# Patient Record
Sex: FEMALE | Race: WHITE | NOT HISPANIC OR LATINO | Employment: OTHER | URBAN - METROPOLITAN AREA
[De-identification: names, ages, dates, MRNs, and addresses within clinical notes are randomized per-mention and may not be internally consistent; named-entity substitution may affect disease eponyms.]

---

## 2017-01-12 ENCOUNTER — HOSPITAL ENCOUNTER (EMERGENCY)
Facility: HOSPITAL | Age: 67
Discharge: HOME | End: 2017-01-12
Admitting: EMERGENCY MEDICINE
Payer: MEDICARE

## 2017-01-12 VITALS
TEMPERATURE: 98.6 F | SYSTOLIC BLOOD PRESSURE: 199 MMHG | DIASTOLIC BLOOD PRESSURE: 88 MMHG | RESPIRATION RATE: 18 BRPM | HEART RATE: 80 BPM | OXYGEN SATURATION: 94 %

## 2017-01-12 DIAGNOSIS — R04.0 ANTERIOR EPISTAXIS: Primary | ICD-10-CM

## 2017-01-12 PROCEDURE — 99283 EMERGENCY DEPT VISIT LOW MDM: CPT

## 2017-01-12 RX ORDER — OXYMETAZOLINE HYDROCHLORIDE 0.05 G/100ML
2 SPRAY NASAL ONCE
Status: COMPLETED | OUTPATIENT
Start: 2017-01-12 | End: 2017-01-12

## 2017-01-12 RX ADMIN — OXYMETAZOLINE HYDROCHLORIDE 2 SPRAY: 5 SPRAY NASAL at 12:37

## 2017-01-12 RX ADMIN — SILVER NITRATE APPLICATORS 1 APPLICATOR: 25; 75 STICK TOPICAL at 12:38

## 2017-01-13 ENCOUNTER — HOSPITAL ENCOUNTER (EMERGENCY)
Facility: HOSPITAL | Age: 67
Discharge: HOME | End: 2017-01-13
Admitting: EMERGENCY MEDICINE
Payer: MEDICARE

## 2017-01-13 VITALS
BODY MASS INDEX: 37.11 KG/M2 | OXYGEN SATURATION: 95 % | TEMPERATURE: 97.7 F | WEIGHT: 223 LBS | SYSTOLIC BLOOD PRESSURE: 186 MMHG | RESPIRATION RATE: 20 BRPM | HEART RATE: 68 BPM | DIASTOLIC BLOOD PRESSURE: 80 MMHG

## 2017-01-13 DIAGNOSIS — R04.0 EPISTAXIS, RECURRENT: Primary | ICD-10-CM

## 2017-01-13 PROCEDURE — 99283 EMERGENCY DEPT VISIT LOW MDM: CPT

## 2017-01-13 RX ORDER — OXYMETAZOLINE HYDROCHLORIDE 0.05 G/100ML
2 SPRAY NASAL ONCE
Status: COMPLETED | OUTPATIENT
Start: 2017-01-13 | End: 2017-01-13

## 2017-01-13 RX ORDER — AMOXICILLIN AND CLAVULANATE POTASSIUM 875; 125 MG/1; MG/1
1 TABLET, FILM COATED ORAL ONCE
Status: COMPLETED | OUTPATIENT
Start: 2017-01-13 | End: 2017-01-13

## 2017-01-13 RX ORDER — OXYCODONE HYDROCHLORIDE AND ACETAMINOPHEN 5; 325 MG/1; MG/1
1 TABLET ORAL ONCE
Status: COMPLETED | OUTPATIENT
Start: 2017-01-13 | End: 2017-01-13

## 2017-01-13 RX ORDER — AMOXICILLIN AND CLAVULANATE POTASSIUM 875; 125 MG/1; MG/1
1 TABLET, FILM COATED ORAL 2 TIMES DAILY
Qty: 19 TABLET | Refills: 0 | Status: SHIPPED | OUTPATIENT
Start: 2017-01-13 | End: 2017-01-23

## 2017-01-13 RX ADMIN — OXYMETAZOLINE HYDROCHLORIDE 2 SPRAY: 5 SPRAY NASAL at 16:26

## 2017-01-13 RX ADMIN — OXYCODONE HYDROCHLORIDE AND ACETAMINOPHEN 1 TABLET: 5; 325 TABLET ORAL at 17:20

## 2017-01-13 RX ADMIN — AMOXICILLIN AND CLAVULANATE POTASSIUM 1 TABLET: 875; 125 TABLET, FILM COATED ORAL at 17:20

## 2017-01-15 ENCOUNTER — HOSPITAL ENCOUNTER (EMERGENCY)
Facility: HOSPITAL | Age: 67
Discharge: HOME | End: 2017-01-15
Attending: EMERGENCY MEDICINE | Admitting: EMERGENCY MEDICINE
Payer: MEDICARE

## 2017-01-15 VITALS
OXYGEN SATURATION: 94 % | SYSTOLIC BLOOD PRESSURE: 165 MMHG | HEART RATE: 90 BPM | RESPIRATION RATE: 24 BRPM | TEMPERATURE: 98.2 F | DIASTOLIC BLOOD PRESSURE: 72 MMHG

## 2017-01-15 DIAGNOSIS — Z48.00 ENCOUNTER FOR REMOVAL OF NASAL PACKING: Primary | ICD-10-CM

## 2017-01-15 PROCEDURE — 99283 EMERGENCY DEPT VISIT LOW MDM: CPT

## 2017-01-16 ENCOUNTER — TRANSCRIBE ORDERS (OUTPATIENT)
Dept: ADMINISTRATIVE | Facility: HOSPITAL | Age: 67
End: 2017-01-16

## 2017-01-16 DIAGNOSIS — R91.1 LUNG NODULE: Primary | ICD-10-CM

## 2017-01-19 ENCOUNTER — HOSPITAL ENCOUNTER (OUTPATIENT)
Dept: RADIOLOGY | Facility: HOSPITAL | Age: 67
Discharge: HOME | End: 2017-01-19
Attending: OTOLARYNGOLOGY
Payer: MEDICARE

## 2017-01-19 DIAGNOSIS — R91.1 LUNG NODULE: ICD-10-CM

## 2017-01-19 PROCEDURE — 71250 CT THORAX DX C-: CPT

## 2017-01-23 ENCOUNTER — HOSPITAL ENCOUNTER (EMERGENCY)
Facility: HOSPITAL | Age: 67
Discharge: HOME | End: 2017-01-23
Attending: EMERGENCY MEDICINE | Admitting: EMERGENCY MEDICINE
Payer: MEDICARE

## 2017-01-23 VITALS
SYSTOLIC BLOOD PRESSURE: 210 MMHG | DIASTOLIC BLOOD PRESSURE: 90 MMHG | HEART RATE: 92 BPM | RESPIRATION RATE: 16 BRPM | TEMPERATURE: 98.5 F | OXYGEN SATURATION: 98 %

## 2017-01-23 DIAGNOSIS — R04.0 EPISTAXIS: Primary | ICD-10-CM

## 2017-01-23 PROCEDURE — 99283 EMERGENCY DEPT VISIT LOW MDM: CPT

## 2017-02-16 ENCOUNTER — FOLLOW UP (OUTPATIENT)
Dept: URBAN - METROPOLITAN AREA CLINIC 27 | Facility: CLINIC | Age: 67
End: 2017-02-16

## 2017-02-16 DIAGNOSIS — E11.3413: ICD-10-CM

## 2017-02-16 PROCEDURE — 67028 INJECTION EYE DRUG: CPT

## 2017-02-16 PROCEDURE — 2024F 7 FLD RTA PHOTO EVC RTNOPTHY: CPT

## 2017-02-16 PROCEDURE — G8482 FLU IMMUNIZE ORDER/ADMIN: HCPCS

## 2017-02-16 PROCEDURE — 92012 INTRM OPH EXAM EST PATIENT: CPT | Mod: 25

## 2017-02-16 PROCEDURE — G8427 DOCREV CUR MEDS BY ELIG CLIN: HCPCS

## 2017-02-16 PROCEDURE — 4040F PNEUMOC VAC/ADMIN/RCVD: CPT

## 2017-02-16 PROCEDURE — 2021F DILAT MACULAR EXAM DONE: CPT

## 2017-02-16 PROCEDURE — G8397 DIL MACULA/FUNDUS EXAM/W DOC: HCPCS

## 2017-02-16 PROCEDURE — 2022F DILAT RTA XM EVC RTNOPTHY: CPT

## 2017-02-16 PROCEDURE — 92134 CPTRZ OPH DX IMG PST SGM RTA: CPT

## 2017-02-16 PROCEDURE — 2026F EYE IMG VALID EVC RTNOPTHY: CPT

## 2017-02-16 PROCEDURE — 5010F MACUL RESULT PHY/QHP MNG DM: CPT

## 2017-02-16 PROCEDURE — 1036F TOBACCO NON-USER: CPT

## 2017-02-16 ASSESSMENT — VISUAL ACUITY
OS_CC: 20/200+1
OD_CC: 20/30-2

## 2017-02-16 ASSESSMENT — TONOMETRY
OD_IOP_MMHG: 21
OS_IOP_MMHG: 20

## 2017-03-06 ENCOUNTER — APPOINTMENT (OUTPATIENT)
Dept: LAB | Facility: HOSPITAL | Age: 67
End: 2017-03-06
Attending: INTERNAL MEDICINE
Payer: MEDICARE

## 2017-03-06 ENCOUNTER — TRANSCRIBE ORDERS (OUTPATIENT)
Dept: ADMINISTRATIVE | Facility: HOSPITAL | Age: 67
End: 2017-03-06

## 2017-03-06 DIAGNOSIS — D64.9 ANEMIA, UNSPECIFIED: Primary | ICD-10-CM

## 2017-03-06 DIAGNOSIS — D64.9 ANEMIA, UNSPECIFIED: ICD-10-CM

## 2017-03-06 LAB
BASOPHILS # BLD AUTO: 0 THOUSANDS/ΜL (ref 0–0.1)
BASOPHILS NFR BLD AUTO: 1 % (ref 0–1)
EOSINOPHIL # BLD AUTO: 0.2 THOUSAND/ΜL (ref 0–0.61)
EOSINOPHIL NFR BLD AUTO: 2 % (ref 0–6)
ERYTHROCYTE [DISTWIDTH] IN BLOOD BY AUTOMATED COUNT: 16.1 % (ref 11.6–15.1)
FERRITIN SERPL-MCNC: 106 NG/ML (ref 8–388)
HCT VFR BLD AUTO: 39.5 % (ref 37–47)
HGB BLD-MCNC: 13.1 G/DL (ref 12–16)
IRON SATN MFR SERPL: 21 %
IRON SERPL-MCNC: 70 UG/DL (ref 50–170)
LYMPHOCYTES # BLD AUTO: 1.4 THOUSANDS/ΜL (ref 0.6–4.47)
LYMPHOCYTES NFR BLD AUTO: 15 % (ref 14–44)
MCH RBC QN AUTO: 28.2 PG (ref 27–31)
MCHC RBC AUTO-ENTMCNC: 33.1 G/DL (ref 31.4–37.4)
MCV RBC AUTO: 85 FL (ref 82–98)
MONOCYTES # BLD AUTO: 0.8 THOUSAND/ΜL (ref 0.17–1.22)
MONOCYTES NFR BLD AUTO: 9 % (ref 4–12)
NEUTROPHILS # BLD AUTO: 6.4 THOUSANDS/ΜL (ref 1.85–7.62)
NEUTS SEG NFR BLD AUTO: 73 % (ref 43–75)
NRBC BLD AUTO-RTO: 0 /100 WBCS
PLATELET # BLD AUTO: 200 THOUSANDS/UL (ref 130–400)
PMV BLD AUTO: 8.8 FL (ref 8.9–12.7)
RBC # BLD AUTO: 4.64 MILLION/UL (ref 4.2–5.4)
TIBC SERPL-MCNC: 326 UG/DL (ref 250–450)
WBC # BLD AUTO: 8.9 THOUSAND/UL (ref 4.8–10.8)

## 2017-03-06 PROCEDURE — 83540 ASSAY OF IRON: CPT

## 2017-03-06 PROCEDURE — 85025 COMPLETE CBC W/AUTO DIFF WBC: CPT | Performed by: INTERNAL MEDICINE

## 2017-03-06 PROCEDURE — 36415 COLL VENOUS BLD VENIPUNCTURE: CPT | Performed by: INTERNAL MEDICINE

## 2017-03-06 PROCEDURE — 82728 ASSAY OF FERRITIN: CPT

## 2017-03-06 PROCEDURE — 83550 IRON BINDING TEST: CPT

## 2017-03-09 ENCOUNTER — PROCEDURE ONLY (OUTPATIENT)
Dept: URBAN - METROPOLITAN AREA CLINIC 27 | Facility: CLINIC | Age: 67
End: 2017-03-09

## 2017-03-09 DIAGNOSIS — E11.3413: ICD-10-CM

## 2017-03-09 PROCEDURE — 67028 INJECTION EYE DRUG: CPT

## 2017-03-09 PROCEDURE — 03MG LUCENTIS 0.3MG

## 2017-03-09 ASSESSMENT — VISUAL ACUITY: OS_CC: 20/200

## 2017-03-09 ASSESSMENT — TONOMETRY: OS_IOP_MMHG: 19

## 2017-03-16 ENCOUNTER — ALLSCRIPTS OFFICE VISIT (OUTPATIENT)
Dept: OTHER | Facility: OTHER | Age: 67
End: 2017-03-16

## 2017-03-16 DIAGNOSIS — I25.10 ATHEROSCLEROTIC HEART DISEASE OF NATIVE CORONARY ARTERY WITHOUT ANGINA PECTORIS: ICD-10-CM

## 2017-03-16 DIAGNOSIS — E03.9 HYPOTHYROIDISM: ICD-10-CM

## 2017-03-22 DIAGNOSIS — D64.9 ANEMIA: ICD-10-CM

## 2017-03-23 ENCOUNTER — HOSPITAL ENCOUNTER (OUTPATIENT)
Dept: RADIOLOGY | Facility: HOSPITAL | Age: 67
Discharge: HOME | End: 2017-03-23
Attending: INTERNAL MEDICINE
Payer: MEDICARE

## 2017-03-23 ENCOUNTER — HOSPITAL ENCOUNTER (OUTPATIENT)
Dept: NON INVASIVE DIAGNOSTICS | Facility: HOSPITAL | Age: 67
Discharge: HOME | End: 2017-03-23
Attending: INTERNAL MEDICINE
Payer: MEDICARE

## 2017-03-23 DIAGNOSIS — I25.10 ATHEROSCLEROTIC HEART DISEASE OF NATIVE CORONARY ARTERY WITHOUT ANGINA PECTORIS: ICD-10-CM

## 2017-03-23 LAB
MAX DIASTOLIC BP: 83 MMHG
MAX HEART RATE: 80 BPM
MAX PREDICTED HEART RATE: 152 BPM
MAX. SYSTOLIC BP: 183 MMHG
PROTOCOL NAME: NORMAL
TIME IN EXERCISE PHASE: 60 S

## 2017-03-23 PROCEDURE — 93017 CV STRESS TEST TRACING ONLY: CPT

## 2017-03-23 PROCEDURE — 78452 HT MUSCLE IMAGE SPECT MULT: CPT

## 2017-03-23 PROCEDURE — A9502 TC99M TETROFOSMIN: HCPCS

## 2017-03-23 RX ADMIN — REGADENOSON 0.4 MG: 0.08 INJECTION, SOLUTION INTRAVENOUS at 10:14

## 2017-03-27 ENCOUNTER — TRANSCRIBE ORDERS (OUTPATIENT)
Dept: ADMINISTRATIVE | Facility: HOSPITAL | Age: 67
End: 2017-03-27

## 2017-03-27 ENCOUNTER — APPOINTMENT (OUTPATIENT)
Dept: LAB | Facility: HOSPITAL | Age: 67
End: 2017-03-27
Attending: INTERNAL MEDICINE
Payer: MEDICARE

## 2017-03-27 ENCOUNTER — GENERIC CONVERSION - ENCOUNTER (OUTPATIENT)
Dept: OTHER | Facility: OTHER | Age: 67
End: 2017-03-27

## 2017-03-27 DIAGNOSIS — I25.10 ATHEROSCLEROTIC HEART DISEASE OF NATIVE CORONARY ARTERY WITHOUT ANGINA PECTORIS: ICD-10-CM

## 2017-03-27 DIAGNOSIS — E03.9 HYPOTHYROIDISM: ICD-10-CM

## 2017-03-27 LAB
ALBUMIN SERPL BCP-MCNC: 3.2 G/DL (ref 3.5–5)
ALP SERPL-CCNC: 126 U/L (ref 46–116)
ALT SERPL W P-5'-P-CCNC: 19 U/L (ref 12–78)
ANION GAP SERPL CALCULATED.3IONS-SCNC: 6 MMOL/L (ref 4–13)
AST SERPL W P-5'-P-CCNC: 17 U/L (ref 5–45)
BILIRUB SERPL-MCNC: 0.4 MG/DL (ref 0.2–1)
BUN SERPL-MCNC: 55 MG/DL (ref 5–25)
CALCIUM SERPL-MCNC: 9.5 MG/DL (ref 8.3–10.1)
CHLORIDE SERPL-SCNC: 101 MMOL/L (ref 100–108)
CHOLEST SERPL-MCNC: 130 MG/DL (ref 50–200)
CO2 SERPL-SCNC: 31 MMOL/L (ref 21–32)
CREAT SERPL-MCNC: 1.35 MG/DL (ref 0.6–1.3)
GFR SERPL CREATININE-BSD FRML MDRD: 39 ML/MIN/1.73SQ M
GLUCOSE P FAST SERPL-MCNC: 126 MG/DL (ref 65–99)
HDLC SERPL-MCNC: 51 MG/DL (ref 40–60)
LDLC SERPL CALC-MCNC: 59 MG/DL (ref 0–100)
POTASSIUM SERPL-SCNC: 4.3 MMOL/L (ref 3.5–5.3)
PROT SERPL-MCNC: 8.2 G/DL (ref 6.4–8.2)
SODIUM SERPL-SCNC: 138 MMOL/L (ref 136–145)
TRIGL SERPL-MCNC: 100 MG/DL
TSH SERPL DL<=0.05 MIU/L-ACNC: 4.26 UIU/ML (ref 0.36–3.74)

## 2017-03-27 PROCEDURE — 36415 COLL VENOUS BLD VENIPUNCTURE: CPT

## 2017-03-27 PROCEDURE — 80053 COMPREHEN METABOLIC PANEL: CPT

## 2017-03-27 PROCEDURE — 80061 LIPID PANEL: CPT

## 2017-03-27 PROCEDURE — 84443 ASSAY THYROID STIM HORMONE: CPT

## 2017-03-28 ENCOUNTER — GENERIC CONVERSION - ENCOUNTER (OUTPATIENT)
Dept: OTHER | Facility: OTHER | Age: 67
End: 2017-03-28

## 2017-04-27 ENCOUNTER — FOLLOW UP (OUTPATIENT)
Dept: URBAN - METROPOLITAN AREA CLINIC 27 | Facility: CLINIC | Age: 67
End: 2017-04-27

## 2017-04-27 DIAGNOSIS — E11.3413: ICD-10-CM

## 2017-04-27 PROCEDURE — 2022F DILAT RTA XM EVC RTNOPTHY: CPT

## 2017-04-27 PROCEDURE — G8427 DOCREV CUR MEDS BY ELIG CLIN: HCPCS

## 2017-04-27 PROCEDURE — 2021F DILAT MACULAR EXAM DONE: CPT

## 2017-04-27 PROCEDURE — 1036F TOBACCO NON-USER: CPT

## 2017-04-27 PROCEDURE — 92134 CPTRZ OPH DX IMG PST SGM RTA: CPT

## 2017-04-27 PROCEDURE — 4040F PNEUMOC VAC/ADMIN/RCVD: CPT

## 2017-04-27 PROCEDURE — 67028 INJECTION EYE DRUG: CPT

## 2017-04-27 PROCEDURE — G8482 FLU IMMUNIZE ORDER/ADMIN: HCPCS

## 2017-04-27 PROCEDURE — 5010F MACUL RESULT PHY/QHP MNG DM: CPT

## 2017-04-27 PROCEDURE — 03MG LUCENTIS 0.3MG

## 2017-04-27 PROCEDURE — 92014 COMPRE OPH EXAM EST PT 1/>: CPT | Mod: 25

## 2017-04-27 PROCEDURE — 2024F 7 FLD RTA PHOTO EVC RTNOPTHY: CPT

## 2017-04-27 PROCEDURE — G8397 DIL MACULA/FUNDUS EXAM/W DOC: HCPCS

## 2017-04-27 PROCEDURE — 2026F EYE IMG VALID EVC RTNOPTHY: CPT

## 2017-04-27 ASSESSMENT — VISUAL ACUITY
OS_CC: 20/200
OD_CC: 20/30-

## 2017-04-27 ASSESSMENT — TONOMETRY
OD_IOP_MMHG: 21
OS_IOP_MMHG: 20

## 2017-06-15 ENCOUNTER — FOLLOW UP (OUTPATIENT)
Dept: URBAN - METROPOLITAN AREA CLINIC 27 | Facility: CLINIC | Age: 67
End: 2017-06-15

## 2017-06-15 DIAGNOSIS — E11.3413: ICD-10-CM

## 2017-06-15 PROCEDURE — G8397 DIL MACULA/FUNDUS EXAM/W DOC: HCPCS

## 2017-06-15 PROCEDURE — 1036F TOBACCO NON-USER: CPT

## 2017-06-15 PROCEDURE — 5010F MACUL RESULT PHY/QHP MNG DM: CPT

## 2017-06-15 PROCEDURE — 2021F DILAT MACULAR EXAM DONE: CPT

## 2017-06-15 PROCEDURE — 03MG LUCENTIS 0.3MG

## 2017-06-15 PROCEDURE — 92012 INTRM OPH EXAM EST PATIENT: CPT | Mod: 25

## 2017-06-15 PROCEDURE — 2022F DILAT RTA XM EVC RTNOPTHY: CPT

## 2017-06-15 PROCEDURE — 67028 INJECTION EYE DRUG: CPT

## 2017-06-15 PROCEDURE — 2024F 7 FLD RTA PHOTO EVC RTNOPTHY: CPT

## 2017-06-15 PROCEDURE — 4040F PNEUMOC VAC/ADMIN/RCVD: CPT

## 2017-06-15 PROCEDURE — 2026F EYE IMG VALID EVC RTNOPTHY: CPT

## 2017-06-15 PROCEDURE — G8482 FLU IMMUNIZE ORDER/ADMIN: HCPCS

## 2017-06-15 PROCEDURE — G8427 DOCREV CUR MEDS BY ELIG CLIN: HCPCS

## 2017-06-15 PROCEDURE — 92134 CPTRZ OPH DX IMG PST SGM RTA: CPT

## 2017-06-15 ASSESSMENT — VISUAL ACUITY
OD_CC: 20/40-2
OS_CC: 20/200

## 2017-06-15 ASSESSMENT — TONOMETRY
OS_IOP_MMHG: 20
OD_IOP_MMHG: 20

## 2017-06-16 ENCOUNTER — TRANSCRIBE ORDERS (OUTPATIENT)
Dept: ADMINISTRATIVE | Facility: HOSPITAL | Age: 67
End: 2017-06-16

## 2017-06-16 ENCOUNTER — APPOINTMENT (OUTPATIENT)
Dept: LAB | Facility: HOSPITAL | Age: 67
End: 2017-06-16
Attending: INTERNAL MEDICINE
Payer: MEDICARE

## 2017-06-16 DIAGNOSIS — D64.9 ANEMIA: ICD-10-CM

## 2017-06-16 DIAGNOSIS — D64.9 ANEMIA, UNSPECIFIED: ICD-10-CM

## 2017-06-16 DIAGNOSIS — D64.9 ANEMIA, UNSPECIFIED: Primary | ICD-10-CM

## 2017-06-16 LAB
BASOPHILS # BLD AUTO: 0 THOUSANDS/ΜL (ref 0–0.1)
BASOPHILS NFR BLD AUTO: 1 % (ref 0–1)
EOSINOPHIL # BLD AUTO: 0.3 THOUSAND/ΜL (ref 0–0.61)
EOSINOPHIL NFR BLD AUTO: 4 % (ref 0–6)
ERYTHROCYTE [DISTWIDTH] IN BLOOD BY AUTOMATED COUNT: 14.8 % (ref 11.6–15.1)
FERRITIN SERPL-MCNC: 79 NG/ML (ref 8–388)
HCT VFR BLD AUTO: 37.4 % (ref 37–47)
HGB BLD-MCNC: 12.5 G/DL (ref 12–16)
IRON SATN MFR SERPL: 21 %
IRON SERPL-MCNC: 64 UG/DL (ref 50–170)
LYMPHOCYTES # BLD AUTO: 1.3 THOUSANDS/ΜL (ref 0.6–4.47)
LYMPHOCYTES NFR BLD AUTO: 16 % (ref 14–44)
MCH RBC QN AUTO: 29.4 PG (ref 27–31)
MCHC RBC AUTO-ENTMCNC: 33.3 G/DL (ref 31.4–37.4)
MCV RBC AUTO: 88 FL (ref 82–98)
MONOCYTES # BLD AUTO: 0.8 THOUSAND/ΜL (ref 0.17–1.22)
MONOCYTES NFR BLD AUTO: 10 % (ref 4–12)
NEUTROPHILS # BLD AUTO: 5.7 THOUSANDS/ΜL (ref 1.85–7.62)
NEUTS SEG NFR BLD AUTO: 70 % (ref 43–75)
NRBC BLD AUTO-RTO: 0 /100 WBCS
PLATELET # BLD AUTO: 178 THOUSANDS/UL (ref 130–400)
PMV BLD AUTO: 8.5 FL (ref 8.9–12.7)
RBC # BLD AUTO: 4.24 MILLION/UL (ref 4.2–5.4)
TIBC SERPL-MCNC: 304 UG/DL (ref 250–450)
WBC # BLD AUTO: 8.2 THOUSAND/UL (ref 4.8–10.8)

## 2017-06-16 PROCEDURE — 83540 ASSAY OF IRON: CPT

## 2017-06-16 PROCEDURE — 82728 ASSAY OF FERRITIN: CPT

## 2017-06-16 PROCEDURE — 83550 IRON BINDING TEST: CPT

## 2017-06-16 PROCEDURE — 85025 COMPLETE CBC W/AUTO DIFF WBC: CPT

## 2017-06-22 DIAGNOSIS — D64.9 ANEMIA: ICD-10-CM

## 2017-06-26 ENCOUNTER — ALLSCRIPTS OFFICE VISIT (OUTPATIENT)
Dept: OTHER | Facility: OTHER | Age: 67
End: 2017-06-26

## 2017-07-01 ENCOUNTER — TRANSCRIBE ORDERS (OUTPATIENT)
Dept: ADMINISTRATIVE | Facility: HOSPITAL | Age: 67
End: 2017-07-01

## 2017-07-01 ENCOUNTER — APPOINTMENT (OUTPATIENT)
Dept: LAB | Facility: HOSPITAL | Age: 67
End: 2017-07-01
Payer: MEDICARE

## 2017-07-01 DIAGNOSIS — E78.5 OTHER AND UNSPECIFIED HYPERLIPIDEMIA: ICD-10-CM

## 2017-07-01 DIAGNOSIS — I10 ESSENTIAL HYPERTENSION, MALIGNANT: ICD-10-CM

## 2017-07-01 DIAGNOSIS — E11.65 UNCONTROLLED TYPE 2 DIABETES MELLITUS WITH COMPLICATION, UNSPECIFIED LONG TERM INSULIN USE STATUS: ICD-10-CM

## 2017-07-01 DIAGNOSIS — E11.3313 TYPE 2 DIABETES MELLITUS WITH MODERATE NONPROLIFERATIVE RETINOPATHY OF BOTH EYES AND MACULAR EDEMA, UNSPECIFIED LONG TERM INSULIN USE STATUS: ICD-10-CM

## 2017-07-01 DIAGNOSIS — R80.9 PROTEINURIA, UNSPECIFIED TYPE: ICD-10-CM

## 2017-07-01 DIAGNOSIS — E13.42 DIABETIC POLYNEUROPATHY ASSOCIATED WITH OTHER SPECIFIED DIABETES MELLITUS (HCC): ICD-10-CM

## 2017-07-01 DIAGNOSIS — I73.9 PERIPHERAL VASCULAR DISEASE, UNSPECIFIED (HCC): ICD-10-CM

## 2017-07-01 DIAGNOSIS — E11.65 UNCONTROLLED TYPE 2 DIABETES MELLITUS WITH COMPLICATION, UNSPECIFIED LONG TERM INSULIN USE STATUS: Primary | ICD-10-CM

## 2017-07-01 DIAGNOSIS — I25.119 ATHEROSCLEROSIS OF NATIVE CORONARY ARTERY WITH ANGINA PECTORIS, UNSPECIFIED WHETHER NATIVE OR TRANSPLANTED HEART (HCC): ICD-10-CM

## 2017-07-01 DIAGNOSIS — E11.8 UNCONTROLLED TYPE 2 DIABETES MELLITUS WITH COMPLICATION, UNSPECIFIED LONG TERM INSULIN USE STATUS: ICD-10-CM

## 2017-07-01 DIAGNOSIS — E11.8 UNCONTROLLED TYPE 2 DIABETES MELLITUS WITH COMPLICATION, UNSPECIFIED LONG TERM INSULIN USE STATUS: Primary | ICD-10-CM

## 2017-07-01 LAB
ALBUMIN SERPL BCP-MCNC: 3.3 G/DL (ref 3.5–5)
ALP SERPL-CCNC: 131 U/L (ref 46–116)
ALT SERPL W P-5'-P-CCNC: 18 U/L (ref 12–78)
ANION GAP SERPL CALCULATED.3IONS-SCNC: 7 MMOL/L (ref 4–13)
AST SERPL W P-5'-P-CCNC: 15 U/L (ref 5–45)
BACTERIA UR QL AUTO: ABNORMAL /HPF
BILIRUB SERPL-MCNC: 0.4 MG/DL (ref 0.2–1)
BILIRUB UR QL STRIP: NEGATIVE
BUN SERPL-MCNC: 63 MG/DL (ref 5–25)
CALCIUM SERPL-MCNC: 9.1 MG/DL (ref 8.3–10.1)
CHLORIDE SERPL-SCNC: 102 MMOL/L (ref 100–108)
CHOLEST SERPL-MCNC: 140 MG/DL (ref 50–200)
CLARITY UR: CLEAR
CO2 SERPL-SCNC: 29 MMOL/L (ref 21–32)
COLOR UR: YELLOW
CREAT SERPL-MCNC: 1.28 MG/DL (ref 0.6–1.3)
CREAT UR-MCNC: 29.9 MG/DL
EST. AVERAGE GLUCOSE BLD GHB EST-MCNC: 163 MG/DL
GFR SERPL CREATININE-BSD FRML MDRD: 41.5 ML/MIN/1.73SQ M
GLUCOSE P FAST SERPL-MCNC: 112 MG/DL (ref 65–99)
GLUCOSE UR STRIP-MCNC: NEGATIVE MG/DL
HBA1C MFR BLD: 7.3 % (ref 4.2–6.3)
HDLC SERPL-MCNC: 54 MG/DL (ref 40–60)
HGB UR QL STRIP.AUTO: ABNORMAL
KETONES UR STRIP-MCNC: NEGATIVE MG/DL
LDLC SERPL CALC-MCNC: 71 MG/DL (ref 0–100)
LEUKOCYTE ESTERASE UR QL STRIP: ABNORMAL
MICROALBUMIN UR-MCNC: 24.1 MG/L (ref 0–20)
MICROALBUMIN/CREAT 24H UR: 81 MG/G CREATININE (ref 0–30)
NITRITE UR QL STRIP: NEGATIVE
NON-SQ EPI CELLS URNS QL MICRO: ABNORMAL /HPF
PH UR STRIP.AUTO: 5 [PH] (ref 5–9)
POTASSIUM SERPL-SCNC: 4.2 MMOL/L (ref 3.5–5.3)
PROT SERPL-MCNC: 8.4 G/DL (ref 6.4–8.2)
PROT UR STRIP-MCNC: NEGATIVE MG/DL
RBC #/AREA URNS AUTO: ABNORMAL /HPF
SODIUM SERPL-SCNC: 138 MMOL/L (ref 136–145)
SP GR UR STRIP.AUTO: 1.01 (ref 1–1.03)
T4 FREE SERPL-MCNC: 1.12 NG/DL (ref 0.76–1.46)
TRIGL SERPL-MCNC: 75 MG/DL
TSH SERPL DL<=0.05 MIU/L-ACNC: 4.59 UIU/ML (ref 0.36–3.74)
UROBILINOGEN UR QL STRIP.AUTO: 0.2 E.U./DL
WBC #/AREA URNS AUTO: ABNORMAL /HPF

## 2017-07-01 PROCEDURE — 83036 HEMOGLOBIN GLYCOSYLATED A1C: CPT | Performed by: INTERNAL MEDICINE

## 2017-07-01 PROCEDURE — 82570 ASSAY OF URINE CREATININE: CPT | Performed by: INTERNAL MEDICINE

## 2017-07-01 PROCEDURE — 36415 COLL VENOUS BLD VENIPUNCTURE: CPT | Performed by: INTERNAL MEDICINE

## 2017-07-01 PROCEDURE — 80053 COMPREHEN METABOLIC PANEL: CPT

## 2017-07-01 PROCEDURE — 82043 UR ALBUMIN QUANTITATIVE: CPT | Performed by: INTERNAL MEDICINE

## 2017-07-01 PROCEDURE — 81001 URINALYSIS AUTO W/SCOPE: CPT | Performed by: INTERNAL MEDICINE

## 2017-07-01 PROCEDURE — 84439 ASSAY OF FREE THYROXINE: CPT

## 2017-07-01 PROCEDURE — 84443 ASSAY THYROID STIM HORMONE: CPT

## 2017-07-01 PROCEDURE — 80061 LIPID PANEL: CPT

## 2017-08-31 ENCOUNTER — FOLLOW UP (OUTPATIENT)
Dept: URBAN - METROPOLITAN AREA CLINIC 27 | Facility: CLINIC | Age: 67
End: 2017-08-31

## 2017-08-31 DIAGNOSIS — E11.3413: ICD-10-CM

## 2017-08-31 DIAGNOSIS — H40.053: ICD-10-CM

## 2017-08-31 PROCEDURE — G8482 FLU IMMUNIZE ORDER/ADMIN: HCPCS

## 2017-08-31 PROCEDURE — G8397 DIL MACULA/FUNDUS EXAM/W DOC: HCPCS

## 2017-08-31 PROCEDURE — 2024F 7 FLD RTA PHOTO EVC RTNOPTHY: CPT

## 2017-08-31 PROCEDURE — 5010F MACUL RESULT PHY/QHP MNG DM: CPT

## 2017-08-31 PROCEDURE — 92014 COMPRE OPH EXAM EST PT 1/>: CPT | Mod: 25

## 2017-08-31 PROCEDURE — 67028 INJECTION EYE DRUG: CPT

## 2017-08-31 PROCEDURE — 4040F PNEUMOC VAC/ADMIN/RCVD: CPT

## 2017-08-31 PROCEDURE — 2022F DILAT RTA XM EVC RTNOPTHY: CPT

## 2017-08-31 PROCEDURE — 1036F TOBACCO NON-USER: CPT

## 2017-08-31 PROCEDURE — G8428 CUR MEDS NOT DOCUMENT: HCPCS

## 2017-08-31 PROCEDURE — 92134 CPTRZ OPH DX IMG PST SGM RTA: CPT

## 2017-08-31 PROCEDURE — 2026F EYE IMG VALID EVC RTNOPTHY: CPT

## 2017-08-31 PROCEDURE — 03VIAL LUCENTIS 03MG VIAL

## 2017-08-31 PROCEDURE — 2021F DILAT MACULAR EXAM DONE: CPT

## 2017-08-31 ASSESSMENT — VISUAL ACUITY
OD_CC: 20/30+
OS_CC: 20/200

## 2017-08-31 ASSESSMENT — TONOMETRY
OS_IOP_MMHG: 24
OD_IOP_MMHG: 30

## 2017-09-18 ENCOUNTER — ALLSCRIPTS OFFICE VISIT (OUTPATIENT)
Dept: OTHER | Facility: OTHER | Age: 67
End: 2017-09-18

## 2017-09-18 DIAGNOSIS — N18.30 CHRONIC KIDNEY DISEASE, STAGE III (MODERATE) (HCC): ICD-10-CM

## 2017-10-15 ENCOUNTER — HOSPITAL ENCOUNTER (EMERGENCY)
Facility: HOSPITAL | Age: 67
Discharge: HOME | End: 2017-10-15
Attending: EMERGENCY MEDICINE | Admitting: EMERGENCY MEDICINE
Payer: MEDICARE

## 2017-10-15 VITALS
RESPIRATION RATE: 20 BRPM | BODY MASS INDEX: 38.46 KG/M2 | DIASTOLIC BLOOD PRESSURE: 58 MMHG | SYSTOLIC BLOOD PRESSURE: 127 MMHG | HEART RATE: 63 BPM | WEIGHT: 231.13 LBS | OXYGEN SATURATION: 94 % | TEMPERATURE: 98.1 F

## 2017-10-15 DIAGNOSIS — L03.90 CELLULITIS: Primary | ICD-10-CM

## 2017-10-15 LAB
ALBUMIN SERPL BCP-MCNC: 3.6 G/DL (ref 3.5–5)
ALP SERPL-CCNC: 126 U/L (ref 46–116)
ALT SERPL W P-5'-P-CCNC: 21 U/L (ref 12–78)
ANION GAP SERPL CALCULATED.3IONS-SCNC: 7 MMOL/L (ref 4–13)
AST SERPL W P-5'-P-CCNC: 21 U/L (ref 5–45)
BASOPHILS # BLD AUTO: 0 THOUSANDS/ΜL (ref 0–0.1)
BASOPHILS NFR BLD AUTO: 0 % (ref 0–1)
BILIRUB SERPL-MCNC: 0.6 MG/DL (ref 0.2–1)
BUN SERPL-MCNC: 56 MG/DL (ref 5–25)
CALCIUM SERPL-MCNC: 9.5 MG/DL (ref 8.3–10.1)
CHLORIDE SERPL-SCNC: 103 MMOL/L (ref 100–108)
CO2 SERPL-SCNC: 30 MMOL/L (ref 21–32)
CREAT SERPL-MCNC: 1.38 MG/DL (ref 0.6–1.3)
EOSINOPHIL # BLD AUTO: 0.2 THOUSAND/ΜL (ref 0–0.61)
EOSINOPHIL NFR BLD AUTO: 2 % (ref 0–6)
ERYTHROCYTE [DISTWIDTH] IN BLOOD BY AUTOMATED COUNT: 16 % (ref 11.6–15.1)
GFR SERPL CREATININE-BSD FRML MDRD: 39 ML/MIN/1.73SQ M
GLUCOSE SERPL-MCNC: 138 MG/DL (ref 65–140)
HCT VFR BLD AUTO: 42 % (ref 37–47)
HGB BLD-MCNC: 13.7 G/DL (ref 12–16)
LYMPHOCYTES # BLD AUTO: 1.2 THOUSANDS/ΜL (ref 0.6–4.47)
LYMPHOCYTES NFR BLD AUTO: 14 % (ref 14–44)
MCH RBC QN AUTO: 28.3 PG (ref 27–31)
MCHC RBC AUTO-ENTMCNC: 32.6 G/DL (ref 31.4–37.4)
MCV RBC AUTO: 87 FL (ref 82–98)
MONOCYTES # BLD AUTO: 0.9 THOUSAND/ΜL (ref 0.17–1.22)
MONOCYTES NFR BLD AUTO: 10 % (ref 4–12)
NEUTROPHILS # BLD AUTO: 6.5 THOUSANDS/ΜL (ref 1.85–7.62)
NEUTS SEG NFR BLD AUTO: 74 % (ref 43–75)
NRBC BLD AUTO-RTO: 0 /100 WBCS
PLATELET # BLD AUTO: 203 THOUSANDS/UL (ref 130–400)
PMV BLD AUTO: 9.1 FL (ref 8.9–12.7)
POTASSIUM SERPL-SCNC: 3.9 MMOL/L (ref 3.5–5.3)
PROT SERPL-MCNC: 9.1 G/DL (ref 6.4–8.2)
RBC # BLD AUTO: 4.84 MILLION/UL (ref 4.2–5.4)
SODIUM SERPL-SCNC: 140 MMOL/L (ref 136–145)
WBC # BLD AUTO: 8.8 THOUSAND/UL (ref 4.8–10.8)

## 2017-10-15 PROCEDURE — 80053 COMPREHEN METABOLIC PANEL: CPT | Performed by: EMERGENCY MEDICINE

## 2017-10-15 PROCEDURE — 99283 EMERGENCY DEPT VISIT LOW MDM: CPT

## 2017-10-15 PROCEDURE — 96365 THER/PROPH/DIAG IV INF INIT: CPT

## 2017-10-15 PROCEDURE — 36415 COLL VENOUS BLD VENIPUNCTURE: CPT | Performed by: EMERGENCY MEDICINE

## 2017-10-15 PROCEDURE — 85025 COMPLETE CBC W/AUTO DIFF WBC: CPT | Performed by: EMERGENCY MEDICINE

## 2017-10-15 PROCEDURE — 87040 BLOOD CULTURE FOR BACTERIA: CPT | Performed by: EMERGENCY MEDICINE

## 2017-10-15 RX ORDER — CEPHALEXIN 500 MG/1
500 CAPSULE ORAL EVERY 6 HOURS SCHEDULED
Qty: 28 CAPSULE | Refills: 0 | Status: SHIPPED | OUTPATIENT
Start: 2017-10-15 | End: 2017-10-22

## 2017-10-15 RX ORDER — DEXTROSE MONOHYDRATE 25 G/50ML
12.5 INJECTION, SOLUTION INTRAVENOUS ONCE
Status: DISCONTINUED | OUTPATIENT
Start: 2017-10-15 | End: 2017-10-15

## 2017-10-15 RX ADMIN — CEFTRIAXONE 1000 MG: 1 INJECTION, SOLUTION INTRAVENOUS at 15:01

## 2017-10-15 NOTE — ED PROVIDER NOTES
History  Chief Complaint   Patient presents with    Leg Swelling     Pt reports LLE redness/swelling which she noticed when she woke up this morning  76 yo female with hx of IDDM, HTN, CAD, CHF presents with hx of left lower leg redness and swelling which she noticed this am   Since the area has expanded and became more circular  No fever  HX of same about 2 years ago, admitted to [facility]  Initially treated as OP and it became worse and was admitted for 7 days with follow up with ID, does not recall what antibiotic she was on  Pt is allergic to PCN            Prior to Admission Medications   Prescriptions Last Dose Informant Patient Reported? Taking? Calcium Carbonate-Vitamin D (CALTRATE 600+D) 600-400 MG-UNIT per tablet   Yes No   Sig: Take 1 tablet by mouth 2 (two) times a day  Ferrous Sulfate (SLOW FE PO)   Yes No   Sig: Take 1 tablet by mouth daily  Multiple Vitamins-Minerals (CENTRUM CARDIO) TABS   Yes No   Sig: Take 2 tablets by mouth daily  acetaminophen (TYLENOL) 325 mg tablet   No No   Sig: As indicated   furosemide (LASIX) 80 mg tablet   Yes No   Sig: Take 80 mg by mouth daily  insulin aspart (NovoLOG) 100 units/mL injection   Yes No   Sig: Inject 10 Units under the skin 3 (three) times a day before meals     insulin glargine (LANTUS) 100 units/mL subcutaneous injection   Yes No   Sig: Inject 34 Units under the skin daily at bedtime  levothyroxine (SYNTHROID) 137 mcg tablet   Yes No   Sig: Take 137 mcg by mouth daily  metolazone (ZAROXOLYN) 2 5 mg tablet   Yes No   Sig: Take 2 5 mg by mouth 2 (two) times a week  prn   omega-3-acid ethyl esters (LOVAZA) 1 g capsule   Yes No   Sig: Take 1 g by mouth daily  omeprazole (PriLOSEC) 20 mg delayed release capsule   Yes No   Sig: Take 20 mg by mouth 3 (three) times a week  oxyCODONE (OXY-IR) 5 MG capsule   Yes No   Sig: Take 5 mg by mouth every 4 (four) hours as needed for moderate pain     rivaroxaban (XARELTO) 15 mg tablet   Yes No Sig: Take 15 mg by mouth daily   simvastatin (ZOCOR) 20 mg tablet   Yes No   Sig: Take 20 mg by mouth daily at bedtime  valsartan (DIOVAN) 80 mg tablet   Yes No   Sig: Take 80 mg by mouth daily  Facility-Administered Medications: None       Past Medical History:   Diagnosis Date    Arthritis     Atrial flutter ([place] [address] )     Cardiac disease     Carotid artery occlusion     [facility]     Coronary artery disease     Diabetes mellitus ([place] [address] )     Disease of thyroid gland     GERD (gastroesophageal reflux disease)     History of transfusion     Hyperlipidemia     Hypertension     Other specified diabetes mellitus with diabetic autonomic (poly)neuropathy ([place] [address] )     Renal disorder     Sleep apnea        Past Surgical History:   Procedure Laterality Date    ABDOMINAL SURGERY      CARDIAC PACEMAKER PLACEMENT      CARDIAC SURGERY      cabg x 2    EYE SURGERY      FRACTURE SURGERY      HERNIA REPAIR      [place] LAP,CHOLECYSTECTOMY N/A 8/10/2016    Procedure: CHOLECYSTECTOMY LAPAROSCOPIC;  Surgeon: Jaelyn Gonzalez MD;  [facility];  Service: General     [address] Left 11/4/2016    Procedure: MICRODIRECT LARYNGOSCOPY; LEFT VOCAL FOLD INJECTION ;  Surgeon: Feliciano Burr MD;  [facility];  Service: ENT    KS REPAIR UMBILICAL AQFX,2+H/G,AGFUV N/A 8/10/2016    Procedure: LAPAROSCOPIC REPAIR HERNIA VENTRAL;  Surgeon: Jaelyn Gonzalez MD;  [facility];  Service: General    VASCULAR SURGERY         History reviewed  No pertinent family history  I have reviewed and agree with the history as documented  Social History   Substance Use Topics    Smoking status: Never Smoker    Smokeless tobacco: Never Used    Alcohol use No        Review of Systems   Skin: Positive for rash  All other systems reviewed and are negative        Physical Exam  ED Triage Vitals [10/15/17 1240]   Temperature Pulse Respirations Blood Pressure SpO2   (!) 96 4 °F (35 8 °C) 85 20 150/68 95 %      Temp Source Heart Rate Source Patient Position - Orthostatic VS BP Location FiO2 (%)   Tympanic Monitor Sitting Left arm --      Pain Score       No Pain           Physical Exam   Constitutional: She is oriented to person, place, and time  She appears well-developed and well-nourished  HENT:   Head: Normocephalic and atraumatic  Right Ear: External ear normal    Left Ear: External ear normal    Nose: Nose normal    Mouth/Throat: Oropharynx is clear and moist    Eyes: EOM are normal  Pupils are equal, round, and reactive to light  Right eye exhibits no discharge  Left eye exhibits no discharge  Neck: Normal range of motion  Neck supple  Cardiovascular: Normal rate and regular rhythm  Pulmonary/Chest: Effort normal and breath sounds normal    Abdominal: Soft  She exhibits no distension  There is no tenderness  Large ventral hernia  Reduceable   Musculoskeletal: Normal range of motion  Left distal lower leg with circular area of erythema, dark pink, warm to touch, no lymphangitis  Neurological: She is alert and oriented to person, place, and time  No cranial nerve deficit  Coordination normal    Skin: Skin is warm and dry  She is not diaphoretic  Psychiatric: She has a normal mood and affect  Her behavior is normal    Nursing note and vitals reviewed        ED Medications  Medications   cefTRIAXone (ROCEPHIN) IVPB (premix) 1,000 mg (1,000 mg Intravenous New Bag 10/15/17 1501)       Diagnostic Studies  Labs Reviewed   CBC AND DIFFERENTIAL - Abnormal        Result Value Ref Range Status    RDW 16 0 (*) 11 6 - 15 1 % Final    WBC 8 80  4 80 - 10 80 Thousand/uL Final    RBC 4 84  4 20 - 5 40 Million/uL Final    Hemoglobin 13 7  12 0 - 16 0 g/dL Final    Hematocrit 42 0  37 0 - 47 0 % Final    MCV 87  82 - 98 fL Final    MCH 28 3  27 0 - 31 0 pg Final    MCHC 32 6  31 4 - 37 4 g/dL Final    MPV 9 1  8 9 - 12 7 fL Final    Platelets 023  136 - 400 Thousands/uL Final nRBC 0  /100 WBCs Final    Neutrophils Relative 74  43 - 75 % Final    Lymphocytes Relative 14  14 - 44 % Final    Monocytes Relative 10  4 - 12 % Final    Eosinophils Relative 2  0 - 6 % Final    Basophils Relative 0  0 - 1 % Final    Neutrophils Absolute 6 50  1 85 - 7 62 Thousands/µL Final    Lymphocytes Absolute 1 20  0 60 - 4 47 Thousands/µL Final    Monocytes Absolute 0 90  0 17 - 1 22 Thousand/µL Final    Eosinophils Absolute 0 20  0 00 - 0 61 Thousand/µL Final    Basophils Absolute 0 00  0 00 - 0 10 Thousands/µL Final   COMPREHENSIVE METABOLIC PANEL - Abnormal     BUN 56 (*) 5 - 25 mg/dL Final    Creatinine 1 38 (*) 0 60 - 1 30 mg/dL Final    Comment: Standardized to IDMS reference method    Alkaline Phosphatase 126 (*) 46 - 116 U/L Final    Total Protein 9 1 (*) 6 4 - 8 2 g/dL Final    Sodium 140  136 - 145 mmol/L Final    Potassium 3 9  3 5 - 5 3 mmol/L Final    Chloride 103  100 - 108 mmol/L Final    CO2 30  21 - 32 mmol/L Final    Anion Gap 7  4 - 13 mmol/L Final    Glucose 138  65 - 140 mg/dL Final    Comment:   If the patient is fasting, the ADA then defines impaired fasting glucose as > 100 mg/dL and diabetes as > or equal to 123 mg/dL  Specimen collection should occur prior to Sulfasalazine administration due to the potential for falsely depressed results  Specimen collection should occur prior to Sulfapyridine administration due to the potential for falsely elevated results  Calcium 9 5  8 3 - 10 1 mg/dL Final    AST 21  5 - 45 U/L Final    Comment:   Specimen collection should occur prior to Sulfasalazine administration due to the potential for falsely depressed results  ALT 21  12 - 78 U/L Final    Comment:   Specimen collection should occur prior to Sulfasalazine administration due to the potential for falsely depressed results       Albumin 3 6  3 5 - 5 0 g/dL Final    Total Bilirubin 0 60  0 20 - 1 00 mg/dL Final    eGFR 39  ml/min/1 73sq m Final    Narrative:     National Kidney Disease Education Program recommendations are as follows:  GFR calculation is accurate only with a steady state creatinine  Chronic Kidney disease less than 60 ml/min/1 73 sq  meters  Kidney failure less than 15 ml/min/1 73 sq  meters  BLOOD CULTURE   BLOOD CULTURE       No orders to display       Procedures  Procedures      Phone Contacts  ED Phone Contact    ED Course  ED Course as of Oct 15 1516   Sun Oct 15, 2017   1510 Re-exam:  no further extension of rash  No lymphangitis  PLAN:  iv rocephin here, home on keflex and recheck in 24 hrs  MDM  Number of Diagnoses or Management Options  Cellulitis:   Diagnosis management comments: PLAN:  IV rocephin here  PO keflex  Return in 24 hrs for recheck and repeat antibiotic      CritCare Time    Disposition  Final diagnoses:   Cellulitis     ED Disposition     ED Disposition Condition Comment    Discharge  Saturnino Ferrell discharge to home/self care  Condition at discharge: Good        Follow-up Information     Follow up With Specialties Details Why Contact Info Additional Information    [address] [facility] Emergency Department Emergency Medicine In 1 day  [place] [address]  [phone] [facility] ED, [facility], [facility]        Patient's Medications   Discharge Prescriptions    CEPHALEXIN (KEFLEX) 500 MG CAPSULE    Take 1 capsule by mouth every 6 (six) hours for 7 days       Start Date: 10/15/2017End Date: 10/22/2017       Order Dose: 500 mg       Quantity: 28 capsule    Refills: 0     No discharge procedures on file      ED Provider  Electronically Signed by       Kimberly Vicente MD  10/15/17 3716

## 2017-10-15 NOTE — DISCHARGE INSTRUCTIONS
Cellulitis, Ambulatory Care   GENERAL INFORMATION:   Cellulitis  is a skin infection caused by bacteria  Common symptoms include the following:   · Fever    · A red, warm, swollen area on your skin    · Pain when the area is touched    · Bumps or blisters (abscess) that may drain pus    · Bumpy, raised skin that feels like an orange peel  Seek immediate care for the following symptoms:   · An increase in pain, redness, warmth, and size    · Red streaks coming from the infected area    · A thin, gray-brown discharge coming from your infected skin area    · A crackling under your skin when you touch it    · Purple dots or bumps on your skin, or bleeding under your skin    · New swelling and pain in your legs    · Sudden trouble breathing or chest pain  Treatment for cellulitis  may include medicines to treat the bacterial infection or decrease pain  The infection may need to be cleaned out  Damaged, dead, or infected tissue may need to be cut away to help your wound heal   Manage your symptoms:   · Elevate your wound above the level of your heart  as often as you can  This will help decrease swelling and pain  Prop your wound on pillows or blankets to keep it elevated comfortably  · Clean your wound as directed  You may need to wash the wound with soap and water  Look for signs of infection  · Wear pressure stockings as directed  The stockings are tight and put pressure on your legs  This improves blood flow and decreases swelling  Prevent cellulitis:   · Wash your hands often  Use soap and water  Wash your hands after you use the bathroom, change a child's diapers, or sneeze  Wash your hands before you prepare or eat food  Use lotion to prevent dry, cracked skin  · Do not share personal items, such as towels, clothing, and razors  · Clean exercise equipment  with germ-killing  before and after you use it    Follow up with your healthcare provider as directed:  Write down your questions so you remember to ask them during your visits  CARE AGREEMENT:   You have the right to help plan your care  Learn about your health condition and how it may be treated  Discuss treatment options with your caregivers to decide what care you want to receive  You always have the right to refuse treatment  The above information is an  only  It is not intended as medical advice for individual conditions or treatments  Talk to your doctor, nurse or pharmacist before following any medical regimen to see if it is safe and effective for you  © 2014 [address] is for End User's use only and may not be sold, redistributed or otherwise used for commercial purposes  All illustrations and images included in CareNotes® are the copyrighted property of [employer]  or Tristan [employer]

## 2017-10-16 ENCOUNTER — HOSPITAL ENCOUNTER (EMERGENCY)
Facility: HOSPITAL | Age: 67
Discharge: HOME | End: 2017-10-16
Attending: EMERGENCY MEDICINE
Payer: MEDICARE

## 2017-10-16 VITALS
WEIGHT: 230 LBS | TEMPERATURE: 95.7 F | OXYGEN SATURATION: 96 % | BODY MASS INDEX: 38.32 KG/M2 | RESPIRATION RATE: 16 BRPM | HEIGHT: 65 IN | HEART RATE: 80 BPM | SYSTOLIC BLOOD PRESSURE: 128 MMHG | DIASTOLIC BLOOD PRESSURE: 58 MMHG

## 2017-10-16 DIAGNOSIS — L03.90 CELLULITIS: Primary | ICD-10-CM

## 2017-10-16 PROCEDURE — 99282 EMERGENCY DEPT VISIT SF MDM: CPT

## 2017-10-16 NOTE — ED PROVIDER NOTES
History  Chief Complaint   Patient presents with    Wound Check     recheck cellulitis from yesterday  58-year-old female presents to the ER for recheck of her cellulitis of left lower extremity  Patient was seen here yesterday for left lower extremity edema, swelling  Patient had blood work that did not show any acute abnormalities  Blood cultures were obtained  Patient received a course of IV antibiotic and discharged home on Keflex  Patient states that her rash appears to be looking better  Patient denies any fevers, chills, nausea, vomiting, diarrhea, dysuria  History provided by:  Patient  Wound Check          Prior to Admission Medications   Prescriptions Last Dose Informant Patient Reported? Taking? Calcium Carbonate-Vitamin D (CALTRATE 600+D) 600-400 MG-UNIT per tablet   Yes No   Sig: Take 1 tablet by mouth 2 (two) times a day  Ferrous Sulfate (SLOW FE PO)   Yes No   Sig: Take 1 tablet by mouth daily  Multiple Vitamins-Minerals (CENTRUM CARDIO) TABS   Yes No   Sig: Take 2 tablets by mouth daily  acetaminophen (TYLENOL) 325 mg tablet   No No   Sig: As indicated   cephalexin (KEFLEX) 500 mg capsule   No No   Sig: Take 1 capsule by mouth every 6 (six) hours for 7 days   furosemide (LASIX) 80 mg tablet   Yes No   Sig: Take 80 mg by mouth daily  insulin aspart (NovoLOG) 100 units/mL injection   Yes No   Sig: Inject 10 Units under the skin 3 (three) times a day before meals     insulin glargine (LANTUS) 100 units/mL subcutaneous injection   Yes No   Sig: Inject 34 Units under the skin daily at bedtime  levothyroxine (SYNTHROID) 137 mcg tablet   Yes No   Sig: Take 137 mcg by mouth daily  metolazone (ZAROXOLYN) 2 5 mg tablet   Yes No   Sig: Take 2 5 mg by mouth 2 (two) times a week  prn   omega-3-acid ethyl esters (LOVAZA) 1 g capsule   Yes No   Sig: Take 1 g by mouth daily     omeprazole (PriLOSEC) 20 mg delayed release capsule   Yes No   Sig: Take 20 mg by mouth 3 (three) times a week    oxyCODONE (OXY-IR) 5 MG capsule   Yes No   Sig: Take 5 mg by mouth every 4 (four) hours as needed for moderate pain  rivaroxaban (XARELTO) 15 mg tablet   Yes No   Sig: Take 15 mg by mouth daily   simvastatin (ZOCOR) 20 mg tablet   Yes No   Sig: Take 20 mg by mouth daily at bedtime  valsartan (DIOVAN) 80 mg tablet   Yes No   Sig: Take 80 mg by mouth daily  Facility-Administered Medications: None       Past Medical History:   Diagnosis Date    Arthritis     Atrial flutter ([place] [address] )     Cardiac disease     Carotid artery occlusion     [facility]     Coronary artery disease     Diabetes mellitus ([place] [address] )     Disease of thyroid gland     GERD (gastroesophageal reflux disease)     History of transfusion     Hyperlipidemia     Hypertension     Other specified diabetes mellitus with diabetic autonomic (poly)neuropathy ([place] [address] )     Renal disorder     Sleep apnea        Past Surgical History:   Procedure Laterality Date    ABDOMINAL SURGERY      CARDIAC PACEMAKER PLACEMENT      CARDIAC SURGERY      cabg x 2    EYE SURGERY      FRACTURE SURGERY      HERNIA REPAIR      [place] LAP,CHOLECYSTECTOMY N/A 8/10/2016    Procedure: CHOLECYSTECTOMY LAPAROSCOPIC;  Surgeon: Chacorta Jacobson MD;  [facility];  Service: General     [address] Left 11/4/2016    Procedure: MICRODIRECT LARYNGOSCOPY; LEFT VOCAL FOLD INJECTION ;  Surgeon: Arlen Dubois MD;  [facility];  Service: ENT    NM REPAIR UMBILICAL CORK,8+F/K,ODUCW N/A 8/10/2016    Procedure: LAPAROSCOPIC REPAIR HERNIA VENTRAL;  Surgeon: Chacorta Jacboson MD;  [facility];  Service: General    VASCULAR SURGERY         History reviewed  No pertinent family history  I have reviewed and agree with the history as documented      Social History   Substance Use Topics    Smoking status: Never Smoker    Smokeless tobacco: Never Used    Alcohol use No        Review of Systems Constitutional: Negative for activity change, appetite change, chills and fever  HENT: Negative for congestion and ear pain  Eyes: Negative for pain and discharge  Respiratory: Negative for cough, chest tightness, shortness of breath, wheezing and stridor  Cardiovascular: Negative for chest pain and palpitations  Gastrointestinal: Negative for abdominal distention, abdominal pain, constipation, diarrhea and nausea  Endocrine: Negative for cold intolerance  Genitourinary: Negative for dysuria, frequency and urgency  Musculoskeletal: Negative for arthralgias and back pain  Skin: Positive for rash  Negative for color change  Allergic/Immunologic: Negative for environmental allergies and food allergies  Neurological: Negative for dizziness, weakness, numbness and headaches  Hematological: Negative for adenopathy  Psychiatric/Behavioral: Negative for agitation, behavioral problems and confusion  The patient is not nervous/anxious  All other systems reviewed and are negative  Physical Exam  ED Triage Vitals [10/16/17 0836]   Temperature Pulse Respirations Blood Pressure SpO2   (!) 95 7 °F (35 4 °C) 80 16 128/58 96 %      Temp Source Heart Rate Source Patient Position - Orthostatic VS BP Location FiO2 (%)   Tympanic Monitor Sitting Right arm --      Pain Score       No Pain           Physical Exam   Constitutional: She is oriented to person, place, and time  She appears well-developed and well-nourished  HENT:   Head: Normocephalic and atraumatic  Mouth/Throat: Oropharynx is clear and moist    Eyes: Conjunctivae and EOM are normal    Neck: Normal range of motion  Neck supple  Cardiovascular: Normal rate, regular rhythm, normal heart sounds and intact distal pulses  Pulmonary/Chest: Effort normal and breath sounds normal    Abdominal: Soft  Bowel sounds are normal  She exhibits no distension  There is no tenderness  Musculoskeletal: Normal range of motion     Neurological: She is alert and oriented to person, place, and time  Skin: Skin is warm and dry  Circumferential erythema noted to the left lower extremity without any warm to touch, or tenderness to touch  Psychiatric: She has a normal mood and affect  Her behavior is normal  Judgment and thought content normal    Nursing note and vitals reviewed  ED Medications  Medications - No data to display    Diagnostic Studies  Labs Reviewed - No data to display    No orders to display       Procedures  Procedures      Phone Contacts  ED Phone Contact    ED Course  ED Course                                MDM  Number of Diagnoses or Management Options  Cellulitis:   Risk of Complications, Morbidity, and/or Mortality  General comments: Patient presented for recheck of her cellulitis  She appears to be healing  Patient denies any further spreading of rash, fevers, chills  Patient discharged home with continuation of her antibiotics and follow up to PCP  Patient agrees with discharge plan  Patient well appearing at time of discharge  Patient Progress  Patient progress: improved    CritCare Time    Disposition  Final diagnoses:   Cellulitis     ED Disposition     ED Disposition Condition Comment    Discharge  Kobe Mclaughlin discharge to home/self care  Condition at discharge: Good        Follow-up Information     Follow up With Specialties Details Why Contact Info    Petra Earl MD  In 2 days For wound re-check [address] [facility] [address]  [phone]          Patient's Medications   Discharge Prescriptions    No medications on file     No discharge procedures on file      ED Provider  Electronically Signed by       Monico Chopra DO  10/16/17 9414

## 2017-10-20 LAB
BACTERIA BLD CULT: NORMAL
BACTERIA BLD CULT: NORMAL

## 2017-11-02 ENCOUNTER — APPOINTMENT (OUTPATIENT)
Dept: LAB | Facility: HOSPITAL | Age: 67
End: 2017-11-02
Payer: MEDICARE

## 2017-11-02 ENCOUNTER — TRANSCRIBE ORDERS (OUTPATIENT)
Dept: ADMINISTRATIVE | Facility: HOSPITAL | Age: 67
End: 2017-11-02

## 2017-11-02 ENCOUNTER — FOLLOW UP (OUTPATIENT)
Dept: URBAN - METROPOLITAN AREA CLINIC 27 | Facility: CLINIC | Age: 67
End: 2017-11-02

## 2017-11-02 DIAGNOSIS — IMO0001 UNCONTROLLED DIABETES MELLITUS TYPE 2 WITHOUT COMPLICATIONS, UNSPECIFIED LONG TERM INSULIN USE STATUS: ICD-10-CM

## 2017-11-02 DIAGNOSIS — E13.42 DIABETIC POLYNEUROPATHY ASSOCIATED WITH OTHER SPECIFIED DIABETES MELLITUS (HCC): ICD-10-CM

## 2017-11-02 DIAGNOSIS — R80.9 PROTEINURIA, UNSPECIFIED TYPE: ICD-10-CM

## 2017-11-02 DIAGNOSIS — Z79.4 ENCOUNTER FOR LONG-TERM (CURRENT) USE OF INSULIN (HCC): ICD-10-CM

## 2017-11-02 DIAGNOSIS — I73.9 INTERMITTENT CLAUDICATION (HCC): ICD-10-CM

## 2017-11-02 DIAGNOSIS — I25.10 ATHEROSCLEROSIS OF NATIVE CORONARY ARTERY WITHOUT ANGINA PECTORIS, UNSPECIFIED WHETHER NATIVE OR TRANSPLANTED HEART: ICD-10-CM

## 2017-11-02 DIAGNOSIS — IMO0001 UNCONTROLLED DIABETES MELLITUS TYPE 2 WITHOUT COMPLICATIONS, UNSPECIFIED LONG TERM INSULIN USE STATUS: Primary | ICD-10-CM

## 2017-11-02 DIAGNOSIS — H40.053: ICD-10-CM

## 2017-11-02 DIAGNOSIS — N18.30 CHRONIC KIDNEY DISEASE, STAGE III (MODERATE) (HCC): ICD-10-CM

## 2017-11-02 DIAGNOSIS — E11.3313 TYPE 2 DIABETES MELLITUS WITH MODERATE NONPROLIFERATIVE RETINOPATHY OF BOTH EYES AND MACULAR EDEMA, UNSPECIFIED LONG TERM INSULIN USE STATUS: ICD-10-CM

## 2017-11-02 DIAGNOSIS — E11.3413: ICD-10-CM

## 2017-11-02 LAB
ALBUMIN SERPL BCP-MCNC: 3.4 G/DL (ref 3.5–5)
ALP SERPL-CCNC: 129 U/L (ref 46–116)
ALT SERPL W P-5'-P-CCNC: 27 U/L (ref 12–78)
ANION GAP SERPL CALCULATED.3IONS-SCNC: 9 MMOL/L (ref 4–13)
AST SERPL W P-5'-P-CCNC: 20 U/L (ref 5–45)
BACTERIA UR QL AUTO: ABNORMAL /HPF
BILIRUB SERPL-MCNC: 0.5 MG/DL (ref 0.2–1)
BILIRUB UR QL STRIP: NEGATIVE
BUN SERPL-MCNC: 45 MG/DL (ref 5–25)
CALCIUM SERPL-MCNC: 9 MG/DL (ref 8.3–10.1)
CHLORIDE SERPL-SCNC: 104 MMOL/L (ref 100–108)
CHOLEST SERPL-MCNC: 121 MG/DL (ref 50–200)
CLARITY UR: CLEAR
CO2 SERPL-SCNC: 27 MMOL/L (ref 21–32)
COLOR UR: YELLOW
CREAT SERPL-MCNC: 1.32 MG/DL (ref 0.6–1.3)
CREAT UR-MCNC: 35.1 MG/DL
EST. AVERAGE GLUCOSE BLD GHB EST-MCNC: 160 MG/DL
GFR SERPL CREATININE-BSD FRML MDRD: 41 ML/MIN/1.73SQ M
GLUCOSE P FAST SERPL-MCNC: 112 MG/DL (ref 65–99)
GLUCOSE UR STRIP-MCNC: NEGATIVE MG/DL
HBA1C MFR BLD: 7.2 % (ref 4.2–6.3)
HDLC SERPL-MCNC: 56 MG/DL (ref 40–60)
HGB UR QL STRIP.AUTO: ABNORMAL
KETONES UR STRIP-MCNC: NEGATIVE MG/DL
LDLC SERPL CALC-MCNC: 53 MG/DL (ref 0–100)
LEUKOCYTE ESTERASE UR QL STRIP: ABNORMAL
MICROALBUMIN UR-MCNC: 39.6 MG/L (ref 0–20)
MICROALBUMIN/CREAT 24H UR: 113 MG/G CREATININE (ref 0–30)
NITRITE UR QL STRIP: NEGATIVE
NON-SQ EPI CELLS URNS QL MICRO: ABNORMAL /HPF
PH UR STRIP.AUTO: 6 [PH] (ref 5–9)
POTASSIUM SERPL-SCNC: 4.1 MMOL/L (ref 3.5–5.3)
PROT SERPL-MCNC: 8.3 G/DL (ref 6.4–8.2)
PROT UR STRIP-MCNC: NEGATIVE MG/DL
RBC #/AREA URNS AUTO: ABNORMAL /HPF
SODIUM SERPL-SCNC: 140 MMOL/L (ref 136–145)
SP GR UR STRIP.AUTO: <=1.005 (ref 1–1.03)
TRIGL SERPL-MCNC: 60 MG/DL
TSH SERPL DL<=0.05 MIU/L-ACNC: 6.17 UIU/ML (ref 0.36–3.74)
UROBILINOGEN UR QL STRIP.AUTO: 0.2 E.U./DL
WBC #/AREA URNS AUTO: ABNORMAL /HPF

## 2017-11-02 PROCEDURE — 82043 UR ALBUMIN QUANTITATIVE: CPT | Performed by: INTERNAL MEDICINE

## 2017-11-02 PROCEDURE — 83036 HEMOGLOBIN GLYCOSYLATED A1C: CPT | Performed by: INTERNAL MEDICINE

## 2017-11-02 PROCEDURE — G9744 PT NOT ELI D/T ACT DIG HTN: HCPCS

## 2017-11-02 PROCEDURE — 81001 URINALYSIS AUTO W/SCOPE: CPT | Performed by: INTERNAL MEDICINE

## 2017-11-02 PROCEDURE — 03VIAL LUCENTIS 03MG VIAL

## 2017-11-02 PROCEDURE — 4040F PNEUMOC VAC/ADMIN/RCVD: CPT

## 2017-11-02 PROCEDURE — 2026F EYE IMG VALID EVC RTNOPTHY: CPT

## 2017-11-02 PROCEDURE — 36415 COLL VENOUS BLD VENIPUNCTURE: CPT

## 2017-11-02 PROCEDURE — 67028 INJECTION EYE DRUG: CPT

## 2017-11-02 PROCEDURE — 1036F TOBACCO NON-USER: CPT

## 2017-11-02 PROCEDURE — 80061 LIPID PANEL: CPT

## 2017-11-02 PROCEDURE — 2022F DILAT RTA XM EVC RTNOPTHY: CPT

## 2017-11-02 PROCEDURE — 82570 ASSAY OF URINE CREATININE: CPT | Performed by: INTERNAL MEDICINE

## 2017-11-02 PROCEDURE — 2024F 7 FLD RTA PHOTO EVC RTNOPTHY: CPT

## 2017-11-02 PROCEDURE — 92012 INTRM OPH EXAM EST PATIENT: CPT | Mod: 25

## 2017-11-02 PROCEDURE — G8428 CUR MEDS NOT DOCUMENT: HCPCS

## 2017-11-02 PROCEDURE — G8482 FLU IMMUNIZE ORDER/ADMIN: HCPCS

## 2017-11-02 PROCEDURE — 2021F DILAT MACULAR EXAM DONE: CPT

## 2017-11-02 PROCEDURE — 92134 CPTRZ OPH DX IMG PST SGM RTA: CPT

## 2017-11-02 PROCEDURE — 80053 COMPREHEN METABOLIC PANEL: CPT

## 2017-11-02 PROCEDURE — 5010F MACUL RESULT PHY/QHP MNG DM: CPT

## 2017-11-02 PROCEDURE — 84443 ASSAY THYROID STIM HORMONE: CPT

## 2017-11-02 PROCEDURE — G8397 DIL MACULA/FUNDUS EXAM/W DOC: HCPCS

## 2017-11-02 PROCEDURE — 84439 ASSAY OF FREE THYROXINE: CPT

## 2017-11-02 ASSESSMENT — VISUAL ACUITY
OS_PH: 20/200
OD_CC: 20/30-2
OS_CC: CF 3FT

## 2017-11-02 ASSESSMENT — TONOMETRY
OS_IOP_MMHG: 25
OD_IOP_MMHG: 25

## 2017-11-03 LAB — MISCELLANEOUS LAB TEST RESULT: NORMAL

## 2017-11-16 ENCOUNTER — PROCEDURE ONLY (OUTPATIENT)
Dept: URBAN - METROPOLITAN AREA CLINIC 27 | Facility: CLINIC | Age: 67
End: 2017-11-16

## 2017-11-16 DIAGNOSIS — E11.3413: ICD-10-CM

## 2017-11-16 PROCEDURE — 67028 INJECTION EYE DRUG: CPT

## 2017-11-16 ASSESSMENT — VISUAL ACUITY: OD_CC: 20/30-

## 2017-11-16 ASSESSMENT — TONOMETRY: OD_IOP_MMHG: 20

## 2017-12-04 ENCOUNTER — HOSPITAL ENCOUNTER (EMERGENCY)
Facility: HOSPITAL | Age: 67
Discharge: HOME | End: 2017-12-04
Attending: EMERGENCY MEDICINE | Admitting: EMERGENCY MEDICINE
Payer: MEDICARE

## 2017-12-04 ENCOUNTER — APPOINTMENT (EMERGENCY)
Dept: RADIOLOGY | Facility: HOSPITAL | Age: 67
End: 2017-12-04
Payer: MEDICARE

## 2017-12-04 VITALS
RESPIRATION RATE: 24 BRPM | WEIGHT: 237 LBS | DIASTOLIC BLOOD PRESSURE: 83 MMHG | SYSTOLIC BLOOD PRESSURE: 198 MMHG | OXYGEN SATURATION: 95 % | HEART RATE: 77 BPM | HEIGHT: 65 IN | TEMPERATURE: 96.7 F | BODY MASS INDEX: 39.49 KG/M2

## 2017-12-04 DIAGNOSIS — M77.10 LATERAL EPICONDYLITIS (TENNIS ELBOW): Primary | ICD-10-CM

## 2017-12-04 PROCEDURE — 73080 X-RAY EXAM OF ELBOW: CPT

## 2017-12-04 PROCEDURE — 99283 EMERGENCY DEPT VISIT LOW MDM: CPT

## 2017-12-04 RX ORDER — NAPROXEN 250 MG/1
250 TABLET ORAL 2 TIMES DAILY WITH MEALS
Qty: 20 TABLET | Refills: 0 | Status: ON HOLD | OUTPATIENT
Start: 2017-12-04 | End: 2018-02-16

## 2017-12-05 NOTE — ED PROVIDER NOTES
History  Chief Complaint   Patient presents with    Elbow Pain     patient c/o right elbow pain last night going down to hand and up into shoulder, can't use arm for support     Patient has been very active around the house between cleaning and decorating and her ADLs  Patient started noticing pain at a very specific point of the lateral aspect of the right dominant elbow  The pain is worse with movement about the elbow, particularly supination and flexion  She states that even at rest the pain seems to radiate up and down the arm  Hand and fingers are not affected  She has had no fever  No rashes  No known injury            Prior to Admission Medications   Prescriptions Last Dose Informant Patient Reported? Taking? Calcium Carbonate-Vitamin D (CALTRATE 600+D) 600-400 MG-UNIT per tablet   Yes No   Sig: Take 1 tablet by mouth 2 (two) times a day  Ferrous Sulfate (SLOW FE PO)   Yes No   Sig: Take 1 tablet by mouth daily  Multiple Vitamins-Minerals (CENTRUM CARDIO) TABS   Yes No   Sig: Take 2 tablets by mouth daily  acetaminophen (TYLENOL) 325 mg tablet   No No   Sig: As indicated   furosemide (LASIX) 80 mg tablet   Yes No   Sig: Take 80 mg by mouth daily  insulin aspart (NovoLOG) 100 units/mL injection   Yes No   Sig: Inject 10 Units under the skin 3 (three) times a day before meals     insulin glargine (LANTUS) 100 units/mL subcutaneous injection   Yes No   Sig: Inject 34 Units under the skin daily at bedtime  levothyroxine (SYNTHROID) 137 mcg tablet   Yes No   Sig: Take 137 mcg by mouth daily  metolazone (ZAROXOLYN) 2 5 mg tablet   Yes No   Sig: Take 2 5 mg by mouth 2 (two) times a week  prn   omega-3-acid ethyl esters (LOVAZA) 1 g capsule   Yes No   Sig: Take 1 g by mouth daily  omeprazole (PriLOSEC) 20 mg delayed release capsule   Yes No   Sig: Take 20 mg by mouth 3 (three) times a week     oxyCODONE (OXY-IR) 5 MG capsule   Yes No   Sig: Take 5 mg by mouth every 4 (four) hours as needed for moderate pain  rivaroxaban (XARELTO) 15 mg tablet   Yes No   Sig: Take 15 mg by mouth daily   simvastatin (ZOCOR) 20 mg tablet   Yes No   Sig: Take 20 mg by mouth daily at bedtime  valsartan (DIOVAN) 80 mg tablet   Yes No   Sig: Take 80 mg by mouth daily  Facility-Administered Medications: None       Past Medical History:   Diagnosis Date    Arthritis     Atrial flutter ([place] [address] )     Cardiac disease     Carotid artery occlusion     [facility]estive [facility] failure) [facility])     Coronary artery disease     Diabetes mellitus ([place] [address] )     Disease of thyroid gland     GERD (gastroesophageal reflux disease)     History of transfusion     Hyperlipidemia     Hypertension     Other specified diabetes mellitus with diabetic autonomic (poly)neuropathy ([place] [address] )     Renal disorder     Sleep apnea        Past Surgical History:   Procedure Laterality Date    ABDOMINAL SURGERY      CARDIAC PACEMAKER PLACEMENT      CARDIAC SURGERY      cabg x 2    CHOLECYSTECTOMY      EYE SURGERY      FRACTURE SURGERY      HERNIA REPAIR      IA LAP,CHOLECYSTECTOMY N/A 8/10/2016    Procedure: CHOLECYSTECTOMY LAPAROSCOPIC;  Surgeon: Evin Gibson MD;  [facility];  Service: General     [address] Left 11/4/2016    Procedure: MICRODIRECT LARYNGOSCOPY; LEFT VOCAL FOLD INJECTION ;  Surgeon: Benton Lopez MD;  [facility];  Service: ENT    IA REPAIR UMBILICAL HLTM,6+Y/A,KJKEA N/A 8/10/2016    Procedure: LAPAROSCOPIC REPAIR HERNIA VENTRAL;  Surgeon: Evin Gibson MD;  [facility];  Service: General    VASCULAR SURGERY         History reviewed  No pertinent family history  I have reviewed and agree with the history as documented  Social History   Substance Use Topics    Smoking status: Never Smoker    Smokeless tobacco: Never Used    Alcohol use No        Review of Systems   Constitutional: Negative for fever  Musculoskeletal: Positive for arthralgias and joint swelling  Negative for back pain and neck pain  Skin: Negative for rash  Neurological: Negative for weakness and numbness  All other systems reviewed and are negative  Physical Exam  ED Triage Vitals [12/04/17 1945]   Temperature Pulse Respirations Blood Pressure SpO2   (!) 96 7 °F (35 9 °C) 77 (!) 24 (!) 198/83 95 %      Temp Source Heart Rate Source Patient Position - Orthostatic VS BP Location FiO2 (%)   Tympanic Monitor Lying Left arm --      Pain Score       8           Orthostatic Vital Signs  Vitals:    12/04/17 1945   BP: (!) 198/83   Pulse: 77   Patient Position - Orthostatic VS: Lying       Physical Exam   Constitutional: She is oriented to person, place, and time  She appears well-developed and well-nourished  HENT:   Head: Normocephalic and atraumatic  Mouth/Throat: Oropharynx is clear and moist    Eyes: Conjunctivae are normal    Neck: Normal range of motion  Neck supple  Cardiovascular: Normal rate, regular rhythm, normal heart sounds and intact distal pulses  Pulmonary/Chest: Effort normal and breath sounds normal    Abdominal: Soft  Bowel sounds are normal  There is no tenderness  Musculoskeletal: Normal range of motion  She exhibits tenderness  She exhibits no deformity  There is exquisite point tenderness exactly at the lateral epicondyle of the right elbow  No effusion  Full range of motion   Neurological: She is alert and oriented to person, place, and time  Skin: Skin is warm and dry  No rash noted  Psychiatric: She has a normal mood and affect  Her behavior is normal    Nursing note and vitals reviewed        ED Medications  Medications - No data to display    Diagnostic Studies  Results Reviewed     None                 XR elbow 3+ vw RIGHT    (Results Pending)              Procedures  Procedures       Phone Contacts  ED Phone Contact    ED Course  ED Course                                MDM  Number of Diagnoses or Management Options  Lateral epicondylitis (tennis elbow): Diagnosis management comments: Patient has classic lateral epicondylitis very likely from an overuse injury  Patient was sensitive to ibuprofen because it causes rapid heart rate  She prefers to take aspirin as a nonsteroidal however she is also on Xarelto  I advised her to try an Aleve OTC 1st and if tolerated full a prescription for Naprosyn  I advised rest ice compression elevation and nonsteroidals as tolerated    CritCare Time    Disposition  Final diagnoses:   Lateral epicondylitis (tennis elbow)     Time reflects when diagnosis was documented in both MDM as applicable and the Disposition within this note     Time User Action Codes Description Comment    12/4/2017  8:39 PM Parul HOOD Add [M77 10] Lateral epicondylitis (tennis elbow)       ED Disposition     ED Disposition Condition Comment    Discharge  Juan Luis Lindquist discharge to home/self care  Condition at discharge: Stable        Follow-up Information     Follow up With Specialties Details Why Suresh Laguerre MD  Schedule an appointment as soon as possible for a visit in 1 day  [address]      Laya Vanessa MD Orthopedic Surgery Schedule an appointment as soon as possible for a visit in 1 day  [address] [facility] [address] [facility]  [phone]          Discharge Medication List as of 12/4/2017  8:41 PM      START taking these medications    Details   naproxen (NAPROSYN) 250 mg tablet Take 1 tablet by mouth 2 (two) times a day with meals, Starting Mon 12/4/2017, Print         CONTINUE these medications which have NOT CHANGED    Details   acetaminophen (TYLENOL) 325 mg tablet As indicated, No Print      Calcium Carbonate-Vitamin D (CALTRATE 600+D) 600-400 MG-UNIT per tablet Take 1 tablet by mouth 2 (two) times a day , Until Discontinued, Historical Med      Ferrous Sulfate (SLOW FE PO) Take 1 tablet by mouth daily  , Until Discontinued, Historical Med furosemide (LASIX) 80 mg tablet Take 80 mg by mouth daily  , Until Discontinued, Historical Med      insulin aspart (NovoLOG) 100 units/mL injection Inject 10 Units under the skin 3 (three) times a day before meals  , Until Discontinued, Historical Med      insulin glargine (LANTUS) 100 units/mL subcutaneous injection Inject 34 Units under the skin daily at bedtime  , Until Discontinued, Historical Med      levothyroxine (SYNTHROID) 137 mcg tablet Take 137 mcg by mouth daily  , Until Discontinued, Historical Med      metolazone (ZAROXOLYN) 2 5 mg tablet Take 2 5 mg by mouth 2 (two) times a week  prn, Until Discontinued, Historical Med      Multiple Vitamins-Minerals (CENTRUM CARDIO) TABS Take 2 tablets by mouth daily  , Until Discontinued, Historical Med      omega-3-acid ethyl esters (LOVAZA) 1 g capsule Take 1 g by mouth daily  , Until Discontinued, Historical Med      omeprazole (PriLOSEC) 20 mg delayed release capsule Take 20 mg by mouth 3 (three) times a week , Until Discontinued, Historical Med      oxyCODONE (OXY-IR) 5 MG capsule Take 5 mg by mouth every 4 (four) hours as needed for moderate pain , Until Discontinued, Historical Med      rivaroxaban (XARELTO) 15 mg tablet Take 15 mg by mouth daily, Until Discontinued, Historical Med      simvastatin (ZOCOR) 20 mg tablet Take 20 mg by mouth daily at bedtime  , Until Discontinued, Historical Med      valsartan (DIOVAN) 80 mg tablet Take 80 mg by mouth daily  , Until Discontinued, Historical Med           No discharge procedures on file      ED Provider  Electronically Signed by         Richard Fox MD  12/04/17 1742

## 2017-12-05 NOTE — DISCHARGE INSTRUCTIONS
Tennis Elbow   WHAT YOU NEED TO KNOW:   Tennis elbow is inflammation of the tendons in your elbow  Tendons are strong tissues that connect muscle to bone  DISCHARGE INSTRUCTIONS:   Return to the emergency department if:   · You suddenly have no feeling in your arm, hand, or fingers  · You suddenly cannot move your arm, wrist, hand, or fingers  Contact your healthcare provider if:   · You have a fever  · You have more pain or weakness in your arm, wrist, hand, or fingers  · You have new numbness or tingling in your arm, hand, or fingers  · You have questions or concerns about your condition or care  Medicines:   · Acetaminophen  decreases pain and fever  It is available without a doctor's order  Ask how much to take and how often to take it  Follow directions  Read the labels of all other medicines you are using to see if they also contain acetaminophen, or ask your doctor or pharmacist  Acetaminophen can cause liver damage if not taken correctly  Do not use more than 4 grams (4,000 milligrams) total of acetaminophen in one day  · NSAIDs , such as ibuprofen, help decrease swelling, pain, and fever  This medicine is available with or without a doctor's order  NSAIDs can cause stomach bleeding or kidney problems in certain people  If you take blood thinner medicine, always ask your healthcare provider if NSAIDs are safe for you  Always read the medicine label and follow directions  · Take your medicine as directed  Contact your healthcare provider if you think your medicine is not helping or if you have side effects  Tell him or her if you are allergic to any medicine  Keep a list of the medicines, vitamins, and herbs you take  Include the amounts, and when and why you take them  Bring the list or the pill bottles to follow-up visits  Carry your medicine list with you in case of an emergency    Physical therapy:  A physical therapist teaches you exercises to help improve movement and strength, and to decrease pain  Self-care:   · Wear your support device as directed  Devices, such as an arm strap, brace, or splint, help limit your arm movement  They also decrease pain and help prevent more damage to your tendon  Ask your healthcare provider how to care for your arm while you wear a brace or splint  · Rest your injured arm  and avoid activities that cause pain  This will help your tendons heal     · Apply ice on your elbow  for 15 to 20 minutes every hour or as directed  Use an ice pack, or put crushed ice in a plastic bag  Cover it with a towel before you apply it to your skin  Ice helps prevent tissue damage and decreases swelling and pain  · Elevate your elbow  above the level of your heart as often as you can  This will help decrease swelling and pain  Prop your elbow on pillows or blankets to keep it elevated comfortably  Follow up with your healthcare provider as directed:  Write down your questions so you remember to ask them during your visits  © 2017 [address] [employer] Information is for End User's use only and may not be sold, redistributed or otherwise used for commercial purposes  All illustrations and images included in CareNotes® are the copyrighted property of A D A M , Inc  or [employer]  The above information is an  only  It is not intended as medical advice for individual conditions or treatments  Talk to your doctor, nurse or pharmacist before following any medical regimen to see if it is safe and effective for you

## 2017-12-13 ENCOUNTER — ALLSCRIPTS OFFICE VISIT (OUTPATIENT)
Dept: OTHER | Facility: OTHER | Age: 67
End: 2017-12-13

## 2017-12-15 NOTE — PROGRESS NOTES
Assessment  1  Lateral epicondylitis of right elbow (046 32) (M77 11)    Plan    Murali Landers has signs and symptoms consistent of lateral epicondylitis of the right elbow  We provided her a cock up wrist splint to wear at night while sleeping and a counter force brace to wear during the day  She was fitted for these by our orthotic fitter  Murali Landers was also provided an informative flier for  epicondylitis as well as a home exercise program   The program was described and demonstrated for her  Should she not improve over the next 6-8 weeks she can return to see me  Discussion/Summary  The patient was counseled regarding diagnostic results,-- instructions for management,-- risk factor reductions,-- prognosis,-- patient and family education,-- impressions,-- risks and benefits of treatment options,-- importance of compliance with treatment  Chief Complaint  1  Elbow Pain    History of Present Illness    To Mann is a 77-year-old female visiting today for right elbow pain  She was referred to us by Dr  product of the emergency room for right elbow pain that was quite severe on December 4, 2017  the elbow pain was of gradual onset and she relates this to twisting a cap off of her humidifier  repetitively  The severe and sharp pain was located about the posterior lateral aspect of the right elbow that would radiate into the forearm  Flexing and extending the  elbow would cause her pain to increase and she was better with the use of aspirin  To Mann does have an ibuprofen allergy and is unable to take Advil  She states that her elbow pain is improved significantly since December 4th  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  Review of Systems   Constitutional: No fever, no chills, feels well, no tiredness, no recent weight gain or loss  Eyes: No complaints of eyesight problems, no red eyes  ENT: no loss of hearing, no nosebleeds, no sore throat    Cardiovascular: No complaints of chest pain, no palpitations, no leg claudication or lower extremity edema  Respiratory: no compliants of shortness of breath, no wheezing, no cough  Gastrointestinal: no complaints of abdominal pain, no constipation, no nausea or diarrhea, no vomiting, no bloody stools  Genitourinary: no complaints of dysuria, no incontinence  Musculoskeletal: as noted in HPI  Integumentary: no complaints of skin rash or lesion, no itching or dry skin, no skin wounds  Neurological: no complaints of headache, no confusion, no numbness or tingling, no dizziness  Endocrine: No complaints of muscle weakness, no feelings of weakness, no frequent urination, no excessive thirst   Psychiatric: No suicidal thoughts, no anxiety, no feelings of depression  ROS reviewed  Active Problems  1  Anemia (285 9) (D64 9)   2  Arteriosclerotic coronary artery disease (414 00) (I25 10)   3  Arthritis (716 90) (M19 90)   4  Chronic atrial fibrillation (427 31) (I48 2)   5  Chronic renal impairment, stage 3 (moderate) (585 3) (N18 3)   6  Congestive heart failure (428 0) (I50 9)   7  Diabetes mellitus (250 00) (E11 9)   8  H/O coronary artery bypass surgery (V45 81) (Z95 1)   9  H/O tricuspid valve annuloplasty (V15 1) (Z98 890)   10  Heart attack (410 90) (I21 9)   11  Heart disease (429 9) (I51 9)   12  High blood pressure (401 9) (I10)   13  Hypothyroidism (244 9) (E03 9)   14  Kidney disease (593 9) (N28 9)   15  Mild pulmonary hypertension (416 8) (I27 20)   16  Pacemaker (V45 01) (Z95 0)   17  Postoperative examination (V67 00) (Z09)    Past Medical History   · Arthritis (716 90) (M19 90)   · History of cholelithiasis (V12 79) (Z87 19)   · History of Umbilical hernia without obstruction and without gangrene (553 1) (K42 9)    The active problems and past medical history were reviewed and updated today        Surgical History   · History of CABG   · History of Cataract Surgery   · History of Heart Valve Replacement   · History of Pacemaker Placement   · History of PTA Subclavian Artery Right   · History of Umbilical Hernia Repair    The surgical history was reviewed and updated today  Family History  Mother    · No pertinent family history  Maternal Aunt    · Family history of malignant neoplasm of breast (V16 3) (Z80 3)    The family history was reviewed and updated today  Social History   · Always uses seat belt   · Never a smoker   · No drug use   · Occasional alcohol use   · Retired   · Single   · Social alcohol use (Z78 9)  The social history was reviewed and updated today  The social history was reviewed and is unchanged  Current Meds   1  Aleve TABS Recorded   2  Caltrate 600 Plus-Vit D TABS; TAKE 1 TABLET DAILY AS DIRECTED; Therapy: (Recorded:04Dfj3527) to Recorded   3  Centrum TABS; TAKE 1 TABLET EVERY 12 HOURS; Therapy: (Recorded:09Bmb3105) to Recorded   4  Diovan 80 MG Oral Tablet; TAKE 1 TABLET DAILY; Therapy: (Recorded:59Nar6983) to Recorded   5  Furosemide 80 MG Oral Tablet; TAKE 1 TABLET DAILY AS DIRECTED; Therapy: 95ABG0588 to (Evaluate:50Yus3526)  Requested for: 01NEW4514; Last Rx:20Nov2017 Ordered   6  Iron TABS; TAKE 1 TABLET DAILY AS DIRECTED; Therapy: (Recorded:56Sve6628) to Recorded   7  Lantus 100 UNIT/ML Subcutaneous Solution; INJECT SUBCUTANEOUSLY AS DIRECTED; Therapy: (Recorded:55Cli7375) to Recorded   8  MetOLazone 2 5 MG Oral Tablet; take 2 times a week as needed; Therapy: 90Ksf9803 to Recorded   9  Naproxen 500 MG Oral Tablet Recorded   10  Omega-3 & Omega-6 Fish Oil Oral Capsule; One Daily; Therapy: (Recorded:40Kqu5020) to Recorded   11  OxyCODONE HCl - 5 MG Oral Tablet; take 1 tablet every 8 hours prn severe pain; Therapy: (Recorded:97Gha1084) to Recorded   12  PriLOSEC 20 MG CPDR; TAKE ONE CAPSULE BY MOUTH EVERY DAY; Therapy: (Recorded:21Hjt6598) to Recorded   13  Simvastatin 20 MG Oral Tablet; TAKE 1 TABLET (20MG) BY ORAL ROUTE EVERY DAY  IN THE EVENING;   Therapy: 60Foi6906 to (Cordell Arrieta)  Requested for: 23Aug2017; Last Rx: 49MTH6022 Ordered   14  Synthroid 137 MCG Oral Tablet; TAKE 1 TABLET DAILY AS DIRECTED; Therapy: (Recorded:47Snx5989) to Recorded   15  Xarelto 15 MG Oral Tablet; Take 1 tablet daily; Therapy: 11ELI3943 to (Renew:92Nsr5213)  Requested for: 63TPM7394; Last  Rx:88Cec9563 Ordered    The medication list was reviewed and updated today  Allergies  1  Adhesive Tape TAPE   2  Ibuprofen CAPS   3  Penicillins    Vitals   Recorded: 67Zoz3447 10:39AM   Heart Rate 85   Systolic 706   Diastolic 83   Height 5 ft 5 in   Weight 248 lb 4 00 oz   BMI Calculated 41 31   BSA Calculated 2 17     Physical Exam    Right Elbow: Appearance: Normal except swelling (posterolateral )  Tenderness: None except the lateral epicondyle  ROM: Full except as noted: Flexion: which was painful-- and-- 5/5  Extension: painful 5/5-- and-- painful  Pronation: 5/5  Supination: 5/5  Motor: Normal  Special Tests: Negative except negative valgus stress-- and-- negative varus stress  Constitutional - General appearance: Normal   Musculoskeletal - Gait and station: Normal -- Digits and nails: Normal -- Muscle strength/tone: Normal -- Upper extremity compartments: Normal   Cardiovascular - Pulses: Normal -- Examination of extremities for edema and/or varicosities: Normal   Lymphatic - Palpation of lymph nodes in other areas: Normal  no right epitrochlear node enlargement  Skin - Skin and subcutaneous tissue: Normal   Neurologic - Sensation: Normal -- Upper extremity peripheral neuro exam: Normal   Psychiatric - Orientation to person, place, and time: Normal -- Mood and affect: Normal   Eyes  Conjunctiva and lids: Normal    Pupils and irises: Normal        Results/Data  I personally reviewed the films/images/results in the office today  My interpretation follows  X-ray Review Xrays of the right elbow are normal with no signs of acute fracture or other osseous abnormality        Attending Note  Scribe Attestation:  Caleb Rodas Call am acting as a scribe in the presence of the attending physician while the attending physician examines the patient  Physician Attestation:  Екатерина Combs MD personally performed the services described in this documentation as scribed in my presence, and it is both accurate and complete  Future Appointments    Date/Time Provider Specialty Site   12/21/2017 11:00 AM [facility] [address] [facility] [place]   03/22/2018 02:00 PM [facility] [address] [place] [address]   06/28/2018 02:30 PM [facility] [address] [facility]   01/25/2018 10:40 AM SIDNEY Zapata   Cardiology [address]     Signatures   Electronically signed by : ZAC Rodrigues; Dec 13 2017 11:38AM EST                       (Author)    Electronically signed by : SIDNEY Aden ; Dec 13 2017  4:00PM EST                       (Author)

## 2017-12-21 ENCOUNTER — GENERIC CONVERSION - ENCOUNTER (OUTPATIENT)
Dept: OTHER | Facility: OTHER | Age: 67
End: 2017-12-21

## 2017-12-21 ENCOUNTER — ALLSCRIPTS OFFICE VISIT (OUTPATIENT)
Dept: OTHER | Facility: OTHER | Age: 67
End: 2017-12-21

## 2018-01-09 NOTE — CONSULTS
Assessment    1  Family history of malignant neoplasm of breast (V16 3) (Z80 3) : Maternal Aunt   2  Social alcohol use (Z78 9)    Plan  Anemia    · Drink plenty of fluids ; Status:Complete;   Done: 55IOK6486   Ordered; Janice Powell; Ordered By:Tiffanie Shin;   · Follow-up visit in 2 months Evaluation and Treatment  Follow-up  Status: Hold For -  Scheduling  Requested for: 21BBO8286   Ordered; For: Anemia; Ordered By: Caitlyn Bowels Performed:  Due: 31EPV7363   · (1) CBC/PLT/DIFF; Status:Active; Requested ONW:84KBB5077;    Perform:[facility]; Due:94Puo4428;Ordered; Janice Powell; Ordered By:Tiffanie Shin;   · (1) CELIAC DISEASE AB PROFILE; Status:Active; Requested CCJ:91YSV2408;    Perform:[facility]; Due:01Btn4214;Ordered; Janice Powell; Ordered By:Tiffanie Shin;   · (1) FERRITIN; Status:Active; Requested YTD:11ZSL7186;    Perform:[facility]; Due:25Bhb3262;Ordered; Janice Powell; Ordered By:Marino Shin;   · (1) FREE LIGHT CHAINS, SERUM; Status:Active; Requested RADHA:74OXP2872;    Perform:[facility]; Due:84Opm5659;Ordered; Janice Powell; Ordered By:Marino Shin;   · (1) IRON SATURATION %, TIBC; Status:Active; Requested EAC:54EUX7114;    Perform:[facility]; Due:05Mwx2720;Ordered; Janice Powell; Ordered By:Marino Shin;   · (1) PROTEIN ELECTRO, SERUM; Status:Active; Requested ELX:51DJK7190;    Perform:[facility]; Due:41Uzw9197;Ordered; Janice Powell; Ordered By:Tiffanie Shin;   · (1) VITAMIN B12; Status:Active; Requested MIL:33WBP7273;    Perform:[facility]; Due:09Vbq8739;Ordered; Janice Barajas; Ordered By:Tiffanie Shin;    Discussion/Summary    In summary, this is a 70-year-old female history of anemia as outlined  While we do not have documentation of prior iron deficiency her excellent clinical response to oral iron replacement is certainly suggestive in this regard    I agree that she's had a relatively comprehensive workup including EGD and colonoscopy  Hemoccults were negative and capsule endoscopy could be deferred on this basis  Further, her hemoglobin has increased to current value of 12 0 with normalization of MCV compared to her baseline of approximately 9 g in February this year  Again, based on the above behavior, I would say that it is reasonable for her to proceed with upcoming cholecystectomy and umbilical hernia repair  Evaluation of other potential causes of anemia can be pursued  Thereafter  Differential diagnosis for contributors could include duodenal process such as inflammatory bowel disease, celiac, blood loss through small bowel source, or previous and nasal bleeding as outlined  I reviewed the above considerations with the patient  She voiced understanding and agreement  The treatment plan was reviewed with the patient/guardian  The patient/guardian understands and agrees with the treatment plan   The patient was counseled regarding diagnostic results, instructions for management, patient and family education, impressions  total time of encounter was 40 minutes  Chief Complaint  Evaluation regarding anemia  History of Present Illness  2012- transfusion after cardiac valvular surgery  February 2016- noted to have anemia and decreasing energy  Hgb 11  to 9 in 2 months  Dr Jeremiah Roberts  Colonoscopy feb 2016- "ok " EGD followed ? [facility]  Hemo cults were negative  Started on oral iron with improvement in energy  Review of Systems    Constitutional: recent 25 lbs in past 6 months, intentional  lb weight loss, but not feeling poorly and not feeling tired  Eyes: No complaints of eye pain, no red eyes, no eyesight problems, no discharge, no dry eyes, no itching of eyes  The patient presents with complaints of nosebleeds (mostly in the winter  Better since using humidified  Had been going on for few months Nov-March 2016    Right nare more than left  ENT attributed to dry air  )     Cardiovascular: No complaints of slow heart rate, no fast heart rate, no chest pain, no palpitations, no leg claudication, no lower extremity edema  Respiratory: shortness of breath during exertion  Gastrointestinal: constipation, but no nausea, no vomiting and no blood in stools  Genitourinary: No complaints of dysuria, no incontinence, no pelvic pain, no dysmenorrhea, no vaginal discharge or bleeding  Musculoskeletal: No complaints of arthralgias, no myalgias, no joint swelling or stiffness, no limb pain or swelling  Integumentary: No complaints of skin rash or lesions, no itching, no skin wounds, no breast pain or lump  Neurological: No complaints of headache, no confusion, no convulsions, no numbness, no dizziness or fainting, no tingling, no limb weakness, no difficulty walking  Psychiatric: Not suicidal, no sleep disturbance, no anxiety or depression, no change in personality, no emotional problems  Endocrine: No complaints of proptosis, no hot flashes, no muscle weakness, no deepening of the voice, no feelings of weakness  Hematologic/Lymphatic: No complaints of swollen glands, no swollen glands in the neck, does not bleed easily, does not bruise easily  Active Problems    1  Arthritis (716 90) (M19 90)   2  Cholelithiases (574 20) (K80 20)   3  Congestive heart failure (428 0) (I50 9)   4  Diabetes mellitus (250 00) (E11 9)   5  Heart attack (410 90) (I21 3)   6  Heart disease (429 9) (I51 9)   7  High blood pressure (401 9) (I10)   8  Hypothyroidism (244 9) (E03 9)   9  Kidney disease (593 9) (N28 9)   10  Umbilical hernia without obstruction and without gangrene (553 1) (K42 9)    Past Medical History    1  Arthritis (716 90) (M19 90)    Surgical History    1  History of CABG   2  History of Cataract Surgery   3  History of Heart Valve Replacement   4  History of Pacemaker Placement   5  History of PTA Subclavian Artery Right    The surgical history was reviewed and updated today         Family History  Maternal Aunt    1  Family history of malignant neoplasm of breast (V16 3) (Z80 3)    The family history was reviewed and updated today  Social History    · Always uses seat belt   · Never a smoker   · No drug use   · Occasional alcohol use   · Retired   · Single   · Social alcohol use (Z78 9)  The social history was reviewed and updated today  Current Meds   1  Caltrate 600 Plus-Vit D TABS; TAKE 1 TABLET DAILY AS DIRECTED; Therapy: (Recorded:67Uge9356) to Recorded   2  Centrum TABS; TAKE 1 TABLET EVERY 12 HOURS; Therapy: (Recorded:08Jul2016) to Recorded   3  Diovan 80 MG Oral Tablet; TAKE 1 TABLET DAILY; Therapy: (Recorded:08Jul2016) to Recorded   4  Furosemide 80 MG Oral Tablet; TAKE 1 TABLET DAILY AS DIRECTED; Therapy: (Recorded:08Jul2016) to Recorded   5  Iron TABS; TAKE 1 TABLET DAILY AS DIRECTED; Therapy: (Recorded:08Jul2016) to Recorded   6  Lantus 100 UNIT/ML Subcutaneous Solution; INJECT SUBCUTANEOUSLY AS   DIRECTED; Therapy: (Recorded:08Jul2016) to Recorded   7  Metolazone 5 MG Oral Tablet; 1/2 tab daily; Therapy: (Recorded:08Jul2016) to Recorded   8  Omega-3 & Omega-6 Fish Oil Oral Capsule; One Daily; Therapy: (Recorded:08Jul2016) to Recorded   9  OxyCODONE HCl - 5 MG Oral Tablet; take 1 tablet every 8 hours prn severe pain; Therapy: (Recorded:47Mir2731) to Recorded   10  PriLOSEC 20 MG Oral Capsule Delayed Release; TAKE ONE CAPSULE BY MOUTH    EVERY DAY; Therapy: (Recorded:08Jul2016) to Recorded   11  Simvastatin 20 MG Oral Tablet; TAKE 1 TABLET AT BEDTIME; Therapy: (Recorded:08Jul2016) to Recorded   12  Synthroid 137 MCG Oral Tablet; TAKE 1 TABLET DAILY AS DIRECTED; Therapy: (Recorded:08Jul2016) to Recorded   13  Xarelto 15 MG Oral Tablet; One tab daily with an evening meal;    Therapy: (Recorded:46Knj3114) to Recorded    Allergies    1  Adhesive Tape TAPE   2  Ibuprofen CAPS   3   Penicillins    Vitals  Vital Signs    Recorded: 13KBY0705 23:56AX   Systolic 306   Diastolic 62   Heart Rate 75   Respiration 16   Temperature 96 5 F   Pain Scale 0   O2 Saturation 92   Height 5 ft 5 in   Weight 246 lb 4 oz   BMI Calculated 40 98   BSA Calculated 2 16     Physical Exam    Constitutional   General appearance: No acute distress, well appearing and well nourished  Eyes   Conjunctiva and lids: No swelling, erythema or discharge  Ears, Nose, Mouth, and Throat   External inspection of ears and nose: Normal     Oropharynx: Normal with no erythema, edema, exudate or lesions  Pulmonary   Auscultation of lungs: Clear to auscultation  Cardiovascular   Auscultation of heart: Normal rate and rhythm, normal S1 and S2, without murmurs  Examination of extremities for edema and/or varicosities: Normal     Abdomen   Abdomen: Non-tender, no masses  Liver and spleen: Abnormal   â¢ Large umbilical Hernia  Lymphatic   Palpation of lymph nodes in neck: No lymphadenopathy  Musculoskeletal   Gait and station: Normal     Skin   Skin and subcutaneous tissue: Normal without rashes or lesions  Neurologic   Cranial nerves: Cranial nerves 2-12 intact      Psychiatric   Orientation to person, place, and time: Normal          Future Appointments    Date/Time Provider Specialty Site   08/24/2016 09:30 AM Lisa Campos MD General Surgery [facility]     Signatures   Electronically signed by : SIDNEY Ivey ,DO; Jul 25 2016 12:39PM EST                       (Author)

## 2018-01-11 ENCOUNTER — TRANSCRIBE ORDERS (OUTPATIENT)
Dept: ADMINISTRATIVE | Facility: HOSPITAL | Age: 68
End: 2018-01-11

## 2018-01-11 ENCOUNTER — APPOINTMENT (OUTPATIENT)
Dept: LAB | Facility: HOSPITAL | Age: 68
End: 2018-01-11
Attending: INTERNAL MEDICINE
Payer: MEDICARE

## 2018-01-11 ENCOUNTER — APPOINTMENT (OUTPATIENT)
Dept: LAB | Facility: HOSPITAL | Age: 68
End: 2018-01-11
Payer: MEDICARE

## 2018-01-11 DIAGNOSIS — E11.3313 DIABETIC VISUAL LOSS TOTAL VISION IMPAIRMENT OF BOTH EYES, WITH MACULAR EDEMA, WITH MODERATE NONPROLIFERATIVE RETINOPATHY, ASSOCIATED WITH TYPE 2 DIABETES MELLITUS (HCC): ICD-10-CM

## 2018-01-11 DIAGNOSIS — E11.65 UNCONTROLLED TYPE 2 DIABETES MELLITUS WITH COMPLICATION, UNSPECIFIED LONG TERM INSULIN USE STATUS: ICD-10-CM

## 2018-01-11 DIAGNOSIS — E11.8 UNCONTROLLED TYPE 2 DIABETES MELLITUS WITH COMPLICATION, UNSPECIFIED LONG TERM INSULIN USE STATUS: Primary | ICD-10-CM

## 2018-01-11 DIAGNOSIS — I73.9 INTERMITTENT CLAUDICATION (HCC): ICD-10-CM

## 2018-01-11 DIAGNOSIS — E06.3 CHRONIC LYMPHOCYTIC THYROIDITIS: ICD-10-CM

## 2018-01-11 DIAGNOSIS — R60.9 EDEMA, UNSPECIFIED TYPE: ICD-10-CM

## 2018-01-11 DIAGNOSIS — R80.9 PROTEINURIA, UNSPECIFIED TYPE: ICD-10-CM

## 2018-01-11 DIAGNOSIS — R80.9 TYPE 2 DIABETES MELLITUS WITH MICROALBUMINURIA, UNSPECIFIED LONG TERM INSULIN USE STATUS: Primary | ICD-10-CM

## 2018-01-11 DIAGNOSIS — H54.0X55 DIABETIC VISUAL LOSS TOTAL VISION IMPAIRMENT OF BOTH EYES, WITH MACULAR EDEMA, WITH MODERATE NONPROLIFERATIVE RETINOPATHY, ASSOCIATED WITH TYPE 2 DIABETES MELLITUS (HCC): ICD-10-CM

## 2018-01-11 DIAGNOSIS — E13.42 DIABETIC POLYNEUROPATHY ASSOCIATED WITH OTHER SPECIFIED DIABETES MELLITUS (HCC): ICD-10-CM

## 2018-01-11 DIAGNOSIS — E11.29 TYPE 2 DIABETES MELLITUS WITH MICROALBUMINURIA, UNSPECIFIED LONG TERM INSULIN USE STATUS: ICD-10-CM

## 2018-01-11 DIAGNOSIS — I25.119 ATHEROSCLEROSIS OF NATIVE CORONARY ARTERY OF NATIVE HEART WITH ANGINA PECTORIS (HCC): ICD-10-CM

## 2018-01-11 DIAGNOSIS — N18.30 CHRONIC KIDNEY DISEASE, STAGE III (MODERATE) (HCC): ICD-10-CM

## 2018-01-11 DIAGNOSIS — E11.65 UNCONTROLLED TYPE 2 DIABETES MELLITUS WITH COMPLICATION, UNSPECIFIED LONG TERM INSULIN USE STATUS: Primary | ICD-10-CM

## 2018-01-11 DIAGNOSIS — E11.8 UNCONTROLLED TYPE 2 DIABETES MELLITUS WITH COMPLICATION, UNSPECIFIED LONG TERM INSULIN USE STATUS: ICD-10-CM

## 2018-01-11 DIAGNOSIS — N18.30 CHRONIC KIDNEY DISEASE, STAGE III (MODERATE) (HCC): Primary | ICD-10-CM

## 2018-01-11 DIAGNOSIS — R80.9 TYPE 2 DIABETES MELLITUS WITH MICROALBUMINURIA, UNSPECIFIED LONG TERM INSULIN USE STATUS: ICD-10-CM

## 2018-01-11 DIAGNOSIS — E11.29 TYPE 2 DIABETES MELLITUS WITH MICROALBUMINURIA, UNSPECIFIED LONG TERM INSULIN USE STATUS: Primary | ICD-10-CM

## 2018-01-11 LAB
ANION GAP SERPL CALCULATED.3IONS-SCNC: 8 MMOL/L (ref 4–13)
BUN SERPL-MCNC: 54 MG/DL (ref 5–25)
CALCIUM SERPL-MCNC: 9 MG/DL (ref 8.3–10.1)
CHLORIDE SERPL-SCNC: 104 MMOL/L (ref 100–108)
CO2 SERPL-SCNC: 27 MMOL/L (ref 21–32)
CREAT SERPL-MCNC: 1.24 MG/DL (ref 0.6–1.3)
GFR SERPL CREATININE-BSD FRML MDRD: 45 ML/MIN/1.73SQ M
GLUCOSE P FAST SERPL-MCNC: 127 MG/DL (ref 65–99)
POTASSIUM SERPL-SCNC: 3.9 MMOL/L (ref 3.5–5.3)
SODIUM SERPL-SCNC: 139 MMOL/L (ref 136–145)
T4 FREE SERPL-MCNC: 1.29 NG/DL (ref 0.76–1.46)
TSH SERPL DL<=0.05 MIU/L-ACNC: 2.25 UIU/ML (ref 0.36–3.74)

## 2018-01-11 PROCEDURE — 84439 ASSAY OF FREE THYROXINE: CPT

## 2018-01-11 PROCEDURE — 84443 ASSAY THYROID STIM HORMONE: CPT

## 2018-01-11 PROCEDURE — 36415 COLL VENOUS BLD VENIPUNCTURE: CPT

## 2018-01-11 PROCEDURE — 80048 BASIC METABOLIC PNL TOTAL CA: CPT

## 2018-01-11 NOTE — RESULT NOTES
Message   TSH is slightly high  His patient on any Synthroid     Verified Results  (1) TSH [id number] 08:04AM Zeeshan Lyn Order Number: [id number]     Test Name Result Flag Reference   TSH 4 264 uIU/mL H 0 358-3 740   - Patient Instructions: This bloodwork is non-fasting  Please drink two glasses of water morning of bloodwork  - Patient Instructions: This is a fasting blood test  Water,black tea or black  coffee only after 9:00pm the night before test Drink 2 glasses of water the morning of test - Patient Instructions: This bloodwork is non-fasting  Please drink two glasses of   water morning of bloodwork  Patients undergoing fluorescein dye angiography may retain small amounts of fluorescein in the body for 48-72 hours post procedure  Samples containing fluorescein can produce falsely depressed TSH values  If the patient had this procedure,a specimen should be resubmitted post fluorescein clearance            The recommended reference ranges for TSH during pregnancy are as follows:  First trimester 0 1 to 2 5 uIU/mL  Second trimester  0 2 to 3 0 uIU/mL  Third trimester 0 3 to 3 0 uIU/m

## 2018-01-12 ENCOUNTER — GENERIC CONVERSION - ENCOUNTER (OUTPATIENT)
Dept: OTHER | Facility: OTHER | Age: 68
End: 2018-01-12

## 2018-01-12 VITALS
DIASTOLIC BLOOD PRESSURE: 70 MMHG | HEIGHT: 65 IN | HEART RATE: 68 BPM | WEIGHT: 240 LBS | BODY MASS INDEX: 39.99 KG/M2 | OXYGEN SATURATION: 92 % | SYSTOLIC BLOOD PRESSURE: 130 MMHG

## 2018-01-13 VITALS
HEART RATE: 68 BPM | BODY MASS INDEX: 39.24 KG/M2 | WEIGHT: 235.5 LBS | OXYGEN SATURATION: 91 % | TEMPERATURE: 97.4 F | DIASTOLIC BLOOD PRESSURE: 78 MMHG | HEIGHT: 65 IN | SYSTOLIC BLOOD PRESSURE: 124 MMHG | RESPIRATION RATE: 16 BRPM

## 2018-01-14 VITALS
DIASTOLIC BLOOD PRESSURE: 62 MMHG | HEIGHT: 65 IN | SYSTOLIC BLOOD PRESSURE: 124 MMHG | WEIGHT: 224 LBS | BODY MASS INDEX: 37.32 KG/M2 | HEART RATE: 60 BPM

## 2018-01-15 NOTE — RESULT NOTES
Message   Please call patient with a stress test  It is normal     Verified Results  NM MYOCARDIAL PERFUSION SPECT (RX STRESS AND/OR REST) [id number] 07:57AM Fatmata Brian Order Number: [id number]    - Patient Instructions: To schedule this appointment, please contact Central Scheduling at [phone]  Test Name Result Flag Reference   NM MYOCARDIAL PERFUSION SPECT (RX STRESS AND/OR REST) (Report)     [facility]   [address] [facility] [address] [facility] [address] [facility] [address]   [phone]     Rest/Stress Gated SPECT Myocardial Perfusion Imaging After Regadenoson     Patient: Ramila Mcmillan   MR number: [id number]   Account number: [de-identified]   : 1950   Age: 75 years   Gender: Female   Status: Outpatient   Location: Stress lab   Height: 65 in   Weight: 224 lb   BP: 184/ 83 mmHg     Allergies: IBUPROFEN, PENICILLINS     Diagnosis: I25 10 - Atherosclerotic heart disease of native coronary artery without angina pectoris     Primary Physician: Simone Hilario   RN: ALPA Cordova   Referring Physician: Makayla Baum MD   Group: [facility]   Report Prepared By[de-identified] ALPA Cordova   Interpreting Physician: Allegra Downs MD     INDICATIONS: Evaluation of known coronary artery disease  HISTORY: The patient is a 75year old  female  Chest pain status: no chest pain  Other symptoms: dyspnea  Coronary artery disease risk factors: hypertension, family history of premature coronary artery disease, and diabetes   mellitus  Cardiovascular history: coronary artery disease  Prior cardiovascular procedures: coronary artery bypass grafting, Tricuspid valve annuloplasty- 2012 and pacemaker  Co-morbidity: obesity  Medications: Xarelto, a diuretic, a lipid   lowering agent, an antihypertensive agent, diabetic medications, and thyroid medications  PHYSICAL EXAM: Baseline physical exam screening: no wheezes audible  REST ECG: Paced rhythm       PROCEDURE: The study was performed in the stress lab  A regadenoson infusion pharmacologic stress test was performed  Gated SPECT myocardial perfusion imaging was performed after stress and at rest  Systolic blood pressure was 184 mmHg, at   the start of the study  Diastolic blood pressure was 83 mmHg, at the start of the study  The heart rate was 64 bpm, at the start of the study  IV double checked  Regadenoson protocol:   Time HR bpm SBP mmHg DBP mmHg Symptoms Rhythm/conduct   Baseline 10:06 64 183 83 none paced   1 min 10:15 69 178 76 flushing --   2 min 10:16 71 182 79 same as above --   3 min 10:17 77 176 66 subsiding --   No medications or fluids given  STRESS SUMMARY: Duration of pharmacologic stress was 3 min  Maximal heart rate during stress was 71 bpm  The heart rate response to stress was blunted  There was resting hypertension with an appropriate blood pressure response to stress  The rate-pressure product for the peak heart rate and blood pressure was 13449  There was no chest pain during stress  The stress test was terminated due to protocol completion  Pre oxygen saturation: 96 %  Peak oxygen saturation: 98 %  The stress ECG was equivocal for ischemia  ISOTOPE ADMINISTRATION:   Resting isotope administration Stress isotope administration   Agent Tetrofosmin Tetrofosmin   Dose 10 8 mCi 33 mCi   Date 03/23/2017 03/23/2017     The radiopharmaceutical was injected at the peak effect of pharmacologic stress  MYOCARDIAL PERFUSION IMAGING:   The image quality was good  PERFUSION DEFECTS:   - There was a small, fixed myocardial perfusion defect of the apical wall cannot rule out prior infarct  GATED SPECT:   The calculated left ventricular ejection fraction was 57 %  There was moderately reduced myocardial thickening and motion of the apical wall of the left ventricle  There was paradoxical motion of the interventricular septum  SUMMARY:   - Stress results:  There was resting hypertension with an appropriate blood pressure response to stress  There was no chest pain during stress  - ECG conclusions: The stress ECG was equivocal for ischemia  - Perfusion imaging: There was a small, fixed myocardial perfusion defect of the apical wall cannot rule out prior infarct    - Gated SPECT: The calculated left ventricular ejection fraction was 57 %  There was moderately reduced myocardial thickening and motion of the apical wall of the left ventricle  There was paradoxical motion of the interventricular   septum  IMPRESSIONS: Abnormal study after pharmacologic vasodilation       Prepared and signed by     Staci Dominguez MD   Signed 03/27/2017 21:09:18

## 2018-01-16 NOTE — RESULT NOTES
Message   Her BUN and creatinine has increased  She needs to decrease her Lasix  She should call us or find out how much Lasix she is taking  Her cholesterol is acceptable     Verified Results  (1) COMPREHENSIVE METABOLIC PANEL [id number] 81:92OI aLura Terrell Order Number: [id number]     Test Name Result Flag Reference   SODIUM 138 mmol/L  136-145   POTASSIUM 4 3 mmol/L  3 5-5 3   CHLORIDE 101 mmol/L  100-108   CARBON DIOXIDE 31 mmol/L  21-32   ANION GAP (CALC) 6 mmol/L  4-13   BLOOD UREA NITROGEN 55 mg/dL H 5-25   CREATININE 1 35 mg/dL H 0 60-1 30   Standardized to IDMS reference method   CALCIUM 9 5 mg/dL  8 3-10 1   BILI, TOTAL 0 40 mg/dL  0 20-1 00   ALK PHOSPHATAS 126 U/L H    ALT (SGPT) 19 U/L  12-78   AST(SGOT) 17 U/L  5-45   ALBUMIN 3 2 g/dL L 3 5-5 0   TOTAL PROTEIN 8 2 g/dL  6 4-8 2   eGFR Non-African American 39 0 ml/min/1 73sq m     - Patient Instructions: This is a fasting blood test  Water,black tea or black  coffee only after 9:00pm the night before test Drink 2 glasses of water the morning of test - Patient Instructions: This bloodwork is non-fasting  Please drink two glasses of   water morning of bloodwork  National Kidney Disease Education Program recommendations are as follows:  GFR calculation is accurate only with a steady state creatinine  Chronic Kidney disease less than 60 ml/min/1 73 sq  meters  Kidney failure less than 15 ml/min/1 73 sq  meters  GLUCOSE FASTING 126 mg/dL H 65-99     (1) LIPID PANEL, FASTING [id number] 08:04AM Laura Terrell Order Number: [id number]     Test Name Result Flag Reference   CHOLESTEROL 130 mg/dL     HDL,DIRECT 51 mg/dL  40-60   Specimen collection should occur prior to Metamizole administration due to the potential for falsely depressed results  LDL CHOLESTEROL CALCULATED 59 mg/dL  0-100   - Patient Instructions:  This is a fasting blood test  Water,black tea or black  coffee only after 9:00pm the night before test   Drink 2 glasses of water the morning of test     - Patient Instructions: This is a fasting blood test  Water,black tea or black  coffee only after 9:00pm the night before test Drink 2 glasses of water the morning of test - Patient Instructions: This bloodwork is non-fasting  Please drink two glasses of   water morning of bloodwork  Triglyceride:         Normal              <150 mg/dl       Borderline High    150-199 mg/dl       High               200-499 mg/dl       Very High          >499 mg/dl  Cholesterol:         Desirable        <200 mg/dl      Borderline High  200-239 mg/dl      High             >239 mg/dl  HDL Cholesterol:        High    >59 mg/dL      Low     <41 mg/dL  LDL CALCULATED:    This screening LDL is a calculated result  It does not have the accuracy of the Direct Measured LDL in the monitoring of patients with hyperlipidemia and/or statin therapy  Direct Measure LDL (III767) must be ordered separately in these patients  TRIGLYCERIDES 100 mg/dL  <=150   Specimen collection should occur prior to N-Acetylcysteine or Metamizole administration due to the potential for falsely depressed results

## 2018-01-17 NOTE — PROGRESS NOTES
Assessment    1  Anemia (285 9) (D64 9)    Plan  Anemia    · Drink plenty of fluids ; Status:Complete;   Done: [id number]   Ordered; Gladys Taylor; Ordered By:Dorene Shin;   · (1) CBC/PLT/DIFF; Status:Active; Requested for:22Dec2016;    Perform:[facility]; JOSE ARMANDO:[id number]; Gladys Taylor; Ordered By:Dorene Shin;   · (1) CBC/PLT/DIFF; Status: In Progress - Order Generated; Requested for:Recurring  Schedule: 12/22/2016; 3/22/2017; 6/22/2017 ;    Perform:[facility]; [id number]:[id number]; Gladys Taylor; Ordered By:Dorene Shin;   · (1) FERRITIN; Status:Active; Requested for:22Dec2016;    Perform:[facility]; ZOA:[id number]; Gladys Taylor; Ordered By:Doreen Shin;   · (1) FERRITIN; Status: In Progress - Order Generated; Requested for:Recurring Schedule:  12/22/2016; 3/22/2017; 6/22/2017 ;    Perform:[facility]; LLJ:[id number]; Gladys Taylor; Ordered By:Marino Shin;   · (1) IRON SATURATION %, TIBC; Status:Active; Requested for:22Dec2016;    Perform:[facility]; BME:[id number]; Gladys Taylor; Ordered By:Marino Shin;   · (1) IRON SATURATION %, TIBC; Status: In Progress - Order Generated; Requested  for:Recurring Schedule: 12/22/2016; 3/22/2017; 6/22/2017 ;    Perform:[facility]; [id number]:[id number]; Gladys Taylor; Ordered By:Dorene Shin;   · Follow-up visit in 6 months Evaluation and Treatment  Follow-up  Status: Complete   Done: [id number] 10:15AM   Ordered; For: Anemia; Ordered By: Elisa Flores Performed:  Due: [id number]; Last Updated By: Cb Barker; 12/22/2016 10:25:25 AM    Discussion/Summary  Discussion Summary:   In summary, this is a 70-year-old female history of anemia as outlined  She had parenteral iron replacement in the past   Her current hemoglobin and iron studies are normal  Continued observation remains appropriate  It seems that she is able to maintain adequate iron stores on dietary iron replacement    I reviewed the above with the patient  She voiced understanding and agreement  Chief Complaint  Chief Complaint Free Text Note Form: Evaluation regarding anemia  History of Present Illness  HPI: 2012- transfusion after cardiac valvular surgery  February 2016- noted to have anemia and decreasing energy  Hgb 11  to 9 in 2 months  Dr Ibeth Seo  Colonoscopy feb 2016- "ok " EGD followed ? Rodriguez's  Hemo cults were negative  Started on oral iron with improvement in energy  August 10, 2016- had cholecystectomy  Required transfusion RBC's  Developed left sided vocal cord paralysis  Interval History: No pain  No complaints  Continues to lose wt about 1 lb per week  Due to dietary modification  Review of Systems  Complete-Female:   Constitutional: recent 25 lbs in past 6 months, intentional  lb weight loss, but No fever, no chills, feels well, no tiredness, no recent weight gain or weight loss, not feeling poorly and not feeling tired  Eyes: No complaints of eye pain, no red eyes, no eyesight problems, no discharge, no dry eyes, no itching of eyes  The patient presents with complaints of nosebleeds (mostly in the winter  Better since using humidified  Had been going on for few months Nov-March 2016    Right nare more than left  ENT attributed to dry air  )  Cardiovascular: No complaints of slow heart rate, no fast heart rate, no chest pain, no palpitations, no leg claudication, no lower extremity edema  Respiratory: shortness of breath during exertion  Gastrointestinal: constipation, but no nausea, no vomiting and no blood in stools  Genitourinary: No complaints of dysuria, no incontinence, no pelvic pain, no dysmenorrhea, no vaginal discharge or bleeding  Musculoskeletal: No complaints of arthralgias, no myalgias, no joint swelling or stiffness, no limb pain or swelling  Integumentary: No complaints of skin rash or lesions, no itching, no skin wounds, no breast pain or lump     Neurological: No complaints of headache, no confusion, no convulsions, no numbness, no dizziness or fainting, no tingling, no limb weakness, no difficulty walking  Psychiatric: Not suicidal, no sleep disturbance, no anxiety or depression, no change in personality, no emotional problems  Endocrine: No complaints of proptosis, no hot flashes, no muscle weakness, no deepening of the voice, no feelings of weakness  Hematologic/Lymphatic: No complaints of swollen glands, no swollen glands in the neck, does not bleed easily, does not bruise easily  Active Problems    1  Anemia (285 9) (D64 9)   2  Arthritis (716 90) (M19 90)   3  Congestive heart failure (428 0) (I50 9)   4  Diabetes mellitus (250 00) (E11 9)   5  Heart attack (410 90) (I21 3)   6  Heart disease (429 9) (I51 9)   7  High blood pressure (401 9) (I10)   8  Hypothyroidism (244 9) (E03 9)   9  Kidney disease (593 9) (N28 9)   10  Postoperative examination (V67 00) (Z09)    Past Medical History    1  Arthritis (716 90) (M19 90)   2  History of cholelithiasis (V12 79) (Z87 19)   3  History of Umbilical hernia without obstruction and without gangrene (553 1) (K42 9)    Surgical History    1  History of CABG   2  History of Cataract Surgery   3  History of Heart Valve Replacement   4  History of Pacemaker Placement   5  History of PTA Subclavian Artery Right   6  History of Umbilical Hernia Repair  Surgical History Reviewed: The surgical history was reviewed and updated today  Family History  Mother    1  No pertinent family history  Maternal Aunt    2  Family history of malignant neoplasm of breast (V16 3) (Z80 3)  Family History Reviewed: The family history was reviewed and updated today  Social History    · Always uses seat belt   · Never a smoker   · No drug use   · Occasional alcohol use   · Retired   · Single   · Social alcohol use (Z78 9)  Social History Reviewed: The social history was reviewed and updated today  Current Meds   1   Caltrate 600 Plus-Vit D TABS; TAKE 1 TABLET DAILY AS DIRECTED; Therapy: (Recorded:[id number]) to Recorded   2  Centrum TABS; TAKE 1 TABLET EVERY 12 HOURS; Therapy: (Recorded:08Jul2016) to Recorded   3  Diovan 80 MG Oral Tablet; TAKE 1 TABLET DAILY; Therapy: (Recorded:08Jul2016) to Recorded   4  Eliquis 5 MG Oral Tablet; 1 TABELT PO TWICE A DAY; Therapy: (Nemesio Montoya) to Recorded   5  Furosemide 80 MG Oral Tablet; TAKE 1 TABLET DAILY AS DIRECTED; Therapy: (Recorded:08Jul2016) to Recorded   6  Iron TABS; TAKE 1 TABLET DAILY AS DIRECTED; Therapy: (Recorded:08Jul2016) to Recorded   7  Lantus 100 UNIT/ML Subcutaneous Solution; INJECT SUBCUTANEOUSLY AS   DIRECTED; Therapy: (Recorded:08Jul2016) to Recorded   8  MetOLazone 5 MG Oral Tablet; 1/2 tab daily; Therapy: (Recorded:08Jul2016) to Recorded   9  Omega-3 & Omega-6 Fish Oil Oral Capsule; One Daily; Therapy: (Recorded:08Jul2016) to Recorded   10  OxyCODONE HCl - 5 MG Oral Tablet; take 1 tablet every 8 hours prn severe pain; Therapy: (Recorded:08Jul2016) to Recorded   11  PriLOSEC 20 MG Oral Capsule Delayed Release; TAKE ONE CAPSULE BY MOUTH    EVERY DAY; Therapy: (Recorded:08Jul2016) to Recorded   12  Simvastatin 20 MG Oral Tablet; TAKE 1 TABLET AT BEDTIME; Therapy: (Recorded:08Jul2016) to Recorded   13  Synthroid 137 MCG Oral Tablet; TAKE 1 TABLET DAILY AS DIRECTED; Therapy: (Recorded:08Jul2016) to Recorded    Allergies    1  Adhesive Tape TAPE   2  Ibuprofen CAPS   3  Penicillins    Vitals  Vital Signs    Recorded: [id number] 09:54AM   Temperature 96 2 F   Heart Rate 84   Respiration 16   Systolic 346   Diastolic 66   Height 5 ft 5 in   Weight 229 lb 8 oz   BMI Calculated 38 19   BSA Calculated 2 1   O2 Saturation 94   Pain Scale 0     Physical Exam    Constitutional   General appearance: No acute distress, well appearing and well nourished  Eyes   Conjunctiva and lids: No swelling, erythema or discharge      Ears, Nose, Mouth, and Throat External inspection of ears and nose: Normal     Oropharynx: Normal with no erythema, edema, exudate or lesions  Pulmonary   Auscultation of lungs: Clear to auscultation  Cardiovascular   Auscultation of heart: Normal rate and rhythm, normal S1 and S2, without murmurs  Examination of extremities for edema and/or varicosities: Normal     Abdomen   Abdomen: Non-tender, no masses  Liver and spleen: Abnormal   â¢ Large umbilical Hernia  Lymphatic   Palpation of lymph nodes in neck: No lymphadenopathy  Musculoskeletal   Gait and station: Normal     Skin   Skin and subcutaneous tissue: Normal without rashes or lesions  Neurologic   Cranial nerves: Cranial nerves 2-12 intact  Psychiatric   Orientation to person, place, and time: Normal          Results/Data  (1) CBC/ PLT (NO DIFF) [id number] 09:40AM Randolpharely Jaron     Test Name Result Flag Reference   HEMATOCRIT 37 3 %  37 0-47 0   HEMOGLOBIN 12 3 g/dL  12 0-16 0   MCHC 33 0 g/dL  31 4-37 4   MCH 27 7 pg  27 0-31 0   MCV 84 fL  82-98   PLATELET COUNT 458 Thousands/uL  130-400   RBC COUNT 4 44 Million/uL  4 20-5  40   RDW 15 8 % H 11 6-15 1   WBC COUNT 7 80 Thousand/uL  4 80-10 80   MPV 9 0 fL  8 9-12 7       Future Appointments    Date/Time Provider Specialty Site   01/10/2017 09:15 AM Sridhar Whelan MD General Surgery [facility]     Signatures   Electronically signed by : SIDNEY Lehman D ,DO; Dec 22 2016 10:32AM EST                       (Author)

## 2018-01-18 ENCOUNTER — FOLLOW UP (OUTPATIENT)
Dept: URBAN - METROPOLITAN AREA CLINIC 27 | Facility: CLINIC | Age: 68
End: 2018-01-18

## 2018-01-18 DIAGNOSIS — H40.053: ICD-10-CM

## 2018-01-18 DIAGNOSIS — E11.3413: ICD-10-CM

## 2018-01-18 PROCEDURE — 92012 INTRM OPH EXAM EST PATIENT: CPT | Mod: 25

## 2018-01-18 PROCEDURE — 03VIAL LUCENTIS 03MG VIAL

## 2018-01-18 PROCEDURE — 67028 INJECTION EYE DRUG: CPT

## 2018-01-18 PROCEDURE — 92134 CPTRZ OPH DX IMG PST SGM RTA: CPT

## 2018-01-18 ASSESSMENT — TONOMETRY
OS_IOP_MMHG: 21
OD_IOP_MMHG: 23

## 2018-01-18 ASSESSMENT — VISUAL ACUITY
OS_CC: 20/160
OD_CC: 20/30-

## 2018-01-23 VITALS
BODY MASS INDEX: 41.36 KG/M2 | SYSTOLIC BLOOD PRESSURE: 135 MMHG | HEIGHT: 65 IN | WEIGHT: 248.25 LBS | DIASTOLIC BLOOD PRESSURE: 83 MMHG | HEART RATE: 85 BPM

## 2018-01-24 RX ORDER — IRON,CARBONYL/ASCORBIC ACID 100-250 MG
TABLET ORAL
Status: ON HOLD | COMMUNITY
End: 2018-02-16

## 2018-01-24 RX ORDER — NAPROXEN SODIUM 220 MG
TABLET ORAL
Status: ON HOLD | COMMUNITY
End: 2018-02-16

## 2018-01-24 RX ORDER — GUARN/MA-HUANG/P.GIN/S.GINSENG
TABLET ORAL
COMMUNITY
End: 2018-02-21 | Stop reason: HOSPADM

## 2018-01-25 ENCOUNTER — OFFICE VISIT (OUTPATIENT)
Dept: CARDIOLOGY CLINIC | Facility: CLINIC | Age: 68
End: 2018-01-25
Payer: MEDICARE

## 2018-01-25 VITALS
HEART RATE: 65 BPM | HEIGHT: 65 IN | OXYGEN SATURATION: 91 % | SYSTOLIC BLOOD PRESSURE: 108 MMHG | BODY MASS INDEX: 42.24 KG/M2 | DIASTOLIC BLOOD PRESSURE: 68 MMHG | WEIGHT: 253.5 LBS

## 2018-01-25 DIAGNOSIS — I48.91 ATRIAL FIBRILLATION, UNSPECIFIED TYPE (HCC): Primary | ICD-10-CM

## 2018-01-25 DIAGNOSIS — R06.02 SOB (SHORTNESS OF BREATH): ICD-10-CM

## 2018-01-25 DIAGNOSIS — E11.9 TYPE 2 DIABETES MELLITUS (HCC): ICD-10-CM

## 2018-01-25 DIAGNOSIS — E66.9 OBESITY: ICD-10-CM

## 2018-01-25 DIAGNOSIS — I25.10 CAD (CORONARY ARTERY DISEASE): ICD-10-CM

## 2018-01-25 DIAGNOSIS — I50.40 COMBINED SYSTOLIC AND DIASTOLIC CONGESTIVE HEART FAILURE, UNSPECIFIED CONGESTIVE HEART FAILURE CHRONICITY: ICD-10-CM

## 2018-01-25 DIAGNOSIS — I10 ESSENTIAL HYPERTENSION: ICD-10-CM

## 2018-01-25 DIAGNOSIS — E03.9 HYPOTHYROIDISM: ICD-10-CM

## 2018-01-25 DIAGNOSIS — Z98.890 HISTORY OF TRICUSPID VALVE ANNULOPLASTY: ICD-10-CM

## 2018-01-25 DIAGNOSIS — Z95.0 HISTORY OF PERMANENT CARDIAC PACEMAKER PLACEMENT: ICD-10-CM

## 2018-01-25 PROCEDURE — 93000 ELECTROCARDIOGRAM COMPLETE: CPT | Performed by: INTERNAL MEDICINE

## 2018-01-25 PROCEDURE — 99214 OFFICE O/P EST MOD 30 MIN: CPT | Performed by: INTERNAL MEDICINE

## 2018-01-25 RX ORDER — INSULIN GLARGINE 100 [IU]/ML
INJECTION, SOLUTION SUBCUTANEOUS
Refills: 1 | COMMUNITY
Start: 2017-12-24 | End: 2018-05-18

## 2018-01-25 RX ORDER — INSULIN ASPART 100 [IU]/ML
INJECTION, SOLUTION INTRAVENOUS; SUBCUTANEOUS
Refills: 1 | Status: ON HOLD | COMMUNITY
Start: 2017-12-29 | End: 2018-02-16

## 2018-01-25 RX ORDER — PEN NEEDLE, DIABETIC 31 GX5/16"
NEEDLE, DISPOSABLE MISCELLANEOUS
Refills: 3 | COMMUNITY
Start: 2017-12-05 | End: 2019-05-23

## 2018-01-25 RX ORDER — BLOOD SUGAR DIAGNOSTIC
STRIP MISCELLANEOUS
Refills: 3 | COMMUNITY
Start: 2018-01-02 | End: 2019-05-23

## 2018-01-25 NOTE — PATIENT INSTRUCTIONS
DASH Eating Plan   AMBULATORY CARE:   The DASH (Dietary Approaches to Stop Hypertension) Eating Plan  is designed to help prevent or lower high blood pressure  It can also help to lower LDL (bad) cholesterol and decrease your risk for heart disease  The plan is low in sodium, sugar, unhealthy fats, and total fat  It is high in potassium, calcium, magnesium, and fiber  These nutrients are added when you eat more fruits, vegetables, and whole grains  Your sodium limit each day: Your dietitian will tell you how much sodium is safe for you to have each day  People with high blood pressure should have no more than 1,500 to 2,300 mg of sodium in a day  A teaspoon (tsp) of salt has 2,300 mg of sodium  This may seem like a difficult goal, but small changes to the foods you eat can make a big difference  Your healthcare provider or dietitian can help you create a meal plan that follows your sodium limit  How to limit sodium:   · Read food labels  Food labels can help you choose foods that are low in sodium  The amount of sodium is listed in milligrams (mg)  The % Daily Value (DV) column tells you how much of your daily needs are met by 1 serving of the food for each nutrient listed  Choose foods that have less than 5% of the DV of sodium  These foods are considered low in sodium  Foods that have 20% or more of the DV of sodium are considered high in sodium  Avoid foods that have more than 300 mg of sodium in each serving  Choose foods that say low-sodium, reduced-sodium, or no salt added on the food label  · Avoid salt  Do not salt food at the table, and add very little salt to foods during cooking  Use herbs and spices, such as onions, garlic, and salt-free seasonings to add flavor to foods  Try lemon or lime juice or vinegar to give foods a tart flavor  Use hot peppers or a small amount of hot pepper sauce to add a spicy flavor to foods  · Ask about salt substitutes    Ask your healthcare provider if you may use salt substitutes  Some salt substitutes have ingredients that can be harmful if you have certain health conditions  · Choose foods carefully at restaurants  Meals from restaurants, especially fast food restaurants, are often high in sodium  Some restaurants have nutrition information that tells you the amount of sodium in their foods  Ask to have your food prepared with less, or no salt  What you need to know about fats:   · Include healthy fats  Examples are unsaturated fats and omega-3 fatty acids  Unsaturated fats are found in soybean, canola, olive, or sunflower oil, and liquid and soft tub margarines  Omega-3 fatty acids are found in fatty fish, such as salmon, tuna, mackerel, and sardines  It is also found in flaxseed oil and ground flaxseed  · Avoid unhealthy fats  Do not eat unhealthy fats, such as saturated fats and trans fats  Saturated fats are found in foods that contain fat from animals  Examples are fatty meats, whole milk, butter, cream, and other dairy foods  It is also found in shortening, stick margarine, palm oil, and coconut oil  Trans fats are found in fried foods, crackers, chips, and baked goods made with margarine or shortening  Foods to include: With the DASH eating plan, you need to eat a certain number of servings from each food group  This will help you get enough of certain nutrients and limit others  The amount of servings you should eat depends on how many calories you need  Your dietitian can tell you how many calories you need  The number of servings listed next to the food groups below are for people who need about 2,000 calories each day    · Grains:  6 to 8 servings (3 of these servings should be whole-grain foods)    ¨ 1 slice of whole-grain bread     ¨ 1 ounce of dry cereal    ¨ ½ cup of cooked cereal, pasta, or brown rice    · Vegetables and fruits:  4 to 5 servings of fruits and 4 to 5 servings of vegetables    ¨ 1 medium fruit    ¨ ½ cup of frozen, canned (no added salt), or chopped fresh vegetables     ¨ ½ cup of fresh, frozen, dried, or canned fruit (canned in light syrup or fruit juice)    ¨ ½ cup of vegetable or fruit juice    · Dairy:  2 to 3 servings    ¨ 1 cup of nonfat (skim) or 1% milk    ¨ 1½ ounces of fat-free or low-fat cheese    ¨ 6 ounces of nonfat or low-fat yogurt    · Lean meat, poultry, and fish:  6 ounces or less    [employer] (chicken, turkey) with no skin    ¨ Fish (especially fatty fish, such as salmon, fresh tuna, or mackerel)    ¨ Lean beef and pork (loin, round, extra lean hamburger)    ¨ Egg whites and egg substitutes    · Nuts, seeds, and legumes:  4 to 5 servings each week    ¨ ½ cup of cooked beans and peas    ¨ 1½ ounces of unsalted nuts    ¨ 2 tablespoons of peanut butter or seeds    · Sweets and added sugars:  5 or less each week    ¨ 1 tablespoon of sugar, jelly, or jam    ¨ ½ cup of sorbet or gelatin    ¨ 1 cup of lemonade    · Fats:  2 to 3 servings each week    ¨ 1 teaspoon of soft margarine or vegetable oil    ¨ 1 tablespoon of mayonnaise    ¨ 2 tablespoons of salad dressing  Foods to avoid:   · Grains:      [employer], such as doughnuts, pastries, cookies, and biscuits (high in fat and sugar)    ¨ Mixes for cornbread and biscuits, packaged foods, such as bread stuffing, rice and pasta mixes, macaroni and cheese, and instant cereals (high in sodium)    · Fruits and vegetables:      ¨ Regular, canned vegetables (high in sodium)    ¨ Sauerkraut, pickled vegetables, and other foods prepared in brine (high in sodium)    ¨ Fried vegetables or vegetables in butter or high-fat sauces    ¨ Fruit in cream or butter sauce (high in fat)    · Dairy:      ¨ Whole milk, 2% milk, and cream (high in fat)    ¨ Regular cheese and processed cheese (high in fat and sodium)    · Meats and protein foods:      ¨ Smoked or cured meat, such as corned beef, [place]ough[place], ham, hot dogs, and sausage (high in fat and sodium)    ¨ Canned beans and canned meats or spreads, such as potted meats, sardines, anchovies, and imitation seafood (high in sodium)    ¨ Deli or lunch meats, such as bologna, ham, turkey, and roast beef (high in sodium)    ¨ High-fat meat (T-bone steak, regular hamburger, and ribs)    ¨ Whole eggs and egg yolks (high in fat)    · Other:      ¨ Seasonings made with salt, such as garlic salt, celery salt, onion salt, seasoned salt, meat tenderizers, and monosodium glutamate (MSG)    ¨ Miso soup and canned or dried soup mixes (high in sodium)    ¨ Regular soy sauce, barbecue sauce, teriyaki sauce, steak sauce, Worcestershire sauce, and most flavored vinegars (high in sodium)    ¨ Regular condiments, such as mustard, ketchup, and salad dressings (high in sodium)    ¨ Gravy and sauces, such as Bernabe or cheese sauces (high in sodium and fat)    ¨ Drinks high in sugar, such as soda or fruit drinks    [employer] foods, such as salted chips, popcorn, pretzels, pork rinds, salted crackers, and salted nuts    ¨ Frozen foods, such as dinners, entrees, vegetables with sauces, and breaded meats (high in sodium)  Other guidelines to follow:   · Maintain a healthy weight  Your risk for heart disease is higher if you are overweight  Your healthcare provider may suggest that you lose weight if you are overweight  You can lose weight by eating fewer calories and foods that have added sugars and fat  The DASH meal plan can help you do this  Decrease calories by eating smaller portions at each meal and fewer snacks  Ask your healthcare provider for more information about how to lose weight  · Exercise regularly  Regular exercise can help you reach or maintain a healthy weight  Regular exercise can also help decrease your blood pressure and improve your cholesterol levels  Get 30 minutes or more of moderate exercise each day of the week  To lose weight, get at least 60 minutes of exercise  Talk to your healthcare provider about the best exercise program for you      · Limit alcohol  Women should limit alcohol to 1 drink a day  Men should limit alcohol to 2 drinks a day  A drink of alcohol is 12 ounces of beer, 5 ounces of wine, or 1½ ounces of liquor  © 2017 [facility] Information is for End User's use only and may not be sold, redistributed or otherwise used for commercial purposes  All illustrations and images included in CareNotes® are the copyrighted property of A D A M [employer] Inc  or Tristan Deal  The above information is an  only  It is not intended as medical advice for individual conditions or treatments  Talk to your doctor, nurse or pharmacist before following any medical regimen to see if it is safe and effective for you

## 2018-02-16 ENCOUNTER — HOSPITAL ENCOUNTER (INPATIENT)
Facility: HOSPITAL | Age: 68
LOS: 5 days | Discharge: HOME | DRG: 286 | End: 2018-02-21
Attending: EMERGENCY MEDICINE | Admitting: FAMILY MEDICINE
Payer: MEDICARE

## 2018-02-16 ENCOUNTER — APPOINTMENT (EMERGENCY)
Dept: RADIOLOGY | Facility: HOSPITAL | Age: 68
DRG: 286 | End: 2018-02-16
Payer: MEDICARE

## 2018-02-16 ENCOUNTER — APPOINTMENT (OUTPATIENT)
Dept: NON INVASIVE DIAGNOSTICS | Facility: HOSPITAL | Age: 68
DRG: 286 | End: 2018-02-16
Payer: MEDICARE

## 2018-02-16 DIAGNOSIS — I50.40 COMBINED SYSTOLIC AND DIASTOLIC CONGESTIVE HEART FAILURE, UNSPECIFIED CONGESTIVE HEART FAILURE CHRONICITY: ICD-10-CM

## 2018-02-16 DIAGNOSIS — K80.20: ICD-10-CM

## 2018-02-16 DIAGNOSIS — I50.9 CHF EXACERBATION (HCC): Primary | ICD-10-CM

## 2018-02-16 PROBLEM — I50.33 ACUTE ON CHRONIC DIASTOLIC CHF (CONGESTIVE HEART FAILURE) (HCC): Status: ACTIVE | Noted: 2018-02-16

## 2018-02-16 PROBLEM — I48.20 CHRONIC A-FIB (HCC): Status: ACTIVE | Noted: 2018-02-16

## 2018-02-16 PROBLEM — N18.30 CKD (CHRONIC KIDNEY DISEASE) STAGE 3, GFR 30-59 ML/MIN (HCC): Status: ACTIVE | Noted: 2018-02-16

## 2018-02-16 LAB
ALBUMIN SERPL BCP-MCNC: 3.1 G/DL (ref 3.5–5)
ALP SERPL-CCNC: 125 U/L (ref 46–116)
ALT SERPL W P-5'-P-CCNC: 18 U/L (ref 12–78)
ANION GAP SERPL CALCULATED.3IONS-SCNC: 8 MMOL/L (ref 4–13)
APTT PPP: 31 SECONDS (ref 23–35)
AST SERPL W P-5'-P-CCNC: 23 U/L (ref 5–45)
BACTERIA UR QL AUTO: ABNORMAL /HPF
BASOPHILS # BLD AUTO: 0.1 THOUSANDS/ΜL (ref 0–0.1)
BASOPHILS NFR BLD AUTO: 1 % (ref 0–1)
BILIRUB SERPL-MCNC: 0.5 MG/DL (ref 0.2–1)
BILIRUB UR QL STRIP: NEGATIVE
BUN SERPL-MCNC: 62 MG/DL (ref 5–25)
CALCIUM SERPL-MCNC: 8.8 MG/DL (ref 8.3–10.1)
CHLORIDE SERPL-SCNC: 104 MMOL/L (ref 100–108)
CLARITY UR: CLEAR
CO2 SERPL-SCNC: 26 MMOL/L (ref 21–32)
COLOR UR: COLORLESS
CREAT SERPL-MCNC: 1.42 MG/DL (ref 0.6–1.3)
EOSINOPHIL # BLD AUTO: 0.2 THOUSAND/ΜL (ref 0–0.61)
EOSINOPHIL NFR BLD AUTO: 3 % (ref 0–6)
ERYTHROCYTE [DISTWIDTH] IN BLOOD BY AUTOMATED COUNT: 18 % (ref 11.6–15.1)
GFR SERPL CREATININE-BSD FRML MDRD: 38 ML/MIN/1.73SQ M
GLUCOSE SERPL-MCNC: 142 MG/DL (ref 65–140)
GLUCOSE SERPL-MCNC: 175 MG/DL (ref 65–140)
GLUCOSE SERPL-MCNC: 184 MG/DL (ref 65–140)
GLUCOSE SERPL-MCNC: 236 MG/DL (ref 65–140)
GLUCOSE UR STRIP-MCNC: NEGATIVE MG/DL
HCT VFR BLD AUTO: 35.2 % (ref 37–47)
HGB BLD-MCNC: 11 G/DL (ref 12–16)
HGB UR QL STRIP.AUTO: ABNORMAL
INR PPP: 1.38 (ref 0.86–1.16)
KETONES UR STRIP-MCNC: NEGATIVE MG/DL
LEUKOCYTE ESTERASE UR QL STRIP: NEGATIVE
LYMPHOCYTES # BLD AUTO: 1 THOUSANDS/ΜL (ref 0.6–4.47)
LYMPHOCYTES NFR BLD AUTO: 13 % (ref 14–44)
MAGNESIUM SERPL-MCNC: 2.4 MG/DL (ref 1.6–2.6)
MCH RBC QN AUTO: 25.4 PG (ref 27–31)
MCHC RBC AUTO-ENTMCNC: 31.3 G/DL (ref 31.4–37.4)
MCV RBC AUTO: 81 FL (ref 82–98)
MONOCYTES # BLD AUTO: 0.9 THOUSAND/ΜL (ref 0.17–1.22)
MONOCYTES NFR BLD AUTO: 12 % (ref 4–12)
NEUTROPHILS # BLD AUTO: 5.3 THOUSANDS/ΜL (ref 1.85–7.62)
NEUTS SEG NFR BLD AUTO: 71 % (ref 43–75)
NITRITE UR QL STRIP: NEGATIVE
NON-SQ EPI CELLS URNS QL MICRO: ABNORMAL /HPF
NRBC BLD AUTO-RTO: 0 /100 WBCS
NT-PROBNP SERPL-MCNC: 3455 PG/ML
PH UR STRIP.AUTO: 6 [PH] (ref 5–9)
PHOSPHATE SERPL-MCNC: 3.5 MG/DL (ref 2.3–4.1)
PLATELET # BLD AUTO: 199 THOUSANDS/UL (ref 130–400)
PMV BLD AUTO: 8.4 FL (ref 8.9–12.7)
POTASSIUM SERPL-SCNC: 4.6 MMOL/L (ref 3.5–5.3)
PROT SERPL-MCNC: 8.1 G/DL (ref 6.4–8.2)
PROT UR STRIP-MCNC: NEGATIVE MG/DL
PROTHROMBIN TIME: 14.5 SECONDS (ref 9.4–11.7)
RBC # BLD AUTO: 4.32 MILLION/UL (ref 4.2–5.4)
RBC #/AREA URNS AUTO: ABNORMAL /HPF
SODIUM SERPL-SCNC: 138 MMOL/L (ref 136–145)
SP GR UR STRIP.AUTO: 1.01 (ref 1–1.03)
TROPONIN I SERPL-MCNC: <0.02 NG/ML
UROBILINOGEN UR QL STRIP.AUTO: 0.2 E.U./DL
WBC # BLD AUTO: 7.4 THOUSAND/UL (ref 4.8–10.8)
WBC #/AREA URNS AUTO: ABNORMAL /HPF

## 2018-02-16 PROCEDURE — 96374 THER/PROPH/DIAG INJ IV PUSH: CPT

## 2018-02-16 PROCEDURE — 87081 CULTURE SCREEN ONLY: CPT | Performed by: STUDENT IN AN ORGANIZED HEALTH CARE EDUCATION/TRAINING PROGRAM

## 2018-02-16 PROCEDURE — 81001 URINALYSIS AUTO W/SCOPE: CPT | Performed by: EMERGENCY MEDICINE

## 2018-02-16 PROCEDURE — 85610 PROTHROMBIN TIME: CPT | Performed by: EMERGENCY MEDICINE

## 2018-02-16 PROCEDURE — 82948 REAGENT STRIP/BLOOD GLUCOSE: CPT

## 2018-02-16 PROCEDURE — 36415 COLL VENOUS BLD VENIPUNCTURE: CPT | Performed by: EMERGENCY MEDICINE

## 2018-02-16 PROCEDURE — 85025 COMPLETE CBC W/AUTO DIFF WBC: CPT | Performed by: EMERGENCY MEDICINE

## 2018-02-16 PROCEDURE — 93306 TTE W/DOPPLER COMPLETE: CPT | Performed by: INTERNAL MEDICINE

## 2018-02-16 PROCEDURE — 85730 THROMBOPLASTIN TIME PARTIAL: CPT | Performed by: EMERGENCY MEDICINE

## 2018-02-16 PROCEDURE — 99223 1ST HOSP IP/OBS HIGH 75: CPT | Performed by: INTERNAL MEDICINE

## 2018-02-16 PROCEDURE — 99285 EMERGENCY DEPT VISIT HI MDM: CPT

## 2018-02-16 PROCEDURE — 93005 ELECTROCARDIOGRAM TRACING: CPT

## 2018-02-16 PROCEDURE — 93010 ELECTROCARDIOGRAM REPORT: CPT | Performed by: INTERNAL MEDICINE

## 2018-02-16 PROCEDURE — 99223 1ST HOSP IP/OBS HIGH 75: CPT | Performed by: FAMILY MEDICINE

## 2018-02-16 PROCEDURE — 71045 X-RAY EXAM CHEST 1 VIEW: CPT

## 2018-02-16 PROCEDURE — 84100 ASSAY OF PHOSPHORUS: CPT | Performed by: EMERGENCY MEDICINE

## 2018-02-16 PROCEDURE — 80053 COMPREHEN METABOLIC PANEL: CPT | Performed by: EMERGENCY MEDICINE

## 2018-02-16 PROCEDURE — 93306 TTE W/DOPPLER COMPLETE: CPT

## 2018-02-16 PROCEDURE — 84484 ASSAY OF TROPONIN QUANT: CPT | Performed by: FAMILY MEDICINE

## 2018-02-16 PROCEDURE — 83880 ASSAY OF NATRIURETIC PEPTIDE: CPT | Performed by: EMERGENCY MEDICINE

## 2018-02-16 PROCEDURE — 83735 ASSAY OF MAGNESIUM: CPT | Performed by: EMERGENCY MEDICINE

## 2018-02-16 PROCEDURE — 84484 ASSAY OF TROPONIN QUANT: CPT | Performed by: EMERGENCY MEDICINE

## 2018-02-16 RX ORDER — PRAVASTATIN SODIUM 40 MG
40 TABLET ORAL
Status: DISCONTINUED | OUTPATIENT
Start: 2018-02-16 | End: 2018-02-21 | Stop reason: HOSPADM

## 2018-02-16 RX ORDER — FUROSEMIDE 10 MG/ML
80 INJECTION INTRAMUSCULAR; INTRAVENOUS ONCE
Status: COMPLETED | OUTPATIENT
Start: 2018-02-16 | End: 2018-02-16

## 2018-02-16 RX ORDER — FUROSEMIDE 40 MG/1
40 TABLET ORAL DAILY
COMMUNITY
End: 2018-02-21 | Stop reason: HOSPADM

## 2018-02-16 RX ORDER — CALCIUM CARBONATE 500(1250)
1 TABLET ORAL
Status: DISCONTINUED | OUTPATIENT
Start: 2018-02-17 | End: 2018-02-21 | Stop reason: HOSPADM

## 2018-02-16 RX ORDER — INSULIN GLARGINE 100 [IU]/ML
30 INJECTION, SOLUTION SUBCUTANEOUS
Status: DISCONTINUED | OUTPATIENT
Start: 2018-02-16 | End: 2018-02-21 | Stop reason: HOSPADM

## 2018-02-16 RX ORDER — PANTOPRAZOLE SODIUM 40 MG/1
40 TABLET, DELAYED RELEASE ORAL
Status: DISCONTINUED | OUTPATIENT
Start: 2018-02-16 | End: 2018-02-21 | Stop reason: HOSPADM

## 2018-02-16 RX ORDER — OXYCODONE HYDROCHLORIDE 5 MG/1
5 TABLET ORAL EVERY 4 HOURS PRN
Status: DISCONTINUED | OUTPATIENT
Start: 2018-02-16 | End: 2018-02-17

## 2018-02-16 RX ORDER — VALSARTAN 80 MG/1
80 TABLET ORAL DAILY
Status: DISCONTINUED | OUTPATIENT
Start: 2018-02-16 | End: 2018-02-19

## 2018-02-16 RX ORDER — FUROSEMIDE 10 MG/ML
60 INJECTION INTRAMUSCULAR; INTRAVENOUS
Status: DISCONTINUED | OUTPATIENT
Start: 2018-02-16 | End: 2018-02-19

## 2018-02-16 RX ORDER — CHLORAL HYDRATE 500 MG
1000 CAPSULE ORAL DAILY
Status: DISCONTINUED | OUTPATIENT
Start: 2018-02-16 | End: 2018-02-21 | Stop reason: HOSPADM

## 2018-02-16 RX ADMIN — FUROSEMIDE 60 MG: 10 INJECTION, SOLUTION INTRAMUSCULAR; INTRAVENOUS at 16:06

## 2018-02-16 RX ADMIN — INSULIN GLARGINE 30 UNITS: 100 INJECTION, SOLUTION SUBCUTANEOUS at 22:18

## 2018-02-16 RX ADMIN — INSULIN LISPRO 1 UNITS: 100 INJECTION, SOLUTION INTRAVENOUS; SUBCUTANEOUS at 16:36

## 2018-02-16 RX ADMIN — PANTOPRAZOLE SODIUM 40 MG: 40 TABLET, DELAYED RELEASE ORAL at 12:14

## 2018-02-16 RX ADMIN — Medication 2 TABLET: at 12:05

## 2018-02-16 RX ADMIN — Medication 1000 MG: at 12:04

## 2018-02-16 RX ADMIN — LEVOTHYROXINE SODIUM 137 MCG: 112 TABLET ORAL at 12:05

## 2018-02-16 RX ADMIN — OXYCODONE HYDROCHLORIDE 5 MG: 5 TABLET ORAL at 22:15

## 2018-02-16 RX ADMIN — FUROSEMIDE 80 MG: 10 INJECTION, SOLUTION INTRAMUSCULAR; INTRAVENOUS at 06:57

## 2018-02-16 RX ADMIN — RIVAROXABAN 15 MG: 15 TABLET, FILM COATED ORAL at 12:05

## 2018-02-16 RX ADMIN — PRAVASTATIN SODIUM 40 MG: 40 TABLET ORAL at 16:09

## 2018-02-16 RX ADMIN — VALSARTAN 80 MG: 80 TABLET ORAL at 12:05

## 2018-02-16 NOTE — SOCIAL WORK
DASH discussion completed  Discussed goals of making sure pt's  needs are met upon discharge, pt's preferences are taken into account, pt understands health condition, medications and symptoms to watch for after returning home and pt is aware of any follow up appointments recommended by hospital physician  PT LIVES ALONE INDEPENDENTLY, HAS SUPPORTIVE FAMILY, USES A CANE, USES [facility] FOR RX NEEDS, DCP IS TO HOME WHEN STABLE

## 2018-02-16 NOTE — ED PROVIDER NOTES
History  Chief Complaint   Patient presents with    Shortness of Breath     pt states she has gained 7 pounds in 5 days and today about 1 hour ago she developed shortness of breath and nausea     CC 4year-old female with past medical history of CHF, diabetes, hyperlipidemia, GERD, hypertension, atrial fibrillation, arthritis, CAD, presents to the ER with worsening shortness of breath for the past 4 days  Patient is on Lasix 80 milligrams every other day and 40 milligrams every other day  Patient notes 7 pounds weight gain over the past 4 days  Patient came to the ER today as her shortness of breath is increasing  Patient now notes bilateral pedal edema  Patient denies any chest pain, nausea, vomiting, diarrhea, dysuria, headaches, weakness, dizziness  History provided by:  Patient      Prior to Admission Medications   Prescriptions Last Dose Informant Patient Reported? Taking? ACCU-CHEK CAITLIN PLUS test strip   Yes No   Sig: USE TO TEST 4 TIMES DAILY   B-D UF III MINI PEN NEEDLES 31G X 5 MM MISC   Yes No   Sig: USE 4 TIMES A DAY   Calcium 600-200 MG-UNIT per tablet   Yes No   Sig: Take by mouth   Calcium Carbonate-Vitamin D (CALTRATE 600+D) 600-400 MG-UNIT per tablet   Yes No   Sig: Take 1 tablet by mouth 2 (two) times a day  Ferrous Sulfate (SLOW FE PO)   Yes No   Sig: Take 1 tablet by mouth daily  Iron-Vitamin C (IRON 100/C) 100-250 MG TABS   Yes No   Sig: Take by mouth   LANTUS SOLOSTAR injection pen 100 units/mL   Yes No   Sig: INJECT 30 UNITS DAILY   Multiple Vitamins-Minerals (CENTRUM CARDIO) TABS   Yes No   Sig: Take 2 tablets by mouth daily  NOVOLOG FLEXPEN 100 UNIT/ML SOPN   Yes No   Sig: UP TO 70 UNTIS DAILY AS DIRECTED   acetaminophen (TYLENOL) 325 mg tablet   No No   Sig: As indicated   furosemide (LASIX) 80 mg tablet   Yes No   Sig: Take 80 mg by mouth daily     insulin aspart (NovoLOG) 100 units/mL injection   Yes No   Sig: Inject 10 Units under the skin 3 (three) times a day before meals     insulin glargine (LANTUS) 100 units/mL subcutaneous injection   Yes No   Sig: Inject 34 Units under the skin daily at bedtime  levothyroxine (SYNTHROID) 137 mcg tablet   Yes No   Sig: Take 137 mcg by mouth daily  metolazone (ZAROXOLYN) 2 5 mg tablet   Yes No   Sig: Take 2 5 mg by mouth 2 (two) times a week  prn   multivitamin-minerals (CENTRUM) tablet   Yes No   Sig: Take by mouth   naproxen (NAPROSYN) 250 mg tablet   No No   Sig: Take 1 tablet by mouth 2 (two) times a day with meals   naproxen sodium (ALEVE) 220 MG tablet   Yes No   Sig: Take by mouth   omega-3-acid ethyl esters (LOVAZA) 1 g capsule   Yes No   Sig: Take 1 g by mouth daily  omeprazole (PriLOSEC) 20 mg delayed release capsule   Yes No   Sig: Take 20 mg by mouth 3 (three) times a week  oxyCODONE (OXY-IR) 5 MG capsule   Yes No   Sig: Take 5 mg by mouth every 4 (four) hours as needed for moderate pain  rivaroxaban (XARELTO) 15 mg tablet   Yes No   Sig: Take 15 mg by mouth daily   simvastatin (ZOCOR) 20 mg tablet   Yes No   Sig: Take 20 mg by mouth daily at bedtime  valsartan (DIOVAN) 80 mg tablet   Yes No   Sig: Take 80 mg by mouth daily        Facility-Administered Medications: None       Past Medical History:   Diagnosis Date    Arthritis     Atrial flutter ([place] [id number] )     Cardiac disease     Carotid artery occlusion     [facility]     Coronary artery disease     Diabetes mellitus ([place] [address] )     Disease of thyroid gland     GERD (gastroesophageal reflux disease)     History of transfusion     Hyperlipidemia     Hypertension     Other specified diabetes mellitus with diabetic autonomic (poly)neuropathy ([place] [address] )     Renal disorder     Sleep apnea        Past Surgical History:   Procedure Laterality Date    ABDOMINAL SURGERY      CARDIAC PACEMAKER PLACEMENT      CARDIAC SURGERY      cabg x 2    CHOLECYSTECTOMY      EYE SURGERY      FRACTURE SURGERY      HERNIA REPAIR      OK LAP,CHOLECYSTECTOMY N/A 8/10/2016    Procedure: CHOLECYSTECTOMY LAPAROSCOPIC;  Surgeon: Millie English MD;  [facility]: [facility];  Service: General    NM LARYNGOSCOPY,DIRECT,SCOPE,INJ CORDS Left 11/4/2016    Procedure: MICRODIRECT LARYNGOSCOPY; LEFT VOCAL FOLD INJECTION ;  Surgeon: Jalyn Ching MD;  [facility]: [facility];  Service: ENT    NM REPAIR UMBILICAL VDTP,8+E/C,TDDOI N/A 8/10/2016    Procedure: LAPAROSCOPIC REPAIR HERNIA VENTRAL;  Surgeon: Millie English MD;  [facility];  Service: General    VASCULAR SURGERY         History reviewed  No pertinent family history  I have reviewed and agree with the history as documented  Social History   Substance Use Topics    Smoking status: Never Smoker    Smokeless tobacco: Never Used    Alcohol use No        Review of Systems   Constitutional: Negative for activity change, appetite change, chills and fever  HENT: Negative for congestion and ear pain  Eyes: Negative for pain and discharge  Respiratory: Positive for shortness of breath  Negative for cough, chest tightness, wheezing and stridor  Cardiovascular: Negative for chest pain and palpitations  Gastrointestinal: Negative for abdominal distention, abdominal pain, constipation, diarrhea and nausea  Endocrine: Negative for cold intolerance  Genitourinary: Negative for dysuria, frequency and urgency  Musculoskeletal: Negative for arthralgias and back pain  Skin: Negative for color change and rash  Allergic/Immunologic: Negative for environmental allergies and food allergies  Neurological: Negative for dizziness, weakness, numbness and headaches  Hematological: Negative for adenopathy  Psychiatric/Behavioral: Negative for agitation, behavioral problems and confusion  The patient is not nervous/anxious  All other systems reviewed and are negative        Physical Exam  ED Triage Vitals   Temperature Pulse Respirations Blood Pressure SpO2   02/16/18 0610 02/16/18 0553 02/16/18 0553 02/16/18 0553 02/16/18 0553   98 4 °F (36 9 °C) 78 (!) 28 166/68 94 %      Temp Source Heart Rate Source Patient Position - Orthostatic VS BP Location FiO2 (%)   02/16/18 0610 02/16/18 0553 02/16/18 0553 02/16/18 0553 --   Tympanic Monitor Lying Left arm       Pain Score       --                  Orthostatic Vital Signs  Vitals:    02/16/18 0553   BP: 166/68   Pulse: 78   Patient Position - Orthostatic VS: Lying       Physical Exam   Constitutional: She is oriented to person, place, and time  She appears well-developed and well-nourished  HENT:   Head: Normocephalic and atraumatic  Mouth/Throat: Oropharynx is clear and moist    Eyes: Conjunctivae and EOM are normal    Neck: Normal range of motion  Neck supple  Cardiovascular: Normal heart sounds and intact distal pulses  Irregularly irregular rhythm on the monitor  Pulmonary/Chest: Effort normal  She has rales  Bibasilar rales noted on exam    Abdominal: Soft  Bowel sounds are normal  She exhibits no distension  There is no tenderness  Musculoskeletal: Normal range of motion  1+ pitting edema noted bilateral, right side worse than left  Neurological: She is alert and oriented to person, place, and time  Skin: Skin is warm and dry  Psychiatric: She has a normal mood and affect  Her behavior is normal  Judgment and thought content normal    Nursing note and vitals reviewed        ED Medications  Medications   furosemide (LASIX) injection 80 mg (80 mg Intravenous Given 2/16/18 0657)       Diagnostic Studies  Results Reviewed     Procedure Component Value Units Date/Time    Protime-INR [[id number]]  (Abnormal) Collected:  02/16/18 0605    Lab Status:  Final result Specimen:  Blood from Arm, Right Updated:  02/16/18 0650     Protime 14 5 (H) seconds      INR 1 38 (H)    APTT [[id number]]  (Normal) Collected:  02/16/18 0605    Lab Status:  Final result Specimen:  Blood from Arm, Right Updated:  02/16/18 0650     PTT 31 seconds Narrative: Therapeutic Heparin Range = 60-90 seconds    Magnesium [[id number]]  (Normal) Collected:  02/16/18 0605    Lab Status:  Final result Specimen:  Blood from Arm, Right Updated:  02/16/18 0639     Magnesium 2 4 mg/dL     Phosphorus [[id number]]  (Normal) Collected:  02/16/18 3604    Lab Status:  Final result Specimen:  Blood from Arm, Right Updated:  02/16/18 0639     Phosphorus 3 5 mg/dL     B-type natriuretic peptide [[id number]]  (Abnormal) Collected:  02/16/18 5653    Lab Status:  Final result Specimen:  Blood from Arm, Right Updated:  02/16/18 0639     NT-proBNP 3,455 (H) pg/mL     Troponin I [[id number]]  (Normal) Collected:  02/16/18 0605    Lab Status:  Final result Specimen:  Blood from Arm, Right Updated:  02/16/18 0639     Troponin I <0 02 ng/mL     Narrative:         [employer] Chemistry analyzer 99% cutoff is > 0 04 ng/mL in network labs    o cTnI 99% cutoff is useful only when applied to patients in the clinical setting of myocardial ischemia  o cTnI 99% cutoff should be interpreted in the context of clinical history, ECG findings and possibly cardiac imaging to establish correct diagnosis  o cTnI 99% cutoff may be suggestive but clearly not indicative of a coronary event without the clinical setting of myocardial ischemia      Comprehensive metabolic panel [[id number]]  (Abnormal) Collected:  02/16/18 0605    Lab Status:  Final result Specimen:  Blood from Arm, Right Updated:  02/16/18 9989     Sodium 138 mmol/L      Potassium 4 6 mmol/L      Chloride 104 mmol/L      CO2 26 mmol/L      Anion Gap 8 mmol/L      BUN 62 (H) mg/dL      Creatinine 1 42 (H) mg/dL      Glucose 175 (H) mg/dL      Calcium 8 8 mg/dL      AST 23 U/L      ALT 18 U/L      Alkaline Phosphatase 125 (H) U/L      Total Protein 8 1 g/dL      Albumin 3 1 (L) g/dL      Total Bilirubin 0 50 mg/dL      eGFR 38 ml/min/1 73sq m     Narrative:         National Kidney Disease Education Program recommendations are as follows:  GFR calculation is accurate only with a steady state creatinine  Chronic Kidney disease less than 60 ml/min/1 73 sq  meters  Kidney failure less than 15 ml/min/1 73 sq  meters  CBC and differential [[id number]]  (Abnormal) Collected:  02/16/18 0605    Lab Status:  Final result Specimen:  Blood from Arm, Right Updated:  02/16/18 0615     WBC 7 40 Thousand/uL      RBC 4 32 Million/uL      Hemoglobin 11 0 (L) g/dL      Hematocrit 35 2 (L) %      MCV 81 (L) fL      MCH 25 4 (L) pg      MCHC 31 3 (L) g/dL      RDW 18 0 (H) %      MPV 8 4 (L) fL      Platelets 404 Thousands/uL      nRBC 0 /100 WBCs      Neutrophils Relative 71 %      Lymphocytes Relative 13 (L) %      Monocytes Relative 12 %      Eosinophils Relative 3 %      Basophils Relative 1 %      Neutrophils Absolute 5 30 Thousands/µL      Lymphocytes Absolute 1 00 Thousands/µL      Monocytes Absolute 0 90 Thousand/µL      Eosinophils Absolute 0 20 Thousand/µL      Basophils Absolute 0 10 Thousands/µL     UA w Reflex to Microscopic w Reflex to Culture [[id number]]     Lab Status:  No result Specimen:  Urine                  XR chest 1 view portable    (Results Pending)              Procedures  ECG 12 Lead Documentation  Date/Time: 2/16/2018 5:01 AM  Performed by: Saqib Shock  Authorized by: Saqib Shock     Indications / Diagnosis:  Shortness of  ECG reviewed by me, the ED Provider: yes    Patient location:  ED  Previous ECG:     Previous ECG:  Compared to current    Similarity:  No change  Interpretation:     Interpretation: abnormal    Comments:      Irregularly irregular rhythm, rate 73, normal axis, no acute ST elevations noted, T-wave flattening noted throughout, essentially unchanged EKG from previous  Phone Contacts  ED Phone Contact    ED Course  ED Course as of Feb 16 0709 Fri Feb 16, 2018   6700 Case discussed with hospitalist will admit patient                                  MDM  Number of Diagnoses or Management Options  CHF exacerbation ([address] ): Diagnosis management comments: Obtain blood work, troponin, BNP, EKG, chest x-ray  Give Lasix for possible CHF exacerbation  Place patient on supplemental oxygen  Amount and/or Complexity of Data Reviewed  Clinical lab tests: ordered and reviewed  Tests in the radiology section of CPT®: ordered and reviewed  Tests in the medicine section of CPT®: ordered and reviewed  Independent visualization of images, tracings, or specimens: yes    Risk of Complications, Morbidity, and/or Mortality  General comments: Patient's chest x-ray shows bibasilar pleural effusion  Patient has been elevated BNP  At this point patient's symptoms are secondary to CHF exacerbation  Patient started on Lasix IV and admitted for further management of her CHF exacerbation  Patient and family agrees with admission plans  Patient Progress  Patient progress: stable    CritCare Time    Disposition  Final diagnoses:   CHF exacerbation ([address] )     Time reflects when diagnosis was documented in both MDM as applicable and the Disposition within this note     Time User Action Codes Description Comment    2/16/2018  7:00 AM Shaun Barron Add [I50 9] CHF exacerbation [facility])       ED Disposition     ED Disposition Condition Comment    Admit  Case was discussed with Dr Manuel Rivas and the patient's admission status was agreed to be Admission Status: observation status to the service of Dr Manuel Rivas  Follow-up Information    None       Patient's Medications   Discharge Prescriptions    No medications on file     No discharge procedures on file      ED Provider  Electronically Signed by           Cami Roach DO  02/16/18 8212

## 2018-02-16 NOTE — H&P
History and Physical - [facility] Internal Medicine    Patient Information: Sanjana Stanley 70 y o  female MRN: [id number]  Unit/Bed#: [facility]ot [facility] 532-20 Encounter: [id number]  Admitting Physician: Ion Allen DO  PCP: No primary care provider on file  Date of Admission:  02/16/18        Hospital Problem List:     Principal Problem:    Acute on chronic diastolic CHF (congestive heart failure) ([facility])  Active Problems:    Chronic a-fib (HCC)    Coronary artery disease involving coronary bypass graft of native heart without angina pectoris    Long-term insulin use in type 2 diabetes ([facility] )    Hypertension    Hyperlipidemia    CKD (chronic kidney disease) stage 3, GFR 30-59 ml/min      Assessment/Plan:    * Acute on chronic diastolic CHF (congestive heart failure) ([facility] )   Assessment & Plan    Patient received a dose of IV Lasix in the ER  Admit to telemetry  Cardiology consult  Patient started on 60 mg IV Lasix twice a day  Continue Diovan  Repeat echo today shows worsening of systolic function  EF is now about 40 %  EF was 60-65% in 2016  Troponins negative  Strict I/Os and daily weight          Chronic a-fib (HCC)   Assessment & Plan    Continue Xarelto  As per prior notes, Patient has underlying very slow ventricular rate and has [employer] pacemaker in place which is functioning adequately  Pacemaker was interrogated December 2017        Coronary artery disease involving coronary bypass graft of native heart without angina pectoris   Assessment & Plan    Continue fish oil, statin  Cardiology consult as noted above        CKD (chronic kidney disease) stage 3, GFR 30-59 ml/min   Assessment & Plan    Creatinine appears somewhat higher than prior values  Monitor creatinine closely while on diuretics  Repeat level in the a m  Hyperlipidemia   Assessment & Plan    Continue statin          Hypertension   Assessment & Plan    Continue Diovan and monitor blood pressure        Long-term insulin use in type 2 diabetes [facility])   Assessment & Plan    Continue Lantus and Humalog sliding scale insulin  Check hemoglobin A1c level in the a m  VTE Prophylaxis: Rivaroxaban (Xarelto)  / sequential compression device   Code Status: Level 1 - Full Code    Anticipated Length of Stay:  Patient will be admitted on an Inpatient basis with an anticipated length of stay of atleast 2 midnights  Justification for Hospital Stay:  CHF exacerbation    Total Time for Visit, including Counseling / Coordination of Care: 45 minutes  Greater than 50% of this total time spent on direct patient counseling and coordination of care  Chief Complaint:     Shortness of Breath (pt states she has gained 7 pounds in 5 days and today about 1 hour ago she developed shortness of breath and nausea)    of Present Illness:    Aleksandr Mae is a 70 y o  female who presents with worsening shortness of breath since yesterday  Patient states that she has gained about 7 lb in 5 days  She also reports nausea  patient is on Lasix at home and reports compliance with it  Patient also reports adhering to a low-salt and diabetic diet  Patient reports bilateral leg edema and weakness due to heaviness in her legs  She also reports intermittent chronic leg pain due to neuropathy  she reports chronic pain in her chest that started after her CABG  She was told that it was "nerve damage"  She does not require oxygen at home  she reports decreased urine output  denies any paroxysmal nocturnal dyspnea, orthopnea    Review of Systems:    Review of Systems   Constitutional: Positive for unexpected weight change  Negative for appetite change, chills and fever  HENT: Negative for congestion and trouble swallowing  Eyes: Negative for photophobia and visual disturbance  Respiratory: Positive for shortness of breath  Negative for chest tightness and wheezing  Cardiovascular: Positive for chest pain (chronic) and leg swelling   Negative for palpitations  Gastrointestinal: Positive for abdominal pain  Negative for blood in stool, nausea and vomiting  Genitourinary: Positive for decreased urine volume  Negative for dysuria, frequency and hematuria  Musculoskeletal:        (+) Chronic leg pain from neuropathy   Skin: Negative for wound  Neurological: Positive for weakness  Negative for syncope, speech difficulty and headaches  Hematological: Does not bruise/bleed easily  Psychiatric/Behavioral: Negative for agitation  Past Medical and Surgical History:     Past Medical History:   Diagnosis Date    Arthritis     Atrial flutter ([place] [address] )     Cardiac disease     Carotid artery occlusion     CHF (congestive heart failure) ([place] [address] )     Coronary artery disease     Diabetes mellitus ([place] [address] )     Disease of thyroid gland     GERD (gastroesophageal reflux disease)     History of transfusion     Hyperlipidemia     Hypertension     Other specified diabetes mellitus with diabetic autonomic (poly)neuropathy ([place] [address] )     Renal disorder     Sleep apnea        Past Surgical History:   Procedure Laterality Date    ABDOMINAL SURGERY      CARDIAC PACEMAKER PLACEMENT      CARDIAC SURGERY      cabg x 2    CHOLECYSTECTOMY      EYE SURGERY      FRACTURE SURGERY      HERNIA REPAIR      DE LAP,CHOLECYSTECTOMY N/A 8/10/2016    Procedure: CHOLECYSTECTOMY LAPAROSCOPIC;  Surgeon: Brandee Spicer MD;  Location: [facility];  Service: General     [address] Left 11/4/2016    Procedure: MICRODIRECT LARYNGOSCOPY; LEFT VOCAL FOLD INJECTION ;  Surgeon: Kael Owens MD;  Location: [facility];  Service: ENT    DE REPAIR UMBILICAL QOMN,5+U/I,KULPV N/A 8/10/2016    Procedure: LAPAROSCOPIC REPAIR HERNIA VENTRAL;  Surgeon: Brandee Spicer MD;  [facility]: [facility];  Service: General    VASCULAR SURGERY         Meds/Allergies:    PTA meds:   Prior to Admission Medications   Prescriptions Last Dose Informant Patient Reported? Taking? ACCU-CHEK CAITLIN PLUS test strip   Yes No   Sig: USE TO TEST 4 TIMES DAILY   B-D UF III MINI PEN NEEDLES 31G X 5 MM MISC   Yes No   Sig: USE 4 TIMES A DAY   Calcium 600-200 MG-UNIT per tablet Past Month at Unknown time  Yes Yes   Sig: Take by mouth   Calcium Carbonate-Vitamin D (CALTRATE 600+D) 600-400 MG-UNIT per tablet Past Month at Unknown time  Yes Yes   Sig: Take 1 tablet by mouth 2 (two) times a day  LANTUS SOLOSTAR injection pen 100 units/mL   Yes No   Sig: INJECT 30 UNITS DAILY   Multiple Vitamins-Minerals (CENTRUM CARDIO) TABS 2/15/2018 at Unknown time  Yes Yes   Sig: Take 2 tablets by mouth daily  furosemide (LASIX) 40 mg tablet  Self Yes No   Sig: Take 40 mg by mouth daily   furosemide (LASIX) 80 mg tablet 2/15/2018 at Unknown time Self Yes Yes   Sig: Take 80 mg by mouth daily     insulin aspart (NovoLOG) 100 units/mL injection   Yes No   Sig: Inject 10 Units under the skin 3 (three) times a day before meals     levothyroxine (SYNTHROID) 137 mcg tablet 2/15/2018 at Unknown time  Yes Yes   Sig: Take 137 mcg by mouth daily  magnesium hydroxide (GOMES CHEWS) 311 MG CHEW   Yes Yes   Sig: Chew 311 mg 2 (two) times a day   omega-3-acid ethyl esters (LOVAZA) 1 g capsule 2/15/2018 at Unknown time  Yes Yes   Sig: Take 1 g by mouth daily  omeprazole (PriLOSEC) 20 mg delayed release capsule   Yes No   Sig: Take 20 mg by mouth 3 (three) times a week     oxyCODONE (OXY-IR) 5 MG capsule More than a month at Unknown time  Yes No   Sig: Take 5 mg by mouth every 4 (four) hours as needed for moderate pain  rivaroxaban (XARELTO) 15 mg tablet 2/15/2018 at Unknown time  Yes Yes   Sig: Take 15 mg by mouth daily   simvastatin (ZOCOR) 20 mg tablet 2/15/2018 at Unknown time  Yes Yes   Sig: Take 20 mg by mouth daily at bedtime  valsartan (DIOVAN) 80 mg tablet 2/15/2018 at Unknown time  Yes Yes   Sig: Take 80 mg by mouth daily  Facility-Administered Medications: None       Allergies:    Allergies   Allergen Reactions    Other      Adhesive tape    Ibuprofen Palpitations    Penicillins Rash     History:     Marital Status: Single     Substance Use History:   History   Alcohol Use No     History   Smoking Status    Never Smoker   Smokeless Tobacco    Never Used     History   Drug Use No       Family History:    History reviewed  No pertinent family history  Physical Exam:     Vitals:   Blood Pressure: 139/63 (02/16/18 1222)  Pulse: 62 (02/16/18 1222)  Temperature: 99 1 °F (37 3 °C) (02/16/18 1222)  Temp Source: Oral (02/16/18 1222)  Respirations: 20 (02/16/18 1222)  Height: 5' 5" (165 1 cm) (02/16/18 0813)  Weight - Scale: 117 kg (258 lb 9 6 oz) (02/16/18 1240)  SpO2: 93 % (02/16/18 1222)    Physical Exam   Constitutional: She is oriented to person, place, and time  She appears well-developed and well-nourished  No distress  HENT:   Head: Normocephalic and atraumatic  Mouth/Throat: Oropharynx is clear and moist    Eyes: EOM are normal  Right eye exhibits no discharge  Left eye exhibits no discharge  No scleral icterus  Neck: Neck supple  Cardiovascular:   Murmur heard  Irregularly, irregular rate and rhythm   Pulmonary/Chest: Effort normal and breath sounds normal  No respiratory distress  She has no wheezes  She has no rales  Abdominal: Soft  Bowel sounds are normal  She exhibits no distension  There is no tenderness  Reducible umbilical hernia noted   Musculoskeletal: She exhibits edema (B/L L  E)  Neurological: She is alert and oriented to person, place, and time  No cranial nerve deficit  Skin: Skin is dry  She is not diaphoretic  Lab Results: I have personally reviewed pertinent reports          Results from last 7 days  Lab Units 02/16/18  0605   WBC Thousand/uL 7 40   HEMOGLOBIN g/dL 11 0*   HEMATOCRIT % 35 2*   PLATELETS Thousands/uL 199   NEUTROS PCT % 71   LYMPHS PCT % 13*   MONOS PCT % 12   EOS PCT % 3       Results from last 7 days  Lab Units 02/16/18  0605   SODIUM mmol/L 138   POTASSIUM mmol/L 4 6   CHLORIDE mmol/L 104   CO2 mmol/L 26   BUN mg/dL 62*   CREATININE mg/dL 1 42*   CALCIUM mg/dL 8 8   TOTAL PROTEIN g/dL 8 1   BILIRUBIN TOTAL mg/dL 0 50   ALK PHOS U/L 125*   ALT U/L 18   AST U/L 23   GLUCOSE RANDOM mg/dL 175*       Results from last 7 days  Lab Units 02/16/18  0605   INR  1 38*       Imaging: I have personally reviewed pertinent reports  Xr Chest 1 View Portable    Result Date: 2/16/2018  Narrative: CHEST INDICATION:  Shortness of breath  COMPARISON:  11/04/2016 VIEWS:   AP frontal IMAGES:  1 FINDINGS: Cardiomediastinal silhouette appears enlarged  A median sternotomy has been performed  A permanent pacemaker is present  There are no infiltrates  The patient appears to be in mild heart failure  Visualized osseous structures appear within normal limits for the patient's age  Impression: Cardiomegaly and mild CHF  Workstation performed: [id number]       XR chest 1 view portable   Final Result      Cardiomegaly and mild CHF  Workstation performed: [id number]             EKG, Pathology, and Other Studies Reviewed on Admission:   · EKG shows atrial fibrillation with nonspecific ST T wave changes    Epic Records Reviewed: Yes     ** Please Note: Dragon 360 Dictation voice to text software may have been used in the creation of this document   **

## 2018-02-16 NOTE — CONSULTS
CARDIOLOGY CONSULTATION  Katina Burkett 70 y o  female MRN: [id number]  Unit/Bed#: [address] [place] [address] Encounter: [id number]      History of Present Illness   Physician Requesting Consult: Raheel Nava DO  Reason for Consult / Principal Problem: Shortness of breath    Assessment/Plan   Acute on chronic diastolic heart failure- last echo 2d in 2016- progression of valvular disease- rule out severe AS/worsening MS  Agree with IV diuresis  Low-salt diet  Tj standing weights  Strict in's and out's  Agree with IV diuresis target 1 5-2 L net  Hx mild-mod AS, mild MS- diuretic therapy  Htn urgency  CAD s/p 4V CABG- Last stress test 3/2017 nuclear-  Mild to moderate pulmonary htn  Hx of tricupsid annuloplasty  Acute on chronic renal insufficiency baseline 1 3- followed by nephrology  Hypothyroidism  IDDM      HPI: Katina Burkett is a 70y o  year old female who presents with progressive shortness of breath for past 4 days  Patient is on alternating dosage of 80mg and 40mg every other day  The patient notes 7 pound weight gain over the past 4 days  Patient came to ER because of 5 pound weight gain, increased shortness of breath, and pdeal edema  Currently denies having chest pain, nausea, or vomiting        Historical Information   Past Medical History:   Diagnosis Date    Arthritis     Atrial flutter [facility] [address] [facility]     Cardiac disease     Carotid artery occlusion     [facility]     Coronary artery disease     Diabetes mellitus ([facility] [address] [facility]     Disease of thyroid gland     GERD (gastroesophageal reflux disease)     History of transfusion     Hyperlipidemia     Hypertension     Other specified diabetes mellitus with diabetic autonomic [facility]neuropathy [facility]     Renal disorder     Sleep apnea      Past Surgical History:   Procedure Laterality Date    ABDOMINAL SURGERY      CARDIAC PACEMAKER PLACEMENT      CARDIAC SURGERY      cabg x 2    CHOLECYSTECTOMY      EYE SURGERY      FRACTURE SURGERY      HERNIA REPAIR      ME LAP,CHOLECYSTECTOMY N/A 8/10/2016    Procedure: CHOLECYSTECTOMY LAPAROSCOPIC;  Surgeon: Maggie Spatz, MD;  [facility];  Service: General    ME LARYNGOSCOPY,DIRECT,SCOPE,INJ CORDS Left 11/4/2016    Procedure: Rosa Manus; LEFT VOCAL FOLD INJECTION ;  Surgeon: Josh Javier MD;  [facility];  Service: ENT    ME REPAIR UMBILICAL FSHM,2+O/F,XGSOC N/A 8/10/2016    Procedure: Christcornelio Boot;  Surgeon: Maggie Spatz, MD;  [facility];  Service: General    VASCULAR SURGERY       History   Alcohol Use No     History   Drug Use No     History   Smoking Status    Never Smoker   Smokeless Tobacco    Never Used     History reviewed  No pertinent family history  Meds/Allergies   Prior to Admission medications    Medication Sig Start Date End Date Taking? Authorizing Provider   Calcium 600-200 MG-UNIT per tablet Take by mouth   Yes Historical Provider, MD   Calcium Carbonate-Vitamin D (CALTRATE 600+D) 600-400 MG-UNIT per tablet Take 1 tablet by mouth 2 (two) times a day  Yes Historical Provider, MD   furosemide (LASIX) 80 mg tablet Take 80 mg by mouth daily     Yes Historical Provider, MD   levothyroxine (SYNTHROID) 137 mcg tablet Take 137 mcg by mouth daily  Yes Historical Provider, MD   magnesium hydroxide (GOMES CHEWS) 311 MG CHEW Chew 311 mg 2 (two) times a day   Yes Historical Provider, MD   Multiple Vitamins-Minerals (CENTRUM CARDIO) TABS Take 2 tablets by mouth daily  Yes Historical Provider, MD   omega-3-acid ethyl esters (LOVAZA) 1 g capsule Take 1 g by mouth daily  Yes Historical Provider, MD   rivaroxaban (XARELTO) 15 mg tablet Take 15 mg by mouth daily   Yes Historical Provider, MD   simvastatin (ZOCOR) 20 mg tablet Take 20 mg by mouth daily at bedtime  Yes Historical Provider, MD   valsartan (DIOVAN) 80 mg tablet Take 80 mg by mouth daily     Yes Historical Provider, MD JACK TUCKER PLUS test strip USE TO TEST 4 TIMES DAILY 1/2/18   Historical Provider, MD HOLDER UF III MINI PEN NEEDLES 31G X 5 MM MISC USE 4 TIMES A DAY 12/5/17   Historical Provider, MD   furosemide (LASIX) 40 mg tablet Take 40 mg by mouth daily    Historical Provider, MD   insulin aspart (NovoLOG) 100 units/mL injection Inject 10 Units under the skin 3 (three) times a day before meals      Historical Provider, MD   LANTUS SOLOSTAR injection pen 100 units/mL INJECT 30 UNITS DAILY 12/24/17   Historical Provider, MD   omeprazole (PriLOSEC) 20 mg delayed release capsule Take 20 mg by mouth 3 (three) times a week      Historical Provider, MD   oxyCODONE (OXY-IR) 5 MG capsule Take 5 mg by mouth every 4 (four) hours as needed for moderate pain  Historical Provider, MD   acetaminophen (TYLENOL) 325 mg tablet As indicated 8/14/16 2/16/18  Dina Kunz MD   Ferrous Sulfate (SLOW FE PO) Take 1 tablet by mouth daily  2/16/18  Historical Provider, MD   insulin glargine (LANTUS) 100 units/mL subcutaneous injection Inject 34 Units under the skin daily at bedtime  2/16/18  Historical Provider, MD   Iron-Vitamin C (IRON 100/C) 100-250 MG TABS Take by mouth  2/16/18  Historical Provider, MD   metolazone (ZAROXOLYN) 2 5 mg tablet Take 2 5 mg by mouth 2 (two) times a week   prn  2/16/18  Historical Provider, MD   multivitamin-minerals (CENTRUM) tablet Take by mouth  2/16/18  Historical Provider, MD   naproxen (NAPROSYN) 250 mg tablet Take 1 tablet by mouth 2 (two) times a day with meals 12/4/17 2/16/18  aHleigh Damon MD   naproxen sodium (ALEVE) 220 MG tablet Take by mouth  2/16/18  Historical Provider, MD Jelena Rico 100 UNIT/ML SOPN UP TO 70 UNTIS DAILY AS DIRECTED 12/29/17 2/16/18  Historical Provider, MD     Current Facility-Administered Medications   Medication Dose Route Frequency Provider Last Rate Last Dose    [START ON 2/17/2018] calcium carbonate (OYSTER SHELL,OSCAL) 500 mg tablet 1 tablet  1 tablet Oral Daily With Breakfast Manasi Revankar, DO        fish oil capsule 1,000 mg  1,000 mg Oral Daily Manasi Revankar, DO        insulin glargine (LANTUS) subcutaneous injection 30 Units  30 Units Subcutaneous HS Manasi Revankar, DO        insulin lispro (HumaLOG) 100 units/mL subcutaneous injection 1-5 Units  1-5 Units Subcutaneous TID AC Manasi Revankar, DO        levothyroxine tablet 137 mcg  137 mcg Oral Daily Manasi Revankar, DO        multivitamin-minerals (CENTRUM) tablet 2 tablet  2 tablet Oral Daily Manasi Revankar, DO        oxyCODONE (ROXICODONE) IR tablet 5 mg  5 mg Oral Q4H PRN Manasi Revankar, DO        pantoprazole (PROTONIX) EC tablet 40 mg  40 mg Oral Early Morning Manasi Revankar, DO        pravastatin (PRAVACHOL) tablet 40 mg  40 mg Oral Daily With [email or web address] Revankar, DO        rivaroxaban (XARELTO) tablet 15 mg  15 mg Oral Daily Manasi Revankar, DO        valsartan (DIOVAN) tablet 80 mg  80 mg Oral Daily Manasi Revankar, DO         Allergies   Allergen Reactions    Other      Adhesive tape    Ibuprofen Palpitations    Penicillins Rash       Review of systems  CONSTITUTIONAL:  No weight loss, fever, chills, weakness or fatigue  HEENT:  Eyes:  No visual loss, blurred vision, double vision or yellow sclerae  Ears, Nose, Throat:  No hearing loss, sneezing, congestion, runny nose or sore throat  SKIN:  No rash or itching  CARDIOVASCULAR: As per hpi  RESPIRATORY:  As per hpi  GASTROINTESTINAL:  No anorexia, nausea, vomiting or diarrhea  No abdominal pain or blood  GENITOURINARY:  Burning on urination  No flank pain  NEUROLOGICAL:  No headache, dizziness, syncope, paralysis, ataxia, numbness or tingling in the extremities  No change in bowel or bladder control  MUSCULOSKELETAL:  No muscle, back pain, joint pain or stiffness  HEMATOLOGIC:  No anemia, bleeding or bruising  LYMPHATICS:  No enlarged nodes  No history of splenectomy    PSYCHIATRIC:  No active suicidal or homicidal ideation  ENDOCRINOLOGIC:  No reports of sweating, cold or heat intolerance  No polyuria or polydipsia  ALLERGIES:  No history of asthma, hives, eczema or rhinitis  More than 10 systems reviewed and otherwise noncontributory  Objective   Vitals: Blood pressure 139/65, pulse 62, temperature (!) 95 6 °F (35 3 °C), temperature source Tympanic, resp  rate 20, height 5' 5" (1 651 m), weight 117 kg (258 lb 8 5 oz), SpO2 92 %, not currently breastfeeding  Physical Exam   Constitutional: She is oriented to person, place, and time  She appears well-developed and well-nourished  No distress  HENT:   Head: Normocephalic and atraumatic  Right Ear: External ear normal    Left Ear: External ear normal    Nose: Nose normal    Mouth/Throat: No oropharyngeal exudate  Eyes: Conjunctivae are normal  Pupils are equal, round, and reactive to light  Right eye exhibits no discharge  Left eye exhibits no discharge  No scleral icterus  Neck: Normal range of motion  No JVD present  No tracheal deviation present  No thyromegaly present  Cardiovascular: Intact distal pulses  An irregularly irregular rhythm present  Exam reveals gallop  Exam reveals no friction rub  Murmur heard  Pulmonary/Chest: Effort normal and breath sounds normal  No stridor  No respiratory distress  She has no wheezes  She has no rales  She exhibits no tenderness  Abdominal: Soft  Bowel sounds are normal  She exhibits distension  She exhibits no mass  There is no tenderness  There is no rebound and no guarding  Genitourinary:   Genitourinary Comments: No CVA tenderness   Musculoskeletal: She exhibits edema  She exhibits no tenderness or deformity  Neurological: She is alert and oriented to person, place, and time  She displays normal reflexes  She exhibits normal muscle tone  Skin: Skin is warm and dry  No rash noted  She is not diaphoretic  No erythema  Psychiatric: She has a normal mood and affect   Her behavior is normal  Judgment and thought content normal    Nursing note and vitals reviewed        Recent Results (from the past 24 hour(s))   CBC and differential    Collection Time: 02/16/18  6:05 AM   Result Value Ref Range    WBC 7 40 4 80 - 10 80 Thousand/uL    RBC 4 32 4 20 - 5 40 Million/uL    Hemoglobin 11 0 (L) 12 0 - 16 0 g/dL    Hematocrit 35 2 (L) 37 0 - 47 0 %    MCV 81 (L) 82 - 98 fL    MCH 25 4 (L) 27 0 - 31 0 pg    MCHC 31 3 (L) 31 4 - 37 4 g/dL    RDW 18 0 (H) 11 6 - 15 1 %    MPV 8 4 (L) 8 9 - 12 7 fL    Platelets 374 744 - 780 Thousands/uL    nRBC 0 /100 WBCs    Neutrophils Relative 71 43 - 75 %    Lymphocytes Relative 13 (L) 14 - 44 %    Monocytes Relative 12 4 - 12 %    Eosinophils Relative 3 0 - 6 %    Basophils Relative 1 0 - 1 %    Neutrophils Absolute 5 30 1 85 - 7 62 Thousands/µL    Lymphocytes Absolute 1 00 0 60 - 4 47 Thousands/µL    Monocytes Absolute 0 90 0 17 - 1 22 Thousand/µL    Eosinophils Absolute 0 20 0 00 - 0 61 Thousand/µL    Basophils Absolute 0 10 0 00 - 0 10 Thousands/µL   Protime-INR    Collection Time: 02/16/18  6:05 AM   Result Value Ref Range    Protime 14 5 (H) 9 4 - 11 7 seconds    INR 1 38 (H) 0 86 - 1 16   APTT    Collection Time: 02/16/18  6:05 AM   Result Value Ref Range    PTT 31 23 - 35 seconds   Comprehensive metabolic panel    Collection Time: 02/16/18  6:05 AM   Result Value Ref Range    Sodium 138 136 - 145 mmol/L    Potassium 4 6 3 5 - 5 3 mmol/L    Chloride 104 100 - 108 mmol/L    CO2 26 21 - 32 mmol/L    Anion Gap 8 4 - 13 mmol/L    BUN 62 (H) 5 - 25 mg/dL    Creatinine 1 42 (H) 0 60 - 1 30 mg/dL    Glucose 175 (H) 65 - 140 mg/dL    Calcium 8 8 8 3 - 10 1 mg/dL    AST 23 5 - 45 U/L    ALT 18 12 - 78 U/L    Alkaline Phosphatase 125 (H) 46 - 116 U/L    Total Protein 8 1 6 4 - 8 2 g/dL    Albumin 3 1 (L) 3 5 - 5 0 g/dL    Total Bilirubin 0 50 0 20 - 1 00 mg/dL    eGFR 38 ml/min/1 73sq m   Magnesium    Collection Time: 02/16/18  6:05 AM   Result Value Ref Range    Magnesium 2 4 1 6 - 2 6 mg/dL Phosphorus    Collection Time: 18  6:05 AM   Result Value Ref Range    Phosphorus 3 5 2 3 - 4 1 mg/dL   Troponin I    Collection Time: 18  6:05 AM   Result Value Ref Range    Troponin I <0 02 <=0 04 ng/mL   B-type natriuretic peptide    Collection Time: 18  6:05 AM   Result Value Ref Range    NT-proBNP 3,455 (H) <125 pg/mL   UA w Reflex to Microscopic w Reflex to Culture    Collection Time: 18  9:40 AM   Result Value Ref Range    Color, UA Colorless     Clarity, UA Clear     Specific [place], UA 1 010 1 000 - 1 030    pH, UA 6 0 5 0 - 9 0    Leukocytes, UA Negative Negative    Nitrite, UA Negative Negative    Protein, UA Negative Negative mg/dl    Glucose, UA Negative Negative mg/dl    Ketones, UA Negative Negative mg/dl    Urobilinogen, UA 0 2 0 2, 1 0 E U /dl E U /dl    Bilirubin, UA Negative Negative    Blood, UA Trace-Intact (A) Negative   Urine Microscopic    Collection Time: 18  9:40 AM   Result Value Ref Range    RBC, UA 1-2 (A) None Seen, 0-5 /hpf    WBC, UA 0-1 (A) None Seen, 0-5, 5-55, 5-65 /hpf    Epithelial Cells Occasional None Seen, Occasional /hpf    Bacteria, UA Occasional None Seen, Occasional /hpf     Imaging: I have personally reviewed pertinent films in PACS    Cardiac testing:   Results for orders placed during the hospital encounter of 16   Echo complete with contrast if indicated    Narrative [address]  [address] [address]  [phone]    Transthoracic Echocardiogram  2D, M-mode, Doppler, and Color Doppler    Study date:  2016    Patient: Kallie Lefort  MR number: [id number]  Account number: [de-identified]  : 1950  Age: 78 years  Gender: Female  Status: Routine  Location: Bedside  Height: 66 in  Weight: 241 6 lb  BP: 158/ 70 mmHg    Indications: Murmur    Diagnoses: 785 2 - CARDIAC MURMURS NEC    Sonographer:  Sumit Anguiano  Primary Physician:  Rojas Doyle  Referring Physician: Anna Marie Leung MD  Group:  [facility]  Interpreting Physician:  Alem Molina MD    IMPRESSIONS:  The examination was technically difficult  The left ventricle appeared normal  in size and overall systolic function  Large body habitus with main cause The  above features are consistent with cor pulmonale  Features were consistent with  pulmonary hypertension  SUMMARY    LEFT VENTRICLE:  Systolic function was normal by visual assessment  Ejection fraction was  estimated in the range of 60 % to 65 %  Although no diagnostic regional wall  motion abnormality was identified, this possibility cannot be completely  excluded on the basis of this study  Wall thickness was at the upper limits of normal     RIGHT VENTRICLE:  The ventricle was mildly to moderately dilated, based on parasternal short axis  view with poor visualization in apical views  Permanent pacemaker leads not  seen  Systolic pressure was moderately increased  Estimated peak pressure was  50 mmHg  MITRAL VALVE:  There was mild to moderate annular calcification  Transmitral velocity was increased due to nonrheumatic valvular stenosis, owing  to mitral annular calcification  There was mild nonrheumatic stenosis  There was trace regurgitation  AORTIC VALVE:  Leaflets exhibited moderately increased thickness, mild calcification, mildly  reduced cuspal separation, and reduced mobility  Transaortic velocity was mildly increased due to valvular stenosis  There was mild to moderate stenosis with peak and mean gradients of 17/9 mmHg  and aortic valve area of 1 6-1 7 cm squared  TRICUSPID VALVE:  There was trace regurgitation  Estimated peak PA pressure was 50 mmHg  The findings suggest moderate pulmonary hypertension  IVC, HEPATIC VEINS:  The inferior vena cava was markedly dilated  Respirophasic changes in dimension were absent      HISTORY: PRIOR HISTORY: HTN, DM, HL, GERD, Atrial Fibrillation    PROCEDURE: The procedure was performed at the bedside  This was a routine  study  The transthoracic approach was used  The study included complete 2D  imaging, M-mode, complete spectral Doppler, and color Doppler  The heart rate  was 68 bpm, at the start of the study  Images were not obtained from the  suprasternal notch acoustic windows  Echocardiographic views were limited due  to decreased penetration  This was a technically difficult study  LEFT VENTRICLE: Size was normal  Systolic function was normal by visual  assessment  Ejection fraction was estimated in the range of 60 % to 65 %  Although no diagnostic regional wall motion abnormality was identified, this  possibility cannot be completely excluded on the basis of this study  Wall  thickness was at the upper limits of normal  DOPPLER: Left ventricular  diastolic function could not be assessed because of atrial fibrillation and  presence of nonrheumatic mitral valve stenosis  RIGHT VENTRICLE: The ventricle was mildly to moderately dilated, based on  parasternal short axis view with poor visualization in apical views  Permanent  pacemaker leads not seen  Systolic function was low normal  DOPPLER: Systolic  pressure was moderately increased  Estimated peak pressure was 50 mmHg  LEFT ATRIUM: Size was at the upper limits of normal     RIGHT ATRIUM: Not well visualized  MITRAL VALVE: There was mild to moderate annular calcification  DOPPLER:  Transmitral velocity was increased due to nonrheumatic valvular stenosis, owing  to mitral annular calcification  There was mild nonrheumatic stenosis  There  was trace regurgitation  AORTIC VALVE: Leaflets exhibited moderately increased thickness, mild  calcification, mildly reduced cuspal separation, and reduced mobility  DOPPLER:  Transaortic velocity was mildly increased due to valvular stenosis  There was  mild to moderate stenosis with peak and mean gradients of 17/9 mmHg and aortic  valve area of 1 6-1 7 cm squared   There was no regurgitation  TRICUSPID VALVE: DOPPLER: There was trace regurgitation  Estimated peak PA  pressure was 50 mmHg  The findings suggest moderate pulmonary hypertension  PULMONIC VALVE: Not well visualized  DOPPLER: The transpulmonic velocity was  within the normal range  There was no regurgitation  AORTA: The root exhibited normal size  SYSTEMIC VEINS: IVC: The inferior vena cava was markedly dilated  Respirophasic  changes in dimension were absent  SYSTEM MEASUREMENT TABLES    2D mode  AoR Diam 2D: 3 1 cm  LA Diam (2D): 4 9 cm  LA/Ao (2D): 1 58  FS (2D Teich): 26 8 %  IVSd (2D): 1 27 cm  LVDEV: 86 3 cm³  LVESV: 41 cm³  LVIDd(2D): 4 37 cm  LVISd (2D): 3 2 cm  LVOT Area 2D: 2 84 cm squared  LVPWd (2D): 1 22 cm  SV (Teich): 45 3 cm³    Apical four chamber  LVEF A4C: 50 %    Unspecified Scan Mode  TROY Cont Eq (Peak Carlos): 1 68 cm squared  TROY Cont Eq (VTI): 1 63 cm squared  LVOT (VTI): 28 9 cm  LVOT Diam : 1 9 cm  LVOT Vmax: 1200 mm/s  LVOT Vmax; Mean: 1200 mm/s  Peak Grad ; Mean: 6 mm[Hg]  SV (LVOT): 82 cm³  VTI;Mean: 3 mm[Hg]  MV Peak E Carlos  Mean: 1640 mm/s  MVA (PHT): 3 24 cm squared  PHT: 68 ms  Max P mm[Hg]  V Max: 3120 mm/s  Vmax: 2870 mm/s  RA Area: 21 9 cm squared  RA Volume: 60 5 cm³  TAPSE: 1 7 cm    [employer] Accredited Echocardiography Laboratory    Prepared and electronically signed by    Sharon Madrigal MD  Signed 2016 21:50:09       No results found for this or any previous visit  EKG: atrial fibrillation nonspecific st-t wave changes    Counseling / Coordination of Care  Total time spent today 70 minutes  Greater than 50% of total time was spent with the patient and / or family counseling and / or coordination of care  Heart failure, mitral stenosis, aortic stenosis, renal insufficiency  Kay Chan MD [facility]      "This note has been constructed using a voice recognition system  Therefore there may be syntax, spelling, and/or grammatical errors   Please call if you have any questions  "

## 2018-02-17 PROBLEM — E66.01 MORBID OBESITY WITH BMI OF 40.0-44.9, ADULT (HCC): Status: ACTIVE | Noted: 2018-02-17

## 2018-02-17 LAB
ANION GAP SERPL CALCULATED.3IONS-SCNC: 11 MMOL/L (ref 4–13)
ATRIAL RATE: 156 BPM
BUN SERPL-MCNC: 57 MG/DL (ref 5–25)
CALCIUM SERPL-MCNC: 8.4 MG/DL (ref 8.3–10.1)
CHLORIDE SERPL-SCNC: 102 MMOL/L (ref 100–108)
CO2 SERPL-SCNC: 26 MMOL/L (ref 21–32)
CREAT SERPL-MCNC: 1.38 MG/DL (ref 0.6–1.3)
ERYTHROCYTE [DISTWIDTH] IN BLOOD BY AUTOMATED COUNT: 18 % (ref 11.6–15.1)
EST. AVERAGE GLUCOSE BLD GHB EST-MCNC: 189 MG/DL
GFR SERPL CREATININE-BSD FRML MDRD: 39 ML/MIN/1.73SQ M
GLUCOSE SERPL-MCNC: 140 MG/DL (ref 65–140)
GLUCOSE SERPL-MCNC: 147 MG/DL (ref 65–140)
GLUCOSE SERPL-MCNC: 174 MG/DL (ref 65–140)
GLUCOSE SERPL-MCNC: 192 MG/DL (ref 65–140)
GLUCOSE SERPL-MCNC: 207 MG/DL (ref 65–140)
HBA1C MFR BLD: 8.2 % (ref 4.2–6.3)
HCT VFR BLD AUTO: 32.6 % (ref 37–47)
HGB BLD-MCNC: 10.1 G/DL (ref 12–16)
MCH RBC QN AUTO: 25.2 PG (ref 27–31)
MCHC RBC AUTO-ENTMCNC: 30.9 G/DL (ref 31.4–37.4)
MCV RBC AUTO: 82 FL (ref 82–98)
MRSA NOSE QL CULT: NORMAL
PLATELET # BLD AUTO: 178 THOUSANDS/UL (ref 130–400)
PMV BLD AUTO: 8.8 FL (ref 8.9–12.7)
POTASSIUM SERPL-SCNC: 3.9 MMOL/L (ref 3.5–5.3)
QRS AXIS: -78 DEGREES
QRSD INTERVAL: 102 MS
QT INTERVAL: 420 MS
QTC INTERVAL: 462 MS
RBC # BLD AUTO: 4 MILLION/UL (ref 4.2–5.4)
SODIUM SERPL-SCNC: 139 MMOL/L (ref 136–145)
T WAVE AXIS: 255 DEGREES
TSH SERPL DL<=0.05 MIU/L-ACNC: 2.63 UIU/ML (ref 0.36–3.74)
VENTRICULAR RATE: 73 BPM
WBC # BLD AUTO: 7.3 THOUSAND/UL (ref 4.8–10.8)

## 2018-02-17 PROCEDURE — G8979 MOBILITY GOAL STATUS: HCPCS

## 2018-02-17 PROCEDURE — 85027 COMPLETE CBC AUTOMATED: CPT | Performed by: FAMILY MEDICINE

## 2018-02-17 PROCEDURE — 80048 BASIC METABOLIC PNL TOTAL CA: CPT | Performed by: FAMILY MEDICINE

## 2018-02-17 PROCEDURE — 82948 REAGENT STRIP/BLOOD GLUCOSE: CPT

## 2018-02-17 PROCEDURE — G8978 MOBILITY CURRENT STATUS: HCPCS

## 2018-02-17 PROCEDURE — 83036 HEMOGLOBIN GLYCOSYLATED A1C: CPT | Performed by: FAMILY MEDICINE

## 2018-02-17 PROCEDURE — 84443 ASSAY THYROID STIM HORMONE: CPT | Performed by: FAMILY MEDICINE

## 2018-02-17 PROCEDURE — 97161 PT EVAL LOW COMPLEX 20 MIN: CPT

## 2018-02-17 PROCEDURE — 99232 SBSQ HOSP IP/OBS MODERATE 35: CPT | Performed by: FAMILY MEDICINE

## 2018-02-17 PROCEDURE — 99232 SBSQ HOSP IP/OBS MODERATE 35: CPT | Performed by: INTERNAL MEDICINE

## 2018-02-17 RX ORDER — OXYCODONE HYDROCHLORIDE 5 MG/1
5 TABLET ORAL EVERY 4 HOURS PRN
Status: DISCONTINUED | OUTPATIENT
Start: 2018-02-17 | End: 2018-02-21 | Stop reason: HOSPADM

## 2018-02-17 RX ADMIN — PRAVASTATIN SODIUM 40 MG: 40 TABLET ORAL at 15:37

## 2018-02-17 RX ADMIN — PANTOPRAZOLE SODIUM 40 MG: 40 TABLET, DELAYED RELEASE ORAL at 06:25

## 2018-02-17 RX ADMIN — Medication 1 TABLET: at 07:45

## 2018-02-17 RX ADMIN — INSULIN LISPRO 1 UNITS: 100 INJECTION, SOLUTION INTRAVENOUS; SUBCUTANEOUS at 17:44

## 2018-02-17 RX ADMIN — FUROSEMIDE 60 MG: 10 INJECTION, SOLUTION INTRAMUSCULAR; INTRAVENOUS at 15:37

## 2018-02-17 RX ADMIN — INSULIN LISPRO 1 UNITS: 100 INJECTION, SOLUTION INTRAVENOUS; SUBCUTANEOUS at 12:11

## 2018-02-17 RX ADMIN — OXYCODONE HYDROCHLORIDE 5 MG: 5 TABLET ORAL at 07:44

## 2018-02-17 RX ADMIN — FUROSEMIDE 60 MG: 10 INJECTION, SOLUTION INTRAMUSCULAR; INTRAVENOUS at 07:46

## 2018-02-17 RX ADMIN — Medication 2 TABLET: at 08:51

## 2018-02-17 RX ADMIN — LEVOTHYROXINE SODIUM 137 MCG: 112 TABLET ORAL at 08:51

## 2018-02-17 RX ADMIN — INSULIN GLARGINE 30 UNITS: 100 INJECTION, SOLUTION SUBCUTANEOUS at 22:25

## 2018-02-17 RX ADMIN — OXYCODONE HYDROCHLORIDE 5 MG: 5 TABLET ORAL at 03:06

## 2018-02-17 RX ADMIN — Medication 1000 MG: at 08:51

## 2018-02-17 RX ADMIN — VALSARTAN 80 MG: 80 TABLET ORAL at 08:51

## 2018-02-17 RX ADMIN — RIVAROXABAN 15 MG: 15 TABLET, FILM COATED ORAL at 08:51

## 2018-02-17 NOTE — ASSESSMENT & PLAN NOTE
Xarelto on hold for cardiac catheterization as per Cardiology recommendations  As per prior notes, patient has underlying very slow ventricular rate and has [employer] pacemaker in place which is functioning adequately    Pacemaker was interrogated December 2017

## 2018-02-17 NOTE — MALNUTRITION/BMI
This medical record reflects one or more clinical indicators suggestive of malnutrition and/or morbid obesity  Malnutrition Findings:              BMI Findings:  BMI Classifications: Morbid Obesity 40-44 9     Body mass index is 42 6 kg/m²  See Nutrition note dated 2/17/18 for additional details  Completed nutrition assessment is viewable in the nutrition documentation

## 2018-02-17 NOTE — PHYSICAL THERAPY NOTE
PT EVALUATION       02/17/18 1145   Note Type   Note type Eval only   Pain Assessment   Pain Assessment No/denies pain   Home Living   Type of Home House  (1 level, 6 MICHELINE)   Home Equipment Cane;Walker   Prior Function   Level of Centre Independent with ADLs and functional mobility  (ambulates with cane prn)   Lives With Alone  (family lives upstairs)   ADL Assistance Independent   General   Additional Pertinent History admitted with rapid weight gain, diagnosed with CHF   Cognition   Overall Cognitive Status WFL   RUE Assessment   RUE Assessment WFL   LUE Assessment   LUE Assessment WFL   RLE Assessment   RLE Assessment WFL   LLE Assessment   LLE Assessment WFL   Bed Mobility   Supine to Sit 7  Independent   Transfers   Sit to Stand 7  Independent   Stand to Sit 7  Independent   Stand pivot 7  Independent   Toilet transfer 7  Independent   Ambulation/Elevation   Gait pattern [facility])   Gait Assistance 6  Modified independent   Assistive Device (with cane or walker)   Distance 2x 60 feet each   Balance   Static Sitting Good   Dynamic Sitting Good   Static Standing Good   Dynamic Standing Fair   Activity Tolerance   Activity Tolerance Patient tolerated treatment well   Assessment   Prognosis Good   Problem List Decreased strength; Impaired balance;Decreased mobility; Decreased endurance   Assessment Patient seen for Physical Therapy evaluation  Patient admitted with Acute on chronic diastolic CHF (congestive heart failure) ([facility] [address] )  Comorbidities affecting patient's physical performance include: DM, obesity, htn, CHF  Personal factors affecting patient at time of initial evaluation include: ambulating with assistive device and stairs to enter home  Prior to admission, patient was independent with functional mobility without assistive device    Please find objective findings from Physical Therapy assessment regarding body systems outlined above with impairments and limitations including weakness, impaired balance, decreased endurance, decreased activity tolerance and decreased functional mobility tolerance  The Barthel Index was used as a functional outcome tool presenting with a score of 100 today indicating no limitations of functional mobility and ADLS  Patient's clinical presentation is currently stable  as seen in patient's presentation of new onset of impairment of functional mobility, decreased endurance and new onset of weakness  Pt would benefit from continued Physical Therapy treatment to address deficits as defined above and maximize level of functional mobility  As demonstrated by objective findings, the assigned level of complexity for this evaluation is low  PT IS WEAKER THAN BASELINE BUT IS INDEPENDENT WITH ADL AND FUNCTIONAL MOBILITY  RECOMMEND PT AMBULATE AD GABBY ON UNIT WITH WALKER OR CANE  ANTICIPATE PT WILL RETURN TO BASELINE AS MEDICAL CONDITION IMPROVED  NO ADDITIONAL SKILLED PT NEEDS AT THIS TIME     Goals   Patient Goals GO HOME   Plan   Treatment/Interventions (PT TO AMBULATE AD GABBY/DC PT)   Recommendation   Recommendation (pt to ambulate ad gabby)   Equipment Recommended (pt has a cane or walker if needed)   Barthel Index   Feeding 10   Bathing 5   Grooming Score 5   Dressing Score 10   Bladder Score 10   Bowels Score 10   Toilet Use Score 10   Transfers (Bed/Chair) Score 15   Mobility (Level Surface) Score 15   Stairs Score 10   Barthel Index Score 100

## 2018-02-17 NOTE — ASSESSMENT & PLAN NOTE
Symptoms improving  Still with leg swelling which is worse than baseline   Continue diuresis with 60 mg IV Lasix b i d and Diovan  Repeat echo during this admission shows worsening of systolic function  EF is now about 40 %  EF was 60-65% in 2016  Troponins negative  Strict I/Os and daily weight  Cardiac catheterization in a m  to evaluate for reduction in EF

## 2018-02-17 NOTE — PLAN OF CARE
CARDIOVASCULAR - ADULT     Maintains optimal cardiac output and hemodynamic stability Progressing     Absence of cardiac dysrhythmias or at baseline rhythm Progressing        DISCHARGE PLANNING     Discharge to home or other facility with appropriate resources Progressing        DISCHARGE PLANNING - CARE MANAGEMENT     Discharge to post-acute care or home with appropriate resources Progressing        INFECTION - ADULT     Absence or prevention of progression during hospitalization Progressing     Absence of fever/infection during neutropenic period Progressing        Knowledge Deficit     Patient/family/caregiver demonstrates understanding of disease process, treatment plan, medications, and discharge instructions Progressing        METABOLIC, FLUID AND ELECTROLYTES - ADULT     Glucose maintained within target range Progressing        PAIN - ADULT     Verbalizes/displays adequate comfort level or baseline comfort level Progressing        Potential for Falls     Patient will remain free of falls Progressing        SAFETY ADULT     Maintain or return to baseline ADL function Progressing     Maintain or return mobility status to optimal level Progressing

## 2018-02-17 NOTE — ASSESSMENT & PLAN NOTE
Creatinine appears stable overall  Monitor creatinine closely while on diuretics  Repeat level in the a m

## 2018-02-17 NOTE — PROGRESS NOTES
Pt ambulated in hallway, 300 ft with the walker tolerated well  No oxygen while  Ambulating   Desaturated to 88% after ambulating, O2 back on and up to 94% on 2l NC

## 2018-02-18 PROBLEM — Z98.890 H/O TRICUSPID VALVE ANNULOPLASTY: Status: ACTIVE | Noted: 2018-02-18

## 2018-02-18 PROBLEM — I50.43 ACUTE ON CHRONIC COMBINED SYSTOLIC AND DIASTOLIC CHF (CONGESTIVE HEART FAILURE) (HCC): Status: ACTIVE | Noted: 2018-02-16

## 2018-02-18 LAB
GLUCOSE SERPL-MCNC: 119 MG/DL (ref 65–140)
GLUCOSE SERPL-MCNC: 179 MG/DL (ref 65–140)
GLUCOSE SERPL-MCNC: 188 MG/DL (ref 65–140)
GLUCOSE SERPL-MCNC: 267 MG/DL (ref 65–140)

## 2018-02-18 PROCEDURE — 99232 SBSQ HOSP IP/OBS MODERATE 35: CPT | Performed by: FAMILY MEDICINE

## 2018-02-18 PROCEDURE — 82948 REAGENT STRIP/BLOOD GLUCOSE: CPT

## 2018-02-18 PROCEDURE — 99232 SBSQ HOSP IP/OBS MODERATE 35: CPT | Performed by: INTERNAL MEDICINE

## 2018-02-18 RX ADMIN — FUROSEMIDE 60 MG: 10 INJECTION, SOLUTION INTRAMUSCULAR; INTRAVENOUS at 16:32

## 2018-02-18 RX ADMIN — PANTOPRAZOLE SODIUM 40 MG: 40 TABLET, DELAYED RELEASE ORAL at 06:37

## 2018-02-18 RX ADMIN — INSULIN GLARGINE 30 UNITS: 100 INJECTION, SOLUTION SUBCUTANEOUS at 21:40

## 2018-02-18 RX ADMIN — BISACODYL 5 MG: 5 TABLET, COATED ORAL at 06:42

## 2018-02-18 RX ADMIN — RIVAROXABAN 15 MG: 15 TABLET, FILM COATED ORAL at 08:33

## 2018-02-18 RX ADMIN — VALSARTAN 80 MG: 80 TABLET ORAL at 08:33

## 2018-02-18 RX ADMIN — Medication 1 TABLET: at 08:33

## 2018-02-18 RX ADMIN — OXYCODONE HYDROCHLORIDE 5 MG: 5 TABLET ORAL at 06:37

## 2018-02-18 RX ADMIN — INSULIN LISPRO 2 UNITS: 100 INJECTION, SOLUTION INTRAVENOUS; SUBCUTANEOUS at 12:12

## 2018-02-18 RX ADMIN — LEVOTHYROXINE SODIUM 137 MCG: 112 TABLET ORAL at 08:33

## 2018-02-18 RX ADMIN — PRAVASTATIN SODIUM 40 MG: 40 TABLET ORAL at 16:31

## 2018-02-18 RX ADMIN — OXYCODONE HYDROCHLORIDE 5 MG: 5 TABLET ORAL at 01:28

## 2018-02-18 RX ADMIN — INSULIN LISPRO 1 UNITS: 100 INJECTION, SOLUTION INTRAVENOUS; SUBCUTANEOUS at 16:33

## 2018-02-18 RX ADMIN — FUROSEMIDE 60 MG: 10 INJECTION, SOLUTION INTRAMUSCULAR; INTRAVENOUS at 08:33

## 2018-02-18 RX ADMIN — Medication 2 TABLET: at 08:34

## 2018-02-18 RX ADMIN — Medication 1000 MG: at 08:34

## 2018-02-18 NOTE — PROGRESS NOTES
[facility] Progress Note  Patient: Keenan Huertas 70 y o  female   MRN: [id number]  PCP: No primary care provider on file  Unit/Bed#: [address] [place] [address] Encounter: [id number]  Date Of Visit: 02/17/18    Problem List:    Principal Problem:    Acute on chronic diastolic CHF (congestive heart failure) ([facility])  Active Problems:    Chronic a-fib ([facility])    Coronary artery disease involving coronary bypass graft of native heart without angina pectoris    Long-term insulin use in type 2 diabetes ([facility] )    Hypertension    Hyperlipidemia    CKD (chronic kidney disease) stage 3, GFR 30-59 ml/min      Assessment & Plan:    * Acute on chronic diastolic CHF (congestive heart failure) ([facility] )   Assessment & Plan    Symptoms improving  Continue diuresis with 60 mg IV Lasix twice a day  Continue Diovan  Repeat echo during this admission shows worsening of systolic function  EF is now about 40 %  EF was 60-65% in 2016  Troponins negative  Strict I/Os and daily weight          Chronic a-fib ([facility])   Assessment & Plan    Continue Xarelto  As per prior notes, patient has underlying very slow ventricular rate and has Medtronic pacemaker in place which is functioning adequately  Pacemaker was interrogated December 2017        Coronary artery disease involving coronary bypass graft of native heart without angina pectoris   Assessment & Plan    Continue statin, fish oil        CKD (chronic kidney disease) stage 3, GFR 30-59 ml/min   Assessment & Plan    Creatinine appears somewhat better today compared to yesterday  Monitor creatinine closely while on diuretics  Repeat level in the a m          Hyperlipidemia   Assessment & Plan    Continue statin        Hypertension   Assessment & Plan    On Diovan and monitor blood pressure        Long-term insulin use in type 2 diabetes ([facility])   Assessment & Plan    Continue Lantus and Humalog sliding scale insulin  hemoglobin A1c of 8 2              VTE Pharmacologic Prophylaxis: Pharmacologic: Rivaroxaban (Xarelto)  Mechanical VTE Prophylaxis in Place: Yes    Patient Centered Rounds: I have performed bedside rounds with nursing staff today  Discussions with Specialists or Other Care Team Provider: Yes    Education and Discussions with Family / Patient:Yes    Time Spent for Care: 30 minutes  More than 50% of total time spent on counseling and coordination of care as described above  Current Length of Stay: 1 day(s)    Current Patient Status: Inpatient     Discharge Plan: home    Code Status: Level 1 - Full Code      Subjective:   States that her legs do not feel as heavy today  She did have cramping last night which has improved today  Denies any abdominal pain    Objective:     Vitals:   Temp (24hrs), Av 3 °F (36 8 °C), Min:97 7 °F (36 5 °C), Max:98 6 °F (37 °C)    HR:  [63-73] 65  Resp:  [18-20] 18  BP: (129-161)/(59-71) 131/59  SpO2:  [90 %-95 %] 95 %  Body mass index is 42 6 kg/m²  Input and Output Summary (last 24 hours): Intake/Output Summary (Last 24 hours) at 18  Last data filed at 18 1700   Gross per 24 hour   Intake              540 ml   Output             2150 ml   Net            -1610 ml       Physical Exam:     Physical Exam   Constitutional: She is oriented to person, place, and time  She appears well-developed and well-nourished  No distress  HENT:   Head: Normocephalic and atraumatic  Eyes: EOM are normal  Right eye exhibits no discharge  Left eye exhibits no discharge  No scleral icterus  Neck: Neck supple  Cardiovascular: Normal rate and regular rhythm  Murmur heard  Pulmonary/Chest: Effort normal and breath sounds normal  No respiratory distress  She has no wheezes  She has no rales  Abdominal: Soft  Bowel sounds are normal  She exhibits no distension  There is no tenderness  Reducible, nontender umbilical hernia   Musculoskeletal: She exhibits edema (B/L L  E)     Neurological: She is alert and oriented to person, place, and time  No cranial nerve deficit  Skin: Skin is dry  She is not diaphoretic  Psychiatric: She has a normal mood and affect  Additional Data:     Labs:      Results from last 7 days  Lab Units 02/17/18  0551 02/16/18  0605   WBC Thousand/uL 7 30 7 40   HEMOGLOBIN g/dL 10 1* 11 0*   HEMATOCRIT % 32 6* 35 2*   PLATELETS Thousands/uL 178 199   NEUTROS PCT %  --  71   LYMPHS PCT %  --  13*   MONOS PCT %  --  12   EOS PCT %  --  3       Results from last 7 days  Lab Units 02/17/18  0551 02/16/18  0605   SODIUM mmol/L 139 138   POTASSIUM mmol/L 3 9 4 6   CHLORIDE mmol/L 102 104   CO2 mmol/L 26 26   BUN mg/dL 57* 62*   CREATININE mg/dL 1 38* 1 42*   CALCIUM mg/dL 8 4 8 8   TOTAL PROTEIN g/dL  --  8 1   BILIRUBIN TOTAL mg/dL  --  0 50   ALK PHOS U/L  --  125*   ALT U/L  --  18   AST U/L  --  23   GLUCOSE RANDOM mg/dL 140 175*       Results from last 7 days  Lab Units 02/16/18  0605   INR  1 38*       * I Have Reviewed All Lab Data Listed Above  * Additional Pertinent Lab Tests Reviewed: All Labs For Current Hospital Admission Reviewed    Imaging:  Xr Chest 1 View Portable    Result Date: 2/16/2018  Narrative: CHEST INDICATION:  Shortness of breath  COMPARISON:  11/04/2016 VIEWS:   AP frontal IMAGES:  1 FINDINGS: Cardiomediastinal silhouette appears enlarged  A median sternotomy has been performed  A permanent pacemaker is present  There are no infiltrates  The patient appears to be in mild heart failure  Visualized osseous structures appear within normal limits for the patient's age  Impression: Cardiomegaly and mild CHF  Workstation performed: [id number]     Imaging Reports Reviewed by myself    Cultures:   Blood Culture:   Lab Results   Component Value Date    BLOODCX No Growth After 5 Days  10/15/2017    BLOODCX No Growth After 5 Days   10/15/2017     Urine Culture:   Lab Results   Component Value Date    URINECX >100,000 cfu/ml Klebsiella pneumoniae 08/15/2016     Sputum Culture: No components found for: SPUTUMCX  Wound Culture: No results found for: WOUNDCULT    Last 24 Hours Medication List:     Current Facility-Administered Medications:  bisacodyl 5 mg Oral Daily PRN Kate Arora MD   calcium carbonate 1 tablet Oral Daily With Breakfast Vero Revankar, DO   fish oil 1,000 mg Oral Daily Vero Revankar, DO   furosemide 60 mg Intravenous BID (diuretic) Sara Washington MD   insulin glargine 30 Units Subcutaneous HS Vero Revankar, DO   insulin lispro 1-5 Units Subcutaneous TID AC Vero Revankar, DO   levothyroxine 137 mcg Oral Daily Vero Revankar, DO   multivitamin-minerals 2 tablet Oral Daily Vero Revankar, DO   oxyCODONE 5 mg Oral Q4H PRN Kate Arora MD   pantoprazole 40 mg Oral Early Morning Vero Revankar, DO   pravastatin 40 mg Oral Daily With John-Padmini Revankar, DO   rivaroxaban 15 mg Oral Daily Vero Revankar, DO   valsartan 80 mg Oral Daily Vero Revankar, DO        Today, Patient Was Seen By: Shahram Palma DO    ** Please Note: Dragon 360 Dictation voice to text software may have been used in the creation of this document   **

## 2018-02-18 NOTE — CASE MANAGEMENT
Initial Clinical Review    Admission: Date/Time/Statement: 2/16/18 @ 1526     Orders Placed This Encounter   Procedures    Place in Observation (expected length of stay for this patient is less than two midnights)     Standing Status:   Standing     Number of Occurrences:   1     Order Specific Question:   Admitting Physician     Answer:   Luis Fernando Resendez [[id number]]     Order Specific Question:   Level of Care     Answer:   Med Surg [16]    Inpatient Admission     Standing Status:   Standing     Number of Occurrences:   1     Order Specific Question:   Admitting Physician     Answer:   Elida Toscano     Order Specific Question:   Level of Care     Answer:   Med Surg [16]     Order Specific Question:   Estimated length of stay     Answer:   More than 2 Midnights     Order Specific Question:   Certification     Answer:   I certify that inpatient services are medically necessary for this patient for a duration of greater than two midnights  See H&P and MD Progress Notes for additional information about the patient's course of treatment  ED: Date/Time/Mode of Arrival:   ED Arrival Information     Expected Arrival Acuity Means of Arrival Escorted By Service Admission Type    - 2/16/2018 05:45 Emergent Walk-In Family Member General Medicine Emergency    Arrival Complaint    HEART PROBLEM,SOB,NAUSEA      Chief Complaint:   Chief Complaint   Patient presents with    Shortness of Breath     pt states she has gained 7 pounds in 5 days and today about 1 hour ago she developed shortness of breath and nausea   History of Illness:   39-year-old female with past medical history of CHF, diabetes, hyperlipidemia, GERD, hypertension, atrial fibrillation, arthritis, CAD, presents to the ER with worsening shortness of breath for the past 4 days  Patient is on Lasix 80 milligrams every other day and 40 milligrams every other day  Patient notes 7 pounds weight gain over the past 4 days    Patient came to the ER today as her shortness of breath is increasing  Patient now notes bilateral pedal edema  Patient denies any chest pain, nausea, vomiting, diarrhea, dysuria, headaches, weakness, dizziness  ED Vital Signs:   ED Triage Vitals   Temperature Pulse Respirations Blood Pressure SpO2   02/16/18 0610 02/16/18 0553 02/16/18 0553 02/16/18 0553 02/16/18 0553   98 4 °F (36 9 °C) 78 (!) 28 166/68 94 %      Temp Source Heart Rate Source Patient Position - Orthostatic VS BP Location FiO2 (%)   02/16/18 0610 02/16/18 0553 02/16/18 0553 02/16/18 0553 --   Tympanic Monitor Lying Left arm       Pain Score       02/16/18 0935       No Pain        Wt Readings from Last 1 Encounters:   02/18/18 116 kg (256 lb 3 2 oz)   Vital Signs (abnormal):   T 95 6  Abnormal Labs/Diagnostic Test Results:   HGB 11 PT 14 5 INR 1 38 BUN 62 CR 1 42 GLUC 175 ALK PHOS 125 ALB 3 1 GFR 38 BNP 3455   TROP NEG  U/A+BLOOD  CXR=Cardiomegaly and mild CHF  EKG=Irregularly irregular rhythm, rate 73, normal axis, no acute ST elevations noted, T-wave flattening noted throughout, essentially unchanged EKG from previous  ED Treatment:   Medication Administration from 02/16/2018 0545 to 02/16/2018 0757       Date/Time Order Dose Route Action Action by Comments     02/16/2018 0657 furosemide (LASIX) injection 80 mg 80 mg Intravenous Given Antonino Chan RN       Past Medical/Surgical History:    Active Ambulatory Problems     Diagnosis Date Noted    Long-term insulin use in type 2 diabetes ([facility] ) 06/04/2016    Obesity 06/04/2016    Gallbladder calculus without cholecystitis 06/06/2016    Symptomatic cholelithiasis 15/36/9834    Umbilical hernia 31/80/3126    SOB (shortness of breath) 08/15/2016    HTN (hypertension) 08/15/2016    Pneumonia 08/15/2016    Leukocytosis 08/15/2016    UTI (urinary tract infection) 08/17/2016    Anemia 08/17/2016    Coronary artery disease involving coronary bypass graft of native heart without angina pectoris 08/17/2016    Combined systolic and diastolic congestive heart failure ([place] [address] ) 08/17/2016    Vocal cord paralysis 08/17/2016    Diabetes mellitus [facility])      Resolved Ambulatory Problems     Diagnosis Date Noted    Epigastric pain 06/04/2016     Past Medical History:   Diagnosis Date    Arthritis     Atrial flutter ([place] [address] )     Cardiac disease     Carotid artery occlusion     [facility]congest[facility] failure) ([facility])     Coronary artery disease     Diabetes mellitus ([place] [address] )     Disease of thyroid gland     GERD (gastroesophageal reflux disease)     History of transfusion     Hyperlipidemia     Hypertension     Other specified diabetes mellitus with diabetic autonomic (poly)neuropathy ([place] [address] )     Renal disorder     Sleep apnea    Admitting Diagnosis: Shortness of breath [R06 02]  CHF exacerbation ([place] [address] ) [I50 9]  Age/Sex: 70 y o  female  Assessment/Plan:   * Acute on chronic diastolic CHF (congestive heart failure) ([place] [address] )   Assessment & Plan     Patient received a dose of IV Lasix in the ER  Admit to telemetry  Cardiology consult  Patient started on 60 mg IV Lasix twice a day  Continue Diovan  Repeat echo today shows worsening of systolic function  EF is now about 40 %  EF was 60-65% in 2016  Troponins negative  Strict I/Os and daily weight       Chronic a-fib ([facility])   Assessment & Plan     Continue Xarelto  As per prior notes, Patient has underlying very slow ventricular rate and has Medtronic pacemaker in place which is functioning adequately   Pacemaker was interrogated December 2017       Coronary artery disease involving coronary bypass graft of native heart without angina pectoris   Assessment & Plan     Continue fish oil, statin  Cardiology consult as noted above       CKD (chronic kidney disease) stage 3, GFR 30-59 ml/min   Assessment & Plan     Creatinine appears somewhat higher than prior values  Monitor creatinine closely while on diuretics    Repeat level in the a m        Hyperlipidemia   Assessment & Plan     Continue statin        Hypertension   Assessment & Plan     Continue Diovan and monitor blood pressure       Long-term insulin use in type 2 diabetes ([facility])   Assessment & Plan     Continue Lantus and Humalog sliding scale insulin  Check hemoglobin A1c level in the a m     Admission Orders:  TELEMETRY  PT/OT EVAL & TX  CONSULT CARDIO  CHF EDUCATION  RODDY  ACCKEVINS WITH COVERAGE SCALE  Scheduled Meds:   Current Facility-Administered Medications:  bisacodyl 5 mg Oral Daily PRN Susanna Blakely MD   calcium carbonate 1 tablet Oral Daily With Breakfast Vero Revankar, DO   fish oil 1,000 mg Oral Daily Vero Revankar, DO   furosemide 60 mg Intravenous BID (diuretic) Emerson Arias MD   insulin glargine 30 Units Subcutaneous HS Vero Revankar, DO   insulin lispro 1-5 Units Subcutaneous TID AC Vero Revankar, DO   levothyroxine 137 mcg Oral Daily Vero Revankar, DO   multivitamin-minerals 2 tablet Oral Daily Vero Revankar, DO   oxyCODONE 5 mg Oral Q4H PRN Susanna Blakely MD   pantoprazole 40 mg Oral Early Morning Vero Revankar, DO   pravastatin 40 mg Oral Daily With Charenton-Padmini Revankar, DO   rivaroxaban 15 mg Oral Daily Vero Revankar, DO   valsartan 80 mg Oral Daily Vero Revankar, DO     Continuous Infusions:    PRN Meds:   bisacodyl    oxyCODONE

## 2018-02-18 NOTE — PROGRESS NOTES
Progress Note - Cardiology   Rohan Medina 70 y o  female MRN: [id number]  Unit/Bed#: [address] [place] [address] Encounter: [id number]  02/17/18  10:55 PM        Assessment:  1  Acute on chronic combined systolic and diastolic CHF - EF 91% on recent echocardiogram which is reduced from previous  2   Aortic stenosis + mild regurgitation on echocardiogram  3  CAD s/p 4 vessel CABG  4  H/o tricuspid valve annuloplasty - stable gradients  5  DM  6  Hypothyroidism  7  Pulmonary hypertension    Plan:  As above  Continue IV diuresis      Subjective: Rohan Medina denies any chest pain  Still with LE edema  Meds/Allergies   current meds:   Current Facility-Administered Medications   Medication Dose Route Frequency    bisacodyl (DULCOLAX) EC tablet 5 mg  5 mg Oral Daily PRN    calcium carbonate (OYSTER SHELL,OSCAL) 500 mg tablet 1 tablet  1 tablet Oral Daily With Breakfast    fish oil capsule 1,000 mg  1,000 mg Oral Daily    furosemide (LASIX) injection 60 mg  60 mg Intravenous BID (diuretic)    insulin glargine (LANTUS) subcutaneous injection 30 Units  30 Units Subcutaneous HS    insulin lispro (HumaLOG) 100 units/mL subcutaneous injection 1-5 Units  1-5 Units Subcutaneous TID AC    levothyroxine tablet 137 mcg  137 mcg Oral Daily    multivitamin-minerals (CENTRUM) tablet 2 tablet  2 tablet Oral Daily    oxyCODONE (ROXICODONE) IR tablet 5 mg  5 mg Oral Q4H PRN    pantoprazole (PROTONIX) EC tablet 40 mg  40 mg Oral Early Morning    pravastatin (PRAVACHOL) tablet 40 mg  40 mg Oral Daily With Dinner    rivaroxaban (XARELTO) tablet 15 mg  15 mg Oral Daily    valsartan (DIOVAN) tablet 80 mg  80 mg Oral Daily     Allergies   Allergen Reactions    Other      Adhesive tape    Ibuprofen Palpitations    Penicillins Rash       Objective   Vitals: Blood pressure 131/59, pulse 65, temperature 98 6 °F (37 °C), temperature source Oral, resp   rate 18, height 5' 5" (1 651 m), weight 116 kg (256 lb), SpO2 95 %, not currently breastfeeding , Body mass index is 42 6 kg/m²  Intake/Output Summary (Last 24 hours) at 02/17/18 2255  Last data filed at 02/17/18 2100   Gross per 24 hour   Intake              540 ml   Output             1750 ml   Net            -1210 ml       Physical Exam   Constitutional: She appears healthy  No distress  HENT:   Nose: Nose normal    Mouth/Throat: Oropharynx is clear  Neck: Neck supple  No JVD present  Cardiovascular: Normal rate and regular rhythm  Exam reveals no distant heart sounds and no friction rub  No murmur heard  Pulmonary/Chest: Effort normal and breath sounds normal  She has no wheezes  She has no rales  Abdominal: Soft  She exhibits no distension  There is no tenderness  Musculoskeletal: She exhibits edema  Neurological: She is alert and oriented to person, place, and time  Skin: Skin is warm and dry  No rash noted         Lab Results:     Troponins:   Results from last 7 days  Lab Units 02/16/18  1604 02/16/18  1110 02/16/18  0605   TROPONIN I ng/mL <0 02 <0 02 <0 02       Recent Results (from the past 24 hour(s))   CBC (With Platelets)    Collection Time: 02/17/18  5:51 AM   Result Value Ref Range    WBC 7 30 4 80 - 10 80 Thousand/uL    RBC 4 00 (L) 4 20 - 5 40 Million/uL    Hemoglobin 10 1 (L) 12 0 - 16 0 g/dL    Hematocrit 32 6 (L) 37 0 - 47 0 %    MCV 82 82 - 98 fL    MCH 25 2 (L) 27 0 - 31 0 pg    MCHC 30 9 (L) 31 4 - 37 4 g/dL    RDW 18 0 (H) 11 6 - 15 1 %    Platelets 153 754 - 406 Thousands/uL    MPV 8 8 (L) 8 9 - 12 7 fL   Basic metabolic panel    Collection Time: 02/17/18  5:51 AM   Result Value Ref Range    Sodium 139 136 - 145 mmol/L    Potassium 3 9 3 5 - 5 3 mmol/L    Chloride 102 100 - 108 mmol/L    CO2 26 21 - 32 mmol/L    Anion Gap 11 4 - 13 mmol/L    BUN 57 (H) 5 - 25 mg/dL    Creatinine 1 38 (H) 0 60 - 1 30 mg/dL    Glucose 140 65 - 140 mg/dL    Calcium 8 4 8 3 - 10 1 mg/dL    eGFR 39 ml/min/1 73sq m   TSH, 3rd generation    Collection Time: 18  5:51 AM   Result Value Ref Range    TSH 3RD GENERATON 2 632 0 358 - 3 740 uIU/mL   Hemoglobin A1c    Collection Time: 18  5:51 AM   Result Value Ref Range    Hemoglobin A1C 8 2 (H) 4 2 - 6 3 %     mg/dl   Fingerstick Glucose (POCT)    Collection Time: 18  7:25 AM   Result Value Ref Range    POC Glucose 147 (H) 65 - 140 mg/dl   Fingerstick Glucose (POCT)    Collection Time: 18 11:03 AM   Result Value Ref Range    POC Glucose 207 (H) 65 - 140 mg/dl   Fingerstick Glucose (POCT)    Collection Time: 18  4:21 PM   Result Value Ref Range    POC Glucose 192 (H) 65 - 140 mg/dl   Fingerstick Glucose (POCT)    Collection Time: 18  8:35 PM   Result Value Ref Range    POC Glucose 174 (H) 65 - 140 mg/dl          Cardiac testing:   Results for orders placed during the hospital encounter of 18   Echo complete with contrast if indicated    Narrative [address]  [address] [facility] [place] [address]  [phone]    Transthoracic Echocardiogram  2D, M-mode, Doppler, and Color Doppler    Study date:  2018    Patient: Dixon Olivares  MR number: [id number]  Account number: [de-identified]  : 1950  Age: 70 years  Gender: Female  Status: Routine  Location: Bedside  Height: 65 in 65 in  Weight: 258 lb 257 4 lb  BP: 136/ 87 mmHg    Indications: Dyspnea    Diagnoses: R06 00 - Dyspnea, unspecified    Sonographer:  DREA Anderson  Primary Physician:  Alexander Arevalo  Referring Physician:  Jan Webb MD  Group:  [facility]  Interpreting Physician:  Jan Webb MD    SUMMARY    SUMMARY:  Compared with prior echo report 2016, there is worsening in systolic function from 58--11% and elevated filling pressures  LEFT VENTRICLE:  Systolic function was normal by visual assessment  Ejection fraction was estimated in the range of 40 % to 45 % to be 40 %  There was moderate diffuse hypokinesis with regional variations    Doppler parameters were consistent with both elevated ventricular end-diastolic filling pressure and elevated left atrial filling pressure  VENTRICULAR SEPTUM:  There was mild systolic and diastolic flattening  These changes are consistent with RV volume and pressure overload  RIGHT VENTRICLE:  Systolic function was mildly reduced  LEFT ATRIUM:  The atrium was moderately to markedly dilated  RIGHT ATRIUM:  The atrium was moderately dilated  MITRAL VALVE:  There was mild annular calcification  AORTIC VALVE:  There was mild regurgitation  TRICUSPID VALVE:  There was moderate regurgitation  The findings suggest severe pulmonary hypertension  HISTORY: PRIOR HISTORY: HTN, GERD, DM, CAD, CHF, Hyperlipidemia, Atrial Fibrillation    PROCEDURE: The procedure was performed at the bedside  This was a routine study  The transthoracic approach was used  The study included complete 2D imaging, M-mode, complete spectral Doppler, and color Doppler  The heart rate was 66 bpm,  at the start of the study  Echocardiographic views were limited due to restricted patient mobility, poor acoustic window availability, and decreased penetration  This was a technically difficult study  LEFT VENTRICLE: Size was normal  Systolic function was normal by visual assessment  Ejection fraction was estimated in the range of 40 % to 45 % to be 40 %  There was moderate diffuse hypokinesis with regional variations  Wall thickness  was normal  No evidence of apical thrombus  DOPPLER: The study was not technically sufficient to allow evaluation of LV diastolic function  Left ventricular diastolic function parameters were abnormal  Doppler parameters were consistent  with both elevated ventricular end-diastolic filling pressure and elevated left atrial filling pressure  VENTRICULAR SEPTUM: There was mild systolic and diastolic flattening  These changes are consistent with RV volume and pressure overload      RIGHT VENTRICLE: The size was normal  Systolic function was mildly reduced  Wall thickness was normal     LEFT ATRIUM: The atrium was moderately to markedly dilated  RIGHT ATRIUM: The atrium was moderately dilated  MITRAL VALVE: There was mild annular calcification  There was normal leaflet separation  DOPPLER: The transmitral velocity was within the normal range  There was no evidence for stenosis  There was no significant regurgitation  AORTIC VALVE: The valve was probably trileaflet  Leaflets exhibited mildly increased thickness, mild calcification, normal cuspal separation, and sclerosis  DOPPLER: Transaortic velocity was within the normal range  There was no evidence  for stenosis  There was mild regurgitation  TRICUSPID VALVE: The valve structure was normal  There was normal leaflet separation  DOPPLER: The transtricuspid velocity was within the normal range  There was no evidence for stenosis  There was moderate regurgitation  Estimated peak PA  pressure was 65 mmHg  The findings suggest severe pulmonary hypertension  PULMONIC VALVE: Leaflets exhibited normal thickness, no calcification, and normal cuspal separation  DOPPLER: The transpulmonic velocity was within the normal range  There was no significant regurgitation  PERICARDIUM: There was no pericardial effusion  The pericardium was normal in appearance  AORTA: The root exhibited normal size  SYSTEMIC VEINS: IVC: The inferior vena cava was normal in size      SYSTEM MEASUREMENT TABLES    2D mode  AoR Diam 2D: 2 8 cm  LA Diam (2D): 5 cm  LA/Ao (2D): 1 79  FS (2D Teich): 21 5 %  IVSd (2D): 1 17 cm  LVDEV: 102 cm³  LVESV: 57 8 cm³  LVIDd(2D): 4 7 cm  LVISd (2D): 3 69 cm  LVOT Area 2D: 3 14 cm squared  LVPWd (2D): 1 08 cm  SV (Teich): 44 2 cm³    Apical four chamber  LVEF A4C: 42 %    Unspecified Scan Mode  TROY Cont Eq (Peak Carlos): 1 53 cm squared  TROY Cont Eq (VTI): 1 57 cm squared  LVOT (VTI): 27 6 cm  LVOT Diam : 2 cm  LVOT Vmax: 1020 mm/s  LVOT Vmax; Mean: 1020 mm/s  Peak Grad ; Mean: 4 mm[Hg]  SV (LVOT): 87 cm³  VTI;Mean: 3 mm[Hg]  TROY Cont Eq (VTI): 2 13 cm squared  MV Peak E Carlos  Mean: 1990 mm/s  MVA (PHT): 2 86 cm squared  PHT: 76 ms  Max P mm[Hg]  V Max: 3790 mm/s  Vmax: 3790 mm/s  RA Area: 22 cm squared  RA Volume: 68 cm³  TAPSE: 1 5 cm    [facility] Accredited Echocardiography Laboratory    Prepared and electronically signed by    Tracy Smith MD  Signed 2018 17:11:18       No results found for this or any previous visit  No results found for this or any previous visit  No results found for this or any previous visit  No results found for this or any previous visit  Results for orders placed during the hospital encounter of 17   NM myocardial perfusion spect (rx stress and/or rest)    [address]  [phone]    Rest/Stress Gated SPECT Myocardial Perfusion Imaging After Regadenoson    Patient: Manny Jane  MR number: [id number]  Account number: [de-identified]  : 1950  Age: 75 years  Gender: Female  Status: Outpatient  Location: Stress lab  Height: 65 in  Weight: 224 lb  BP: 184/ 83 mmHg    Allergies: IBUPROFEN, PENICILLINS    Diagnosis: I25 10 - Atherosclerotic heart disease of native coronary artery without angina pectoris    Primary Physician:  Hien Rodas  RN:  ALPA Calderon  Referring Physician:  Jef Grimes MD  Group:  [facility]  Report Prepared By[de-identified]  ALPA Calderon  Interpreting Physician:  Tracy Smith MD    INDICATIONS: Evaluation of known coronary artery disease  HISTORY: The patient is a 75year old  female  Chest pain status: no chest pain  Other symptoms: dyspnea  Coronary artery disease risk factors: hypertension, family history of premature coronary artery disease, and diabetes  mellitus  Cardiovascular history: coronary artery disease   Prior cardiovascular procedures: coronary artery bypass grafting, Tricuspid valve annuloplasty- 2012 and pacemaker  Co-morbidity: obesity  Medications: Xarelto, a diuretic, a lipid  lowering agent, an antihypertensive agent, diabetic medications, and thyroid medications  PHYSICAL EXAM: Baseline physical exam screening: no wheezes audible  REST ECG: Paced rhythm  PROCEDURE: The study was performed in the stress lab  A regadenoson infusion pharmacologic stress test was performed  Gated SPECT myocardial perfusion imaging was performed after stress and at rest  Systolic blood pressure was 184 mmHg, at  the start of the study  Diastolic blood pressure was 83 mmHg, at the start of the study  The heart rate was 64 bpm, at the start of the study  IV double checked  Regadenoson protocol:  Time HR bpm SBP mmHg DBP mmHg Symptoms Rhythm/conduct  Baseline 10:06 64 183 83 none paced  1 min 10:15 69 178 76 flushing --  2 min 10:16 71 182 79 same as above --  3 min 10:17 77 176 66 subsiding --  No medications or fluids given  STRESS SUMMARY: Duration of pharmacologic stress was 3 min  Maximal heart rate during stress was 71 bpm  The heart rate response to stress was blunted  There was resting hypertension with an appropriate blood pressure response to stress  The rate-pressure product for the peak heart rate and blood pressure was 60202  There was no chest pain during stress  The stress test was terminated due to protocol completion  Pre oxygen saturation: 96 %  Peak oxygen saturation: 98 %  The stress ECG was equivocal for ischemia  ISOTOPE ADMINISTRATION:  Resting isotope administration Stress isotope administration  Agent Tetrofosmin Tetrofosmin  Dose 10 8 mCi 33 mCi  Date 03/23/2017 03/23/2017    The radiopharmaceutical was injected at the peak effect of pharmacologic stress  MYOCARDIAL PERFUSION IMAGING:  The image quality was good      PERFUSION DEFECTS:  -  There was a small, fixed myocardial perfusion defect of the apical wall cannot rule out prior infarct  GATED SPECT:  The calculated left ventricular ejection fraction was 57 %  There was moderately reduced myocardial thickening and motion of the apical wall of the left ventricle  There was paradoxical motion of the interventricular septum  SUMMARY:  -  Stress results: There was resting hypertension with an appropriate blood pressure response to stress  There was no chest pain during stress  -  ECG conclusions: The stress ECG was equivocal for ischemia  -  Perfusion imaging: There was a small, fixed myocardial perfusion defect of the apical wall cannot rule out prior infarct   -  Gated SPECT: The calculated left ventricular ejection fraction was 57 %  There was moderately reduced myocardial thickening and motion of the apical wall of the left ventricle  There was paradoxical motion of the interventricular  septum  IMPRESSIONS: Abnormal study after pharmacologic vasodilation      Prepared and signed by    Robb Chandra MD  Signed 03/27/2017 21:09:18           EKG/TELE: Personally reviewed  No events    Imaging: I have personally reviewed pertinent films in PACS

## 2018-02-18 NOTE — PROGRESS NOTES
Progress Note - Cardiology   Sanjana Stanley 70 y o  female MRN: [id number]  Unit/Bed#: [address] [place] [address] Encounter: [id number]  02/18/18  2:54 PM        Assessment:  1  Acute on chronic combined systolic and diastolic CHF - EF 02% on recent echocardiogram which is reduced from previous  2   Aortic stenosis + mild regurgitation on echocardiogram  3  CAD s/p 4 vessel CABG  4  H/o tricuspid valve annuloplasty - stable gradients,  Moderate TR seen on echocardiogram with severe pulmonary hypertension  5  DM  6  Hypothyroidism  7  Pulmonary hypertension    Plan:  Continue IV diuresis  Will obtain stress test in AM to evaluate reduction in EF  Consider LAILA to reevaluate Tricuspid valve  Subjective: Sanjana Stanley denies any chest pain  Still with LE edema        Meds/Allergies   current meds:   Current Facility-Administered Medications   Medication Dose Route Frequency    bisacodyl (DULCOLAX) EC tablet 5 mg  5 mg Oral Daily PRN    calcium carbonate (OYSTER SHELL,OSCAL) 500 mg tablet 1 tablet  1 tablet Oral Daily With Breakfast    fish oil capsule 1,000 mg  1,000 mg Oral Daily    furosemide (LASIX) injection 60 mg  60 mg Intravenous BID (diuretic)    insulin glargine (LANTUS) subcutaneous injection 30 Units  30 Units Subcutaneous HS    insulin lispro (HumaLOG) 100 units/mL subcutaneous injection 1-5 Units  1-5 Units Subcutaneous TID AC    levothyroxine tablet 137 mcg  137 mcg Oral Daily    multivitamin-minerals (CENTRUM) tablet 2 tablet  2 tablet Oral Daily    oxyCODONE (ROXICODONE) IR tablet 5 mg  5 mg Oral Q4H PRN    pantoprazole (PROTONIX) EC tablet 40 mg  40 mg Oral Early Morning    pravastatin (PRAVACHOL) tablet 40 mg  40 mg Oral Daily With Dinner    rivaroxaban (XARELTO) tablet 15 mg  15 mg Oral Daily    valsartan (DIOVAN) tablet 80 mg  80 mg Oral Daily     Allergies   Allergen Reactions    Other      Adhesive tape    Ibuprofen Palpitations    Penicillins Rash       Objective   Vitals: Blood pressure 145/65, pulse 68, temperature 98 4 °F (36 9 °C), temperature source Oral, resp  rate 18, height 5' 5" (1 651 m), weight 116 kg (256 lb 3 2 oz), SpO2 92 %, not currently breastfeeding , Body mass index is 42 63 kg/m²  Intake/Output Summary (Last 24 hours) at 02/18/18 1454  Last data filed at 02/18/18 1401   Gross per 24 hour   Intake                0 ml   Output             1850 ml   Net            -1850 ml       Physical Exam   Constitutional: She appears healthy  No distress  HENT:   Nose: Nose normal    Mouth/Throat: Oropharynx is clear  Eyes: Conjunctivae are normal  Pupils are equal, round, and reactive to light  Neck: Neck supple  No JVD present  Cardiovascular: Normal rate and regular rhythm  Exam reveals no distant heart sounds and no friction rub  No murmur heard  Pulmonary/Chest: Effort normal  She has no rales  She has scattered wheezes  Abdominal: Soft  She exhibits no distension  There is no tenderness  Musculoskeletal: She exhibits edema  Neurological: She is alert and oriented to person, place, and time  Skin: Skin is warm and dry  No rash noted         Lab Results:     Troponins:     Results from last 7 days  Lab Units 02/16/18  1604 02/16/18  1110 02/16/18  0605   TROPONIN I ng/mL <0 02 <0 02 <0 02       Recent Results (from the past 24 hour(s))   Fingerstick Glucose (POCT)    Collection Time: 02/17/18  4:21 PM   Result Value Ref Range    POC Glucose 192 (H) 65 - 140 mg/dl   Fingerstick Glucose (POCT)    Collection Time: 02/17/18  8:35 PM   Result Value Ref Range    POC Glucose 174 (H) 65 - 140 mg/dl   Fingerstick Glucose (POCT)    Collection Time: 02/18/18  7:52 AM   Result Value Ref Range    POC Glucose 119 65 - 140 mg/dl   Fingerstick Glucose (POCT)    Collection Time: 02/18/18 11:29 AM   Result Value Ref Range    POC Glucose 267 (H) 65 - 140 mg/dl          Cardiac testing:   Results for orders placed during the hospital encounter of 02/16/18   Echo complete with contrast if indicated    Narrative [facility] [address]  [address] [facility]  [phone]    Transthoracic Echocardiogram  2D, M-mode, Doppler, and Color Doppler    Study date:  2018    Patient: Maxine Rahman  MR number: [id number]  Account number: [de-identified]  : 1950  Age: 70 years  Gender: Female  Status: Routine  Location: Bedside  Height: 65 in 65 in  Weight: 258 lb 257 4 lb  BP: 136/ 87 mmHg    Indications: Dyspnea    Diagnoses: R06 00 - Dyspnea, unspecified    Sonographer:  DREA Hairston  Primary Physician:  Sheryl Pablo  Referring Physician:  Krystal Thompson MD  Group:  [facility]  Interpreting Physician:  Krystal Thompson MD    SUMMARY    SUMMARY:  Compared with prior echo report 2016, there is worsening in systolic function from 77--27% and elevated filling pressures  LEFT VENTRICLE:  Systolic function was normal by visual assessment  Ejection fraction was estimated in the range of 40 % to 45 % to be 40 %  There was moderate diffuse hypokinesis with regional variations  Doppler parameters were consistent with both elevated ventricular end-diastolic filling pressure and elevated left atrial filling pressure  VENTRICULAR SEPTUM:  There was mild systolic and diastolic flattening  These changes are consistent with RV volume and pressure overload  RIGHT VENTRICLE:  Systolic function was mildly reduced  LEFT ATRIUM:  The atrium was moderately to markedly dilated  RIGHT ATRIUM:  The atrium was moderately dilated  MITRAL VALVE:  There was mild annular calcification  AORTIC VALVE:  There was mild regurgitation  TRICUSPID VALVE:  There was moderate regurgitation  The findings suggest severe pulmonary hypertension  HISTORY: PRIOR HISTORY: HTN, GERD, DM, CAD, CHF, Hyperlipidemia, Atrial Fibrillation    PROCEDURE: The procedure was performed at the bedside  This was a routine study   The transthoracic approach was used  The study included complete 2D imaging, M-mode, complete spectral Doppler, and color Doppler  The heart rate was 66 bpm,  at the start of the study  Echocardiographic views were limited due to restricted patient mobility, poor acoustic window availability, and decreased penetration  This was a technically difficult study  LEFT VENTRICLE: Size was normal  Systolic function was normal by visual assessment  Ejection fraction was estimated in the range of 40 % to 45 % to be 40 %  There was moderate diffuse hypokinesis with regional variations  Wall thickness  was normal  No evidence of apical thrombus  DOPPLER: The study was not technically sufficient to allow evaluation of LV diastolic function  Left ventricular diastolic function parameters were abnormal  Doppler parameters were consistent  with both elevated ventricular end-diastolic filling pressure and elevated left atrial filling pressure  VENTRICULAR SEPTUM: There was mild systolic and diastolic flattening  These changes are consistent with RV volume and pressure overload  RIGHT VENTRICLE: The size was normal  Systolic function was mildly reduced  Wall thickness was normal     LEFT ATRIUM: The atrium was moderately to markedly dilated  RIGHT ATRIUM: The atrium was moderately dilated  MITRAL VALVE: There was mild annular calcification  There was normal leaflet separation  DOPPLER: The transmitral velocity was within the normal range  There was no evidence for stenosis  There was no significant regurgitation  AORTIC VALVE: The valve was probably trileaflet  Leaflets exhibited mildly increased thickness, mild calcification, normal cuspal separation, and sclerosis  DOPPLER: Transaortic velocity was within the normal range  There was no evidence  for stenosis  There was mild regurgitation  TRICUSPID VALVE: The valve structure was normal  There was normal leaflet separation   DOPPLER: The transtricuspid velocity was within the normal range  There was no evidence for stenosis  There was moderate regurgitation  Estimated peak PA  pressure was 65 mmHg  The findings suggest severe pulmonary hypertension  PULMONIC VALVE: Leaflets exhibited normal thickness, no calcification, and normal cuspal separation  DOPPLER: The transpulmonic velocity was within the normal range  There was no significant regurgitation  PERICARDIUM: There was no pericardial effusion  The pericardium was normal in appearance  AORTA: The root exhibited normal size  SYSTEMIC VEINS: IVC: The inferior vena cava was normal in size  SYSTEM MEASUREMENT TABLES    2D mode  AoR Diam 2D: 2 8 cm  LA Diam (2D): 5 cm  LA/Ao (2D): 1 79  FS (2D Teich): 21 5 %  IVSd (2D): 1 17 cm  LVDEV: 102 cm³  LVESV: 57 8 cm³  LVIDd(2D): 4 7 cm  LVISd (2D): 3 69 cm  LVOT Area 2D: 3 14 cm squared  LVPWd (2D): 1 08 cm  SV (Teich): 44 2 cm³    Apical four chamber  LVEF A4C: 42 %    Unspecified Scan Mode  TROY Cont Eq (Peak Carlos): 1 53 cm squared  TROY Cont Eq (VTI): 1 57 cm squared  LVOT (VTI): 27 6 cm  LVOT Diam : 2 cm  LVOT Vmax: 1020 mm/s  LVOT Vmax; Mean: 1020 mm/s  Peak Grad ; Mean: 4 mm[Hg]  SV (LVOT): 87 cm³  VTI;Mean: 3 mm[Hg]  TROY Cont Eq (VTI): 2 13 cm squared  MV Peak E Carlos   Mean: 1990 mm/s  MVA (PHT): 2 86 cm squared  PHT: 76 ms  Max P mm[Hg]  V Max: 3790 mm/s  Vmax: 3790 mm/s  RA Area: 22 cm squared  RA Volume: 68 cm³  TAPSE: 1 5 cm    [facility] Accredited Echocardiography [facility]    Prepared and electronically signed by    Nick Ford MD  Signed 2018 17:11:18         Results for orders placed during the hospital encounter of 17   NM myocardial perfusion spect (rx stress and/or rest)    [facility] [place]   [address] [facility] [address]  [phone]    Rest/Stress Gated SPECT Myocardial Perfusion Imaging After Regadenoson    Patient: Abraham Garcia  MR number: [id number]  Account number: [de-identified]  : 1950  Age: 75 years  Gender: Female  Status: Outpatient  Location: Stress lab  Height: 65 in  Weight: 224 lb  BP: 184/ 83 mmHg    Allergies: IBUPROFEN, PENICILLINS    Diagnosis: I25 10 - Atherosclerotic heart disease of native coronary artery without angina pectoris    Primary Physician:  Mike Bahena  RN:  ALPA Newsome  Referring Physician:  Ar Ashby MD  Group:  [facility]  Report Prepared By[de-identified]  ALPA Newsome  Interpreting Physician:  Paul Meyers MD    INDICATIONS: Evaluation of known coronary artery disease  HISTORY: The patient is a 75year old  female  Chest pain status: no chest pain  Other symptoms: dyspnea  Coronary artery disease risk factors: hypertension, family history of premature coronary artery disease, and diabetes  mellitus  Cardiovascular history: coronary artery disease  Prior cardiovascular procedures: coronary artery bypass grafting, Tricuspid valve annuloplasty- 2012 and pacemaker  Co-morbidity: obesity  Medications: Xarelto, a diuretic, a lipid  lowering agent, an antihypertensive agent, diabetic medications, and thyroid medications  PHYSICAL EXAM: Baseline physical exam screening: no wheezes audible  REST ECG: Paced rhythm  PROCEDURE: The study was performed in the stress lab  A regadenoson infusion pharmacologic stress test was performed  Gated SPECT myocardial perfusion imaging was performed after stress and at rest  Systolic blood pressure was 184 mmHg, at  the start of the study  Diastolic blood pressure was 83 mmHg, at the start of the study  The heart rate was 64 bpm, at the start of the study  IV double checked  Regadenoson protocol:  Time HR bpm SBP mmHg DBP mmHg Symptoms Rhythm/conduct  Baseline 10:06 64 183 83 none paced  1 min 10:15 69 178 76 flushing --  2 min 10:16 71 182 79 same as above --  3 min 10:17 77 176 66 subsiding --  No medications or fluids given  STRESS SUMMARY: Duration of pharmacologic stress was 3 min   Maximal heart rate during stress was 71 bpm  The heart rate response to stress was blunted  There was resting hypertension with an appropriate blood pressure response to stress  The rate-pressure product for the peak heart rate and blood pressure was 57471  There was no chest pain during stress  The stress test was terminated due to protocol completion  Pre oxygen saturation: 96 %  Peak oxygen saturation: 98 %  The stress ECG was equivocal for ischemia  ISOTOPE ADMINISTRATION:  Resting isotope administration Stress isotope administration  Agent Tetrofosmin Tetrofosmin  Dose 10 8 mCi 33 mCi  Date 03/23/2017 03/23/2017    The radiopharmaceutical was injected at the peak effect of pharmacologic stress  MYOCARDIAL PERFUSION IMAGING:  The image quality was good  PERFUSION DEFECTS:  -  There was a small, fixed myocardial perfusion defect of the apical wall cannot rule out prior infarct  GATED SPECT:  The calculated left ventricular ejection fraction was 57 %  There was moderately reduced myocardial thickening and motion of the apical wall of the left ventricle  There was paradoxical motion of the interventricular septum  SUMMARY:  -  Stress results: There was resting hypertension with an appropriate blood pressure response to stress  There was no chest pain during stress  -  ECG conclusions: The stress ECG was equivocal for ischemia  -  Perfusion imaging: There was a small, fixed myocardial perfusion defect of the apical wall cannot rule out prior infarct   -  Gated SPECT: The calculated left ventricular ejection fraction was 57 %  There was moderately reduced myocardial thickening and motion of the apical wall of the left ventricle  There was paradoxical motion of the interventricular  septum  IMPRESSIONS: Abnormal study after pharmacologic vasodilation      Prepared and signed by    Meño Shin MD  Signed 03/27/2017 21:09:18           EKG/TELE: Personally reviewed  No events    Imaging: I have personally reviewed pertinent films in PACS

## 2018-02-19 ENCOUNTER — APPOINTMENT (INPATIENT)
Dept: NON INVASIVE DIAGNOSTICS | Facility: HOSPITAL | Age: 68
DRG: 286 | End: 2018-02-19
Attending: INTERNAL MEDICINE
Payer: MEDICARE

## 2018-02-19 LAB
ANION GAP SERPL CALCULATED.3IONS-SCNC: 5 MMOL/L (ref 4–13)
BUN SERPL-MCNC: 57 MG/DL (ref 5–25)
CALCIUM SERPL-MCNC: 8.6 MG/DL (ref 8.3–10.1)
CHLORIDE SERPL-SCNC: 105 MMOL/L (ref 100–108)
CO2 SERPL-SCNC: 31 MMOL/L (ref 21–32)
CREAT SERPL-MCNC: 1.42 MG/DL (ref 0.6–1.3)
ERYTHROCYTE [DISTWIDTH] IN BLOOD BY AUTOMATED COUNT: 17.5 % (ref 11.6–15.1)
GFR SERPL CREATININE-BSD FRML MDRD: 38 ML/MIN/1.73SQ M
GLUCOSE SERPL-MCNC: 151 MG/DL (ref 65–140)
GLUCOSE SERPL-MCNC: 198 MG/DL (ref 65–140)
GLUCOSE SERPL-MCNC: 201 MG/DL (ref 65–140)
GLUCOSE SERPL-MCNC: 82 MG/DL (ref 65–140)
GLUCOSE SERPL-MCNC: 86 MG/DL (ref 65–140)
HCT VFR BLD AUTO: 32.5 % (ref 37–47)
HGB BLD-MCNC: 10.2 G/DL (ref 12–16)
MCH RBC QN AUTO: 25.2 PG (ref 27–31)
MCHC RBC AUTO-ENTMCNC: 31.3 G/DL (ref 31.4–37.4)
MCV RBC AUTO: 81 FL (ref 82–98)
PLATELET # BLD AUTO: 174 THOUSANDS/UL (ref 130–400)
PMV BLD AUTO: 8.1 FL (ref 8.9–12.7)
POTASSIUM SERPL-SCNC: 3.5 MMOL/L (ref 3.5–5.3)
RBC # BLD AUTO: 4.03 MILLION/UL (ref 4.2–5.4)
SODIUM SERPL-SCNC: 141 MMOL/L (ref 136–145)
WBC # BLD AUTO: 6.8 THOUSAND/UL (ref 4.8–10.8)

## 2018-02-19 PROCEDURE — 97165 OT EVAL LOW COMPLEX 30 MIN: CPT

## 2018-02-19 PROCEDURE — 99232 SBSQ HOSP IP/OBS MODERATE 35: CPT | Performed by: FAMILY MEDICINE

## 2018-02-19 PROCEDURE — 99232 SBSQ HOSP IP/OBS MODERATE 35: CPT | Performed by: INTERNAL MEDICINE

## 2018-02-19 PROCEDURE — 85027 COMPLETE CBC AUTOMATED: CPT | Performed by: FAMILY MEDICINE

## 2018-02-19 PROCEDURE — G8989 SELF CARE D/C STATUS: HCPCS

## 2018-02-19 PROCEDURE — 80048 BASIC METABOLIC PNL TOTAL CA: CPT | Performed by: FAMILY MEDICINE

## 2018-02-19 PROCEDURE — G8988 SELF CARE GOAL STATUS: HCPCS

## 2018-02-19 PROCEDURE — 82948 REAGENT STRIP/BLOOD GLUCOSE: CPT

## 2018-02-19 PROCEDURE — G8987 SELF CARE CURRENT STATUS: HCPCS

## 2018-02-19 RX ORDER — POTASSIUM CHLORIDE 20 MEQ/1
40 TABLET, EXTENDED RELEASE ORAL ONCE
Status: COMPLETED | OUTPATIENT
Start: 2018-02-19 | End: 2018-02-19

## 2018-02-19 RX ADMIN — PRAVASTATIN SODIUM 40 MG: 40 TABLET ORAL at 17:42

## 2018-02-19 RX ADMIN — Medication 1 TABLET: at 08:27

## 2018-02-19 RX ADMIN — INSULIN GLARGINE 30 UNITS: 100 INJECTION, SOLUTION SUBCUTANEOUS at 22:30

## 2018-02-19 RX ADMIN — PANTOPRAZOLE SODIUM 40 MG: 40 TABLET, DELAYED RELEASE ORAL at 05:55

## 2018-02-19 RX ADMIN — INSULIN LISPRO 1 UNITS: 100 INJECTION, SOLUTION INTRAVENOUS; SUBCUTANEOUS at 12:39

## 2018-02-19 RX ADMIN — POTASSIUM CHLORIDE 40 MEQ: 1500 TABLET, EXTENDED RELEASE ORAL at 12:39

## 2018-02-19 RX ADMIN — Medication 2 TABLET: at 08:27

## 2018-02-19 RX ADMIN — VALSARTAN 80 MG: 80 TABLET ORAL at 08:27

## 2018-02-19 RX ADMIN — INSULIN LISPRO 1 UNITS: 100 INJECTION, SOLUTION INTRAVENOUS; SUBCUTANEOUS at 17:43

## 2018-02-19 RX ADMIN — Medication 1000 MG: at 08:27

## 2018-02-19 RX ADMIN — LEVOTHYROXINE SODIUM 137 MCG: 112 TABLET ORAL at 08:27

## 2018-02-19 RX ADMIN — FUROSEMIDE 60 MG: 10 INJECTION, SOLUTION INTRAMUSCULAR; INTRAVENOUS at 08:26

## 2018-02-19 RX ADMIN — FUROSEMIDE 60 MG: 10 INJECTION, SOLUTION INTRAMUSCULAR; INTRAVENOUS at 17:42

## 2018-02-19 NOTE — PROGRESS NOTES
[facility] Progress Note  Patient: Veronica Taylor 70 y o  female   MRN: [id number]  PCP: No primary care provider on file  Unit/Bed#: [address] [place] [address] Encounter: [id number]  Date Of Visit: 02/18/18    Problem List:    Principal Problem:    Acute on chronic combined systolic and diastolic CHF (congestive heart failure) ([facility])  Active Problems:    Chronic a-fib ([facility])    Coronary artery disease involving coronary bypass graft of native heart without angina pectoris    Long-term insulin use in type 2 diabetes ([facility] )    Hypertension    Hyperlipidemia    CKD (chronic kidney disease) stage 3, GFR 30-59 ml/min    Morbid obesity with BMI of 40 0-44 9, adult ([place] [id number] )    H/O tricuspid valve annuloplasty      Assessment & Plan:    * Acute on chronic combined systolic and diastolic CHF (congestive heart failure) ([facility] )   Assessment & Plan    Symptoms improving  Continue diuresis with 60 mg IV Lasix b i d and Diovan  Repeat echo during this admission shows worsening of systolic function  EF is now about 40 %  EF was 60-65% in 2016  Troponins negative  Strict I/Os and daily weight  Stress test in AM to evaluate for reduction in EF  Chronic a-fib ([facility] )   Assessment & Plan    On Xarelto  As per prior notes, patient has underlying very slow ventricular rate and has Medtronic pacemaker in place which is functioning adequately  Pacemaker was interrogated December 2017        Coronary artery disease involving coronary bypass graft of native heart without angina pectoris   Assessment & Plan    On statin, fish oil        H/O tricuspid valve annuloplasty   Assessment & Plan    Possible LAILA to reevaluate tricuspid valve as per cardio        Morbid obesity with BMI of 40 0-44 9, adult ([facility])   Assessment & Plan    Diet and lifestyle modification  Body mass index is 42 63 kg/m²  CKD (chronic kidney disease) stage 3, GFR 30-59 ml/min   Assessment & Plan    No labwork from today    Monitor creatinine closely while on diuretics  Repeat level in the a m  Hyperlipidemia   Assessment & Plan    On statin        Hypertension   Assessment & Plan    Continue Diovan and monitor blood pressure        Long-term insulin use in type 2 diabetes (HCC)   Assessment & Plan    Continue Lantus and Humalog SSI  hemoglobin A1c of 8 2              VTE Pharmacologic Prophylaxis:   Pharmacologic: Rivaroxaban (Xarelto)  Mechanical VTE Prophylaxis in Place: Yes    Patient Centered Rounds: I have performed bedside rounds with nursing staff today  Discussions with Specialists or Other Care Team Provider: Yes    Education and Discussions with Family / Patient:Yes    Time Spent for Care: 35 min  More than 50% of total time spent on counseling and coordination of care as described above  Current Length of Stay: 2 day(s)    Current Patient Status: Inpatient     Discharge Plan: home    Code Status: Level 1 - Full Code     Certification Statement: The patient will continue to require additional inpatient hospital stay due to CHF exacerbation requiring IV diuretic      Subjective:     Less cramping of her legs overnight  Reports improvement in her breathing    Objective:     Vitals:   Temp (24hrs), Av 3 °F (36 8 °C), Min:97 9 °F (36 6 °C), Max:98 6 °F (37 °C)    HR:  [61-68] 67  Resp:  [18-20] 20  BP: (138-148)/(65-70) 148/68  SpO2:  [92 %-94 %] 94 %  Body mass index is 42 63 kg/m²  Input and Output Summary (last 24 hours): Intake/Output Summary (Last 24 hours) at 18 2221  Last data filed at 18 1900   Gross per 24 hour   Intake                0 ml   Output             1650 ml   Net            -1650 ml       Physical Exam:     Physical Exam   Constitutional: She is oriented to person, place, and time  She appears well-developed and well-nourished  No distress  HENT:   Head: Normocephalic and atraumatic  Mouth/Throat: Oropharynx is clear and moist    Eyes: Conjunctivae are normal  No scleral icterus     Neck: Neck supple  Cardiovascular: Normal rate and regular rhythm  Murmur heard  Pulmonary/Chest: Effort normal and breath sounds normal  No respiratory distress  She has no wheezes  She has no rales  Abdominal: Soft  Bowel sounds are normal  She exhibits no distension  There is no tenderness  Reducible, nontender umbilical hernia   Musculoskeletal: She exhibits edema (Bilateral lower extremity)  Neurological: She is alert and oriented to person, place, and time  No cranial nerve deficit  Skin: Skin is dry  She is not diaphoretic  Psychiatric: She has a normal mood and affect  Additional Data:     Labs:      Results from last 7 days  Lab Units 02/17/18  0551 02/16/18  0605   WBC Thousand/uL 7 30 7 40   HEMOGLOBIN g/dL 10 1* 11 0*   HEMATOCRIT % 32 6* 35 2*   PLATELETS Thousands/uL 178 199   NEUTROS PCT %  --  71   LYMPHS PCT %  --  13*   MONOS PCT %  --  12   EOS PCT %  --  3       Results from last 7 days  Lab Units 02/17/18  0551 02/16/18  0605   SODIUM mmol/L 139 138   POTASSIUM mmol/L 3 9 4 6   CHLORIDE mmol/L 102 104   CO2 mmol/L 26 26   BUN mg/dL 57* 62*   CREATININE mg/dL 1 38* 1 42*   CALCIUM mg/dL 8 4 8 8   TOTAL PROTEIN g/dL  --  8 1   BILIRUBIN TOTAL mg/dL  --  0 50   ALK PHOS U/L  --  125*   ALT U/L  --  18   AST U/L  --  23   GLUCOSE RANDOM mg/dL 140 175*       Results from last 7 days  Lab Units 02/16/18  0605   INR  1 38*       * I Have Reviewed All Lab Data Listed Above  * Additional Pertinent Lab Tests Reviewed: All Labs For Current Hospital Admission Reviewed    Imaging:  Xr Chest 1 View Portable    Result Date: 2/16/2018  Narrative: CHEST INDICATION:  Shortness of breath  COMPARISON:  11/04/2016 VIEWS:   AP frontal IMAGES:  1 FINDINGS: Cardiomediastinal silhouette appears enlarged  A median sternotomy has been performed  A permanent pacemaker is present  There are no infiltrates  The patient appears to be in mild heart failure   Visualized osseous structures appear within normal limits for the patient's age  Impression: Cardiomegaly and mild CHF  Workstation performed: [id number]     Imaging Reports Reviewed by myself    Cultures:   Blood Culture:   Lab Results   Component Value Date    BLOODCX No Growth After 5 Days  10/15/2017    BLOODCX No Growth After 5 Days  10/15/2017     Urine Culture:   Lab Results   Component Value Date    URINECX >100,000 cfu/ml Klebsiella pneumoniae 08/15/2016     Sputum Culture: No components found for: SPUTUMCX  Wound Culture: No results found for: WOUNDCULT    Last 24 Hours Medication List:     Current Facility-Administered Medications:  bisacodyl 5 mg Oral Daily PRN Evaline Flatness, MD   calcium carbonate 1 tablet Oral Daily With Breakfast Vero Revankar, DO   fish oil 1,000 mg Oral Daily Vreo Revankar, DO   furosemide 60 mg Intravenous BID (diuretic) Ekaterina Blanton MD   insulin glargine 30 Units Subcutaneous HS Vero Revankar, DO   insulin lispro 1-5 Units Subcutaneous TID AC Vero Revankar, DO   levothyroxine 137 mcg Oral Daily Vreo Revankar, DO   multivitamin-minerals 2 tablet Oral Daily Vero Revankar, DO   oxyCODONE 5 mg Oral Q4H PRN Evaline Flatenriqueta, MD   pantoprazole 40 mg Oral Early Morning Vero Revankar, DO   pravastatin 40 mg Oral Daily With [place]mini Revankar, DO   rivaroxaban 15 mg Oral Daily Vero Revankar, DO   valsartan 80 mg Oral Daily Vero Revankar, DO        Today, Patient Was Seen By: Alphonso Haney DO    ** Please Note: Dragon 360 Dictation voice to text software may have been used in the creation of this document   **

## 2018-02-19 NOTE — CONSULTS
[address]   Neurology Initial Consult    Papi Raya is a 70 y o  female  [address]3 [facility]-*          Information obtained from:   Chief Complaint   Patient presents with    Shortness of Breath     pt states she has gained 7 pounds in 5 days and today about 1 hour ago she developed shortness of breath and nausea         Assessment/Plan:    [email or web address]          HPI:      Past Medical History:   Diagnosis Date    Arthritis     [facility]     Cardiac disease     Carotid artery occlusion     [facility]congest[facility] failure) [facility]     Coronary artery disease     Diabetes mellitus ([facility]     Disease of thyroid gland     GERD (gastroesophageal reflux disease)     History of transfusion     Hyperlipidemia     Hypertension     Other specified diabetes mellitus with diabetic autonomic (poly)neuropathy ([facility] )     Renal disorder     Sleep apnea        Past Surgical History:   Procedure Laterality Date    ABDOMINAL SURGERY      CARDIAC PACEMAKER PLACEMENT      CARDIAC SURGERY      cabg x 2    CHOLECYSTECTOMY      EYE SURGERY      FRACTURE SURGERY      HERNIA REPAIR      NH LAP,CHOLECYSTECTOMY N/A 8/10/2016    Procedure: CHOLECYSTECTOMY LAPAROSCOPIC;  Surgeon: Jaspreet Mary MD;  [facility];  Service: General    NH LARYNGOSCOPY,DIRECT,SCOPE,INJ CORDS Left 11/4/2016    Procedure: MICRODIRECT LARYNGOSCOPY; LEFT VOCAL FOLD INJECTION ;  Surgeon: Tho Bridges MD;  Location[facility];  Service: ENT    NH REPAIR UMBILICAL DRFF,2+T/U,OEMLL N/A 8/10/2016    Procedure: LAPAROSCOPIC REPAIR HERNIA VENTRAL;  Surgeon: Jaspreet Mary MD;  [facility];  Service: General    VASCULAR SURGERY         Allergies   Allergen Reactions    Other      Adhesive tape    Ibuprofen Palpitations    Penicillins Rash         Current Facility-Administered Medications:     bisacodyl (DULCOLAX) EC tablet 5 mg, 5 mg, Oral, Daily PRN, Christobal Kayser, MD, 5 mg at 02/18/18 1232   calcium carbonate (OYSTER SHELL,OSCAL) 500 mg tablet 1 tablet, 1 tablet, Oral, Daily With Breakfast, Vero Revankar, DO, 1 tablet at 02/19/18 0827    fish oil capsule 1,000 mg, 1,000 mg, Oral, Daily, Vero Revankar, DO, 1,000 mg at 02/19/18 0827    furosemide (LASIX) injection 60 mg, 60 mg, Intravenous, BID (diuretic), Meño Shin MD, 60 mg at 02/19/18 0826    insulin glargine (LANTUS) subcutaneous injection 30 Units, 30 Units, Subcutaneous, HS, Vero Revankar, DO, 30 Units at 02/18/18 2140    insulin lispro (HumaLOG) 100 units/mL subcutaneous injection 1-5 Units, 1-5 Units, Subcutaneous, TID AC, 1 Units at 02/18/18 1633 **AND** Fingerstick Glucose (POCT), , , TID AC, Vero Revankar, DO    levothyroxine tablet 137 mcg, 137 mcg, Oral, Daily, Vero Revankar, DO, 137 mcg at 02/19/18 0827    multivitamin-minerals (CENTRUM) tablet 2 tablet, 2 tablet, Oral, Daily, Vero Revankar, DO, 2 tablet at 02/19/18 0827    oxyCODONE (ROXICODONE) IR tablet 5 mg, 5 mg, Oral, Q4H PRN, Modesta Berkowitz MD, 5 mg at 02/18/18 1735    pantoprazole (PROTONIX) EC tablet 40 mg, 40 mg, Oral, Early Morning, Vero Revankar, DO, 40 mg at 02/19/18 0555    potassium chloride (K-DUR,KLOR-CON) CR tablet 40 mEq, 40 mEq, Oral, Once, Vero Revankar, DO    pravastatin (PRAVACHOL) tablet 40 mg, 40 mg, Oral, Daily With Dinner, Vero Revankar, DO, 40 mg at 02/18/18 1631    Social History     Social History    Marital status: Single     Spouse name: N/A    Number of children: N/A    Years of education: N/A     Occupational History    Not on file  Social History Main Topics    Smoking status: Never Smoker    Smokeless tobacco: Never Used    Alcohol use No    Drug use: No    Sexual activity: No     Other Topics Concern    Not on file     Social History Narrative    No narrative on file       History reviewed  No pertinent family history  Review of systems:  Please see HPI for positive symptoms   No fever, no chills, no weight change  Ocular: No drainage, no blurred vision  HEENT:  No sore throat, earache, or congestion  No neck pain  COR:  No chest pain  No palpitations  Lungs:  no sob, wheezing,  GI:  no  nausea, no vomiting, no diarrhea, no constipation, no anorexia  :  No dysuria, frequency, or urgency  No hematuria  Musculoskeletal:  No joint pain or swelling or edema  Skin:  No rash or itching  Psychiatric:  no anxiety, no depression  Endocrine:  No polyuria or polydipsia  Physical examination:  Vitals:    02/19/18 0820   BP: 148/65   Pulse: 65   Resp: 20   Temp: (!) 97 4 °F (36 3 °C)   SpO2: 95%       GENERAL APPEARANCE:  The patient is alert, oriented  He is in no acute distress  HEENT:  Head is normocephalic  The sinuses are otherwise nontender  Pupils are equal and reactive  NECK:  Supple without lymphadenopathy  HEART:  Regular rate and rhythm  LUNGS:  clear to auscultation  No crackles or wheezes are heard  ABDOMEN:  Soft, nontender, nondistended with good bowel sounds heard  EXTREMITIES:  Without cyanosis, clubbing or edema  Mental status: The patient is alert, attentive, and oriented  Speech is clear and fluent, good repetition, comprehension, and naming  he recalls 3/3 objects at 5 minutes  Cranial nerves:  CN II: Visual fields are full to confrontation  Fundoscopic exam is normal with sharp discs and no vascular changes    Pupils are 3 mm and briskly reactive to light  CN III, IV, VI: At primary gaze, there is no eye deviation  CN V: Facial sensation is intact to pinprick in all 3 divisions bilaterally  Corneal responses are intact  CN VII: Face is symmetric with normal eye closure and smile  CN VIII: Hearing is normal to rubbing fingers  CN IX, X: Palate elevates symmetrically  Phonation is normal   CN XI: Head turning and shoulder shrug are intact  CN XII: Tongue is midline with normal movements and no atrophy  Motor: There is no pronator drift of out-stretched arms    Muscle bulk and tone are normal      Muscle exam  Arm Right Left Leg Right Left   Deltoid 5/5 5/5 Iliopsoas 5/5 5/5   Biceps 5/5 5/5 Quads 5/5 5/5   Triceps 5/5 5/5 Hamstrings 5/5 5/5   Wrist Extension 5/5 5/5 Ankle Dorsi Flexion 5/5 5/5   Wrist Flexion 5/5 5/5 Ankle Plantar Flexion 5/5 5/5   Interossei 5/5 5/5 Ankle Eversion 5/5 5/5   APB 5/5 5/5 Ankle Inversion 5/5 5/5       Reflexes   RJ BJ TJ KJ AJ Plantars León's   Right 2+ 2+ 2+ 2+ 2+ Downgoing Not present   Left 2+ 2+ 2+ 2+ 2+ Downgoing Not present     Sensory:  Light touch, pinprick, position sense, and vibration sense are intact in fingers and toes  Coordination:  Rapid alternating movements and fine finger movements are intact  There is no dysmetria on finger-to-nose and heel-knee-shin  There are no abnormal or extraneous movements  Romberg negative  Gait/Stance:  Normal heel/toe walking and tandem gait  Lab Results   Component Value Date    WBC 6 80 02/19/2018    HGB 10 2 (L) 02/19/2018    HCT 32 5 (L) 02/19/2018    MCV 81 (L) 02/19/2018     02/19/2018     Lab Results   Component Value Date    HGBA1C 8 2 (H) 02/17/2018     Lab Results   Component Value Date    ALT 18 02/16/2018    AST 23 02/16/2018    ALKPHOS 125 (H) 02/16/2018    BILITOT 0 50 02/16/2018     Lab Results   Component Value Date    GLUCOSE 86 02/19/2018    CALCIUM 8 6 02/19/2018     02/19/2018    K 3 5 02/19/2018    CO2 31 02/19/2018     02/19/2018    BUN 57 (H) 02/19/2018    CREATININE 1 42 (H) 02/19/2018         Radiology          Review of reports and notes reveal:    Independent Interpretation of images or specimens:  MRI brain: negative for stroke, mass/lesion  No acute process  CT brain: negative for acute process  Thank you for this consult  Total time of encounter: ***  More than 50% of time was spent in counseling and coordination of care of patient  [facility]  [address] [place], [place] [address]

## 2018-02-19 NOTE — PROGRESS NOTES
[facility] Progress Note  Patient: Monae Cruz 70 y o  female   MRN: [id number]  PCP: No primary care provider on file  Unit/Bed#: [address] [place] [address] Encounter: [id number]  Date Of Visit: 02/19/18    Problem List:    Principal Problem:    Acute on chronic combined systolic and diastolic CHF (congestive heart failure) ([facility])  Active Problems:    Chronic a-fib ([facility])    Coronary artery disease involving coronary bypass graft of native heart without angina pectoris    Long-term insulin use in type 2 diabetes ([facility])    Hypertension    Hyperlipidemia    CKD (chronic kidney disease) stage 3, GFR 30-59 ml/min    Morbid obesity with BMI of 40 0-44 9, adult ([facility])    H/O tricuspid valve annuloplasty      Assessment & Plan:    * Acute on chronic combined systolic and diastolic CHF (congestive heart failure) ([facility])   Assessment & Plan    Symptoms improving  Still with leg swelling which is worse than baseline   Continue diuresis with 60 mg IV Lasix b i d and Diovan  Repeat echo during this admission shows worsening of systolic function  EF is now about 40 %  EF was 60-65% in 2016  Troponins negative  Strict I/Os and daily weight  Cardiac catheterization in a m  to evaluate for reduction in EF  Chronic a-fib [facility])   Assessment & Plan    Xarelto on hold for cardiac catheterization as per Cardiology recommendations  As per prior notes, patient has underlying very slow ventricular rate and has Medtronic pacemaker in place which is functioning adequately  Pacemaker was interrogated December 2017        Coronary artery disease involving coronary bypass graft of native heart without angina pectoris   Assessment & Plan    Continue statin, fish oil        H/O tricuspid valve annuloplasty   Assessment & Plan    Possible LAILA as per Cardiology to reevaluate tricuspid valve        Morbid obesity with BMI of 40 0-44 9, adult ([facility])   Assessment & Plan    Diet and lifestyle modification   Body mass index is 42 63 kg/m²  CKD (chronic kidney disease) stage 3, GFR 30-59 ml/min   Assessment & Plan    Creatinine appears stable overall  Monitor creatinine closely while on diuretics  Repeat level in the a m  Hyperlipidemia   Assessment & Plan    Continue statin        Hypertension   Assessment & Plan    Continue Diovan and monitor blood pressure        Long-term insulin use in type 2 diabetes (HCC)   Assessment & Plan    On Lantus and Humalog SSI  hemoglobin A1c of 8 2              VTE Pharmacologic Prophylaxis:   Pharmacologic: Rivaroxaban (Xarelto) d/c'ed for cardiac cath  Mechanical VTE Prophylaxis in Place: Yes    Patient Centered Rounds: I have performed bedside rounds with nursing staff today  Discussions with Specialists or Other Care Team Provider: Yes    Education and Discussions with Family / Patient:Yes    Time Spent for Care: 30 min  More than 50% of total time spent on counseling and coordination of care as described above  Current Length of Stay: 3 day(s)    Current Patient Status: Inpatient     Discharge Plan: home    Code Status: Level 1 - Full Code     Certification Statement: The patient will continue to require additional inpatient hospital stay due to CHF exacerbation requiring IV diuretic      Subjective:     Still with leg swelling  Shortness of breath has improved    Objective:     Vitals:   Temp (24hrs), Av 7 °F (36 5 °C), Min:97 4 °F (36 3 °C), Max:97 9 °F (36 6 °C)    HR:  [65-67] 65  Resp:  [20] 20  BP: (148)/(65-68) 148/65  SpO2:  [94 %-95 %] 95 %  Body mass index is 42 52 kg/m²  Input and Output Summary (last 24 hours): Intake/Output Summary (Last 24 hours) at 18 1331  Last data filed at 18 1229   Gross per 24 hour   Intake                0 ml   Output             2050 ml   Net            -2050 ml       Physical Exam:     Physical Exam   Constitutional: She is oriented to person, place, and time  She appears well-developed and well-nourished   No distress  HENT:   Head: Normocephalic and atraumatic  Mouth/Throat: Oropharynx is clear and moist    Eyes: Conjunctivae are normal  Right eye exhibits no discharge  Left eye exhibits no discharge  Neck: Neck supple  Cardiovascular: Normal rate and regular rhythm  Murmur heard  Pulmonary/Chest: Effort normal and breath sounds normal  No respiratory distress  She has no wheezes  She has no rales  Abdominal: Soft  Bowel sounds are normal  She exhibits no distension  There is no tenderness  Reducible, nontender umbilical hernia   Musculoskeletal: She exhibits edema (B/L L  E)  Neurological: She is alert and oriented to person, place, and time  No cranial nerve deficit  Skin: Skin is dry  She is not diaphoretic  Psychiatric: She has a normal mood and affect  Additional Data:     Labs:      Results from last 7 days  Lab Units 02/19/18  0632  02/16/18  0605   WBC Thousand/uL 6 80  < > 7 40   HEMOGLOBIN g/dL 10 2*  < > 11 0*   HEMATOCRIT % 32 5*  < > 35 2*   PLATELETS Thousands/uL 174  < > 199   NEUTROS PCT %  --   --  71   LYMPHS PCT %  --   --  13*   MONOS PCT %  --   --  12   EOS PCT %  --   --  3   < > = values in this interval not displayed  Results from last 7 days  Lab Units 02/19/18 0632  02/16/18  0605   SODIUM mmol/L 141  < > 138   POTASSIUM mmol/L 3 5  < > 4 6   CHLORIDE mmol/L 105  < > 104   CO2 mmol/L 31  < > 26   BUN mg/dL 57*  < > 62*   CREATININE mg/dL 1 42*  < > 1 42*   CALCIUM mg/dL 8 6  < > 8 8   TOTAL PROTEIN g/dL  --   --  8 1   BILIRUBIN TOTAL mg/dL  --   --  0 50   ALK PHOS U/L  --   --  125*   ALT U/L  --   --  18   AST U/L  --   --  23   GLUCOSE RANDOM mg/dL 86  < > 175*   < > = values in this interval not displayed  Results from last 7 days  Lab Units 02/16/18  0605   INR  1 38*       * I Have Reviewed All Lab Data Listed Above  * Additional Pertinent Lab Tests Reviewed:  All Labs For Current Hospital Admission Reviewed    Imaging:  Xr Chest 1 View Portable    Result Date: 2/16/2018  Narrative: CHEST INDICATION:  Shortness of breath  COMPARISON:  11/04/2016 VIEWS:   AP frontal IMAGES:  1 FINDINGS: Cardiomediastinal silhouette appears enlarged  A median sternotomy has been performed  A permanent pacemaker is present  There are no infiltrates  The patient appears to be in mild heart failure  Visualized osseous structures appear within normal limits for the patient's age  Impression: Cardiomegaly and mild CHF  Workstation performed: [id number]     Imaging Reports Reviewed by myself    Cultures:   Blood Culture:   Lab Results   Component Value Date    BLOODCX No Growth After 5 Days  10/15/2017    BLOODCX No Growth After 5 Days  10/15/2017     Urine Culture:   Lab Results   Component Value Date    URINECX >100,000 cfu/ml Klebsiella pneumoniae 08/15/2016     Sputum Culture: No components found for: SPUTUMCX  Wound Culture: No results found for: WOUNDCULT    Last 24 Hours Medication List:     Current Facility-Administered Medications:  bisacodyl 5 mg Oral Daily PRN Shannon Norman MD   calcium carbonate 1 tablet Oral Daily With Breakfast Vero Revankar, DO   fish oil 1,000 mg Oral Daily Vero Revankar, DO   furosemide 60 mg Intravenous BID (diuretic) Krystal Thompson MD   insulin glargine 30 Units Subcutaneous HS Vero Revankar, DO   insulin lispro 1-5 Units Subcutaneous TID AC Vero Revankar, DO   levothyroxine 137 mcg Oral Daily Vero Revankar, DO   multivitamin-minerals 2 tablet Oral Daily Vero Revankar, DO   oxyCODONE 5 mg Oral Q4H PRN Shannon Norman MD   pantoprazole 40 mg Oral Early Morning Vero Revankar, DO   pravastatin 40 mg Oral Daily With John-Padmini Revankar, DO        Today, Patient Was Seen By: Grace Acevedo DO    ** Please Note: Dragon 360 Dictation voice to text software may have been used in the creation of this document   **

## 2018-02-19 NOTE — OCCUPATIONAL THERAPY NOTE
OT EVALUATION       02/19/18 1128   Pain Assessment   Pain Assessment No/denies pain   Home Living   Type of Home House  ([address])   Home Layout One level   Bathroom Shower/Tub Tub/shower unit   Bathroom Equipment Shower chair;Grab bars in shower;Commode   Home Equipment Walker;Cane   Prior Function   Level of [place] Independent with ADLs and functional mobility  (uses cane at times)   Lives With Alone  (family lives downstairs )   Receives Help From Family   ADL Assistance Independent   IADLs Independent   Comments son checks on patient daily   ADL   Eating Assistance 7  Independent   Grooming Assistance 7  Independent   UB Bathing Assistance 7  Independent   LB Bathing Assistance 7  Independent   UB Dressing Assistance 7  Independent   LB Dressing Assistance 7  [address]   Supine to Sit 7  Independent   Sit to Supine 7  Independent   Transfers   Sit to Stand 7  Independent   Stand to Sit 7  Independent   Functional Mobility   Functional Mobility 7  Independent   Additional Comments 150 feet   Additional items Rolling walker   Balance   Static Sitting Good   Dynamic Sitting Good   Static Standing Good   Dynamic Standing Good   Activity Tolerance   Activity Tolerance Patient tolerated treatment well   RUE Assessment   RUE Assessment WFL   LUE Assessment   LUE Assessment WFL   Cognition   Overall Cognitive Status WFL   Arousal/Participation Cooperative   Attention Within functional limits   Orientation Level Oriented X4   Following Commands Follows all commands and directions without difficulty   Assessment   Assessment Patient evaluated by Occupational Therapy  Patient admitted with Acute on chronic combined systolic and diastolic CHF (congestive heart failure) ([facility] )  The patients occupational profile, medical and therapy history includes a brief history with review of medical and/or therapy records related to the presenting problem    Comorbidities affecting functional mobility and ADLS include: arthritis, CAD, cardiac disease, CHF, diabetes and hypertension  Prior to admission, patient was independent with functional mobility with cane at times, independent with ADLS and independent with IADLS  The evaluation identifies the following performance deficits: no deficits, that result in activity limitations and/or participation restrictions  This evaluation requires clinical decision making of low complexity, because the patients presents with no comorbidities that affect occupational performance and required no modification of tasks or assistance with consideration of a limited number of treatment options  The Barthel Index was used as a functional outcome tool presenting with a score of 100, indicating no limitations of functional mobility and ADLS  Patient is independent with ADLS and functional mobility  No further skilled OT services required at this time  Discharge OT     Goals   Patient Goals go home   Recommendation   OT Discharge Recommendation Home independent   Barthel Index   Feeding 10   Bathing 5   Grooming Score 5   Dressing Score 10   Bladder Score 10   Bowels Score 10   Toilet Use Score 10   Transfers (Bed/Chair) Score 15   Mobility (Level Surface) Score 15   Stairs Score 10   Barthel Index Score 100

## 2018-02-20 ENCOUNTER — APPOINTMENT (INPATIENT)
Dept: NON INVASIVE DIAGNOSTICS | Facility: HOSPITAL | Age: 68
DRG: 286 | End: 2018-02-20
Attending: INTERNAL MEDICINE
Payer: MEDICARE

## 2018-02-20 PROBLEM — G47.33 OBSTRUCTIVE SLEEP APNEA: Status: ACTIVE | Noted: 2018-02-20

## 2018-02-20 LAB
ANION GAP SERPL CALCULATED.3IONS-SCNC: 6 MMOL/L (ref 4–13)
BUN SERPL-MCNC: 56 MG/DL (ref 5–25)
CALCIUM SERPL-MCNC: 8.7 MG/DL (ref 8.3–10.1)
CHLORIDE SERPL-SCNC: 105 MMOL/L (ref 100–108)
CO2 SERPL-SCNC: 31 MMOL/L (ref 21–32)
CREAT SERPL-MCNC: 1.41 MG/DL (ref 0.6–1.3)
GFR SERPL CREATININE-BSD FRML MDRD: 38 ML/MIN/1.73SQ M
GLUCOSE SERPL-MCNC: 116 MG/DL (ref 65–140)
GLUCOSE SERPL-MCNC: 127 MG/DL (ref 65–140)
GLUCOSE SERPL-MCNC: 154 MG/DL (ref 65–140)
GLUCOSE SERPL-MCNC: 174 MG/DL (ref 65–140)
GLUCOSE SERPL-MCNC: 222 MG/DL (ref 65–140)
HCT VFR BLD AUTO: 34.9 % (ref 37–47)
HGB BLD-MCNC: 10.9 G/DL (ref 12–16)
POTASSIUM SERPL-SCNC: 4 MMOL/L (ref 3.5–5.3)
SODIUM SERPL-SCNC: 142 MMOL/L (ref 136–145)

## 2018-02-20 PROCEDURE — 80048 BASIC METABOLIC PNL TOTAL CA: CPT | Performed by: FAMILY MEDICINE

## 2018-02-20 PROCEDURE — 82948 REAGENT STRIP/BLOOD GLUCOSE: CPT

## 2018-02-20 PROCEDURE — C1769 GUIDE WIRE: HCPCS

## 2018-02-20 PROCEDURE — 85014 HEMATOCRIT: CPT | Performed by: FAMILY MEDICINE

## 2018-02-20 PROCEDURE — 85018 HEMOGLOBIN: CPT | Performed by: FAMILY MEDICINE

## 2018-02-20 PROCEDURE — C1894 INTRO/SHEATH, NON-LASER: HCPCS

## 2018-02-20 PROCEDURE — 4A023N7 MEASUREMENT OF CARDIAC SAMPLING AND PRESSURE, LEFT HEART, PERCUTANEOUS APPROACH: ICD-10-PCS | Performed by: INTERNAL MEDICINE

## 2018-02-20 PROCEDURE — 93005 ELECTROCARDIOGRAM TRACING: CPT

## 2018-02-20 PROCEDURE — 93455 CORONARY ART/GRFT ANGIO S&I: CPT | Performed by: INTERNAL MEDICINE

## 2018-02-20 PROCEDURE — 99233 SBSQ HOSP IP/OBS HIGH 50: CPT | Performed by: INTERNAL MEDICINE

## 2018-02-20 PROCEDURE — 99152 MOD SED SAME PHYS/QHP 5/>YRS: CPT | Performed by: INTERNAL MEDICINE

## 2018-02-20 PROCEDURE — 93010 ELECTROCARDIOGRAM REPORT: CPT | Performed by: INTERNAL MEDICINE

## 2018-02-20 PROCEDURE — 93455 CORONARY ART/GRFT ANGIO S&I: CPT

## 2018-02-20 PROCEDURE — B2111ZZ FLUOROSCOPY OF MULTIPLE CORONARY ARTERIES USING LOW OSMOLAR CONTRAST: ICD-10-PCS | Performed by: INTERNAL MEDICINE

## 2018-02-20 PROCEDURE — B2151ZZ FLUOROSCOPY OF LEFT HEART USING LOW OSMOLAR CONTRAST: ICD-10-PCS | Performed by: INTERNAL MEDICINE

## 2018-02-20 PROCEDURE — 94660 CPAP INITIATION&MGMT: CPT

## 2018-02-20 RX ORDER — MIDAZOLAM HYDROCHLORIDE 1 MG/ML
INJECTION INTRAMUSCULAR; INTRAVENOUS CODE/TRAUMA/SEDATION MEDICATION
Status: COMPLETED | OUTPATIENT
Start: 2018-02-20 | End: 2018-02-20

## 2018-02-20 RX ORDER — SODIUM CHLORIDE 9 MG/ML
50 INJECTION, SOLUTION INTRAVENOUS CONTINUOUS
Status: DISCONTINUED | OUTPATIENT
Start: 2018-02-20 | End: 2018-02-21

## 2018-02-20 RX ORDER — LIDOCAINE HYDROCHLORIDE 10 MG/ML
INJECTION, SOLUTION INFILTRATION; PERINEURAL CODE/TRAUMA/SEDATION MEDICATION
Status: COMPLETED | OUTPATIENT
Start: 2018-02-20 | End: 2018-02-20

## 2018-02-20 RX ORDER — CARVEDILOL 3.12 MG/1
3.12 TABLET ORAL 2 TIMES DAILY WITH MEALS
Status: DISCONTINUED | OUTPATIENT
Start: 2018-02-20 | End: 2018-02-21 | Stop reason: HOSPADM

## 2018-02-20 RX ORDER — FENTANYL CITRATE 50 UG/ML
INJECTION, SOLUTION INTRAMUSCULAR; INTRAVENOUS CODE/TRAUMA/SEDATION MEDICATION
Status: COMPLETED | OUTPATIENT
Start: 2018-02-20 | End: 2018-02-20

## 2018-02-20 RX ORDER — VALSARTAN 80 MG/1
80 TABLET ORAL DAILY
Status: DISCONTINUED | OUTPATIENT
Start: 2018-02-20 | End: 2018-02-21 | Stop reason: HOSPADM

## 2018-02-20 RX ORDER — FUROSEMIDE 10 MG/ML
20 INJECTION INTRAMUSCULAR; INTRAVENOUS ONCE
Status: COMPLETED | OUTPATIENT
Start: 2018-02-20 | End: 2018-02-20

## 2018-02-20 RX ADMIN — INSULIN GLARGINE 30 UNITS: 100 INJECTION, SOLUTION SUBCUTANEOUS at 21:24

## 2018-02-20 RX ADMIN — CARVEDILOL 3.12 MG: 3.12 TABLET, FILM COATED ORAL at 17:47

## 2018-02-20 RX ADMIN — FUROSEMIDE 20 MG: 10 INJECTION, SOLUTION INTRAMUSCULAR; INTRAVENOUS at 13:48

## 2018-02-20 RX ADMIN — Medication 1000 MG: at 10:43

## 2018-02-20 RX ADMIN — Medication 1 TABLET: at 10:43

## 2018-02-20 RX ADMIN — OXYCODONE HYDROCHLORIDE 5 MG: 5 TABLET ORAL at 11:46

## 2018-02-20 RX ADMIN — LEVOTHYROXINE SODIUM 137 MCG: 112 TABLET ORAL at 07:26

## 2018-02-20 RX ADMIN — INSULIN LISPRO 1 UNITS: 100 INJECTION, SOLUTION INTRAVENOUS; SUBCUTANEOUS at 17:47

## 2018-02-20 RX ADMIN — PRAVASTATIN SODIUM 40 MG: 40 TABLET ORAL at 17:47

## 2018-02-20 RX ADMIN — VALSARTAN 80 MG: 80 TABLET ORAL at 13:46

## 2018-02-20 RX ADMIN — PANTOPRAZOLE SODIUM 40 MG: 40 TABLET, DELAYED RELEASE ORAL at 06:53

## 2018-02-20 RX ADMIN — LIDOCAINE HYDROCHLORIDE 8 ML: 10 INJECTION, SOLUTION INFILTRATION; PERINEURAL at 09:41

## 2018-02-20 RX ADMIN — FENTANYL CITRATE 25 MCG: 50 INJECTION, SOLUTION INTRAMUSCULAR; INTRAVENOUS at 09:36

## 2018-02-20 RX ADMIN — MIDAZOLAM HYDROCHLORIDE 1 MG: 1 INJECTION, SOLUTION INTRAMUSCULAR; INTRAVENOUS at 09:36

## 2018-02-20 RX ADMIN — IODIXANOL 70 ML: 320 INJECTION, SOLUTION INTRAVASCULAR at 11:06

## 2018-02-20 RX ADMIN — SODIUM CHLORIDE 50 ML/HR: 0.9 INJECTION, SOLUTION INTRAVENOUS at 10:45

## 2018-02-20 RX ADMIN — Medication 2 TABLET: at 10:43

## 2018-02-20 NOTE — ASSESSMENT & PLAN NOTE
Patient has significant improvement of symptoms with IV Lasix  Patient had -8 liters since admission  Patient's echo showed EF of 40% which is decreased from previous echo done in 2016 which showed EF of 60-65%  Patient can be transitioned to p o  Lasix in a m    Patient's cardiac catheterization showed patent grafts

## 2018-02-20 NOTE — ASSESSMENT & PLAN NOTE
Patient cardiac catheterization showed significant 3 vessel coronary disease with patent grafts  Decreased ejection fraction etiology is not clear  Outpatient [facility]GA per Cardiology

## 2018-02-20 NOTE — PROGRESS NOTES
Patient post cardiac cath this morning  Pulses present to BL legs and upper extremities  C/o pain to R calf, pain subsided with Roxicodone 5 mg  Femo stat present to right femoral artery, pressure decreased to 20 mmHg at 12 30pm, and removed at 3 pm  No bleeding at site, dressing clean dry & intact  Patient minimal ambulatory as tolerated, no lightheadedness, no dizziness, no c/o pain

## 2018-02-20 NOTE — PROGRESS NOTES
Progress Note - Cardiology   Madelin Crooked 70 y o  female MRN: [id number]  Unit/Bed#: [address] [place] [address] Encounter: [id number]    Assessment/Plan:  Acute on chronic systolic heart failure EF of 40%- decline in EF since prior echo 2016, cardiac catheterization postponed secondary to xarelto  Plan for cardiac cath tomorrow R + L with Dr Hunter Godwin (outpatient cardiologist)  Will hold ace-inhibitor  Hold diuretic in AM   CAD s/p bypass grafts  Severe pulm htn- secondary to heart failure? Hx of tricuspid annuloplasty  Diabetes mellitus  Hypothyroidism  Afib- restart xarelto post cath  Acute on chronic kidney disease    Subjective/Objective   Shortness of breath significantly improved  No chest pain    Objective:     Vitals: /58 (BP Location: Left arm)   Pulse 73   Temp 97 6 °F (36 4 °C) (Oral)   Resp 20   Ht 5' 5" (1 651 m)   Wt 116 kg (255 lb 8 2 oz)   LMP  (LMP Unknown)   SpO2 93%   BMI 42 52 kg/m²   Vitals:    02/18/18 0750 02/19/18 0500   Weight: 116 kg (256 lb 3 2 oz) 116 kg (255 lb 8 2 oz)     Orthostatic Blood Pressures    Flowsheet Row Most Recent Value   Blood Pressure  132/58 filed at 02/19/2018 1406   Patient Position - Orthostatic VS  Lying filed at 02/19/2018 1406            Intake/Output Summary (Last 24 hours) at 02/19/18 1900  Last data filed at 02/19/18 1701   Gross per 24 hour   Intake                0 ml   Output             1550 ml   Net            -1550 ml       Invasive Devices     Peripheral Intravenous Line            Peripheral IV 02/16/18 Right Arm 3 days                Review of Systems: reviewed    Physical Exam   Constitutional: She is oriented to person, place, and time  No distress  HENT:   Head: Normocephalic and atraumatic  Right Ear: External ear normal    Left Ear: External ear normal    Eyes: Conjunctivae are normal  Pupils are equal, round, and reactive to light  Right eye exhibits no discharge  Left eye exhibits no discharge  No scleral icterus     Neck: Normal range of motion  Neck supple  JVD present  No tracheal deviation present  No thyromegaly present  Cardiovascular: Normal rate and regular rhythm  Exam reveals gallop  Exam reveals no friction rub  Murmur heard  Pulmonary/Chest: Effort normal and breath sounds normal  No stridor  No respiratory distress  She has no wheezes  She has no rales  She exhibits no tenderness  Abdominal: Soft  Bowel sounds are normal  She exhibits no distension and no mass  There is no tenderness  There is no rebound and no guarding  Musculoskeletal: Normal range of motion  She exhibits edema  She exhibits no tenderness or deformity  Neurological: She is alert and oriented to person, place, and time  She has normal reflexes  No cranial nerve deficit  She exhibits normal muscle tone  Coordination normal    Skin: Skin is warm and dry  No rash noted  She is not diaphoretic  No erythema  No pallor  Psychiatric: She has a normal mood and affect  Her behavior is normal  Judgment and thought content normal    Nursing note and vitals reviewed  Lab Results: I have personally reviewed pertinent lab results  Imaging: I have personally reviewed pertinent reports  EKG: reviewed  VTE Pharmacologic Prophylaxis: Sequential compression device (Venodyne)   VTE Mechanical Prophylaxis: sequential compression device    Counseling / Coordination of Care  Total time spent today 40 minutes  Greater than 50% of total time was spent with the patient and / or family counseling and / or coordination of care   A description of the counseling / coordination of care: hf, afib

## 2018-02-20 NOTE — PROGRESS NOTES
Progress Note - Cardiology   Gomez Granados 70 y o  female MRN: [id number]  Unit/Bed#: [address] [place] [address] Encounter: [id number]    Assessment and Plan:   1  Acute on chronic systolic and diastolic heart failure who with recent echo showing ejection fraction 40-45%  Echo reviewed, difficult study  Cardiac catheterization shows no evidence of any progression of coronary artery disease  Will check MUGA for echo as lower EF, as persistently documented lower EF may when it want us to place biventricular pacemaker on her  2  CAD status post CABG x4 with LIMA to LAD, SVG vein graft to RCA, SVG vein graft to OM and SVG vein graft to diagonal   All patent on cardiac catheterization  LV g not done due to CRI  LVEDP was around 16 mm of mercury  3  History of severe pulmonary hypertension and tricuspid and low plasty  4  Diabetes mellitus  Need better control HbA1c 8 2  5  Hypothyroidism  6  Chronic atrial fibrillations status post [employer] pacemaker for sick sinus syndrome currently 100% paced  Will resume Xarelto  7  Acute on kidney disease with serum creatinine baseline around 1 5  8  Dyslipidemia on statin  Plan  Will resume Lasix  Resume Diovan  Will add Coreg  Monitor input output  Daily weight  Check electrolytes  Resume Xarelto  MUGA scan  Concur with other Rx provided  Subjective / Objective:   Patient seen and evaluated  She has earlier cardiac catheterization done found to have three-vessel coronary artery disease but patent grafts of  LIMA to LAD, vein graft to diagonal, circumflex OM 1 and RPDA was patent  Vitals: Blood pressure 150/66, pulse 63, temperature (!) 96 4 °F (35 8 °C), temperature source Tympanic, resp  rate 20, height 5' 5" (1 651 m), weight 115 kg (253 lb 2 oz), SpO2 97 %, not currently breastfeeding  Vitals:    02/19/18 0500 02/20/18 0548   Weight: 116 kg (255 lb 8 2 oz) 115 kg (253 lb 2 oz)     Body mass index is 42 12 kg/m²    BP Readings from Last 3 Encounters:   02/20/18 150/66   01/25/18 108/68   12/13/17 135/83     Orthostatic Blood Pressures    Flowsheet Row Most Recent Value   Blood Pressure  150/66 filed at 02/20/2018 1420   Patient Position - Orthostatic VS  Lying filed at 02/20/2018 1420             Intake/Output Summary (Last 24 hours) at 02/20/18 1515  Last data filed at 02/20/18 1127   Gross per 24 hour   Intake              360 ml   Output             2000 ml   Net            -1640 ml        Invasive Devices     Peripheral Intravenous Line            Peripheral IV 02/20/18 Left Forearm less than 1 day                  Intake/Output Summary (Last 24 hours) at 02/20/18 1515  Last data filed at 02/20/18 1127   Gross per 24 hour   Intake              360 ml   Output             2000 ml   Net            -1640 ml       Invasive Devices     Peripheral Intravenous Line            Peripheral IV 02/20/18 Left Forearm less than 1 day                Physical Exam:   Physical Exam    Neurologic:  Alert & oriented x 3,  no focal deficits noted   Constitutional:  Well developed, well nourished,  With no acute distress  Eyes:  PERRL, conjunctiva normal   HENT:  Atraumatic, external ears normal, nose normal,   NECK: Normal range of motion, no tenderness, neck is supple , No JVP  Respiratory:  Bilateral air entry mostly clear to auscultation  Cardiovascular: S1-S2 regular with a 3/6 pansystolic murmur  GI:  Soft, nondistended, normal bowel sounds, nontender, no hepatosplenomegaly appreciated  Musculoskeletal:  No tenderness, no deformities      Extremities:  Mild edema and distal pulses are present  Psychiatric:  Speech and behavior appropriate       Medications/ Allergies:       Current Facility-Administered Medications:  bisacodyl 5 mg Oral Daily PRN Kate Arora MD    calcium carbonate 1 tablet Oral Daily With Breakfast Vero Brown DO    carvedilol 3 125 mg Oral BID With Meals Jeff Fan MD    fish oil 1,000 mg Oral Daily Vero Brown DO    insulin glargine 30 Units Subcutaneous HS Vero Revankar, DO    insulin lispro 1-5 Units Subcutaneous TID AC Vero Revankar, DO    levothyroxine 137 mcg Oral Daily Vero Revankar, DO    multivitamin-minerals 2 tablet Oral Daily Vero Revankar, DO    oxyCODONE 5 mg Oral Q4H PRN Bridger Boland MD    pantoprazole 40 mg Oral Early Morning Vero Revankar, DO    pravastatin 40 mg Oral Daily With [employer] Revankar, DO    sodium chloride 50 mL/hr Intravenous Continuous Anna Marie Leung MD Last Rate: 50 mL/hr (02/20/18 1045)   valsartan 80 mg Oral Daily Anna Marie Leung MD        bisacodyl 5 mg Daily PRN   oxyCODONE 5 mg Q4H PRN     Allergies   Allergen Reactions    Other      Adhesive tape    Ibuprofen Palpitations    Penicillins Rash       VTE Pharmacologic Prophylaxis:   Xarelto    Labs:   Troponins:    Results from last 7 days  Lab Units 02/16/18  1604 02/16/18  1110 02/16/18  0605   TROPONIN I ng/mL <0 02 <0 02 <0 02       CBC with diff:    Results from last 7 days  Lab Units 02/20/18  0551 02/19/18  0632 02/17/18  0551 02/16/18  0605   WBC Thousand/uL  --  6 80 7 30 7 40   HEMOGLOBIN g/dL 10 9* 10 2* 10 1* 11 0*   HEMATOCRIT % 34 9* 32 5* 32 6* 35 2*   MCV fL  --  81* 82 81*   PLATELETS Thousands/uL  --  174 178 199   MCH pg  --  25 2* 25 2* 25 4*   MCHC g/dL  --  31 3* 30 9* 31 3*   RDW %  --  17 5* 18 0* 18 0*   MPV fL  --  8 1* 8 8* 8 4*   NRBC AUTO /100 WBCs  --   --   --  0       CMP:    Results from last 7 days  Lab Units 02/20/18  0551 02/19/18  0632 02/17/18  0551 02/16/18  0605   SODIUM mmol/L 142 141 139 138   POTASSIUM mmol/L 4 0 3 5 3 9 4 6   CHLORIDE mmol/L 105 105 102 104   CO2 mmol/L 31 31 26 26   ANION GAP mmol/L 6 5 11 8   BUN mg/dL 56* 57* 57* 62*   CREATININE mg/dL 1 41* 1 42* 1 38* 1 42*   GLUCOSE RANDOM mg/dL 174* 86 140 175*   CALCIUM mg/dL 8 7 8 6 8 4 8 8   AST U/L  --   --   --  23   ALT U/L  --   --   --  18   ALK PHOS U/L  --   --   --  125*   TOTAL PROTEIN g/dL  --   --   --  8 1   BILIRUBIN TOTAL mg/dL --   --   --  0 50   EGFR ml/min/1 73sq m 38 38 39 38       Magnesium:    Results from last 7 days  Lab Units 18  0605   MAGNESIUM mg/dL 2 4     Coags:    Results from last 7 days  Lab Units 18  0605   PTT seconds 31   INR  1 38*     TSH:    Results from last 7 days  Lab Units 18  0551   TSH 3RD GENERATON uIU/mL 2 632     Hgb A1c:    Results from last 7 days  Lab Units 18  0551   HEMOGLOBIN A1C % 8 2*         Imaging & Testing   I have personally reviewed pertinent reports  Xr Chest 1 View Portable    Result Date: 2018  Narrative: CHEST INDICATION:  Shortness of breath  COMPARISON:  2016 VIEWS:   AP frontal IMAGES:  1 FINDINGS: Cardiomediastinal silhouette appears enlarged  A median sternotomy has been performed  A permanent pacemaker is present  There are no infiltrates  The patient appears to be in mild heart failure  Visualized osseous structures appear within normal limits for the patient's age  Impression: Cardiomegaly and mild CHF  Workstation performed: [id number]        EKG / Monitor: Personally reviewed  Patient is paced all the time      Cardiac testing:   Results for orders placed during the hospital encounter of 18   Echo complete with contrast if indicated    Narrative Mandy [address]  [address] [facility]  [phone]    Transthoracic Echocardiogram  2D, M-mode, Doppler, and Color Doppler    Study date:  2018    Patient: Adam Benson  MR number: [id number]  Account number: [de-identified]  : 1950  Age: 70 years  Gender: Female  Status: Routine  Location: Bedside  Height: 65 in 65 in  Weight: 258 lb 257 4 lb  BP: 136/ 87 mmHg    Indications: Dyspnea    Diagnoses: R06 00 - Dyspnea, unspecified    Sonographer:  DREA Ross  Primary Physician:  Kamar Gillis  Referring Physician:  Ritu Castillo MD  Group:  [facility]  Interpreting Physician:  Ritu Castillo MD    SUMMARY    SUMMARY:  Compared with prior echo report 2016, there is worsening in systolic function from 10--97% and elevated filling pressures  LEFT VENTRICLE:  Systolic function was normal by visual assessment  Ejection fraction was estimated in the range of 40 % to 45 % to be 40 %  There was moderate diffuse hypokinesis with regional variations  Doppler parameters were consistent with both elevated ventricular end-diastolic filling pressure and elevated left atrial filling pressure  VENTRICULAR SEPTUM:  There was mild systolic and diastolic flattening  These changes are consistent with RV volume and pressure overload  RIGHT VENTRICLE:  Systolic function was mildly reduced  LEFT ATRIUM:  The atrium was moderately to markedly dilated  RIGHT ATRIUM:  The atrium was moderately dilated  MITRAL VALVE:  There was mild annular calcification  AORTIC VALVE:  There was mild regurgitation  TRICUSPID VALVE:  There was moderate regurgitation  The findings suggest severe pulmonary hypertension  HISTORY: PRIOR HISTORY: HTN, GERD, DM, CAD, CHF, Hyperlipidemia, Atrial Fibrillation    PROCEDURE: The procedure was performed at the bedside  This was a routine study  The transthoracic approach was used  The study included complete 2D imaging, M-mode, complete spectral Doppler, and color Doppler  The heart rate was 66 bpm,  at the start of the study  Echocardiographic views were limited due to restricted patient mobility, poor acoustic window availability, and decreased penetration  This was a technically difficult study  LEFT VENTRICLE: Size was normal  Systolic function was normal by visual assessment  Ejection fraction was estimated in the range of 40 % to 45 % to be 40 %  There was moderate diffuse hypokinesis with regional variations  Wall thickness  was normal  No evidence of apical thrombus  DOPPLER: The study was not technically sufficient to allow evaluation of LV diastolic function   Left ventricular diastolic function parameters were abnormal  Doppler parameters were consistent  with both elevated ventricular end-diastolic filling pressure and elevated left atrial filling pressure  VENTRICULAR SEPTUM: There was mild systolic and diastolic flattening  These changes are consistent with RV volume and pressure overload  RIGHT VENTRICLE: The size was normal  Systolic function was mildly reduced  Wall thickness was normal     LEFT ATRIUM: The atrium was moderately to markedly dilated  RIGHT ATRIUM: The atrium was moderately dilated  MITRAL VALVE: There was mild annular calcification  There was normal leaflet separation  DOPPLER: The transmitral velocity was within the normal range  There was no evidence for stenosis  There was no significant regurgitation  AORTIC VALVE: The valve was probably trileaflet  Leaflets exhibited mildly increased thickness, mild calcification, normal cuspal separation, and sclerosis  DOPPLER: Transaortic velocity was within the normal range  There was no evidence  for stenosis  There was mild regurgitation  TRICUSPID VALVE: The valve structure was normal  There was normal leaflet separation  DOPPLER: The transtricuspid velocity was within the normal range  There was no evidence for stenosis  There was moderate regurgitation  Estimated peak PA  pressure was 65 mmHg  The findings suggest severe pulmonary hypertension  PULMONIC VALVE: Leaflets exhibited normal thickness, no calcification, and normal cuspal separation  DOPPLER: The transpulmonic velocity was within the normal range  There was no significant regurgitation  PERICARDIUM: There was no pericardial effusion  The pericardium was normal in appearance  AORTA: The root exhibited normal size  SYSTEMIC VEINS: IVC: The inferior vena cava was normal in size      SYSTEM MEASUREMENT TABLES    2D mode  AoR Diam 2D: 2 8 cm  LA Diam (2D): 5 cm  LA/Ao (2D): 1 79  FS (2D Teich): 21 5 %  IVSd (2D): 1 17 cm  LVDEV: 102 cm³  LVESV: 57 8 cm³  LVIDd(2D): 4 7 cm  LVISd (2D): 3 69 cm  LVOT Area 2D: 3 14 cm squared  LVPWd (2D): 1 08 cm  SV (Teich): 44 2 cm³    Apical four chamber  LVEF A4C: 42 %    Unspecified Scan Mode  TROY Cont Eq (Peak Carlos): 1 53 cm squared  TROY Cont Eq (VTI): 1 57 cm squared  LVOT (VTI): 27 6 cm  LVOT Diam : 2 cm  LVOT Vmax: 1020 mm/s  LVOT Vmax; Mean: 1020 mm/s  Peak Grad ; Mean: 4 mm[Hg]  SV (LVOT): 87 cm³  VTI;Mean: 3 mm[Hg]  TROY Cont Eq (VTI): 2 13 cm squared  MV Peak E Carlos  Mean: 1990 mm/s  MVA (PHT): 2 86 cm squared  PHT: 76 ms  Max P mm[Hg]  V Max: 3790 mm/s  Vmax: 3790 mm/s  RA Area: 22 cm squared  RA Volume: 68 cm³  TAPSE: 1 5 cm    [employer] Accredited Echocardi[facility] Laboratory    Prepared and electronically signed by    Raymond Hernandez MD  Signed 2018 17:11:18         Dr Jose Salcedo MD [facility]      "This note has been constructed using a voice recognition system  Therefore there may be syntax, spelling, and/or grammatical errors   Please call if you have any questions  "

## 2018-02-20 NOTE — RESPIRATORY THERAPY NOTE
RT Protocol Note  Samuel Wheeler 70 y o  female MRN: [id number]  Unit/Bed#: [address] [place] [address] Encounter: [id number]    Assessment    Principal Problem:    Acute on chronic combined systolic and diastolic CHF (congestive heart failure) ([place] [address] )  Active Problems:    Long-term insulin use in type 2 diabetes ([place] [address] )    Coronary artery disease involving coronary bypass graft of native heart without angina pectoris    Hypertension    Hyperlipidemia    CKD (chronic kidney disease) stage 3, GFR 30-59 ml/min    Chronic a-fib (HCC)    Morbid obesity with BMI of 40 0-44 9, adult ([place] [address] )    H/O tricuspid valve annuloplasty      Home Pulmonary Medications:    No home meds pt has cpap at home not using too much pressure but doctors are working on getting her a unit that delivers pressure based on pt needs  Pt states she will try to use our cpap if offered      Past Medical History:   Diagnosis Date    Arthritis     Atrial flutter ([place] [address] )     Cardiac disease     Carotid artery occlusion     CHF (congestive heart failure) ([place] [address] )     Coronary artery disease     Diabetes mellitus ([place] [address] )     Disease of thyroid gland     GERD (gastroesophageal reflux disease)     History of transfusion     Hyperlipidemia     Hypertension     Other specified diabetes mellitus with diabetic autonomic (poly)neuropathy ([place] [address] )     Renal disorder     Sleep apnea      Social History     Social History    Marital status: Single     Spouse name: N/A    Number of children: N/A    Years of education: N/A     Social History Main Topics    Smoking status: Never Smoker    Smokeless tobacco: Never Used    Alcohol use No    Drug use: No    Sexual activity: No     Other Topics Concern    None     Social History Narrative    None       Subjective    Subjective Data: pt states breathing is good now    Objective    Physical Exam:   Assessment Type: Assess only  General Appearance: Alert, Awake  Respiratory Pattern: Normal  Chest Assessment: Chest expansion symmetrical  Bilateral Breath Sounds: Clear  R Breath Sounds: Clear  L Breath Sounds: Clear  Location Specific: No  Cough: Non-productive, Dry, Strong  O2 Device: nc 3 lpm 99%    Vitals:  Blood pressure 151/69, pulse 66, temperature 98 2 °F (36 8 °C), temperature source Oral, resp  rate 17, height 5' 5" (1 651 m), weight 115 kg (253 lb 2 oz), SpO2 99 %, not currently breastfeeding  Imaging and other studies: I have personally reviewed pertinent reports        O2 Device: nc 3 lpm 99%     Plan    Respiratory Plan: No distress/Pulmonary history (cpap hs for tere pressure unknown)        Resp Comments: pt states no breathing issues no resp/pulm history no pulm meds or 02 at home

## 2018-02-20 NOTE — ASSESSMENT & PLAN NOTE
Patient has underlying low heart rate and patient has a pacemaker which was interrogated  In December 2017 with normal function   resume Xarelto

## 2018-02-20 NOTE — SEDATION DOCUMENTATION
Hemostasis obtained to right femoral arteriotomy site with manual pressure for 15 minutes  Site remains stable, no bleeding or hematoma noted  Groin soft to touch  Femostop applied at 40mmHg per Dr Kendal Tuttle

## 2018-02-20 NOTE — PROCEDURES
Cardiac Catheterization Operative Report    Gomez Granados  [id number]  2/20/2018  No primary care provider on file  Right coronary angiography  Left coronary angiography  LV gram  Fluoroscopy    Cardiologist: Dr Michelle Hale MD [facility]    Brief history: Patient is 67-year-old woman with history of coronary artery disease status post CABG with LIMA to LAD, SVG vein graft to RPDA, SVG vein graft to diagonal and SVG vein graft to stay OM1 was admitted with new onset heart failure and was found to have low ejection fraction  In view of her worsening ejection fraction and history of coronary artery disease she was scheduled to have cardiac catheterization  She was explained about the procedure in detail, risk of heart attack, bleeding, stroke, kidney injury but not limited to it discussed with the patient and verbal consent was obtained  Procedure Details: The risks, benefits, complications, including risk of stroke, heart attack, bleeding and even death but not limited to it, along with alternative  treatment options, and expected outcomes were discussed with the patient in detail  The patient and/or family concurred with the proposed plan, giving informed consent  Patient was brought to the cath lab after IV hydration was begun and oral premedication was given  He was further sedated with midazolam and fentanyl  He was prepped and draped in the usual manner  Using the modified Seldinger access technique, a 6 [place] sheath was placed in the right common femoral artery without any complications    A left heart catheterization was done  Right and left coronary angiograms were performed  End of the procedure patient was transferred to holding area in stable condition without any complications  He tolerated the procedure well  After the procedure was completed, sedation was stopped and the sheaths and catheters were all removed  Hemostasis was achieved with 6 [place] Gracie Angio-Seal     Equipment used:   1  JR4 for right coronary angiography, and for right coronary angiography, SVG to RCA, SVG to diagonal, and SVG to om 1 angiogram  2   JL 3 5 for left coronary angiography  3  LV gram was not done due to CRI  Patient's LVEDP was measured    Findings:    1  Dominance: Right dominant coronary system    2  Left main Coronary artery: Normal size vessels  It bifurcates into large LAD and a nondominant but medium-size circumflex system  Distal left main has around 70% stenosis  I    3  Left anterior descending artery: LAD is a large-size vessel and in the mid it is 100% occluded  Competitive flow is seen in LAD from LIMA  A diagonal has also patent graft from aorta  4  Circumflex Coronary artery: Circumflex is a nondominant medium size vessel, it is still a medium to large size artery  It has proximally around 70%  OM1 is 100% occluded with competitive flow  AV groove circumflex is small        5  Right coronary artery: RCA is normal size vessel and it is 100% occluded in the mid area  SVG vein graft to RPDA is widely patent  5  Left ventriculogram: LV gram was not done due to CRI  LVEDP was around 16  There was no gradient across aortic valve    Estimated Blood Loss: Minimal    Complications:  None,  patient tolerated the procedure well  Impression: Cardiac catheterization shows severe three-vessel coronary artery disease  Patient had patent LIMA to LAD, SVG vein graft to diagonal, circumflex as well as RPDA  LV-gram was not done due to CRI    Recommendation: Continue medical therapy  Continue aggressive risk factor modification  Continue medical Rx  Monitor electrolytes  Disposition: Patient transferred to holding area/ monitoring room in stable condition  Condition: Stable    "This note has been constructed using a voice recognition system  Therefore there may be syntax, spelling, and/or grammatical errors   Please call if you have any questions  "    Dr Emeli Paz MD [facility]

## 2018-02-20 NOTE — ASSESSMENT & PLAN NOTE
Creatinine close to baseline  Patient is status post cardiac catheterization  Can resume Diovan and Lasix in a m    Follow-up BMP

## 2018-02-20 NOTE — PLAN OF CARE
Problem: RESPIRATORY - ADULT  Goal: Achieves optimal ventilation and oxygenation  INTERVENTIONS:  - Assess for changes in respiratory status  - Assess for changes in mentation and behavior  - Position to facilitate oxygenation and minimize respiratory effort  - Oxygen administration by appropriate delivery method based on oxygen saturation (per order) or ABGs  - Initiate smoking cessation education as indicated  - Encourage broncho-pulmonary hygiene including cough, deep breathe, Incentive Spirometry  - Assess the need for suctioning and aspirate as needed  - Assess and instruct to report SOB or any respiratory difficulty  - Respiratory Therapy support as indicated    cpap for tere and n/c     Outcome: Progressing  resp poc initiated cpap for tere and nc added    will update on 2/23

## 2018-02-20 NOTE — PROGRESS NOTES
Progress Note - Katina Burkett 1950, 70 y o  female MRN: [id number]    Unit/Bed#: [address] [place] [address] Encounter: [id number]    Primary Care Provider: No primary care provider on file  Date and time admitted to hospital: 2/16/2018  5:50 AM        * Acute on chronic combined systolic and diastolic CHF (congestive heart failure) ([facility] [address] )   Assessment & Plan    Patient has significant improvement of symptoms with IV Lasix  Patient had -8 liters since admission  Patient's echo showed EF of 40% which is decreased from previous echo done in 2016 which showed EF of 60-65%  Patient can be transitioned to p o  Lasix in a m  Patient's cardiac catheterization showed patent grafts        Hypertension   Assessment & Plan    Blood pressure is running high  Restart Diovan  Patient also added on Coreg        Coronary artery disease involving coronary bypass graft of native heart without angina pectoris   Assessment & Plan    Patient cardiac catheterization showed significant 3 vessel coronary disease with patent grafts  Decreased ejection fraction etiology is not clear  Outpatient MUGA per Cardiology  Long-term insulin use in type 2 diabetes [facility])   Assessment & Plan    Last hemoglobin A1c was 8 2  Continue Lantus and Humalog sliding scale        [facility]   Assessment & Plan    Patient has underlying low heart rate and patient has a pacemaker which was interrogated  In December 2017 with normal function   resume Xarelto        Hyperlipidemia   Assessment & Plan    Continue Pravachol 40 milligram p o  daily        Morbid obesity with BMI of 40 0-44 9, adult [facility])   Assessment & Plan    Dietary modification and nutrition evaluation        CKD (chronic kidney disease) stage 3, GFR 30-59 ml/min   Assessment & Plan    Creatinine close to baseline  Patient is status post cardiac catheterization  Can resume Diovan and Lasix in a m    Follow-up BMP        Obstructive sleep apnea   Assessment & Plan    Patient was not clear of her CPAP settings  Will start CPAP 10 centimeter water        H/O tricuspid valve annuloplasty   Assessment & Plan    Follow-up with Cardiology            VTE Pharmacologic Prophylaxis:   Pharmacologic: Rivaroxaban (Xarelto)  Mechanical VTE Prophylaxis in Place: Yes    Patient Centered Rounds: I have performed bedside rounds with nursing staff today  Discussions with Specialists or Other Care Team Provider: Pastora Michele    Education and Discussions with Family / Patient: Yes    Time Spent for Care: 45 minutes  More than 50% of total time spent on counseling and coordination of care as described above  Current Length of Stay: 4 day(s)    Current Patient Status: Inpatient   Certification Statement: The patient will continue to require additional inpatient hospital stay due to CHF    Discharge Plan: Home    Code Status: Level 1 - Full Code      Subjective:   Patient has significant improvement of shortness of breath  Patient complains of some right calf pain which improved with pain medication  Patient also reported improved leg swelling  Denies any chest pain    Objective:     Vitals:   Temp (24hrs), Av 6 °F (36 4 °C), Min:96 4 °F (35 8 °C), Max:98 4 °F (36 9 °C)    HR:  [60-83] 73  Resp:  [17-20] 20  BP: (140-191)/(62-80) 149/66  SpO2:  [92 %-99 %] 98 %  Body mass index is 42 12 kg/m²  Input and Output Summary (last 24 hours): Intake/Output Summary (Last 24 hours) at 18 1829  Last data filed at 18 1127   Gross per 24 hour   Intake              360 ml   Output             1800 ml   Net            -1440 ml       Physical Exam:     Physical Exam   Constitutional: No distress  HENT:   Head: Normocephalic and atraumatic  Nose: Nose normal    Eyes: Conjunctivae and EOM are normal  Pupils are equal, round, and reactive to light  Neck: Normal range of motion  Neck supple  No JVD present  Cardiovascular: Normal rate and regular rhythm    Exam reveals no gallop and no friction rub     Murmur heard  Pansystolic murmur   Pulmonary/Chest: Effort normal and breath sounds normal  No respiratory distress  She has no wheezes  She has no rales  She exhibits no tenderness  Abdominal: Soft  Bowel sounds are normal  She exhibits no distension  There is no tenderness  There is no rebound and no guarding  Musculoskeletal: She exhibits edema  Trace edema bilaterally   Neurological: She is alert  No cranial nerve deficit  Skin: Skin is warm and dry  No rash noted  Psychiatric: She has a normal mood and affect  Additional Data:     Labs:      Results from last 7 days  Lab Units 02/20/18  0551 02/19/18  0632  02/16/18  0605   WBC Thousand/uL  --  6 80  < > 7 40   HEMOGLOBIN g/dL 10 9* 10 2*  < > 11 0*   HEMATOCRIT % 34 9* 32 5*  < > 35 2*   PLATELETS Thousands/uL  --  174  < > 199   NEUTROS PCT %  --   --   --  71   LYMPHS PCT %  --   --   --  13*   MONOS PCT %  --   --   --  12   EOS PCT %  --   --   --  3   < > = values in this interval not displayed  Results from last 7 days  Lab Units 02/20/18 0551  02/16/18  0605   SODIUM mmol/L 142  < > 138   POTASSIUM mmol/L 4 0  < > 4 6   CHLORIDE mmol/L 105  < > 104   CO2 mmol/L 31  < > 26   BUN mg/dL 56*  < > 62*   CREATININE mg/dL 1 41*  < > 1 42*   CALCIUM mg/dL 8 7  < > 8 8   TOTAL PROTEIN g/dL  --   --  8 1   BILIRUBIN TOTAL mg/dL  --   --  0 50   ALK PHOS U/L  --   --  125*   ALT U/L  --   --  18   AST U/L  --   --  23   GLUCOSE RANDOM mg/dL 174*  < > 175*   < > = values in this interval not displayed  Results from last 7 days  Lab Units 02/16/18  0605   INR  1 38*       * I Have Reviewed All Lab Data Listed Above  * Additional Pertinent Lab Tests Reviewed:  [facility]        Recent Cultures (last 7 days):           Last 24 Hours Medication List:     Current Facility-Administered Medications:  bisacodyl 5 mg Oral Daily PRN Antony Belle MD    calcium carbonate 1 tablet Oral Daily With Breakfast Vero Revankar, DO    carvedilol 3 125 mg Oral BID With Meals Jef Grimes MD    fish oil 1,000 mg Oral Daily Vero Revankar, DO    insulin glargine 30 Units Subcutaneous HS Vero Revankar, DO    insulin lispro 1-5 Units Subcutaneous TID AC Vero Revankar, DO    levothyroxine 137 mcg Oral Daily Vero Revankar, DO    multivitamin-minerals 2 tablet Oral Daily Vero Revankar, DO    oxyCODONE 5 mg Oral Q4H PRN Zelda Greene MD    pantoprazole 40 mg Oral Early Morning Vero Revankar, DO    pravastatin 40 mg Oral Daily With [place]-Pad[place] Revankar, DO    sodium chloride 50 mL/hr Intravenous Continuous Jef Grimes MD Last Rate: 50 mL/hr (02/20/18 1045)   valsartan 80 mg Oral Daily Jef Grimes MD         Today, Patient Was Seen By: Jenni Crane MD    ** Please Note: Dictation voice to text software may have been used in the creation of this document   **

## 2018-02-20 NOTE — SEDATION DOCUMENTATION
Bedside report given to [facility], [facility]  Site remains stable at this time, no bleeding or hematoma  Femostop in place at ~40mmHg

## 2018-02-21 ENCOUNTER — APPOINTMENT (INPATIENT)
Dept: RADIOLOGY | Facility: HOSPITAL | Age: 68
DRG: 286 | End: 2018-02-21
Payer: MEDICARE

## 2018-02-21 VITALS
DIASTOLIC BLOOD PRESSURE: 57 MMHG | HEIGHT: 65 IN | TEMPERATURE: 98 F | SYSTOLIC BLOOD PRESSURE: 115 MMHG | HEART RATE: 61 BPM | WEIGHT: 253.13 LBS | RESPIRATION RATE: 20 BRPM | OXYGEN SATURATION: 93 % | BODY MASS INDEX: 42.17 KG/M2

## 2018-02-21 LAB
ANION GAP SERPL CALCULATED.3IONS-SCNC: 5 MMOL/L (ref 4–13)
ATRIAL RATE: 357 BPM
BUN SERPL-MCNC: 49 MG/DL (ref 5–25)
CALCIUM SERPL-MCNC: 8.6 MG/DL (ref 8.3–10.1)
CHLORIDE SERPL-SCNC: 106 MMOL/L (ref 100–108)
CO2 SERPL-SCNC: 31 MMOL/L (ref 21–32)
CREAT SERPL-MCNC: 1.21 MG/DL (ref 0.6–1.3)
ERYTHROCYTE [DISTWIDTH] IN BLOOD BY AUTOMATED COUNT: 18.5 % (ref 11.6–15.1)
GFR SERPL CREATININE-BSD FRML MDRD: 46 ML/MIN/1.73SQ M
GLUCOSE SERPL-MCNC: 146 MG/DL (ref 65–140)
GLUCOSE SERPL-MCNC: 157 MG/DL (ref 65–140)
GLUCOSE SERPL-MCNC: 220 MG/DL (ref 65–140)
HCT VFR BLD AUTO: 33.7 % (ref 37–47)
HGB BLD-MCNC: 10.5 G/DL (ref 12–16)
MAGNESIUM SERPL-MCNC: 2.2 MG/DL (ref 1.6–2.6)
MCH RBC QN AUTO: 25.2 PG (ref 27–31)
MCHC RBC AUTO-ENTMCNC: 31.3 G/DL (ref 31.4–37.4)
MCV RBC AUTO: 81 FL (ref 82–98)
PLATELET # BLD AUTO: 168 THOUSANDS/UL (ref 130–400)
PMV BLD AUTO: 8.5 FL (ref 8.9–12.7)
POTASSIUM SERPL-SCNC: 4.1 MMOL/L (ref 3.5–5.3)
QRS AXIS: 263 DEGREES
QRSD INTERVAL: 188 MS
QT INTERVAL: 522 MS
QTC INTERVAL: 534 MS
RBC # BLD AUTO: 4.17 MILLION/UL (ref 4.2–5.4)
SODIUM SERPL-SCNC: 142 MMOL/L (ref 136–145)
T WAVE AXIS: 86 DEGREES
VENTRICULAR RATE: 63 BPM
WBC # BLD AUTO: 6.3 THOUSAND/UL (ref 4.8–10.8)

## 2018-02-21 PROCEDURE — 99232 SBSQ HOSP IP/OBS MODERATE 35: CPT | Performed by: INTERNAL MEDICINE

## 2018-02-21 PROCEDURE — 80048 BASIC METABOLIC PNL TOTAL CA: CPT | Performed by: INTERNAL MEDICINE

## 2018-02-21 PROCEDURE — 82948 REAGENT STRIP/BLOOD GLUCOSE: CPT

## 2018-02-21 PROCEDURE — 85027 COMPLETE CBC AUTOMATED: CPT | Performed by: INTERNAL MEDICINE

## 2018-02-21 PROCEDURE — 83735 ASSAY OF MAGNESIUM: CPT | Performed by: INTERNAL MEDICINE

## 2018-02-21 PROCEDURE — 78472 GATED HEART PLANAR SINGLE: CPT

## 2018-02-21 PROCEDURE — 99239 HOSP IP/OBS DSCHRG MGMT >30: CPT | Performed by: INTERNAL MEDICINE

## 2018-02-21 PROCEDURE — A9560 TC99M LABELED RBC: HCPCS

## 2018-02-21 RX ORDER — FUROSEMIDE 10 MG/ML
20 INJECTION INTRAMUSCULAR; INTRAVENOUS ONCE
Status: DISCONTINUED | OUTPATIENT
Start: 2018-02-21 | End: 2018-02-21

## 2018-02-21 RX ORDER — FUROSEMIDE 40 MG/1
40 TABLET ORAL
Status: DISCONTINUED | OUTPATIENT
Start: 2018-02-21 | End: 2018-02-21

## 2018-02-21 RX ORDER — METOLAZONE 5 MG/1
2.5 TABLET ORAL 3 TIMES WEEKLY
Status: DISCONTINUED | OUTPATIENT
Start: 2018-02-21 | End: 2018-02-21 | Stop reason: HOSPADM

## 2018-02-21 RX ORDER — OXYCODONE HYDROCHLORIDE 5 MG/1
5 CAPSULE ORAL EVERY 6 HOURS PRN
Qty: 10 CAPSULE | Refills: 0 | Status: SHIPPED | OUTPATIENT
Start: 2018-02-21 | End: 2018-02-24

## 2018-02-21 RX ORDER — METOLAZONE 2.5 MG/1
2.5 TABLET ORAL 3 TIMES WEEKLY
Qty: 30 TABLET | Refills: 0 | Status: SHIPPED | OUTPATIENT
Start: 2018-02-23 | End: 2018-03-05 | Stop reason: SDUPTHER

## 2018-02-21 RX ORDER — FUROSEMIDE 40 MG/1
40 TABLET ORAL
Status: DISCONTINUED | OUTPATIENT
Start: 2018-02-21 | End: 2018-02-21 | Stop reason: HOSPADM

## 2018-02-21 RX ADMIN — Medication 1 TABLET: at 08:00

## 2018-02-21 RX ADMIN — CARVEDILOL 3.12 MG: 3.12 TABLET, FILM COATED ORAL at 08:00

## 2018-02-21 RX ADMIN — Medication 1000 MG: at 08:00

## 2018-02-21 RX ADMIN — Medication 2 TABLET: at 08:00

## 2018-02-21 RX ADMIN — CARVEDILOL 3.12 MG: 3.12 TABLET, FILM COATED ORAL at 15:46

## 2018-02-21 RX ADMIN — LEVOTHYROXINE SODIUM 137 MCG: 112 TABLET ORAL at 06:40

## 2018-02-21 RX ADMIN — RIVAROXABAN 15 MG: 15 TABLET, FILM COATED ORAL at 08:00

## 2018-02-21 RX ADMIN — FUROSEMIDE 40 MG: 40 TABLET ORAL at 15:46

## 2018-02-21 RX ADMIN — VALSARTAN 80 MG: 80 TABLET ORAL at 08:00

## 2018-02-21 RX ADMIN — PRAVASTATIN SODIUM 40 MG: 40 TABLET ORAL at 15:46

## 2018-02-21 RX ADMIN — FUROSEMIDE 40 MG: 40 TABLET ORAL at 14:12

## 2018-02-21 RX ADMIN — PANTOPRAZOLE SODIUM 40 MG: 40 TABLET, DELAYED RELEASE ORAL at 06:40

## 2018-02-21 RX ADMIN — INSULIN LISPRO 1 UNITS: 100 INJECTION, SOLUTION INTRAVENOUS; SUBCUTANEOUS at 08:01

## 2018-02-21 RX ADMIN — INSULIN LISPRO 2 UNITS: 100 INJECTION, SOLUTION INTRAVENOUS; SUBCUTANEOUS at 12:14

## 2018-02-21 RX ADMIN — METOLAZONE 2.5 MG: 5 TABLET ORAL at 12:13

## 2018-02-21 NOTE — ASSESSMENT & PLAN NOTE
Patient has significant improvement of symptoms with IV Lasix  Patient had -8 5 liters since admission  Patient's echo showed EF of 40% which is decreased from previous echo done in 2016 which showed EF of 60-65%  Patient had MUGA scan today which showed EF of 45%  Patient's cardiac catheterization showed patent grafts  Patient will be discharged on Lasix 80 milligram p o  daily and metolazone 3 times a week  BMP in 1 week  Patient will need to check daily weights and follow up with Dr Sylwia Ly in 2 weeks

## 2018-02-21 NOTE — NURSING NOTE
Pt left the unit via wheelchair, accompanied by son and granddaughters  Discharge paperwork reviewed with patient, prescription given to patient, information on new medications and procedures provided to patient  No discomfort at this time, no SOB  Follow appointments reviewed with patient

## 2018-02-21 NOTE — CASE MANAGEMENT
Continued Stay Review    Date: 2/20 /18    Vital Signs: /57 (BP Location: Left arm)   Pulse 61   Temp 98 °F (36 7 °C) (Oral)   Resp 20   Ht 5' 5" (1 651 m)   Wt 115 kg (253 lb 2 oz)   LMP  (LMP Unknown)   SpO2 93%   BMI 42 12 kg/m²     Medications:   Scheduled Meds:   Current Facility-Administered Medications:  bisacodyl 5 mg Oral Daily PRN Radha Rodriguez MD   calcium carbonate 1 tablet Oral Daily With Breakfast Veroaixa Brown, DO   carvedilol 3 125 mg Oral BID With Meals Brie Jacobson MD   fish oil 1,000 mg Oral Daily Vero Revankar, DO   furosemide 40 mg Oral BID (diuretic) Brie Jacobson MD   insulin glargine 30 Units Subcutaneous HS Vero Revankar, DO   insulin lispro 1-5 Units Subcutaneous TID AC Vero Revankar, DO   levothyroxine 137 mcg Oral Daily Vero Revankar, DO   metolazone 2 5 mg Oral Once per day on Mon Wed Fri Brie Jacobson MD   multivitamin-minerals 2 tablet Oral Daily Vero Revankar, DO   oxyCODONE 5 mg Oral Q4H PRN Radha Rodriguez MD   pantoprazole 40 mg Oral Early Morning Vero Revankar, DO   pravastatin 40 mg Oral Daily With [place]-Pad[place] Revankar, DO   rivaroxaban 15 mg Oral Daily With Breakfast Gee Rivera MD   valsartan 80 mg Oral Daily Brie Jacobson MD     Continuous Infusions:    PRN Meds:   bisacodyl    oxyCODONE    Abnormal Labs/Diagnostic Results:     Age/Sex: 70 y o  female     Assessment/Plan:   * Acute on chronic combined systolic and diastolic CHF (congestive heart failure) ([facility] [address] )   Assessment & Plan     Patient has significant improvement of symptoms with IV Lasix  Patient had -8 liters since admission  Patient's echo showed EF of 40% which is decreased from previous echo done in 2016 which showed EF of 60-65%  Patient can be transitioned to p o  Lasix in a m    Patient's cardiac catheterization showed patent grafts          Hypertension   Assessment & Plan     Blood pressure is running high  Restart Diovan  Patient also added on Coreg        Coronary artery disease involving coronary bypass graft of native heart without angina pectoris   Assessment & Plan     Patient cardiac catheterization showed significant 3 vessel coronary disease with patent grafts  Decreased ejection fraction etiology is not clear  Outpatient MUGA per Cardiology           Long-term insulin use in type 2 diabetes [facility])   Assessment & Plan     Last hemoglobin A1c was 8 2  Continue Lantus and Humalog sliding scale          Chronic a-fib [facility])   Assessment & Plan     Patient has underlying low heart rate and patient has a pacemaker which was interrogated  In December 2017 with normal function   resume Xarelto          Hyperlipidemia   Assessment & Plan     Continue Pravachol 40 milligram p o  daily          Morbid obesity with BMI of 40 0-44 9, adult (HCC)   Assessment & Plan     Dietary modification and nutrition evaluation          CKD (chronic kidney disease) stage 3, GFR 30-59 ml/min   Assessment & Plan     Creatinine close to baseline  Patient is status post cardiac catheterization  Can resume Diovan and Lasix in a m    Follow-up BMP          Obstructive sleep apnea   Assessment & Plan     Patient was not clear of her CPAP settings  Will start CPAP 10 centimeter water          H/O tricuspid valve annuloplasty   Assessment & Plan     Follow-up with Cardiology               Discharge Plan:

## 2018-02-21 NOTE — PROGRESS NOTES
Progress Note - Cardiology   Angie Fierro 70 y o  female MRN: [id number]  Unit/Bed#: [address] [place] [address] Encounter: [id number]    Assessment and Plan:   1  Acute on chronic systolic and diastolic heart failure who with recent echo showing ejection fraction 40-45%  Echo reviewed, difficult study  Cardiac catheterization shows no evidence of any progression of coronary artery disease  MUGA  EF 45%  2  CAD status post CABG x4 with LIMA to LAD, SVG vein graft to RCA, SVG vein graft to OM and SVG vein graft to diagonal   All patent on cardiac catheterization  LV gram not done due to CRI  LVEDP was around 16 mm of mercury  3  History of severe pulmonary hypertension and tricuspid valve annuloplasty  4  Diabetes mellitus  Need better control HbA1c 8 2  5  Hypothyroidism  6  Chronic atrial fibrillations status post [employer] pacemaker for sick sinus syndrome currently 100% paced  Will resume Xarelto  7  Acute on kidney disease with serum creatinine baseline around 1 5  8  Dyslipidemia on statin  Plan  Lasix 80 mg daily along with Zaroxolyn 2 5 alternate days  Resume Diovan  Will add Coreg  Monitor input output  Daily weight  Check BMP in 3-5 days  Continue Xarelto  Okay to discharge from cardiac point of view  Follow-up in 1-2 weeks  Discussed with medical team    Subjective / Objective:   Patient seen and evaluated  No chest pain no shortness of breath no other significant complaint  She has earlier cardiac catheterization done found to have three-vessel coronary artery disease but patent grafts of  LIMA to LAD, vein graft to diagonal, circumflex OM 1 and RPDA was patent  Vitals: Blood pressure 147/65, pulse 63, temperature 97 7 °F (36 5 °C), temperature source Oral, resp  rate 18, height 5' 5" (1 651 m), weight 115 kg (253 lb 2 oz), SpO2 93 %, not currently breastfeeding    Vitals:    02/19/18 0500 02/20/18 0548   Weight: 116 kg (255 lb 8 2 oz) 115 kg (253 lb 2 oz)     Body mass index is 42 12 kg/m²   BP Readings from Last 3 Encounters:   02/21/18 147/65   01/25/18 108/68   12/13/17 135/83     Orthostatic Blood Pressures    Flowsheet Row Most Recent Value   Blood Pressure  147/65 filed at 02/21/2018 9606   Patient Position - Orthostatic VS  Sitting filed at 02/21/2018 0758             Intake/Output Summary (Last 24 hours) at 02/21/18 1021  Last data filed at 02/21/18 0101   Gross per 24 hour   Intake              260 ml   Output             1800 ml   Net            -1540 ml        Invasive Devices     Peripheral Intravenous Line            Peripheral IV 02/21/18 Right Forearm less than 1 day                Invasive Devices     Peripheral Intravenous Line            Peripheral IV 02/21/18 Right Forearm less than 1 day                Physical Exam:   Physical Exam   Constitutional: She is oriented to person, place, and time  She appears well-developed and well-nourished  No distress  HENT:   Head: Normocephalic and atraumatic  Eyes: Pupils are equal, round, and reactive to light  Neck: Neck supple  No JVD present  No tracheal deviation present  No thyromegaly present  Cardiovascular: Normal rate, regular rhythm, S1 normal, S2 normal and intact distal pulses  Exam reveals no gallop, no S3, no S4, no distant heart sounds and no friction rub  Murmur heard  Systolic (ejection) murmur is present with a grade of 2/6   Pulmonary/Chest: Effort normal and breath sounds normal  No respiratory distress  She has no wheezes  She has no rales  She exhibits no tenderness  Abdominal: Soft  Bowel sounds are normal  She exhibits no distension  There is no tenderness  Musculoskeletal: She exhibits edema  She exhibits no deformity  Edema is mild   Neurological: She is alert and oriented to person, place, and time  Skin: Skin is warm and dry  No rash noted  She is not diaphoretic  No pallor  Psychiatric: She has a normal mood and affect   Her behavior is normal  Judgment normal          Medications/ Allergies:         bisacodyl 5 mg Daily PRN   oxyCODONE 5 mg Q4H PRN     Allergies   Allergen Reactions    Other      Adhesive tape    Ibuprofen Palpitations    Penicillins Rash       VTE Pharmacologic Prophylaxis:   Xarelto    Labs:   Troponins:    Results from last 7 days  Lab Units 02/16/18  1604 02/16/18  1110 02/16/18  0605   TROPONIN I ng/mL <0 02 <0 02 <0 02       CBC with diff:    Results from last 7 days  Lab Units 02/21/18  0546 02/20/18  0551 02/19/18  0632 02/17/18  0551 02/16/18  0605   WBC Thousand/uL 6 30  --  6 80 7 30 7 40   HEMOGLOBIN g/dL 10 5* 10 9* 10 2* 10 1* 11 0*   HEMATOCRIT % 33 7* 34 9* 32 5* 32 6* 35 2*   MCV fL 81*  --  81* 82 81*   PLATELETS Thousands/uL 168  --  174 178 199   MCH pg 25 2*  --  25 2* 25 2* 25 4*   MCHC g/dL 31 3*  --  31 3* 30 9* 31 3*   RDW % 18 5*  --  17 5* 18 0* 18 0*   MPV fL 8 5*  --  8 1* 8 8* 8 4*   NRBC AUTO /100 WBCs  --   --   --   --  0       CMP:    Results from last 7 days  Lab Units 02/21/18  0546 02/20/18  0551 02/19/18  0632 02/17/18  0551 02/16/18  0605   SODIUM mmol/L 142 142 141 139 138   POTASSIUM mmol/L 4 1 4 0 3 5 3 9 4 6   CHLORIDE mmol/L 106 105 105 102 104   CO2 mmol/L 31 31 31 26 26   ANION GAP mmol/L 5 6 5 11 8   BUN mg/dL 49* 56* 57* 57* 62*   CREATININE mg/dL 1 21 1 41* 1 42* 1 38* 1 42*   GLUCOSE RANDOM mg/dL 146* 174* 86 140 175*   CALCIUM mg/dL 8 6 8 7 8 6 8 4 8 8   AST U/L  --   --   --   --  23   ALT U/L  --   --   --   --  18   ALK PHOS U/L  --   --   --   --  125*   TOTAL PROTEIN g/dL  --   --   --   --  8 1   BILIRUBIN TOTAL mg/dL  --   --   --   --  0 50   EGFR ml/min/1 73sq m 46 38 38 39 38       Magnesium:    Results from last 7 days  Lab Units 02/21/18  0546 02/16/18  0605   MAGNESIUM mg/dL 2 2 2 4     Coags:    Results from last 7 days  Lab Units 02/16/18  0605   PTT seconds 31   INR  1 38*     TSH:    Results from last 7 days  Lab Units 02/17/18  0551   TSH 3RD GENERATON uIU/mL 2 632     Hgb A1c:    Results from last 7 days  Lab Units 18  0551   HEMOGLOBIN A1C % 8 2*       Imaging & Testing   I have personally reviewed pertinent reports  Xr Chest 1 View Portable    Result Date: 2018  Narrative: CHEST INDICATION:  Shortness of breath  COMPARISON:  2016 VIEWS:   AP frontal IMAGES:  1 FINDINGS: Cardiomediastinal silhouette appears enlarged  A median sternotomy has been performed  A permanent pacemaker is present  There are no infiltrates  The patient appears to be in mild heart failure  Visualized osseous structures appear within normal limits for the patient's age  Impression: Cardiomegaly and mild CHF  Workstation performed: [id number]     EKG / Monitor: Personally reviewed  Patient is paced all the time  Cardiac testing:   Results for orders placed during the hospital encounter of 18   Echo complete with contrast if indicated    [address]  [phone]    Transthoracic Echocardiogram  2D, M-mode, Doppler, and Color Doppler    Study date:  2018    Patient: Isha Daugherty  MR number: [id number]  Account number: [de-identified]  : 1950  Age: 70 years  Gender: Female  Status: Routine  Location: Bedside  Height: 65 in 65 in  Weight: 258 lb 257 4 lb  BP: 136/ 87 mmHg    Indications: Dyspnea    Diagnoses: R06 00 - Dyspnea, unspecified    Sonographer:  DREA Mehta  Primary Physician:  Sudeep Fields  Referring Physician:  Raymond Hernandez MD  Group:  [facility]  Interpreting Physician:  Raymond Hernandez MD    SUMMARY    SUMMARY:  Compared with prior echo report 2016, there is worsening in systolic function from 31--33% and elevated filling pressures  LEFT VENTRICLE:  Systolic function was normal by visual assessment  Ejection fraction was estimated in the range of 40 % to 45 % to be 40 %  There was moderate diffuse hypokinesis with regional variations    Doppler parameters were consistent with both elevated ventricular end-diastolic filling pressure and elevated left atrial filling pressure  VENTRICULAR SEPTUM:  There was mild systolic and diastolic flattening  These changes are consistent with RV volume and pressure overload  RIGHT VENTRICLE:  Systolic function was mildly reduced  LEFT ATRIUM:  The atrium was moderately to markedly dilated  RIGHT ATRIUM:  The atrium was moderately dilated  MITRAL VALVE:  There was mild annular calcification  AORTIC VALVE:  There was mild regurgitation  TRICUSPID VALVE:  There was moderate regurgitation  The findings suggest severe pulmonary hypertension  HISTORY: PRIOR HISTORY: HTN, GERD, DM, CAD, CHF, Hyperlipidemia, Atrial Fibrillation    PROCEDURE: The procedure was performed at the bedside  This was a routine study  The transthoracic approach was used  The study included complete 2D imaging, M-mode, complete spectral Doppler, and color Doppler  The heart rate was 66 bpm,  at the start of the study  Echocardiographic views were limited due to restricted patient mobility, poor acoustic window availability, and decreased penetration  This was a technically difficult study  LEFT VENTRICLE: Size was normal  Systolic function was normal by visual assessment  Ejection fraction was estimated in the range of 40 % to 45 % to be 40 %  There was moderate diffuse hypokinesis with regional variations  Wall thickness  was normal  No evidence of apical thrombus  DOPPLER: The study was not technically sufficient to allow evaluation of LV diastolic function  Left ventricular diastolic function parameters were abnormal  Doppler parameters were consistent  with both elevated ventricular end-diastolic filling pressure and elevated left atrial filling pressure  VENTRICULAR SEPTUM: There was mild systolic and diastolic flattening  These changes are consistent with RV volume and pressure overload      RIGHT VENTRICLE: The size was normal  Systolic function was mildly reduced  Wall thickness was normal     LEFT ATRIUM: The atrium was moderately to markedly dilated  RIGHT ATRIUM: The atrium was moderately dilated  MITRAL VALVE: There was mild annular calcification  There was normal leaflet separation  DOPPLER: The transmitral velocity was within the normal range  There was no evidence for stenosis  There was no significant regurgitation  AORTIC VALVE: The valve was probably trileaflet  Leaflets exhibited mildly increased thickness, mild calcification, normal cuspal separation, and sclerosis  DOPPLER: Transaortic velocity was within the normal range  There was no evidence  for stenosis  There was mild regurgitation  TRICUSPID VALVE: The valve structure was normal  There was normal leaflet separation  DOPPLER: The transtricuspid velocity was within the normal range  There was no evidence for stenosis  There was moderate regurgitation  Estimated peak PA  pressure was 65 mmHg  The findings suggest severe pulmonary hypertension  PULMONIC VALVE: Leaflets exhibited normal thickness, no calcification, and normal cuspal separation  DOPPLER: The transpulmonic velocity was within the normal range  There was no significant regurgitation  PERICARDIUM: There was no pericardial effusion  The pericardium was normal in appearance  AORTA: The root exhibited normal size  SYSTEMIC VEINS: IVC: The inferior vena cava was normal in size      SYSTEM MEASUREMENT TABLES    2D mode  AoR Diam 2D: 2 8 cm  LA Diam (2D): 5 cm  LA/Ao (2D): 1 79  FS (2D Teich): 21 5 %  IVSd (2D): 1 17 cm  LVDEV: 102 cm³  LVESV: 57 8 cm³  LVIDd(2D): 4 7 cm  LVISd (2D): 3 69 cm  LVOT Area 2D: 3 14 cm squared  LVPWd (2D): 1 08 cm  SV (Teich): 44 2 cm³    Apical four chamber  LVEF A4C: 42 %    Unspecified Scan Mode  TROY Cont Eq (Peak Carlos): 1 53 cm squared  TROY Cont Eq (VTI): 1 57 cm squared  LVOT (VTI): 27 6 cm  LVOT Diam : 2 cm  LVOT Vmax: 1020 mm/s  LVOT Vmax; Mean: 1020 mm/s  Peak Grad ; Mean: 4 mm[Hg]  SV (LVOT): 87 cm³  VTI;Mean: 3 mm[Hg]  TROY Cont Eq (VTI): 2 13 cm squared  MV Peak E Carlos  Mean: 1990 mm/s  MVA (PHT): 2 86 cm squared  PHT: 76 ms  Max P mm[Hg]  V Max: 3790 mm/s  Vmax: 3790 mm/s  RA Area: 22 cm squared  RA Volume: 68 cm³  TAPSE: 1 5 cm    [facility] Accredited Echocardiography Laboratory    Prepared and electronically signed by    Veronica Carranza MD  Signed 2018 17:11:18         Dr Jose Calhoun MD [facility]      "This note has been constructed using a voice recognition system  Therefore there may be syntax, spelling, and/or grammatical errors   Please call if you have any questions  "

## 2018-02-21 NOTE — DISCHARGE INSTRUCTIONS
A-fib (Atrial Fibrillation)   WHAT YOU NEED TO KNOW:   A-fib may come and go, or it may be a long-term condition  A-fib can cause blood clots, stroke, or heart failure  These conditions may become life-threatening  It is important to treat and manage a-fib to help prevent a blood clot, stroke, or heart failure  DISCHARGE INSTRUCTIONS:   Call 911 for any of the following:   · You have any of the following signs of a heart attack:      ¨ Squeezing, pressure, or pain in your chest that lasts longer than 5 minutes or returns    ¨ Discomfort or pain in your back, neck, jaw, stomach, or arm     ¨ Trouble breathing    ¨ Nausea or vomiting    ¨ Lightheadedness or a sudden cold sweat, especially with chest pain or trouble breathing    · You have any of the following signs of a stroke:      ¨ Numbness or drooping on one side of your face     ¨ Weakness in an arm or leg    ¨ Confusion or difficulty speaking    ¨ Dizziness, a severe headache, or vision loss  Return to the emergency department if:  You have any of the following signs of a blood clot:  · You feel lightheaded, are short of breath, and have chest pain  · You cough up blood  · You have swelling, redness, pain, or warmth in your arm or leg  Contact your cardiologist or healthcare provider if:   · Your target heart rate is not in the range it should be  · You have new or worsening swelling in your legs, feet, ankles, or abdomen  · You are short of breath, even at rest      · You have questions or concerns about your condition or care  Medicines: You may need any of the following:  · Heart medicines  help control your heart rate and rhythm  You may need more than one medicine to treat your symptoms  · Blood thinners    help prevent blood clots  Examples of blood thinners include heparin and warfarin  Clots can cause strokes, heart attacks, and death   The following are general safety guidelines to follow while you are taking a blood thinner:    ¨ Watch for bleeding and bruising while you take blood thinners  Watch for bleeding from your gums or nose  Watch for blood in your urine and bowel movements  Use a soft washcloth on your skin, and a soft toothbrush to brush your teeth  This can keep your skin and gums from bleeding  If you shave, use an electric shaver  Do not play contact sports  ¨ Tell your dentist and other healthcare providers that you take anticoagulants  Wear a bracelet or necklace that says you take this medicine  ¨ Do not start or stop any medicines unless your healthcare provider tells you to  Many medicines cannot be used with blood thinners  ¨ Tell your healthcare provider right away if you forget to take the medicine, or if you take too much  ¨ Warfarin  is a blood thinner that you may need to take  The following are things you should be aware of if you take warfarin  § Foods and medicines can affect the amount of warfarin in your blood  Do not make major changes to your diet while you take warfarin  Warfarin works best when you eat about the same amount of vitamin K every day  Vitamin K is found in green leafy vegetables and certain other foods  Ask for more information about what to eat when you are taking warfarin  § You will need to see your healthcare provider for follow-up visits when you are on warfarin  You will need regular blood tests  These tests are used to decide how much medicine you need  · Antiplatelets , such as aspirin, help prevent blood clots  Take your antiplatelet medicine exactly as directed  These medicines make it more likely for you to bleed or bruise  If you are told to take aspirin, do not take acetaminophen or ibuprofen instead  · Take your medicine as directed  Contact your healthcare provider if you think your medicine is not helping or if you have side effects  Tell him or her if you are allergic to any medicine   Keep a list of the medicines, vitamins, and herbs you take  Include the amounts, and when and why you take them  Bring the list or the pill bottles to follow-up visits  Carry your medicine list with you in case of an emergency  Follow up with your cardiologist as directed: You will need regular blood tests and monitoring  Write down your questions so you remember to ask them during your visits  Manage A-fib:   · Know your target heart rate  Learn how to take your pulse and monitor your heart rate  · Manage other health conditions  This includes high blood pressure, sleep apnea, thyroid disease, diabetes, and other heart conditions  Take medicine as directed and follow your treatment plan  · Limit or do not drink alcohol  Alcohol can make a-fib hard to manage  Ask your healthcare provider if it is safe for you to drink alcohol  A drink of alcohol is 12 ounces of beer, 5 ounces of wine, or 1½ ounces of liquor  · Do not smoke  Nicotine and other chemicals in cigarettes and cigars can cause heart and lung damage  Ask your healthcare provider for information if you currently smoke and need help to quit  E-cigarettes or smokeless tobacco still contain nicotine  Talk to your healthcare provider before you use these products  · Eat heart-healthy foods  Heart healthy foods will help keep your cholesterol low  These include fruits, vegetables, whole-grain breads, low-fat dairy products, beans, lean meats, and fish  Replace butter and margarine with heart-healthy oils such as olive oil and canola oil  · Maintain a healthy weight  Ask your healthcare provider how much you should weigh  Ask him to help you create a weight loss plan if you are overweight  · Exercise for 30 minutes  most days of the week  Ask your healthcare provider about the best exercise plan for you  © 2017 [employer] Information is for End User's use only and may not be sold, redistributed or otherwise used for commercial purposes   All illustrations and images included in CareNotes® are the copyrighted property of [employer]  or [employer]  The above information is an  only  It is not intended as medical advice for individual conditions or treatments  Talk to your doctor, nurse or pharmacist before following any medical regimen to see if it is safe and effective for you    Check daily weight and call Dr Андрей Winter if 3lb or more weight gain in 2-3 days

## 2018-02-21 NOTE — DISCHARGE SUMMARY
Discharge- Madelin Jean-Baptiste 1950, 70 y o  female MRN: [id number]    Unit/Bed#: [address] [place] [address] Encounter: [id number]    Primary Care Provider: No primary care provider on file  Date and time admitted to hospital: 2/16/2018  5:50 AM        * Acute on chronic combined systolic and diastolic CHF (congestive heart failure) ([address] )   Assessment & Plan    Patient has significant improvement of symptoms with IV Lasix  Patient had -8 5 liters since admission  Patient's echo showed EF of 40% which is decreased from previous echo done in 2016 which showed EF of 60-65%  Patient had MUGA scan today which showed EF of 45%  Patient's cardiac catheterization showed patent grafts  Patient will be discharged on Lasix 80 milligram p o  daily and metolazone 3 times a week  BMP in 1 week  Patient will need to check daily weights and follow up with Dr Hunter Godwin in 2 weeks        Hypertension   Assessment & Plan    Blood pressure was low last night  Continue Diovan, Lasix and metolazone  Hold off Coreg for now        Coronary artery disease involving coronary bypass graft of native heart without angina pectoris   Assessment & Plan    Patient cardiac catheterization showed significant 3 vessel coronary disease with patent grafts            Long-term insulin use in type 2 diabetes [facility])   Assessment & Plan    Last hemoglobin A1c was 8 2  Continue Lantus and Humalog sliding scale        Chronic a-fib [facility])   Assessment & Plan    Patient has underlying low heart rate and patient has a pacemaker which was interrogated  In December 2017 with normal function   resume Xarelto        Morbid obesity with BMI of 40 0-44 9, adult [facility])   Assessment & Plan    Dietary modification and nutrition evaluation        CKD (chronic kidney disease) stage 3, GFR 30-59 ml/min   Assessment & Plan    Creatinine has improved and is at 1 2  Continue Diovan, Lasix and metolazone  Follow BMP in 1 week        Obstructive sleep apnea   Assessment & Plan Continue CPAP        H/O tricuspid valve annuloplasty   Assessment & Plan    Follow-up with Cardiology              Resolved Problems  Date Reviewed: 2/21/2018    None          Consultations During Hospital Stay:  · Dr Lars Mobley    Procedures Performed:     · Cardiac catheterization showed severe 3 vessel coronary disease with patent grafts  · Echo showed EF of 40% with elevated filling pressures on moderate diffuse hypokinesis with regional variations  · MUGa scan showed EF of 45%     Outpatient Tests Requested:  · Bmp in 1 week, follow-up Dr Lars Mobley in 2 weeks    Complications:  None    Reason for Admission:  CHF exacerbation    Hospital Course:     Monae Cruz is a 70 y o  female patient who originally presented to the hospital on 2/16/2018 due to worsening shortness of breath and weight gain about 7 pounds in 5 days and bilateral leg swelling with heaviness  Patient was admitted to the hospital for CHF exacerbation and was seen in consultation with Cardiology  Patient was initially started on Lasix 60 milligram IV q 12 hours with good diuresis  Patient's echo showed EF of 40%  Later due to low EF which has decreased from 60% in 2016 , cardiac catheterization was performed which showed patent grafts  Later patient was transitioned to home dose Lasix and patient advised to increase her metolazone dose to 3 times a week  Patient also had a MUGA scan which showed EF of 45%  Patient remained in a stable condition be discharged home with follow BMP in 1 week, cardiology follow-up in 2 weeks  Patient was discharged on Lasix 80 milligram p o  daily, metolazone 3 times a week  Please see above list of diagnoses and related plan for additional information       Condition at Discharge: stable     Discharge Day Visit / Exam:     Subjective:  Patient denies any chest pain, shortness of breath, abdominal pain, nausea, vomiting, leg 7  Vitals: Blood Pressure: 115/57 (02/21/18 1253)  Pulse: 61 (02/21/18 1253)  Temperature: 98 °F (36 7 °C) (02/21/18 1253)  Temp Source: Oral (02/21/18 1253)  Respirations: 20 (02/21/18 1253)  Height: 5' 5" (165 1 cm) (02/16/18 0813)  Weight - Scale: 115 kg (253 lb 2 oz) (02/20/18 0548)  SpO2: 93 % (02/21/18 1253)  Exam:   Physical Exam   Constitutional: No distress  HENT:   Head: Normocephalic and atraumatic  Nose: Nose normal    Eyes: Conjunctivae and EOM are normal  Pupils are equal, round, and reactive to light  Neck: Normal range of motion  Neck supple  No JVD present  Cardiovascular: Normal rate and regular rhythm  Exam reveals no gallop and no friction rub  Murmur heard  Pulmonary/Chest: Effort normal and breath sounds normal  No respiratory distress  She has no wheezes  She has no rales  She exhibits no tenderness  Abdominal: Soft  Bowel sounds are normal  She exhibits no distension  There is no tenderness  There is no rebound and no guarding  Musculoskeletal: She exhibits edema  Right leg +1 pedal edema   Neurological: She is alert  No cranial nerve deficit  Skin: Skin is warm and dry  No rash noted  Psychiatric: She has a normal mood and affect  Discharge instructions/Information to patient and family:   See after visit summary for information provided to patient and family  Provisions for Follow-Up Care:  See after visit summary for information related to follow-up care and any pertinent home health orders  Disposition:     Home    For Discharges to [facility]:   · Not Applicable to this Patient - Not Applicable to this Patient    Planned Readmission: No     Discharge Statement:  I spent 40 minutes discharging the patient  This time was spent on the day of discharge  I had direct contact with the patient on the day of discharge   Greater than 50% of the total time was spent examining patient, answering all patient questions, arranging and discussing plan of care with patient as well as directly providing post-discharge instructions  Additional time then spent on discharge activities  Discharge Medications:  See after visit summary for reconciled discharge medications provided to patient and family        ** Please Note: This note has been constructed using a voice recognition system **

## 2018-02-26 NOTE — RESULT NOTES
Verified Results  (1) BASIC METABOLIC PROFILE [id number] 10:32AM John Welch     Test Name Result Flag Reference   SODIUM 139 mmol/L  136-145   POTASSIUM 3 9 mmol/L  3 5-5 3   CHLORIDE 104 mmol/L  100-108   CARBON DIOXIDE 27 mmol/L  21-32   ANION GAP (CALC) 8 mmol/L  4-13   BLOOD UREA NITROGEN 54 mg/dL H 5-25   CREATININE 1 24 mg/dL  0 60-1 30   Standardized to IDMS reference method   CALCIUM 9 0 mg/dL  8 3-10 1   eGFR 45 ml/min/1 73sq m     This is a patient instruction: Patient fasting for 8 hours or longer recommended  National Kidney Disease Education Program recommendations are as follows:  GFR calculation is accurate only with a steady state creatinine  Chronic Kidney disease less than 60 ml/min/1 73 sq  meters  Kidney failure less than 15 ml/min/1 73 sq  meters  GLUCOSE FASTING 127 mg/dL H 65-99   Specimen collection should occur prior to Sulfasalazine administration due to the potential for falsely depressed results  Specimen collection should occur prior to Sulfapyridine administration due to the potential for falsely elevated results

## 2018-03-01 ENCOUNTER — TRANSCRIBE ORDERS (OUTPATIENT)
Dept: ADMINISTRATIVE | Facility: HOSPITAL | Age: 68
End: 2018-03-01

## 2018-03-01 ENCOUNTER — FOLLOW UP (OUTPATIENT)
Dept: URBAN - METROPOLITAN AREA CLINIC 27 | Facility: CLINIC | Age: 68
End: 2018-03-01

## 2018-03-01 ENCOUNTER — APPOINTMENT (OUTPATIENT)
Dept: LAB | Facility: HOSPITAL | Age: 68
End: 2018-03-01
Attending: INTERNAL MEDICINE
Payer: MEDICARE

## 2018-03-01 DIAGNOSIS — I50.41 ACUTE COMBINED SYSTOLIC AND DIASTOLIC HEART FAILURE (HCC): Primary | ICD-10-CM

## 2018-03-01 DIAGNOSIS — I50.41 ACUTE COMBINED SYSTOLIC AND DIASTOLIC HEART FAILURE (HCC): ICD-10-CM

## 2018-03-01 DIAGNOSIS — H40.053: ICD-10-CM

## 2018-03-01 DIAGNOSIS — I50.40 COMBINED SYSTOLIC AND DIASTOLIC CONGESTIVE HEART FAILURE, UNSPECIFIED CONGESTIVE HEART FAILURE CHRONICITY: ICD-10-CM

## 2018-03-01 DIAGNOSIS — E11.3413: ICD-10-CM

## 2018-03-01 LAB
ANION GAP SERPL CALCULATED.3IONS-SCNC: 11 MMOL/L (ref 4–13)
BUN SERPL-MCNC: 67 MG/DL (ref 5–25)
CALCIUM SERPL-MCNC: 9.3 MG/DL (ref 8.3–10.1)
CHLORIDE SERPL-SCNC: 100 MMOL/L (ref 100–108)
CO2 SERPL-SCNC: 28 MMOL/L (ref 21–32)
CREAT SERPL-MCNC: 1.45 MG/DL (ref 0.6–1.3)
GFR SERPL CREATININE-BSD FRML MDRD: 37 ML/MIN/1.73SQ M
GLUCOSE P FAST SERPL-MCNC: 149 MG/DL (ref 65–99)
POTASSIUM SERPL-SCNC: 3.4 MMOL/L (ref 3.5–5.3)
SODIUM SERPL-SCNC: 139 MMOL/L (ref 136–145)

## 2018-03-01 PROCEDURE — 03VIAL LUCENTIS 03MG VIAL

## 2018-03-01 PROCEDURE — 36415 COLL VENOUS BLD VENIPUNCTURE: CPT

## 2018-03-01 PROCEDURE — 67028 INJECTION EYE DRUG: CPT

## 2018-03-01 PROCEDURE — 92014 COMPRE OPH EXAM EST PT 1/>: CPT | Mod: 25

## 2018-03-01 PROCEDURE — 92134 CPTRZ OPH DX IMG PST SGM RTA: CPT

## 2018-03-01 PROCEDURE — 80048 BASIC METABOLIC PNL TOTAL CA: CPT

## 2018-03-01 ASSESSMENT — TONOMETRY
OD_IOP_MMHG: 28
OS_IOP_MMHG: 27

## 2018-03-01 ASSESSMENT — VISUAL ACUITY
OD_CC: 20/25-
OS_CC: 20/200

## 2018-03-03 PROBLEM — I50.32 CHRONIC DIASTOLIC CHF (CONGESTIVE HEART FAILURE), NYHA CLASS 3 (HCC): Status: RESOLVED | Noted: 2018-03-03 | Resolved: 2018-03-03

## 2018-03-03 PROBLEM — I50.32 CHRONIC DIASTOLIC CHF (CONGESTIVE HEART FAILURE), NYHA CLASS 3 (HCC): Status: ACTIVE | Noted: 2018-03-03

## 2018-03-03 PROBLEM — I25.10 ARTERIOSCLEROTIC CORONARY ARTERY DISEASE: Status: ACTIVE | Noted: 2017-03-16

## 2018-03-03 NOTE — PROGRESS NOTES
Cardiology Out Patient Progress Note  Katina Burkett 70 y o  female MRN: [id number]   Encounter: [id number]    1  Chronic a-fib ([place] [address] )    2  Combined systolic and diastolic congestive heart failure, unspecified congestive heart failure chronicity ([place] [address] )    3  Coronary artery disease involving coronary bypass graft of native heart without angina pectoris    4  Essential hypertension    5  CKD (chronic kidney disease) stage 3, GFR 30-59 ml/min    6  H/O tricuspid valve annuloplasty    7  Anemia, unspecified type    8  Morbid obesity with BMI of 40 0-44 9, adult ([place] [address] )    9  Type 2 diabetes mellitus without complication, with long-term current use of insulin ([place] [address] )    10  Hyperlipidemia, unspecified hyperlipidemia type    11  Obstructive sleep apnea      Assessment/Plan   1  Combined systolic and diastolic heart failure [place] heart Association class 2/3  She she seems to be currently compensated  In fact may be little bit on the dry side  Will decrease metolazone to 2 days a week as BUN and creatinine has gone up  Continue Lasix 80 mg daily as before  She was advised to measure her weight every day and if she gains more than 2 lb or loses more than 2 lb a day or gains 5 lb a week or loses 5 lb a week he should call us  She is keeping a diary  BMP requested  2  Coronary artery disease status post coronary artery bypass surgery with 4 grafts  Status post cardiac catheterization, in February of 2018  All grafts are patent she has three-vessel coronary artery disease  3  Chronic atrial fibrillation  Patient has underlying very slow ventricular rate and is status post Medtronic pacemaker which is functioning adequately  She is on Xarelto  Pacemaker was interrogated December 2017   4  Diabetes mellitus  Management as per PMD  5  Chronic renal insufficiency  Her baseline creatinine is around 1 3, BUN still elevated  Her potassium is low    Will give her additional 10 mEq potassium twice a week and if remains low will add Aldactone and DC metolazone in next visit  6 History of tricuspid annuloplasty  Echo was reviewed which was done in June 2016  Echo reviewed PA pressure is high around 60   7  Dyslipidemia  Continue statins  8 History of anemia and lung nodule, follow-up with hematology and oncology  9  Hypertension  But he well controlled with current Rx   10   Hypothyroidism  On Synthroid and TSH was acceptable  other Rx as before  Follow-up in 4 weeks    Counseling :   A description of the counseling  Patient is little under stress regarding her sister's death  She was counselled  She was reassured  All her questions were answered  Advised her to lose weight  Diet was advised  Advised to decrease salt intake and decreased fluid intake  Monitor her input output  HPI :     Veronica Taylor is a 70y o  year old female who presents for follow-up  She haspast medical history significant for coronary artery disease status post coronary artery bypass surgery with 4 bypasses in 2004 and then tricuspid valve annuloplasty in January 2012, known moderate pulmonary hypertension with mild to moderate TR, recurrent abdominal hernia, history of anemia, chronic atrial fibrillation with underlying sick sinus syndrome status post Medtronic pacemaker on long-term antithrombotic therapy with Xarelto who came for regular follow-up  Patient has some dyspnea on exertion  She hasn't had any recent stress test  She also had some mild CRI creatinine stable around 1 3  He has no fever no chills  No PND, no orthopnea, mostly exertional symptoms  01/25/2018  Patient came for follow-up  She is little bit depressed and sad as her sister passed away  She gained about 15-20 lb in the last few months  This is making her more short of breath  No leg edema  She had a blood test done recently which shows BUN is slightly high but creatinine is excepted    She had a fall few weeks ago and her umbilical hernia is making her lose balance  No fever no chills no nausea no vomiting  No leg swelling, has chronic shortness of breath  No PND no other significant cardiovascular complaint  No palpitations, no loss of consciousness  03/05/2018  Patient came for follow-up after hospital discharge  Above reviewed  She was admitted with diastolic heart failure  Underwent ECHO and cardiac catheterization  Found to have nonobstructive CAD in her grafts  She is now on medical therapy  Her diuretics were adjusted  She has lost about 10-12 lb  No dizziness no lightheadedness  She monitor her blood pressures almost every day  Blood pressure has been adequate 1 reading was low few days ago  She has continue still losing weight  No chest pain  No shortness of breath  No fever no PND no orthopnea no other significant complaint  Review of Systems   Constitutional: Positive for activity change and unexpected weight change  Patient has gained about 25 lb   Respiratory: Positive for shortness of breath  Shortness of breath is chronic no new change   Cardiovascular: Positive for leg swelling  Right more than left but much better than before   Gastrointestinal:        Known large umbilical hernia   Musculoskeletal: Positive for arthralgias and back pain  Psychiatric/Behavioral: The patient is nervous/anxious  Little depressed and stressed as sister passed away   All other systems reviewed and are negative        Historical Information   Past Medical History:   Diagnosis Date    Arthritis     Atrial flutter ([facility] [place] [facility] )     Cardiac disease     Carotid artery occlusion     [facility]     Coronary artery disease     Diabetes mellitus ([place] [address] )     Disease of thyroid gland     GERD (gastroesophageal reflux disease)     History of transfusion     Hyperlipidemia     Hypertension     Other specified diabetes mellitus with diabetic autonomic (poly)neuropathy ([facility] [place] [facility] )     Renal disorder     Sleep apnea      Past Surgical History:   Procedure Laterality Date    ABDOMINAL SURGERY      CARDIAC PACEMAKER PLACEMENT      CARDIAC SURGERY      cabg x 2    CHOLECYSTECTOMY      EYE SURGERY      FRACTURE SURGERY      HERNIA REPAIR      FL LAP,CHOLECYSTECTOMY N/A 8/10/2016    Procedure: CHOLECYSTECTOMY LAPAROSCOPIC;  Surgeon: Amy Forbes MD;  [facility];  Service: General    FL LARYNGOSCOPY,DIRECT,SCOPE,INJ CORDS Left 11/4/2016    Procedure: Susan Noonanbessy; LEFT VOCAL FOLD INJECTION ;  Surgeon: Sae Anderson MD;  [facility];  Service: ENT    FL REPAIR UMBILICAL ZALV,0+E/Z,UVEKW N/A 8/10/2016    Procedure: Murali Reynolds;  Surgeon: Amy Forbes MD;  [facility];  Service: General    VASCULAR SURGERY       History   Alcohol Use No     History   Drug Use No     History   Smoking Status    Never Smoker   Smokeless Tobacco    Never Used     Family History: History reviewed  No pertinent family history  Meds/Allergies     Allergies   Allergen Reactions    Other      Adhesive tape    Ibuprofen Palpitations    Penicillins Rash       Current Outpatient Prescriptions:     ACCU-CHEK CAITLIN PLUS test strip, USE TO TEST 4 TIMES DAILY, Disp: , Rfl: 3    B-D UF III MINI PEN NEEDLES 31G X 5 MM MISC, USE 4 TIMES A DAY, Disp: , Rfl: 3    Calcium Carbonate-Vitamin D (CALTRATE 600+D) 600-400 MG-UNIT per tablet, Take 1 tablet by mouth 2 (two) times a day , Disp: , Rfl:     furosemide (LASIX) 80 mg tablet, Take 80 mg by mouth daily  , Disp: , Rfl:     insulin aspart (NovoLOG) 100 units/mL injection, Inject 10 Units under the skin 3 (three) times a day before meals  , Disp: , Rfl:     LANTUS SOLOSTAR injection pen 100 units/mL, INJECT 30 UNITS DAILY, Disp: , Rfl: 1    levothyroxine (SYNTHROID) 137 mcg tablet, Take 137 mcg by mouth daily  , Disp: , Rfl:     magnesium hydroxide (GOMES CHEWS) 311 MG CHEW, Chew 311 mg 2 (two) times a day, Disp: , Rfl:     metolazone (ZAROXOLYN) 2 5 mg tablet, Take 1 tablet (2 5 mg total) by mouth 3 (three) times a week, Disp: 30 tablet, Rfl: 0    Multiple Vitamins-Minerals (CENTRUM CARDIO) TABS, Take 2 tablets by mouth daily  , Disp: , Rfl:     omega-3-acid ethyl esters (LOVAZA) 1 g capsule, Take 1 g by mouth daily  , Disp: , Rfl:     omeprazole (PriLOSEC) 20 mg delayed release capsule, Take 20 mg by mouth 3 (three) times a week  , Disp: , Rfl:     rivaroxaban (XARELTO) 15 mg tablet, Take 15 mg by mouth daily, Disp: , Rfl:     simvastatin (ZOCOR) 20 mg tablet, Take 20 mg by mouth daily at bedtime  , Disp: , Rfl:     valsartan (DIOVAN) 80 mg tablet, Take 80 mg by mouth daily  , Disp: , Rfl:     Vitals: Blood pressure 118/78, pulse 78, height 5' 5" (1 651 m), weight 111 kg (245 lb 12 8 oz), SpO2 94 %, not currently breastfeeding  Body mass index is 40 9 kg/m²  Physical Exam:  Physical Exam    Neurologic:  Alert & oriented x 3, no new focal deficits, Not in any acute distress,  Constitutional:  Well developed, well nourished, non-toxic appearance morbidly obese  Eyes:  Pupil equal and reacting to light, conjunctiva normal   HENT:  Atraumatic, oropharynx moist, Neck- normal range of motion, no tenderness, supple   Respiratory:  Bilateral air entry, mostly clear to auscultation  Cardiovascular: S1-S2 irregular with a 2/6 ejection systolic murmur   GI:  Soft, nondistended, normal bowel sounds, nontender, no hepatosplenomegaly appreciated  Has large umbilical hernia  Musculoskeletal:  No edema, no tenderness, no deformities  Skin:  Well hydrated, no rash   Lymphatic:  No lymphadenopathy noted   Extremities:  1+ edema of right lower extremity  No significant edema on the left  Diagnostic cardiac testing review:    Cardiac catheterization:  Cardiac catheterization done in February of 2018 shows severe three-vessel coronary artery disease  Distal left main has 70%  %    Proximal circ 70%, obtuse marginal 100%, RPDA 100% occluded  Patient had patent LIMA to LAD, SVG vein graft to diagonal, circumflex as well as RPDA  LV-gram was not done due to CRI  Echo Doppler:  Echo Doppler done in 2018 shows EF around 45%, dilated right-sided chambers, moderate to severe TR with PA pressure 65 mm of mercury, mild AI, mild MR, moderate to markedly dilated left atrium, moderately dilated right atrium Paradoxical septal motion noted  Stress test:  Nuclear stress test done in  was abnormal   EF was 57%  Paradoxical septal motion was noted  Nuclear stress test in 2017 shows is fixed apical wall defect with prior infarct  No ischemia EF was 57%    Pacemaker interrogation:  Patient has Medtronic pacemaker which is functioning adequately its VVI mode  Patient is paced 100% all the time  EC lead EKG done on 2018 shows atrial fibrillation with few paced beats  Heart rate is 65 beats per minute  Patient has known Medtronic pacemaker  It is a VVI type  2018  Repeat EKG shows paced rhythm heart rate 78 beats per minute  Underlying is AFib    Results of echo and stress test reviewed with patient  Labs done 2018 shows sodium 139 potassium 3 4 BUN 67 creatinine 1 45 GFR is 37      Lab Review   Lab Results   Component Value Date    WBC 6 30 2018    HGB 10 5 (L) 2018    HCT 33 7 (L) 2018    MCV 81 (L) 2018     2018     Lab Results   Component Value Date     2018    K 3 4 (L) 2018     2018    CO2 28 2018    ANIONGAP 11 2018    BUN 67 (H) 2018    CREATININE 1 45 (H) 2018    GLUCOSE 146 (H) 2018    GLUF 149 (H) 2018    CALCIUM 9 3 2018    AST 23 2018    ALT 18 2018    ALKPHOS 125 (H) 2018    PROT 8 1 2018    BILITOT 0 50 2018    EGFR 37 2018     Lab Results   Component Value Date    GLUCOSE 146 (H) 2018    CALCIUM 9 3 2018     03/01/2018    K 3 4 (L) 03/01/2018    CO2 28 03/01/2018     03/01/2018    BUN 67 (H) 03/01/2018    CREATININE 1 45 (H) 03/01/2018     Dr Ar Ashby MD [facility]      "This note has been constructed using a voice recognition system  Therefore there may be syntax, spelling, and/or grammatical errors   Please call if you have any questions  "

## 2018-03-05 ENCOUNTER — OFFICE VISIT (OUTPATIENT)
Dept: CARDIOLOGY CLINIC | Facility: CLINIC | Age: 68
End: 2018-03-05
Payer: MEDICARE

## 2018-03-05 VITALS
SYSTOLIC BLOOD PRESSURE: 118 MMHG | DIASTOLIC BLOOD PRESSURE: 78 MMHG | HEIGHT: 65 IN | BODY MASS INDEX: 40.95 KG/M2 | OXYGEN SATURATION: 94 % | HEART RATE: 78 BPM | WEIGHT: 245.8 LBS

## 2018-03-05 DIAGNOSIS — D64.9 ANEMIA, UNSPECIFIED TYPE: ICD-10-CM

## 2018-03-05 DIAGNOSIS — I50.40 COMBINED SYSTOLIC AND DIASTOLIC CONGESTIVE HEART FAILURE, UNSPECIFIED CONGESTIVE HEART FAILURE CHRONICITY: ICD-10-CM

## 2018-03-05 DIAGNOSIS — E11.9 TYPE 2 DIABETES MELLITUS WITHOUT COMPLICATION, WITH LONG-TERM CURRENT USE OF INSULIN (HCC): ICD-10-CM

## 2018-03-05 DIAGNOSIS — Z98.890 H/O TRICUSPID VALVE ANNULOPLASTY: ICD-10-CM

## 2018-03-05 DIAGNOSIS — I10 ESSENTIAL HYPERTENSION: ICD-10-CM

## 2018-03-05 DIAGNOSIS — G47.33 OBSTRUCTIVE SLEEP APNEA: ICD-10-CM

## 2018-03-05 DIAGNOSIS — E66.01 MORBID OBESITY WITH BMI OF 40.0-44.9, ADULT (HCC): ICD-10-CM

## 2018-03-05 DIAGNOSIS — I48.20 CHRONIC A-FIB (HCC): ICD-10-CM

## 2018-03-05 DIAGNOSIS — E78.5 HYPERLIPIDEMIA, UNSPECIFIED HYPERLIPIDEMIA TYPE: ICD-10-CM

## 2018-03-05 DIAGNOSIS — N18.30 CKD (CHRONIC KIDNEY DISEASE) STAGE 3, GFR 30-59 ML/MIN (HCC): ICD-10-CM

## 2018-03-05 DIAGNOSIS — I25.810 CORONARY ARTERY DISEASE INVOLVING CORONARY BYPASS GRAFT OF NATIVE HEART WITHOUT ANGINA PECTORIS: ICD-10-CM

## 2018-03-05 DIAGNOSIS — Z79.4 TYPE 2 DIABETES MELLITUS WITHOUT COMPLICATION, WITH LONG-TERM CURRENT USE OF INSULIN (HCC): ICD-10-CM

## 2018-03-05 PROCEDURE — 93000 ELECTROCARDIOGRAM COMPLETE: CPT | Performed by: INTERNAL MEDICINE

## 2018-03-05 PROCEDURE — 99214 OFFICE O/P EST MOD 30 MIN: CPT | Performed by: INTERNAL MEDICINE

## 2018-03-05 RX ORDER — POTASSIUM CHLORIDE 750 MG/1
10 TABLET, EXTENDED RELEASE ORAL 2 TIMES WEEKLY
Qty: 15 TABLET | Refills: 3 | Status: SHIPPED | OUTPATIENT
Start: 2018-03-05 | End: 2019-04-08 | Stop reason: SDUPTHER

## 2018-03-05 RX ORDER — METOLAZONE 2.5 MG/1
2.5 TABLET ORAL 2 TIMES WEEKLY
Qty: 10 TABLET | Refills: 3 | Status: SHIPPED | OUTPATIENT
Start: 2018-03-05 | End: 2019-04-11 | Stop reason: SDUPTHER

## 2018-03-05 NOTE — PATIENT INSTRUCTIONS
Heart Failure   AMBULATORY CARE:   Heart failure (HF) is a condition that does not allow your heart to fill or pump properly  Not enough oxygen in your blood gets to your organs and tissues  HF can occur in the right side, the left side, or both lower chambers of your heart  HF is often caused by damage or injury to your heart  The damage may be caused by heart attack, other heart conditions, or high blood pressure  HF is a long-term condition that tends to get worse over time  It is important to manage your health to improve your quality of life  HF can be worsened by heavy alcohol use, smoking, diabetes that is not controlled, or obesity  Common signs and symptoms:   · Difficulty breathing with activity that worsens to difficulty breathing at rest    · Shortness of breath while lying flat    · Severe shortness of breath and coughing at night that usually wakes you     · Chest pain at night    · Periods of no breathing, then breathing fast    · Fatigue or lack of energy (often worsened by physical activity)     · Swelling in your ankles, legs, or abdomen    · Fast heartbeat, purple color around your mouth and nailbeds    · Fingers and toes cool to the touch  Call 911 if:   · You have any of the following signs of a heart attack:      ¨ Squeezing, pressure, or pain in your chest that lasts longer than 5 minutes or returns    ¨ Discomfort or pain in your back, neck, jaw, stomach, or arm     ¨ Trouble breathing    ¨ Nausea or vomiting    ¨ Lightheadedness or a sudden cold sweat, especially with chest pain or trouble breathing    Seek care immediately if:   · You gain 3 or more pounds (1 4 kg) in a day, or more than your healthcare provider says you should  · Your heartbeat is fast, slow, or uneven all the time    Contact your healthcare provider if:   · You have symptoms of worsening HF:      ¨ Shortness of breath at rest, at night, or that is getting worse in any way     ¨ Weight gain of 5 or more pounds (2 2 kg) in a week     ¨ More swelling in your legs or ankles     ¨ Abdominal pain or swelling     ¨ More coughing     ¨ Loss of appetite     ¨ Feeling tired all the time    · You feel hopeless or depressed, or you have lost interest in things you used to enjoy  · You often feel worried or afraid  · You have questions or concerns about your condition or care  Treatment for HF  may include any of the following:  · Medicines  may be needed to help regulate your heart rhythm  You may also need medicines to lower your blood pressure, and to get rid of extra fluids  · Oxygen  may help you breathe easier if your oxygen level is lower than normal  A CPAP machine may be used to keep your airway open while you sleep  · Surgery  can be done to implant a pacemaker in your chest to regulate your heart rhythm  Other types of surgery can open blocked heart vessels, replace a damaged heart valve, or remove scar tissue  Manage or prevent HF:   · Do not smoke  Nicotine and other chemicals in cigarettes and cigars can cause lung damage and make HF difficult to manage  Ask your healthcare provider for information if you currently smoke and need help to quit  E-cigarettes or smokeless tobacco still contain nicotine  Talk to your healthcare provider before you use these products  · Do not drink alcohol or take illegal drugs  Alcohol and drugs can worsen your symptoms quickly  · Weigh yourself every morning  Use the same scale, in the same spot  Do this after you use the bathroom, but before you eat or drink anything  Wear the same type of clothing  Do not wear shoes  Record your weight each day so you will notice any sudden weight gain  Swelling and weight gain are signs of fluid retention  If you are overweight, ask how to lose weight safely  · Check your blood pressure and heart rate every day  Ask for more information about how to measure your blood pressure and heart rate correctly   Ask what these numbers should be for you  · Manage any chronic health conditions you have  These include high blood pressure, diabetes, obesity, high cholesterol, metabolic syndrome, and COPD  You will have fewer symptoms if you manage these health conditions  Follow your healthcare provider's recommendations and follow up with him or her regularly  · Eat heart-healthy foods and limit sodium (salt)  An easy way to do this is to eat more fresh fruits and vegetables and fewer canned and processed foods  Replace butter and margarine with heart-healthy oils such as olive oil and canola oil  Other heart-healthy foods include walnuts, whole-grain breads, low-fat dairy products, beans, and lean meats  Fatty fish such as salmon and tuna are also heart healthy  Ask how much salt you can eat each day  Do not use salt substitutes  · Drink liquids as directed  You may need to limit the amount of liquids you drink if you retain fluid  Ask how much liquid to drink each day and which liquids are best for you  · Stay active  If you are not active, your symptoms are likely to worsen quickly  Walking, bicycling, and other types of physical activity help maintain your strength and improve your mood  Physical activity also helps you manage your weight  Work with your healthcare provider to create an exercise plan that is right for you  · Get vaccines as directed  Get a flu shot every year  You may also need the pneumonia vaccine  The flu and pneumonia can be severe for a person who has HF  Vaccines protect you from these infections  Join a support group:  Living with HF can be difficult  It may be helpful to talk with others who have HF  You may learn how to better manage your condition or get emotional support  For more information:   · Madhu Lance   Phone: 3- 179 - [phone]  Web Address: https://[email or web address]  org   Follow up with your healthcare provider or cardiologist within 2 weeks or as directed: You may need to return for other tests  You may need home health care  A healthcare provider will monitor your vital signs, weight, and make sure your medicines are working  Write down your questions so you remember to ask them during your visits  © 2017 [address] Information is for End User's use only and may not be sold, redistributed or otherwise used for commercial purposes  All illustrations and images included in CareNotes® are the copyrighted property of [employer]  or [employer]istan [employer]  The above information is an  only  It is not intended as medical advice for individual conditions or treatments  Talk to your doctor, nurse or pharmacist before following any medical regimen to see if it is safe and effective for you

## 2018-03-07 NOTE — PROGRESS NOTES
"  Results/Data  Cardiac Device Remote [id number] 02:28PM Bertha Tamez     Test Name Result Flag Reference   MISCELLANEOUS COMMENT      CARELINK TRANSMISSION: [id number] VOLTAGE ADEQUATE  (4 5 YRS)   88%  ALL AVAILABLE LEAD PARAMETERS WITHIN NORMAL LIMITS  NO SIGNIFICANT HIGH RATE EPISODES  NORMAL DEVICE FUNCTION  ---JHAVERI   Cardiac Electrophysiology Report      [id number]   DEVICE TYPE Pacemaker       Cardiac Electrophysiology Report [id number] 02:28PM Bertha Tamez     Test Name Result Flag Reference   Cardiac Electrophysiology Report      [id number]     Signatures   Electronically signed by : Charlene Ang, ; Dec 26 2017 10:20AM EST                       (Author)    Electronically signed by : SIDNEY Mcconnell ; Dec 26 2017 12:16PM EST                       (Author)    "

## 2018-03-07 NOTE — PROGRESS NOTES
"  Discussion/Summary  Normal device function      Results/Data  Cardiac Device In Clinic 18Sep2017 12:27PM Lewis Lam     Test Name Result Flag Reference   MISCELLANEOUS COMMENT      DEVICE INTERROGATED IN THE [facility] OFFICE: NP TO DEVICE CLINIC  BATTERY VOLTAGE ADEQUATE (5 YR)   90 4% (>40%/BRADYCARDIA)  NO SIGNIFICANT HIGH RATE EPISODES  NO PROGRAMMING CHANGES MADE TO DEVICE PARAMETERS  NORMAL DEVICE FUNCTION  RG   Cardiac Electrophysiology Report      [id number]  pdf   DEVICE TYPE Pacemaker       Cardiac Electrophysiology Report 18Sep2017 12:27PM Riwhit Genaro     Test Name Result Flag Reference   Cardiac Electrophysiology Report      [id number]     Signatures   Electronically signed by : Ankita Pearce, ; Sep 18 2017  8:32AM EST                       (Author)    Electronically signed by : SIDNEY Mireles ; Sep 18 2017  9:36AM EST                       (Author)    "

## 2018-03-12 ENCOUNTER — TRANSCRIBE ORDERS (OUTPATIENT)
Dept: ADMINISTRATIVE | Facility: HOSPITAL | Age: 68
End: 2018-03-12

## 2018-03-12 ENCOUNTER — APPOINTMENT (OUTPATIENT)
Dept: LAB | Facility: HOSPITAL | Age: 68
End: 2018-03-12
Payer: MEDICARE

## 2018-03-12 DIAGNOSIS — E11.65 UNCONTROLLED TYPE 2 DIABETES MELLITUS WITH COMPLICATION, UNSPECIFIED LONG TERM INSULIN USE STATUS: ICD-10-CM

## 2018-03-12 DIAGNOSIS — E06.3 CHRONIC LYMPHOCYTIC THYROIDITIS: ICD-10-CM

## 2018-03-12 DIAGNOSIS — N18.30 CHRONIC KIDNEY DISEASE, STAGE III (MODERATE) (HCC): ICD-10-CM

## 2018-03-12 DIAGNOSIS — E11.8 UNCONTROLLED TYPE 2 DIABETES MELLITUS WITH COMPLICATION, UNSPECIFIED LONG TERM INSULIN USE STATUS: ICD-10-CM

## 2018-03-12 DIAGNOSIS — I25.10 DISEASE OF CARDIOVASCULAR SYSTEM: ICD-10-CM

## 2018-03-12 DIAGNOSIS — H54.0X55 DIABETIC VISUAL LOSS TOTAL VISION IMPAIRMENT OF BOTH EYES, WITH MACULAR EDEMA, WITH MODERATE NONPROLIFERATIVE RETINOPATHY, ASSOCIATED WITH TYPE 2 DIABETES MELLITUS (HCC): ICD-10-CM

## 2018-03-12 DIAGNOSIS — E11.3313 DIABETIC VISUAL LOSS TOTAL VISION IMPAIRMENT OF BOTH EYES, WITH MACULAR EDEMA, WITH MODERATE NONPROLIFERATIVE RETINOPATHY, ASSOCIATED WITH TYPE 2 DIABETES MELLITUS (HCC): ICD-10-CM

## 2018-03-12 DIAGNOSIS — I73.9 INTERMITTENT CLAUDICATION (HCC): ICD-10-CM

## 2018-03-12 DIAGNOSIS — E11.65 UNCONTROLLED TYPE 2 DIABETES MELLITUS WITH COMPLICATION, UNSPECIFIED LONG TERM INSULIN USE STATUS: Primary | ICD-10-CM

## 2018-03-12 DIAGNOSIS — R80.9 PROTEINURIA, UNSPECIFIED TYPE: ICD-10-CM

## 2018-03-12 DIAGNOSIS — E11.8 UNCONTROLLED TYPE 2 DIABETES MELLITUS WITH COMPLICATION, UNSPECIFIED LONG TERM INSULIN USE STATUS: Primary | ICD-10-CM

## 2018-03-12 DIAGNOSIS — E13.42 DIABETIC POLYNEUROPATHY ASSOCIATED WITH OTHER SPECIFIED DIABETES MELLITUS (HCC): ICD-10-CM

## 2018-03-12 LAB
ALBUMIN SERPL BCP-MCNC: 3.2 G/DL (ref 3.5–5)
ALP SERPL-CCNC: 133 U/L (ref 46–116)
ALT SERPL W P-5'-P-CCNC: 16 U/L (ref 12–78)
ANION GAP SERPL CALCULATED.3IONS-SCNC: 12 MMOL/L (ref 4–13)
AST SERPL W P-5'-P-CCNC: 16 U/L (ref 5–45)
BACTERIA UR QL AUTO: ABNORMAL /HPF
BILIRUB SERPL-MCNC: 0.7 MG/DL (ref 0.2–1)
BILIRUB UR QL STRIP: NEGATIVE
BUN SERPL-MCNC: 66 MG/DL (ref 5–25)
CALCIUM SERPL-MCNC: 9.3 MG/DL (ref 8.3–10.1)
CHLORIDE SERPL-SCNC: 100 MMOL/L (ref 100–108)
CHOLEST SERPL-MCNC: 96 MG/DL (ref 50–200)
CLARITY UR: CLEAR
CO2 SERPL-SCNC: 26 MMOL/L (ref 21–32)
COLOR UR: YELLOW
CREAT SERPL-MCNC: 1.39 MG/DL (ref 0.6–1.3)
CREAT UR-MCNC: 37.9 MG/DL
EST. AVERAGE GLUCOSE BLD GHB EST-MCNC: 192 MG/DL
GFR SERPL CREATININE-BSD FRML MDRD: 39 ML/MIN/1.73SQ M
GLUCOSE P FAST SERPL-MCNC: 181 MG/DL (ref 65–99)
GLUCOSE UR STRIP-MCNC: NEGATIVE MG/DL
HBA1C MFR BLD: 8.3 % (ref 4.2–6.3)
HDLC SERPL-MCNC: 41 MG/DL (ref 40–60)
HGB UR QL STRIP.AUTO: ABNORMAL
KETONES UR STRIP-MCNC: NEGATIVE MG/DL
LDLC SERPL CALC-MCNC: 41 MG/DL (ref 0–100)
LEUKOCYTE ESTERASE UR QL STRIP: ABNORMAL
MICROALBUMIN UR-MCNC: 57.4 MG/L (ref 0–20)
MICROALBUMIN/CREAT 24H UR: 151 MG/G CREATININE (ref 0–30)
NITRITE UR QL STRIP: NEGATIVE
NON-SQ EPI CELLS URNS QL MICRO: ABNORMAL /HPF
PH UR STRIP.AUTO: 6 [PH] (ref 5–9)
POTASSIUM SERPL-SCNC: 3.7 MMOL/L (ref 3.5–5.3)
PROT SERPL-MCNC: 8.2 G/DL (ref 6.4–8.2)
PROT UR STRIP-MCNC: NEGATIVE MG/DL
RBC #/AREA URNS AUTO: ABNORMAL /HPF
SODIUM SERPL-SCNC: 138 MMOL/L (ref 136–145)
SP GR UR STRIP.AUTO: <=1.005 (ref 1–1.03)
T4 FREE SERPL-MCNC: 1.32 NG/DL (ref 0.76–1.46)
T4 SERPL-MCNC: 8.8 UG/DL (ref 4.7–13.3)
TRIGL SERPL-MCNC: 70 MG/DL
TSH SERPL DL<=0.05 MIU/L-ACNC: 2.69 UIU/ML (ref 0.36–3.74)
UROBILINOGEN UR QL STRIP.AUTO: 0.2 E.U./DL
WBC #/AREA URNS AUTO: ABNORMAL /HPF

## 2018-03-12 PROCEDURE — 82570 ASSAY OF URINE CREATININE: CPT | Performed by: INTERNAL MEDICINE

## 2018-03-12 PROCEDURE — 84439 ASSAY OF FREE THYROXINE: CPT

## 2018-03-12 PROCEDURE — 36415 COLL VENOUS BLD VENIPUNCTURE: CPT | Performed by: INTERNAL MEDICINE

## 2018-03-12 PROCEDURE — 84443 ASSAY THYROID STIM HORMONE: CPT

## 2018-03-12 PROCEDURE — 80053 COMPREHEN METABOLIC PANEL: CPT

## 2018-03-12 PROCEDURE — 82043 UR ALBUMIN QUANTITATIVE: CPT | Performed by: INTERNAL MEDICINE

## 2018-03-12 PROCEDURE — 80061 LIPID PANEL: CPT

## 2018-03-12 PROCEDURE — 81001 URINALYSIS AUTO W/SCOPE: CPT | Performed by: INTERNAL MEDICINE

## 2018-03-12 PROCEDURE — 83036 HEMOGLOBIN GLYCOSYLATED A1C: CPT | Performed by: INTERNAL MEDICINE

## 2018-03-19 ENCOUNTER — TELEPHONE (OUTPATIENT)
Dept: CARDIOLOGY CLINIC | Facility: CLINIC | Age: 68
End: 2018-03-19

## 2018-03-20 ENCOUNTER — TRANSCRIBE ORDERS (OUTPATIENT)
Dept: ADMINISTRATIVE | Facility: HOSPITAL | Age: 68
End: 2018-03-20

## 2018-03-20 ENCOUNTER — LAB (OUTPATIENT)
Dept: LAB | Facility: HOSPITAL | Age: 68
End: 2018-03-20
Attending: INTERNAL MEDICINE
Payer: MEDICARE

## 2018-03-20 DIAGNOSIS — I50.40 COMBINED SYSTOLIC AND DIASTOLIC CONGESTIVE HEART FAILURE, UNSPECIFIED CONGESTIVE HEART FAILURE CHRONICITY: ICD-10-CM

## 2018-03-20 DIAGNOSIS — N18.30 CKD (CHRONIC KIDNEY DISEASE) STAGE 3, GFR 30-59 ML/MIN (HCC): ICD-10-CM

## 2018-03-20 LAB
ANION GAP SERPL CALCULATED.3IONS-SCNC: 9 MMOL/L (ref 4–13)
BUN SERPL-MCNC: 71 MG/DL (ref 5–25)
CALCIUM SERPL-MCNC: 9 MG/DL (ref 8.3–10.1)
CHLORIDE SERPL-SCNC: 99 MMOL/L (ref 100–108)
CO2 SERPL-SCNC: 28 MMOL/L (ref 21–32)
CREAT SERPL-MCNC: 1.43 MG/DL (ref 0.6–1.3)
GFR SERPL CREATININE-BSD FRML MDRD: 37 ML/MIN/1.73SQ M
GLUCOSE P FAST SERPL-MCNC: 187 MG/DL (ref 65–99)
POTASSIUM SERPL-SCNC: 3.7 MMOL/L (ref 3.5–5.3)
SODIUM SERPL-SCNC: 136 MMOL/L (ref 136–145)

## 2018-03-20 PROCEDURE — 80048 BASIC METABOLIC PNL TOTAL CA: CPT

## 2018-03-20 PROCEDURE — 36415 COLL VENOUS BLD VENIPUNCTURE: CPT

## 2018-03-22 ENCOUNTER — TELEPHONE (OUTPATIENT)
Dept: CARDIOLOGY CLINIC | Facility: CLINIC | Age: 68
End: 2018-03-22

## 2018-03-22 NOTE — PROGRESS NOTES
Weight is 232    Yamilethlean Jose patient is feeling well at this point  She is scared about stopping any meds because she is doing so well at this point

## 2018-04-02 NOTE — PROGRESS NOTES
Patient labs are acceptable  Continue same medications  She is advised to follow up her appointment regularly

## 2018-04-19 ENCOUNTER — FOLLOW UP (OUTPATIENT)
Dept: URBAN - METROPOLITAN AREA CLINIC 27 | Facility: CLINIC | Age: 68
End: 2018-04-19

## 2018-04-19 DIAGNOSIS — E11.3413: ICD-10-CM

## 2018-04-19 DIAGNOSIS — H40.053: ICD-10-CM

## 2018-04-19 PROCEDURE — 67028 INJECTION EYE DRUG: CPT

## 2018-04-19 PROCEDURE — 03VIAL LUCENTIS 03MG VIAL

## 2018-04-19 PROCEDURE — 92012 INTRM OPH EXAM EST PATIENT: CPT | Mod: 25

## 2018-04-19 PROCEDURE — 92134 CPTRZ OPH DX IMG PST SGM RTA: CPT

## 2018-04-19 ASSESSMENT — VISUAL ACUITY
OD_CC: 20/30
OS_CC: 20/200
OD_PH: 20/25

## 2018-04-19 ASSESSMENT — TONOMETRY
OS_IOP_MMHG: 29
OD_IOP_MMHG: 26

## 2018-05-18 ENCOUNTER — HOSPITAL ENCOUNTER (EMERGENCY)
Facility: HOSPITAL | Age: 68
Discharge: HOME | End: 2018-05-18
Attending: EMERGENCY MEDICINE | Admitting: EMERGENCY MEDICINE
Payer: MEDICARE

## 2018-05-18 VITALS
HEIGHT: 66 IN | RESPIRATION RATE: 16 BRPM | WEIGHT: 229 LBS | SYSTOLIC BLOOD PRESSURE: 158 MMHG | HEART RATE: 84 BPM | BODY MASS INDEX: 36.8 KG/M2 | OXYGEN SATURATION: 96 % | TEMPERATURE: 96 F | DIASTOLIC BLOOD PRESSURE: 70 MMHG

## 2018-05-18 DIAGNOSIS — L03.039 CELLULITIS OF GREAT TOE: Primary | ICD-10-CM

## 2018-05-18 LAB
ALBUMIN SERPL BCP-MCNC: 3.2 G/DL (ref 3.5–5)
ALP SERPL-CCNC: 136 U/L (ref 46–116)
ALT SERPL W P-5'-P-CCNC: 19 U/L (ref 12–78)
ANION GAP SERPL CALCULATED.3IONS-SCNC: 8 MMOL/L (ref 4–13)
APTT PPP: 27 SECONDS (ref 24–36)
AST SERPL W P-5'-P-CCNC: 24 U/L (ref 5–45)
ATRIAL RATE: 60 BPM
BASOPHILS # BLD AUTO: 0.05 THOUSANDS/ΜL (ref 0–0.1)
BASOPHILS NFR BLD AUTO: 1 % (ref 0–1)
BILIRUB SERPL-MCNC: 0.4 MG/DL (ref 0.2–1)
BUN SERPL-MCNC: 84 MG/DL (ref 5–25)
CALCIUM SERPL-MCNC: 9.3 MG/DL (ref 8.3–10.1)
CHLORIDE SERPL-SCNC: 96 MMOL/L (ref 100–108)
CO2 SERPL-SCNC: 30 MMOL/L (ref 21–32)
CREAT SERPL-MCNC: 1.46 MG/DL (ref 0.6–1.3)
EOSINOPHIL # BLD AUTO: 0.24 THOUSAND/ΜL (ref 0–0.61)
EOSINOPHIL NFR BLD AUTO: 2 % (ref 0–6)
ERYTHROCYTE [DISTWIDTH] IN BLOOD BY AUTOMATED COUNT: 18.6 % (ref 11.6–15.1)
GFR SERPL CREATININE-BSD FRML MDRD: 36 ML/MIN/1.73SQ M
GLUCOSE SERPL-MCNC: 135 MG/DL (ref 65–140)
HCT VFR BLD AUTO: 40.8 % (ref 34.8–46.1)
HGB BLD-MCNC: 13 G/DL (ref 11.5–15.4)
IMM GRANULOCYTES # BLD AUTO: 0.04 THOUSAND/UL (ref 0–0.2)
IMM GRANULOCYTES NFR BLD AUTO: 0 % (ref 0–2)
INR PPP: 1.08 (ref 0.86–1.16)
LYMPHOCYTES # BLD AUTO: 1.73 THOUSANDS/ΜL (ref 0.6–4.47)
LYMPHOCYTES NFR BLD AUTO: 16 % (ref 14–44)
MCH RBC QN AUTO: 26.2 PG (ref 26.8–34.3)
MCHC RBC AUTO-ENTMCNC: 31.9 G/DL (ref 31.4–37.4)
MCV RBC AUTO: 82 FL (ref 82–98)
MONOCYTES # BLD AUTO: 1.12 THOUSAND/ΜL (ref 0.17–1.22)
MONOCYTES NFR BLD AUTO: 11 % (ref 4–12)
NEUTROPHILS # BLD AUTO: 7.46 THOUSANDS/ΜL (ref 1.85–7.62)
NEUTS SEG NFR BLD AUTO: 70 % (ref 43–75)
NRBC BLD AUTO-RTO: 0 /100 WBCS
PLATELET # BLD AUTO: 225 THOUSANDS/UL (ref 149–390)
PMV BLD AUTO: 10.6 FL (ref 8.9–12.7)
POTASSIUM SERPL-SCNC: 3.6 MMOL/L (ref 3.5–5.3)
PROT SERPL-MCNC: 9.3 G/DL (ref 6.4–8.2)
PROTHROMBIN TIME: 11.4 SECONDS (ref 9.4–11.7)
QRS AXIS: 267 DEGREES
QRSD INTERVAL: 206 MS
QT INTERVAL: 524 MS
QTC INTERVAL: 536 MS
RBC # BLD AUTO: 4.97 MILLION/UL (ref 3.81–5.12)
SODIUM SERPL-SCNC: 134 MMOL/L (ref 136–145)
T WAVE AXIS: 77 DEGREES
VENTRICULAR RATE: 63 BPM
WBC # BLD AUTO: 10.64 THOUSAND/UL (ref 4.31–10.16)

## 2018-05-18 PROCEDURE — 96366 THER/PROPH/DIAG IV INF ADDON: CPT

## 2018-05-18 PROCEDURE — 36415 COLL VENOUS BLD VENIPUNCTURE: CPT | Performed by: EMERGENCY MEDICINE

## 2018-05-18 PROCEDURE — 80053 COMPREHEN METABOLIC PANEL: CPT | Performed by: EMERGENCY MEDICINE

## 2018-05-18 PROCEDURE — 93010 ELECTROCARDIOGRAM REPORT: CPT | Performed by: INTERNAL MEDICINE

## 2018-05-18 PROCEDURE — 99283 EMERGENCY DEPT VISIT LOW MDM: CPT

## 2018-05-18 PROCEDURE — 85610 PROTHROMBIN TIME: CPT | Performed by: EMERGENCY MEDICINE

## 2018-05-18 PROCEDURE — 96365 THER/PROPH/DIAG IV INF INIT: CPT

## 2018-05-18 PROCEDURE — 93005 ELECTROCARDIOGRAM TRACING: CPT

## 2018-05-18 PROCEDURE — 85025 COMPLETE CBC W/AUTO DIFF WBC: CPT | Performed by: EMERGENCY MEDICINE

## 2018-05-18 PROCEDURE — 85730 THROMBOPLASTIN TIME PARTIAL: CPT | Performed by: EMERGENCY MEDICINE

## 2018-05-18 RX ORDER — CEPHALEXIN 500 MG/1
500 CAPSULE ORAL EVERY 6 HOURS SCHEDULED
Qty: 40 CAPSULE | Refills: 0 | Status: SHIPPED | OUTPATIENT
Start: 2018-05-18 | End: 2018-05-28

## 2018-05-18 RX ORDER — SULFAMETHOXAZOLE AND TRIMETHOPRIM 800; 160 MG/1; MG/1
1 TABLET ORAL EVERY 12 HOURS SCHEDULED
Qty: 20 TABLET | Refills: 0 | Status: SHIPPED | OUTPATIENT
Start: 2018-05-18 | End: 2018-05-28

## 2018-05-18 RX ADMIN — VANCOMYCIN HYDROCHLORIDE 1500 MG: 1 INJECTION, POWDER, LYOPHILIZED, FOR SOLUTION INTRAVENOUS at 08:00

## 2018-05-18 NOTE — DISCHARGE INSTRUCTIONS
Cellulitis, Ambulatory Care   GENERAL INFORMATION:   Cellulitis  is a skin infection caused by bacteria  Common symptoms include the following:   · Fever    · A red, warm, swollen area on your skin    · Pain when the area is touched    · Bumps or blisters (abscess) that may drain pus    · Bumpy, raised skin that feels like an orange peel  Seek immediate care for the following symptoms:   · An increase in pain, redness, warmth, and size    · Red streaks coming from the infected area    · A thin, gray-brown discharge coming from your infected skin area    · A crackling under your skin when you touch it    · Purple dots or bumps on your skin, or bleeding under your skin    · New swelling and pain in your legs    · Sudden trouble breathing or chest pain  Treatment for cellulitis  may include medicines to treat the bacterial infection or decrease pain  The infection may need to be cleaned out  Damaged, dead, or infected tissue may need to be cut away to help your wound heal   Manage your symptoms:   · Elevate your wound above the level of your heart  as often as you can  This will help decrease swelling and pain  Prop your wound on pillows or blankets to keep it elevated comfortably  · Clean your wound as directed  You may need to wash the wound with soap and water  Look for signs of infection  · Wear pressure stockings as directed  The stockings are tight and put pressure on your legs  This improves blood flow and decreases swelling  Prevent cellulitis:   · Wash your hands often  Use soap and water  Wash your hands after you use the bathroom, change a child's diapers, or sneeze  Wash your hands before you prepare or eat food  Use lotion to prevent dry, cracked skin  · Do not share personal items, such as towels, clothing, and razors  · Clean exercise equipment  with germ-killing  before and after you use it    Follow up with your healthcare provider as directed:  Write down your questions so you remember to ask them during your visits  CARE AGREEMENT:   You have the right to help plan your care  Learn about your health condition and how it may be treated  Discuss treatment options with your caregivers to decide what care you want to receive  You always have the right to refuse treatment  The above information is an  only  It is not intended as medical advice for individual conditions or treatments  Talk to your doctor, nurse or pharmacist before following any medical regimen to see if it is safe and effective for you  © 2014 [address] is for End User's use only and may not be sold, redistributed or otherwise used for commercial purposes  All illustrations and images included in CareNotes® are the copyrighted property of [employer]  or [employer]istan [employer]

## 2018-05-18 NOTE — ED PROVIDER NOTES
History  Chief Complaint   Patient presents with    Toe Pain     Pt c/o redness, pain to right great toe for two days  Patient is a diabetic who is had problems with recurrent infections of ingrown toenails  Patient sees a podiatrist on a regular basis and in fact has an upcoming appointment on Monday  She has had a recent hemiunguectomy of the toenail of the right great toe  Patient noticed pain and redness around the cuticle of that toe yesterday which is worse today  She has redness which is extending from the end of the toe to its base  There is also small wound on the very tip of the toe which she cannot account for  She has had no fever no chills  He has had no chest pain or breathing problems  Patient is on blood thinners and states that she even takes some aspirin occasionally even though she know she is not supposed to            Prior to Admission Medications   Prescriptions Last Dose Informant Patient Reported? Taking? ACCU-CHEK CAITLIN PLUS test strip  Self Yes Yes   Sig: USE TO TEST 4 TIMES DAILY   B-D UF III MINI PEN NEEDLES 31G X 5 MM MISC  Self Yes Yes   Sig: USE 4 TIMES A DAY   furosemide (LASIX) 80 mg tablet  Self Yes Yes   Sig: Take 80 mg by mouth daily     insulin aspart (NovoLOG) 100 units/mL injection  Self Yes Yes   Sig: Inject 10 Units under the skin 3 (three) times a day before meals     insulin degludec (TRESIBA) injection pen 100 units/mL   Yes Yes   Sig: Inject 20 Units under the skin daily at bedtime   levothyroxine (SYNTHROID) 137 mcg tablet  Self Yes Yes   Sig: Take 137 mcg by mouth daily     magnesium hydroxide (GOMES CHEWS) 311 MG CHEW  Self Yes Yes   Sig: Chew 311 mg 2 (two) times a day   metolazone (ZAROXOLYN) 2 5 mg tablet   No Yes   Sig: Take 1 tablet (2 5 mg total) by mouth 2 (two) times a week   omeprazole (PriLOSEC) 20 mg delayed release capsule  Self Yes Yes   Sig: Take 20 mg by mouth 3 (three) times a week     potassium chloride (K-DUR,KLOR-CON) 10 mEq tablet No Yes   Sig: Take 1 tablet (10 mEq total) by mouth 2 (two) times a week   rivaroxaban (XARELTO) 15 mg tablet  Self Yes Yes   Sig: Take 15 mg by mouth daily   simvastatin (ZOCOR) 20 mg tablet  Self Yes Yes   Sig: Take 20 mg by mouth daily at bedtime  valsartan (DIOVAN) 80 mg tablet  Self Yes Yes   Sig: Take 80 mg by mouth daily  Facility-Administered Medications: None       Past Medical History:   Diagnosis Date    Arthritis     Atrial flutter ([place] [address] )     Cardiac disease     Carotid artery occlusion     [facility]     Coronary artery disease     Diabetes mellitus ([place] [address] )     Disease of thyroid gland     GERD (gastroesophageal reflux disease)     History of transfusion     Hyperlipidemia     Hypertension     Other specified diabetes mellitus with diabetic autonomic (poly)neuropathy ([place] [address] )     Renal disorder     Sleep apnea        Past Surgical History:   Procedure Laterality Date    ABDOMINAL SURGERY      CARDIAC PACEMAKER PLACEMENT      CARDIAC SURGERY      cabg x 2    CHOLECYSTECTOMY      EYE SURGERY      FRACTURE SURGERY      HERNIA REPAIR      [place] LAP,CHOLECYSTECTOMY N/A 8/10/2016    Procedure: CHOLECYSTECTOMY LAPAROSCOPIC;  Surgeon: Evin Gibson MD;  [facility];  Service: General     [address] Left 11/4/2016    Procedure: MICRODIRECT LARYNGOSCOPY; LEFT VOCAL FOLD INJECTION ;  Surgeon: Benton Lopez MD;  [facility];  Service: ENT    [place] REPAIR UMBILICAL XDCR,8+O/X,SONZR N/A 8/10/2016    Procedure: LAPAROSCOPIC REPAIR HERNIA VENTRAL;  Surgeon: Evin Gibson MD;  [facility];  Service: General    VASCULAR SURGERY         History reviewed  No pertinent family history  I have reviewed and agree with the history as documented      Social History   Substance Use Topics    Smoking status: Never Smoker    Smokeless tobacco: Never Used    Alcohol use No        Review of Systems   Constitutional: Negative for chills and fever  HENT: Negative for congestion  Respiratory: Negative for shortness of breath  Cardiovascular: Negative for chest pain  Gastrointestinal: Negative for abdominal pain  Genitourinary: Negative for dysuria  Musculoskeletal: Positive for arthralgias, joint swelling and myalgias  Skin: Positive for color change and rash  Neurological: Negative for weakness and numbness  Hematological: Bruises/bleeds easily  All other systems reviewed and are negative  Physical Exam  ED Triage Vitals [05/18/18 0632]   Temperature Pulse Respirations Blood Pressure SpO2   (!) 96 °F (35 6 °C) 88 20 (!) 174/73 96 %      Temp Source Heart Rate Source Patient Position - Orthostatic VS BP Location FiO2 (%)   Tympanic Monitor Sitting Left arm --      Pain Score       7           Orthostatic Vital Signs  Vitals:    05/18/18 0632   BP: (!) 174/73   Pulse: 88   Patient Position - Orthostatic VS: Sitting       Physical Exam   Constitutional: She is oriented to person, place, and time  She appears well-developed and well-nourished  HENT:   Head: Normocephalic and atraumatic  Mouth/Throat: Oropharynx is clear and moist    Eyes: Conjunctivae are normal    Neck: Normal range of motion  Neck supple  Cardiovascular: Normal rate, regular rhythm, normal heart sounds and intact distal pulses  DP pulses palpable   Pulmonary/Chest: Effort normal and breath sounds normal    Abdominal: Soft  Bowel sounds are normal    Musculoskeletal: Normal range of motion  There is tenderness and erythema around the cuticle of the toe extending proximally to the MTP   Neurological: She is alert and oriented to person, place, and time  Skin: Skin is warm  Rash noted  There is erythema  There is a small ulcer at the very tip of the toe with surrounding cellulitis   Psychiatric: She has a normal mood and affect  Her behavior is normal    Nursing note and vitals reviewed        ED Medications  Medications   vancomycin (VANCOCIN) 1,500 mg in sodium chloride 0 9 % 250 mL IVPB (0 mg/kg × 104 kg Intravenous Stopped 5/18/18 0957)       Diagnostic Studies  Results Reviewed     Procedure Component Value Units Date/Time    Comprehensive metabolic panel [[id number]]  (Abnormal) Collected:  05/18/18 0755    Lab Status:  Final result Specimen:  Blood from Arm, Left Updated:  05/18/18 8780     Sodium 134 (L) mmol/L      Potassium 3 6 mmol/L      Chloride 96 (L) mmol/L      CO2 30 mmol/L      Anion Gap 8 mmol/L      BUN 84 (H) mg/dL      Creatinine 1 46 (H) mg/dL      Glucose 135 mg/dL      Calcium 9 3 mg/dL      AST 24 U/L      ALT 19 U/L      Alkaline Phosphatase 136 (H) U/L      Total Protein 9 3 (H) g/dL      Albumin 3 2 (L) g/dL      Total Bilirubin 0 40 mg/dL      eGFR 36 ml/min/1 73sq m     Narrative:         National Kidney Disease Education Program recommendations are as follows:  GFR calculation is accurate only with a steady state creatinine  Chronic Kidney disease less than 60 ml/min/1 73 sq  meters  Kidney failure less than 15 ml/min/1 73 sq  meters      APTT [[id number]]  (Normal) Collected:  05/18/18 0755    Lab Status:  Final result Specimen:  Blood from Arm, Left Updated:  05/18/18 0815     PTT 27 seconds     Protime-INR [[id number]]  (Normal) Collected:  05/18/18 0755    Lab Status:  Final result Specimen:  Blood from Arm, Left Updated:  05/18/18 0815     Protime 11 4 seconds      INR 1 08    CBC and differential [[id number]]  (Abnormal) Collected:  05/18/18 0755    Lab Status:  Final result Specimen:  Blood from Arm, Left Updated:  05/18/18 0804     WBC 10 64 (H) Thousand/uL      RBC 4 97 Million/uL      Hemoglobin 13 0 g/dL      Hematocrit 40 8 %      MCV 82 fL      MCH 26 2 (L) pg      MCHC 31 9 g/dL      RDW 18 6 (H) %      MPV 10 6 fL      Platelets 914 Thousands/uL      nRBC 0 /100 WBCs      Neutrophils Relative 70 %      Immat GRANS % 0 %      Lymphocytes Relative 16 %      Monocytes Relative 11 %      Eosinophils Relative 2 % Basophils Relative 1 %      Neutrophils Absolute 7 46 Thousands/µL      Immature Grans Absolute 0 04 Thousand/uL      Lymphocytes Absolute 1 73 Thousands/µL      Monocytes Absolute 1 12 Thousand/µL      Eosinophils Absolute 0 24 Thousand/µL      Basophils Absolute 0 05 Thousands/µL                  No orders to display              Procedures  ECG 12 Lead Documentation  Date/Time: 5/18/2018 7:42 AM  Performed by: Daniel Galicia  Authorized by: Daniel Galicia     Indications / Diagnosis:  Cellulitis  ECG reviewed by me, the ED Provider: yes    Patient location:  ED  Interpretation:     Interpretation: abnormal    Rate:     ECG rate:  63    ECG rate assessment: normal    Rhythm:     Rhythm: paced    Pacing:     Capture:  Complete    Type of pacing:  Ventricular  Ectopy:     Ectopy: none             Phone Contacts  ED Phone Contact    ED Course                               MDM  Number of Diagnoses or Management Options  Diagnosis management comments: Patient has been compliant with blood thinners and in fact was even on aspirin as well  Her EKG is paced the 100% capture  I suspect the ulcers from unknown trauma as opposed to a embolic phenomenon    CritCare Time    Disposition  Final diagnoses:   Cellulitis of great toe     Time reflects when diagnosis was documented in both MDM as applicable and the Disposition within this note     Time User Action Codes Description Comment    5/18/2018 10:08 AM Stanislaw HOOD Add [L03 039] Cellulitis of great toe       ED Disposition     ED Disposition Condition Comment    Discharge  Galo Celaya discharge to home/self care  Condition at discharge: Stable        Follow-up Information     Follow up With Specialties Details Why Αμαλίας MD [facility], Family [facility] Schedule an appointment as soon as possible for a visit As needed [address] [place] [address]  [phone]          Patient's Medications   Discharge Prescriptions CEPHALEXIN (KEFLEX) 500 MG CAPSULE    Take 1 capsule (500 mg total) by mouth every 6 (six) hours for 10 days       Start Date: 5/18/2018 End Date: 5/28/2018       Order Dose: 500 mg       Quantity: 40 capsule    Refills: 0    SULFAMETHOXAZOLE-TRIMETHOPRIM (BACTRIM DS) 800-160 MG PER TABLET    Take 1 tablet by mouth every 12 (twelve) hours for 10 days       Start Date: 5/18/2018 End Date: 5/28/2018       Order Dose: 1 tablet       Quantity: 20 tablet    Refills: 0     No discharge procedures on file      ED Provider  Electronically Signed by           Valentine Matias MD  05/18/18 3520

## 2018-05-18 NOTE — ED NOTES
Discharge instructions reviewed with patient  Patient states understanding  Patient discharged to home       Terrence Hall RN  05/18/18 8211

## 2018-05-21 ENCOUNTER — VBI (OUTPATIENT)
Dept: ADMINISTRATIVE | Facility: OTHER | Age: 68
End: 2018-05-21

## 2018-05-21 DIAGNOSIS — E78.5 DYSLIPIDEMIA: Primary | ICD-10-CM

## 2018-05-21 RX ORDER — SIMVASTATIN 20 MG
TABLET ORAL
Qty: 90 TABLET | Refills: 2 | Status: SHIPPED | OUTPATIENT
Start: 2018-05-21 | End: 2019-02-20 | Stop reason: SDUPTHER

## 2018-05-23 DIAGNOSIS — I48.20 CHRONIC ATRIAL FIBRILLATION (HCC): Primary | ICD-10-CM

## 2018-05-23 RX ORDER — RIVAROXABAN 15 MG/1
TABLET, FILM COATED ORAL
Qty: 90 TABLET | Refills: 0 | Status: SHIPPED | OUTPATIENT
Start: 2018-05-23 | End: 2018-11-14 | Stop reason: SDUPTHER

## 2018-05-29 ENCOUNTER — PATIENT OUTREACH (OUTPATIENT)
Dept: OTHER | Facility: HOSPITAL | Age: 68
End: 2018-05-29

## 2018-05-29 NOTE — PROGRESS NOTES
Hallie Al is new to [place]  she has hx of two CABG surgeries, DM II with insulin use, cholecystectomy, paralyzed vocal cord and cellulitis of foot requiring IV antibiotics  This last DX has caused latest ER admission  She had an inpatient stay for CHF 2/16/18 in which she told staff she would like to find a local PCP  She was given Dr Diop Dad name but never followed up with her  She checks glucose 4x daily, follows with endo and has hyperglycemia  She also logs daily weights, pulse and BP  We review the yellow zones of CHF and COPD booklets  I explain [facility] hours, on call policy and Care Management services  She is agreeable to calls  I transfer her to [facility] to make her first appointment

## 2018-05-31 ENCOUNTER — FOLLOW UP (OUTPATIENT)
Dept: URBAN - METROPOLITAN AREA CLINIC 27 | Facility: CLINIC | Age: 68
End: 2018-05-31

## 2018-05-31 DIAGNOSIS — H40.053: ICD-10-CM

## 2018-05-31 DIAGNOSIS — E11.3413: ICD-10-CM

## 2018-05-31 PROCEDURE — 92012 INTRM OPH EXAM EST PATIENT: CPT | Mod: 25

## 2018-05-31 PROCEDURE — 67028 INJECTION EYE DRUG: CPT

## 2018-05-31 PROCEDURE — 92134 CPTRZ OPH DX IMG PST SGM RTA: CPT

## 2018-05-31 PROCEDURE — PFS3 LUCENTIS 0.3MG PREFILLED SYRINGE

## 2018-05-31 ASSESSMENT — VISUAL ACUITY
OD_CC: 20/30
OS_CC: CF 4FT

## 2018-05-31 ASSESSMENT — TONOMETRY
OS_IOP_MMHG: 21
OD_IOP_MMHG: 16

## 2018-06-13 ENCOUNTER — PATIENT OUTREACH (OUTPATIENT)
Dept: OTHER | Facility: HOSPITAL | Age: 68
End: 2018-06-13

## 2018-06-13 NOTE — PROGRESS NOTES
[address]  SHE C/O PEELING SKIN ON HANDS/FEET  IT IS WORSENING  SHE DOES NOT HAVE PAIN OR ITCHING  SHE HAS JUST FINISHED AN ANTIBIOTIC REGIMEN FOR CELLULITS  SHE HAS PCP APPOINTMENT NEXT WEEK, NEW PATIENT, TO ESTABLISH CARE  SHE REFUSES TO SEE IF THIS CAN BE MOVED UP AS SHE HAS PODIATRY APT ON 6/15 AND ENDOCRINE ON 6/18  I ENCOURAGE HER TO CALL PCP IF CONDITION WORSENS

## 2018-06-15 LAB — HBA1C MFR BLD HPLC: 7.8 %

## 2018-06-19 ENCOUNTER — PATIENT OUTREACH (OUTPATIENT)
Dept: OTHER | Facility: HOSPITAL | Age: 68
End: 2018-06-19

## 2018-06-19 NOTE — PROGRESS NOTES
Kim Sullivan returns my call  She has seen Dr Zuri Olivares, podiatrist, who has determined the skin peeling on hands/feet is from Vancomycin used to treat cellulitis  Zaire Friday did not go to endo apt  I encourage he to reschedule  She will start care with Dr Von Montilla 6/20  I encourage her to wear shoes, especially around her dogs, inspect her feet daily  I encourage her to call PCP office any time with issues  She is checking glucose 4x daily  Average is 130  She does say night time levels can reach 200  She describes insulin doseage of 1 unit/ 4 grams carbs and I unit for each 30 points over 150  We again discuss carb servings, snack options

## 2018-06-20 ENCOUNTER — OFFICE VISIT (OUTPATIENT)
Dept: FAMILY MEDICINE CLINIC | Facility: CLINIC | Age: 68
End: 2018-06-20
Payer: MEDICARE

## 2018-06-20 VITALS
SYSTOLIC BLOOD PRESSURE: 136 MMHG | WEIGHT: 240 LBS | DIASTOLIC BLOOD PRESSURE: 66 MMHG | HEART RATE: 84 BPM | TEMPERATURE: 97 F | HEIGHT: 63 IN | BODY MASS INDEX: 42.52 KG/M2 | RESPIRATION RATE: 16 BRPM

## 2018-06-20 DIAGNOSIS — I48.20 CHRONIC ATRIAL FIBRILLATION (HCC): ICD-10-CM

## 2018-06-20 DIAGNOSIS — Z79.4 TYPE 2 DIABETES MELLITUS WITH DIABETIC NEUROPATHY, WITH LONG-TERM CURRENT USE OF INSULIN (HCC): ICD-10-CM

## 2018-06-20 DIAGNOSIS — I50.43 ACUTE ON CHRONIC COMBINED SYSTOLIC AND DIASTOLIC CHF (CONGESTIVE HEART FAILURE) (HCC): Primary | ICD-10-CM

## 2018-06-20 DIAGNOSIS — R42 DIZZINESS: ICD-10-CM

## 2018-06-20 DIAGNOSIS — E11.40 TYPE 2 DIABETES MELLITUS WITH DIABETIC NEUROPATHY, WITH LONG-TERM CURRENT USE OF INSULIN (HCC): ICD-10-CM

## 2018-06-20 DIAGNOSIS — I50.40 COMBINED SYSTOLIC AND DIASTOLIC CONGESTIVE HEART FAILURE, UNSPECIFIED HF CHRONICITY (HCC): ICD-10-CM

## 2018-06-20 DIAGNOSIS — M19.90 ARTHRITIS: ICD-10-CM

## 2018-06-20 PROBLEM — I27.20 MILD PULMONARY HYPERTENSION (HCC): Status: ACTIVE | Noted: 2017-03-16

## 2018-06-20 PROCEDURE — 99204 OFFICE O/P NEW MOD 45 MIN: CPT | Performed by: INTERNAL MEDICINE

## 2018-06-20 RX ORDER — TRAMADOL HYDROCHLORIDE 50 MG/1
50 TABLET ORAL EVERY 6 HOURS PRN
Qty: 30 TABLET | Refills: 0 | Status: SHIPPED | OUTPATIENT
Start: 2018-06-20 | End: 2020-02-20 | Stop reason: SDUPTHER

## 2018-06-20 NOTE — ASSESSMENT & PLAN NOTE
She will continue her medications, urged continued daily weights  No symptoms currently of decompensated CHF

## 2018-06-20 NOTE — ASSESSMENT & PLAN NOTE
Escribed tramadol to her pharmacy to try this instead of oxycodone  Advised on possible side effects and she will use only PRN

## 2018-06-20 NOTE — ASSESSMENT & PLAN NOTE
Lab Results   Component Value Date    HGBA1C 8 3 (H) 03/12/2018       No results for input(s): POCGLU in the last 72 hours  Blood Sugar Average: Last 72 hrs:     She sees Dr Daniel Junior regularly and will have recent labs faxed to the office  Continue current medications  Referred to optmae  Sees podiatry regularlly

## 2018-06-20 NOTE — PROGRESS NOTES
Subjective:      Patient ID: Ale Bello is a 70 y o  female  Chief Complaint   Patient presents with    initial visit     follow up for medication use--radha       NP, moved up from [facility], needs a new PCP  Sees Dr Jim Dias for DM and a Dr Keke Calderon for podiatry in [place]  Up to date with optho and has diabetic retinopathy in both eyes  Has history of CAD and CHF and sees Dr Cano Pulse regularly  Denies dyspnea, orthopnea, or PND currently  Will get intermittent edema and does weigh herself at least once daily  Has an ENT due to history of L vocal cord paralysis and he is also seeing her for some dizziness  He gave her a referral for the [address] [place] [address]  Has a lot of intermittent pain beneath bra strap, was told nerve damage from her previous CABG  Previously had rx for oxycodone  Would like to know what she can take for intermittent use  The following portions of the patient's history were reviewed and updated as appropriate: allergies, current medications, past family history, past medical history, past social history, past surgical history and problem list     Review of Systems   Constitutional: Negative  Respiratory: Negative  Cardiovascular: Negative  Musculoskeletal:        As per HPI  Current Outpatient Prescriptions   Medication Sig Dispense Refill    ACCU-CHEK CAITLIN PLUS test strip USE TO TEST 4 TIMES DAILY  3    B-D UF III MINI PEN NEEDLES 31G X 5 MM MISC USE 4 TIMES A DAY  3    furosemide (LASIX) 80 mg tablet Take 80 mg by mouth daily        insulin aspart (NovoLOG) 100 units/mL injection Inject 10 Units under the skin 3 (three) times a day before meals        insulin degludec (TRESIBA) injection pen 100 units/mL Inject 20 Units under the skin daily at bedtime      levothyroxine (SYNTHROID) 137 mcg tablet Take 137 mcg by mouth daily        metolazone (ZAROXOLYN) 2 5 mg tablet Take 1 tablet (2 5 mg total) by mouth 2 (two) times a week 10 tablet 3    omeprazole (PriLOSEC) 20 mg delayed release capsule Take 20 mg by mouth 3 (three) times a week        potassium chloride (K-DUR,KLOR-CON) 10 mEq tablet Take 1 tablet (10 mEq total) by mouth 2 (two) times a week 15 tablet 3    simvastatin (ZOCOR) 20 mg tablet TAKE 1 TABLET (20MG) BY ORAL ROUTE EVERY DAY IN THE EVENING 90 tablet 2    valsartan (DIOVAN) 80 mg tablet Take 80 mg by mouth daily   XARELTO 15 MG tablet TAKE 1 TAB BY MOUTH DAILY WITH EVENEING MEAL  90 tablet 0    traMADol (ULTRAM) 50 mg tablet Take 1 tablet (50 mg total) by mouth every 6 (six) hours as needed for moderate pain 30 tablet 0     No current facility-administered medications for this visit  Objective:    /66   Pulse 84   Temp (!) 97 °F (36 1 °C)   Resp 16   Ht 5' 3" (1 6 m)   Wt 109 kg (240 lb)   LMP  (LMP Unknown)   BMI 42 51 kg/m²        Physical Exam   Constitutional: She appears well-developed and well-nourished  obese   Eyes: Conjunctivae are normal    Neck: Neck supple  No JVD present  No thyromegaly present  Cardiovascular: Normal rate, normal heart sounds and intact distal pulses  An irregularly irregular rhythm present  Exam reveals no gallop and no friction rub  Pulses are no weak pulses  No murmur heard  Pulses:       Dorsalis pedis pulses are 1+ on the right side, and 1+ on the left side  Pulmonary/Chest: Effort normal and breath sounds normal  She has no wheezes  She has no rales  Abdominal: Soft  Bowel sounds are normal  She exhibits no distension  There is no tenderness  Musculoskeletal: She exhibits edema  Trace bilateral brawny edema with hyperpigmentation  Feet:   Right Foot:   Skin Integrity: Positive for erythema  Negative for ulcer, skin breakdown, warmth, callus or dry skin  Left Foot:   Skin Integrity: Positive for erythema  Negative for ulcer, skin breakdown, warmth, callus or dry skin  Skin: Skin is warm and dry  No rash noted         Patient's shoes and socks removed  Right Foot/Ankle   Right Foot Inspection  Skin Exam: skin normal, skin intact and erythema no dry skin, no warmth, no callus, no maceration, no abnormal color, no pre-ulcer, no ulcer and no callus                          Toe Exam: erythemano swelling, no tenderness and  no right toe deformity  Sensory     Proprioception: intact   Monofilament testing: intact  Vascular  Capillary refills: < 3 seconds  The right DP pulse is 1+  Left Foot/Ankle  Left Foot Inspection  Skin Exam: skin normal, skin intact and erythemano dry skin, no warmth, no maceration, normal color, no pre-ulcer, no ulcer and no callus                         Toe Exam: erythemano swelling, no tenderness and no left toe deformity                   Sensory     Proprioception: intact  Monofilament: intact  Vascular  Capillary refills: < 3 seconds  The left DP pulse is 1+  Assign Risk Category:  No deformity present; No loss of protective sensation; No weak pulses       Risk: 0      Assessment/Plan:    Arthritis  Escribed tramadol to her pharmacy to try this instead of oxycodone  Advised on possible side effects and she will use only PRN  Chronic atrial fibrillation (HCC)  Rate controlled and is up to date with appointments with cardiology (Dr Corinne Guard)  Combined systolic and diastolic congestive heart failure ([facility] )  She will continue her medications, urged continued daily weights  No symptoms currently of decompensated CHF  Type 2 diabetes mellitus with diabetic neuropathy, with long-term current use of insulin ([facility])  Lab Results   Component Value Date    HGBA1C 8 3 (H) 03/12/2018       No results for input(s): POCGLU in the last 72 hours  Blood Sugar Average: Last 72 hrs:     She sees Dr Zeeshan Ricks regularly and will have recent labs faxed to the office  Continue current medications  Referred to optho  Sees podiatry regularlly         Diagnoses and all orders for this visit:    Acute on chronic combined systolic and diastolic CHF (congestive heart failure) (HCC)    Chronic atrial fibrillation (HCC)    Combined systolic and diastolic congestive heart failure, unspecified HF chronicity ([facility] )    Dizziness  -     Ambulatory referral to Physical Therapy; Future    Arthritis  -     traMADol (ULTRAM) 50 mg tablet; Take 1 tablet (50 mg total) by mouth every 6 (six) hours as needed for moderate pain    Type 2 diabetes mellitus with diabetic neuropathy, with long-term current use of insulin ([facility] )          Return in about 6 months (around 12/20/2018)         Norma Rogel MD

## 2018-06-21 DIAGNOSIS — I50.32 CHRONIC DIASTOLIC HEART FAILURE (HCC): Primary | ICD-10-CM

## 2018-06-22 RX ORDER — FUROSEMIDE 80 MG
TABLET ORAL
Qty: 30 TABLET | Refills: 5 | Status: SHIPPED | OUTPATIENT
Start: 2018-06-22 | End: 2018-12-22 | Stop reason: SDUPTHER

## 2018-07-02 ENCOUNTER — EVALUATION (OUTPATIENT)
Dept: PHYSICAL THERAPY | Facility: CLINIC | Age: 68
End: 2018-07-02
Payer: MEDICARE

## 2018-07-02 DIAGNOSIS — R42 DIZZINESS: ICD-10-CM

## 2018-07-02 DIAGNOSIS — R26.9 GAIT ABNORMALITY: Primary | ICD-10-CM

## 2018-07-02 PROCEDURE — G8979 MOBILITY GOAL STATUS: HCPCS | Performed by: PHYSICAL THERAPIST

## 2018-07-02 PROCEDURE — G8978 MOBILITY CURRENT STATUS: HCPCS | Performed by: PHYSICAL THERAPIST

## 2018-07-02 PROCEDURE — 97163 PT EVAL HIGH COMPLEX 45 MIN: CPT | Performed by: PHYSICAL THERAPIST

## 2018-07-02 NOTE — PROGRESS NOTES
PT Evaluation     Today's date: 2018  Patient name: Armand Solano  : 1950  MRN: [id number]  Referring provider: Abelardo Becerril MD  Dx:   Encounter Diagnosis     ICD-10-CM    1  Dizziness R42 Ambulatory referral to Physical Therapy                  Assessment  Impairments: activity intolerance, impaired balance, impaired physical strength, safety issue and weight-bearing intolerance    Assessment details: Patient is 70year old female who arrived to PT ambulating with quad cane  Patient presented with decreased overall LE strength and endurance as per 5xSTS(21 87sec) and 6MWT (745ft) as compared to age matched norms  Decrease in gait speed was noted nursing home through 6MWT  Patient presents as high risk for falls as per Glenny Presto (44/56) and TUG (22 89sec) as compared to age matched norms  Patient presents with challenges with walking in the community, navigate stairs and walking on uneven terrain  Patient will benefit from skilled PT to address deficits in order to decrease risk for fall and enhance mobility to highest level  Understanding of Dx/Px/POC: good   Prognosis: good    Goals  Goals  STG 30 days    Patient will achieve 300 feet improvement with overall distance achieved of 1045 feet with 6 minute walk test which is Minimal Detectable Change pre current research standards with endurance to demonstrate enhance functional capacity   Patient will display 2 3  second improvement with overall score of 20 59 minute  with TUG test or lower with noted improvement being Minimal Detectable Change pre current research standards with fall risk  Patient will achieve 50/56 PRATT score with minimal improve by 6 points or more demonstrating Minimal Detectable change per current research standards for this objective test which assess fall risk  Patient will achieve   59 ft/sec improvement with score of  2 9ft/sec with 10 Meter Walk test, demonstrating Minimal Detectable change per current research standards for this objective test which assess fall risk     Patient will be able to perform sit to stand without physical assistance       LT days   Patient will score low risk for falls with 3/4 fall risk measures   Patient will be able to ambulate 1300 feet with/without AD during 6 minute walk test   Patient will display 5 x sit to stand test score of 13 seconds   Patient will be able to ambulate outdoors without any loss of balance   Patient will report reduction in near falls to 2 times weekly by DC       Cut off score   All date taken from [facility] Section or Rehab Measures    PRATT test: 46/56                                              5 x STS Test:  MDC: 6 points                                                  MDC: 2 3 seconds   age norms                                                                 Age Norms   61-76 year old = M: 54, F: 54                        61-76 year old: 11 4 seconds   66-77 year old = M 47,  F: 48                       66-77 year old: 12 6 seconds     80-80 year old = M46,   F: 53                       80-80 year old: 14 8 seconds      TUG test:                                                                     10 Meter Walk Test:  MDC: 4 14 seconds       MDC:  59 ft/sec  Cut off score for Falls                                                  Age Norms  > 13 5 seconds community dwelling adults                20-29; M: 4 56 ft/sec F: 4 62 ft/sec  > 32 2 Frail Elderly                                                     30-39: M 4 76 ft/sec  F: 4 68 ft/sec          40-49: M: 4 79 ft/sec  F: 4 62 ft/sec  6 Minute Walk Test      50-59: M: 4 76 ft/sec  F: 4 56 ft/sec  MDC: 190 feet       60-69: M: 4 56 ft/sec  F: 4 26 ft/sec  Age Norms       70-+    M: 4 36 ft/sec  F: 4 16 ft/sec  60-69:    M: 1876 F: 1765  70-79:    M: 1729 F: 1545  80-89 +: M: 4910 F; 1286     Plan  Planned therapy interventions: manual therapy, balance, balance/weight bearing training, neuromuscular re-education, strengthening, graded activity, gait training, graded exercise, graded motor, home exercise program, therapeutic exercise and patient education  Frequency: 2x week  Duration in weeks: 12  Treatment plan discussed with: patient        Subjective Evaluation    History of Present Illness  Mechanism of injury: Patient reports that she feels unsteady when walking in the community  Patient reports that she uses the quad cane to ambulate in the community however does not use the AD when in the house  Patient reports that does not feel confident walking down ramps or with stairs  Her goal is to become more comfortable with walking       Quality of life: good    Pain  Current pain ratin  At best pain ratin  At worst pain ratin    Social Support  Steps to enter house: yes  6  Stairs in house: no   Lives in: [facility]  Lives with: alone    Employment status: not working  Hand dominance: right    Treatments  Previous treatment: physical therapy  Patient Goals  Patient goal: be more confortable with walking         Objective     Strength/Myotome Testing     Left Hip   Planes of Motion   Flexion: 4-  Extension: 4-  Abduction: 4-  Adduction: 4    Right Hip   Planes of Motion   Flexion: 4  Extension: 4-  Abduction: 4-  Adduction: 4    Left Knee   Flexion: 4-  Extension: 4    Right Knee   Flexion: 4-  Extension: 4    Functional Assessment     Comments  Gait speed: 10/14 31= 0 69 m/s = 2 31ft/sec  Day 44/56  5xSTS 21 87sec  30sec STS 7rep  TU 89 sec No AD   6MWT: 745ft quad cane           Precautions: fall    Daily Treatment Diary     Manual                                                                                   Exercise Diary              STS             Standing TE  Flex  Abd  ext             Step taps             Side step Modalities

## 2018-07-05 ENCOUNTER — APPOINTMENT (OUTPATIENT)
Dept: PHYSICAL THERAPY | Facility: CLINIC | Age: 68
End: 2018-07-05
Payer: MEDICARE

## 2018-07-09 ENCOUNTER — APPOINTMENT (OUTPATIENT)
Dept: PHYSICAL THERAPY | Facility: CLINIC | Age: 68
End: 2018-07-09
Payer: MEDICARE

## 2018-07-12 ENCOUNTER — OFFICE VISIT (OUTPATIENT)
Dept: PHYSICAL THERAPY | Facility: CLINIC | Age: 68
End: 2018-07-12
Payer: MEDICARE

## 2018-07-12 DIAGNOSIS — R42 DIZZINESS: Primary | ICD-10-CM

## 2018-07-12 DIAGNOSIS — R26.9 GAIT ABNORMALITY: ICD-10-CM

## 2018-07-12 PROCEDURE — 97110 THERAPEUTIC EXERCISES: CPT | Performed by: PHYSICAL THERAPIST

## 2018-07-12 NOTE — PROGRESS NOTES
Daily Note     Today's date: 2018  Patient name: Kaila Edwards  : 1950  MRN: [id number]  Referring provider: Krystle Carr MD  Dx:   Encounter Diagnosis     ICD-10-CM    1  Dizziness R42    2  Gait abnormality R26 9                   Subjective: Patient reports that she needed assistance when walking to the clinic from her car  She reports that she got anxious walking over due to the ramps and curbs  Patient reported that she was sick last week with "stomach bug" but feels better today  Objective: See treatment diary below      Precautions: fall    Daily Treatment Diary     Manual                                                                                   Exercise Diary              STS 15 reps   2 UE             Standing TE  Flex  Abd  ext 15 reps   2 UE             Step taps 15 reps             Side step              Hurdles  6"   reciprocal   1 UE   FWD   LAT - 1 UE             FT EC foam  30 secon x 2 set             minisquats  15 reps                                                                                                                                                                                          Modalities                                                               Assessment: Patient tolerated session with focus on maintain stability  Decreased graded control noted due to LE weakness  Decreased endurance noted as per frequent rests  Patient presents with fear of falling and requires assistance when walking out to her car  Patient will continue to benefit from skilled PT to decrease fear of falling, increase LE strength and decrease risk for falls

## 2018-07-16 ENCOUNTER — PATIENT OUTREACH (OUTPATIENT)
Dept: OTHER | Facility: HOSPITAL | Age: 68
End: 2018-07-16

## 2018-07-19 ENCOUNTER — OFFICE VISIT (OUTPATIENT)
Dept: PHYSICAL THERAPY | Facility: CLINIC | Age: 68
End: 2018-07-19
Payer: MEDICARE

## 2018-07-19 ENCOUNTER — FOLLOW UP (OUTPATIENT)
Dept: URBAN - METROPOLITAN AREA CLINIC 27 | Facility: CLINIC | Age: 68
End: 2018-07-19

## 2018-07-19 DIAGNOSIS — R26.9 GAIT ABNORMALITY: ICD-10-CM

## 2018-07-19 DIAGNOSIS — E11.3413: ICD-10-CM

## 2018-07-19 DIAGNOSIS — H40.053: ICD-10-CM

## 2018-07-19 DIAGNOSIS — R42 DIZZINESS: Primary | ICD-10-CM

## 2018-07-19 PROCEDURE — 92014 COMPRE OPH EXAM EST PT 1/>: CPT | Mod: 25

## 2018-07-19 PROCEDURE — 92134 CPTRZ OPH DX IMG PST SGM RTA: CPT

## 2018-07-19 PROCEDURE — 97112 NEUROMUSCULAR REEDUCATION: CPT | Performed by: PHYSICAL THERAPIST

## 2018-07-19 PROCEDURE — 67028 INJECTION EYE DRUG: CPT

## 2018-07-19 PROCEDURE — PFS3 LUCENTIS 0.3MG PREFILLED SYRINGE

## 2018-07-19 ASSESSMENT — VISUAL ACUITY
OD_CC: 20/80
OS_CC: CF 5FT

## 2018-07-19 ASSESSMENT — TONOMETRY
OD_IOP_MMHG: 26
OS_IOP_MMHG: 28

## 2018-07-19 NOTE — PROGRESS NOTES
Daily Note     Today's date: 2018  Patient name: Yelena Mullins  : 1950  MRN: [id number]  Referring provider: Claire Schroeder MD  Dx:   Encounter Diagnosis     ICD-10-CM    1  Dizziness R42    2  Gait abnormality R26 9                   Subjective: Patient reports that she needed assistance when walking to the clinic from her car  She reports that she got anxious walking over due to the ramps and curbs  Patient reported that she was sick last week with "stomach bug" but feels better today  Objective: See treatment diary below      Precautions: fall    Daily Treatment Diary     Manual                                                                                   Exercise Diary       STS 15 reps   2 UE  10 reps, 2 sets, UE knees  And 7 reps form arm rest      Standing TE  Flex  Abd  ext 15 reps   2 UE  20 reps, 3 sec hold  End range   UE support      Step taps 15 reps       Side step        Hurdles  6"   reciprocal   1 UE   FWD   LAT - 1 UE       FT EC foam  30 secon x 2 set       minisquats  15 reps       Toe raises   20 reps 3 sec hold UE      Heel raises   20 reps 3 sec hold UE      Gait training with SPC   Amb from parking lot to clinic and back out  150 feet , 2 sets  Modalities                                                               Assessment:   Focus of therapy session on reviewing and issuing HEP consisting of exercises in flow sheet  Education for co-contraction of proximal hip musculature to improve neuro recruitment with isometric hold and Kegel  Patient was able to stand with pushing up from knee and conducted 1 rep with sit to stand without UE support  Patient continues to require UE support and anxious to reduce support due to fear of falling  Patient will continue to benefit from skilled PT     PLAN:  Addition of step downs and simulation curb step either in clinic or outdoors

## 2018-07-23 ENCOUNTER — OFFICE VISIT (OUTPATIENT)
Dept: PHYSICAL THERAPY | Facility: CLINIC | Age: 68
End: 2018-07-23
Payer: MEDICARE

## 2018-07-23 DIAGNOSIS — R42 DIZZINESS: Primary | ICD-10-CM

## 2018-07-23 DIAGNOSIS — R26.9 GAIT ABNORMALITY: ICD-10-CM

## 2018-07-23 PROCEDURE — 97112 NEUROMUSCULAR REEDUCATION: CPT | Performed by: PHYSICAL THERAPIST

## 2018-07-23 PROCEDURE — 97110 THERAPEUTIC EXERCISES: CPT | Performed by: PHYSICAL THERAPIST

## 2018-07-23 NOTE — PROGRESS NOTES
Daily Note     Today's date: 2018  Patient name: Fina Rios  : 1950  MRN: [id number]  Referring provider: Suri Wood MD  Dx:   Encounter Diagnosis     ICD-10-CM    1  Dizziness R42    2  Gait abnormality R26 9                   Subjective:Patient reports that she has nothing new to report to PT  Objective: See treatment diary below    Precautions: fall    Daily Treatment Diary     Manual                                                                                   Exercise Diary      STS 15 reps   2 UE  10 reps, 2 sets, UE knees  And 7 reps form arm rest  20 reps   No UE     Standing TE  Flex  Abd  ext 15 reps   2 UE  20 reps, 3 sec hold  End range   UE support  On foam   20 reps, 3 iso hold   2 UE support     Step taps 15 reps       Side step        Hurdles  6"   reciprocal   1 UE   FWD   LAT - 1 UE       FT EC foam  30 secon x 2 set   30 secon x 2 set     minisquats  15 reps       Toe raises   20 reps 3 sec hold UE      Heel raises   20 reps 3 sec hold UE      Gait training with SPC   Amb from parking lot to clinic and back out  150 feet , 2 sets  70ft close supervision    Step ups    4" and 6" step with foam in between   No UE   5 cycles FWD   5 cycles LAT                                                                       Modalities                                                               Assessment: Patient demonstrated the ability to perform exercises without use of UE support  Educated patient on use of cane and re-sized for patient  Verbal cueing required to increase graded control when negotiating steps and curb  Patient demonstrated the ability to perform STS without UE support  Patient continues to require UE support and anxious to reduce support due to fear of falling   Patient will continue to benefit from skilled PT     Plan: continue with POC     Ambulation with [facility] with obstacles/curb

## 2018-07-26 ENCOUNTER — OFFICE VISIT (OUTPATIENT)
Dept: PHYSICAL THERAPY | Facility: CLINIC | Age: 68
End: 2018-07-26
Payer: MEDICARE

## 2018-07-26 DIAGNOSIS — R26.9 GAIT ABNORMALITY: ICD-10-CM

## 2018-07-26 DIAGNOSIS — R42 DIZZINESS: Primary | ICD-10-CM

## 2018-07-26 PROCEDURE — 97112 NEUROMUSCULAR REEDUCATION: CPT

## 2018-07-26 PROCEDURE — 97110 THERAPEUTIC EXERCISES: CPT

## 2018-07-26 NOTE — PROGRESS NOTES
Daily Note     Today's date: 2018  Patient name: Yelena Mullins  : 1950  MRN: [id number]  Referring provider: Claire Schroeder MD  Dx:   Encounter Diagnosis     ICD-10-CM    1  Dizziness R42    2  Gait abnormality R26 9        Start Time: 945  Stop Time: 1030  Total time in clinic (min): 45 minutes    Subjective:  Pt reports no pain today and no recent medical updates  Pt c/o most diffclty with ramps  Objective: See treatment diary below    Precautions: fall    Daily Treatment Diary     Manual                                                                                   Exercise Diary  18   STS 15 reps   2 UE  10 reps, 2 sets, UE knees  And 7 reps form arm rest  20 reps   No UE  10 reps, 9 reps,    Standing TE  Flex  Abd  ext 15 reps   2 UE  20 reps, 3 sec hold  End range   UE support  On foam   20 reps, 3 iso hold   2 UE support  20 reps, 3 sec iso hold, 2 UE   Step taps 15 reps       Side step        Hurdles  6"   reciprocal   1 UE   FWD   LAT - 1 UE       FT FOAM HT    EO, 10 turns, LOB= 3   FT EC foam  30 secon x 2 set   30 secon x 2 set  60 sec, 2 sets   minisquats  15 reps       Toe raises   20 reps 3 sec hold UE      Heel raises   20 reps 3 sec hold UE      Gait training with SPC   Amb from parking lot to clinic and back out  150 feet , 2 sets  70ft close supervision    Step ups    4" and 6" step with foam in between   No UE   5 cycles FWD   5 cycles LAT Foam to 6" step   1 UE   20 reps,   FWD  LAT                                                                      Modalities                                                               Assessment: Pt unable to perform step up exercise on foam without UE support today  Good static balance noted on foam  Multiple LOB with EC  Regressed to EO with HT to improve vestibular function  Two UE support for standing therapeutic exercise  Consider progressing to 1 UE   Pt improving with sit to stand transfer with 1 failed attempt noted today  Patient continues to require UE support and anxious to reduce support due to fear of falling  Patient will continue to benefit from skilled PT     Plan: continue with POC

## 2018-07-27 LAB
LEFT EYE DIABETIC RETINOPATHY: NORMAL
RIGHT EYE DIABETIC RETINOPATHY: NORMAL
SEVERITY (EYE EXAM): NORMAL

## 2018-07-30 ENCOUNTER — APPOINTMENT (OUTPATIENT)
Dept: PHYSICAL THERAPY | Facility: CLINIC | Age: 68
End: 2018-07-30
Payer: MEDICARE

## 2018-08-02 ENCOUNTER — PATIENT OUTREACH (OUTPATIENT)
Dept: OTHER | Facility: HOSPITAL | Age: 68
End: 2018-08-02

## 2018-08-02 ENCOUNTER — OFFICE VISIT (OUTPATIENT)
Dept: PHYSICAL THERAPY | Facility: CLINIC | Age: 68
End: 2018-08-02
Payer: MEDICARE

## 2018-08-02 DIAGNOSIS — R26.9 GAIT ABNORMALITY: ICD-10-CM

## 2018-08-02 DIAGNOSIS — R42 DIZZINESS: Primary | ICD-10-CM

## 2018-08-02 PROCEDURE — G8979 MOBILITY GOAL STATUS: HCPCS | Performed by: PHYSICAL THERAPIST

## 2018-08-02 PROCEDURE — G8978 MOBILITY CURRENT STATUS: HCPCS | Performed by: PHYSICAL THERAPIST

## 2018-08-02 PROCEDURE — 97112 NEUROMUSCULAR REEDUCATION: CPT | Performed by: PHYSICAL THERAPIST

## 2018-08-02 NOTE — PROGRESS NOTES
PT Re-Evaluation     Today's date: 2018  Patient name: Evette Peña  : 1950  MRN: [id number]  Referring provider: Khadijah Rivers MD  Dx:   Encounter Diagnosis     ICD-10-CM    1  Dizziness R42    2  Gait abnormality R26 9                   Assessment  Impairments: activity intolerance, impaired balance, impaired physical strength, safety issue and weight-bearing intolerance    Assessment details: Patient is 70year old female who arrived to PT ambulating with quad cane  Patient presented with increased overall LE strength and endurance as per 5xSTS(21 87sec to 2 58ft/sec) and 6MWT (745ft to 878ft ) as compared to age matched norms  Patient presents as high risk for falls despite improved scores as per Day (44/56 to 48/56) and TUG (22 89sec to 19 02 without AD and 13  65sec with AD) as compared to age matched norms  Patient presents with challenges with walking in the community, navigate stairs and walking on uneven terrain  Patient will benefit from skilled PT to address deficits in order to decrease risk for fall and enhance mobility to highest level  18    Gait speed: 10/14 31= 0 69 m/s =  2 31ft/sec  2 58ft/sec  Day      44/56   48/56  5xSTS      21 87sec  21 22 sec  30sec STS     7rep   7 reps  TU 89 sec No AD  19 02 no AD; 13 02 AD  6MWT:     745ft quad cane  878 ft  Understanding of Dx/Px/POC: good   Prognosis: good    Goals  Goals  STG 30 days    Patient will achieve 300 feet improvement with overall distance achieved of 1045 feet with 6 minute walk test which is Minimal Detectable Change pre current research standards with endurance to demonstrate enhance functional capacity PARTIALLY MET  Patient will display 2 3  second improvement with overall score of 20 59 minute  with TUG test or lower with noted improvement being Minimal Detectable Change pre current research standards with fall risk   MET  Patient will achieve 50/56 DAY score with minimal improve by 6 points or more demonstrating Minimal Detectable change per current research standards for this objective test which assess fall risk  PARTIALLY MET  Patient will achieve   59 ft/sec improvement with score of  2 9ft/sec with 10 Meter Walk test, demonstrating Minimal Detectable change per current research standards for this objective test which assess fall risk   PARTIALLY MET  Patient will be able to perform sit to stand without physical assistance MET      LT days   Patient will score low risk for falls with 3/4 fall risk measures PARTIALLY MET  Patient will be able to ambulate 1300 feet with/without AD during 6 minute walk test NOT MET  Patient will display 5 x sit to stand test score of 13 seconds NOT MET  Patient will be able to ambulate outdoors without any loss of balance NOT MET  Patient will report reduction in near falls to 2 times weekly by DC PARTIALLY MET      Cut off score   All date taken from [facility] Neuro Section or Rehab Measures    PRATT test: 46/56                                              5 x STS Test:  MDC: 6 points                                                  MDC: 2 3 seconds   age norms                                                                 Age Norms   61-76 year old = M: 54, F: 54                        61-76 year old: 11 4 seconds   66-77 year old = M 47,  F: 48                       66-77 year old: 12 6 seconds     80-80 year old = M46,   F: 48                       80-80 year old: 14 8 seconds      TUG test:                                                                     10 Meter Walk Test:  MDC: 4 14 seconds       MDC:  59 ft/sec  Cut off score for Falls                                                  Age Norms  > 13 5 seconds community dwelling adults                20-29; M: 4 56 ft/sec F: 4 62 ft/sec  > 32 2 Frail Elderly                                                     30-39: M 4 76 ft/sec  F: 4 68 ft/sec          40-49: M: 4 79 ft/sec  F: 4 62 ft/sec  6 Minute Walk Test      50-59: M: 4 76 ft/sec  F: 4 56 ft/sec  MDC: 190 feet       60-69: M: 4 56 ft/sec  F: 4 26 ft/sec  Age Norms       70-+    M: 4 36 ft/sec  F: 4 16 ft/sec  60-69:    M: 1876 F: 3072  89-85:    M: 1729 F: 5449  97-70 +: M: 7824 F; 1286     Plan  Planned therapy interventions: manual therapy, balance, balance/weight bearing training, neuromuscular re-education, strengthening, graded activity, gait training, graded exercise, graded motor, home exercise program, therapeutic exercise and patient education  Frequency: 2x week  Duration in weeks: 12  Treatment plan discussed with: patient        Subjective Evaluation    History of Present Illness  Mechanism of injury: Patient reports she feels more confortable with stairs and has increased walking in the house to increase her endurance  Patient reports she has completed HEP 1x/day  Patient reports she walked down a ramp for the first time in a while  Patient continues to feels unsteady when walking in the community but feels more confident  Patient reports she is having side-effects with an medication which is affecting her vision, ability to drive and itching       Quality of life: good    Pain  Current pain ratin  At best pain ratin  At worst pain ratin    Social Support  Steps to enter house: yes  6  Stairs in house: no   Lives in: [facility]  Lives with: alone    Employment status: not working  Hand dominance: right    Treatments  Previous treatment: physical therapy  Patient Goals  Patient goal: be more confortable with walking         Objective     Strength/Myotome Testing     Left Hip   Planes of Motion   Flexion: 4+  Extension: 4+  Abduction: 4+  Adduction: 4+    Right Hip   Planes of Motion   Flexion: 4+  Extension: 4+  Abduction: 4+  Adduction: 4+    Left Knee   Flexion: 4+  Extension: 4    Right Knee   Flexion: 4+  Extension: 4+    Functional Assessment     Comments       18    Gait speed: 10/14 31= 0 69 m/s = 2 31ft/sec  2 58ft/sec  Day      44/56   48/56  5xSTS      21 87sec  21 22 sec  30sec STS     7rep   7 reps  TU 89 sec No AD  19 02 no AD; 13 02 AD  6MWT:     745ft quad cane  878 ft          Precautions: fall    Daily Treatment Diary     Manual                                                                                   Exercise Diary              STS             Standing TE  Flex  Abd  ext             Step taps             Side step                                                                                                                                                                                                                                  Modalities

## 2018-08-02 NOTE — PROGRESS NOTES
Thelma Whitten HAS BEGUN TO EXPERIENCE BLURRED Ingrid Liner  [address], [address]  THE THOUGHT IS THIS MAY BE A SIDE EFFECT OF TRESIBA  SHE IS NOW TAKING LANTUS 20 MG AT NIGHT  SHE IS NOT DRIVING RELATED TO THIS  SHE HAS RETURN APPOINTMENTS WITH THESE SPECIALISTS TO ASSESS FOR IMPROVEMENT  SHE WILL SEE PODIATRY NEXT WEEK  SHE DENIES OPEN WOUNDS, S/S INFECTION, OR SOB  SHE DOES SEE AN IMPROVEMENT IN [facility] WHEN SHE HAS A SNACK WITH FAT AT NIGHT  SHE IS EXPERIENCING DIABETIC "BURN OUT, BUT IS KEEPING APPOINTMENTS AND TAKING MEDS AS DIRECTED  I WILL CALL HER ONCE MORE TO SEE IF SYMPTOMS HAVE IMPROVED  SHE WOULD LIKE MONTHLY CALLS

## 2018-08-07 ENCOUNTER — OFFICE VISIT (OUTPATIENT)
Dept: PHYSICAL THERAPY | Facility: CLINIC | Age: 68
End: 2018-08-07
Payer: MEDICARE

## 2018-08-07 DIAGNOSIS — R26.9 GAIT ABNORMALITY: ICD-10-CM

## 2018-08-07 DIAGNOSIS — R42 DIZZINESS: Primary | ICD-10-CM

## 2018-08-07 PROCEDURE — 97112 NEUROMUSCULAR REEDUCATION: CPT | Performed by: PHYSICAL THERAPIST

## 2018-08-07 NOTE — PROGRESS NOTES
Daily Note     Today's date: 2018  Patient name: Reyna Cowart  : 1950  MRN: [id number]  Referring provider: Teetee Remy MD  Dx:   Encounter Diagnosis     ICD-10-CM    1  Dizziness R42    2  Gait abnormality R26 9                   Subjective:  Pt reports no issues at this time, notes walking better outdoors  Objective: See treatment diary below    Precautions: fall    Daily Treatment Diary     Manual                                                                                 //  Exercise Diary  18   STS 10 reps   2 UE  10 reps, 2 sets, UE knees  And 7 reps form arm rest  20 reps   No UE  10 reps, 9 reps,    Standing TE  Flex  Abd  ext 15 reps   2 UE  20 reps, 3 sec hold  End range   UE support  On foam   20 reps, 3 iso hold   2 UE support  20 reps, 3 sec iso hold, 2 UE   Step taps 20 reps with SOLO step  Side step        Hurdles  6"   reciprocal   1 UE   FWD   LAT - 1 UE       FT FOAM HT    EO, 10 turns, LOB= 3   FT EC foam    30 secon x 2 set  60 sec, 2 sets   minisquats         Toe raises   20 reps 3 sec hold UE      Heel raises   20 reps 3 sec hold UE      Gait training with SPC   Amb from parking lot to clinic and back out  150 feet , 2 sets  70ft close supervision    Step ups    4" and 6" step with foam in between   No UE   5 cycles FWD   5 cycles LAT Foam to 6" step   1 UE   20 reps,   FWD  LAT   SOLO step walking  FWD, Lateral stepping  80 feet full Jena   2 cycles fwd and lateral       SOLO step step ups 4 inch with foam pad  10 reps R/L                                                            Modalities                                                               Assessment: Focus of session on balance activities without UE support via SOLO step system  Patient had 1 LOB with assistance from PT to correct  Was able to correctly and safely ambulate 300 feet outdoors without loss of balance and increase in speed and amp of step      Patient will continue to benefit from skilled PT     Plan: continue with POC

## 2018-08-08 ENCOUNTER — OFFICE VISIT (OUTPATIENT)
Dept: PHYSICAL THERAPY | Facility: CLINIC | Age: 68
End: 2018-08-08
Payer: MEDICARE

## 2018-08-08 DIAGNOSIS — R26.9 GAIT ABNORMALITY: ICD-10-CM

## 2018-08-08 DIAGNOSIS — R42 DIZZINESS: Primary | ICD-10-CM

## 2018-08-08 PROCEDURE — 97112 NEUROMUSCULAR REEDUCATION: CPT | Performed by: PHYSICAL THERAPIST

## 2018-08-08 PROCEDURE — 97530 THERAPEUTIC ACTIVITIES: CPT | Performed by: PHYSICAL THERAPIST

## 2018-08-08 NOTE — PROGRESS NOTES
Daily Note     Today's date: 2018  Patient name: Evette Peña  : 1950  MRN: [id number]  Referring provider: Khadijah Rivers MD  Dx:   Encounter Diagnosis     ICD-10-CM    1  Dizziness R42    2  Gait abnormality R26 9        Start Time: 1615  Stop Time: 1700  Total time in clinic (min): 45 minutes    Subjective: Patient reports soreness in LE secondary to last PT session  Patient felt lighted and checked blood glucose level 224  Patient reports she ate cherries before visit  Objective: See treatment diary below    Precautions: fall    Daily Treatment Diary     Manual                                                                                 //  Exercise Diary  18   STS 10 reps   2 UE   20 reps   No UE  10 reps, 9 reps,    Standing TE  Flex  Abd  ext 15 reps   2 UE   On foam   20 reps, 3 iso hold   2 UE support  20 reps, 3 sec iso hold, 2 UE   Step taps 20 reps with SOLO step         Side step        Hurdles  6"   reciprocal   1 UE   FWD   LAT - 1 UE  6" x 10 cycles   FWD   LAT      FT FOAM HT    EO, 10 turns, LOB= 3   FT EC foam   30 sec x 4  30 secon x 2 set  60 sec, 2 sets   minisquats         Toe raises        Heel raises        Gait training with [facility]     70ft close supervision    Step ups   4"   8 reps  4" and 6" step with foam in between   No UE   5 cycles FWD   5 cycles LAT Foam to 6" step   1 UE   20 reps,   FWD  LAT   SOLO step walking  FWD, Lateral stepping  80 feet full [place]   2 cycles fwd and lateral  FWD, Lateral stepping  80 feet full [place]   2 cycles fwd and lateral      SOLO step step ups 4 inch with foam pad  10 reps R/L       Cone weaving   8 cones x 4 laps      Agility ladder with foam and hurdles   4 laps                                            Modalities                                                          - all exercises in solo step       Assessment: patient challenged with maintain static balance on foam with EO and EC with posterior sway and apprehension noted  Patient required verbal cueing to increase stride length  Patient was able to negiotate 4 inch curb with continuous gait patter  Patient will continue to benefit from skilled PT     Plan: continue with POC

## 2018-08-15 ENCOUNTER — OFFICE VISIT (OUTPATIENT)
Dept: PHYSICAL THERAPY | Facility: CLINIC | Age: 68
End: 2018-08-15
Payer: MEDICARE

## 2018-08-15 DIAGNOSIS — R26.9 GAIT ABNORMALITY: ICD-10-CM

## 2018-08-15 DIAGNOSIS — R42 DIZZINESS: Primary | ICD-10-CM

## 2018-08-15 PROCEDURE — 97112 NEUROMUSCULAR REEDUCATION: CPT | Performed by: PHYSICAL THERAPIST

## 2018-08-15 NOTE — PROGRESS NOTES
Daily Note     Today's date: 8/15/2018  Patient name: Estephania Stanley  : 1950  MRN: [id number]  Referring provider: Ernestina Talbot MD  Dx:   Encounter Diagnosis     ICD-10-CM    1  Dizziness R42    2  Gait abnormality R26 9                   Subjective: Patient reports that she felt sore after last visit but feels more energized today  Objective: See treatment diary below      Precautions: fall    Daily Treatment Diary     Manual                                                                                 //  Exercise Diary  8/7/18 8/8 8/15 7/26/18   STS 10 reps   2 UE   10 reps  No UE   10 reps, 9 reps,    Standing TE  Flex  Abd  ext 15 reps   2 UE     20 reps, 3 sec iso hold, 2 UE   Step taps 20 reps with SOLO step  Side step        Hurdles  6"   reciprocal   1 UE   FWD   LAT - 1 UE  6" x 10 cycles   FWD   LAT  6" foam   FWD  LAT   BACK  No UE     FT FOAM HT    EO, 10 turns, LOB= 3   FT EC foam   30 sec x 4  30sec x 3  60 sec, 2 sets   minisquats         Toe raises        Heel raises        Gait training with SPC         Step ups   4"   8 reps   Foam to 6" step   1 UE   20 reps,   FWD  LAT   SOLO step walking  FWD, Lateral stepping  80 feet full [place]   2 cycles fwd and lateral  FWD, Lateral stepping  80 feet full [place]   2 cycles fwd and lateral      SOLO step step ups 4 inch with foam pad  10 reps R/L       Cone weaving   8 cones x 4 laps      Agility ladder with foam and hurdles   4 laps     Ball toss on foam    On foam   30 reps   LOB: 7    Ramp incline    FWD   20 reps   1 UE     Ambulation with commands    70 ft x 4 laps                       Modalities                                                          - all exercises in solo step       Assessment:  Patient challlenged with foam based activities with posterior sway noted  Patient able to negiotate hurdles with intermittent use of UE  Patient challenged with ball toss with posterior LOB noted   Patient able to maintain dynamic balance with gait with commands  Patient will continue to benefit from skilled PT     Plan: continue with POC

## 2018-08-16 ENCOUNTER — OFFICE VISIT (OUTPATIENT)
Dept: PHYSICAL THERAPY | Facility: CLINIC | Age: 68
End: 2018-08-16
Payer: MEDICARE

## 2018-08-16 DIAGNOSIS — R42 DIZZINESS: Primary | ICD-10-CM

## 2018-08-16 DIAGNOSIS — R26.9 GAIT ABNORMALITY: ICD-10-CM

## 2018-08-16 PROCEDURE — 97110 THERAPEUTIC EXERCISES: CPT | Performed by: PHYSICAL THERAPIST

## 2018-08-16 PROCEDURE — 97112 NEUROMUSCULAR REEDUCATION: CPT | Performed by: PHYSICAL THERAPIST

## 2018-08-16 NOTE — PROGRESS NOTES
Daily Note     Today's date: 2018  Patient name: Rosa Maria Beckman  : 1950  MRN: [id number]  Referring provider: Lieutenant Annetta MD  Dx:   Encounter Diagnosis     ICD-10-CM    1  Dizziness R42    2  Gait abnormality R26 9                   Subjective: No new complaints       Objective: See treatment diary below      Precautions: fall    Daily Treatment Diary     Manual                                                                                 //  Exercise Diary  8/7/18 8/8 8/15 8/16   STS 10 reps   2 UE   10 reps  No UE   10 reps, no UE   Standing TE  Flex  Abd  ext 15 reps   2 UE        Step taps 20 reps with SOLO step  Side step        Hurdles  6"   reciprocal   1 UE   FWD   LAT - 1 UE  6" x 10 cycles   FWD   LAT  6" foam   FWD  LAT   BACK  No UE     FT FOAM HT       FT EC foam   30 sec x 4  30sec x 3     minisquats         Toe raises        Heel raises        Gait training with SPC         Step ups   4"   8 reps  Foam inbetween (2) 6" steps   No UE   FWD   LAT   No UE  Foam to 6" step   1 UE   20 reps,   FWD  LAT  BACK   SOLO step walking  FWD, Lateral stepping  80 feet full [place]   2 cycles fwd and lateral  FWD, Lateral stepping  80 feet full [place]   2 cycles fwd and lateral      SOLO step step ups 4 inch with foam pad  10 reps R/L       Cone weaving   8 cones x 4 laps      Agility ladder with foam and hurdles   4 laps     Ball toss on foam    On foam   30 reps   LOB: 7    Ramp incline    FWD   20 reps   1 UE  20 reps   No UE    Ambulation with commands    70 ft x 4 laps     Uneven surface mat    5 cycles   FWD   LAT   No UE              Modalities                                                          - all exercises in solo step       Assessment:    Patient tolerated treatment session without UE support throughout session  Patient challenged with foam based activities with posterior sway noted  Patient challenged with decline of ramp   Decreased eccentric quad control noted with step downs  Patient will continue to benefit from skilled PT to increase LE strength, endurance and decrease risk of balance  Plan: continue with POC

## 2018-08-17 ENCOUNTER — APPOINTMENT (OUTPATIENT)
Dept: PHYSICAL THERAPY | Facility: CLINIC | Age: 68
End: 2018-08-17
Payer: MEDICARE

## 2018-08-20 ENCOUNTER — OFFICE VISIT (OUTPATIENT)
Dept: PHYSICAL THERAPY | Facility: CLINIC | Age: 68
End: 2018-08-20
Payer: MEDICARE

## 2018-08-20 DIAGNOSIS — R26.9 GAIT ABNORMALITY: Primary | ICD-10-CM

## 2018-08-20 DIAGNOSIS — R42 DIZZINESS: ICD-10-CM

## 2018-08-20 PROCEDURE — 97530 THERAPEUTIC ACTIVITIES: CPT

## 2018-08-20 PROCEDURE — 97110 THERAPEUTIC EXERCISES: CPT

## 2018-08-20 PROCEDURE — 97112 NEUROMUSCULAR REEDUCATION: CPT

## 2018-08-20 NOTE — PROGRESS NOTES
Daily Note     Today's date: 2018  Patient name: Letha Mejia  : 1950  MRN: [id number]  Referring provider: Woody Huerta MD  Dx:   Encounter Diagnosis     ICD-10-CM    1  Gait abnormality R26 9    2  Dizziness R42        Start Time:   Stop Time:   Total time in clinic (min): 45 minutes    Subjective: Patient reported no updates since last session  No dizziness upon arrival  Patient states that graded surfaces "give my anxiety"         Objective: See treatment diary below      Precautions: Fall Risk    Daily Treatment Diary     Manual                                                                                   Exercise Diary  2018 8/8 8/15 8/16   STS 10 reps performed throughout session   10 reps  No UE   10 reps, no UE   Standing TE  Flex  Abd  ext 20 reps each direction indicated, all performed on foam, 2 UE       Step taps       Side step        Hurdles  Forward, Lateral, Backwards, No UE  6" hurdles, 5 cycles in parallel bars 6" x 10 cycles   FWD   LAT  6" foam   FWD  LAT   BACK  No UE     FT FOAM HT       FT EC foam   30 sec x 4  30sec x 3     minisquats        Toe raises        Heel raises        Gait training with SPC         Step ups  2 consecutive surfaces, foam between each surface, 8" steps, no UE, Forward, Backwards, Sideways, 20 reps each direction  4"   8 reps  Foam inbetween (2) 6" steps   No UE   FWD   LAT   No UE  Foam to 6" step   1 UE   20 reps,   FWD  LAT  BACK   SOLO step walking   FWD, Lateral stepping  80 feet full [place]   2 cycles fwd and lateral      SOLO step step ups 4 inch with foam pad        Cone weaving   8 cones x 4 laps      Agility ladder with foam and hurdles   4 laps     Ball toss on foam    On foam   30 reps   LOB: 7    Ramp incline  Forward and Backwards, no UE, 15% grade, 20 cycles ascending and descending each direction   FWD   20 reps   1 UE  20 reps   No UE    Ambulation with commands    70 ft x 4 laps     Uneven surface mat    5 cycles FWD   LAT   No UE              Modalities                                                         Assessment: Patient tolerated session well, able to complete backward negotiation over hurdles and ascending and descending raised surface with minimal difficulties today, requiring no UE support  Patient continues to demonstrate hesitation with ascending and descending raised surfaces today, no UE required when performing  Patient improving stability on compliant surfaces  Patient will continue to benefit from skilled PT to increase LE strength, endurance and decrease risk of balance  Plan: continue with POC

## 2018-08-23 ENCOUNTER — OFFICE VISIT (OUTPATIENT)
Dept: PHYSICAL THERAPY | Facility: CLINIC | Age: 68
End: 2018-08-23
Payer: MEDICARE

## 2018-08-23 DIAGNOSIS — R26.9 GAIT ABNORMALITY: Primary | ICD-10-CM

## 2018-08-23 DIAGNOSIS — R42 DIZZINESS: ICD-10-CM

## 2018-08-23 PROCEDURE — 97112 NEUROMUSCULAR REEDUCATION: CPT

## 2018-08-23 PROCEDURE — 97110 THERAPEUTIC EXERCISES: CPT

## 2018-08-23 PROCEDURE — 97530 THERAPEUTIC ACTIVITIES: CPT

## 2018-08-23 NOTE — PROGRESS NOTES
Daily Note     Today's date: 2018  Patient name: Ale Bello  : 1950  MRN: [id number]  Referring provider: Albert Kuo MD  Dx:   Encounter Diagnosis     ICD-10-CM    1  Gait abnormality R26 9    2  Dizziness R42        Start Time:   Stop Time:   Total time in clinic (min): 45 minutes    Subjective: Patient reported no updates since last session  No dizziness upon arrival  States that she "feels good today"           Objective: See treatment diary below      Precautions: Fall Risk,   Past Medical History:   Diagnosis Date    Arthritis     Atrial flutter ([place] [address] )     Cardiac disease     Carotid artery occlusion     CHF (congestive heart failure) ([place] [address] )     Cholelithiasis     last assessed 2016    Coronary artery disease     Diabetes mellitus ([place] [address] )     Disease of thyroid gland     GERD (gastroesophageal reflux disease)     History of transfusion     Hyperlipidemia     Hypertension     Long-term insulin use in type 2 diabetes ([place] [address] ) 2016    Other specified diabetes mellitus with diabetic autonomic (poly)neuropathy ([place] [address] )     Renal disorder     Sleep apnea     Umbilical hernia with obstruction, without gangrene     last assessed 2016         Daily Treatment Diary     Manual                                                                                   Exercise Diary  2018 2018 8/15 8/16   STS 10 reps performed throughout session  25 reps consecutively  10 reps  No UE   10 reps, no UE   Standing TE  Flex  Abd  ext 20 reps each direction indicated, all performed on foam, 2 UE 25 reps each direction indicated, all performed on foam, 2 UE support      Step taps  12" taps, standing on foam    Hip Flexion- 20 reps, 3 second holds bilaterally, 1 UE support    Hip Abduction- 20 reps, 3 second holds bilaterally, 1 UE support    Hip Extension- 20 reps, 3 second holds bilaterally, 1 UE support     Side step        Hurdles  Forward, Lateral, Backwards, No UE  6" hurdles, 5 cycles in parallel bars  6" foam   FWD  LAT   BACK  No UE     FT FOAM HT       FT EC foam    30sec x 3     minisquats        Toe raises        Heel raises        Gait training with SPC        Step ups  2 consecutive surfaces, foam between each surface, 8" steps, no UE, Forward, Backwards, Sideways, 20 reps each direction  2 consecutive surfaces, foam between each surface, 12" steps, no UE, Forward, Backwards, Sideways, 20 reps each direction  Foam inbetween (2) 6" steps   No UE   FWD   LAT   No UE  Foam to 6" step   1 UE   20 reps,   FWD  LAT  BACK   SOLO step walking        SOLO step step ups 4 inch with foam pad        Cone weaving        Agility ladder with foam and hurdles        Ball toss on foam    On foam   30 reps   LOB: 7    Ramp incline  Forward and Backwards, no UE, 15% grade, 20 cycles ascending and descending each direction  Forward and Backwards, no UE, 15% grade, 20 cycles ascending and descending each direction  FWD   20 reps   1 UE  20 reps   No UE    Ambulation with commands    70 ft x 4 laps     Uneven surface mat    5 cycles   FWD   LAT   No UE              Modalities                                                         Assessment: Patient tolerated session well, able to progress her step tap heights as well as performance of activities on compliant surfaces  Patient requires 1 UE support for all stepping activities due to her balance deficits and necessities for haptic touch  Progressed step ups today to 12" height today, patient required extensive visual and tactile cuing today from bars in order to improve her stability due to the increased height  Patient was able to complete a full turn on ramp today without UE support  Patient will continue to benefit from skilled PT to increase LE strength, endurance and decrease risk of balance  Plan: continue with POC

## 2018-08-27 ENCOUNTER — OFFICE VISIT (OUTPATIENT)
Dept: PHYSICAL THERAPY | Facility: CLINIC | Age: 68
End: 2018-08-27
Payer: MEDICARE

## 2018-08-27 DIAGNOSIS — R26.9 GAIT ABNORMALITY: Primary | ICD-10-CM

## 2018-08-27 DIAGNOSIS — R42 DIZZINESS: ICD-10-CM

## 2018-08-27 PROCEDURE — 97530 THERAPEUTIC ACTIVITIES: CPT

## 2018-08-27 PROCEDURE — 97112 NEUROMUSCULAR REEDUCATION: CPT

## 2018-08-27 PROCEDURE — 97110 THERAPEUTIC EXERCISES: CPT

## 2018-08-27 NOTE — PROGRESS NOTES
[facility]  Ambulatory Surgery Unit  Pre-operative Instructions for Endo Procedures    Procedure Date  8/8/17            Tentative Arrival Time 1100      1. On the day of your procedure, please report to the Ambulatory Surgery Unit Registration Desk and sign in at your designated time. The Ambulatory Surgery Unit is located in [facility] on the Critical access hospital side of the [facility] across from the [address] [place] [address]. Please have all of your health insurance cards and a photo ID. 2. You must have someone with you to drive you home, as you should not drive a car for 24 hours following anesthesia. Please make arrangements for a responsible adult friend or family member to stay with you for at least the first 24 hours after your procedure. 3. Do not have anything to eat or drink (including water, gum, mints, coffee, juice) after midnight  8/7/17. This may not apply to medications prescribed by your physician. (Please note below the special instructions with medications to take the morning of your procedure.)    4. If applicable, follow the clear liquid diet and bowel prep instructions provided by your physician's office. If you do not have this information, or have any questions, please contact your physician's office. 5. We recommend you do not drink any alcoholic beverages for 24 hours before and after your procedure. 6. Stop all Aspirin, non-steroidal anti-inflammatory drugs (i.e. Advil, Aleve), vitamins, and supplements as directed by your surgeon's office. **If you are currently taking Plavix, Coumadin, or other blood-thinning agents, contact your surgeon for instructions. **    7. In an effort to help prevent surgical site infection, we ask that you shower with an anti-bacterial soap (i.e. Dial or Safeguard) on the morning of your procedure. Do not apply any lotions, powders, or deodorants after showering. 8. Wear comfortable clothes. Wear glasses instead of contacts.  Do not Daily Note     Today's date: 2018  Patient name: Talon Velazquez  : 1950  MRN: [id number]  Referring provider: Farida Butler MD  Dx:   Encounter Diagnosis     ICD-10-CM    1  Gait abnormality R26 9    2  Dizziness R42        Start Time:   Stop Time: [facility]  Total time in clinic (min): 42 minutes    Subjective: Patient reported no updates since last session  No dizziness upon arrival  States that she "feels good today"  [facility]         Objective: See treatment diary below      Precautions: Fall Risk,   Past Medical History:   Diagnosis Date    Arthritis     [facility] flutter ([facility] )     Cardiac disease     Carotid artery occlusion     CHF (congestive heart failure) ([place] [address] )     Cholelithiasis     last assessed 2016    Coronary artery disease     Diabetes mellitus ([place] [address] )     Disease of thyroid gland     GERD (gastroesophageal reflux disease)     History of transfusion     Hyperlipidemia     Hypertension     Long-term insulin use in type 2 diabetes ([place] [address] ) 2016    Other specified diabetes mellitus with diabetic autonomic (poly)neuropathy ([place] [address] )     Renal disorder     Sleep apnea     Umbilical hernia with obstruction, without gangrene     last assessed 2016         Daily Treatment Diary     Manual                                                                                   Exercise Diary  2018   STS 10 reps performed throughout session  25 reps consecutively  25 reps  Consecutively 10 reps, no UE   Standing TE  Flex  Abd  ext 20 reps each direction indicated, all performed on foam, 2 UE 25 reps each direction indicated, all performed on foam, 2 UE support  25 reps each direction indicated, all performed on foam, 1 UE support    Step taps  12" taps, standing on foam    Hip Flexion- 20 reps, 3 second holds bilaterally, 1 UE support    Hip Abduction- 20 reps, 3 second holds bilaterally, 1 UE support    Hip Extension- 20 reps, 3 second holds bilaterally, 1 UE support     Side step        Hurdles  Forward, Lateral, Backwards, No UE  6" hurdles, 5 cycles in parallel bars  Forward, Lateral, Backwards, No UE  9" hurdles, 5 cycles in parallel bars    FT FOAM HT       FT EC foam        minisquats        Toe raises        Heel raises        Gait training with SPC        Step ups  2 consecutive surfaces, foam between each surface, 8" steps, no UE, Forward, Backwards, Sideways, 20 reps each direction  2 consecutive surfaces, foam between each surface, 12" steps, no UE, Forward, Backwards, Sideways, 20 reps each direction   Foam to 6" step   1 UE   20 reps,   FWD  LAT  BACK   SOLO step walking        SOLO step step ups 4 inch with foam pad        Cone weaving        Agility ladder with foam and hurdles        Ball toss on foam        Ramp incline  Forward and Backwards, no UE, 15% grade, 20 cycles ascending and descending each direction  Forward and Backwards, no UE, 15% grade, 20 cycles ascending and descending each direction  Forward and Backwards, no UE, 15% grade, 20 cycles ascending and descending each direction  20 reps   No UE    Ambulation with commands        Uneven surface mat    5 cycles   FWD   LAT   No UE   Lateral cone taps no UE   25 cones, no UE support, 20 feet, 5 cycles     Forward cone taps no UE   25 cones, no UE support, 20 feet, 5 cycles     Lateral stepping on foam beams   5 cycles in parallel bars                                    Modalities                                                         Assessment: Patient tolerated session well, progressing laurence height today as well as dynamic balance with step taps on cones today  Patient demonstrates slight difficulties with neuromuscular control with controlling descent of foot today with cone taps, hitting into cones occasionally with loss of foot control   Patient is challenged on compliant surfaces with lateral stepping on foam, indicating difficulties with narrow base of support on bring any jewelry or money (other than copays or fees as instructed). Do not wear make-up, particularly mascara, the morning of your procedure. Wear your hair loose or down, no ponytails, buns, yobani pins or clips. All body piercings must be removed. 9. You should understand that if you do not follow these instructions your procedure may be cancelled. If your physical condition changes (i.e. fever, cold or flu) please contact your surgeon as soon as possible. 10. It is important that you be on time. If a situation occurs where you may be late, or if you have any questions or problems, please call [phone]. 11. Your procedure time may be subject to change. You will receive a phone call the day prior to confirm your arrival time. Special Instructions: Take all medications and inhalers, as prescribed, on the morning of surgery with a sip of water EXCEPT: Aspirin to be instructed by Dr. Suzette Sandhoff      I understand a pre-operative phone call will be made to verify my procedure time. In the event that I am not available, I give permission for a message to be left on my answering service and/or with another person? Yes    Instructions given during pat phone call.  Patient verbalized understanding.         ___________________      ___________________      ___________________  (Signature of Patient)          (Witness)                   (Date and Time) complaint surfaces  Patient will continue to benefit from skilled PT to increase LE strength, endurance and decrease risk of balance  Plan: continue with POC

## 2018-08-30 ENCOUNTER — OFFICE VISIT (OUTPATIENT)
Dept: PHYSICAL THERAPY | Facility: CLINIC | Age: 68
End: 2018-08-30
Payer: MEDICARE

## 2018-08-30 DIAGNOSIS — R42 DIZZINESS: ICD-10-CM

## 2018-08-30 DIAGNOSIS — R26.9 GAIT ABNORMALITY: Primary | ICD-10-CM

## 2018-08-30 PROCEDURE — 97112 NEUROMUSCULAR REEDUCATION: CPT | Performed by: PHYSICAL THERAPIST

## 2018-08-30 PROCEDURE — 97116 GAIT TRAINING THERAPY: CPT | Performed by: PHYSICAL THERAPIST

## 2018-08-30 NOTE — PROGRESS NOTES
Daily Note     Today's date: 2018  Patient name: Galo Celaya  : 1950  MRN: [id number]  Referring provider: Josephine Higgins MD  Dx:   Encounter Diagnosis     ICD-10-CM    1  Gait abnormality R26 9    2  Dizziness R42                   Subjective: Patient reports she still feels apprehensive when negotiating ramps, but overall feels balance has improved        Objective: See treatment diary below      Precautions: Fall Risk,   Past Medical History:   Diagnosis Date    Arthritis     Atrial flutter ([place] [address] )     Cardiac disease     Carotid artery occlusion     CHF (congestive heart failure) ([place] [address] )     Cholelithiasis     last assessed 2016    Coronary artery disease     Diabetes mellitus ([place] [address] )     Disease of thyroid gland     GERD (gastroesophageal reflux disease)     History of transfusion     Hyperlipidemia     Hypertension     Long-term insulin use in type 2 diabetes ([place] [address] ) 2016    Other specified diabetes mellitus with diabetic autonomic (poly)neuropathy ([place] [address] )     Renal disorder     Sleep apnea     Umbilical hernia with obstruction, without gangrene     last assessed 2016         Daily Treatment Diary     Manual                                                                                   Exercise Diary  2018   STS 10 reps performed throughout session  25 reps consecutively  25 reps  Consecutively 10 reps, no UE   Standing TE  Flex  Abd  ext 20 reps each direction indicated, all performed on foam, 2 UE 25 reps each direction indicated, all performed on foam, 2 UE support  25 reps each direction indicated, all performed on foam, 1 UE support    Step taps  12" taps, standing on foam    Hip Flexion- 20 reps, 3 second holds bilaterally, 1 UE support    Hip Abduction- 20 reps, 3 second holds bilaterally, 1 UE support    Hip Extension- 20 reps, 3 second holds bilaterally, 1 UE support     Side step        Hurdles  Forward, Lateral, Backwards, No UE  6" hurdles, 5 cycles in parallel bars  Forward, Lateral, Backwards, No UE  9" hurdles, 5 cycles in parallel bars Forward and lateral, no UE support  9" hurdles  4 cycles in // bars   FT FOAM HT       FT EC foam        minisquats        Toe raises        Heel raises        Gait training with SPC     Outdoors on concrete and grassy surfaces  Forward, lateral, backwards  Forward with eyes closed on concrete     Step ups  2 consecutive surfaces, foam between each surface, 8" steps, no UE, Forward, Backwards, Sideways, 20 reps each direction  2 consecutive surfaces, foam between each surface, 12" steps, no UE, Forward, Backwards, Sideways, 20 reps each direction      SOLO step walking        SOLO step step ups 4 inch with foam pad        Cone weaving        Agility ladder with foam and hurdles        Ball toss on foam        Ramp incline  Forward and Backwards, no UE, 15% grade, 20 cycles ascending and descending each direction  Forward and Backwards, no UE, 15% grade, 20 cycles ascending and descending each direction  Forward and Backwards, no UE, 15% grade, 20 cycles ascending and descending each direction  Outdoors on concrete surface 5 reps   Ambulation with commands        Uneven surface mat       Lateral cone taps no UE   25 cones, no UE support, 20 feet, 5 cycles  No UE support  20 ft x 4 cycles   Forward cone taps no UE   25 cones, no UE support, 20 feet, 5 cycles     Lateral stepping on foam beams   5 cycles in parallel bars    Tandem walk    In // bars but no UE support  4 cycles                            Modalities                                                         Assessment: Patient tolerated session well, progressing to tandem walk as higher level gait activity  Pt did well negotiating uneven surfaces outdoors, no use of SPC and no LOB  Pt also demonstrated ability to negotiate curb step using SPC and sign post to steady herself   Pt with 2 posterior LOB during sidestep cone taps requiring CG/min A to maintain balance  Pt fatigued by end of session  Pt will cont to benefit from skilled PT to further improve higher level dynamic balance and maximize safety with all functional mobility  Plan: continue with POC

## 2018-08-31 PROCEDURE — G8978 MOBILITY CURRENT STATUS: HCPCS

## 2018-08-31 PROCEDURE — G8979 MOBILITY GOAL STATUS: HCPCS

## 2018-09-04 ENCOUNTER — OFFICE VISIT (OUTPATIENT)
Dept: PHYSICAL THERAPY | Facility: CLINIC | Age: 68
End: 2018-09-04
Payer: MEDICARE

## 2018-09-04 DIAGNOSIS — R26.9 GAIT ABNORMALITY: Primary | ICD-10-CM

## 2018-09-04 DIAGNOSIS — R42 DIZZINESS: ICD-10-CM

## 2018-09-04 PROCEDURE — 97110 THERAPEUTIC EXERCISES: CPT | Performed by: PHYSICAL THERAPIST

## 2018-09-04 PROCEDURE — 97112 NEUROMUSCULAR REEDUCATION: CPT | Performed by: PHYSICAL THERAPIST

## 2018-09-04 NOTE — PROGRESS NOTES
Daily Note     Today's date: 2018  Patient name: Alfredo Hoyos  : 1950  MRN: [id number]  Referring provider: Jessica Reid MD  Dx:   Encounter Diagnosis     ICD-10-CM    1  Gait abnormality R26 9    2  Dizziness R42        Start Time:   Stop Time:   Total time in clinic (min): 45 minutes    Subjective: Patient reports increased L knee pain over the past week; feels better today  C/o L knee pain during SLS on foam       Objective: See treatment diary below      Precautions: Fall Risk,   Past Medical History:   Diagnosis Date    Arthritis     Atrial flutter ([place] [address] )     Cardiac disease     Carotid artery occlusion     CHF (congestive heart failure) ([place] [address] )     Cholelithiasis     last assessed 2016    Coronary artery disease     Diabetes mellitus ([place] [address] )     Disease of thyroid gland     GERD (gastroesophageal reflux disease)     History of transfusion     Hyperlipidemia     Hypertension     Long-term insulin use in type 2 diabetes ([place] [address] ) 2016    Other specified diabetes mellitus with diabetic autonomic (poly)neuropathy ([place] [address] )     Renal disorder     Sleep apnea     Umbilical hernia with obstruction, without gangrene     last assessed 2016         Daily Treatment Diary     Manual                                                                                   Exercise Diary  2018   STS  25 reps consecutively  25 reps  Consecutively 10 reps, no UE   Standing TE  Flex  Abd  ext On foam  25x each  Abd & Ext  1 UE support 25 reps each direction indicated, all performed on foam, 2 UE support  25 reps each direction indicated, all performed on foam, 1 UE support    Step taps  12" taps, standing on foam    Hip Flexion- 20 reps, 3 second holds bilaterally, 1 UE support    Hip Abduction- 20 reps, 3 second holds bilaterally, 1 UE support    Hip Extension- 20 reps, 3 second holds bilaterally, 1 UE support     Side step        Hurdles  Forward, Lateral, Backwards  No UE   9" hurdles  4 cycles  Forward, Lateral, Backwards, No UE  9" hurdles, 5 cycles in parallel bars Forward and lateral, no UE support  9" hurdles  4 cycles in // bars   FT FOAM HT       FT EC foam  5 sec x 2      minisquats        Toe raises        Heel raises  25x on foam      Gait training with SPC     Outdoors on concrete and grassy surfaces  Forward, lateral, backwards  Forward with eyes closed on concrete     Step ups  On Bosu ball  Fwd and lateral  5 cycles 2 consecutive surfaces, foam between each surface, 12" steps, no UE, Forward, Backwards, Sideways, 20 reps each direction      SOLO step walking        SOLO step step ups 4 inch with foam pad        Cone weaving        Agility ladder with foam and hurdles        Ball toss on foam  20 reps    Grabbed // bar 4x      Ramp incline   Forward and Backwards, no UE, 15% grade, 20 cycles ascending and descending each direction  Forward and Backwards, no UE, 15% grade, 20 cycles ascending and descending each direction  Outdoors on concrete surface 5 reps   Ambulation with commands        Uneven surface mat       Lateral cone taps no UE   25 cones, no UE support, 20 feet, 5 cycles  No UE support  20 ft x 4 cycles   Forward cone taps no UE   25 cones, no UE support, 20 feet, 5 cycles     Lateral stepping on foam beams   5 cycles in parallel bars    Tandem walk    In // bars but no UE support  4 cycles   EC on foam 30 sec x2      FT EO on foam 30 sec x2                 Modalities                                                         Assessment:  Pt challenged with more foam based exercises today, as well as use of Bosu for step ups  Pt has most difficulty with FT EC on foam surface, only able to maintain for 5 seconds before grabbing // bars  Also noted increased foot catching on hurdles today  Pt reports L knee had been bothering her for the past week; increased pain with L SLS on foam  Pt fatigued by end of session   Pt will cont to benefit from skilled PT to further improve higher level dynamic balance and maximize safety with all functional mobility  Plan: continue with POC  Progress to more foam based activities to further challenge balance, as tolerated

## 2018-09-06 ENCOUNTER — APPOINTMENT (OUTPATIENT)
Dept: PHYSICAL THERAPY | Facility: CLINIC | Age: 68
End: 2018-09-06
Payer: MEDICARE

## 2018-09-06 ENCOUNTER — FOLLOW UP (OUTPATIENT)
Dept: URBAN - METROPOLITAN AREA CLINIC 27 | Facility: CLINIC | Age: 68
End: 2018-09-06

## 2018-09-06 DIAGNOSIS — H40.053: ICD-10-CM

## 2018-09-06 DIAGNOSIS — E11.3413: ICD-10-CM

## 2018-09-06 PROCEDURE — 67028 INJECTION EYE DRUG: CPT

## 2018-09-06 PROCEDURE — PFS3 LUCENTIS 0.3MG PREFILLED SYRINGE

## 2018-09-06 PROCEDURE — 92134 CPTRZ OPH DX IMG PST SGM RTA: CPT

## 2018-09-06 PROCEDURE — 92014 COMPRE OPH EXAM EST PT 1/>: CPT | Mod: 25

## 2018-09-06 ASSESSMENT — VISUAL ACUITY
OD_CC: 20/160
OS_CC: 20/200

## 2018-09-06 ASSESSMENT — TONOMETRY: OD_IOP_MMHG: 26,24

## 2018-09-10 ENCOUNTER — OFFICE VISIT (OUTPATIENT)
Dept: PHYSICAL THERAPY | Facility: CLINIC | Age: 68
End: 2018-09-10
Payer: MEDICARE

## 2018-09-10 DIAGNOSIS — R42 DIZZINESS: ICD-10-CM

## 2018-09-10 DIAGNOSIS — R26.9 GAIT ABNORMALITY: Primary | ICD-10-CM

## 2018-09-10 PROCEDURE — 97112 NEUROMUSCULAR REEDUCATION: CPT

## 2018-09-10 NOTE — PROGRESS NOTES
PT Re-Evaluation /Progress Note    Today's date: 9/10/2018  Patient name: Erum Perales  : 1950  MRN: [id number]  Referring provider: May Jay MD  Dx:   Encounter Diagnosis     ICD-10-CM    1  Gait abnormality R26 9    2  Dizziness R42        Start Time: 172  Stop Time: 181  Total time in clinic (min): 46 minutes    Assessment  Impairments: activity intolerance, impaired balance, impaired physical strength, safety issue and weight-bearing intolerance    Assessment details: Patient is 70year old female who arrived to PT ambulating with occasional SPC usage  Patient reports no dizziness and has improved all outcome measures assessed today  LE endurance improving via 30 second sit to stand and 6 minute walk test, although below age related norms  Patient improving in fall risk stratification with PRATT scores as well as improving with TUG and gait speed, still deemed a fall risk although improved  No significant changes in MMT for LE strength  Patient expresses concerns about anxiety with uneven surfaces and steps and ramps, but notes that her ability to negotiate ramps is improving  Patient requires skilled PT to meet all goals and maximize function  Understanding of Dx/Px/POC: good   Prognosis: good    Goals  Goals  STG 30 days    Patient will achieve 300 feet improvement with overall distance achieved of 1045 feet with 6 minute walk test which is Minimal Detectable Change pre current research standards with endurance to demonstrate enhance functional capacity-PARTIALLY MET  Patient will display 2 3  second improvement with overall score of 20 59 minute  with TUG test or lower with noted improvement being Minimal Detectable Change pre current research standards with fall risk  -MET  Patient will achieve 50/56 PRATT score with minimal improve by 6 points or more demonstrating Minimal Detectable change per current research standards for this objective test which assess fall risk - MET  Patient will achieve   59 ft/sec improvement with score of  2 9ft/sec with 10 Meter Walk test, demonstrating Minimal Detectable change per current research standards for this objective test which assess fall risk   PARTIALLY MET  Patient will be able to perform sit to stand without physical assistance-MET      LT days   Patient will score low risk for falls with 3/4 fall risk measures PARTIALLY MET  Patient will be able to ambulate 1300 feet with/without AD during 6 minute walk test NOT MET  Patient will display 5 x sit to stand test score of 13 seconds-MET  Patient will be able to ambulate outdoors without any loss of balance NOT MET  Patient will report reduction in near falls to 2 times weekly by DC PARTIALLY MET      Cut off score   All date taken from [facility] or Rehab Measures    PRATT test: 46                                              5 x STS Test:  MDC: 6 points                                                  MDC: 2 3 seconds   age norms                                                                 Age Norms   61-76 year old = M: 54, F: 54                        61-76 year old: 11 4 seconds   66-77 year old = M 47,  F: 48                       66-77 year old: 12 6 seconds     80-80 year old = M46,   F: 48                       80-80 year old: 14 8 seconds      TUG test:                                                                     10 Meter Walk Test:  MDC: 4 14 seconds       MDC:  59 ft/sec  Cut off score for Falls                                                  Age Norms  > 13 5 seconds community dwelling adults                20-29; M: 4 56 ft/sec F: 4 62 ft/sec  > 32 2 Frail Elderly                                                     30-39: M 4 76 ft/sec  F: 4 68 ft/sec          40-49: M: 4 79 ft/sec  F: 4 62 ft/sec  6 Minute Walk Test      50-59: M: 4 76 ft/sec  F: 4 56 ft/sec  MDC: 190 feet       60-69: M: 4 56 ft/sec  F: 4 26 ft/sec  Age Norms       70-+    M: 4 36 ft/sec  F: 4 16 ft/sec  60-69:    M: 1876 F: 1765  70-79:    M: 80 F: 1545  80-89 +: M: 80 F; 5     Plan  Planned therapy interventions: manual therapy, balance, balance/weight bearing training, neuromuscular re-education, strengthening, graded activity, gait training, graded exercise, graded motor, home exercise program, therapeutic exercise and patient education  Frequency: 2x week  Duration in weeks: 12  Plan of Care beginning date: 2018  Plan of Care expiration date: 2018  Treatment plan discussed with: patient        Subjective Evaluation    History of Present Illness  Mechanism of injury: Patient reports she feels more confortable with stairs and has increased her confidence with her improvements with her ramp abilities  Patient notes that she continues to feel challenged with her ramps, patient notes increases in anxiety with performance  Patient says she "feels stronger"  Patient denies dizziness     Quality of life: good    Pain  Current pain ratin  At best pain ratin  At worst pain ratin    Social Support  Steps to enter house: yes  6  Stairs in house: no   Lives in: [facility]  Lives with: alone    Employment status: not working  Hand dominance: right    Treatments  Previous treatment: physical therapy  Patient Goals  Patient goal: be more confortable with walking         Objective     Strength/Myotome Testing     Left Hip   Planes of Motion   Flexion: 4+  Extension: 4+  Abduction: 4+  Adduction: 4+    Right Hip   Planes of Motion   Flexion: 4+  Extension: 4+  Abduction: 4+  Adduction: 4+    Left Knee   Flexion: 4+  Extension: 4+    Right Knee   Flexion: 4+  Extension: 4+    Left Ankle/Foot   Dorsiflexion: 4  Plantar flexion: 4+    Right Ankle/Foot   Dorsiflexion: 4  Plantar flexion: 4+    Functional Assessment     Comments       7/2/18   8/2/18                                           9/10    Gait speed: 10/14 31= 0 69 m/s =  2 31ft/sec  2 58ft/sec                                  10 meters/ 11 30 seconds=  88 meters/seconds   Day      44/56   48/56                                           50/56  5xSTS      21 87sec  21 22 sec                       14 02 seconds  30sec STS     7rep   7 reps                                          10 reps        TU 89 sec No AD  19 02 no AD; 13 02 AD             No AD: 12 05 seconds , AD: 10 97 seconds  6MWT:     745ft quad cane  878 ft                                         No Cane, 1000 feet      Flowsheet Rows      Most Recent Value   PT/OT G-Codes   Current Score  63   Projected Score  67   FOTO information reviewed  Yes   Assessment Type  Re-evaluation [monthly update]   G code set  Mobility: Walking & Moving Around   Mobility: Walking and Moving Around Current Status ()  CJ   Mobility: Walking and Moving Around Goal Status ()  CJ          Precautions: fall    Daily Treatment Diary     Manual                                                                                   Exercise Diary   9/10           STS             Standing TE  Flex  Abd  ext             Step taps             Side step              Neuro Testing  23'                                                                                                                                                                                                                  Modalities

## 2018-09-13 ENCOUNTER — OFFICE VISIT (OUTPATIENT)
Dept: PHYSICAL THERAPY | Facility: CLINIC | Age: 68
End: 2018-09-13
Payer: MEDICARE

## 2018-09-13 DIAGNOSIS — R26.9 GAIT ABNORMALITY: Primary | ICD-10-CM

## 2018-09-13 DIAGNOSIS — R42 DIZZINESS: ICD-10-CM

## 2018-09-13 PROCEDURE — 97112 NEUROMUSCULAR REEDUCATION: CPT

## 2018-09-13 PROCEDURE — 97116 GAIT TRAINING THERAPY: CPT

## 2018-09-13 NOTE — PROGRESS NOTES
Daily Note     Today's date: 2018  Patient name: Rosalie Correia  : 1950  MRN: [id number]  Referring provider: Darrius Morris MD  Dx:   Encounter Diagnosis     ICD-10-CM    1  Gait abnormality R26 9    2  Dizziness R42        Start Time: 7300  Stop Time:   Total time in clinic (min): 45 minutes    Subjective: Patient reports that she continues to have difficulty going down ramps and is fearful of falling  She does not want to rely on her cane for ambulation       Objective: See treatment diary below      Precautions: Fall Risk,   Past Medical History:   Diagnosis Date    Arthritis     [facility] )     Cardiac disease     Carotid artery occlusion     CHF (congestive heart failure) ([place] [address] )     Cholelithiasis     last assessed 2016    Coronary artery disease     Diabetes mellitus ([place] [address] )     Disease of thyroid gland     GERD (gastroesophageal reflux disease)     History of transfusion     Hyperlipidemia     Hypertension     Long-term insulin use in type 2 diabetes ([place] [address] ) 2016    Other specified diabetes mellitus with diabetic autonomic (poly)neuropathy ([place] [address] )     Renal disorder     Sleep apnea     Umbilical hernia with obstruction, without gangrene     last assessed 2016         Daily Treatment Diary     Manual                                                                                   Exercise Diary  2018   STS  25 reps consecutively  25 reps  Consecutively 10 reps, no UE   Standing TE  Flex  Abd  ext On foam  25x each  Abd & Ext  1 UE support 25 reps each direction indicated, all performed on foam, 2 UE support  25 reps each direction indicated, all performed on foam, 1 UE support    Step taps 8", 2 x 10 without UE support  12" taps, standing on foam    Hip Flexion- 20 reps, 3 second holds bilaterally, 1 UE support    Hip Abduction- 20 reps, 3 second holds bilaterally, 1 UE support    Hip Extension- 20 reps, 3 second holds bilaterally, 1 UE support     Side step        Hurdles  Forward, Lateral, Backwards  No UE  9" hurdles  4 cycles  Forward, Lateral, Backwards, No UE  9" hurdles, 5 cycles in parallel bars Forward and lateral, no UE support  9" hurdles  4 cycles in // bars   FT FOAM HT       FT EC foam        minisquats        Toe raises        Heel raises        Gait training with SPC  Outdoors over grass, ascending and descending ramps, ascending and descending curbs   Outdoors on concrete and grassy surfaces  Forward, lateral, backwards  Forward with eyes closed on concrete     Step ups   2 consecutive surfaces, foam between each surface, 12" steps, no UE, Forward, Backwards, Sideways, 20 reps each direction      SOLO step walking        SOLO step step ups 4 inch with foam pad        Cone weaving        Agility ladder with foam and hurdles        Ball toss on foam        Ramp incline  5x's ascending and descending Forward and Backwards, no UE, 15% grade, 20 cycles ascending and descending each direction  Forward and Backwards, no UE, 15% grade, 20 cycles ascending and descending each direction  Outdoors on concrete surface 5 reps   Ambulation with commands        Uneven surface mat       Lateral cone taps no UE   25 cones, no UE support, 20 feet, 5 cycles  No UE support  20 ft x 4 cycles   Forward cone taps no UE   25 cones, no UE support, 20 feet, 5 cycles     Lateral stepping on foam beams   5 cycles in parallel bars    Tandem walk    In // bars but no UE support  4 cycles   EC on foam 30 sec x3      FT EO on foam 30 sec x3                 Modalities                                                         Assessment:  Pt was able to complete FT EO on foam without requiring use of UE, with feet apart EC on foam patient required UE for tactile cueing to maintain COB  Completed gait training outside with and without use of SPC  Patient felt more confident and less fearful ambulating ramp with SPC, but her goal is to not require [facility] for ambulation  When descending curbs patient had to step down laterally, and did not feel safe descending forward  Goal for next session is to correct proper descend of curb with AD and no railing incase she comes across this situation in the community  Patient maintained good posture and stability with ambulation over grass and PT close supervision   Pt will cont to benefit from skilled PT to further improve higher level dynamic balance and maximize safety with all functional mobility  Plan: continue with POC  Progress to more foam based activities to further challenge balance, as tolerated

## 2018-09-17 ENCOUNTER — APPOINTMENT (OUTPATIENT)
Dept: PHYSICAL THERAPY | Facility: CLINIC | Age: 68
End: 2018-09-17
Payer: MEDICARE

## 2018-09-20 ENCOUNTER — OFFICE VISIT (OUTPATIENT)
Dept: PHYSICAL THERAPY | Facility: CLINIC | Age: 68
End: 2018-09-20
Payer: MEDICARE

## 2018-09-20 DIAGNOSIS — R42 DIZZINESS: ICD-10-CM

## 2018-09-20 DIAGNOSIS — R26.9 GAIT ABNORMALITY: Primary | ICD-10-CM

## 2018-09-20 PROCEDURE — 97112 NEUROMUSCULAR REEDUCATION: CPT | Performed by: PHYSICAL THERAPIST

## 2018-09-20 PROCEDURE — 97116 GAIT TRAINING THERAPY: CPT | Performed by: PHYSICAL THERAPIST

## 2018-09-20 NOTE — PROGRESS NOTES
Daily Note     Today's date: 2018  Patient name: Nicola Berger  : 1950  MRN: [id number]  Referring provider: Ean Granger MD  Dx:   Encounter Diagnosis     ICD-10-CM    1  Gait abnormality R26 9    2  Dizziness R42        Start Time: 6393  Stop Time: 54  Total time in clinic (min): 45 minutes    Subjective: Patient reports generalized fatigue, wants to continue practicing ramp  Objective: See treatment diary below      Precautions: Fall Risk,   Past Medical History:   Diagnosis Date    Arthritis     [facility] flutter ([facility] )     Cardiac disease     Carotid artery occlusion     CHF (congestive heart failure) ([place] [address] )     Cholelithiasis     last assessed 2016    Coronary artery disease     Diabetes mellitus ([place] [address] )     Disease of thyroid gland     GERD (gastroesophageal reflux disease)     History of transfusion     Hyperlipidemia     Hypertension     Long-term insulin use in type 2 diabetes ([place] [address] ) 2016    Other specified diabetes mellitus with diabetic autonomic (poly)neuropathy ([place] [address] )     Renal disorder     Sleep apnea     Umbilical hernia with obstruction, without gangrene     last assessed 2016         Daily Treatment Diary     Manual                                                                                   Exercise Diary     STS   25 reps  Consecutively 10 reps, no UE   Standing TE  Flex  Abd  ext On foam  25x each  Abd & Ext  1 UE support On foam  25x each  1 UE support  25 reps each direction indicated, all performed on foam, 1 UE support    Step taps 8", 2 x 10 without UE support       Side step        Hurdles  Forward, Lateral, Backwards  No UE    9" hurdles  4 cycles Forward, Lateral   No UE  9" hurdles  4 cycles Forward, Lateral, Backwards, No UE  9" hurdles, 5 cycles in parallel bars Forward and lateral, no UE support  9" hurdles  4 cycles in // bars   FT FOAM HT       FT EC foam        minisquats        Toe raises   On foam 25 reps Heel raises   On foam 25 reps      Gait training with [id number] over grass, ascending and descending ramps, ascending and descending curbs   Outdoors on concrete and grassy surfaces  Forward, lateral, backwards  Forward with eyes closed on concrete     Step ups        SOLO step walking        SOLO step step ups 4 inch with foam pad        Cone weaving        Agility ladder with foam and hurdles        Ball toss on foam        Ramp incline  5x's ascending and descending Negotiating 2 ramps with use of SPC and without SPC    10x each Forward and Backwards, no UE, 15% grade, 20 cycles ascending and descending each direction  Outdoors on concrete surface 5 reps   Ambulation with commands        Uneven surface mat       Lateral cone taps no UE   25 cones, no UE support, 20 feet, 5 cycles  No UE support  20 ft x 4 cycles   Forward cone taps no UE   25 cones, no UE support, 20 feet, 5 cycles     Lateral stepping on foam beams   5 cycles in parallel bars    Tandem walk    In // bars but no UE support  4 cycles   EC on foam 30 sec x3      FT EO on foam 30 sec x3                 Modalities                                                         Assessment:  Pt tolerated session well with focus on descending ramps  Initially used [facility] but progressed to no SPC as pt gained more confidence  No difficulty ascending ramp; decreased step length and foot clearance when descending ramp however no LOB and demonstrates appropriate postural stability and control  Practiced ramps in quiet, secluded environment as well as more crowded, distracting environment; no difference noted in performance based on environment  Unable to practice outdoors ramps due to cold weather and pt lacking appropriate outerwear  Pt expressed she is still fearful descending ramps and wants to practice more; also wants to practice negotiating parking lot  Will focus on outdoor functional mobility next session (pending weather)   Pt will cont to benefit from skilled PT to further improve higher level dynamic balance and maximize safety with all functional mobility  Plan: continue with POC  Focus on ramps, curbs, outdoor mobility

## 2018-09-24 ENCOUNTER — OFFICE VISIT (OUTPATIENT)
Dept: PHYSICAL THERAPY | Facility: CLINIC | Age: 68
End: 2018-09-24
Payer: MEDICARE

## 2018-09-24 DIAGNOSIS — R42 DIZZINESS: ICD-10-CM

## 2018-09-24 DIAGNOSIS — R26.9 GAIT ABNORMALITY: Primary | ICD-10-CM

## 2018-09-24 PROCEDURE — 97116 GAIT TRAINING THERAPY: CPT

## 2018-09-24 PROCEDURE — 97112 NEUROMUSCULAR REEDUCATION: CPT

## 2018-09-24 NOTE — PROGRESS NOTES
Daily Note     Today's date: 2018  Patient name: Luigi Agudelo  : 1950  MRN: [id number]  Referring provider: Sissy Pereira MD  Dx:   Encounter Diagnosis     ICD-10-CM    1  Gait abnormality R26 9    2  Dizziness R42        Start Time:   Stop Time:   Total time in clinic (min): 45 minutes    Subjective: Patient reports generalized fatigue, wants to continue practicing ramp before discharge next appointment  Objective: See treatment diary below      Precautions: Fall Risk,   Past Medical History:   Diagnosis Date    Arthritis     Atrial flutter ([place] [address] )     Cardiac disease     Carotid artery occlusion     CHF (congestive heart failure) ([place] [address] )     Cholelithiasis     last assessed 2016    Coronary artery disease     Diabetes mellitus ([place] [address] )     Disease of thyroid gland     GERD (gastroesophageal reflux disease)     History of transfusion     Hyperlipidemia     Hypertension     Long-term insulin use in type 2 diabetes ([place] [address] ) 2016    Other specified diabetes mellitus with diabetic autonomic (poly)neuropathy ([place] [address] )     Renal disorder     Sleep apnea     Umbilical hernia with obstruction, without gangrene     last assessed 2016         Daily Treatment Diary     Manual                                                                                   Exercise Diary     STS   25 reps  Consecutively 10 reps, no UE   Standing TE  Flex  Abd  ext On foam  25x each  Abd & Ext  1 UE support On foam  25x each  1 UE support 25 reps each direction indicated, all performed on foam, 1 UE support    Step taps 8", 2 x 10 without UE support       Side step        Hurdles  Forward, Lateral, Backwards  No UE    9" hurdles  4 cycles Forward, Lateral   No UE  9" hurdles  4 cycles  Forward and lateral, no UE support  9" hurdles  4 cycles in // bars   FT FOAM HT       FT EC foam        minisquats        Toe raises   On foam 25 reps     Heel raises   On foam 25 reps      Gait training with [id number] over grass, ascending and descending ramps, ascending and descending curbs  Outdoors on concrete grass, inclined surfaces, up and down steps, 10 minutes Outdoors on concrete and grassy surfaces  Forward, lateral, backwards  Forward with eyes closed on concrete     Step ups        SOLO step walking        SOLO step step ups 4 inch with foam pad        Cone weaving        Agility ladder with foam and hurdles        Ball toss on foam        Ramp incline  5x's ascending and descending Negotiating 2 ramps with use of SPC and without SPC    10x each Negotiating 2 ramps with use of SPC and without SPC    20 repetitions with and without cane, 10% grade ascending and descending Outdoors on concrete surface 5 reps   Ambulation with commands        Uneven surface mat       Lateral cone taps no UE    No UE support  20 ft x 4 cycles   Forward cone taps no UE       Lateral stepping on foam beams       Tandem walk    In // bars but no UE support  4 cycles   EC on foam 30 sec x3      FT EO on foam 30 sec x3      Ladder Ball    Wide Tandem Stance, encouragement of hip strategy, 10 minutes        Modalities                                                         Assessment:  Patient tolerated session well able to progress her POC to more curb negotiations as well as increasing her ability to perform with more confidence  Patient continues to ambulate with decreased step and stride length, but improves with and without SPC usage, rafael and speed slightly improving  Patient has no issues with ascending steps or curbs  Patient able to progress to ladder ball today with proper hip strategy noted with weight shift, no loss of balance noted posteriorly  Pt will cont to benefit from skilled PT to further improve higher level dynamic balance and maximize safety with all functional mobility  Plan: continue with POC  Focus on ramps, curbs, outdoor mobility

## 2018-09-27 ENCOUNTER — OFFICE VISIT (OUTPATIENT)
Dept: PHYSICAL THERAPY | Facility: CLINIC | Age: 68
End: 2018-09-27
Payer: MEDICARE

## 2018-09-27 DIAGNOSIS — R42 DIZZINESS: ICD-10-CM

## 2018-09-27 DIAGNOSIS — R26.9 GAIT ABNORMALITY: Primary | ICD-10-CM

## 2018-09-27 PROCEDURE — G8980 MOBILITY D/C STATUS: HCPCS

## 2018-09-27 PROCEDURE — 97112 NEUROMUSCULAR REEDUCATION: CPT

## 2018-09-27 PROCEDURE — G8979 MOBILITY GOAL STATUS: HCPCS

## 2018-09-27 NOTE — PROGRESS NOTES
PT Re-Evaluation /Progress Note    Today's date: 2018  Patient name: Esteban Florian  : 1950  MRN: [id number]  Referring provider: Junior Mitchell MD  Dx:   Encounter Diagnosis     ICD-10-CM    1  Gait abnormality R26 9    2  Dizziness R42        Start Time: 1730  Stop Time: 1800  Total time in clinic (min): 30 minutes    Assessment  Impairments: activity intolerance, impaired balance, impaired physical strength, safety issue and weight-bearing intolerance    Assessment details: Patient is 70year old female who arrived to PT for ambulation and gait training  Patient improved in all outcome measures today, not deemed a fall risk via TUG, PRATT today  Patient improving with her transfer capabilities, patient improving with endurance but still below age related norms  MMT within normal limits  Patient improving in confidence with ramp negotiation and outside ambulation with activities  Patient has met all goals and maximized function, and will be discharged from skilled PT today  Patient in agreement with proposed plan to POC  Understanding of Dx/Px/POC: good   Prognosis: good    Goals  Goals  STG 30 days    Patient will achieve 300 feet improvement with overall distance achieved of 1045 feet with 6 minute walk test which is Minimal Detectable Change pre current research standards with endurance to demonstrate enhance functional capacity-PARTIALLY MET  Patient will display 2 3  second improvement with overall score of 20 59 minute  with TUG test or lower with noted improvement being Minimal Detectable Change pre current research standards with fall risk  -MET  Patient will achieve 50/56 PRATT score with minimal improve by 6 points or more demonstrating Minimal Detectable change per current research standards for this objective test which assess fall risk - MET  Patient will achieve   59 ft/sec improvement with score of  2 9ft/sec with 10 Meter Walk test, demonstrating Minimal Detectable change per current research standards for this objective test which assess fall risk    MET  Patient will be able to perform sit to stand without physical assistance-MET      LT days   Patient will score low risk for falls with 3/4 fall risk measures MET  Patient will be able to ambulate 1300 feet with/without AD during 6 minute walk test NOT MET  Patient will display 5 x sit to stand test score of 13 seconds-MET  Patient will be able to ambulate outdoors without any loss of balance MET  Patient will report reduction in near falls to 2 times weekly by DC- MET      Cut off score   All date taken from [facility] Neuro Section or Rehab Measures    PRATT test: 46/56                                              5 x STS Test:  MDC: 6 points                                                  MDC: 2 3 seconds   age norms                                                                 Age Norms   61-76 year old = M: 54, F: 54                        61-76 year old: 11 4 seconds   66-77 year old = M 47,  F: 48                       66-77 year old: 12 6 seconds     80-80 year old = M46,   F: 53                       80-80 year old: 14 8 seconds      TUG test:                                                                     10 Meter Walk Test:  MDC: 4 14 seconds       MDC:  59 ft/sec  Cut off score for Falls                                                  Age Norms  > 13 5 seconds community dwelling adults                20-29; M: 4 56 ft/sec F: 4 62 ft/sec  > 32 2 Frail Elderly                                                     30-39: M 4 76 ft/sec  F: 4 68 ft/sec          40-49: M: 4 79 ft/sec  F: 4 62 ft/sec  6 Minute Walk Test      50-59: M: 4 76 ft/sec  F: 4 56 ft/sec  MDC: 190 feet       60-69: M: 4 56 ft/sec  F: 4 26 ft/sec  Age Norms       70-+    M: 4 36 ft/sec  F: 4 16 ft/sec  60-69:    M: 1876 F: 1765  70-79:    M: 1729 F: 1545  80-89 +: M: 6900 F; 1286     Plan  Frequency: 2x week  Treatment plan discussed with: patient        Subjective Evaluation    History of Present Illness  Mechanism of injury: Patient reports she feels more confortable with stairs and has increased her confidence with her improvements with her ramp abilities  Able to perform in the clinic in treatment sessions as well as in the community, denies dizziness  Patient feels ready for discharge     Quality of life: good    Pain  Current pain ratin  At best pain ratin  At worst pain ratin    Social Support  Steps to enter house: yes  6  Stairs in house: no   L[facility] in: [facility]  Lives with: alone    Employment status: not working  Hand dominance: right    Treatments  Previous treatment: physical therapy  Patient Goals  Patient goal: be more confortable with walking         Objective     Strength/Myotome Testing     Left Hip   Planes of Motion   Flexion: 4+  Extension: 4+  Abduction: 4+  Adduction: 4+    Right Hip   Planes of Motion   Flexion: 4+  Extension: 4+  Abduction: 4+  Adduction: 4+    Left Knee   Flexion: 4+  Extension: 4+    Right Knee   Flexion: 4+  Extension: 4+    Left Ankle/Foot   Dorsiflexion: 4  Plantar flexion: 4+    Right Ankle/Foot   Dorsiflexion: 4  Plantar flexion: 4+    Functional Assessment     Comments       7/2/18   8/2/18                                           9/10                                                                                 9/27    Gait speed: 10/14 31= 0 69 m/s =  2 31ft/sec  2 58ft/sec                                  10 meters/ 11 30 seconds=  88 meters/seconds          10 meters/10 11 seconds no AD:  99 meters/seconds  Day      44/56   48/56                                           50/56                                                                              51/56  5xSTS      21 87sec  21 22 sec                       14 02 seconds                                                               12 51 seconds  30sec STS     7rep   7 reps                                          10 reps 11 reps  TU 89 sec No AD  19 02 no AD; 13 02 AD             No AD: 12 05 seconds , AD: 10 97 seconds                  No AD: 10 51 seconds  AD: D/C, patient denied can usage  6MWT:     745ft quad cane  878 ft                                         No Cane, 1000 feet                                                         1000 feet No Cane          Precautions: fall    Daily Treatment Diary     Manual                                                                                   Exercise Diary   9/10           STS             Standing TE  Flex  Abd  ext             Step taps             Side step              Neuro Testing  23'                                                                                                                                                                                                                  Modalities

## 2018-10-22 ENCOUNTER — IN-CLINIC DEVICE VISIT (OUTPATIENT)
Dept: CARDIOLOGY CLINIC | Facility: CLINIC | Age: 68
End: 2018-10-22
Payer: MEDICARE

## 2018-10-22 ENCOUNTER — TELEPHONE (OUTPATIENT)
Dept: CARDIOLOGY CLINIC | Facility: CLINIC | Age: 68
End: 2018-10-22

## 2018-10-22 DIAGNOSIS — I49.5 SSS (SICK SINUS SYNDROME) (HCC): Primary | ICD-10-CM

## 2018-10-22 DIAGNOSIS — Z95.0 PRESENCE OF PERMANENT CARDIAC PACEMAKER: ICD-10-CM

## 2018-10-22 DIAGNOSIS — I48.20 CHRONIC ATRIAL FIBRILLATION (HCC): ICD-10-CM

## 2018-10-22 PROCEDURE — 93279 PRGRMG DEV EVAL PM/LDLS PM: CPT | Performed by: INTERNAL MEDICINE

## 2018-10-22 NOTE — PROGRESS NOTES
MDT DUAL PM  DEVICE INTERROGATED IN THE [facility] OFFICE:  BATTERY VOLTAGE ADEQUATE (4 YR)    87 3% (>40%/VVIR 60PPM)   ALL LEAD PARAMETERS WITHIN NORMAL LIMITS   NO SIGNIFICANT HIGH RATE EPISODES   NO PROGRAMMING CHANGES MADE TO DEVICE PARAMETERS   NORMAL DEVICE FUNCTION   RG

## 2018-10-22 NOTE — TELEPHONE ENCOUNTER
----- Message from Jef Grimes MD sent at 10/22/2018 12:32 PM EDT -----  Patient's device was interrogated in our office clinic  It shows device function      Please call patient

## 2018-11-01 ENCOUNTER — FOLLOW UP (OUTPATIENT)
Dept: URBAN - METROPOLITAN AREA CLINIC 27 | Facility: CLINIC | Age: 68
End: 2018-11-01

## 2018-11-01 DIAGNOSIS — H40.053: ICD-10-CM

## 2018-11-01 DIAGNOSIS — E11.3413: ICD-10-CM

## 2018-11-01 PROCEDURE — 92134 CPTRZ OPH DX IMG PST SGM RTA: CPT

## 2018-11-01 PROCEDURE — PFS3 LUCENTIS 0.3MG PREFILLED SYRINGE

## 2018-11-01 PROCEDURE — 92012 INTRM OPH EXAM EST PATIENT: CPT | Mod: 25

## 2018-11-01 PROCEDURE — 67028 INJECTION EYE DRUG: CPT

## 2018-11-01 ASSESSMENT — TONOMETRY
OS_IOP_MMHG: 27
OD_IOP_MMHG: 39

## 2018-11-01 ASSESSMENT — VISUAL ACUITY
OD_CC: 20/160
OS_CC: CF 5FT

## 2018-11-14 ENCOUNTER — TRANSCRIBE ORDERS (OUTPATIENT)
Dept: ADMINISTRATIVE | Facility: HOSPITAL | Age: 68
End: 2018-11-14

## 2018-11-14 ENCOUNTER — APPOINTMENT (OUTPATIENT)
Dept: LAB | Facility: HOSPITAL | Age: 68
End: 2018-11-14
Payer: MEDICARE

## 2018-11-14 DIAGNOSIS — R80.9 PROTEINURIA, UNSPECIFIED TYPE: ICD-10-CM

## 2018-11-14 DIAGNOSIS — E11.649 UNCONTROLLED TYPE 2 DIABETES MELLITUS WITH HYPOGLYCEMIA, UNSPECIFIED HYPOGLYCEMIA COMA STATUS (HCC): Primary | ICD-10-CM

## 2018-11-14 DIAGNOSIS — I50.22 CHRONIC SYSTOLIC HEART FAILURE (HCC): ICD-10-CM

## 2018-11-14 DIAGNOSIS — I73.9 PERIPHERAL VASCULAR DISEASE, UNSPECIFIED (HCC): ICD-10-CM

## 2018-11-14 DIAGNOSIS — I25.10 ATHEROSCLEROSIS OF NATIVE CORONARY ARTERY WITHOUT ANGINA PECTORIS, UNSPECIFIED WHETHER NATIVE OR TRANSPLANTED HEART: ICD-10-CM

## 2018-11-14 DIAGNOSIS — I48.20 CHRONIC ATRIAL FIBRILLATION (HCC): ICD-10-CM

## 2018-11-14 DIAGNOSIS — E13.42 DIABETIC POLYNEUROPATHY ASSOCIATED WITH OTHER SPECIFIED DIABETES MELLITUS (HCC): ICD-10-CM

## 2018-11-14 DIAGNOSIS — N18.30 CHRONIC KIDNEY DISEASE, STAGE III (MODERATE) (HCC): ICD-10-CM

## 2018-11-14 DIAGNOSIS — E11.649 UNCONTROLLED TYPE 2 DIABETES MELLITUS WITH HYPOGLYCEMIA, UNSPECIFIED HYPOGLYCEMIA COMA STATUS (HCC): ICD-10-CM

## 2018-11-14 DIAGNOSIS — E11.3313 TYPE 2 DIABETES MELLITUS WITH MODERATE NONPROLIFERATIVE RETINOPATHY OF BOTH EYES AND MACULAR EDEMA, UNSPECIFIED WHETHER LONG TERM INSULIN USE (HCC): ICD-10-CM

## 2018-11-14 LAB
ALBUMIN SERPL BCP-MCNC: 3.3 G/DL (ref 3.5–5)
ALP SERPL-CCNC: 126 U/L (ref 46–116)
ALT SERPL W P-5'-P-CCNC: 23 U/L (ref 12–78)
ANION GAP SERPL CALCULATED.3IONS-SCNC: 9 MMOL/L (ref 4–13)
AST SERPL W P-5'-P-CCNC: 18 U/L (ref 5–45)
BACTERIA UR QL AUTO: ABNORMAL /HPF
BASOPHILS # BLD AUTO: 0.05 THOUSANDS/ΜL (ref 0–0.1)
BASOPHILS NFR BLD AUTO: 1 % (ref 0–1)
BILIRUB SERPL-MCNC: 0.5 MG/DL (ref 0.2–1)
BILIRUB UR QL STRIP: NEGATIVE
BUN SERPL-MCNC: 68 MG/DL (ref 5–25)
CALCIUM SERPL-MCNC: 8.9 MG/DL (ref 8.3–10.1)
CHLORIDE SERPL-SCNC: 100 MMOL/L (ref 100–108)
CHOLEST SERPL-MCNC: 116 MG/DL (ref 50–200)
CLARITY UR: CLEAR
CO2 SERPL-SCNC: 27 MMOL/L (ref 21–32)
COLOR UR: YELLOW
CREAT SERPL-MCNC: 1.43 MG/DL (ref 0.6–1.3)
CREAT UR-MCNC: 54.6 MG/DL
EOSINOPHIL # BLD AUTO: 0.27 THOUSAND/ΜL (ref 0–0.61)
EOSINOPHIL NFR BLD AUTO: 4 % (ref 0–6)
ERYTHROCYTE [DISTWIDTH] IN BLOOD BY AUTOMATED COUNT: 16.1 % (ref 11.6–15.1)
EST. AVERAGE GLUCOSE BLD GHB EST-MCNC: 197 MG/DL
GFR SERPL CREATININE-BSD FRML MDRD: 37 ML/MIN/1.73SQ M
GLUCOSE P FAST SERPL-MCNC: 146 MG/DL (ref 65–99)
GLUCOSE UR STRIP-MCNC: NEGATIVE MG/DL
HBA1C MFR BLD: 8.5 % (ref 4.2–6.3)
HCT VFR BLD AUTO: 39.8 % (ref 34.8–46.1)
HDLC SERPL-MCNC: 53 MG/DL (ref 40–60)
HGB BLD-MCNC: 12 G/DL (ref 11.5–15.4)
HGB UR QL STRIP.AUTO: NEGATIVE
IMM GRANULOCYTES # BLD AUTO: 0.01 THOUSAND/UL (ref 0–0.2)
IMM GRANULOCYTES NFR BLD AUTO: 0 % (ref 0–2)
KETONES UR STRIP-MCNC: NEGATIVE MG/DL
LDLC SERPL CALC-MCNC: 48 MG/DL (ref 0–100)
LEUKOCYTE ESTERASE UR QL STRIP: ABNORMAL
LYMPHOCYTES # BLD AUTO: 1.29 THOUSANDS/ΜL (ref 0.6–4.47)
LYMPHOCYTES NFR BLD AUTO: 17 % (ref 14–44)
MCH RBC QN AUTO: 26.7 PG (ref 26.8–34.3)
MCHC RBC AUTO-ENTMCNC: 30.2 G/DL (ref 31.4–37.4)
MCV RBC AUTO: 88 FL (ref 82–98)
MICROALBUMIN UR-MCNC: 32.3 MG/L (ref 0–20)
MICROALBUMIN/CREAT 24H UR: 59 MG/G CREATININE (ref 0–30)
MONOCYTES # BLD AUTO: 0.81 THOUSAND/ΜL (ref 0.17–1.22)
MONOCYTES NFR BLD AUTO: 11 % (ref 4–12)
NEUTROPHILS # BLD AUTO: 5.03 THOUSANDS/ΜL (ref 1.85–7.62)
NEUTS SEG NFR BLD AUTO: 67 % (ref 43–75)
NITRITE UR QL STRIP: NEGATIVE
NON-SQ EPI CELLS URNS QL MICRO: ABNORMAL /HPF
NONHDLC SERPL-MCNC: 63 MG/DL
NRBC BLD AUTO-RTO: 0 /100 WBCS
PH UR STRIP.AUTO: 5.5 [PH] (ref 5–9)
PLATELET # BLD AUTO: 198 THOUSANDS/UL (ref 149–390)
PMV BLD AUTO: 9.8 FL (ref 8.9–12.7)
POTASSIUM SERPL-SCNC: 3.9 MMOL/L (ref 3.5–5.3)
PROT SERPL-MCNC: 8.3 G/DL (ref 6.4–8.2)
PROT UR STRIP-MCNC: NEGATIVE MG/DL
RBC # BLD AUTO: 4.5 MILLION/UL (ref 3.81–5.12)
RBC #/AREA URNS AUTO: ABNORMAL /HPF
SODIUM SERPL-SCNC: 136 MMOL/L (ref 136–145)
SP GR UR STRIP.AUTO: 1.01 (ref 1–1.03)
T4 FREE SERPL-MCNC: 1.12 NG/DL (ref 0.76–1.46)
TRIGL SERPL-MCNC: 77 MG/DL
TSH SERPL DL<=0.05 MIU/L-ACNC: 2.08 UIU/ML (ref 0.36–3.74)
UROBILINOGEN UR QL STRIP.AUTO: 0.2 E.U./DL
WBC # BLD AUTO: 7.46 THOUSAND/UL (ref 4.31–10.16)
WBC #/AREA URNS AUTO: ABNORMAL /HPF

## 2018-11-14 PROCEDURE — 82570 ASSAY OF URINE CREATININE: CPT | Performed by: INTERNAL MEDICINE

## 2018-11-14 PROCEDURE — 80053 COMPREHEN METABOLIC PANEL: CPT

## 2018-11-14 PROCEDURE — 82043 UR ALBUMIN QUANTITATIVE: CPT | Performed by: INTERNAL MEDICINE

## 2018-11-14 PROCEDURE — 36415 COLL VENOUS BLD VENIPUNCTURE: CPT | Performed by: INTERNAL MEDICINE

## 2018-11-14 PROCEDURE — 81001 URINALYSIS AUTO W/SCOPE: CPT | Performed by: INTERNAL MEDICINE

## 2018-11-14 PROCEDURE — 83036 HEMOGLOBIN GLYCOSYLATED A1C: CPT | Performed by: INTERNAL MEDICINE

## 2018-11-14 PROCEDURE — 84443 ASSAY THYROID STIM HORMONE: CPT

## 2018-11-14 PROCEDURE — 84439 ASSAY OF FREE THYROXINE: CPT

## 2018-11-14 PROCEDURE — 85025 COMPLETE CBC W/AUTO DIFF WBC: CPT

## 2018-11-14 PROCEDURE — 80061 LIPID PANEL: CPT

## 2018-11-14 RX ORDER — RIVAROXABAN 15 MG/1
TABLET, FILM COATED ORAL
Qty: 90 TABLET | Refills: 0 | Status: SHIPPED | OUTPATIENT
Start: 2018-11-14 | End: 2019-05-15 | Stop reason: SDUPTHER

## 2018-12-12 NOTE — PROGRESS NOTES
Subjective:      Patient ID: Rodolph Klinefelter is a 70 y o  female  Chief Complaint   Patient presents with    Follow-up     6 month f/u       Here for diabetic follow up  Has been getting steroid shots in her eyes and her pressure is up, following closely with the opthomologist   Sees endocrinologist regularly, he has been adjusting her medications  Denies chest pain, dyspnea, orthopnea, palpitations  Sees Dr Shaun Pierre regularly for afib  The following portions of the patient's history were reviewed and updated as appropriate: allergies, current medications, past family history, past medical history, past social history, past surgical history and problem list     Review of Systems   Constitutional: Negative  Respiratory: Negative  Cardiovascular: Negative  Current Outpatient Prescriptions   Medication Sig Dispense Refill    ACCU-CHEK CAITLIN PLUS test strip USE TO TEST 4 TIMES DAILY  3    B-D UF III MINI PEN NEEDLES 31G X 5 MM MISC USE 4 TIMES A DAY  3    furosemide (LASIX) 80 mg tablet TAKE 1 TABLET DAILY AS DIRECTED  30 tablet 5    insulin aspart (NovoLOG) 100 units/mL injection Inject 10 Units under the skin 3 (three) times a day before meals        levothyroxine (SYNTHROID) 137 mcg tablet Take 137 mcg by mouth daily        metolazone (ZAROXOLYN) 2 5 mg tablet Take 1 tablet (2 5 mg total) by mouth 2 (two) times a week 10 tablet 3    NOVOLOG FLEXPEN 100 units/mL injection pen UP TO 70 UNITS DAILY AS DIRECTED  1    omeprazole (PriLOSEC) 20 mg delayed release capsule Take 20 mg by mouth 3 (three) times a week        potassium chloride (K-DUR,KLOR-CON) 10 mEq tablet Take 1 tablet (10 mEq total) by mouth 2 (two) times a week 15 tablet 3    simvastatin (ZOCOR) 20 mg tablet TAKE 1 TABLET (20MG) BY ORAL ROUTE EVERY DAY IN THE EVENING 90 tablet 2    traMADol (ULTRAM) 50 mg tablet Take 1 tablet (50 mg total) by mouth every 6 (six) hours as needed for moderate pain 30 tablet 0    valsartan (DIOVAN) 80 mg tablet Take 80 mg by mouth daily   XARELTO 15 MG tablet TAKE 1 TAB BY MOUTH DAILY WITH EVENEING MEAL  90 tablet 0    insulin glargine (BASAGLAR KWIKPEN) 100 units/mL injection pen Inject 20 Units under the skin daily 5 pen 0     No current facility-administered medications for this visit  Objective:    /70   Pulse 88   Temp (!) 96 4 °F (35 8 °C)   Resp 16   Ht 5' 3" (1 6 m)   Wt 112 kg (248 lb)   LMP  (LMP Unknown)   BMI 43 93 kg/m²        Physical Exam   Constitutional: She appears well-developed and well-nourished  Eyes: Conjunctivae are normal    Neck: Neck supple  No JVD present  No thyromegaly present  Cardiovascular: Normal rate, regular rhythm, normal heart sounds and intact distal pulses  Exam reveals no gallop and no friction rub  No murmur heard  Pulmonary/Chest: Effort normal and breath sounds normal  She has no wheezes  She has no rales  Abdominal: Soft  Bowel sounds are normal  She exhibits no distension  There is no tenderness  Musculoskeletal: She exhibits no edema  Assessment/Plan:    Type 2 diabetes mellitus with diabetic neuropathy, with long-term current use of insulin (HCC)  Lab Results   Component Value Date    HGBA1C 8 5 (H) 11/14/2018       No results for input(s): POCGLU in the last 72 hours  Blood Sugar Average: Last 72 hrs:    Sees Dr Andrea Melendez regularly and he is working with her insulin  Advised on good foot and eye care; up to date with optho and requests local podiatrist       HTN (hypertension)  Stable on current medication, will continue  Chronic atrial fibrillation (HCC)  Stable on xarelto, rate controlled, sees cardiology regularly  Morbid obesity with BMI of 40 0-44 9, adult ([facility] )  Advised on diet/exercise/weight loss  Hyperlipidemia  Reviewed labs and lipids are at goal   Continue medications         Diagnoses and all orders for this visit:    Type 2 diabetes mellitus with diabetic neuropathy, with long-term current use of insulin ([facility] )  -     Ambulatory referral to Podiatry; Future  -     insulin glargine (BASAGLAR KWIKPEN) 100 units/mL injection pen; Inject 20 Units under the skin daily    Hypothyroidism, unspecified type    Essential hypertension    Morbid obesity with BMI of 40 0-44 9, adult (HCC)    Hyperlipidemia, unspecified hyperlipidemia type    Chronic atrial fibrillation (HCC)    Other orders  -     NOVOLOG FLEXPEN 100 units/mL injection pen; UP TO 70 UNITS DAILY AS DIRECTED          Return in about 6 months (around 6/17/2019) for 1/2 hour AWV and follow up         Ravinder Conteh MD

## 2018-12-17 ENCOUNTER — OFFICE VISIT (OUTPATIENT)
Dept: FAMILY MEDICINE CLINIC | Facility: CLINIC | Age: 68
End: 2018-12-17
Payer: MEDICARE

## 2018-12-17 VITALS
DIASTOLIC BLOOD PRESSURE: 70 MMHG | RESPIRATION RATE: 16 BRPM | HEART RATE: 88 BPM | WEIGHT: 248 LBS | HEIGHT: 63 IN | SYSTOLIC BLOOD PRESSURE: 140 MMHG | TEMPERATURE: 96.4 F | BODY MASS INDEX: 43.94 KG/M2

## 2018-12-17 DIAGNOSIS — E03.9 HYPOTHYROIDISM, UNSPECIFIED TYPE: ICD-10-CM

## 2018-12-17 DIAGNOSIS — E11.40 TYPE 2 DIABETES MELLITUS WITH DIABETIC NEUROPATHY, WITH LONG-TERM CURRENT USE OF INSULIN (HCC): Primary | ICD-10-CM

## 2018-12-17 DIAGNOSIS — I10 ESSENTIAL HYPERTENSION: ICD-10-CM

## 2018-12-17 DIAGNOSIS — Z79.4 TYPE 2 DIABETES MELLITUS WITH DIABETIC NEUROPATHY, WITH LONG-TERM CURRENT USE OF INSULIN (HCC): Primary | ICD-10-CM

## 2018-12-17 DIAGNOSIS — I48.20 CHRONIC ATRIAL FIBRILLATION (HCC): ICD-10-CM

## 2018-12-17 DIAGNOSIS — E66.01 MORBID OBESITY WITH BMI OF 40.0-44.9, ADULT (HCC): ICD-10-CM

## 2018-12-17 DIAGNOSIS — E78.5 HYPERLIPIDEMIA, UNSPECIFIED HYPERLIPIDEMIA TYPE: ICD-10-CM

## 2018-12-17 PROCEDURE — 99214 OFFICE O/P EST MOD 30 MIN: CPT | Performed by: INTERNAL MEDICINE

## 2018-12-17 RX ORDER — INSULIN ASPART 100 [IU]/ML
INJECTION, SOLUTION INTRAVENOUS; SUBCUTANEOUS
Refills: 1 | COMMUNITY
Start: 2018-12-06 | End: 2019-05-09 | Stop reason: ALTCHOICE

## 2018-12-17 NOTE — ASSESSMENT & PLAN NOTE
Lab Results   Component Value Date    HGBA1C 8 5 (H) 11/14/2018       No results for input(s): POCGLU in the last 72 hours  Blood Sugar Average: Last 72 hrs:    Sees Dr Senia Diaz regularly and he is working with her insulin    Advised on good foot and eye care; up to date with optho and requests local podiatrist

## 2018-12-22 DIAGNOSIS — I50.32 CHRONIC DIASTOLIC HEART FAILURE (HCC): ICD-10-CM

## 2018-12-22 RX ORDER — FUROSEMIDE 80 MG
TABLET ORAL
Qty: 30 TABLET | Refills: 5 | Status: SHIPPED | OUTPATIENT
Start: 2018-12-22 | End: 2019-07-03 | Stop reason: SDUPTHER

## 2019-01-03 ENCOUNTER — FOLLOW UP (OUTPATIENT)
Dept: URBAN - METROPOLITAN AREA CLINIC 27 | Facility: CLINIC | Age: 69
End: 2019-01-03

## 2019-01-03 DIAGNOSIS — E11.3413: ICD-10-CM

## 2019-01-03 DIAGNOSIS — H40.053: ICD-10-CM

## 2019-01-03 PROCEDURE — 92014 COMPRE OPH EXAM EST PT 1/>: CPT | Mod: 25

## 2019-01-03 PROCEDURE — 67028 INJECTION EYE DRUG: CPT

## 2019-01-03 PROCEDURE — PFS3 LUCENTIS 0.3MG PREFILLED SYRINGE

## 2019-01-03 PROCEDURE — 03VIAL LUCENTIS 03MG VIAL

## 2019-01-03 PROCEDURE — 92134 CPTRZ OPH DX IMG PST SGM RTA: CPT

## 2019-01-03 ASSESSMENT — TONOMETRY
OD_IOP_MMHG: 22
OS_IOP_MMHG: 26

## 2019-01-03 ASSESSMENT — VISUAL ACUITY
OD_SC: 20/160
OS_SC: CF 5FT

## 2019-01-24 ENCOUNTER — PROCEDURE ONLY (OUTPATIENT)
Dept: URBAN - METROPOLITAN AREA CLINIC 27 | Facility: CLINIC | Age: 69
End: 2019-01-24

## 2019-01-24 DIAGNOSIS — E11.3413: ICD-10-CM

## 2019-01-24 PROCEDURE — 67028 INJECTION EYE DRUG: CPT

## 2019-01-24 ASSESSMENT — TONOMETRY
OS_IOP_MMHG: 20
OD_IOP_MMHG: 15

## 2019-01-24 ASSESSMENT — VISUAL ACUITY: OS_CC: CF 5FT

## 2019-02-01 ENCOUNTER — TELEPHONE (OUTPATIENT)
Dept: CARDIOLOGY CLINIC | Facility: CLINIC | Age: 69
End: 2019-02-01

## 2019-02-01 ENCOUNTER — REMOTE DEVICE CLINIC VISIT (OUTPATIENT)
Dept: CARDIOLOGY CLINIC | Facility: CLINIC | Age: 69
End: 2019-02-01
Payer: MEDICARE

## 2019-02-01 DIAGNOSIS — Z95.0 PRESENCE OF PERMANENT CARDIAC PACEMAKER: ICD-10-CM

## 2019-02-01 DIAGNOSIS — I48.20 CHRONIC ATRIAL FIBRILLATION (HCC): Primary | ICD-10-CM

## 2019-02-01 PROCEDURE — 93296 REM INTERROG EVL PM/IDS: CPT | Performed by: INTERNAL MEDICINE

## 2019-02-01 PROCEDURE — 93294 REM INTERROG EVL PM/LDLS PM: CPT | Performed by: INTERNAL MEDICINE

## 2019-02-01 NOTE — PROGRESS NOTES
Results for orders placed or performed in visit on 02/01/19   Cardiac EP device report    Narrative    MDT DUAL PM  [employer]ELINK TRANSMISSION: BATTERY VOLTAGE ADEQUATE  (3 5 YRS)   93%  ALL AVAILABLE LEAD PARAMETERS WITHIN NORMAL LIMITS  NO SIGNIFICANT HIGH RATE EPISODES  NORMAL DEVICE FUNCTION  ---JHAVERI

## 2019-02-01 NOTE — TELEPHONE ENCOUNTER
----- Message from Brie Jacobson MD sent at 2/1/2019  1:06 PM EST -----  Patient's device was interrogated in our office clinic  It shows device function normal she should keep her appointment  Please call patient

## 2019-02-20 DIAGNOSIS — E78.5 DYSLIPIDEMIA: ICD-10-CM

## 2019-02-20 RX ORDER — SIMVASTATIN 20 MG
TABLET ORAL
Qty: 90 TABLET | Refills: 2 | Status: SHIPPED | OUTPATIENT
Start: 2019-02-20 | End: 2019-11-22 | Stop reason: SDUPTHER

## 2019-03-21 ENCOUNTER — FOLLOW UP (OUTPATIENT)
Dept: URBAN - METROPOLITAN AREA CLINIC 27 | Facility: CLINIC | Age: 69
End: 2019-03-21

## 2019-03-21 DIAGNOSIS — H40.053: ICD-10-CM

## 2019-03-21 DIAGNOSIS — E11.3413: ICD-10-CM

## 2019-03-21 PROBLEM — I25.10 ARTERIOSCLEROTIC CORONARY ARTERY DISEASE: Status: RESOLVED | Noted: 2017-03-16 | Resolved: 2019-03-21

## 2019-03-21 PROCEDURE — 67028 INJECTION EYE DRUG: CPT

## 2019-03-21 PROCEDURE — 92134 CPTRZ OPH DX IMG PST SGM RTA: CPT

## 2019-03-21 PROCEDURE — 92012 INTRM OPH EXAM EST PATIENT: CPT | Mod: 25

## 2019-03-21 PROCEDURE — PFS3 LUCENTIS 0.3MG PREFILLED SYRINGE

## 2019-03-21 ASSESSMENT — VISUAL ACUITY
OS_CC: CF 3FT
OD_CC: 20/100-2

## 2019-03-21 ASSESSMENT — TONOMETRY
OD_IOP_MMHG: 19
OS_IOP_MMHG: 19

## 2019-03-21 NOTE — PROGRESS NOTES
Cardiology Out Patient Progress Note  [facility]    Gomez Granados 78 y o  female MRN: [id number]   Encounter: [id number]    1  Coronary artery disease involving coronary bypass graft of native heart without angina pectoris    2  Chronic atrial fibrillation ([facility])    3  Combined systolic and diastolic congestive heart failure, unspecified HF chronicity ([facility] )    4  CKD (chronic kidney disease) stage 3, GFR 30-59 ml/min ([facility])    5  Essential hypertension    6  Obstructive sleep apnea    7  Mild pulmonary hypertension ([place] [address] )    8  Type 2 diabetes mellitus with diabetic neuropathy, with long-term current use of insulin ([place] [address] )    9  H/O tricuspid valve annuloplasty    10  Morbid obesity with BMI of 40 0-44 9, adult ([place] [address] )    11  Mixed hyperlipidemia      Assessment/Plan   1  Combined systolic and diastolic heart failure [place] heart Association class 2/3  Combined systolic and diastolic heart failure with decrease in EF last year in February  Currently seems to compensated  She seems to be euvolemic  She is not overly dry no leg edema lungs are clear  Will check BMP  Will continue same doses of diuretics  Currently she takes 80 mg daily and uses p r n  Zaroxolyn based on her weight  She is monitoring her weight daily  BMP ordered  2  Coronary artery disease status post coronary artery bypass surgery with 4 grafts  Status post cardiac catheterization, in February of 2018  All grafts are patent she has three-vessel coronary artery disease  Continue medical Rx       3  Chronic atrial fibrillation  Patient has underlying very slow ventricular rate and is status post Medtronic pacemaker which is functioning adequately  She is on Xarelto  Pacemaker was interrogated in February of 2019 it is functioning adequately  She still have battery life of 3 and half years  4  Diabetes mellitus  Management as per PMD    5  Chronic renal insufficiency    Her creatinine has been now around 1 6 last labs were in October and November of 2018  Will check BMP  To keep her on the dry side we have to accept high BUN creatinine as she has predominantly RV failure due to previous history of tricuspid valve annuloplasty and pulmonary hypertension  6 History of tricuspid annuloplasty  Repeat echo report from February 2018 reviewed  EF was around 40-45%  PA pressure remains elevated  7  Dyslipidemia  Continue statins    8 History of anemia and lung nodule, follow-up with hematology and oncology    9  Hypertension  But he well controlled with current Rx     10   Hypothyroidism  On Synthroid and TSH was acceptable    She is advised to keep her appointment  She has missed a couple appointments  All her questions answered to her satisfaction  Her cath report echo report and previous stress test report reviewed with her  Counseling :   A description of the counseling  Patient is little under stress regarding her sister's death  She was counselled  She was reassured  All her questions were answered  Advised her to lose weight  Diet was advised  Advised to decrease salt intake and decreased fluid intake  Monitor her input output  HPI :     Katina Burkett is a 78y o  year old female who presents for follow-up  She haspast medical history significant for coronary artery disease status post coronary artery bypass surgery with 4 bypasses in 2004 and then tricuspid valve annuloplasty in January 2012, known moderate pulmonary hypertension with mild to moderate TR, recurrent abdominal hernia, history of anemia, chronic atrial fibrillation with underlying sick sinus syndrome status post Medtronic pacemaker on long-term antithrombotic therapy with Xarelto who came for regular follow-up  Patient has some dyspnea on exertion  She hasn't had any recent stress test  She also had some mild CRI creatinine stable around 1 3  He has no fever no chills  No PND, no orthopnea, mostly exertional symptoms  03/05/2018  Patient came for follow-up after hospital discharge  Above reviewed  She was admitted with diastolic heart failure  Underwent ECHO and cardiac catheterization  Found to have nonobstructive CAD in her grafts  She is now on medical therapy  Her diuretics were adjusted  She has lost about 10-12 lb  No dizziness no lightheadedness  She monitor her blood pressures almost every day  Blood pressure has been adequate 1 reading was low few days ago  She has continue still losing weight  No chest pain  No shortness of breath  No fever no PND no orthopnea no other significant complaint  03/26/2019  Above reviewed  Patient came for follow-up  She has missed couple appointment but she is doing very well  She is trying to lose weight  She was admitted last year with diastolic heart failure and weight gain and underwent ECHO and cardiac catheterization  She was found to have nonobstructive coronary artery disease in her grafts  She is feeling much better she has lost some weight she regularly exercise  No fever no chills no nausea no vomiting no PND no orthopnea  She has some chronic other problems like umbilical hernia, chronic shortness of breath, gait dysfunction which is not changed  Patient has past medical history significant for coronary artery disease status post coronary artery bypass surgery graft x4,  History of tricuspid valve annuloplasty, moderate pulmonary hypertension, coronary artery disease as mentioned above and has been doing well  She has history of Medtronic single-chamber pacemaker was interrogated in February 2019 and is functioning adequately  Still have battery life of 3 years  Review of Systems   Constitutional: Negative for activity change, chills, diaphoresis, fever and unexpected weight change  Appetite is good trying to lose weight   HENT: Negative for congestion  Eyes: Negative for discharge and redness     Respiratory: Positive for shortness of breath  Negative for cough, chest tightness and wheezing  Chronic not changed   Cardiovascular: Positive for leg swelling  Negative for chest pain and palpitations  Chronic not changed   Gastrointestinal: Negative for abdominal pain, diarrhea and nausea  Endocrine: Negative  Genitourinary: Negative for decreased urine volume and urgency  Musculoskeletal: Positive for back pain and gait problem  Negative for arthralgias  Skin: Negative for rash and wound  Allergic/Immunologic: Negative  Neurological: Negative for dizziness, seizures, syncope, weakness, light-headedness and headaches  Hematological: Negative  Psychiatric/Behavioral: Negative for agitation and confusion  The patient is nervous/anxious          Historical Information   Past Medical History:   Diagnosis Date    Arthritis     Atrial flutter ([place] [address] )     Cardiac disease     Carotid artery occlusion     CHF (congestive heart failure) ([place] [address] )     Cholelithiasis     last assessed 8/8/2016    Coronary artery disease     Diabetes mellitus ([place] [address] )     Disease of thyroid gland     GERD (gastroesophageal reflux disease)     History of transfusion     Hyperlipidemia     Hypertension     Long-term insulin use in type 2 diabetes ([place] [address] ) 6/4/2016    Other specified diabetes mellitus with diabetic autonomic (poly)neuropathy ([place] [address] )     Renal disorder     Sleep apnea     Umbilical hernia with obstruction, without gangrene     last assessed 7/8/2016     Past Surgical History:   Procedure Laterality Date    ABDOMINAL SURGERY      CARDIAC PACEMAKER PLACEMENT      CARDIAC SURGERY      cabg x 2    CATARACT EXTRACTION      CHOLECYSTECTOMY      EYE SURGERY      FRACTURE SURGERY      HERNIA REPAIR      umbilical    [place] LAP,CHOLECYSTECTOMY N/A 8/10/2016    Procedure: CHOLECYSTECTOMY LAPAROSCOPIC;  Surgeon: Shaina Hein MD;  Location: [facility];  Service: General    [place] LARYNGOSCOPY,DIRECT,SCOPE,INJ CORDS Left 11/4/2016    Procedure: MICRODIRECT LARYNGOSCOPY; LEFT VOCAL FOLD INJECTION ;  Surgeon: Melissa Pinzon MD;  [facility];  Service: ENT    [place] REPAIR UMBILICAL VJVM,3+M/W,[facility] N/A 8/10/2016    Procedure: Prabha Bhatia;  Surgeon: Evelia Bradley MD;  Location[facility];  Service: General    [address] / STENTING Right     VASCULAR SURGERY      heart valve replacement     Social History     Substance and Sexual Activity   Alcohol Use No    Comment: per [employer] 'occasional alcohol use'     Social History     Substance and Sexual Activity   Drug Use No    Types: Oxycodone     Social History     Tobacco Use   Smoking Status Never Smoker   Smokeless Tobacco Never Used     Family History:   Family History   Problem Relation Age of Onset    No Known Problems Mother     Breast cancer Maternal Aunt        Meds/Allergies     Allergies   Allergen Reactions    Bromide Ion [Bromine]      Blurred vision  Has preservative that  Pt cannot use    Other      Adhesive tape    Ibuprofen Palpitations    Penicillins Rash       Current Outpatient Medications:     ACCU-CHEK CAITLIN PLUS test strip, USE TO TEST 4 TIMES DAILY, Disp: , Rfl: 3    B-D UF III MINI PEN NEEDLES 31G X 5 MM MISC, USE 4 TIMES A DAY, Disp: , Rfl: 3    COMBIGAN 0 2-0 5 %, Administer 1 drop to both eyes 2 (two) times a day, Disp: , Rfl:     furosemide (LASIX) 80 mg tablet, TAKE 1 TABLET DAILY AS DIRECTED , Disp: 30 tablet, Rfl: 5    insulin aspart (NovoLOG) 100 units/mL injection, Inject 10 Units under the skin 3 (three) times a day before meals  , Disp: , Rfl:     insulin glargine (BASAGLAR KWIKPEN) 100 units/mL injection pen, Inject 20 Units under the skin daily, Disp: 5 pen, Rfl: 0    levothyroxine (SYNTHROID) 137 mcg tablet, Take 137 mcg by mouth daily  , Disp: , Rfl:     metolazone (ZAROXOLYN) 2 5 mg tablet, Take 1 tablet (2 5 mg total) by mouth 2 (two) times a week, Disp: 10 tablet, Rfl: 3    NOVOLOG FLEXPEN 100 units/mL injection pen, UP TO 70 UNITS DAILY AS DIRECTED, Disp: , Rfl: 1    omeprazole (PriLOSEC) 20 mg delayed release capsule, Take 20 mg by mouth 3 (three) times a week  , Disp: , Rfl:     potassium chloride (K-DUR,KLOR-CON) 10 mEq tablet, Take 1 tablet (10 mEq total) by mouth 2 (two) times a week, Disp: 15 tablet, Rfl: 3    simvastatin (ZOCOR) 20 mg tablet, TAKE 1 TABLET (20MG) BY ORAL ROUTE EVERY DAY IN THE EVENING, Disp: 90 tablet, Rfl: 2    traMADol (ULTRAM) 50 mg tablet, Take 1 tablet (50 mg total) by mouth every 6 (six) hours as needed for moderate pain, Disp: 30 tablet, Rfl: 0    valsartan (DIOVAN) 80 mg tablet, Take 80 mg by mouth daily  , Disp: , Rfl:     XARELTO 15 MG tablet, TAKE 1 TAB BY MOUTH DAILY WITH EVENEING MEAL , Disp: 90 tablet, Rfl: 0    Vitals: Blood pressure 112/70, pulse 66, height 5' 3" (1 6 m), weight 110 kg (243 lb), SpO2 97 %, not currently breastfeeding  Body mass index is 43 05 kg/m²  Physical Exam:  Physical Exam   Constitutional: She is oriented to person, place, and time  She appears well-developed and well-nourished  No distress  HENT:   Head: Normocephalic and atraumatic  Eyes: Pupils are equal, round, and reactive to light  Neck: Neck supple  No JVD present  No tracheal deviation present  No thyromegaly present  Cardiovascular: Normal rate, regular rhythm, S1 normal and S2 normal  Exam reveals no gallop, no S3, no S4, no distant heart sounds and no friction rub  Murmur heard  Systolic (ejection) murmur is present with a grade of 2/6  S1-S2 irregular with a 3/6 pansystolic murmur  She has regular  Paced beat underlying atrial fibrillation  She is 100% paced   Pulmonary/Chest: Effort normal and breath sounds normal  No respiratory distress  She has no wheezes  She has no rales  She exhibits no tenderness  Abdominal: Soft  Bowel sounds are normal  She exhibits no distension  There is no tenderness  Musculoskeletal: She exhibits edema  She exhibits no deformity  Minimal not changed   Neurological: She is alert and oriented to person, place, and time  Skin: Skin is warm and dry  No rash noted  She is not diaphoretic  No pallor  Psychiatric: She has a normal mood and affect  Her behavior is normal  Judgment normal          Diagnostic cardiac testing review:    Cardiac catheterization:  Cardiac catheterization done in 2018 shows severe three-vessel coronary artery disease  Distal left main has 70%  %  Proximal circ 70%, obtuse marginal 100%, RPDA 100% occluded  Patient had patent LIMA to LAD, SVG vein graft to diagonal, circumflex as well as RPDA  LV-gram was not done due to CRI  Echo Doppler:  Echo Doppler done in 2018 shows EF around 45%, dilated right-sided chambers, moderate to severe TR with PA pressure 65 mm of mercury, mild AI, mild MR, moderate to markedly dilated left atrium, moderately dilated right atrium Paradoxical septal motion noted    Stress test:  Nuclear stress test done in  was abnormal   EF was 57%  Paradoxical septal motion was noted  Nuclear stress test in 2017 shows is fixed apical wall defect with prior infarct  No ischemia EF was 57%    Pacemaker interrogation:  Patient has Medtronic pacemaker which is functioning adequately its VVI mode  Patient is paced 100% all the time  EC lead EKG done on 2018 shows atrial fibrillation with few paced beats  Heart rate is 65 beats per minute  Patient has known Medtronic pacemaker  It is a VVI type  2018  Repeat EKG shows paced rhythm heart rate 78 beats per minute  Underlying is AFib    Twelve lead EKG done in our office 2019 shows atrial fibrillation underlying with ventricular paced heart rate 66 beats per minute  No change from old EKG  Results of echo and stress test reviewed with patient  Labs done 2018 shows sodium 139 potassium 3 4 BUN 67 creatinine 1 45 GFR is 37      Lab Review Lab Results   Component Value Date    WBC 7 46 11/14/2018    HGB 12 0 11/14/2018    HCT 39 8 11/14/2018    MCV 88 11/14/2018     11/14/2018     Lab Results   Component Value Date    K 3 9 11/14/2018     11/14/2018    CO2 27 11/14/2018    BUN 68 (H) 11/14/2018    CREATININE 1 43 (H) 11/14/2018    GLUF 146 (H) 11/14/2018    CALCIUM 8 9 11/14/2018    AST 18 11/14/2018    ALT 23 11/14/2018    ALKPHOS 126 (H) 11/14/2018    PROT 7 9 09/21/2016    EGFR 37 11/14/2018     Lab Results   Component Value Date    CALCIUM 8 9 11/14/2018    K 3 9 11/14/2018    CO2 27 11/14/2018     11/14/2018    BUN 68 (H) 11/14/2018    CREATININE 1 43 (H) 11/14/2018     Dr Greyson Chapin MD [facility]      "This note has been constructed using a voice recognition system  Therefore there may be syntax, spelling, and/or grammatical errors   Please call if you have any questions  "

## 2019-03-26 ENCOUNTER — OFFICE VISIT (OUTPATIENT)
Dept: CARDIOLOGY CLINIC | Facility: CLINIC | Age: 69
End: 2019-03-26
Payer: MEDICARE

## 2019-03-26 VITALS
HEIGHT: 63 IN | BODY MASS INDEX: 43.05 KG/M2 | WEIGHT: 243 LBS | OXYGEN SATURATION: 97 % | HEART RATE: 66 BPM | DIASTOLIC BLOOD PRESSURE: 70 MMHG | SYSTOLIC BLOOD PRESSURE: 112 MMHG

## 2019-03-26 DIAGNOSIS — N18.30 CKD (CHRONIC KIDNEY DISEASE) STAGE 3, GFR 30-59 ML/MIN (HCC): ICD-10-CM

## 2019-03-26 DIAGNOSIS — Z79.4 TYPE 2 DIABETES MELLITUS WITH DIABETIC NEUROPATHY, WITH LONG-TERM CURRENT USE OF INSULIN (HCC): ICD-10-CM

## 2019-03-26 DIAGNOSIS — E66.01 MORBID OBESITY WITH BMI OF 40.0-44.9, ADULT (HCC): ICD-10-CM

## 2019-03-26 DIAGNOSIS — I48.20 CHRONIC ATRIAL FIBRILLATION (HCC): ICD-10-CM

## 2019-03-26 DIAGNOSIS — E11.40 TYPE 2 DIABETES MELLITUS WITH DIABETIC NEUROPATHY, WITH LONG-TERM CURRENT USE OF INSULIN (HCC): ICD-10-CM

## 2019-03-26 DIAGNOSIS — Z98.890 H/O TRICUSPID VALVE ANNULOPLASTY: ICD-10-CM

## 2019-03-26 DIAGNOSIS — I50.40 COMBINED SYSTOLIC AND DIASTOLIC CONGESTIVE HEART FAILURE, UNSPECIFIED HF CHRONICITY (HCC): ICD-10-CM

## 2019-03-26 DIAGNOSIS — G47.33 OBSTRUCTIVE SLEEP APNEA: ICD-10-CM

## 2019-03-26 DIAGNOSIS — I10 ESSENTIAL HYPERTENSION: ICD-10-CM

## 2019-03-26 DIAGNOSIS — I27.20 MILD PULMONARY HYPERTENSION (HCC): ICD-10-CM

## 2019-03-26 DIAGNOSIS — E78.2 MIXED HYPERLIPIDEMIA: ICD-10-CM

## 2019-03-26 DIAGNOSIS — I25.810 CORONARY ARTERY DISEASE INVOLVING CORONARY BYPASS GRAFT OF NATIVE HEART WITHOUT ANGINA PECTORIS: ICD-10-CM

## 2019-03-26 PROCEDURE — 93000 ELECTROCARDIOGRAM COMPLETE: CPT | Performed by: INTERNAL MEDICINE

## 2019-03-26 PROCEDURE — 99214 OFFICE O/P EST MOD 30 MIN: CPT | Performed by: INTERNAL MEDICINE

## 2019-03-26 RX ORDER — BRIMONIDINE TARTRATE/TIMOLOL 0.2%-0.5%
1 DROPS OPHTHALMIC (EYE) 2 TIMES DAILY
COMMUNITY
Start: 2019-03-19

## 2019-04-04 ENCOUNTER — TRANSCRIBE ORDERS (OUTPATIENT)
Dept: ADMINISTRATIVE | Facility: HOSPITAL | Age: 69
End: 2019-04-04

## 2019-04-04 ENCOUNTER — TELEPHONE (OUTPATIENT)
Dept: CARDIOLOGY CLINIC | Facility: CLINIC | Age: 69
End: 2019-04-04

## 2019-04-04 ENCOUNTER — LAB (OUTPATIENT)
Dept: LAB | Facility: HOSPITAL | Age: 69
End: 2019-04-04
Attending: INTERNAL MEDICINE
Payer: MEDICARE

## 2019-04-04 DIAGNOSIS — I48.20 CHRONIC ATRIAL FIBRILLATION (HCC): ICD-10-CM

## 2019-04-04 DIAGNOSIS — I50.40 COMBINED SYSTOLIC AND DIASTOLIC CONGESTIVE HEART FAILURE, UNSPECIFIED HF CHRONICITY (HCC): ICD-10-CM

## 2019-04-04 LAB
ANION GAP SERPL CALCULATED.3IONS-SCNC: 8 MMOL/L (ref 4–13)
BUN SERPL-MCNC: 60 MG/DL (ref 5–25)
CALCIUM SERPL-MCNC: 8.6 MG/DL (ref 8.3–10.1)
CHLORIDE SERPL-SCNC: 102 MMOL/L (ref 100–108)
CO2 SERPL-SCNC: 29 MMOL/L (ref 21–32)
CREAT SERPL-MCNC: 1.3 MG/DL (ref 0.6–1.3)
GFR SERPL CREATININE-BSD FRML MDRD: 42 ML/MIN/1.73SQ M
GLUCOSE P FAST SERPL-MCNC: 178 MG/DL (ref 65–99)
POTASSIUM SERPL-SCNC: 3.9 MMOL/L (ref 3.5–5.3)
SODIUM SERPL-SCNC: 139 MMOL/L (ref 136–145)

## 2019-04-04 PROCEDURE — 36415 COLL VENOUS BLD VENIPUNCTURE: CPT

## 2019-04-04 PROCEDURE — 80048 BASIC METABOLIC PNL TOTAL CA: CPT

## 2019-04-08 DIAGNOSIS — I50.40 COMBINED SYSTOLIC AND DIASTOLIC CONGESTIVE HEART FAILURE (HCC): ICD-10-CM

## 2019-04-08 DIAGNOSIS — N18.30 CKD (CHRONIC KIDNEY DISEASE) STAGE 3, GFR 30-59 ML/MIN (HCC): ICD-10-CM

## 2019-04-08 RX ORDER — POTASSIUM CHLORIDE 750 MG/1
10 TABLET, EXTENDED RELEASE ORAL 2 TIMES WEEKLY
Qty: 15 TABLET | Refills: 3 | Status: SHIPPED | OUTPATIENT
Start: 2019-04-08 | End: 2019-12-30 | Stop reason: SDUPTHER

## 2019-04-11 DIAGNOSIS — I50.40 COMBINED SYSTOLIC AND DIASTOLIC CONGESTIVE HEART FAILURE (HCC): ICD-10-CM

## 2019-04-11 RX ORDER — METOLAZONE 2.5 MG/1
2.5 TABLET ORAL 2 TIMES WEEKLY
Qty: 10 TABLET | Refills: 0 | Status: SHIPPED | OUTPATIENT
Start: 2019-04-11 | End: 2019-06-17 | Stop reason: SDUPTHER

## 2019-04-12 DIAGNOSIS — I50.40 COMBINED SYSTOLIC AND DIASTOLIC CONGESTIVE HEART FAILURE (HCC): ICD-10-CM

## 2019-04-12 RX ORDER — METOLAZONE 2.5 MG/1
2.5 TABLET ORAL 2 TIMES WEEKLY
Qty: 10 TABLET | Refills: 0 | Status: SHIPPED | OUTPATIENT
Start: 2019-04-15 | End: 2019-05-09 | Stop reason: SDUPTHER

## 2019-04-25 ENCOUNTER — TELEPHONE (OUTPATIENT)
Dept: CARDIOLOGY CLINIC | Facility: CLINIC | Age: 69
End: 2019-04-25

## 2019-05-07 ENCOUNTER — HOSPITAL ENCOUNTER (EMERGENCY)
Facility: HOSPITAL | Age: 69
Discharge: HOME | End: 2019-05-07
Attending: EMERGENCY MEDICINE | Admitting: EMERGENCY MEDICINE
Payer: MEDICARE

## 2019-05-07 ENCOUNTER — APPOINTMENT (EMERGENCY)
Dept: RADIOLOGY | Facility: HOSPITAL | Age: 69
End: 2019-05-07
Payer: MEDICARE

## 2019-05-07 VITALS
DIASTOLIC BLOOD PRESSURE: 78 MMHG | OXYGEN SATURATION: 93 % | RESPIRATION RATE: 20 BRPM | HEART RATE: 69 BPM | SYSTOLIC BLOOD PRESSURE: 187 MMHG | TEMPERATURE: 98 F

## 2019-05-07 DIAGNOSIS — R10.9 ABDOMINAL PAIN: Primary | ICD-10-CM

## 2019-05-07 DIAGNOSIS — N28.9 RENAL INSUFFICIENCY: ICD-10-CM

## 2019-05-07 DIAGNOSIS — N39.0 UTI (URINARY TRACT INFECTION): ICD-10-CM

## 2019-05-07 DIAGNOSIS — R73.9 HYPERGLYCEMIA: ICD-10-CM

## 2019-05-07 DIAGNOSIS — K42.9 HERNIA, UMBILICAL: ICD-10-CM

## 2019-05-07 LAB
ALBUMIN SERPL BCP-MCNC: 3.4 G/DL (ref 3.5–5)
ALP SERPL-CCNC: 145 U/L (ref 46–116)
ALT SERPL W P-5'-P-CCNC: 15 U/L (ref 12–78)
ANION GAP SERPL CALCULATED.3IONS-SCNC: 9 MMOL/L (ref 4–13)
AST SERPL W P-5'-P-CCNC: 16 U/L (ref 5–45)
ATRIAL RATE: 44 BPM
BACTERIA UR QL AUTO: ABNORMAL /HPF
BASOPHILS # BLD AUTO: 0.04 THOUSANDS/ΜL (ref 0–0.1)
BASOPHILS NFR BLD AUTO: 1 % (ref 0–1)
BILIRUB SERPL-MCNC: 0.7 MG/DL (ref 0.2–1)
BILIRUB UR QL STRIP: NEGATIVE
BUN SERPL-MCNC: 69 MG/DL (ref 5–25)
CALCIUM SERPL-MCNC: 9.3 MG/DL (ref 8.3–10.1)
CHLORIDE SERPL-SCNC: 100 MMOL/L (ref 100–108)
CLARITY UR: CLEAR
CO2 SERPL-SCNC: 29 MMOL/L (ref 21–32)
COLOR UR: YELLOW
CREAT SERPL-MCNC: 1.43 MG/DL (ref 0.6–1.3)
EOSINOPHIL # BLD AUTO: 0.17 THOUSAND/ΜL (ref 0–0.61)
EOSINOPHIL NFR BLD AUTO: 2 % (ref 0–6)
ERYTHROCYTE [DISTWIDTH] IN BLOOD BY AUTOMATED COUNT: 21.2 % (ref 11.6–15.1)
GFR SERPL CREATININE-BSD FRML MDRD: 37 ML/MIN/1.73SQ M
GLUCOSE SERPL-MCNC: 232 MG/DL (ref 65–140)
GLUCOSE UR STRIP-MCNC: NEGATIVE MG/DL
HCT VFR BLD AUTO: 41.1 % (ref 34.8–46.1)
HGB BLD-MCNC: 12.2 G/DL (ref 11.5–15.4)
HGB UR QL STRIP.AUTO: ABNORMAL
HOLD SPECIMEN: NORMAL
HYALINE CASTS #/AREA URNS LPF: ABNORMAL /LPF
IMM GRANULOCYTES # BLD AUTO: 0.01 THOUSAND/UL (ref 0–0.2)
IMM GRANULOCYTES NFR BLD AUTO: 0 % (ref 0–2)
KETONES UR STRIP-MCNC: NEGATIVE MG/DL
LEUKOCYTE ESTERASE UR QL STRIP: ABNORMAL
LIPASE SERPL-CCNC: 234 U/L (ref 73–393)
LYMPHOCYTES # BLD AUTO: 0.94 THOUSANDS/ΜL (ref 0.6–4.47)
LYMPHOCYTES NFR BLD AUTO: 13 % (ref 14–44)
MAGNESIUM SERPL-MCNC: 2.2 MG/DL (ref 1.6–2.6)
MCH RBC QN AUTO: 24.6 PG (ref 26.8–34.3)
MCHC RBC AUTO-ENTMCNC: 29.7 G/DL (ref 31.4–37.4)
MCV RBC AUTO: 83 FL (ref 82–98)
MONOCYTES # BLD AUTO: 0.87 THOUSAND/ΜL (ref 0.17–1.22)
MONOCYTES NFR BLD AUTO: 12 % (ref 4–12)
NEUTROPHILS # BLD AUTO: 5.26 THOUSANDS/ΜL (ref 1.85–7.62)
NEUTS SEG NFR BLD AUTO: 72 % (ref 43–75)
NITRITE UR QL STRIP: POSITIVE
NON-SQ EPI CELLS URNS QL MICRO: ABNORMAL /HPF
NRBC BLD AUTO-RTO: 0 /100 WBCS
PH UR STRIP.AUTO: 5.5 [PH]
PHOSPHATE SERPL-MCNC: 3.7 MG/DL (ref 2.3–4.1)
PLATELET # BLD AUTO: 216 THOUSANDS/UL (ref 149–390)
PMV BLD AUTO: 10.2 FL (ref 8.9–12.7)
POTASSIUM SERPL-SCNC: 4 MMOL/L (ref 3.5–5.3)
PROT SERPL-MCNC: 8.9 G/DL (ref 6.4–8.2)
PROT UR STRIP-MCNC: ABNORMAL MG/DL
QRS AXIS: 263 DEGREES
QRSD INTERVAL: 192 MS
QT INTERVAL: 506 MS
QTC INTERVAL: 522 MS
RBC # BLD AUTO: 4.95 MILLION/UL (ref 3.81–5.12)
RBC #/AREA URNS AUTO: ABNORMAL /HPF
SODIUM SERPL-SCNC: 138 MMOL/L (ref 136–145)
SP GR UR STRIP.AUTO: 1.01 (ref 1–1.03)
T WAVE AXIS: 80 DEGREES
TROPONIN I SERPL-MCNC: 0.02 NG/ML
UROBILINOGEN UR QL STRIP.AUTO: 1 E.U./DL
VENTRICULAR RATE: 64 BPM
WBC # BLD AUTO: 7.29 THOUSAND/UL (ref 4.31–10.16)
WBC #/AREA URNS AUTO: ABNORMAL /HPF

## 2019-05-07 PROCEDURE — 87186 SC STD MICRODIL/AGAR DIL: CPT | Performed by: EMERGENCY MEDICINE

## 2019-05-07 PROCEDURE — 96374 THER/PROPH/DIAG INJ IV PUSH: CPT

## 2019-05-07 PROCEDURE — 93005 ELECTROCARDIOGRAM TRACING: CPT

## 2019-05-07 PROCEDURE — 84100 ASSAY OF PHOSPHORUS: CPT | Performed by: EMERGENCY MEDICINE

## 2019-05-07 PROCEDURE — 80053 COMPREHEN METABOLIC PANEL: CPT

## 2019-05-07 PROCEDURE — 84484 ASSAY OF TROPONIN QUANT: CPT | Performed by: EMERGENCY MEDICINE

## 2019-05-07 PROCEDURE — 74176 CT ABD & PELVIS W/O CONTRAST: CPT

## 2019-05-07 PROCEDURE — 99284 EMERGENCY DEPT VISIT MOD MDM: CPT

## 2019-05-07 PROCEDURE — 93010 ELECTROCARDIOGRAM REPORT: CPT | Performed by: INTERNAL MEDICINE

## 2019-05-07 PROCEDURE — 87086 URINE CULTURE/COLONY COUNT: CPT | Performed by: EMERGENCY MEDICINE

## 2019-05-07 PROCEDURE — 83690 ASSAY OF LIPASE: CPT | Performed by: EMERGENCY MEDICINE

## 2019-05-07 PROCEDURE — 81001 URINALYSIS AUTO W/SCOPE: CPT | Performed by: EMERGENCY MEDICINE

## 2019-05-07 PROCEDURE — 83735 ASSAY OF MAGNESIUM: CPT | Performed by: EMERGENCY MEDICINE

## 2019-05-07 PROCEDURE — 87077 CULTURE AEROBIC IDENTIFY: CPT | Performed by: EMERGENCY MEDICINE

## 2019-05-07 PROCEDURE — 85025 COMPLETE CBC W/AUTO DIFF WBC: CPT

## 2019-05-07 PROCEDURE — 36415 COLL VENOUS BLD VENIPUNCTURE: CPT

## 2019-05-07 RX ORDER — ONDANSETRON 2 MG/ML
4 INJECTION INTRAMUSCULAR; INTRAVENOUS ONCE
Status: COMPLETED | OUTPATIENT
Start: 2019-05-07 | End: 2019-05-07

## 2019-05-07 RX ORDER — ONDANSETRON 2 MG/ML
INJECTION INTRAMUSCULAR; INTRAVENOUS
Status: COMPLETED
Start: 2019-05-07 | End: 2019-05-07

## 2019-05-07 RX ADMIN — ONDANSETRON 4 MG: 2 INJECTION INTRAMUSCULAR; INTRAVENOUS at 06:14

## 2019-05-08 ENCOUNTER — VBI (OUTPATIENT)
Dept: FAMILY MEDICINE CLINIC | Facility: CLINIC | Age: 69
End: 2019-05-08

## 2019-05-09 ENCOUNTER — TELEPHONE (OUTPATIENT)
Dept: ADMINISTRATIVE | Facility: OTHER | Age: 69
End: 2019-05-09

## 2019-05-09 ENCOUNTER — OFFICE VISIT (OUTPATIENT)
Dept: FAMILY MEDICINE CLINIC | Facility: CLINIC | Age: 69
End: 2019-05-09
Payer: MEDICARE

## 2019-05-09 VITALS
HEIGHT: 63 IN | BODY MASS INDEX: 40.75 KG/M2 | RESPIRATION RATE: 16 BRPM | TEMPERATURE: 95.8 F | WEIGHT: 230 LBS | DIASTOLIC BLOOD PRESSURE: 72 MMHG | SYSTOLIC BLOOD PRESSURE: 120 MMHG | HEART RATE: 64 BPM

## 2019-05-09 DIAGNOSIS — M54.50 ACUTE RIGHT-SIDED LOW BACK PAIN WITHOUT SCIATICA: ICD-10-CM

## 2019-05-09 DIAGNOSIS — E03.9 HYPOTHYROIDISM, UNSPECIFIED TYPE: ICD-10-CM

## 2019-05-09 DIAGNOSIS — N30.00 ACUTE CYSTITIS WITHOUT HEMATURIA: ICD-10-CM

## 2019-05-09 DIAGNOSIS — E11.40 TYPE 2 DIABETES MELLITUS WITH DIABETIC NEUROPATHY, WITH LONG-TERM CURRENT USE OF INSULIN (HCC): Primary | ICD-10-CM

## 2019-05-09 DIAGNOSIS — Z79.4 TYPE 2 DIABETES MELLITUS WITH DIABETIC NEUROPATHY, WITH LONG-TERM CURRENT USE OF INSULIN (HCC): Primary | ICD-10-CM

## 2019-05-09 DIAGNOSIS — K43.9 VENTRAL HERNIA WITHOUT OBSTRUCTION OR GANGRENE: ICD-10-CM

## 2019-05-09 LAB — BACTERIA UR CULT: ABNORMAL

## 2019-05-09 PROCEDURE — 99214 OFFICE O/P EST MOD 30 MIN: CPT | Performed by: INTERNAL MEDICINE

## 2019-05-09 PROCEDURE — 83036 HEMOGLOBIN GLYCOSYLATED A1C: CPT | Performed by: INTERNAL MEDICINE

## 2019-05-09 RX ORDER — NITROFURANTOIN MACROCRYSTALS 100 MG/1
100 CAPSULE ORAL 2 TIMES DAILY
COMMUNITY
End: 2019-05-23

## 2019-05-10 LAB — SL AMB POCT HEMOGLOBIN AIC: 7.7 (ref ?–6.5)

## 2019-05-15 DIAGNOSIS — I48.20 CHRONIC ATRIAL FIBRILLATION (HCC): ICD-10-CM

## 2019-05-15 RX ORDER — RIVAROXABAN 15 MG/1
TABLET, FILM COATED ORAL
Qty: 90 TABLET | Refills: 0 | Status: SHIPPED | OUTPATIENT
Start: 2019-05-15 | End: 2019-10-20 | Stop reason: SDUPTHER

## 2019-05-16 ENCOUNTER — REMOTE DEVICE CLINIC VISIT (OUTPATIENT)
Dept: CARDIOLOGY CLINIC | Facility: CLINIC | Age: 69
End: 2019-05-16
Payer: MEDICARE

## 2019-05-16 DIAGNOSIS — I48.20 CHRONIC ATRIAL FIBRILLATION (HCC): Primary | ICD-10-CM

## 2019-05-16 DIAGNOSIS — Z95.0 PRESENCE OF PERMANENT CARDIAC PACEMAKER: ICD-10-CM

## 2019-05-16 PROCEDURE — 93296 REM INTERROG EVL PM/IDS: CPT | Performed by: INTERNAL MEDICINE

## 2019-05-16 PROCEDURE — 93294 REM INTERROG EVL PM/LDLS PM: CPT | Performed by: INTERNAL MEDICINE

## 2019-05-23 ENCOUNTER — APPOINTMENT (EMERGENCY)
Dept: RADIOLOGY | Facility: HOSPITAL | Age: 69
End: 2019-05-23
Payer: MEDICARE

## 2019-05-23 ENCOUNTER — HOSPITAL ENCOUNTER (OUTPATIENT)
Facility: HOSPITAL | Age: 69
Setting detail: OBSERVATION
Discharge: HOME | End: 2019-05-23
Attending: EMERGENCY MEDICINE | Admitting: FAMILY MEDICINE
Payer: MEDICARE

## 2019-05-23 ENCOUNTER — APPOINTMENT (OUTPATIENT)
Dept: NON INVASIVE DIAGNOSTICS | Facility: HOSPITAL | Age: 69
End: 2019-05-23
Payer: MEDICARE

## 2019-05-23 DIAGNOSIS — I27.20 MILD PULMONARY HYPERTENSION (HCC): ICD-10-CM

## 2019-05-23 DIAGNOSIS — R55 SYNCOPE: Primary | ICD-10-CM

## 2019-05-23 DIAGNOSIS — I50.42 CHRONIC COMBINED SYSTOLIC AND DIASTOLIC CONGESTIVE HEART FAILURE (HCC): ICD-10-CM

## 2019-05-23 DIAGNOSIS — I27.20 PULMONARY HYPERTENSION (HCC): ICD-10-CM

## 2019-05-23 DIAGNOSIS — R55 SYNCOPE, UNSPECIFIED SYNCOPE TYPE: ICD-10-CM

## 2019-05-23 LAB
ALBUMIN SERPL BCP-MCNC: 3.4 G/DL (ref 3.5–5)
ALP SERPL-CCNC: 121 U/L (ref 46–116)
ALT SERPL W P-5'-P-CCNC: 13 U/L (ref 12–78)
ANION GAP SERPL CALCULATED.3IONS-SCNC: 9 MMOL/L (ref 4–13)
AST SERPL W P-5'-P-CCNC: 21 U/L (ref 5–45)
BASOPHILS # BLD AUTO: 0.04 THOUSANDS/ΜL (ref 0–0.1)
BASOPHILS NFR BLD AUTO: 1 % (ref 0–1)
BILIRUB SERPL-MCNC: 0.7 MG/DL (ref 0.2–1)
BUN SERPL-MCNC: 69 MG/DL (ref 5–25)
CALCIUM SERPL-MCNC: 9 MG/DL (ref 8.3–10.1)
CHLORIDE SERPL-SCNC: 98 MMOL/L (ref 100–108)
CHOLEST SERPL-MCNC: 108 MG/DL (ref 50–200)
CO2 SERPL-SCNC: 27 MMOL/L (ref 21–32)
CREAT SERPL-MCNC: 1.45 MG/DL (ref 0.6–1.3)
EOSINOPHIL # BLD AUTO: 0.23 THOUSAND/ΜL (ref 0–0.61)
EOSINOPHIL NFR BLD AUTO: 4 % (ref 0–6)
ERYTHROCYTE [DISTWIDTH] IN BLOOD BY AUTOMATED COUNT: 21 % (ref 11.6–15.1)
GFR SERPL CREATININE-BSD FRML MDRD: 37 ML/MIN/1.73SQ M
GLUCOSE SERPL-MCNC: 181 MG/DL (ref 65–140)
GLUCOSE SERPL-MCNC: 203 MG/DL (ref 65–140)
GLUCOSE SERPL-MCNC: 215 MG/DL (ref 65–140)
GLUCOSE SERPL-MCNC: 88 MG/DL (ref 65–140)
HCT VFR BLD AUTO: 41.4 % (ref 34.8–46.1)
HDLC SERPL-MCNC: 39 MG/DL (ref 40–60)
HGB BLD-MCNC: 12.2 G/DL (ref 11.5–15.4)
IMM GRANULOCYTES # BLD AUTO: 0.02 THOUSAND/UL (ref 0–0.2)
IMM GRANULOCYTES NFR BLD AUTO: 0 % (ref 0–2)
LDLC SERPL CALC-MCNC: 52 MG/DL (ref 0–100)
LYMPHOCYTES # BLD AUTO: 1.04 THOUSANDS/ΜL (ref 0.6–4.47)
LYMPHOCYTES NFR BLD AUTO: 17 % (ref 14–44)
MAGNESIUM SERPL-MCNC: 2.2 MG/DL (ref 1.6–2.6)
MCH RBC QN AUTO: 24.6 PG (ref 26.8–34.3)
MCHC RBC AUTO-ENTMCNC: 29.5 G/DL (ref 31.4–37.4)
MCV RBC AUTO: 84 FL (ref 82–98)
MONOCYTES # BLD AUTO: 0.89 THOUSAND/ΜL (ref 0.17–1.22)
MONOCYTES NFR BLD AUTO: 14 % (ref 4–12)
NEUTROPHILS # BLD AUTO: 4.06 THOUSANDS/ΜL (ref 1.85–7.62)
NEUTS SEG NFR BLD AUTO: 64 % (ref 43–75)
NONHDLC SERPL-MCNC: 69 MG/DL
NRBC BLD AUTO-RTO: 0 /100 WBCS
PLATELET # BLD AUTO: 199 THOUSANDS/UL (ref 149–390)
PMV BLD AUTO: 10.5 FL (ref 8.9–12.7)
POTASSIUM SERPL-SCNC: 4.3 MMOL/L (ref 3.5–5.3)
PROT SERPL-MCNC: 8.7 G/DL (ref 6.4–8.2)
RBC # BLD AUTO: 4.96 MILLION/UL (ref 3.81–5.12)
SODIUM SERPL-SCNC: 134 MMOL/L (ref 136–145)
TRIGL SERPL-MCNC: 83 MG/DL
TROPONIN I SERPL-MCNC: 0.02 NG/ML
TROPONIN I SERPL-MCNC: 0.04 NG/ML
TROPONIN I SERPL-MCNC: <0.02 NG/ML
TSH SERPL DL<=0.05 MIU/L-ACNC: 0.87 UIU/ML (ref 0.36–3.74)
WBC # BLD AUTO: 6.28 THOUSAND/UL (ref 4.31–10.16)

## 2019-05-23 PROCEDURE — 99215 OFFICE O/P EST HI 40 MIN: CPT | Performed by: INTERNAL MEDICINE

## 2019-05-23 PROCEDURE — G8987 SELF CARE CURRENT STATUS: HCPCS

## 2019-05-23 PROCEDURE — 93306 TTE W/DOPPLER COMPLETE: CPT | Performed by: INTERNAL MEDICINE

## 2019-05-23 PROCEDURE — 80053 COMPREHEN METABOLIC PANEL: CPT | Performed by: EMERGENCY MEDICINE

## 2019-05-23 PROCEDURE — 99236 HOSP IP/OBS SAME DATE HI 85: CPT | Performed by: INTERNAL MEDICINE

## 2019-05-23 PROCEDURE — 84484 ASSAY OF TROPONIN QUANT: CPT | Performed by: NURSE PRACTITIONER

## 2019-05-23 PROCEDURE — 93005 ELECTROCARDIOGRAM TRACING: CPT

## 2019-05-23 PROCEDURE — 71045 X-RAY EXAM CHEST 1 VIEW: CPT

## 2019-05-23 PROCEDURE — 80061 LIPID PANEL: CPT | Performed by: NURSE PRACTITIONER

## 2019-05-23 PROCEDURE — 70450 CT HEAD/BRAIN W/O DYE: CPT

## 2019-05-23 PROCEDURE — 93288 INTERROG EVL PM/LDLS PM IP: CPT | Performed by: INTERNAL MEDICINE

## 2019-05-23 PROCEDURE — 72125 CT NECK SPINE W/O DYE: CPT

## 2019-05-23 PROCEDURE — 93306 TTE W/DOPPLER COMPLETE: CPT

## 2019-05-23 PROCEDURE — 94664 DEMO&/EVAL PT USE INHALER: CPT

## 2019-05-23 PROCEDURE — 84443 ASSAY THYROID STIM HORMONE: CPT | Performed by: NURSE PRACTITIONER

## 2019-05-23 PROCEDURE — 99285 EMERGENCY DEPT VISIT HI MDM: CPT

## 2019-05-23 PROCEDURE — 83735 ASSAY OF MAGNESIUM: CPT | Performed by: EMERGENCY MEDICINE

## 2019-05-23 PROCEDURE — 1123F ACP DISCUSS/DSCN MKR DOCD: CPT | Performed by: INTERNAL MEDICINE

## 2019-05-23 PROCEDURE — G8988 SELF CARE GOAL STATUS: HCPCS

## 2019-05-23 PROCEDURE — 97166 OT EVAL MOD COMPLEX 45 MIN: CPT

## 2019-05-23 PROCEDURE — 97530 THERAPEUTIC ACTIVITIES: CPT

## 2019-05-23 PROCEDURE — 82948 REAGENT STRIP/BLOOD GLUCOSE: CPT

## 2019-05-23 PROCEDURE — 73030 X-RAY EXAM OF SHOULDER: CPT

## 2019-05-23 PROCEDURE — G8979 MOBILITY GOAL STATUS: HCPCS

## 2019-05-23 PROCEDURE — 84484 ASSAY OF TROPONIN QUANT: CPT | Performed by: EMERGENCY MEDICINE

## 2019-05-23 PROCEDURE — 85025 COMPLETE CBC W/AUTO DIFF WBC: CPT | Performed by: EMERGENCY MEDICINE

## 2019-05-23 PROCEDURE — 94761 N-INVAS EAR/PLS OXIMETRY MLT: CPT

## 2019-05-23 PROCEDURE — G8978 MOBILITY CURRENT STATUS: HCPCS

## 2019-05-23 PROCEDURE — 97162 PT EVAL MOD COMPLEX 30 MIN: CPT

## 2019-05-23 PROCEDURE — 36415 COLL VENOUS BLD VENIPUNCTURE: CPT | Performed by: EMERGENCY MEDICINE

## 2019-05-23 RX ORDER — ASPIRIN 81 MG/1
81 TABLET, CHEWABLE ORAL DAILY
Status: DISCONTINUED | OUTPATIENT
Start: 2019-05-23 | End: 2019-05-23 | Stop reason: HOSPADM

## 2019-05-23 RX ORDER — PRAVASTATIN SODIUM 40 MG
40 TABLET ORAL
Status: DISCONTINUED | OUTPATIENT
Start: 2019-05-23 | End: 2019-05-23 | Stop reason: HOSPADM

## 2019-05-23 RX ORDER — INSULIN GLARGINE 100 [IU]/ML
15 INJECTION, SOLUTION SUBCUTANEOUS
Status: DISCONTINUED | OUTPATIENT
Start: 2019-05-23 | End: 2019-05-23 | Stop reason: HOSPADM

## 2019-05-23 RX ORDER — PANTOPRAZOLE SODIUM 20 MG/1
20 TABLET, DELAYED RELEASE ORAL
Status: DISCONTINUED | OUTPATIENT
Start: 2019-05-23 | End: 2019-05-23 | Stop reason: HOSPADM

## 2019-05-23 RX ORDER — ONDANSETRON 2 MG/ML
4 INJECTION INTRAMUSCULAR; INTRAVENOUS EVERY 6 HOURS PRN
Status: DISCONTINUED | OUTPATIENT
Start: 2019-05-23 | End: 2019-05-23 | Stop reason: HOSPADM

## 2019-05-23 RX ORDER — LOSARTAN POTASSIUM 50 MG/1
50 TABLET ORAL DAILY
Status: DISCONTINUED | OUTPATIENT
Start: 2019-05-23 | End: 2019-05-23 | Stop reason: HOSPADM

## 2019-05-23 RX ORDER — FUROSEMIDE 80 MG
80 TABLET ORAL DAILY
Status: DISCONTINUED | OUTPATIENT
Start: 2019-05-23 | End: 2019-05-23 | Stop reason: HOSPADM

## 2019-05-23 RX ADMIN — INSULIN LISPRO 10 UNITS: 100 INJECTION, SOLUTION INTRAVENOUS; SUBCUTANEOUS at 11:47

## 2019-05-23 RX ADMIN — PRAVASTATIN SODIUM 40 MG: 40 TABLET ORAL at 18:19

## 2019-05-23 RX ADMIN — FUROSEMIDE 80 MG: 80 TABLET ORAL at 09:35

## 2019-05-23 RX ADMIN — ASPIRIN 81 MG 81 MG: 81 TABLET ORAL at 09:34

## 2019-05-23 RX ADMIN — RIVAROXABAN 15 MG: 15 TABLET, FILM COATED ORAL at 18:19

## 2019-05-23 RX ADMIN — INSULIN LISPRO 1 UNITS: 100 INJECTION, SOLUTION INTRAVENOUS; SUBCUTANEOUS at 09:36

## 2019-05-23 RX ADMIN — PANTOPRAZOLE SODIUM 20 MG: 20 TABLET, DELAYED RELEASE ORAL at 09:34

## 2019-05-23 RX ADMIN — LEVOTHYROXINE SODIUM 137 MCG: 112 TABLET ORAL at 09:28

## 2019-05-23 RX ADMIN — INSULIN LISPRO 2 UNITS: 100 INJECTION, SOLUTION INTRAVENOUS; SUBCUTANEOUS at 11:47

## 2019-05-23 RX ADMIN — LOSARTAN POTASSIUM 50 MG: 50 TABLET ORAL at 09:34

## 2019-05-23 RX ADMIN — INSULIN LISPRO 10 UNITS: 100 INJECTION, SOLUTION INTRAVENOUS; SUBCUTANEOUS at 09:36

## 2019-05-25 LAB
ATRIAL RATE: 65 BPM
QRS AXIS: 255 DEGREES
QRSD INTERVAL: 194 MS
QT INTERVAL: 492 MS
QTC INTERVAL: 531 MS
T WAVE AXIS: 83 DEGREES
VENTRICULAR RATE: 70 BPM

## 2019-05-25 PROCEDURE — 93010 ELECTROCARDIOGRAM REPORT: CPT | Performed by: INTERNAL MEDICINE

## 2019-05-28 ENCOUNTER — TRANSITIONAL CARE MANAGEMENT (OUTPATIENT)
Dept: FAMILY MEDICINE CLINIC | Facility: CLINIC | Age: 69
End: 2019-05-28

## 2019-05-28 VITALS
TEMPERATURE: 98.3 F | BODY MASS INDEX: 34.66 KG/M2 | DIASTOLIC BLOOD PRESSURE: 90 MMHG | RESPIRATION RATE: 19 BRPM | SYSTOLIC BLOOD PRESSURE: 180 MMHG | OXYGEN SATURATION: 94 % | HEIGHT: 65 IN | HEART RATE: 61 BPM | WEIGHT: 208 LBS

## 2019-05-29 ENCOUNTER — OFFICE VISIT (OUTPATIENT)
Dept: FAMILY MEDICINE CLINIC | Facility: CLINIC | Age: 69
End: 2019-05-29
Payer: MEDICARE

## 2019-05-29 VITALS
HEART RATE: 84 BPM | SYSTOLIC BLOOD PRESSURE: 122 MMHG | RESPIRATION RATE: 16 BRPM | TEMPERATURE: 97.2 F | BODY MASS INDEX: 37.99 KG/M2 | HEIGHT: 65 IN | WEIGHT: 228 LBS | DIASTOLIC BLOOD PRESSURE: 56 MMHG

## 2019-05-29 DIAGNOSIS — G47.33 OBSTRUCTIVE SLEEP APNEA: ICD-10-CM

## 2019-05-29 DIAGNOSIS — Z79.4 TYPE 2 DIABETES MELLITUS WITH DIABETIC NEUROPATHY, WITH LONG-TERM CURRENT USE OF INSULIN (HCC): ICD-10-CM

## 2019-05-29 DIAGNOSIS — E11.40 TYPE 2 DIABETES MELLITUS WITH DIABETIC NEUROPATHY, WITH LONG-TERM CURRENT USE OF INSULIN (HCC): ICD-10-CM

## 2019-05-29 DIAGNOSIS — K43.9 VENTRAL HERNIA WITHOUT OBSTRUCTION OR GANGRENE: ICD-10-CM

## 2019-05-29 DIAGNOSIS — I50.42 CHRONIC COMBINED SYSTOLIC AND DIASTOLIC CONGESTIVE HEART FAILURE (HCC): ICD-10-CM

## 2019-05-29 DIAGNOSIS — E78.2 MIXED HYPERLIPIDEMIA: ICD-10-CM

## 2019-05-29 DIAGNOSIS — R55 SYNCOPE, UNSPECIFIED SYNCOPE TYPE: Primary | ICD-10-CM

## 2019-05-29 DIAGNOSIS — I25.810 CORONARY ARTERY DISEASE INVOLVING CORONARY BYPASS GRAFT OF NATIVE HEART WITHOUT ANGINA PECTORIS: ICD-10-CM

## 2019-05-29 DIAGNOSIS — E07.9 DISEASE OF THYROID GLAND: ICD-10-CM
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
## 2019-05-29 DIAGNOSIS — Z12.12 SCREENING FOR COLORECTAL CANCER: ICD-10-CM

## 2019-05-29 DIAGNOSIS — I48.20 CHRONIC ATRIAL FIBRILLATION (HCC): ICD-10-CM

## 2019-05-29 DIAGNOSIS — Z12.11 SCREENING FOR COLORECTAL CANCER: ICD-10-CM

## 2019-05-29 PROCEDURE — 99496 TRANSJ CARE MGMT HIGH F2F 7D: CPT | Performed by: NURSE PRACTITIONER

## 2019-05-29 RX ORDER — INSULIN ASPART 100 [IU]/ML
INJECTION, SOLUTION INTRAVENOUS; SUBCUTANEOUS
Refills: 1 | COMMUNITY
Start: 2019-05-25 | End: 2019-09-20 | Stop reason: SDUPTHER

## 2019-05-30 ENCOUNTER — FOLLOW UP (OUTPATIENT)
Dept: URBAN - METROPOLITAN AREA CLINIC 27 | Facility: CLINIC | Age: 69
End: 2019-05-30

## 2019-05-30 DIAGNOSIS — H43.811: ICD-10-CM

## 2019-05-30 DIAGNOSIS — E11.3413: ICD-10-CM

## 2019-05-30 DIAGNOSIS — H40.053: ICD-10-CM

## 2019-05-30 PROCEDURE — 92134 CPTRZ OPH DX IMG PST SGM RTA: CPT

## 2019-05-30 PROCEDURE — 67028 INJECTION EYE DRUG: CPT

## 2019-05-30 PROCEDURE — 92014 COMPRE OPH EXAM EST PT 1/>: CPT | Mod: 25

## 2019-05-30 ASSESSMENT — TONOMETRY
OS_IOP_MMHG: 19
OD_IOP_MMHG: 18

## 2019-05-30 ASSESSMENT — VISUAL ACUITY
OD_CC: 20/100
OS_CC: CF 2FT

## 2019-06-17 DIAGNOSIS — I50.40 COMBINED SYSTOLIC AND DIASTOLIC CONGESTIVE HEART FAILURE (HCC): ICD-10-CM

## 2019-06-17 RX ORDER — METOLAZONE 2.5 MG/1
TABLET ORAL
Qty: 10 TABLET | Refills: 0 | Status: SHIPPED | OUTPATIENT
Start: 2019-06-17 | End: 2019-07-15 | Stop reason: SDUPTHER

## 2019-06-18 PROBLEM — E11.311 TYPE 2 DIABETES MELLITUS WITH UNSPECIFIED DIABETIC RETINOPATHY WITH MACULAR EDEMA (HCC): Status: ACTIVE | Noted: 2019-06-18

## 2019-06-19 DIAGNOSIS — I10 ESSENTIAL HYPERTENSION: Primary | ICD-10-CM

## 2019-06-19 RX ORDER — VALSARTAN 80 MG/1
TABLET ORAL
Qty: 90 TABLET | Refills: 3 | Status: SHIPPED | OUTPATIENT
Start: 2019-06-19 | End: 2019-12-24 | Stop reason: HOSPADM

## 2019-06-24 ENCOUNTER — TELEPHONE (OUTPATIENT)
Dept: FAMILY MEDICINE CLINIC | Facility: CLINIC | Age: 69
End: 2019-06-24

## 2019-06-24 DIAGNOSIS — I27.20 MILD PULMONARY HYPERTENSION (HCC): ICD-10-CM

## 2019-06-24 DIAGNOSIS — G47.33 OBSTRUCTIVE SLEEP APNEA: Primary | ICD-10-CM

## 2019-07-01 ENCOUNTER — CONSULT (OUTPATIENT)
Dept: ENDOCRINOLOGY | Facility: CLINIC | Age: 69
End: 2019-07-01
Payer: MEDICARE

## 2019-07-01 VITALS
HEART RATE: 62 BPM | BODY MASS INDEX: 38.15 KG/M2 | WEIGHT: 229 LBS | SYSTOLIC BLOOD PRESSURE: 152 MMHG | HEIGHT: 65 IN | DIASTOLIC BLOOD PRESSURE: 86 MMHG

## 2019-07-01 DIAGNOSIS — N18.30 CKD (CHRONIC KIDNEY DISEASE) STAGE 3, GFR 30-59 ML/MIN (HCC): ICD-10-CM

## 2019-07-01 DIAGNOSIS — Z79.4 TYPE 2 DIABETES MELLITUS WITH DIABETIC NEUROPATHY, WITH LONG-TERM CURRENT USE OF INSULIN (HCC): Primary | ICD-10-CM

## 2019-07-01 DIAGNOSIS — E78.2 MIXED HYPERLIPIDEMIA: ICD-10-CM

## 2019-07-01 DIAGNOSIS — Z79.4 ENCOUNTER FOR LONG-TERM (CURRENT) USE OF INSULIN (HCC): ICD-10-CM

## 2019-07-01 DIAGNOSIS — R25.2 LEG CRAMPS: ICD-10-CM

## 2019-07-01 DIAGNOSIS — E03.9 ACQUIRED HYPOTHYROIDISM: ICD-10-CM

## 2019-07-01 DIAGNOSIS — R20.2 PARESTHESIA: ICD-10-CM

## 2019-07-01 DIAGNOSIS — E11.40 TYPE 2 DIABETES MELLITUS WITH DIABETIC NEUROPATHY, WITH LONG-TERM CURRENT USE OF INSULIN (HCC): Primary | ICD-10-CM

## 2019-07-01 PROCEDURE — 1124F ACP DISCUSS-NO DSCNMKR DOCD: CPT | Performed by: INTERNAL MEDICINE

## 2019-07-01 PROCEDURE — 99205 OFFICE O/P NEW HI 60 MIN: CPT | Performed by: INTERNAL MEDICINE

## 2019-07-01 NOTE — PATIENT INSTRUCTIONS
Please continue current medications   Please try to avoid high carbohydrate snacks   please get labs done as ordered  Please send blood sugar log for review in 3-4 weeks    If you notice frequent tight blood sugars, please get in touch with me  earlier  Follow-up in 3 months

## 2019-07-01 NOTE — PROGRESS NOTES
Esteban Florian 78 y o  female MRN: [id number]    Encounter: [id number]      Assessment/Plan     Assessment: This is a 78y o -year-old female with type 2 diabetes mellitus, hypertension, hyperlipidemia, CKD, CAD, CHF, obesity, hypothyroidism    Plan:  1  Type 2 diabetes mellitus on long-term insulin therapy   Fairly well controlled with last A1c 7 7% however may not reliable in the setting of CKD  In this elderly patient with comorbidities, goal A1c close to 7-7 5% without lows  Noted to have variability in blood sugars which appear to be diet related    Recommend the following at this time  - continue current regimen for now  -avoid high carbohydrate snacks  - Advised patient to send blood sugar log for review in 3-4 weeks  - Check fructosamine level    - Recommended a consistent carbohydrate diet   - weight control and exercise as discussed ( ideal is 30 min, at least 5 times a week)   - home glucose monitoring and goals emphasized, requested patient bring in glucose monitor or log sheets to next visit   - discussed signs and symptoms of hypoglycemia and how to correct them appropriately  - counseled about the long term complications of uncontrolled diabetes, including, Nephropathy, Neuropathy, CVD, Retinopathy and importance  of adherence to diet, treatment plan and life style modifications   - importance of following up with Opthalmology and podiatry   - glycohemoglobin and other lab monitoring discussed, A1c goal value reviewed  - long term diabetic complications discussed  - labs immediately prior to next visit     2  Hypothyroidism  TSH within normal limits  Continue levothyroxine 137 mcg orally once a day  Repeat TSH, free T4 in 3 months    3  Hyperlipidemia  - continue statin therapy    4  Hypertension, CAD, CHF  Blood pressure at goal  - continue current medications  Follow-up with cardiology    5  CKD-trend BMP  Follow-up with Nephrology  6  Paresthesias, cramps-check B12, vitamin D  7   History of iron deficiency  MCH low, RDW high  May be related to CKD  -check iron panel      CC: Diabetes    History of Present Illness     HPI:  Eitan Walker is a 78 y o  female presents for a new visit regarding diabetes management  Also has a h/o hypertension, hyperlipidemia, obesity, CKD, CAD, CHF, atrial fibrillation, hypothyroidism     Was following up with Endo in [place]    DM history:   Diagnosed around [id number]  Has  complications of CAD/CKD  No history of CVA  Last Eye exam: has retinopathy; follows up with a retina specialist every 2 months on an avg     Current regimen:   Basaglar 15 units subcutaneously at bedtime  NovoLog per CIR 1:3, average 12-14 per meals  Correction 1:30> 150 mg    Has been to Diabetes edu in the past   Feels comfortable carb counting     Statin:  Simvastatin  ACE-I/ARB: --  For hypothyroidism-taking levothyroxine 137 mcg orally once a day    Appetite is good  Lost 20-25 lbs   has numbness, tingling in the hands and feet  Stable; Has paraesthesias throughout the body   Using ASA- caffiene which she says helps; oxycodone one a week helped better per patient   No chest pain/ SOB; using as needed oxygen  Has constipation, takes stool softner  Will be following up with GI for colo  Has an abdominal hernia   No urinary complaints  Exercise:  Not much   Uses a walker at night/ cane for ambulation when she is out of the house    Home glucose monitoring: 3-4 times a day -  will be scanned into chart   Tight Bg related to eating less carbs after already taking insulin; no blood sugar below 75 mg/dL  Admits to snacking bw meals; OJ  To avoid cramps bid     Symptoms of hypoglycemia :never had a low    H/o B12 def - no supplementation at this time    All other systems were reviewed and are negative      Review of Systems      Historical Information   Past Medical History:   Diagnosis Date    Arthritis     Atrial flutter ([address] )     Cardiac disease     Carotid artery occlusion     CHF (congestive heart failure) ([place] [address] )     Cholelithiasis     last assessed 8/8/2016    Coronary artery disease     Diabetes mellitus ([place] [address] )     Disease of thyroid gland     GERD (gastroesophageal reflux disease)     History of transfusion     Hyperlipidemia     Hypertension     Long-term insulin use in type 2 diabetes ([place] [address] ) 6/4/2016    Other specified diabetes mellitus with diabetic autonomic (poly)neuropathy ([place] [address] )     Renal disorder     Sleep apnea     Subclavian artery stenosis ([place] [address] )     Umbilical hernia with obstruction, without gangrene     last assessed 7/8/2016     Past Surgical History:   Procedure Laterality Date    ABDOMINAL SURGERY      CARDIAC PACEMAKER PLACEMENT      CARDIAC SURGERY      cabg x 2    CATARACT EXTRACTION      CHOLECYSTECTOMY      CORONARY ARTERY BYPASS GRAFT  2008    EYE SURGERY      FRACTURE SURGERY Right 2010    shoulder    HERNIA REPAIR      umbilical    CT LAP,CHOLECYSTECTOMY N/A 8/10/2016    Procedure: CHOLECYSTECTOMY LAPAROSCOPIC;  Surgeon: Nate Hdz MD;  [facility]: [facility];  Service: General     [address] Left 11/4/2016    Procedure: MICRODIRECT LARYNGOSCOPY; LEFT VOCAL FOLD INJECTION ;  Surgeon: Mio Mchugh MD;  [facility];  Service: ENT    CT REPAIR UMBILICAL USXL,4+D/A,NRMDI N/A 8/10/2016    Procedure: LAPAROSCOPIC REPAIR HERNIA VENTRAL;  Surgeon: Nate Hdz MD;  [facility];  Service: General    [address] / STENTING Right     TRICUSPID VALVE REPLACEMENT      VASCULAR SURGERY      heart valve replacement     Social History   Social History     Substance and Sexual Activity   Alcohol Use Not Currently    Comment: per [employer] 'occasional alcohol use'     Social History     Substance and Sexual Activity   Drug Use Never    Types: Oxycodone     Social History     Tobacco Use   Smoking Status Never Smoker   Smokeless Tobacco Never Used     Family History:   Family History   Problem Relation Age of Onset    Heart disease Mother     Breast cancer Maternal Aunt     Heart disease Sister        Meds/Allergies   Current Outpatient Medications   Medication Sig Dispense Refill    Aspirin-Caffeine 500-32 5 MG TABS Take 2 tablets by mouth daily at bedtime      COMBIGAN 0 2-0 5 % Administer 1 drop to both eyes 2 (two) times a day      furosemide (LASIX) 80 mg tablet TAKE 1 TABLET DAILY AS DIRECTED  30 tablet 5    insulin aspart (NovoLOG) 100 units/mL injection Inject 10 Units under the skin 3 (three) times a day before meals        insulin glargine (BASAGLAR KWIKPEN) 100 units/mL injection pen Inject 20 Units under the skin daily (Patient taking differently: Inject 15 Units under the skin daily at bedtime ) 5 pen 0    levothyroxine (SYNTHROID) 137 mcg tablet Take 137 mcg by mouth daily   metolazone (ZAROXOLYN) 2 5 mg tablet TAKE 1 TABLET BY MOUTH TWICE WEEKLY 10 tablet 0    omeprazole (PriLOSEC) 20 mg delayed release capsule Take 20 mg by mouth 3 (three) times a week        potassium chloride (K-DUR,KLOR-CON) 10 mEq tablet Take 1 tablet (10 mEq total) by mouth 2 (two) times a week (Patient taking differently: Take 10 mEq by mouth as needed (only taken with the metolazone) ) 15 tablet 3    Sennosides (SENOKOT PO) Take by mouth daily      simvastatin (ZOCOR) 20 mg tablet TAKE 1 TABLET (20MG) BY ORAL ROUTE EVERY DAY IN THE EVENING 90 tablet 2    traMADol (ULTRAM) 50 mg tablet Take 1 tablet (50 mg total) by mouth every 6 (six) hours as needed for moderate pain 30 tablet 0    valsartan (DIOVAN) 80 mg tablet TAKE 1 TABLET ORALLY EVERY DAY 90 tablet 3    XARELTO 15 MG tablet TAKE 1 TAB BY MOUTH DAILY WITH EVENEING MEAL  90 tablet 0    Magnesium Hydroxide (MILK OF MAGNESIA PO) Take by mouth 1 in the AM and 1 in the PM      NOVOLOG FLEXPEN 100 units/mL injection pen UP TO 70 UNITS DAILY AS DIRECTED  1     No current facility-administered medications for this visit        Allergies   Allergen Reactions    Bromide Ion [Bromine]      Blurred vision  Has preservative that  Pt cannot use    Other      Adhesive tape    Ibuprofen Palpitations    Penicillins Rash       Objective   Vitals: Blood pressure 152/86, pulse 62, height 5' 5" (1 651 m), weight 104 kg (229 lb), not currently breastfeeding  Physical Exam   Constitutional: She is oriented to person, place, and time  She appears well-developed and well-nourished  HENT:   Head: Normocephalic and atraumatic  Eyes: Pupils are equal, round, and reactive to light  Conjunctivae and EOM are normal    Neck: Normal range of motion  Neck supple  No thyromegaly present  Cardiovascular: Normal rate, regular rhythm and normal heart sounds  No murmur heard  Pulmonary/Chest: Effort normal and breath sounds normal  She has no wheezes  Abdominal: Soft  She exhibits no distension  There is no tenderness  Musculoskeletal: Normal range of motion  She exhibits edema  Neurological: She is alert and oriented to person, place, and time  Skin: Skin is warm and dry  Psychiatric: She has a normal mood and affect  Her behavior is normal    Vitals reviewed  The history was obtained from the review of the chart, patient      Lab Results:   Lab Results   Component Value Date/Time    Hemoglobin A1C 7 7 (A) 05/10/2019 08:34 AM    Hemoglobin A1C 8 5 (H) 11/14/2018 07:55 AM    WBC 6 28 05/23/2019 03:24 AM    WBC 7 29 05/07/2019 05:09 AM    WBC 7 46 11/14/2018 07:55 AM    Hemoglobin 12 2 05/23/2019 03:24 AM    Hemoglobin 12 2 05/07/2019 05:09 AM    Hemoglobin 12 0 11/14/2018 07:55 AM    Hematocrit 41 4 05/23/2019 03:24 AM    Hematocrit 41 1 05/07/2019 05:09 AM    Hematocrit 39 8 11/14/2018 07:55 AM    MCV 84 05/23/2019 03:24 AM    MCV 83 05/07/2019 05:09 AM    MCV 88 11/14/2018 07:55 AM    Platelets 463 63/12/4151 03:24 AM    Platelets 717 81/90/8880 05:09 AM    Platelets 189 92/99/0181 07:55 AM    BUN 69 (H) 05/23/2019 03:24 AM    BUN 69 (H) 05/07/2019 05:17 AM BUN 60 (H) 04/04/2019 07:50 AM    Potassium 4 3 05/23/2019 03:24 AM    Potassium 4 0 05/07/2019 05:17 AM    Potassium 3 9 04/04/2019 07:50 AM    Chloride 98 (L) 05/23/2019 03:24 AM    Chloride 100 05/07/2019 05:17 AM    Chloride 102 04/04/2019 07:50 AM    CO2 27 05/23/2019 03:24 AM    CO2 29 05/07/2019 05:17 AM    CO2 29 04/04/2019 07:50 AM    Creatinine 1 45 (H) 05/23/2019 03:24 AM    Creatinine 1 43 (H) 05/07/2019 05:17 AM    Creatinine 1 30 04/04/2019 07:50 AM    AST 21 05/23/2019 03:24 AM    AST 16 05/07/2019 05:17 AM    AST 18 11/14/2018 07:55 AM    ALT 13 05/23/2019 03:24 AM    ALT 15 05/07/2019 05:17 AM    ALT 23 11/14/2018 07:55 AM    Albumin 3 4 (L) 05/23/2019 03:24 AM    Albumin 3 4 (L) 05/07/2019 05:17 AM    Albumin 3 3 (L) 11/14/2018 07:55 AM    HDL, Direct 39 (L) 05/23/2019 08:30 AM    HDL, Direct 53 11/14/2018 07:55 AM    Triglycerides 83 05/23/2019 08:30 AM    Triglycerides 77 11/14/2018 07:55 AM           Imaging Studies: I have personally reviewed pertinent reports  Portions of the record may have been created with voice recognition software  Occasional wrong word or "sound a like" substitutions may have occurred due to the inherent limitations of voice recognition software  Read the chart carefully and recognize, using context, where substitutions have occurred

## 2019-07-03 DIAGNOSIS — I50.32 CHRONIC DIASTOLIC HEART FAILURE (HCC): ICD-10-CM

## 2019-07-03 RX ORDER — FUROSEMIDE 80 MG
TABLET ORAL
Qty: 30 TABLET | Refills: 5 | Status: SHIPPED | OUTPATIENT
Start: 2019-07-03 | End: 2019-12-20 | Stop reason: SDUPTHER

## 2019-07-15 DIAGNOSIS — I50.40 COMBINED SYSTOLIC AND DIASTOLIC CONGESTIVE HEART FAILURE (HCC): ICD-10-CM

## 2019-07-15 RX ORDER — METOLAZONE 2.5 MG/1
TABLET ORAL
Qty: 10 TABLET | Refills: 0 | Status: SHIPPED | OUTPATIENT
Start: 2019-07-15 | End: 2019-08-20 | Stop reason: SDUPTHER

## 2019-08-01 ENCOUNTER — FOLLOW UP (OUTPATIENT)
Dept: URBAN - METROPOLITAN AREA CLINIC 27 | Facility: CLINIC | Age: 69
End: 2019-08-01

## 2019-08-01 DIAGNOSIS — H43.811: ICD-10-CM

## 2019-08-01 DIAGNOSIS — H40.053: ICD-10-CM

## 2019-08-01 DIAGNOSIS — E11.3413: ICD-10-CM

## 2019-08-01 PROCEDURE — 67028 INJECTION EYE DRUG: CPT

## 2019-08-01 PROCEDURE — PFS3 LUCENTIS 0.3MG PREFILLED SYRINGE

## 2019-08-01 PROCEDURE — 92134 CPTRZ OPH DX IMG PST SGM RTA: CPT

## 2019-08-01 PROCEDURE — 92012 INTRM OPH EXAM EST PATIENT: CPT | Mod: 25

## 2019-08-01 ASSESSMENT — VISUAL ACUITY
OD_CC: 20/200+1
OS_CC: CF 3FT

## 2019-08-01 ASSESSMENT — TONOMETRY
OD_IOP_MMHG: 22
OS_IOP_MMHG: 21

## 2019-08-02 ENCOUNTER — TELEPHONE (OUTPATIENT)
Dept: GASTROENTEROLOGY | Facility: CLINIC | Age: 69
End: 2019-08-02

## 2019-08-02 NOTE — TELEPHONE ENCOUNTER
ATTEMPTED TO CONTACT PT TO SEE IF SHE HAS EVER HAD A COLONOSCOPY, THERE WAS NO ANSWER AND NO VOICEMAIL BOX

## 2019-08-20 DIAGNOSIS — I50.40 COMBINED SYSTOLIC AND DIASTOLIC CONGESTIVE HEART FAILURE (HCC): ICD-10-CM

## 2019-08-20 RX ORDER — METOLAZONE 2.5 MG/1
TABLET ORAL
Qty: 10 TABLET | Refills: 0 | Status: SHIPPED | OUTPATIENT
Start: 2019-08-20 | End: 2020-10-02 | Stop reason: HOSPADM

## 2019-08-23 ENCOUNTER — REMOTE DEVICE CLINIC VISIT (OUTPATIENT)
Dept: CARDIOLOGY CLINIC | Facility: CLINIC | Age: 69
End: 2019-08-23
Payer: MEDICARE

## 2019-08-23 DIAGNOSIS — Z95.0 PRESENCE OF PERMANENT CARDIAC PACEMAKER: Primary | ICD-10-CM

## 2019-08-23 PROCEDURE — 93294 REM INTERROG EVL PM/LDLS PM: CPT | Performed by: INTERNAL MEDICINE

## 2019-08-23 PROCEDURE — 93296 REM INTERROG EVL PM/IDS: CPT | Performed by: INTERNAL MEDICINE

## 2019-08-23 NOTE — PROGRESS NOTES
Results for orders placed or performed in visit on 08/23/19   Cardiac EP device report    Narrative    MDT DUAL PM  CARELINK TRANSMISSION: BATTERY VOLTAGE ADEQUATE  (3 YRS)   100%  ALL AVAILABLE LEAD PARAMETERS WITHIN NORMAL LIMITS  NO SIGNIFICANT HIGH RATE EPISODES  NORMAL DEVICE FUNCTION  ---JHAVERI

## 2019-09-18 NOTE — PROGRESS NOTES
Cardiology Out Patient Progress Note  [facility]    Brea Guzman 78 y o  female MRN: [id number]   Encounter: [id number]    1  Chronic combined systolic and diastolic congestive heart failure ([facility] [address] )    2  Chronic atrial fibrillation (HCC)    3  Coronary artery disease involving coronary bypass graft of native heart without angina pectoris    4  Essential hypertension    5  Type 2 diabetes mellitus with diabetic neuropathy, with long-term current use of insulin ([facility] )    6  H/O tricuspid valve annuloplasty    7  Morbid obesity with BMI of 40 0-44 9, adult ([facility] )    8  Mixed hyperlipidemia    9  Obstructive sleep apnea    10  CKD (chronic kidney disease) stage 3, GFR 30-59 ml/min ([facility])    11  Status post placement of cardiac pacemaker      Assessment/Plan   1  Combined systolic and diastolic heart failure [place] heart Association class 2/3  Combined systolic and diastolic heart failure with decrease in EF last year in February  Currently seems to compensated  She seems to be euvolemic  Her fluid status appears to be euvolemic  Will check BMP  For now will continue same dose of diuretics  She uses 80 mg day sick and uses Zaroxolyn p r n  She does monitor her weight daily  If she continues to have decrease in ejection fraction she may need to upgrade her pacemaker to biventricular pacemaker as she is 100% paced  2  Coronary artery disease status post coronary artery bypass surgery with 4 grafts  Status post cardiac catheterization, in February of 2018  All grafts are patent she has three-vessel coronary artery disease  Continue medical Rx  No symptoms of angina    3  Chronic atrial fibrillation  Patient has underlying very slow ventricular rate and is status post Medtronic pacemaker which is functioning adequately  She is on Xarelto  Pacemaker was interrogated recently  She is 100% paced  4  Diabetes mellitus  Management as per PMD    5  Chronic renal insufficiency    Her creatinine has been now around 1 6 last labs were in October and November of 2018  Check BMP  To keep her dry side we may have to accept little high BUN creatinine    6 History of tricuspid annuloplasty  Repeat echo report from February 2018 reviewed  EF was around 45%  PA pressure remains elevated  7  Dyslipidemia  Continue statins    8  Cardiomyopathy  Lately patient is noted to have low ejection fraction  Partially related to being 100% paced  Clinically no evidence of fluid overload  If EF continues to decrease she may need biventricular pacemaker     9  Hypertension  But he well controlled with current Rx     10   Hypothyroidism  On Synthroid and TSH was acceptable    11  Recent admission for syncope  No clear etiology was thought to be orthostatic hypotension or hypoxemia  She is scheduled to see Pulmonary MD   She had abnormal sleep screen she is not using oxygen at nighttime for nocturnal hypoxemia  She is advised to keep her appointment  She has missed a couple appointments  All her questions answered to her satisfaction  Her cath report echo report and previous stress test report reviewed with her  Counseling :   A description of the counseling  Patient is little under stress regarding her sister's death  She was counselled  She was reassured  All her questions were answered  Advised her to lose weight  Diet was advised  Advised to decrease salt intake and decreased fluid intake  Monitor her input output  HPI :     Esteban Florian is a 78y o  year old female who presents for follow-up    She haspast medical history significant for coronary artery disease status post coronary artery bypass surgery with 4 bypasses in 2004 and then tricuspid valve annuloplasty in January 2012, known moderate pulmonary hypertension with mild to moderate TR, recurrent abdominal hernia, history of anemia, chronic atrial fibrillation with underlying sick sinus syndrome status post Medtronic pacemaker on long-term antithrombotic therapy with Xarelto who came for regular follow-up  Patient has some dyspnea on exertion  She hasn't had any recent stress test  She also had some mild CRI creatinine stable around 1 3  He has no fever no chills  No PND, no orthopnea, mostly exertional symptoms  09/23/2019  Above reviewed  Patient was admitted to [facility] in May of 2019 with syncopal episode  It was thought to be orthostasis  No pacemaker malfunction was found  She has an echo done at that time which shows EF around 45 percent, paradoxical septal motion, moderate TR with PA pressure around 65 millimeters of mercury and mild MR  Her left atrium right atrium were moderately dilated and LV was mildly dilated RV was mildly dilated and low normal RV function  Her pacemaker was interrogated in August her battery voltage is adequate still have 3 year battery she is 100 percent paced she has underlying atrial fibrillation  No pauses  She has some chronic other problems like umbilical hernia, chronic shortness of breath, gait dysfunction which is not changed  Patient has past medical history significant for coronary artery disease status post coronary artery bypass surgery graft x4,  History of tricuspid valve annuloplasty, moderate pulmonary hypertension, coronary artery disease as mentioned above and has been doing well  Recently she was found to have hypoxemia and was started on oxygen therapy  She had abnormal sleep screen  She is using oxygen but she did not see much difference  She has been using his as she felt her oxygen was low  Review of Systems   Constitutional: Negative for activity change, chills, diaphoresis, fever and unexpected weight change  HENT: Negative for congestion  Eyes: Negative for discharge and redness  Respiratory: Positive for shortness of breath  Negative for cough, chest tightness and wheezing           Chronic not changed Cardiovascular: Positive for leg swelling  Negative for chest pain and palpitations  Gastrointestinal: Negative for abdominal pain, diarrhea and nausea  Endocrine: Negative  Genitourinary: Negative for decreased urine volume and urgency  Musculoskeletal: Negative  Negative for arthralgias, back pain and gait problem  Skin: Negative for rash and wound  Allergic/Immunologic: Negative  Neurological: Negative for dizziness, seizures, syncope, weakness, light-headedness and headaches  Hematological: Negative  Psychiatric/Behavioral: Negative for agitation and confusion  The patient is nervous/anxious          Historical Information   Past Medical History:   Diagnosis Date    Arthritis     Atrial flutter ([place] [address] )     Cardiac disease     Carotid artery occlusion     CHF (congestive heart failure) ([place] [address] )     Cholelithiasis     last assessed 8/8/2016    Coronary artery disease     Diabetes mellitus ([place] [address] )     Disease of thyroid gland     GERD (gastroesophageal reflux disease)     History of transfusion     Hyperlipidemia     Hypertension     Long-term insulin use in type 2 diabetes ([place] [address] ) 6/4/2016    Other specified diabetes mellitus with diabetic autonomic (poly)neuropathy ([place] [address] )     Renal disorder     Sleep apnea     Subclavian artery stenosis ([place] [address] )     Umbilical hernia with obstruction, without gangrene     last assessed 7/8/2016     Past Surgical History:   Procedure Laterality Date    ABDOMINAL SURGERY      CARDIAC PACEMAKER PLACEMENT      CARDIAC SURGERY      cabg x 2    CATARACT EXTRACTION      CHOLECYSTECTOMY      CORONARY ARTERY BYPASS GRAFT  2008    EYE SURGERY      FRACTURE SURGERY Right 2010    shoulder    HERNIA REPAIR      umbilical    MD LAP,CHOLECYSTECTOMY N/A 8/10/2016    Procedure: CHOLECYSTECTOMY LAPAROSCOPIC;  Surgeon: Jojo Bower MD;  Location: [facility];  Service: General     [address] Left 11/4/2016    Procedure: Fannie Gordillo LARYNGOSCOPY; LEFT VOCAL FOLD INJECTION ;  Surgeon: Atif Anthony MD;  [facility];  Service: ENT    WI REPAIR UMBILICAL YFPL,1+Y/Q,CIRILO N/A 8/10/2016    Procedure: LAPAROSCOPIC REPAIR HERNIA VENTRAL;  Surgeon: Francesco Julian MD;  Location: [facility];  Service: General    [address] / STENTING Right     TRICUSPID VALVE REPLACEMENT      VASCULAR SURGERY      heart valve replacement     Social History     Substance and Sexual Activity   Alcohol Use Not Currently    Comment: per [employer] 'occasional alcohol use'     Social History     Substance and Sexual Activity   Drug Use Never    Types: Oxycodone     Social History     Tobacco Use   Smoking Status Never Smoker   Smokeless Tobacco Never Used     Family History:   Family History   Problem Relation Age of Onset    Heart disease Mother     Breast cancer Maternal Aunt     Heart disease Sister        Meds/Allergies     Allergies   Allergen Reactions    Bromide Ion [Bromine]      Blurred vision  Has preservative that  Pt cannot use    Other      Adhesive tape    Ibuprofen Palpitations    Penicillins Rash       Current Outpatient Medications:     Aspirin-Caffeine 500-32 5 MG TABS, Take 2 tablets by mouth daily at bedtime, Disp: , Rfl:     COMBIGAN 0 2-0 5 %, Administer 1 drop to both eyes 2 (two) times a day, Disp: , Rfl:     furosemide (LASIX) 80 mg tablet, TAKE 1 TABLET DAILY AS DIRECTED , Disp: 30 tablet, Rfl: 5    insulin aspart (NOVOLOG FLEXPEN) 100 Units/mL injection pen, Per insulin to carbohydrate ratio 1:3 to cover meals and carbohydrate containing snacks and sliding scale insulin, Disp: 15 pen, Rfl: 11    insulin glargine (BASAGLAR KWIKPEN) 100 units/mL injection pen, Inject 20 Units under the skin daily (Patient taking differently: Inject 15 Units under the skin daily at bedtime ), Disp: 5 pen, Rfl: 0    levothyroxine (SYNTHROID) 137 mcg tablet, Take 137 mcg by mouth daily  , Disp: , Rfl:     metolazone (ZAROXOLYN) 2 5 mg tablet, TAKE 1 TABLET BY MOUTH TWICE WEEKLY, Disp: 10 tablet, Rfl: 0    omeprazole (PriLOSEC) 20 mg delayed release capsule, Take 20 mg by mouth 3 (three) times a week  , Disp: , Rfl:     potassium chloride (K-DUR,KLOR-CON) 10 mEq tablet, Take 1 tablet (10 mEq total) by mouth 2 (two) times a week (Patient taking differently: Take 10 mEq by mouth as needed (only taken with the metolazone) ), Disp: 15 tablet, Rfl: 3    Sennosides (SENOKOT PO), Take by mouth daily, Disp: , Rfl:     simvastatin (ZOCOR) 20 mg tablet, TAKE 1 TABLET (20MG) BY ORAL ROUTE EVERY DAY IN THE EVENING, Disp: 90 tablet, Rfl: 2    traMADol (ULTRAM) 50 mg tablet, Take 1 tablet (50 mg total) by mouth every 6 (six) hours as needed for moderate pain, Disp: 30 tablet, Rfl: 0    valsartan (DIOVAN) 80 mg tablet, TAKE 1 TABLET ORALLY EVERY DAY, Disp: 90 tablet, Rfl: 3    XARELTO 15 MG tablet, TAKE 1 TAB BY MOUTH DAILY WITH EVENEING MEAL , Disp: 90 tablet, Rfl: 0    Magnesium Hydroxide (MILK OF MAGNESIA PO), Take by mouth 1 in the AM and 1 in the PM, Disp: , Rfl:     Vitals: Blood pressure 126/64, pulse 83, height 5' 5" (1 651 m), weight 101 kg (222 lb), SpO2 (!) 81 %, not currently breastfeeding  Body mass index is 36 94 kg/m²  Physical Exam:  Physical Exam   Constitutional: She is oriented to person, place, and time  She appears well-developed and well-nourished  No distress  HENT:   Head: Normocephalic and atraumatic  Eyes: Pupils are equal, round, and reactive to light  Neck: Neck supple  No JVD present  No tracheal deviation present  No thyromegaly present  Cardiovascular: Normal rate, regular rhythm, S1 normal and S2 normal  Exam reveals no gallop, no S3, no S4, no distant heart sounds and no friction rub  Murmur heard  Systolic (ejection) murmur is present with a grade of 2/6  She is 100 percent paced  S1-S2 regular with pansystolic murmur at left lower sternal border     Pulmonary/Chest: Effort normal  No respiratory distress  She has no wheezes  She has no rales  She exhibits no tenderness  Bilateral air entry with few rhonchi and coarse breath sounds   Abdominal: Soft  Bowel sounds are normal  She exhibits no distension  There is no tenderness  Musculoskeletal: She exhibits no edema or deformity  No significant edema with varicose vein noted   Neurological: She is alert and oriented to person, place, and time  Skin: Skin is warm and dry  No rash noted  She is not diaphoretic  No pallor  Psychiatric: She has a normal mood and affect  Her behavior is normal  Judgment normal          Diagnostic cardiac testing review:    Cardiac catheterization:  Cardiac catheterization done in February of 2018 shows severe three-vessel coronary artery disease  Distal left main has 70%  %  Proximal circ 70%, obtuse marginal 100%, RPDA 100% occluded  Patient had patent LIMA to LAD, SVG vein graft to diagonal, circumflex as well as RPDA  LV-gram was not done due to CRI  Echo Doppler:  Echo Doppler done in February of 2018 shows EF around 45%, dilated right-sided chambers, moderate to severe TR with PA pressure 65 mm of mercury, mild AI, mild MR, moderate to markedly dilated left atrium, moderately dilated right atrium Paradoxical septal motion noted    Stress test:  Nuclear stress test done in 2016 was abnormal   EF was 57%  Paradoxical septal motion was noted  Nuclear stress test in March of 2017 shows is fixed apical wall defect with prior infarct  No ischemia EF was 57%    :  Cardiac catheterization  In February of 2018  Findings:     1  Dominance: Right dominant coronary system     2  Left main Coronary artery: Normal size vessels  It bifurcates into large LAD and a nondominant but medium-size circumflex system  Distal left main has around 70% stenosis  I     3  Left anterior descending artery: LAD is a large-size vessel and in the mid it is 100% occluded  Competitive flow is seen in LAD from LIMA  A diagonal has also patent graft from aorta      4  Circumflex Coronary artery: Circumflex is a nondominant medium size vessel, it is still a medium to large size artery  It has proximally around 70%  OM1 is 100% occluded with competitive flow  AV groove circumflex is small         5  Right coronary artery: RCA is normal size vessel and it is 100% occluded in the mid area  SVG vein graft to RPDA is widely patent        5  Left ventriculogram: LV gram was not done due to CRI  LVEDP was around 16  There was no gradient across aortic valve    Pacemaker interrogation:  Patient has Medtronic pacemaker which is functioning adequately its VVI mode  Patient is paced 100% all the time  EC lead EKG done on 2018 shows atrial fibrillation with few paced beats  Heart rate is 65 beats per minute  Patient has known Medtronic pacemaker  It is a VVI type  2018  Repeat EKG shows paced rhythm heart rate 78 beats per minute  Underlying is AFib    Twelve lead EKG done in our office 2019 shows atrial fibrillation underlying with ventricular paced heart rate 66 beats per minute  No change from old EKG  Twelve lead EKG done 2019 shows 100 percent paced rhythm  She has underlying atrial fibrillation  No change from old EKG heart rate 83 beats per minute  Results of echo and stress test reviewed with patient  Labs done 2018 shows sodium 139 potassium 3 4 BUN 67 creatinine 1 45 GFR is 37      Lab Review   Lab Results   Component Value Date    WBC 6 28 2019    HGB 12 2 2019    HCT 41 4 2019    MCV 84 2019     2019     Lab Results   Component Value Date    K 4 3 2019    CL 98 (L) 2019    CO2 27 2019    BUN 69 (H) 2019    CREATININE 1 45 (H) 2019    GLUF 178 (H) 2019    CALCIUM 9 0 2019    AST 21 2019    ALT 13 2019    ALKPHOS 121 (H) 2019    PROT 7 9 2016    EGFR 37 2019     Lab Results   Component Value Date    CALCIUM 9 0 05/23/2019    K 4 3 05/23/2019    CO2 27 05/23/2019    CL 98 (L) 05/23/2019    BUN 69 (H) 05/23/2019    CREATININE 1 45 (H) 05/23/2019     Dr Gini Smith MD [facility]      "This note has been constructed using a voice recognition system  Therefore there may be syntax, spelling, and/or grammatical errors   Please call if you have any questions  "

## 2019-09-20 DIAGNOSIS — Z79.4 TYPE 2 DIABETES MELLITUS WITH DIABETIC NEUROPATHY, WITH LONG-TERM CURRENT USE OF INSULIN (HCC): Primary | ICD-10-CM

## 2019-09-20 DIAGNOSIS — E11.40 TYPE 2 DIABETES MELLITUS WITH DIABETIC NEUROPATHY, WITH LONG-TERM CURRENT USE OF INSULIN (HCC): Primary | ICD-10-CM

## 2019-09-20 RX ORDER — INSULIN ASPART 100 [IU]/ML
INJECTION, SOLUTION INTRAVENOUS; SUBCUTANEOUS
Qty: 15 PEN | Refills: 11 | Status: SHIPPED | OUTPATIENT
Start: 2019-09-20 | End: 2019-09-20

## 2019-09-23 ENCOUNTER — OFFICE VISIT (OUTPATIENT)
Dept: CARDIOLOGY CLINIC | Facility: CLINIC | Age: 69
End: 2019-09-23
Payer: MEDICARE

## 2019-09-23 VITALS
BODY MASS INDEX: 36.99 KG/M2 | DIASTOLIC BLOOD PRESSURE: 64 MMHG | HEIGHT: 65 IN | HEART RATE: 83 BPM | SYSTOLIC BLOOD PRESSURE: 126 MMHG | OXYGEN SATURATION: 81 % | WEIGHT: 222 LBS

## 2019-09-23 DIAGNOSIS — G47.33 OBSTRUCTIVE SLEEP APNEA: ICD-10-CM

## 2019-09-23 DIAGNOSIS — E11.40 TYPE 2 DIABETES MELLITUS WITH DIABETIC NEUROPATHY, WITH LONG-TERM CURRENT USE OF INSULIN (HCC): ICD-10-CM

## 2019-09-23 DIAGNOSIS — I10 ESSENTIAL HYPERTENSION: ICD-10-CM

## 2019-09-23 DIAGNOSIS — N18.30 CKD (CHRONIC KIDNEY DISEASE) STAGE 3, GFR 30-59 ML/MIN (HCC): ICD-10-CM

## 2019-09-23 DIAGNOSIS — I48.20 CHRONIC ATRIAL FIBRILLATION (HCC): ICD-10-CM

## 2019-09-23 DIAGNOSIS — I25.810 CORONARY ARTERY DISEASE INVOLVING CORONARY BYPASS GRAFT OF NATIVE HEART WITHOUT ANGINA PECTORIS: ICD-10-CM

## 2019-09-23 DIAGNOSIS — Z98.890 H/O TRICUSPID VALVE ANNULOPLASTY: ICD-10-CM

## 2019-09-23 DIAGNOSIS — Z95.0 STATUS POST PLACEMENT OF CARDIAC PACEMAKER: ICD-10-CM

## 2019-09-23 DIAGNOSIS — Z79.4 TYPE 2 DIABETES MELLITUS WITH DIABETIC NEUROPATHY, WITH LONG-TERM CURRENT USE OF INSULIN (HCC): ICD-10-CM

## 2019-09-23 DIAGNOSIS — I50.42 CHRONIC COMBINED SYSTOLIC AND DIASTOLIC CONGESTIVE HEART FAILURE (HCC): ICD-10-CM

## 2019-09-23 DIAGNOSIS — E78.2 MIXED HYPERLIPIDEMIA: ICD-10-CM

## 2019-09-23 DIAGNOSIS — E66.01 MORBID OBESITY WITH BMI OF 40.0-44.9, ADULT (HCC): ICD-10-CM

## 2019-09-23 PROCEDURE — 93000 ELECTROCARDIOGRAM COMPLETE: CPT | Performed by: INTERNAL MEDICINE

## 2019-09-23 PROCEDURE — 99214 OFFICE O/P EST MOD 30 MIN: CPT | Performed by: INTERNAL MEDICINE

## 2019-09-25 ENCOUNTER — TELEPHONE (OUTPATIENT)
Dept: ENDOCRINOLOGY | Facility: CLINIC | Age: 69
End: 2019-09-25

## 2019-09-25 NOTE — TELEPHONE ENCOUNTER
Please clarify prescription orders as follows  NovoLog 12-14 units 3 times a day with meals  Use carbohydrate to insulin ratio 1:3 which means 1 unit of NovoLog for every 3 gm of carbohydrates

## 2019-09-26 DIAGNOSIS — E11.40 TYPE 2 DIABETES MELLITUS WITH DIABETIC NEUROPATHY, WITH LONG-TERM CURRENT USE OF INSULIN (HCC): ICD-10-CM

## 2019-09-26 DIAGNOSIS — Z79.4 TYPE 2 DIABETES MELLITUS WITH DIABETIC NEUROPATHY, WITH LONG-TERM CURRENT USE OF INSULIN (HCC): ICD-10-CM

## 2019-10-01 ENCOUNTER — TRANSCRIBE ORDERS (OUTPATIENT)
Dept: ADMINISTRATIVE | Facility: HOSPITAL | Age: 69
End: 2019-10-01

## 2019-10-01 ENCOUNTER — APPOINTMENT (OUTPATIENT)
Dept: LAB | Facility: HOSPITAL | Age: 69
End: 2019-10-01
Attending: INTERNAL MEDICINE
Payer: MEDICARE

## 2019-10-01 DIAGNOSIS — Z79.4 TYPE 2 DIABETES MELLITUS WITH DIABETIC NEUROPATHY, WITH LONG-TERM CURRENT USE OF INSULIN (HCC): Primary | ICD-10-CM

## 2019-10-01 DIAGNOSIS — R20.2 PARESTHESIA: ICD-10-CM

## 2019-10-01 DIAGNOSIS — R25.2 LEG CRAMPS: ICD-10-CM

## 2019-10-01 DIAGNOSIS — Z79.4 TYPE 2 DIABETES MELLITUS WITH DIABETIC NEUROPATHY, WITH LONG-TERM CURRENT USE OF INSULIN (HCC): ICD-10-CM

## 2019-10-01 DIAGNOSIS — N18.30 CKD (CHRONIC KIDNEY DISEASE) STAGE 3, GFR 30-59 ML/MIN (HCC): ICD-10-CM

## 2019-10-01 DIAGNOSIS — I50.42 CHRONIC COMBINED SYSTOLIC AND DIASTOLIC CONGESTIVE HEART FAILURE (HCC): ICD-10-CM

## 2019-10-01 DIAGNOSIS — E11.40 TYPE 2 DIABETES MELLITUS WITH DIABETIC NEUROPATHY, WITH LONG-TERM CURRENT USE OF INSULIN (HCC): ICD-10-CM

## 2019-10-01 DIAGNOSIS — E11.40 TYPE 2 DIABETES MELLITUS WITH DIABETIC NEUROPATHY, WITH LONG-TERM CURRENT USE OF INSULIN (HCC): Primary | ICD-10-CM

## 2019-10-01 LAB
25(OH)D3 SERPL-MCNC: 36.1 NG/ML (ref 30–100)
ANION GAP SERPL CALCULATED.3IONS-SCNC: 5 MMOL/L (ref 4–13)
BUN SERPL-MCNC: 47 MG/DL (ref 5–25)
CALCIUM SERPL-MCNC: 8.6 MG/DL (ref 8.3–10.1)
CHLORIDE SERPL-SCNC: 102 MMOL/L (ref 100–108)
CHOLEST SERPL-MCNC: 111 MG/DL (ref 50–200)
CO2 SERPL-SCNC: 33 MMOL/L (ref 21–32)
CREAT SERPL-MCNC: 1.13 MG/DL (ref 0.6–1.3)
EST. AVERAGE GLUCOSE BLD GHB EST-MCNC: 180 MG/DL
FERRITIN SERPL-MCNC: 31 NG/ML (ref 8–388)
GFR SERPL CREATININE-BSD FRML MDRD: 49 ML/MIN/1.73SQ M
GLUCOSE P FAST SERPL-MCNC: 133 MG/DL (ref 65–99)
HBA1C MFR BLD: 7.9 % (ref 4.2–6.3)
HDLC SERPL-MCNC: 38 MG/DL (ref 40–60)
IRON SATN MFR SERPL: 10 %
IRON SERPL-MCNC: 39 UG/DL (ref 50–170)
LDLC SERPL CALC-MCNC: 50 MG/DL (ref 0–100)
NONHDLC SERPL-MCNC: 73 MG/DL
POTASSIUM SERPL-SCNC: 3.5 MMOL/L (ref 3.5–5.3)
SODIUM SERPL-SCNC: 140 MMOL/L (ref 136–145)
T4 FREE SERPL-MCNC: 1.09 NG/DL (ref 0.76–1.46)
TIBC SERPL-MCNC: 380 UG/DL (ref 250–450)
TRIGL SERPL-MCNC: 113 MG/DL
TSH SERPL DL<=0.05 MIU/L-ACNC: 1.5 UIU/ML (ref 0.36–3.74)
VIT B12 SERPL-MCNC: 699 PG/ML (ref 100–900)

## 2019-10-01 PROCEDURE — 84443 ASSAY THYROID STIM HORMONE: CPT

## 2019-10-01 PROCEDURE — 83540 ASSAY OF IRON: CPT

## 2019-10-01 PROCEDURE — 83036 HEMOGLOBIN GLYCOSYLATED A1C: CPT

## 2019-10-01 PROCEDURE — 82306 VITAMIN D 25 HYDROXY: CPT

## 2019-10-01 PROCEDURE — 80048 BASIC METABOLIC PNL TOTAL CA: CPT

## 2019-10-01 PROCEDURE — 83550 IRON BINDING TEST: CPT

## 2019-10-01 PROCEDURE — 82728 ASSAY OF FERRITIN: CPT

## 2019-10-01 PROCEDURE — 80061 LIPID PANEL: CPT

## 2019-10-01 PROCEDURE — 82985 ASSAY OF GLYCATED PROTEIN: CPT

## 2019-10-01 PROCEDURE — 82607 VITAMIN B-12: CPT

## 2019-10-01 PROCEDURE — 84439 ASSAY OF FREE THYROXINE: CPT

## 2019-10-01 PROCEDURE — 36415 COLL VENOUS BLD VENIPUNCTURE: CPT

## 2019-10-02 LAB — FRUCTOSAMINE SERPL-SCNC: 328 UMOL/L (ref 0–285)

## 2019-10-03 ENCOUNTER — FOLLOW UP (OUTPATIENT)
Dept: URBAN - METROPOLITAN AREA CLINIC 27 | Facility: CLINIC | Age: 69
End: 2019-10-03

## 2019-10-03 DIAGNOSIS — H43.811: ICD-10-CM

## 2019-10-03 DIAGNOSIS — H40.053: ICD-10-CM

## 2019-10-03 DIAGNOSIS — E11.3413: ICD-10-CM

## 2019-10-03 PROCEDURE — 67028 INJECTION EYE DRUG: CPT

## 2019-10-03 PROCEDURE — 92014 COMPRE OPH EXAM EST PT 1/>: CPT | Mod: 25

## 2019-10-03 PROCEDURE — 92134 CPTRZ OPH DX IMG PST SGM RTA: CPT

## 2019-10-03 ASSESSMENT — VISUAL ACUITY
OS_PH: 20/400
OD_CC: 20/200
OS_CC: CF 4FT

## 2019-10-03 ASSESSMENT — TONOMETRY
OD_IOP_MMHG: 17
OS_IOP_MMHG: 21

## 2019-10-12 NOTE — PROGRESS NOTES
Subjective:      Patient ID: Denise Bueno is a 78 y o  female  Chief Complaint   Patient presents with   [facility] Visit     prcma       Here for follow up visit  Has multiple specialists and is up to date with cardiologist, endocrinologist, GI  Dr Armando Fowler has wanted to do a colonoscopy but is worried about doing this per patient due to large ventral hernia  He has referred her to Dr Dulce Mayer for surgical evaluation  Recently saw Dr Kay Acosta from cardiology and he felt her CHF was well compensated  She continues her medications with zaroxylyn prn  Denies change in dyspnea or edema  There is no chest pain  Feels her hearing is getting much worse and feels like her head is in cotton  Did make appointment with audiology but needs an order  Her eye disease from DM is getting worse and she has had to stop driving  Son and daughter in law are driving her around  The following portions of the patient's history were reviewed and updated as appropriate: allergies, current medications, past family history, past medical history, past social history, past surgical history and problem list     Review of Systems   Constitutional: Negative  HENT: Positive for hearing loss  Respiratory: Negative  Cardiovascular: Negative  Current Outpatient Medications   Medication Sig Dispense Refill    Aspirin-Caffeine 500-32 5 MG TABS Take 2 tablets by mouth daily at bedtime      bisacodyl (DULCOLAX) 5 mg EC tablet Take 2 tablets (10 mg total) by mouth once for 1 dose 2 tablet 0    COMBIGAN 0 2-0 5 % Administer 1 drop to both eyes 2 (two) times a day      famotidine (PEPCID) 20 mg tablet Take 1 tablet (20 mg total) by mouth 2 (two) times a day 60 tablet 11    furosemide (LASIX) 80 mg tablet TAKE 1 TABLET DAILY AS DIRECTED   30 tablet 5    insulin aspart (NOVOLOG FLEXPEN) 100 Units/mL injection pen Inject under the [id number] units 3 times a day with meals  Use carbohydrate to insulin ratio 1:3 which means 1 unit of NovoLog for every 3 gm of carbohydrates 15 pen 11    insulin glargine (BASAGLAR KWIKPEN) 100 units/mL injection pen Inject 20 Units under the skin daily (Patient taking differently: Inject 15 Units under the skin daily at bedtime ) 5 pen 0    levothyroxine (SYNTHROID) 137 mcg tablet Take 137 mcg by mouth daily   linaCLOtide (LINZESS) 145 MCG CAPS Take 1 capsule (145 mcg total) by mouth daily 30 capsule 4    Magnesium Hydroxide (MILK OF MAGNESIA PO) Take by mouth 1 in the AM and 1 in the PM      metolazone (ZAROXOLYN) 2 5 mg tablet TAKE 1 TABLET BY MOUTH TWICE WEEKLY 10 tablet 0    omeprazole (PriLOSEC) 20 mg delayed release capsule Take 20 mg by mouth 3 (three) times a week        potassium chloride (K-DUR,KLOR-CON) 10 mEq tablet Take 1 tablet (10 mEq total) by mouth 2 (two) times a week (Patient taking differently: Take 10 mEq by mouth as needed (only taken with the metolazone) ) 15 tablet 3    simvastatin (ZOCOR) 20 mg tablet TAKE 1 TABLET (20MG) BY ORAL ROUTE EVERY DAY IN THE EVENING 90 tablet 2    traMADol (ULTRAM) 50 mg tablet Take 1 tablet (50 mg total) by mouth every 6 (six) hours as needed for moderate pain 30 tablet 0    valsartan (DIOVAN) 80 mg tablet TAKE 1 TABLET ORALLY EVERY DAY 90 tablet 3    polyethylene glycol (GOLYTELY) 4000 mL solution Take 4,000 mL by mouth once for 1 dose 4000 mL 0    Sennosides (SENOKOT PO) Take by mouth daily      XARELTO 15 MG tablet TAKE 1 TAB BY MOUTH DAILY WITH EVENEING MEAL  90 tablet 0     No current facility-administered medications for this visit  Objective:    /60   Pulse 68   Temp (!) 97 1 °F (36 2 °C)   Resp 18   Ht 5' 5" (1 651 m)   Wt 104 kg (230 lb)   LMP  (LMP Unknown)   BMI 38 27 kg/m²        Physical Exam   Constitutional: She appears well-developed and well-nourished  HENT:   Small amount of wax in R ear canal   Eyes: Conjunctivae are normal    Neck: Neck supple  No JVD present   No thyromegaly present  Cardiovascular: Normal rate, regular rhythm, normal heart sounds and intact distal pulses  Exam reveals no gallop and no friction rub  Pulses are no weak pulses  No murmur heard  Pulses:       Dorsalis pedis pulses are 2+ on the right side, and 2+ on the left side  Pulmonary/Chest: Effort normal and breath sounds normal  She has no wheezes  She has no rales  Abdominal: Soft  Bowel sounds are normal  She exhibits no distension  There is no tenderness  Musculoskeletal: She exhibits no edema  Feet:   Right Foot:   Skin Integrity: Negative for ulcer, skin breakdown, erythema, warmth, callus or dry skin  Left Foot:   Skin Integrity: Negative for ulcer, skin breakdown, erythema, warmth, callus or dry skin  Patient's shoes and socks removed  Right Foot/Ankle   Right Foot Inspection  Skin Exam: skin normal and skin intact no dry skin, no warmth, no callus, no erythema, no maceration, no abnormal color, no pre-ulcer, no ulcer and no callus                          Toe Exam: ROM and strength within normal limits  Sensory       Monofilament testing: intact  Vascular  Capillary refills: < 3 seconds  The right DP pulse is 2+  Left Foot/Ankle  Left Foot Inspection  Skin Exam: skin normal and skin intactno dry skin, no warmth, no erythema, no maceration, normal color, no pre-ulcer, no ulcer and no callus                         Toe Exam: ROM and strength within normal limits                   Sensory       Monofilament: intact  Vascular  Capillary refills: < 3 seconds  The left DP pulse is 2+  Assign Risk Category:  No deformity present; No loss of protective sensation; No weak pulses       Risk: 0      Assessment/Plan:    Hernia of anterior abdominal wall  She has upcoming appointment with Dr Jim Fuller to discuss possible hernia repair       History of colon polyps  She has recently seen Dr Shelby Milian who would like to schedule colonoscopy; due to large ventral hernia she is seeing general surgery for an opinion first      HTN (hypertension)  This is well controlled on multiple medications and will continue  Combined systolic and diastolic congestive heart failure (HCC)  Wt Readings from Last 3 Encounters:   10/17/19 104 kg (230 lb)   10/15/19 103 kg (226 lb 6 oz)   09/23/19 101 kg (222 lb)       She has gained some weight but appears well compensated  No signs of fluid overload on today's exam   Continue diuretics and follows regularly with cardiology  Hyperlipidemia  Stable on simvastatin; labs are up to date  Type 2 diabetes mellitus with diabetic neuropathy, with long-term current use of insulin (HCC)    Lab Results   Component Value Date    HGBA1C 7 9 (H) 10/01/2019     She continues to follow with endocrinology and medications are managed there  Dysthymia  Depression Screening Follow-up Plan: Patient's depression screening was positive with a PHQ-2 2score of 3  Their PHQ-9 score was 10  Patient declines further evaluation by mental health professional and/or medications  They have no active suicidal ideations  Brief counseling provided and recommend additional follow-up/re-evaluation at next office visit  CKD (chronic kidney disease) stage 3, GFR 30-59 ml/min ([facility])  Reviewed bloodwork and this is stable  Advised to avoid nephrotoxic agents       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 ([facility])    Lab Results   Component Value Date    HGBA1C 7 9 (H) 10/01/2019     She continues to follow up with her ophthalmologist and her retinal specialist         Diagnoses and all orders for this visit:    Medicare annual wellness visit, subsequent    Hernia of anterior abdominal wall  -     Ambulatory referral to General Surgery    Essential hypertension    Chronic combined systolic and diastolic congestive heart failure ([address] )    Mixed hyperlipidemia    Type 2 diabetes mellitus with diabetic neuropathy, with long-term current use of insulin ([facility] [address] )  - Ambulatory referral to Podiatry; Future    CKD (chronic kidney disease) stage 3, GFR 30-59 ml/min ([facility])    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, with long-term current use of insulin, unspecified laterality, unspecified retinopathy severity ([facility] [address] )    History of colon polyps    Peripheral vascular disease, unspecified ([facility] [address] )    Atherosclerosis of native coronary artery of native heart with angina pectoris ([facility] [address] )    Hearing loss, unspecified hearing loss type, unspecified laterality  -     Ambulatory referral to Audiology; Future    Need for vaccination  -     influenza vaccine, [id number], high-dose, PF 0 5 mL (FLUZONE HIGH-DOSE)          Return in about 4 months (around 2/17/2020)         Leno Kellogg MD

## 2019-10-15 ENCOUNTER — TELEPHONE (OUTPATIENT)
Dept: GASTROENTEROLOGY | Facility: CLINIC | Age: 69
End: 2019-10-15

## 2019-10-15 ENCOUNTER — OFFICE VISIT (OUTPATIENT)
Dept: GASTROENTEROLOGY | Facility: CLINIC | Age: 69
End: 2019-10-15
Payer: MEDICARE

## 2019-10-15 VITALS
SYSTOLIC BLOOD PRESSURE: 130 MMHG | WEIGHT: 226.38 LBS | TEMPERATURE: 98.5 F | HEART RATE: 68 BPM | DIASTOLIC BLOOD PRESSURE: 70 MMHG | BODY MASS INDEX: 37.72 KG/M2 | HEIGHT: 65 IN

## 2019-10-15 DIAGNOSIS — K21.9 GASTROESOPHAGEAL REFLUX DISEASE WITHOUT ESOPHAGITIS: ICD-10-CM

## 2019-10-15 DIAGNOSIS — Z86.010 HISTORY OF COLON POLYPS: Primary | ICD-10-CM

## 2019-10-15 DIAGNOSIS — Z12.12 SCREENING FOR COLORECTAL CANCER: ICD-10-CM

## 2019-10-15 DIAGNOSIS — K43.9 HERNIA OF ANTERIOR ABDOMINAL WALL: ICD-10-CM

## 2019-10-15 DIAGNOSIS — Z12.11 SCREENING FOR COLORECTAL CANCER: ICD-10-CM

## 2019-10-15 DIAGNOSIS — Z79.01 ANTICOAGULANT LONG-TERM USE: ICD-10-CM

## 2019-10-15 DIAGNOSIS — K59.09 OTHER CONSTIPATION: ICD-10-CM

## 2019-10-15 PROCEDURE — 99204 OFFICE O/P NEW MOD 45 MIN: CPT | Performed by: INTERNAL MEDICINE

## 2019-10-15 RX ORDER — FAMOTIDINE 20 MG/1
20 TABLET, FILM COATED ORAL 2 TIMES DAILY
Qty: 60 TABLET | Refills: 11 | Status: SHIPPED | OUTPATIENT
Start: 2019-10-15 | End: 2020-08-11

## 2019-10-15 NOTE — ASSESSMENT & PLAN NOTE
Patient is at increased risk because of anticoagulation therapy  She was advised to check with her cardiologist about holding Xarelto prior to the procedures

## 2019-10-15 NOTE — ASSESSMENT & PLAN NOTE
Patient with history of chronic constipation for which she has been on Senokot and laxatives without any significant help     -will put her on Linzess 145 micro g regularly    Gave her some samples to try    -advised to take plenty of liquids    -high-fiber diet

## 2019-10-15 NOTE — ASSESSMENT & PLAN NOTE
Gastroesophageal reflux disease - Patient has the symptoms of chronic acid reflux for a long time  Possible hiatal hernia or LES weakness  Should rule out Rodriguez's esophagus because of chronic symptoms         -discussed about long-term side effects from omeprazole and advised her to try Pepcid instead     -schedule for EGD    -Patient was explained about the lifestyle and dietary modifications  Advised to avoid fatty foods, chocolates, caffeine, alcohol and any other triggering foods  Avoid eating for at least 3 hours before going to bed

## 2019-10-15 NOTE — PROGRESS NOTES
Consultation - [facility]  Bob August 1950 female         Chief Complaint:  HISTORY OF COLON POLYPS AND CONSTIPATION    HPI:  26-year-old female with history of multiple medical problems including CABG, tricuspid valve repair, atrial fibrillation on anticoagulation therapy with Xarelto, hypertension, hyperlipidemia, hypothyroidism, permanent pacemaker placement gives history of colon polyps and her last colonoscopy was about 5 years ago  She has problems with chronic constipation for which she takes Senokot and some laxatives but still with symptoms  Denies any blood or mucus in the stool  No abdominal pain, nausea or vomiting  She is complaining about large ventral hernia for which she is trying to get surgery  She has problems with chronic acid reflux and has been on Prilosec 3 to 4 times a week  Denies any difficulty swallowing  REVIEW OF SYSTEMS: Review of Systems   Constitutional: Negative for activity change, appetite change, chills, diaphoresis, fatigue, fever and unexpected weight change  HENT: Negative for ear discharge, ear pain, facial swelling, hearing loss, nosebleeds, sore throat, tinnitus and voice change  Eyes: Negative for pain, discharge, redness, itching and visual disturbance  Respiratory: Positive for shortness of breath (uses O2)  Negative for apnea, cough, chest tightness and wheezing  Cardiovascular: Negative for chest pain and palpitations  Gastrointestinal:        As noted in HPI   Endocrine: Negative for cold intolerance, heat intolerance and polyuria  Genitourinary: Negative for difficulty urinating, dysuria, flank pain, hematuria and urgency  Musculoskeletal: Positive for arthralgias  Negative for back pain, gait problem, joint swelling and myalgias  Skin: Negative for rash and wound  Neurological: Negative for dizziness, tremors, seizures, speech difficulty, light-headedness, numbness and headaches     Hematological: Negative for adenopathy  Does not bruise/bleed easily  Psychiatric/Behavioral: Negative for agitation, behavioral problems and confusion  The patient is not nervous/anxious           Past Medical History:   Diagnosis Date    Arthritis     Atrial flutter ([place] [address] )     Cardiac disease     Carotid artery occlusion     CHF (congestive heart failure) ([place] [address] )     Cholelithiasis     last assessed 8/8/2016    Coronary artery disease     Diabetes mellitus ([place] [address] )     Disease of thyroid gland     GERD (gastroesophageal reflux disease)     History of transfusion     Hyperlipidemia     Hypertension     Long-term insulin use in type 2 diabetes ([place] [address] ) 6/4/2016    Other specified diabetes mellitus with diabetic autonomic (poly)neuropathy ([place] [address] )     Renal disorder     Sleep apnea     Subclavian artery stenosis ([place] [address] )     Umbilical hernia with obstruction, without gangrene     last assessed 7/8/2016      Past Surgical History:   Procedure Laterality Date    ABDOMINAL SURGERY      CARDIAC PACEMAKER PLACEMENT      CARDIAC SURGERY      cabg x 2    CATARACT EXTRACTION      CHOLECYSTECTOMY      CORONARY ARTERY BYPASS GRAFT  2008    EYE SURGERY      FRACTURE SURGERY Right 2010    shoulder    HERNIA REPAIR      umbilical    AZ LAP,CHOLECYSTECTOMY N/A 8/10/2016    Procedure: CHOLECYSTECTOMY LAPAROSCOPIC;  Surgeon: Hilario Guy MD;  [facility];  Service: General    AZ LARYNGOSCOPY,DIRECT,SCOPE,INJ CORDS Left 11/4/2016    Procedure: MICRODIRECT LARYNGOSCOPY; LEFT VOCAL FOLD INJECTION ;  Surgeon: Valdez Dupree MD;  [facility];  Service: ENT    AZ REPAIR UMBILICAL JNEW,3+W/M,CIVSG N/A 8/10/2016    Procedure: LAPAROSCOPIC REPAIR HERNIA VENTRAL;  Surgeon: Hilario Guy MD;  [facility];  Service: General    [address] / STENTING Right     TRICUSPID VALVE REPLACEMENT      VASCULAR SURGERY      heart valve replacement     Social History     Socioeconomic History    Marital status: Single     Spouse name: Not on file    Number of children: Not on file    Years of education: Not on file    Highest education level: Not on file   Occupational History    Not on file   Social Needs    Financial resource strain: Not on file    Food insecurity:     Worry: Not on file     Inability: Not on file    Transportation needs:     Medical: Not on file     Non-medical: Not on file   Tobacco Use    Smoking status: Never Smoker    Smokeless tobacco: Never Used   Substance and Sexual Activity    Alcohol use: Yes     Comment: per [employer] 'occasional alcohol use'    Drug use: Never     Types: Oxycodone    Sexual activity: Never   Lifestyle    Physical activity:     Days per week: Not on file     Minutes per session: Not on file    Stress: Not on file   Relationships    Social connections:     Talks on phone: Not on file     Gets together: Not on file     Attends Gnosticism service: Not on file     Active member of club or organization: Not on file     Attends meetings of clubs or organizations: Not on file     Relationship status: Not on file    Intimate partner violence:     Fear of current or ex partner: Not on file     Emotionally abused: Not on file     Physically abused: Not on file     Forced sexual activity: Not on file   Other Topics Concern    Not on file   Social History Narrative    Always uses seat belt     Family History   Problem Relation Age of Onset    Heart disease Mother     Breast cancer Maternal Aunt     Heart disease Sister      Bromide ion [bromine]; Other; Ibuprofen; and Penicillins  Current Outpatient Medications   Medication Sig Dispense Refill    Aspirin-Caffeine 500-32 5 MG TABS Take 2 tablets by mouth daily at bedtime      COMBIGAN 0 2-0 5 % Administer 1 drop to both eyes 2 (two) times a day      furosemide (LASIX) 80 mg tablet TAKE 1 TABLET DAILY AS DIRECTED   30 tablet 5    insulin aspart (NOVOLOG FLEXPEN) 100 Units/mL injection pen Inject under the [id number] units 3 times a day with meals  Use carbohydrate to insulin ratio 1:3 which means 1 unit of NovoLog for every 3 gm of carbohydrates 15 pen 11    insulin glargine (BASAGLAR KWIKPEN) 100 units/mL injection pen Inject 20 Units under the skin daily (Patient taking differently: Inject 15 Units under the skin daily at bedtime ) 5 pen 0    levothyroxine (SYNTHROID) 137 mcg tablet Take 137 mcg by mouth daily   metolazone (ZAROXOLYN) 2 5 mg tablet TAKE 1 TABLET BY MOUTH TWICE WEEKLY 10 tablet 0    omeprazole (PriLOSEC) 20 mg delayed release capsule Take 20 mg by mouth 3 (three) times a week        potassium chloride (K-DUR,KLOR-CON) 10 mEq tablet Take 1 tablet (10 mEq total) by mouth 2 (two) times a week (Patient taking differently: Take 10 mEq by mouth as needed (only taken with the metolazone) ) 15 tablet 3    Sennosides (SENOKOT PO) Take by mouth daily      simvastatin (ZOCOR) 20 mg tablet TAKE 1 TABLET (20MG) BY ORAL ROUTE EVERY DAY IN THE EVENING 90 tablet 2    traMADol (ULTRAM) 50 mg tablet Take 1 tablet (50 mg total) by mouth every 6 (six) hours as needed for moderate pain 30 tablet 0    valsartan (DIOVAN) 80 mg tablet TAKE 1 TABLET ORALLY EVERY DAY 90 tablet 3    XARELTO 15 MG tablet TAKE 1 TAB BY MOUTH DAILY WITH EVENEING MEAL  90 tablet 0    bisacodyl (DULCOLAX) 5 mg EC tablet Take 2 tablets (10 mg total) by mouth once for 1 dose 2 tablet 0    famotidine (PEPCID) 20 mg tablet Take 1 tablet (20 mg total) by mouth 2 (two) times a day 60 tablet 11    linaCLOtide (LINZESS) 145 MCG CAPS Take 1 capsule (145 mcg total) by mouth daily 30 capsule 4    Magnesium Hydroxide (MILK OF MAGNESIA PO) Take by mouth 1 in the AM and 1 in the PM      polyethylene glycol (GOLYTELY) 4000 mL solution Take 4,000 mL by mouth once for 1 dose 4000 mL 0     No current facility-administered medications for this visit          Blood pressure 130/70, pulse 68, temperature 98 5 °F (36 9 °C), height 5' 5" (1 651 m), weight 103 kg (226 lb 6 oz), not currently breastfeeding  PHYSICAL EXAM: Physical Exam   Constitutional: She is oriented to person, place, and time  She appears well-developed and well-nourished  HENT:   Head: Normocephalic and atraumatic  Nose: Nose normal    Mouth/Throat: Oropharynx is clear and moist    Eyes: Conjunctivae are normal  Right eye exhibits no discharge  Left eye exhibits no discharge  No scleral icterus  Neck: Neck supple  No JVD present  No tracheal deviation present  No thyromegaly present  Cardiovascular: Normal rate, regular rhythm and normal heart sounds  Exam reveals no gallop and no friction rub  No murmur heard  Pulmonary/Chest: Effort normal and breath sounds normal  No respiratory distress  She has no wheezes  She has no rales  She exhibits no tenderness  Abdominal: Soft  Bowel sounds are normal  She exhibits no distension and no mass  There is no tenderness  There is no rebound and no guarding  No hernia (large ventral hernia)  Musculoskeletal: She exhibits no edema, tenderness or deformity  Lymphadenopathy:     She has no cervical adenopathy  Neurological: She is alert and oriented to person, place, and time  Skin: Skin is warm and dry  No rash noted  No erythema  Psychiatric: She has a normal mood and affect   Her behavior is normal  Thought content normal         Lab Results   Component Value Date    WBC 6 28 05/23/2019    HGB 12 2 05/23/2019    HCT 41 4 05/23/2019    MCV 84 05/23/2019     05/23/2019     Lab Results   Component Value Date    CALCIUM 8 6 10/01/2019    K 3 5 10/01/2019    CO2 33 (H) 10/01/2019     10/01/2019    BUN 47 (H) 10/01/2019    CREATININE 1 13 10/01/2019     Lab Results   Component Value Date    ALT 13 05/23/2019    AST 21 05/23/2019    ALKPHOS 121 (H) 05/23/2019     Lab Results   Component Value Date    INR 1 08 05/18/2018    INR 1 38 (H) 02/16/2018    INR 1 14 08/15/2016    PROTIME 11 4 05/18/2018    PROTIME 14 5 (H) 02/16/2018    PROTIME 12 0 (H) 08/15/2016       Xr Chest 1 View Portable    Result Date: 5/23/2019  Impression: Chronic vascular congestion with no overt edema or lobar consolidation  Stable cardiomegaly and central vascular engorgement  Workstation performed: [id number]     Xr Shoulder 2+ Views Left    Result Date: 5/23/2019  Impression: No acute osseous abnormality  Workstation performed: [id number]     Ct Head Without Contrast    Result Date: 5/23/2019  Impression: Small to moderate-sized posterior vertex scalp hematoma but no calvarial fracture or acute intracranial process is seen  Other findings as above  Workstation performed: [id number]     Ct Cervical Spine Without Contrast    Result Date: 5/23/2019  Impression: Degenerative changes as described but no evidence of acute cervical spine injury  Other findings as above  Workstation performed: [id number]       ASSESSMENT & PLAN:    History of colon polyps  Personal history of colon polyps- patient is at increased risk for colon cancer screening  Rule out colorectal lesions including polyps or malignancy     -Schedule for colonoscopy  Discussed with patient in detail about the risks because of large ventral hernia  She would like to get the hernia fixed prior to the colonoscopy  Make a referral to surgery and advised her to call back to schedule for colonoscopy    -High-fiber diet  Other constipation  Patient with history of chronic constipation for which she has been on Senokot and laxatives without any significant help     -will put her on Linzess 145 micro g regularly  Gave her some samples to try    -advised to take plenty of liquids    -high-fiber diet    Gastroesophageal reflux disease without esophagitis  Gastroesophageal reflux disease - Patient has the symptoms of chronic acid reflux for a long time  Possible hiatal hernia or LES weakness    Should rule out Rodriguez's esophagus because of chronic symptoms         -discussed about long-term side effects from omeprazole and advised her to try Pepcid instead     -schedule for EGD    -Patient was explained about the lifestyle and dietary modifications  Advised to avoid fatty foods, chocolates, caffeine, alcohol and any other triggering foods  Avoid eating for at least 3 hours before going to bed  Hernia of anterior abdominal wall    -refer for surgical evaluation    Anticoagulant long-term use  Patient is at increased risk because of anticoagulation therapy  She was advised to check with her cardiologist about holding Xarelto prior to the procedures

## 2019-10-15 NOTE — ASSESSMENT & PLAN NOTE
Personal history of colon polyps- patient is at increased risk for colon cancer screening  Rule out colorectal lesions including polyps or malignancy     -Schedule for colonoscopy  Discussed with patient in detail about the risks because of large ventral hernia  She would like to get the hernia fixed prior to the colonoscopy  Make a referral to surgery and advised her to call back to schedule for colonoscopy    -High-fiber diet

## 2019-10-17 ENCOUNTER — OFFICE VISIT (OUTPATIENT)
Dept: FAMILY MEDICINE CLINIC | Facility: CLINIC | Age: 69
End: 2019-10-17
Payer: MEDICARE

## 2019-10-17 VITALS
SYSTOLIC BLOOD PRESSURE: 120 MMHG | HEART RATE: 68 BPM | TEMPERATURE: 97.1 F | WEIGHT: 230 LBS | HEIGHT: 65 IN | DIASTOLIC BLOOD PRESSURE: 60 MMHG | RESPIRATION RATE: 18 BRPM | BODY MASS INDEX: 38.32 KG/M2

## 2019-10-17 DIAGNOSIS — Z79.4 TYPE 2 DIABETES MELLITUS WITH RETINOPATHY AND MACULAR EDEMA, WITH LONG-TERM CURRENT USE OF INSULIN, UNSPECIFIED LATERALITY, UNSPECIFIED RETINOPATHY SEVERITY (HCC): ICD-10-CM

## 2019-10-17 DIAGNOSIS — E11.40 TYPE 2 DIABETES MELLITUS WITH DIABETIC NEUROPATHY, WITH LONG-TERM CURRENT USE OF INSULIN (HCC): ICD-10-CM

## 2019-10-17 DIAGNOSIS — Z86.010 HISTORY OF COLON POLYPS: ICD-10-CM

## 2019-10-17 DIAGNOSIS — E78.2 MIXED HYPERLIPIDEMIA: ICD-10-CM

## 2019-10-17 DIAGNOSIS — I10 ESSENTIAL HYPERTENSION: ICD-10-CM

## 2019-10-17 DIAGNOSIS — Z23 NEED FOR VACCINATION: ICD-10-CM

## 2019-10-17 DIAGNOSIS — K43.9 HERNIA OF ANTERIOR ABDOMINAL WALL: ICD-10-CM

## 2019-10-17 DIAGNOSIS — Z00.00 MEDICARE ANNUAL WELLNESS VISIT, SUBSEQUENT: Primary | ICD-10-CM

## 2019-10-17 DIAGNOSIS — I50.42 CHRONIC COMBINED SYSTOLIC AND DIASTOLIC CONGESTIVE HEART FAILURE (HCC): ICD-10-CM

## 2019-10-17 DIAGNOSIS — Z79.4 TYPE 2 DIABETES MELLITUS WITH DIABETIC NEUROPATHY, WITH LONG-TERM CURRENT USE OF INSULIN (HCC): ICD-10-CM

## 2019-10-17 DIAGNOSIS — I73.9 PERIPHERAL VASCULAR DISEASE, UNSPECIFIED (HCC): ICD-10-CM

## 2019-10-17 DIAGNOSIS — E11.311 TYPE 2 DIABETES MELLITUS WITH RETINOPATHY AND MACULAR EDEMA, WITH LONG-TERM CURRENT USE OF INSULIN, UNSPECIFIED LATERALITY, UNSPECIFIED RETINOPATHY SEVERITY (HCC): ICD-10-CM

## 2019-10-17 DIAGNOSIS — H91.90 HEARING LOSS, UNSPECIFIED HEARING LOSS TYPE, UNSPECIFIED LATERALITY: ICD-10-CM

## 2019-10-17 DIAGNOSIS — I25.119 ATHEROSCLEROSIS OF NATIVE CORONARY ARTERY OF NATIVE HEART WITH ANGINA PECTORIS (HCC): ICD-10-CM

## 2019-10-17 DIAGNOSIS — N18.30 CKD (CHRONIC KIDNEY DISEASE) STAGE 3, GFR 30-59 ML/MIN (HCC): ICD-10-CM

## 2019-10-17 PROBLEM — I07.1 TRICUSPID REGURGITATION: Status: ACTIVE | Noted: 2019-10-17

## 2019-10-17 PROBLEM — F34.1 DYSTHYMIA: Status: ACTIVE | Noted: 2019-10-17

## 2019-10-17 PROBLEM — Z95.1 H/O CORONARY ARTERY BYPASS SURGERY: Status: ACTIVE | Noted: 2019-10-17

## 2019-10-17 PROCEDURE — 90662 IIV NO PRSV INCREASED AG IM: CPT

## 2019-10-17 PROCEDURE — G0439 PPPS, SUBSEQ VISIT: HCPCS | Performed by: INTERNAL MEDICINE

## 2019-10-17 PROCEDURE — G0008 ADMIN INFLUENZA VIRUS VAC: HCPCS

## 2019-10-17 PROCEDURE — 99214 OFFICE O/P EST MOD 30 MIN: CPT | Performed by: INTERNAL MEDICINE

## 2019-10-17 NOTE — PATIENT INSTRUCTIONS
Medicare Preventive Visit Patient Instructions  Thank you for completing your Welcome to Medicare Visit or Medicare Annual Wellness Visit today  Your next wellness visit will be due in one year (10/17/2020)  The screening/preventive services that you may require over the next 5-10 years are detailed below  Some tests may not apply to you based off risk factors and/or age  Screening tests ordered at today's visit but not completed yet may show as past due  Also, please note that scanned in results may not display below  Preventive Screenings:  Service Recommendations Previous Testing/Comments   Colorectal Cancer Screening  * Colonoscopy    * Fecal Occult Blood Test (FOBT)/Fecal Immunochemical Test (FIT)  * Fecal DNA/Cologuard Test  * Flexible Sigmoidoscopy Age: 54-65 years old   Colonoscopy: every 10 years (may be performed more frequently if at higher risk)  OR  FOBT/FIT: every 1 year  OR  Cologuard: every 3 years  OR  Sigmoidoscopy: every 5 years  Screening may be recommended earlier than age 48 if at higher risk for colorectal cancer  Also, an individualized decision between you and your healthcare provider will decide whether screening between the ages of 74-80 would be appropriate  Colonoscopy: 02/26/2016  FOBT/FIT: Not on file  Cologuard: Not on file  Sigmoidoscopy: Not on file    Screening Current     Breast Cancer Screening Age: 36 years old  Frequency: every 1-2 years  Not required if history of left and right mastectomy Mammogram: Not on file       Cervical Cancer Screening Between the ages of 21-29, pap smear recommended once every 3 years  Between the ages of 33-67, can perform pap smear with HPV co-testing every 5 years     Recommendations may differ for women with a history of total hysterectomy, cervical cancer, or abnormal pap smears in past  Pap Smear: Not on file    Screening Not Indicated   Hepatitis C Screening Once for adults born between 1945 and 1965  More frequently in patients at high risk for Hepatitis C Hep C Antibody: Not on file       Diabetes Screening 1-2 times per year if you're at risk for diabetes or have pre-diabetes Fasting glucose: 133 mg/dL   A1C: 7 9 %    Screening Not Indicated  History Diabetes   Cholesterol Screening Once every 5 years if you don't have a lipid disorder  May order more often based on risk factors  Lipid panel: 10/01/2019    Screening Not Indicated  History Lipid Disorder     Other Preventive Screenings Covered by Medicare:  1  Abdominal Aortic Aneurysm (AAA) Screening: covered once if your at risk  You're considered to be at risk if you have a family history of AAA  2  Lung Cancer Screening: covers low dose CT scan once per year if you meet all of the following conditions: (1) Age 50-69; (2) No signs or symptoms of lung cancer; (3) Current smoker or have quit smoking within the last 15 years; (4) You have a tobacco smoking history of at least 30 pack years (packs per day multiplied by number of years you smoked); (5) You get a written order from a healthcare provider  3  Glaucoma Screening: covered annually if you're considered high risk: (1) You have diabetes OR (2) Family history of glaucoma OR (3)  aged 48 and older OR (3)  American aged 72 and older  3  Osteoporosis Screening: covered every 2 years if you meet one of the following conditions: (1) You're estrogen deficient and at risk for osteoporosis based off medical history and other findings; (2) Have a vertebral abnormality; (3) On glucocorticoid therapy for more than 3 months; (4) Have primary hyperparathyroidism; (5) On osteoporosis medications and need to assess response to drug therapy  · Last bone density test (DXA Scan): Not on file  5  HIV Screening: covered annually if you're between the age of 12-76  Also covered annually if you are younger than 13 and older than 72 with risk factors for HIV infection   For pregnant patients, it is covered up to 3 times per pregnancy  Immunizations:  Immunization Recommendations   Influenza Vaccine Annual influenza vaccination during flu season is recommended for all persons aged >= 6 months who do not have contraindications   Pneumococcal Vaccine (Prevnar and Pneumovax)  * Prevnar = PCV13  * Pneumovax = PPSV23   Adults 25-60 years old: 1-3 doses may be recommended based on certain risk factors  Adults 72 years old: Prevnar (PCV13) vaccine recommended followed by Pneumovax (PPSV23) vaccine  If already received PPSV23 since turning 65, then PCV13 recommended at least one year after PPSV23 dose  Hepatitis B Vaccine 3 dose series if at intermediate or high risk (ex: diabetes, end stage renal disease, liver disease)   Tetanus (Td) Vaccine - COST NOT COVERED BY MEDICARE PART B Following completion of primary series, a booster dose should be given every 10 years to maintain immunity against tetanus  Td may also be given as tetanus wound prophylaxis  Tdap Vaccine - COST NOT COVERED BY MEDICARE PART B Recommended at least once for all adults  For pregnant patients, recommended with each pregnancy  Shingles Vaccine (Shingrix) - COST NOT COVERED BY MEDICARE PART B  2 shot series recommended in those aged 48 and above     Health Maintenance Due:      Topic Date Due    Hepatitis C Screening  1950    MAMMOGRAM  1950    CRC Screening: Colonoscopy  02/26/2026     Immunizations Due:      Topic Date Due    HEPATITIS B VACCINES (1 of 3 - Risk 3-dose series) 01/28/1968    DTaP,Tdap,and Td Vaccines (1 - Tdap) 01/28/1970    Pneumococcal Vaccine: 65+ Years (1 of 2 - PCV13) 01/28/2014    INFLUENZA VACCINE  07/01/2019     Advance Directives   What are advance directives? Advance directives are legal documents that state your wishes and plans for medical care  These plans are made ahead of time in case you lose your ability to make decisions for yourself   Advance directives can apply to any medical decision, such as the treatments you want, and if you want to donate organs  What are the types of advance directives? There are many types of advance directives, and each state has rules about how to use them  You may choose a combination of any of the following:  · Living will: This is a written record of the treatment you want  You can also choose which treatments you do not want, which to limit, and which to stop at a certain time  This includes surgery, medicine, IV fluid, and tube feedings  · Durable power of  for healthcare [facility]): This is a written record that states who you want to make healthcare choices for you when you are unable to make them for yourself  This person, called a proxy, is usually a family member or a friend  You may choose more than 1 proxy  · Do not resuscitate (DNR) order:  A DNR order is used in case your heart stops beating or you stop breathing  It is a request not to have certain forms of treatment, such as CPR  A DNR order may be included in other types of advance directives  · Medical directive: This covers the care that you want if you are in a coma, near death, or unable to make decisions for yourself  You can list the treatments you want for each condition  Treatment may include pain medicine, surgery, blood transfusions, dialysis, IV or tube feedings, and a ventilator (breathing machine)  · Values history: This document has questions about your views, beliefs, and how you feel and think about life  This information can help others choose the care that you would choose  Why are advance directives important? An advance directive helps you control your care  Although spoken wishes may be used, it is better to have your wishes written down  Spoken wishes can be misunderstood, or not followed  Treatments may be given even if you do not want them  An advance directive may make it easier for your family to make difficult choices about your care     Depression   Depression  is a medical condition that causes feelings of sadness or hopelessness that do not go away  Depression may cause you to lose interest in things you used to enjoy  These feelings may interfere with your daily life  Call your local emergency number (911 in the [address]) if:   · You think about harming yourself or someone else  · You have done something on purpose to hurt yourself  The following resources are available at any time to help you, if needed:   · [facility]: 9-274.486.9144 (9-382-373-XCRX)   · Suicide Hotline: 6-795.995.4802 (7-268-FYQKLAY)   · For a list of international numbers: https://save org/find-help/international-resources/  Treatment for depression may include medicine to relieve depression  Medicine is often used together with therapy  Therapy is a way for you to talk about your feelings and anything that may be causing depression  Therapy can be done alone or in a group  It may also be done with family members or a significant other  · Get regular physical activity  · Create a regular sleep schedule  · Eat a variety of healthy foods  · Do not drink alcohol or use drugs  Urinary Incontinence   Urinary incontinence (UI)  is when you lose control of your bladder  UI develops because your bladder cannot store or empty urine properly  The 3 most common types of UI are stress incontinence, urge incontinence, or both  Medicines:   · May be given to help strengthen your bladder control  Report any side effects of medication to your healthcare provider  Do pelvic muscle exercises often:  Your pelvic muscles help you stop urinating  Squeeze these muscles tight for 5 seconds, then relax for 5 seconds  Gradually work up to squeezing for 10 seconds  Do 3 sets of 15 repetitions a day, or as directed  This will help strengthen your pelvic muscles and improve bladder control    Train your bladder:  Go to the bathroom at set times, such as every 2 hours, even if you do not feel the urge to go  You can also try to hold your urine when you feel the urge to go  For example, hold your urine for 5 minutes when you feel the urge to go  As that becomes easier, hold your urine for 10 minutes  Self-care:   · Keep a UI record  Write down how often you leak urine and how much you leak  Make a note of what you were doing when you leaked urine  · Drink liquids as directed  You may need to limit the amount of liquid you drink to help control your urine leakage  Do not drink any liquid right before you go to bed  Limit or do not have drinks that contain caffeine or alcohol  · Prevent constipation  Eat a variety of high-fiber foods  Good examples are high-fiber cereals, beans, vegetables, and whole-grain breads  Walking is the best way to trigger your intestines to have a bowel movement  · Exercise regularly and maintain a healthy weight  Weight loss and exercise will decrease pressure on your bladder and help you control your leakage  · Use a catheter as directed  to help empty your bladder  A catheter is a tiny, plastic tube that is put into your bladder to drain your urine  · Go to behavior therapy as directed  Behavior therapy may be used to help you learn to control your urge to urinate  Weight Management   Why it is important to manage your weight:  Being overweight increases your risk of health conditions such as heart disease, high blood pressure, type 2 diabetes, and certain types of cancer  It can also increase your risk for osteoarthritis, sleep apnea, and other respiratory problems  Aim for a slow, steady weight loss  Even a small amount of weight loss can lower your risk of health problems  How to lose weight safely:  A safe and healthy way to lose weight is to eat fewer calories and get regular exercise  You can lose up about 1 pound a week by decreasing the number of calories you eat by 500 calories each day     Healthy meal plan for weight management:  A healthy meal plan includes a variety of foods, contains fewer calories, and helps you stay healthy  A healthy meal plan includes the following:  · Eat whole-grain foods more often  A healthy meal plan should contain fiber  Fiber is the part of grains, fruits, and vegetables that is not broken down by your body  Whole-grain foods are healthy and provide extra fiber in your diet  Some examples of whole-grain foods are whole-wheat breads and pastas, oatmeal, brown rice, and bulgur  · Eat a variety of vegetables every day  Include dark, leafy greens such as spinach, kale, bijal greens, and mustard greens  Eat yellow and orange vegetables such as carrots, sweet potatoes, and winter squash  · Eat a variety of fruits every day  Choose fresh or canned fruit (canned in its own juice or light syrup) instead of juice  Fruit juice has very little or no fiber  · Eat low-fat dairy foods  Drink fat-free (skim) milk or 1% milk  Eat fat-free yogurt and low-fat cottage cheese  Try low-fat cheeses such as mozzarella and other reduced-fat cheeses  · Choose meat and other protein foods that are low in fat  Choose beans or other legumes such as split peas or lentils  Choose fish, skinless poultry (chicken or turkey), or lean cuts of red meat (beef or pork)  Before you cook meat or poultry, cut off any visible fat  · Use less fat and oil  Try baking foods instead of frying them  Add less fat, such as margarine, sour cream, regular salad dressing and mayonnaise to foods  Eat fewer high-fat foods  Some examples of high-fat foods include french fries, doughnuts, ice cream, and cakes  · Eat fewer sweets  Limit foods and drinks that are high in sugar  This includes candy, cookies, regular soda, and sweetened drinks  Exercise:  Exercise at least 30 minutes per day on most days of the week  Some examples of exercise include walking, biking, dancing, and swimming   You can also fit in more physical activity by taking the stairs instead of the elevator or parking farther away from stores  Ask your healthcare provider about the best exercise plan for you  © Copyright [employer] 2018 Information is for End User's use only and may not be sold, redistributed or otherwise used for commercial purposes  All illustrations and images included in CareNotes® are the copyrighted property of [employer]  [employer]           Please use debrox or cerumenex drops to right ear daily for 4 days before your audiology appointment

## 2019-10-17 NOTE — ASSESSMENT & PLAN NOTE
Depression Screening Follow-up Plan: Patient's depression screening was positive with a PHQ-2 2score of 3  Their PHQ-9 score was 10  Patient declines further evaluation by mental health professional and/or medications  They have no active suicidal ideations  Brief counseling provided and recommend additional follow-up/re-evaluation at next office visit

## 2019-10-17 NOTE — ASSESSMENT & PLAN NOTE
Wt Readings from Last 3 Encounters:   10/17/19 104 kg (230 lb)   10/15/19 103 kg (226 lb 6 oz)   09/23/19 101 kg (222 lb)       She has gained some weight but appears well compensated  No signs of fluid overload on today's exam   Continue diuretics and follows regularly with cardiology

## 2019-10-17 NOTE — ASSESSMENT & PLAN NOTE
She has recently seen Dr Mendez Agarwal who would like to schedule colonoscopy; due to large ventral hernia she is seeing general surgery for an opinion first

## 2019-10-17 NOTE — ASSESSMENT & PLAN NOTE
Lab Results   Component Value Date    HGBA1C 7 9 (H) 10/01/2019     She continues to follow with endocrinology and medications are managed there

## 2019-10-17 NOTE — PROGRESS NOTES
Assessment and Plan:     Problem List Items Addressed This Visit        Other    Hernia of anterior abdominal wall           Preventive health issues were discussed with patient, and age appropriate screening tests were ordered as noted in patient's After Visit Summary  Personalized health advice and appropriate referrals for health education or preventive services given if needed, as noted in patient's After Visit Summary       History of Present Illness:     Patient presents for Medicare Annual Wellness visit    Patient Care Team:  Robson Bernal MD as PCP - General (Internal Medicine)  DO Gordo Ayala MD Ebb Fantasia, MD Oza Ley, MD     Problem List:     Patient Active Problem List   Diagnosis    Gallbladder calculus without cholecystitis    Umbilical hernia    HTN (hypertension)    Anemia    Coronary artery disease involving coronary bypass graft of native heart without angina pectoris    Combined systolic and diastolic congestive heart failure ([facility]    Vocal cord paralysis    Hyperlipidemia    Type 2 diabetes mellitus with diabetic neuropathy, with long-term current use of insulin ([facility])    CKD (chronic kidney disease) stage 3, GFR 30-59 ml/min ([facility] )    Morbid obesity with BMI of 40 0-44 9, adult ([facility] [address] )    H/O tricuspid valve annuloplasty    Obstructive sleep apnea    Arthritis    Chronic atrial fibrillation    Acquired hypothyroidism    Mild pulmonary hypertension ([address] )    Ventral hernia without obstruction or gangrene    Syncope    Disease of thyroid gland    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 ([facility] [address] [facility]    Encounter for long-term (current) use of insulin ([facility] [address] )    Leg cramps    Paresthesia    Status post placement of cardiac pacemaker    History of colon polyps    Other constipation    Gastroesophageal reflux disease without esophagitis    Anticoagulant long-term use    Hernia of anterior abdominal wall Past Medical and Surgical History:     Past Medical History:   Diagnosis Date    Arthritis     Atrial flutter ([place] [address] )     Cardiac disease     Carotid artery occlusion     [facility]est[facility]     Cholelithiasis     last assessed 8/8/2016    Coronary artery disease     Diabetes mellitus ([place] [address] )     Disease of thyroid gland     GERD (gastroesophageal reflux disease)     History of transfusion     Hyperlipidemia     Hypertension     Long-term insulin use in type 2 diabetes ([place] [address] ) 6/4/2016    Other specified diabetes mellitus with diabetic autonomic (poly)neuropathy ([place] [address] )     Renal disorder     Sleep apnea     Subclavian artery stenosis ([place] [address] )     Umbilical hernia with obstruction, without gangrene     last assessed 7/8/2016     Past Surgical History:   Procedure Laterality Date    ABDOMINAL SURGERY      CARDIAC PACEMAKER PLACEMENT      CARDIAC SURGERY      cabg x 2    CATARACT EXTRACTION      CHOLECYSTECTOMY      CORONARY ARTERY BYPASS GRAFT  2008    EYE SURGERY      FRACTURE SURGERY Right 2010    shoulder    HERNIA REPAIR      umbilical    IN LAP,CHOLECYSTECTOMY N/A 8/10/2016    Procedure: CHOLECYSTECTOMY LAPAROSCOPIC;  Surgeon: Rukhsana Hammond MD;  [facility];  Service: General    IN LARYNGOSCOPY,DIRECT,SCOPE,INJ CORDS Left 11/4/2016    Procedure: Malen Fabian; LEFT VOCAL FOLD INJECTION ;  Surgeon: Ramon Redmond MD;  [facility];  Service: ENT    IN REPAIR UMBILICAL UTXB,8+Z/B,KXAGD N/A 8/10/2016    Procedure: LAPAROSCOPIC REPAIR HERNIA VENTRAL;  Surgeon: Rukhsana Hammond MD;  [facility];  Service: General    [address] / STENTING Right     TRICUSPID VALVE REPLACEMENT      VASCULAR SURGERY      heart valve replacement      Family History:     Family History   Problem Relation Age of Onset    Heart disease Mother     Breast cancer Maternal Aunt     Heart disease Sister       Social History:     Social History Socioeconomic History    Marital status: Single     Spouse name: None    Number of children: None    Years of education: None    Highest education level: None   Occupational History    None   Social Needs    Financial resource strain: None    Food insecurity:     Worry: None     Inability: None    Transportation needs:     Medical: None     Non-medical: None   Tobacco Use    Smoking status: Never Smoker    Smokeless tobacco: Never Used   Substance and Sexual Activity    Alcohol use: Yes     Comment: per [employer] 'occasional alcohol use'    Drug use: Never     Types: Oxycodone    Sexual activity: Never   Lifestyle    Physical activity:     Days per week: None     Minutes per session: None    Stress: None   Relationships    Social connections:     Talks on phone: None     Gets together: None     Attends Yazdanism service: None     Active member of club or organization: None     Attends meetings of clubs or organizations: None     Relationship status: None    Intimate partner violence:     Fear of current or ex partner: None     Emotionally abused: None     Physically abused: None     Forced sexual activity: None   Other Topics Concern    None   Social History Narrative    Always uses seat belt       Medications and Allergies:     Current Outpatient Medications   Medication Sig Dispense Refill    Aspirin-Caffeine 500-32 5 MG TABS Take 2 tablets by mouth daily at bedtime      bisacodyl (DULCOLAX) 5 mg EC tablet Take 2 tablets (10 mg total) by mouth once for 1 dose 2 tablet 0    COMBIGAN 0 2-0 5 % Administer 1 drop to both eyes 2 (two) times a day      famotidine (PEPCID) 20 mg tablet Take 1 tablet (20 mg total) by mouth 2 (two) times a day 60 tablet 11    furosemide (LASIX) 80 mg tablet TAKE 1 TABLET DAILY AS DIRECTED   30 tablet 5    insulin aspart (NOVOLOG FLEXPEN) 100 Units/mL injection pen Inject under the [id number] units 3 times a day with meals  Use carbohydrate to insulin ratio 1:3 which means 1 unit of NovoLog for every 3 gm of carbohydrates 15 pen 11    insulin glargine (BASAGLAR KWIKPEN) 100 units/mL injection pen Inject 20 Units under the skin daily (Patient taking differently: Inject 15 Units under the skin daily at bedtime ) 5 pen 0    levothyroxine (SYNTHROID) 137 mcg tablet Take 137 mcg by mouth daily   linaCLOtide (LINZESS) 145 MCG CAPS Take 1 capsule (145 mcg total) by mouth daily 30 capsule 4    Magnesium Hydroxide (MILK OF MAGNESIA PO) Take by mouth 1 in the AM and 1 in the PM      metolazone (ZAROXOLYN) 2 5 mg tablet TAKE 1 TABLET BY MOUTH TWICE WEEKLY 10 tablet 0    omeprazole (PriLOSEC) 20 mg delayed release capsule Take 20 mg by mouth 3 (three) times a week        potassium chloride (K-DUR,KLOR-CON) 10 mEq tablet Take 1 tablet (10 mEq total) by mouth 2 (two) times a week (Patient taking differently: Take 10 mEq by mouth as needed (only taken with the metolazone) ) 15 tablet 3    simvastatin (ZOCOR) 20 mg tablet TAKE 1 TABLET (20MG) BY ORAL ROUTE EVERY DAY IN THE EVENING 90 tablet 2    traMADol (ULTRAM) 50 mg tablet Take 1 tablet (50 mg total) by mouth every 6 (six) hours as needed for moderate pain 30 tablet 0    valsartan (DIOVAN) 80 mg tablet TAKE 1 TABLET ORALLY EVERY DAY 90 tablet 3    XARELTO 15 MG tablet TAKE 1 TAB BY MOUTH DAILY WITH EVENEING MEAL  90 tablet 0    polyethylene glycol (GOLYTELY) 4000 mL solution Take 4,000 mL by mouth once for 1 dose 4000 mL 0    Sennosides (SENOKOT PO) Take by mouth daily       No current facility-administered medications for this visit  Allergies   Allergen Reactions    Bromide Ion [Bromine]      Blurred vision   Has preservative that  Pt cannot use    Other      Adhesive tape    Ibuprofen Palpitations    Penicillins Rash      Immunizations:     Immunization History   Administered Date(s) Administered    INFLUENZA 11/01/2018      Health Maintenance:         Topic Date Due    Hepatitis C Screening  1950  MAMMOGRAM  1950    CRC Screening: Colonoscopy  02/26/2026         Topic Date Due    HEPATITIS B VACCINES (1 of 3 - Risk 3-dose series) 01/28/1968    DTaP,Tdap,and Td Vaccines (1 - Tdap) 01/28/1970    Pneumococcal Vaccine: 65+ Years (1 of 2 - PCV13) 01/28/2014    INFLUENZA VACCINE  07/01/2019      Medicare Health Risk Assessment:     /60   Pulse 68   Temp (!) 97 1 °F (36 2 °C)   Resp 18   Ht 5' 5" (1 651 m)   Wt 104 kg (230 lb)   LMP  (LMP Unknown)   BMI 38 27 kg/m²      Asmita Meade is here for her Subsequent Wellness visit  Health Risk Assessment:   Patient rates overall health as fair  Patient feels that their physical health rating is slightly worse  Eyesight was rated as slightly worse  Hearing was rated as slightly worse  Patient feels that their emotional and mental health rating is slightly worse  Pain experienced in the last 7 days has been none  Patient states that she has experienced no weight loss or gain in last 6 months  Depression Screening:   PHQ-2 Score: 3  PHQ-9 Score: 10      Fall Risk Screening: In the past year, patient has experienced: no history of falling in past year      Urinary Incontinence Screening:   Patient has leaked urine accidently in the last six months  Home Safety:  Patient does not have trouble with stairs inside or outside of their home  Patient has working smoke alarms and has no working carbon monoxide detector  Home safety hazards include: none  Nutrition:   Current diet is Diabetic and Low Carb  Medications:   Patient is currently taking over-the-counter supplements  OTC medications include: see medication list  Patient is able to manage medications  Activities of Daily Living (ADLs)/Instrumental Activities of Daily Living (IADLs):   Walk and transfer into and out of bed and chair?: Yes  Dress and groom yourself?: Yes    Bathe or shower yourself?: Yes    Feed yourself?  Yes  Do your laundry/housekeeping?: Yes  Manage your money, pay your bills and track your expenses?: Yes  Make your own meals?: Yes    Do your own shopping?: No    ADL comments: Son helps with shopping     Previous Hospitalizations:   Any hospitalizations or ED visits within the last 12 months?: No      Advance Care Planning:   Living will: Yes    Advanced directive: Yes    End of Life Decisions reviewed with patient: Yes      Cognitive Screening:   Provider or family/friend/caregiver concerned regarding cognition?: No    PREVENTIVE SCREENINGS      Cardiovascular Screening:    General: Screening Not Indicated and History Lipid Disorder      Diabetes Screening:     General: Screening Not Indicated and History Diabetes      Colorectal Cancer Screening:     General: Screening Current      Breast Cancer Screening:     General: Patient Declines      Cervical Cancer Screening:    General: Screening Not Indicated      Osteoporosis Screening:    General: Patient Declines      Abdominal Aortic Aneurysm (AAA) Screening:        General: Screening Not Indicated      Lung Cancer Screening:     General: Patient Declines      Hepatitis C Screening:    General: Patient Declines    Other Counseling Topics:   Calcium and vitamin D intake and regular weightbearing exercise         Brian Hi MD

## 2019-10-17 NOTE — ASSESSMENT & PLAN NOTE
Lab Results   Component Value Date    HGBA1C 7 9 (H) 10/01/2019     She continues to follow up with her ophthalmologist and her retinal specialist

## 2019-10-20 DIAGNOSIS — I48.20 CHRONIC ATRIAL FIBRILLATION (HCC): ICD-10-CM

## 2019-10-21 RX ORDER — RIVAROXABAN 15 MG/1
TABLET, FILM COATED ORAL
Qty: 30 TABLET | Refills: 2 | Status: SHIPPED | OUTPATIENT
Start: 2019-10-21 | End: 2020-04-27 | Stop reason: SDUPTHER

## 2019-10-28 ENCOUNTER — CONSULT (OUTPATIENT)
Dept: SURGERY | Facility: CLINIC | Age: 69
End: 2019-10-28
Payer: MEDICARE

## 2019-10-28 ENCOUNTER — OFFICE VISIT (OUTPATIENT)
Dept: FAMILY MEDICINE CLINIC | Facility: CLINIC | Age: 69
End: 2019-10-28
Payer: MEDICARE

## 2019-10-28 VITALS
TEMPERATURE: 97 F | DIASTOLIC BLOOD PRESSURE: 78 MMHG | WEIGHT: 220.6 LBS | SYSTOLIC BLOOD PRESSURE: 136 MMHG | BODY MASS INDEX: 36.75 KG/M2 | HEART RATE: 88 BPM | HEIGHT: 65 IN

## 2019-10-28 VITALS
OXYGEN SATURATION: 90 % | DIASTOLIC BLOOD PRESSURE: 60 MMHG | SYSTOLIC BLOOD PRESSURE: 120 MMHG | HEART RATE: 65 BPM | HEIGHT: 65 IN | RESPIRATION RATE: 16 BRPM | BODY MASS INDEX: 36.79 KG/M2 | TEMPERATURE: 96.8 F | WEIGHT: 220.8 LBS

## 2019-10-28 DIAGNOSIS — I48.20 CHRONIC ATRIAL FIBRILLATION (HCC): ICD-10-CM

## 2019-10-28 DIAGNOSIS — R41.82 ALTERED MENTAL STATUS, UNSPECIFIED ALTERED MENTAL STATUS TYPE: Primary | ICD-10-CM

## 2019-10-28 DIAGNOSIS — Z79.4 TYPE 2 DIABETES MELLITUS WITH DIABETIC NEUROPATHY, WITH LONG-TERM CURRENT USE OF INSULIN (HCC): ICD-10-CM

## 2019-10-28 DIAGNOSIS — E78.2 MIXED HYPERLIPIDEMIA: ICD-10-CM

## 2019-10-28 DIAGNOSIS — K43.2 RECURRENT INCISIONAL HERNIA: Primary | ICD-10-CM

## 2019-10-28 DIAGNOSIS — E11.40 TYPE 2 DIABETES MELLITUS WITH DIABETIC NEUROPATHY, WITH LONG-TERM CURRENT USE OF INSULIN (HCC): ICD-10-CM

## 2019-10-28 DIAGNOSIS — I50.42 CHRONIC COMBINED SYSTOLIC AND DIASTOLIC CONGESTIVE HEART FAILURE (HCC): ICD-10-CM

## 2019-10-28 PROCEDURE — 99214 OFFICE O/P EST MOD 30 MIN: CPT | Performed by: NURSE PRACTITIONER

## 2019-10-28 PROCEDURE — 99204 OFFICE O/P NEW MOD 45 MIN: CPT | Performed by: SURGERY

## 2019-10-28 NOTE — PROGRESS NOTES
Assessment/Plan:  Advised to address eye drop issue with ophthalmologist   Assessment benign and will follow up for any issues and concerns  1  Altered mental status, unspecified altered mental status type  Comments:  one episode after waking up from nap and possible related to ADRIAN  Assessment is benign and advised to follow up for any worsening and strict ER precautions advi    2  Chronic atrial fibrillation  Comments:  Managed by cardiologist, had permanenet pacemaker and on xeralto    3  Chronic combined systolic and diastolic congestive heart failure ([address] )  Comments:  On diuretics and managed by cardiologist and no distress noted in office    4  Mixed hyperlipidemia  Comments:  on statin and tolerating it well    5  Type 2 diabetes mellitus with diabetic neuropathy, with long-term current use of insulin (H[facility])  Comments:  Management per endo          BMI Counseling: Body mass index is 36 74 kg/m²  Discussed the patient's BMI with her  The BMI is above normal  Nutrition recommendations include reducing portion sizes, decreasing overall calorie intake, 3-5 servings of fruits/vegetables daily, reducing fast food intake, consuming healthier snacks, decreasing soda and/or juice intake and moderation in carbohydrate intake  Patient Instructions:  Supportive care discussed and advised  Advised to RTO for any worsening and no improvement  Follow up for no improvement and worsening of conditions  Patient advised and educated when to see immediate medical care  Return if symptoms worsen or fail to improve        Future Appointments   Date Time Provider Seb Lacey   10/28/2019  4:45 PM Ashley Garcia MD [facility] [place] [facility]   11/4/2019 10:00 AM Dayana Fernandez [place] [facility]   11/4/2019  1:40 PM Promise Johnson MD Endo [facility] [place] [facility]   11/21/2019  8:40 AM DO MIGUEL ANGEL [place] [facility]   2/20/2020  5:45 PM Jan Angulo MD [facility]   3/12/2020  2:30 PM [facility] [place] [facility]           Subjective:      Patient ID: Artjosefa Thomas is a 78 y o  female  Chief Complaint   Patient presents with    Confusion     had an episode 2 days ago  Could not remeber person place or time for at least 5 min  [id number]         Vitals:  /60   Pulse 65   Temp (!) 96 8 °F (36 °C)   Resp 16   Ht 5' 5" (1 651 m)   Wt 100 kg (220 lb 12 8 oz)   LMP  (LMP Unknown)   SpO2 90%   BMI 36 74 kg/m²     HPI  Patient accompanied by daughter in law  Stated that had right eye surgery done by Dr Jackelyn Barnett about 2 weeks ago and was using eye drops which were steroids as per patient  Patient stated that while she was using steroids eye drops her hearing got bad and then last time she used drops on 10/25/19 and since then her hearing is improved a lot and can hear much better now  Scheduled for hearing test on 11/5/2019  Have left eye surgery scheduled on 10/30/2019  Stated that fall a sleep while watching TV day time at her bed on 10/26/19 and when she woke up, she knew where she was and who she was but her room surroundings did not feel familiar and then in few minutes back to normal  Stated that was only one episode  Denies any vision changes, movement changes, dizziness and headache since that episode but wanted to get checked  Uses home oxygen but does not wear all the time and has ADRIAN  Complaint with medications  The following portions of the patient's history were reviewed and updated as appropriate: allergies, current medications, past family history, past medical history, past social history, past surgical history and problem list       Review of Systems   Constitutional: Negative for activity change and fatigue  HENT: Negative  Respiratory: Negative  Cardiovascular: Negative  Gastrointestinal: Negative  Genitourinary: Negative  Skin: Negative      Neurological: Negative for dizziness, tremors, facial asymmetry, speech difficulty, weakness, light-headedness, numbness and headaches  As noted in HPI    Uses can for walking and stated that no gait changes happened since that episode  Psychiatric/Behavioral: Negative  Objective:    Social History     Tobacco Use   Smoking Status Never Smoker   Smokeless Tobacco Never Used       Allergies: Allergies   Allergen Reactions    Bromide Ion [Bromine]      Blurred vision  Has preservative that  Pt cannot use    Other      Adhesive tape    Ibuprofen Palpitations    Penicillins Rash         Current Outpatient Medications   Medication Sig Dispense Refill    Aspirin-Caffeine 500-32 5 MG TABS Take 2 tablets by mouth daily at bedtime      COMBIGAN 0 2-0 5 % Administer 1 drop to both eyes 2 (two) times a day      famotidine (PEPCID) 20 mg tablet Take 1 tablet (20 mg total) by mouth 2 (two) times a day 60 tablet 11    furosemide (LASIX) 80 mg tablet TAKE 1 TABLET DAILY AS DIRECTED  30 tablet 5    insulin aspart (NOVOLOG FLEXPEN) 100 Units/mL injection pen Inject under the [id number] units 3 times a day with meals  Use carbohydrate to insulin ratio 1:3 which means 1 unit of NovoLog for every 3 gm of carbohydrates 15 pen 11    insulin glargine (BASAGLAR KWIKPEN) 100 units/mL injection pen Inject 20 Units under the skin daily (Patient taking differently: Inject 15 Units under the skin daily at bedtime ) 5 pen 0    levothyroxine (SYNTHROID) 137 mcg tablet Take 137 mcg by mouth daily        linaCLOtide (LINZESS) 145 MCG CAPS Take 1 capsule (145 mcg total) by mouth daily 30 capsule 4    metolazone (ZAROXOLYN) 2 5 mg tablet TAKE 1 TABLET BY MOUTH TWICE WEEKLY 10 tablet 0    potassium chloride (K-DUR,KLOR-CON) 10 mEq tablet Take 1 tablet (10 mEq total) by mouth 2 (two) times a week (Patient taking differently: Take 10 mEq by mouth as needed (only taken with the metolazone) ) 15 tablet 3    Sennosides (SENOKOT PO) Take by mouth daily      simvastatin (ZOCOR) 20 mg tablet TAKE 1 TABLET (20MG) BY ORAL ROUTE EVERY DAY IN THE EVENING 90 tablet 2    traMADol (ULTRAM) 50 mg tablet Take 1 tablet (50 mg total) by mouth every 6 (six) hours as needed for moderate pain 30 tablet 0    valsartan (DIOVAN) 80 mg tablet TAKE 1 TABLET ORALLY EVERY DAY 90 tablet 3    XARELTO 15 MG tablet TAKE 1 TAB BY MOUTH DAILY WITH EVENEING MEAL  30 tablet 2    bisacodyl (DULCOLAX) 5 mg EC tablet Take 2 tablets (10 mg total) by mouth once for 1 dose 2 tablet 0     No current facility-administered medications for this visit  Physical Exam   Constitutional: She is oriented to person, place, and time  She appears well-developed and well-nourished  HENT:   Head: Normocephalic  Right Ear: Tympanic membrane, external ear and ear canal normal    Left Ear: Tympanic membrane, external ear and ear canal normal    Nose: Nose normal  Right sinus exhibits no maxillary sinus tenderness and no frontal sinus tenderness  Left sinus exhibits no maxillary sinus tenderness and no frontal sinus tenderness  Mouth/Throat: Oropharynx is clear and moist and mucous membranes are normal    Neck: Neck supple  Cardiovascular: Normal rate and regular rhythm  Murmur heard  Pulmonary/Chest: Effort normal and breath sounds normal    Abdominal: Normal appearance and bowel sounds are normal  There is no hepatosplenomegaly  There is no tenderness  There is no rebound  Genitourinary: No vaginal discharge found  Musculoskeletal: Normal range of motion  She exhibits edema (trace ankle edema noted)  Lymphadenopathy:        Right cervical: No superficial cervical and no posterior cervical adenopathy present  Left cervical: No superficial cervical and no posterior cervical adenopathy present  Neurological: She is alert and oriented to person, place, and time  She has normal strength  No sensory deficit  Coordination normal  GCS eye subscore is 4  GCS verbal subscore is 5  GCS motor subscore is 6     No eye lag noted   Skin: Skin is warm and dry  Psychiatric: She has a normal mood and affect  Her behavior is normal  Judgment and thought content normal    Vitals reviewed                    ISIDRA Sanford

## 2019-10-28 NOTE — PROGRESS NOTES
[facility] History and Physical Note:    Assessment:  Incisional hernia, recurrent  Plan:  1  Screening colonoscopy  2  CT scan with oral contrast to assess surgical planning  3  Counseling after 1 and 2 have been obtained to assess whether risks of surgery outweigh the benefit of ventral hernia repair  Chief Complaint:  "I am here for the hernia    HPI  Patient is a pleasant 65-year-old woman with significant pulmonary cardiac history as noted below  She underwent laparoscopic cholecystectomy and laparoscopic ventral hernia repair in 2016 with permanent mesh  Patient has noted recurrent hernia since then  Patient has no bowel or bladder obstructive complaints  She is due for colonoscopy  She has oxygen dependent COPD        PMH:  Past Medical History:   Diagnosis Date    Arthritis     [facility]rial flutter ([facility]     Cardiac disease     Carotid artery occlusion     CHF (congestive heart failure) ([facility]     Cholelithiasis     last assessed 8/8/2016    Coronary artery disease     Diabetes mellitus ([facility]     Disease of thyroid gland     GERD (gastroesophageal reflux disease)     History of transfusion     Hyperlipidemia     Hypertension     Long-term insulin use in type 2 diabetes ([facility] ) 6/4/2016    Other specified diabetes mellitus with diabetic autonomic (poly)neuropathy ([facility] )     Renal disorder     Sleep apnea     Subclavian artery stenosis ([facility]     Umbilical hernia with obstruction, without gangrene     last assessed 7/8/2016       PSH:  Past Surgical History:   Procedure Laterality Date    ABDOMINAL SURGERY      CARDIAC PACEMAKER PLACEMENT      CARDIAC SURGERY      cabg x 2    CATARACT EXTRACTION      CHOLECYSTECTOMY      CORONARY ARTERY BYPASS GRAFT  2008    EYE SURGERY      FRACTURE SURGERY Right 2010    shoulder    HERNIA REPAIR      umbilical    FL LAP,CHOLECYSTECTOMY N/A 8/10/2016    Procedure: CHOLECYSTECTOMY LAPAROSCOPIC;  Surgeon: Pete Freeman MD;  [facility];  Service: General    MT LARYNGOSCOPY,DIRECT,SCOPE,INJ CORDS Left 11/4/2016    Procedure: MICRODIRECT LARYNGOSCOPY; LEFT VOCAL FOLD INJECTION ;  Surgeon: Judi Buitrago MD;  [facility];  Service: ENT    MT REPAIR UMBILICAL VNSE,3+A/Q,RJRSI N/A 8/10/2016    Procedure: LAPAROSCOPIC REPAIR HERNIA VENTRAL;  Surgeon: Kirk López MD;  [facility];  Service: General    [address] / STENTING Right     TRICUSPID VALVE REPLACEMENT      VASCULAR SURGERY      heart valve replacement       Home Meds:  Current Outpatient Medications on File Prior to Visit   Medication Sig Dispense Refill    Aspirin-Caffeine 500-32 5 MG TABS Take 2 tablets by mouth daily at bedtime      bisacodyl (DULCOLAX) 5 mg EC tablet Take 2 tablets (10 mg total) by mouth once for 1 dose 2 tablet 0    COMBIGAN 0 2-0 5 % Administer 1 drop to both eyes 2 (two) times a day      famotidine (PEPCID) 20 mg tablet Take 1 tablet (20 mg total) by mouth 2 (two) times a day 60 tablet 11    furosemide (LASIX) 80 mg tablet TAKE 1 TABLET DAILY AS DIRECTED  30 tablet 5    insulin aspart (NOVOLOG FLEXPEN) 100 Units/mL injection pen Inject under the edgr30-89 units 3 times a day with meals  Use carbohydrate to insulin ratio 1:3 which means 1 unit of NovoLog for every 3 gm of carbohydrates 15 pen 11    insulin glargine (BASAGLAR KWIKPEN) 100 units/mL injection pen Inject 20 Units under the skin daily (Patient taking differently: Inject 15 Units under the skin daily at bedtime ) 5 pen 0    levothyroxine (SYNTHROID) 137 mcg tablet Take 137 mcg by mouth daily        linaCLOtide (LINZESS) 145 MCG CAPS Take 1 capsule (145 mcg total) by mouth daily 30 capsule 4    metolazone (ZAROXOLYN) 2 5 mg tablet TAKE 1 TABLET BY MOUTH TWICE WEEKLY 10 tablet 0    potassium chloride (K-DUR,KLOR-CON) 10 mEq tablet Take 1 tablet (10 mEq total) by mouth 2 (two) times a week (Patient taking differently: Take 10 mEq by mouth as needed (only taken with the metolazone) ) 15 tablet 3    Sennosides (SENOKOT PO) Take by mouth daily      simvastatin (ZOCOR) 20 mg tablet TAKE 1 TABLET (20MG) BY ORAL ROUTE EVERY DAY IN THE EVENING 90 tablet 2    traMADol (ULTRAM) 50 mg tablet Take 1 tablet (50 mg total) by mouth every 6 (six) hours as needed for moderate pain 30 tablet 0    valsartan (DIOVAN) 80 mg tablet TAKE 1 TABLET ORALLY EVERY DAY 90 tablet 3    XARELTO 15 MG tablet TAKE 1 TAB BY MOUTH DAILY WITH EVENEING MEAL  30 tablet 2    [DISCONTINUED] Magnesium Hydroxide (MILK OF MAGNESIA PO) Take by mouth 1 in the AM and 1 in the PM      [DISCONTINUED] omeprazole (PriLOSEC) 20 mg delayed release capsule Take 20 mg by mouth 3 (three) times a week        [DISCONTINUED] polyethylene glycol (GOLYTELY) 4000 mL solution Take 4,000 mL by mouth once for 1 dose 4000 mL 0     No current facility-administered medications on file prior to visit  Allergies: Allergies   Allergen Reactions    Bromide Ion [Bromine]      Blurred vision   Has preservative that  Pt cannot use    Other      Adhesive tape    Ibuprofen Palpitations    Penicillins Rash       Social Hx:  Social History     Socioeconomic History    Marital status: Single     Spouse name: Not on file    Number of children: Not on file    Years of education: Not on file    Highest education level: Not on file   Occupational History    Not on file   Social Needs    Financial resource strain: Not on file    Food insecurity:     Worry: Not on file     Inability: Not on file    Transportation needs:     Medical: Not on file     Non-medical: Not on file   Tobacco Use    Smoking status: Never Smoker    Smokeless tobacco: Never Used   Substance and Sexual Activity    Alcohol use: Yes     Comment: per [employer] 'occasional alcohol use'    Drug use: Never     Types: Oxycodone    Sexual activity: Never   Lifestyle    Physical activity:     Days per week: Not on file     Minutes per session: Not on file    Stress: Not on file   Relationships    Social connections:     Talks on phone: Not on file     Gets together: Not on file     Attends Baptism service: Not on file     Active member of club or organization: Not on file     Attends meetings of clubs or organizations: Not on file     Relationship status: Not on file    Intimate partner violence:     Fear of current or ex partner: Not on file     Emotionally abused: Not on file     Physically abused: Not on file     Forced sexual activity: Not on file   Other Topics Concern    Not on file   Social History Narrative    Always uses seat belt        Family Hx:    Family History   Problem Relation Age of Onset    Heart disease Mother     Breast cancer Maternal Aunt     Heart disease Sister          Review of Systems   Constitutional: Negative  HENT: Negative  Eyes: Negative  Respiratory:        COPD with home O2   Cardiovascular:        Long, cardiac history  Last ejection fraction 45%   Gastrointestinal:        See past medical history   Endocrine: Negative  Genitourinary:        See past medical history   Musculoskeletal: Negative  Allergic/Immunologic: Negative  Neurological: Negative  Hematological:        On Xarelto   Psychiatric/Behavioral: Negative  /78 (BP Location: Left arm, Patient Position: Sitting, Cuff Size: Standard)   Pulse 88   Temp (!) 97 °F (36 1 °C) (Tympanic)   Ht 5' 5" (1 651 m)   Wt 100 kg (220 lb 9 6 oz)   LMP  (LMP Unknown)   Breastfeeding? No   BMI 36 71 kg/m²     Physical Exam   Constitutional: She is oriented to person, place, and time  Elderly female no acute distress   HENT:   Head: Normocephalic and atraumatic  Eyes: Pupils are equal, round, and reactive to light  EOM are normal    Neck: Normal range of motion  Neck supple  Cardiovascular: Normal rate  Pulmonary/Chest: Effort normal  No respiratory distress     Course breath sounds bilaterally   Abdominal: Large, reducible midline ventral hernia  Nontender   Musculoskeletal: Normal range of motion  Lymphadenopathy:     She has no cervical adenopathy  Neurological: She is alert and oriented to person, place, and time  Skin: Skin is warm and dry  Psychiatric: She has a normal mood and affect   Her behavior is normal        Pertinent labs reviewed    Pertinent images and available reads personally reviewed    Pertinent notes reviewed       Jean Muñiz MD [address] [facility]  [phone]

## 2019-10-29 ENCOUNTER — TELEPHONE (OUTPATIENT)
Dept: GASTROENTEROLOGY | Facility: CLINIC | Age: 69
End: 2019-10-29

## 2019-10-29 NOTE — TELEPHONE ENCOUNTER
Spoke with son, he stated they will call to schedule CT and will call office to get scheduled for colonoscopy  Was unable to talk   Was at work

## 2019-10-29 NOTE — TELEPHONE ENCOUNTER
Dr Carolina Jha Pt had consult with Dr Chris Lilly yesterday  Dr Chris Lilly said he did not want to do anything with the hernia until a CT was done as well as the colonoscopy  Is it ok to go ahead and schedule colon?  Or should we wait for CT results first?

## 2019-11-04 ENCOUNTER — OFFICE VISIT (OUTPATIENT)
Dept: ENDOCRINOLOGY | Facility: CLINIC | Age: 69
End: 2019-11-04
Payer: MEDICARE

## 2019-11-04 ENCOUNTER — OFFICE VISIT (OUTPATIENT)
Dept: AUDIOLOGY | Facility: CLINIC | Age: 69
End: 2019-11-04
Payer: MEDICARE

## 2019-11-04 VITALS
HEIGHT: 65 IN | WEIGHT: 215.2 LBS | BODY MASS INDEX: 35.85 KG/M2 | HEART RATE: 71 BPM | DIASTOLIC BLOOD PRESSURE: 76 MMHG | SYSTOLIC BLOOD PRESSURE: 140 MMHG

## 2019-11-04 DIAGNOSIS — E11.40 TYPE 2 DIABETES MELLITUS WITH DIABETIC NEUROPATHY, WITH LONG-TERM CURRENT USE OF INSULIN (HCC): Primary | ICD-10-CM

## 2019-11-04 DIAGNOSIS — H90.A22 SENSORINEURAL HEARING LOSS (SNHL) OF LEFT EAR WITH RESTRICTED HEARING OF RIGHT EAR: Primary | ICD-10-CM

## 2019-11-04 DIAGNOSIS — Z79.4 ENCOUNTER FOR LONG-TERM (CURRENT) USE OF INSULIN (HCC): ICD-10-CM

## 2019-11-04 DIAGNOSIS — N18.30 CKD (CHRONIC KIDNEY DISEASE) STAGE 3, GFR 30-59 ML/MIN (HCC): ICD-10-CM

## 2019-11-04 DIAGNOSIS — E03.9 ACQUIRED HYPOTHYROIDISM: ICD-10-CM

## 2019-11-04 DIAGNOSIS — Z79.4 TYPE 2 DIABETES MELLITUS WITH DIABETIC NEUROPATHY, WITH LONG-TERM CURRENT USE OF INSULIN (HCC): Primary | ICD-10-CM

## 2019-11-04 PROCEDURE — 92557 COMPREHENSIVE HEARING TEST: CPT | Performed by: AUDIOLOGIST

## 2019-11-04 PROCEDURE — 92567 TYMPANOMETRY: CPT | Performed by: AUDIOLOGIST

## 2019-11-04 PROCEDURE — 99214 OFFICE O/P EST MOD 30 MIN: CPT | Performed by: INTERNAL MEDICINE

## 2019-11-04 RX ORDER — POLYETHYLENE GLYCOL 1000
4000 POWDER (GRAM) MISCELLANEOUS
COMMUNITY
End: 2019-11-27

## 2019-11-04 RX ORDER — OMEPRAZOLE 20 MG/1
20 CAPSULE, DELAYED RELEASE ORAL DAILY
COMMUNITY
End: 2019-11-27

## 2019-11-04 NOTE — LETTER
2019     Saira Vela MD  [address]  [place] [address]    Patient: Aretha Heard   YOB: 1950   Date of Visit: 2019       Dear Dr Page Miss:    Thank you for referring Aretha Heard to me for evaluation  Below are my notes for this consultation  If you have questions, please do not hesitate to call me  I look forward to following your patient along with you  Sincerely,        Fidelina Moya , CCC/A  Clinical Audiologist   [place]  [id number]        CC: No Recipients  Rudy Hurley  2019  5:24 PM  Sign at close encounter  HEARING EVALUATION    Name:  Aretha Heard  :  1950  Age:  78 y o  Date of Evaluation: 19     History:  Recent decrease in hearing "cotton" sensation in both ears  Reported loss of hearing left ear following a medical procedure in the past     Reason for visit: Aretha Heard is being seen today at the request of Dr Kaylee Mi for an evaluation of hearing  Patient and daughter in law reports issues with hearing the TV and family members in conversation  Ms Isabella Saldaña has a significant medical history including diabetes, retinopathy, lightheadedness, use of oxygen in the home (day and night; recent)  (see full history in medical notes)  She walks with the assistance of a cane  EVALUATION:    Otoscopic Evaluation:   Right Ear: Mild cerumen, further back in canal    Left Ear: Minimum cerumen     Tympanometry:   Right: Type A - normal middle ear pressure and compliance   Left: Type A - normal middle ear pressure and compliance    Audiogram Results:  R ear:  Mild/moderate high frequency loss with excellent word recognition score  L ear:  Precipitously sloping moderate to severe SNHL with poor (52%) word recognition score  Binaural word recognition was 96% at 75dBHL (L) and 60 dBHL (R)  Low level tinnitus, described as machine noise, was reported by the patient while she was being evaluated        *see attached audiogram    RECOMMENDATIONS:  1  As the patient is due for an evaluation with Dr Justin Martinez (ENT), I advised them to discuss today's results with the physician in order to obtain medical clearance for possible amplification  2  All options regarding amplification vs  Assistive listening devices were discussed with the patient and her daughter in law  3  Will place a referral for a [employer] phone   4  They will call back once they are medically cleared to discuss possible amplification options further  PATIENT EDUCATION:   Discussed results and recommendations with Ms Chance Restrepo and her family member  Questions were addressed and the patient was encouraged to contact our department should concerns arise  A copy of today's results as well as brochures regarding hearing aids and the [employer] phone were provided          Fidelina Simms , [facility], [place]# [id number], Hearing Aid Dispenser, [place]# [id number]  Clinical Audiologist

## 2019-11-04 NOTE — PROGRESS NOTES
HEARING EVALUATION    Name:  Shanae Aguila  :  1950  Age:  78 y o  Date of Evaluation: 19     History:  Recent decrease in hearing "cotton" sensation in both ears  Reported loss of hearing left ear following a medical procedure in the past     Reason for visit: Shanae Aguila is being seen today at the request of Dr Gurpreet Hernandez for an evaluation of hearing  Patient and daughter in law reports issues with hearing the TV and family members in conversation  Ms Lawson Flores has a significant medical history including diabetes, retinopathy, lightheadedness, use of oxygen in the home (day and night; recent)  (see full history in medical notes)  She walks with the assistance of a cane  EVALUATION:    Otoscopic Evaluation:   Right Ear: Mild cerumen, further back in canal    Left Ear: Minimum cerumen     Tympanometry:   Right: Type A - normal middle ear pressure and compliance   Left: Type A - normal middle ear pressure and compliance    Audiogram Results:  R ear:  Mild/moderate high frequency loss with excellent word recognition score  L ear:  Precipitously sloping moderate to severe SNHL with poor (52%) word recognition score  Binaural word recognition was 96% at 75dBHL (L) and 60 dBHL (R)  Low level tinnitus, described as machine noise, was reported by the patient while she was being evaluated  *see attached audiogram    RECOMMENDATIONS:  1  As the patient is due for an evaluation with Dr Dannielle Bowie (ENT), I advised them to discuss today's results with the physician in order to obtain medical clearance for possible amplification  2  All options regarding amplification vs  Assistive listening devices were discussed with the patient and her daughter in law  3  Will place a referral for a [facility] phone   4  They will call back once they are medically cleared to discuss possible amplification options further        PATIENT EDUCATION:   Discussed results and recommendations with Ms  Yonny Brewster and her family member  Questions were addressed and the patient was encouraged to contact our department should concerns arise  A copy of today's results as well as brochures regarding hearing aids and the [employer] phone were provided          Fidelina Draper , CCC-A, [place]# [id number], Hearing Aid Dispenser, [place]# [id number]  Clinical Audiologist

## 2019-11-04 NOTE — PATIENT INSTRUCTIONS
Continue current medications   Please try to eat consistent meals and avoid high carbohydrate snacks   If you are eating fewer carbs, please Take less insulin    If you're skipping a meal, do not take any mealtime insulin    Send blood sugar log for review in 2-3 weeks    Follow up in 3 months

## 2019-11-04 NOTE — PROGRESS NOTES
Sara Stewart 78 y o  female MRN: [id number]    Encounter: [id number]      Assessment/Plan     Assessment: This is a 78y o -year-old female with history of type 2 diabetes mellitus, hypertension, hyperlipidemia, CKD CAD CHF obesity hypothyroidism    Plan:  1  Type 2 diabetes mellitus on long-term insulin therapy  Poorly controlled with last A1c 7 9%, which is trending up  Has diet related variability in blood sugars  Does not want to change any medications at this time, would like to try her best with diet, lifestyle  Recommend the following at this time  Continue current medications   Advised to try eating tconsistent meals and avoid high carbohydrate snacks   If eating fewer carbs, take less mealtime insulin  If skipping a meal, advised to not take any mealtime insulin    Patient to send blood sugar log for review in 2-3 weeks  Follow-up in 3 months with repeat A1c, BMP, lipid panel    2  Hypothyroidism  Clinically, biochemically euthyroid  Continue levothyroxine 137 mcg orally daily  Repeat TSH, free T4 in 3 months     3  Hyperlipidemia  - continue statin therapy  Repeat fasting lipid panel in 6 months    4  Hypertension, CAD, CHF  Blood pressure at goal  - continue current medications, follow-up with cardiology    5  CKD-repeat BMP in 3 months  Follow-up with Nephrology    CC: Diabetes    History of Present Illness     HPI:  Sara Stewart is a 78 y o  female presents for a follow-up visit regarding diabetes management  DM history:   Diagnosed around 3616  Has complications of CAD/CKD  Last Eye exam:  Has retinopathy     Current regimen:   Basaglar 16 units subcutaneously at bedtime  NovoLog per CIR 1:3, average 12 per meals  Correction 1:30> 150    Statin:  zocor 20   ACE-I/ARB: valsartan     Went for a hearing test today, decreased in the left ear  Thinks she has a reaction to ophthalmic steroids - confusion etc      Appetite is good  Denies recent weight gain/ loss     Denies numbness or tingling in the hands or feet  Denies changes in vision  No known retinopathy  No chest pain/ SOB  No diarrhea/ constipation  No urinary complaints  Exercise:     Home glucose monitoring- on recall   Before breakfast:    Before lunch:  avg 150   Before dinner: 140-150   Bedtime:  usually higher   Tends to snack   Takes meds with OJ - not willing to give up     BG log reviewed - checking 1-3 times a day   Ranging 78-220s     All other systems were reviewed and are negative      Review of Systems      Historical Information   Past Medical History:   Diagnosis Date    Arthritis     [facility] flutter ([facility] )     Cardiac disease     Carotid artery occlusion     CHF (congestive heart failure) ([facility] )     Cholelithiasis     last assessed 8/8/2016    Coronary artery disease     Diabetes mellitus ([facility] )     Disease of thyroid gland     GERD (gastroesophageal reflux disease)     History of transfusion     Hyperlipidemia     Hypertension     Long-term insulin use in type 2 diabetes ([facility] ) 6/4/2016    Other specified diabetes mellitus with diabetic autonomic (poly)neuropathy ([place] [address] )     Renal disorder     Sleep apnea     Subclavian artery stenosis ([place] [address] )     Umbilical hernia with obstruction, without gangrene     last assessed 7/8/2016     Past Surgical History:   Procedure Laterality Date    ABDOMINAL SURGERY      CARDIAC PACEMAKER PLACEMENT      CARDIAC SURGERY      cabg x 2    CATARACT EXTRACTION      CHOLECYSTECTOMY      CORONARY ARTERY BYPASS GRAFT  2008    EYE SURGERY      FRACTURE SURGERY Right 2010    shoulder    HERNIA REPAIR      umbilical    IL LAP,CHOLECYSTECTOMY N/A 8/10/2016    Procedure: CHOLECYSTECTOMY LAPAROSCOPIC;  Surgeon: Sanjuanita Polk MD;  Location: [facility];  Service: General     [address] Left 11/4/2016    Procedure: MICRODIRECT LARYNGOSCOPY; LEFT VOCAL FOLD INJECTION ;  Surgeon: Ania Sandhu MD;  Location[facility];  Service: ENT    IL REPAIR UMBILICAL VIGI,5+W/Q,HUPVO N/A 8/10/2016    Procedure: LAPAROSCOPIC REPAIR HERNIA VENTRAL;  Surgeon: Sierra Eller MD;  Location: [facility];  Service: General    [address] / STENTING Right     TRICUSPID VALVE REPLACEMENT      VASCULAR SURGERY      heart valve replacement     Social History   Social History     Substance and Sexual Activity   Alcohol Use Yes    Comment: per [employer] 'occasional alcohol use'     Social History     Substance and Sexual Activity   Drug Use Never    Types: Oxycodone     Social History     Tobacco Use   Smoking Status Never Smoker   Smokeless Tobacco Never Used     Family History:   Family History   Problem Relation Age of Onset    Heart disease Mother     Breast cancer Maternal Aunt     Heart disease Sister        Meds/Allergies   Current Outpatient Medications   Medication Sig Dispense Refill    Aspirin-Caffeine 500-32 5 MG TABS Take 2 tablets by mouth daily at bedtime      COMBIGAN 0 2-0 5 % Administer 1 drop to both eyes 2 (two) times a day      famotidine (PEPCID) 20 mg tablet Take 1 tablet (20 mg total) by mouth 2 (two) times a day 60 tablet 11    furosemide (LASIX) 80 mg tablet TAKE 1 TABLET DAILY AS DIRECTED  30 tablet 5    insulin aspart (NOVOLOG FLEXPEN) 100 Units/mL injection pen Inject under the [id number] units 3 times a day with meals  Use carbohydrate to insulin ratio 1:3 which means 1 unit of NovoLog for every 3 gm of carbohydrates 15 pen 11    insulin glargine (BASAGLAR KWIKPEN) 100 units/mL injection pen Inject 16 Units under the skin daily 5 pen 3    levothyroxine (SYNTHROID) 137 mcg tablet Take 137 mcg by mouth daily        linaCLOtide (LINZESS) 145 MCG CAPS Take 1 capsule (145 mcg total) by mouth daily 30 capsule 4    MAGNESIUM OXIDE, ANTACID, PO Take by mouth      metolazone (ZAROXOLYN) 2 5 mg tablet TAKE 1 TABLET BY MOUTH TWICE WEEKLY 10 tablet 0    omeprazole (PriLOSEC) 20 mg delayed release capsule Take 20 mg by mouth daily      Polyethylene Glycol 1000 LIQD 4,000 mL      potassium chloride (K-DUR,KLOR-CON) 10 mEq tablet Take 1 tablet (10 mEq total) by mouth 2 (two) times a week (Patient taking differently: Take 10 mEq by mouth as needed (only taken with the metolazone) ) 15 tablet 3    Sennosides (SENOKOT PO) Take 20 mg by mouth every evening       simvastatin (ZOCOR) 20 mg tablet TAKE 1 TABLET (20MG) BY ORAL ROUTE EVERY DAY IN THE EVENING 90 tablet 2    traMADol (ULTRAM) 50 mg tablet Take 1 tablet (50 mg total) by mouth every 6 (six) hours as needed for moderate pain 30 tablet 0    valsartan (DIOVAN) 80 mg tablet TAKE 1 TABLET ORALLY EVERY DAY 90 tablet 3    XARELTO 15 MG tablet TAKE 1 TAB BY MOUTH DAILY WITH EVENEING MEAL  30 tablet 2    bisacodyl (DULCOLAX) 5 mg EC tablet Take 2 tablets (10 mg total) by mouth once for 1 dose (Patient not taking: Reported on 11/4/2019) 2 tablet 0     No current facility-administered medications for this visit  Allergies   Allergen Reactions    Bromide Ion [Bromine]      Blurred vision  Has preservative that  Pt cannot use    Other      Adhesive tape    Ibuprofen Palpitations    Penicillins Rash       Objective   Vitals: Blood pressure 140/76, pulse 71, height 5' 5" (1 651 m), weight 97 6 kg (215 lb 3 2 oz), not currently breastfeeding  Physical Exam   Constitutional: She is oriented to person, place, and time  She appears well-developed and well-nourished  HENT:   Head: Normocephalic and atraumatic  Eyes: Pupils are equal, round, and reactive to light  Conjunctivae and EOM are normal    Neck: Normal range of motion  Neck supple  No thyromegaly present  Cardiovascular: Normal rate, regular rhythm and normal heart sounds  No murmur heard  Pulmonary/Chest: Effort normal and breath sounds normal  She has no wheezes  Abdominal: Soft  She exhibits no distension  There is no tenderness  Obese   Musculoskeletal: Normal range of motion  She exhibits no edema     Using a cane to ambulate   Neurological: She is alert and oriented to person, place, and time  Skin: Skin is warm and dry  Psychiatric: She has a normal mood and affect  Her behavior is normal    Vitals reviewed  The history was obtained from the review of the chart, patient and family      Lab Results:   Lab Results   Component Value Date/Time    Hemoglobin A1C 7 9 (H) 10/01/2019 09:02 AM    Hemoglobin A1C 7 7 (A) 05/10/2019 08:34 AM    Hemoglobin A1C 8 5 (H) 11/14/2018 07:55 AM    WBC 6 28 05/23/2019 03:24 AM    WBC 7 29 05/07/2019 05:09 AM    WBC 7 46 11/14/2018 07:55 AM    Hemoglobin 12 2 05/23/2019 03:24 AM    Hemoglobin 12 2 05/07/2019 05:09 AM    Hemoglobin 12 0 11/14/2018 07:55 AM    Hematocrit 41 4 05/23/2019 03:24 AM    Hematocrit 41 1 05/07/2019 05:09 AM    Hematocrit 39 8 11/14/2018 07:55 AM    MCV 84 05/23/2019 03:24 AM    MCV 83 05/07/2019 05:09 AM    MCV 88 11/14/2018 07:55 AM    Platelets 499 77/48/4966 03:24 AM    Platelets 017 86/59/0743 05:09 AM    Platelets 017 26/06/8446 07:55 AM    BUN 47 (H) 10/01/2019 09:02 AM    BUN 69 (H) 05/23/2019 03:24 AM    BUN 69 (H) 05/07/2019 05:17 AM    Potassium 3 5 10/01/2019 09:02 AM    Potassium 4 3 05/23/2019 03:24 AM    Potassium 4 0 05/07/2019 05:17 AM    Chloride 102 10/01/2019 09:02 AM    Chloride 98 (L) 05/23/2019 03:24 AM    Chloride 100 05/07/2019 05:17 AM    CO2 33 (H) 10/01/2019 09:02 AM    CO2 27 05/23/2019 03:24 AM    CO2 29 05/07/2019 05:17 AM    Creatinine 1 13 10/01/2019 09:02 AM    Creatinine 1 45 (H) 05/23/2019 03:24 AM    Creatinine 1 43 (H) 05/07/2019 05:17 AM    AST 21 05/23/2019 03:24 AM    AST 16 05/07/2019 05:17 AM    AST 18 11/14/2018 07:55 AM    ALT 13 05/23/2019 03:24 AM    ALT 15 05/07/2019 05:17 AM    ALT 23 11/14/2018 07:55 AM    Albumin 3 4 (L) 05/23/2019 03:24 AM    Albumin 3 4 (L) 05/07/2019 05:17 AM    Albumin 3 3 (L) 11/14/2018 07:55 AM    HDL, Direct 38 (L) 10/01/2019 09:02 AM    HDL, Direct 39 (L) 05/23/2019 08:30 AM    HDL, Direct 53 11/14/2018 07:55 AM    Triglycerides 113 10/01/2019 09:02 AM    Triglycerides 83 05/23/2019 08:30 AM    Triglycerides 77 11/14/2018 07:55 AM       Component      Latest Ref Rng & Units 10/1/2019   Hemoglobin A1C      4 2 - 6 3 % 7 9 (H)   EAG      mg/dl 180   FRUCTOSAMINE      0 - 285 umol/L 328 (H)       Imaging Studies: I have personally reviewed pertinent reports  Portions of the record may have been created with voice recognition software  Occasional wrong word or "sound a like" substitutions may have occurred due to the inherent limitations of voice recognition software  Read the chart carefully and recognize, using context, where substitutions have occurred

## 2019-11-05 ENCOUNTER — TELEPHONE (OUTPATIENT)
Dept: GASTROENTEROLOGY | Facility: CLINIC | Age: 69
End: 2019-11-05

## 2019-11-05 ENCOUNTER — HOSPITAL ENCOUNTER (OUTPATIENT)
Dept: RADIOLOGY | Facility: HOSPITAL | Age: 69
Discharge: HOME | End: 2019-11-05
Attending: SURGERY
Payer: MEDICARE

## 2019-11-05 DIAGNOSIS — K43.2 RECURRENT INCISIONAL HERNIA: ICD-10-CM

## 2019-11-05 DIAGNOSIS — Z86.010 HISTORY OF COLON POLYPS: ICD-10-CM

## 2019-11-05 PROCEDURE — 74176 CT ABD & PELVIS W/O CONTRAST: CPT

## 2019-11-05 NOTE — TELEPHONE ENCOUNTER
Our mutual patient is scheduled for procedure: Colonoscopy     On: 12/2/2019       With: Dr Jane Pantoja       She is taking the following blood thinner:  Xarelto       Can this be stopped _2_ days prior to the procedure?          Physician Approving clearance:       ________________________

## 2019-11-05 NOTE — TELEPHONE ENCOUNTER
Pt has been scheduled with Kamlesh Glass 12/2/2019  Please send Golytely/Ducolax to pharmacy on file      Instructions have been mailed to pt's home

## 2019-11-21 ENCOUNTER — OFFICE VISIT (OUTPATIENT)
Dept: PULMONOLOGY | Facility: MEDICAL CENTER | Age: 69
End: 2019-11-21
Payer: MEDICARE

## 2019-11-21 VITALS
TEMPERATURE: 97.7 F | WEIGHT: 206 LBS | HEART RATE: 63 BPM | OXYGEN SATURATION: 94 % | DIASTOLIC BLOOD PRESSURE: 82 MMHG | HEIGHT: 65 IN | BODY MASS INDEX: 34.32 KG/M2 | SYSTOLIC BLOOD PRESSURE: 128 MMHG | RESPIRATION RATE: 12 BRPM

## 2019-11-21 DIAGNOSIS — R06.02 SOB (SHORTNESS OF BREATH) ON EXERTION: Primary | ICD-10-CM

## 2019-11-21 DIAGNOSIS — J38.00 VOCAL CORD PARALYSIS: ICD-10-CM

## 2019-11-21 DIAGNOSIS — E66.09 CLASS 1 OBESITY DUE TO EXCESS CALORIES WITH SERIOUS COMORBIDITY AND BODY MASS INDEX (BMI) OF 33.0 TO 33.9 IN ADULT: ICD-10-CM

## 2019-11-21 DIAGNOSIS — G47.33 OBSTRUCTIVE SLEEP APNEA: ICD-10-CM

## 2019-11-21 DIAGNOSIS — R09.02 HYPOXEMIA: ICD-10-CM

## 2019-11-21 PROCEDURE — 94010 BREATHING CAPACITY TEST: CPT | Performed by: INTERNAL MEDICINE

## 2019-11-21 PROCEDURE — 99204 OFFICE O/P NEW MOD 45 MIN: CPT | Performed by: INTERNAL MEDICINE

## 2019-11-21 NOTE — PROGRESS NOTES
Assessment/Plan:       Problem List Items Addressed This Visit        Respiratory    Vocal cord paralysis     She does have history of left vocal cord paralysis  She states this initially started after she had laparoscopic cholecystectomy in 2016 states was related to her intubation for general anesthesia  She did have a left vocal cord fold injection done November 4, 2016 for this         Obstructive sleep apnea     Ector Villarreal does have history of obstructive sleep apnea that was diagnosed several years ago at [facility]  She did have a CPAP machine before but had difficulty tolerating it  She presently is using oxygen 4 liters/minute at bedtime  She has no nocturnal dyspnea  I did discuss weight loss with her that this would help with her ADRIAN  I did order pulse oximetry recording to be done with her using 4 L of oxygen at night to see if she has any significant oxygen desaturation with this  At the present time she does not want pursue CPAP again         Relevant Orders    Pulse oximetry overnight       Other    SOB (shortness of breath) on exertion - Primary     She does have chronic shortness of breath with SIRS likely due to her obesity, chronic diastolic and systolic cardiac dysfunction, and pulmonary artery hypertension  Last echocardiogram done May 2018 showed left ventricular ejection fraction mildly decreased to 45% evidence of diastolic dysfunction  There was some mild dilatation mild decrease in right ventricle function  She also had mild to moderate aortic stenosis and evidence of pulmonary hypertension with estimated PA pressure of 65 mm Hg  She did have moderate tricuspid regurgitation  She did have prior tricuspid valve angioplasty done at [facility] in January 2012    Spirometry today shows severe restrictive impairment likely due to her being overweight  Forced vital capacity of 1 36 L or 44% of predicted    I personally reviewed chest x-ray from May 23, 2018  This shows moderate cardiomegaly and prominent pulmonary vasculature  No pleural effusions or evidence of interstitial lung disease  Relevant Orders    POCT spirometry (Completed)    Pulse oximetry overnight    Home Oxygen Portability Evaluation Only    Class 1 obesity due to excess calories with serious comorbidity in adult     She is obese in this is contributing to her shortness of breath  I did encourage her to lose weight  Hypoxemia     She does have oxygen desaturation with ambulation  Pulse oximetry testing done today on room air at rest showed O2 saturation be 95% and after ambulating 100 ft this decreased 86% on room air  With 2 L of oxygen O2 saturation was 97% at rest and 94% on 2 L of nasal cannula oxygen after ambulating  100 ft    I recommend she use 2 L of oxygen with activity and will order assessment for home portable battery operated concentrator  Her medical supply company is [facility]    She will continue oxygen 4 liters/minute at bedtime and will order nocturnal pulse oximetry at night to see if this is providing adequate oxygenation for her at bedtime  She likely does have alveolar hypoventilation from obesity causing nocturnal sleep-related hypoxemia  Return in about 6 months (around 5/21/2020)  All questions are answered to the patient's satisfaction and understanding  She verbalizes understanding  She is encouraged to call with any further questions or concerns  Portions of the record may have been created with voice recognition software  Occasional wrong word or "sound a like" substitutions may have occurred due to the inherent limitations of voice recognition software  Read the chart carefully and recognize, using context, where substitutions have occurred  a    Electronically Signed by Dev Watson DO    ______________________________________________________________________    Chief Complaint:   Chief Complaint   Patient presents with    Shortness of Breath     pt states that she uses o2  pt states occurs at night    Sleeping Problem     pt  son states that pt does not sleep the whole night   Cough     occasional /dry  no wheeze        Patient ID: Kassandra Sequeira is a 78 y o  y o  female has a past medical history of Arthritis, Atrial flutter ([facility] [address] ), Cardiac disease, Carotid artery occlusion, CHF (congestive heart failure) ([facility] ), Cholelithiasis, Coronary artery disease, Diabetes mellitus ([facility] [address] ), Disease of thyroid gland, GERD (gastroesophageal reflux disease), History of transfusion, Hyperlipidemia, Hypertension, Long-term insulin use in type 2 diabetes ([facility] [address] ) (6/4/2016), Other specified diabetes mellitus with diabetic autonomic (poly)neuropathy ([facility] [address] ), Pleural effusion (2008), Pulmonary embolism ([facility] [address] ), Renal disorder, Retinopathy, Sleep apnea, Subclavian artery stenosis ([facility] [address] ), and Umbilical hernia with obstruction, without gangrene  11/21/2019  Patient presents today for initial visit  HPI     Kassandra Sequeira has history of obstructive sleep apnea, atrial fibrillation, and diastolic cardiac dysfunction  She does have coronary disease and had CABG done  She does have a [employer] pacemaker  She also has history of right subclavian artery stent  Complains of shortness of breath  Had an initial episode in May 2019 where she felt short of breath and had a fall at home, was taken to hospital, admitted for syncope  Following that hospitalization has been on supplemental oxygen at home, 3L  She sleeps with it overnight, at 3-4L  Sometimes leaves oxygen off if she is sitting at rest at home  Feels more comfortable sleeping with oxygen  No coughing or wheezing  Has a portable tank at home but does not always feel like she needs it  She also has history of diabetes mellitus type 2 and has been on long-term insulin therapy  She has history of obesity, hyperlipidemia, hypertension and chronic kidney disease    She also has history of hypothyroidism, and hyperlipidemia  Also has history chronic atrial fibrillation and is on Xarelto  History of hemothorax requiring chest tube placement   History of left vocal cord paralysis  Had initial issues with vocal cord following lap morris in 2016 after intubation where "they used a tube that was too big"   Had procedure by ENT 11/4/16, was told results would last a few months, but has generally had relief following that since the procedure  Plans to follow up with same ENT who did initial procedure  She did have a left vocal cord fold injection done 11/04/2016 for this  States she was previously diagnosed with sleep apnea but "haven't used a machine in 3-4 years"  Lost 50lb since initial diagnosis and states symptoms have improved  Initial study was done at [facility]  Tolerated CPAP fairly well but felt it "dried her out"  Was using a full mask, thinks machine may have been set to 19, can remember discussing getting a new machine with auto-CPAP with previous doctor  Does not know if she snores  Does not wake up gasping for air  Echocardiogram done in May of 2019 showed left ventricular ejection fraction be mildly decreased at 45%  There is evidence of diastolic dysfunction and the right ventricle is mildly dilated with mild decrease in his right ventricular function  There is evidence of mild to moderate aortic stenosis with aortic valve area of 1 25 centimeter squared and she had evidence of pulmonary hypertension with estimated PA pressure of 65 mm Hg and moderate tricuspid regurgitation  She has had prior tricuspid valve annuloplasty  She did have CABG x4 in 2008 at [facility] and then had tricuspid valve annuloplasty done January 2012 at [facility]     She did have laparoscopic surgery with cholecystectomy and ventral hernia repair done prominent measure August 10, 2016 at [facility]  She developed anemia afterwards    She never smoked    She used to follow with pulmonologist Dr Chikis Calvert  Review of Systems   Constitutional: Negative for activity change, appetite change, fever and unexpected weight change  HENT: Negative for congestion, postnasal drip and rhinorrhea  Eyes: Negative for redness  Respiratory: Positive for shortness of breath  Negative for wheezing  Cardiovascular: Negative for chest pain  Gastrointestinal: Negative for abdominal distention  Endocrine: Negative for polydipsia and polyphagia  Genitourinary: Negative for hematuria  Musculoskeletal: Negative for joint swelling  Neurological: Negative for light-headedness  Psychiatric/Behavioral: Negative for confusion and decreased concentration  Social history: She reports that she has never smoked  She has never used smokeless tobacco  She reports that she drank alcohol  She reports that she does not use drugs      Past surgical history:   Past Surgical History:   Procedure Laterality Date    ABDOMINAL SURGERY      CARDIAC PACEMAKER PLACEMENT      CARDIAC SURGERY      cabg x 2    CATARACT EXTRACTION      CHOLECYSTECTOMY      CORONARY ARTERY BYPASS GRAFT  2008    EYE SURGERY      FRACTURE SURGERY Right 2010    shoulder    HERNIA REPAIR      umbilical    [place] LAP,CHOLECYSTECTOMY N/A 8/10/2016    Procedure: CHOLECYSTECTOMY LAPAROSCOPIC;  Surgeon: Sirena Johnson MD;  [facility];  Service: General    NJ LARYNGOSCOPY,DIRECT,SCOPE,INJ CORDS Left 11/4/2016    Procedure: Callum Parham; LEFT VOCAL FOLD INJECTION ;  Surgeon: Juan Carlos Meza MD;  [facility];  Service: ENT    NJ REPAIR UMBILICAL GAMD,6+N/A,GQPOV N/A 8/10/2016    Procedure: LAPAROSCOPIC REPAIR HERNIA VENTRAL;  Surgeon: Sirena Johnson MD;  [facility];  Service: General    [address] / STENTING Right     TRICUSPID VALVE REPLACEMENT      VASCULAR SURGERY      heart valve replacement     Family history:   Family History   Problem Relation Age of Onset  Heart disease Mother     Breast cancer Maternal Aunt     Heart disease Sister        Immunization History   Administered Date(s) Administered    INFLUENZA 11/01/2018    Influenza, high dose seasonal 0 5 mL 10/17/2019    Pneumococcal Conjugate 13-Valent 12/24/2019     Current Outpatient Medications   Medication Sig Dispense Refill    COMBIGAN 0 2-0 5 % Administer 1 drop to both eyes 2 (two) times a day      famotidine (PEPCID) 20 mg tablet Take 1 tablet (20 mg total) by mouth 2 (two) times a day 60 tablet 11    insulin aspart (NOVOLOG FLEXPEN) 100 Units/mL injection pen Inject under the [id number] units 3 times a day with meals  Use carbohydrate to insulin ratio 1:3 which means 1 unit of NovoLog for every 3 gm of carbohydrates 15 pen 11    insulin glargine (BASAGLAR KWIKPEN) 100 units/mL injection pen Inject 16 Units under the skin daily 5 pen 3    levothyroxine (SYNTHROID) 137 mcg tablet Take 137 mcg by mouth daily   metolazone (ZAROXOLYN) 2 5 mg tablet TAKE 1 TABLET BY MOUTH TWICE WEEKLY (Patient taking differently: 2 (two) times a day as needed Pt taking twice weekly per Dr Manuel Yañez  12/24/2019) 10 tablet 0    traMADol (ULTRAM) 50 mg tablet Take 1 tablet (50 mg total) by mouth every 6 (six) hours as needed for moderate pain 30 tablet 0    XARELTO 15 MG tablet TAKE 1 TAB BY MOUTH DAILY WITH EVENEING MEAL  30 tablet 2    furosemide (LASIX) 80 mg tablet TAKE 1 TABLET DAILY AS DIRECTED   (Patient taking differently: Pt taking 120mg daily per Dr Manuel Yañez) 30 tablet 5    linaCLOtide (LINZESS) 145 MCG CAPS Take 1 capsule (145 mcg total) by mouth daily 30 capsule 4    potassium chloride (K-DUR,KLOR-CON) 10 mEq tablet Take 1 tablet (10 mEq total) by mouth 2 (two) times a week (Patient taking differently: Take 10 mEq by mouth as needed (only taken with the metolazone) ) 15 tablet 3    simvastatin (ZOCOR) 20 mg tablet TAKE 1 TABLET (20MG) BY ORAL ROUTE EVERY DAY IN THE EVENING 90 tablet 2    valsartan (DIOVAN) 80 mg tablet Take 2 tablets (160 mg total) by mouth daily  0     No current facility-administered medications for this visit  Allergies: Bromide ion [bromine]; Other; Ibuprofen; and Penicillins    Objective:  Vitals:    11/21/19 0852   BP: 128/82   BP Location: Left arm   Patient Position: Sitting   Cuff Size: Standard   Pulse: 63   Resp: 12   Temp: 97 7 °F (36 5 °C)   TempSrc: Tympanic   SpO2: 94%   Weight: 93 4 kg (206 lb)   Height: 5' 5" (1 651 m)   Oxygen Therapy  SpO2: 94 %    Wt Readings from Last 3 Encounters:   12/24/19 90 4 kg (199 lb 4 7 oz)   12/02/19 89 8 kg (198 lb)   11/21/19 93 4 kg (206 lb)     Body mass index is 34 28 kg/m²  Physical Exam   Constitutional: She is oriented to person, place, and time  She appears well-developed and well-nourished  No distress  Patient is obese   HENT:   Head: Normocephalic  Nose: Nose normal    Mouth/Throat: Oropharynx is clear and moist  No oropharyngeal exudate  Eyes: Pupils are equal, round, and reactive to light  Conjunctivae are normal    Neck: Neck supple  No JVD present  No tracheal deviation present  Mallampati score is 3   Cardiovascular: Normal rate, regular rhythm and normal heart sounds  Pulmonary/Chest: Effort normal  She has no wheezes  She has no rhonchi  She has no rales  Lung sounds are clear without any wheezes, crackles or rhonchi   Abdominal: Soft  She exhibits no distension  There is no tenderness  There is no guarding  Musculoskeletal:        Right lower leg: She exhibits edema (+1-2 bilateral lower extremities)  Lymphadenopathy:     She has no cervical adenopathy  Neurological: She is alert and oriented to person, place, and time  Skin: Skin is warm and dry  No rash noted  Psychiatric: She has a normal mood and affect   Her behavior is normal  Thought content normal          Lab Review:   Serum creatinine was 1 45 in May of 2019 in hemoglobin was 12 2      Diagnostics:  I have personally reviewed pertinent films in PACS  Chest x-ray:  Portable done May 23, 2019 was reviewed  There is moderate cardiomegaly and prominent pulmonary vasculature  No pleural effusions or masses  Office Spirometry Results: done today    FVC - 1 36 L  44%  FEV1 - 1 03 L 43%  FEV1/FVC% - 75%    Severe restrictive impairment       ESS: Total score: 14       Pulse Oximetry:  2L NC, 97% at rest  2L NC, 94% after 100ft ambulation     Room air, 95% at rest  Room air, 86% after 100ft ambulation           Ct Abdomen Pelvis Without Contrast    Result Date: 11/6/2019  Narrative: CT ABDOMEN AND PELVIS WITHOUT IV CONTRAST INDICATION:   K43 2: Incisional hernia without obstruction or gangrene  COMPARISON:  5/7/2019 TECHNIQUE:  CT examination of the abdomen and pelvis was performed without intravenous contrast   Axial, sagittal, and coronal 2D reformatted images were created from the source data and submitted for interpretation  Radiation dose length product (DLP) for this visit:  665 48 mGy-cm   This examination, like all CT scans performed in the [facility], was performed utilizing techniques to minimize radiation dose exposure, including the use of iterative  reconstruction and automated exposure control  Enteric contrast was administered  FINDINGS: ABDOMEN LOWER CHEST:  Minimal bibasilar scarring with stable cardiomegaly  No pericardial effusion  Pacemaker leads again noted  LIVER/BILIARY TREE:  The liver demonstrates mild cirrhotic morphology  Within the limitations of this examination there is no evidence of suspicious hepatic mass  No biliary dilatation  GALLBLADDER:  Gallbladder is surgically absent  SPLEEN:  Unremarkable  PANCREAS:  Unremarkable  ADRENAL GLANDS:  Unremarkable  KIDNEYS/URETERS:  Unremarkable  No hydronephrosis  STOMACH AND BOWEL:  Large ventral wall umbilical hernia contains right colon,  transverse colon, and nondilated small bowel loops  No bowel obstruction or wall thickening   APPENDIX:  No findings to suggest appendicitis  ABDOMINOPELVIC CAVITY:  No ascites or free intraperitoneal air  No lymphadenopathy  VESSELS:  Atherosclerotic changes are present  No evidence of aneurysm  PELVIS REPRODUCTIVE ORGANS:  Unremarkable for patient's age  URINARY BLADDER:  Unremarkable  ABDOMINAL WALL/INGUINAL REGIONS:  Again, a large ventral umbilical hernia containing small and large bowel as well as mesenteric fat without evidence for obstruction and/or incarceration  The abdominal wall defect measures approximately 8 4 cm  A small  suspected adjacent seroma in the right abdominal wall within the subcutaneous fat measures 5 1 x 3 6 cm, a stable finding  Prominent varices within the inguinal regions stable  OSSEOUS STRUCTURES:  No acute fracture or destructive osseous lesion  Impression: 1  Large ventral wall umbilical hernia containing small and large bowel as well as mesenteric fat  No bowel obstruction and/or evidence for incarceration  2   Stable seroma adjacent to the hernia sac to the right anterior abdominal wall  3   Mildly cirrhotic liver  No evidence for portal hypertension   Workstation performed: [id number]

## 2019-11-21 NOTE — PATIENT INSTRUCTIONS
Overnight pulse oximetry study ordered - if nobody from [email or web address] reaches out to you within 2 weeks, call the office, they will drop off equipment for you to use   After you have the study done, call the office in 1-2 days to follow up  Follow up in 6 months or sooner if any issues arise

## 2019-11-22 DIAGNOSIS — E78.5 DYSLIPIDEMIA: ICD-10-CM

## 2019-11-22 RX ORDER — SIMVASTATIN 20 MG
TABLET ORAL
Qty: 90 TABLET | Refills: 2 | Status: SHIPPED | OUTPATIENT
Start: 2019-11-22 | End: 2020-06-20

## 2019-11-27 NOTE — PRE-PROCEDURE INSTRUCTIONS
Pre-Surgery Instructions:   Medication Instructions    Aspirin-Caffeine 500-32 5 MG TABS Patient was instructed by Physician and understands   bisacodyl (DULCOLAX) 5 mg EC tablet Patient was instructed by Physician and understands   COMBIGAN 0 2-0 5 % Instructed patient per Anesthesia Guidelines   famotidine (PEPCID) 20 mg tablet Patient was instructed by Physician and understands   furosemide (LASIX) 80 mg tablet Patient was instructed by Physician and understands   insulin aspart (NOVOLOG FLEXPEN) 100 Units/mL injection pen Patient was instructed by Physician and understands   insulin glargine (BASAGLAR KWIKPEN) 100 units/mL injection pen Patient was instructed by Physician and understands   levothyroxine (SYNTHROID) 137 mcg tablet Instructed patient per Anesthesia Guidelines   linaCLOtide (LINZESS) 145 MCG CAPS Patient was instructed by Physician and understands   MAGNESIUM OXIDE, ANTACID, PO Patient was instructed by Physician and understands   metolazone (ZAROXOLYN) 2 5 mg tablet Patient was instructed by Physician and understands   potassium chloride (K-DUR,KLOR-CON) 10 mEq tablet Patient was instructed by Physician and understands   simvastatin (ZOCOR) 20 mg tablet Patient was instructed by Physician and understands   traMADol (ULTRAM) 50 mg tablet Patient was instructed by Physician and understands   valsartan (DIOVAN) 80 mg tablet Instructed patient per Anesthesia Guidelines   XARELTO 15 MG tablet Patient was instructed by Physician and understands  LD xarelto per MD instructions 11/29/19

## 2019-12-01 ENCOUNTER — ANESTHESIA EVENT (OUTPATIENT)
Dept: GASTROENTEROLOGY | Facility: AMBULARY SURGERY CENTER | Age: 69
End: 2019-12-01

## 2019-12-02 ENCOUNTER — HOSPITAL ENCOUNTER (OUTPATIENT)
Dept: GASTROENTEROLOGY | Facility: AMBULARY SURGERY CENTER | Age: 69
Setting detail: OUTPATIENT SURGERY
Discharge: HOME | End: 2019-12-02
Attending: INTERNAL MEDICINE | Admitting: INTERNAL MEDICINE
Payer: MEDICARE

## 2019-12-02 ENCOUNTER — ANESTHESIA (OUTPATIENT)
Dept: GASTROENTEROLOGY | Facility: AMBULARY SURGERY CENTER | Age: 69
End: 2019-12-02

## 2019-12-02 VITALS
OXYGEN SATURATION: 93 % | DIASTOLIC BLOOD PRESSURE: 61 MMHG | WEIGHT: 198 LBS | BODY MASS INDEX: 32.99 KG/M2 | RESPIRATION RATE: 18 BRPM | TEMPERATURE: 96 F | HEART RATE: 61 BPM | HEIGHT: 65 IN | SYSTOLIC BLOOD PRESSURE: 115 MMHG

## 2019-12-02 DIAGNOSIS — K21.9 GASTROESOPHAGEAL REFLUX DISEASE WITHOUT ESOPHAGITIS: Primary | ICD-10-CM

## 2019-12-02 DIAGNOSIS — Z86.010 HISTORY OF COLON POLYPS: ICD-10-CM

## 2019-12-02 PROCEDURE — G0121 COLON CA SCRN NOT HI RSK IND: HCPCS | Performed by: INTERNAL MEDICINE

## 2019-12-02 PROCEDURE — 43239 EGD BIOPSY SINGLE/MULTIPLE: CPT | Performed by: INTERNAL MEDICINE

## 2019-12-02 PROCEDURE — NC001 PR NO CHARGE: Performed by: INTERNAL MEDICINE

## 2019-12-02 PROCEDURE — 88305 TISSUE EXAM BY PATHOLOGIST: CPT | Performed by: PATHOLOGY

## 2019-12-02 RX ORDER — LIDOCAINE HYDROCHLORIDE 20 MG/ML
INJECTION, SOLUTION EPIDURAL; INFILTRATION; INTRACAUDAL; PERINEURAL AS NEEDED
Status: DISCONTINUED | OUTPATIENT
Start: 2019-12-02 | End: 2019-12-02 | Stop reason: SURG

## 2019-12-02 RX ORDER — PROPOFOL 10 MG/ML
INJECTION, EMULSION INTRAVENOUS CONTINUOUS PRN
Status: DISCONTINUED | OUTPATIENT
Start: 2019-12-02 | End: 2019-12-02 | Stop reason: SURG

## 2019-12-02 RX ORDER — PANTOPRAZOLE SODIUM 40 MG/1
40 TABLET, DELAYED RELEASE ORAL DAILY
Qty: 30 TABLET | Refills: 11 | Status: SHIPPED | OUTPATIENT
Start: 2019-12-02 | End: 2019-12-24

## 2019-12-02 RX ORDER — SODIUM CHLORIDE, SODIUM LACTATE, POTASSIUM CHLORIDE, CALCIUM CHLORIDE 600; 310; 30; 20 MG/100ML; MG/100ML; MG/100ML; MG/100ML
125 INJECTION, SOLUTION INTRAVENOUS CONTINUOUS
Status: DISCONTINUED | OUTPATIENT
Start: 2019-12-02 | End: 2019-12-06 | Stop reason: HOSPADM

## 2019-12-02 RX ORDER — PROPOFOL 10 MG/ML
INJECTION, EMULSION INTRAVENOUS AS NEEDED
Status: DISCONTINUED | OUTPATIENT
Start: 2019-12-02 | End: 2019-12-02 | Stop reason: SURG

## 2019-12-02 RX ADMIN — PROPOFOL 100 MCG/KG/MIN: 10 INJECTION, EMULSION INTRAVENOUS at 09:34

## 2019-12-02 RX ADMIN — LIDOCAINE HYDROCHLORIDE 100 MG: 20 INJECTION, SOLUTION EPIDURAL; INFILTRATION; INTRACAUDAL; PERINEURAL at 09:29

## 2019-12-02 RX ADMIN — SODIUM CHLORIDE, SODIUM LACTATE, POTASSIUM CHLORIDE, AND CALCIUM CHLORIDE 125 ML/HR: .6; .31; .03; .02 INJECTION, SOLUTION INTRAVENOUS at 09:13

## 2019-12-02 RX ADMIN — PROPOFOL 70 MG: 10 INJECTION, EMULSION INTRAVENOUS at 09:29

## 2019-12-02 NOTE — H&P
History and Physical - [facility]  Maggie Gibson 78 y o  female MRN: [id number]        HPI:  74-year-old female with history of multiple medical problems including GERD reports having problems with constipation  She has history of colon polyps      Historical Information   Past Medical History:   Diagnosis Date    Arthritis     Atrial flutter ([place] [address] )     Cardiac disease     Carotid artery occlusion     CHF (congestive heart failure) ([place] [address] )     Cholelithiasis     last assessed 8/8/2016    Coronary artery disease     Diabetes mellitus ([place] [address] )     Disease of thyroid gland     GERD (gastroesophageal reflux disease)     History of transfusion     Hyperlipidemia     Hypertension     Long-term insulin use in type 2 diabetes ([place] [address] ) 6/4/2016    Other specified diabetes mellitus with diabetic autonomic (poly)neuropathy ([place] [address] )     Pleural effusion 2008    Pulmonary embolism ([place] [address] )     2008    Renal disorder     Retinopathy     bilat    Sleep apnea     Subclavian artery stenosis ([place] [address] )     Umbilical hernia with obstruction, without gangrene     last assessed 7/8/2016     Past Surgical History:   Procedure Laterality Date    ABDOMINAL SURGERY      CARDIAC PACEMAKER PLACEMENT      CARDIAC SURGERY      cabg x 2    CATARACT EXTRACTION      CHOLECYSTECTOMY      CORONARY ARTERY BYPASS GRAFT  2008    EYE SURGERY      FRACTURE SURGERY Right 2010    shoulder    HERNIA REPAIR      umbilical    [place] LAP,CHOLECYSTECTOMY N/A 8/10/2016    Procedure: CHOLECYSTECTOMY LAPAROSCOPIC;  Surgeon: Kassidy Laguna MD;  [facility];  Service: General     [address] Left 11/4/2016    Procedure: MICRODIRECT LARYNGOSCOPY; LEFT VOCAL FOLD INJECTION ;  Surgeon: Roger Collins MD;  [facility];  Service: ENT    CO REPAIR UMBILICAL PTXR,0+V/Z,ANRTZ N/A 8/10/2016    Procedure: LAPAROSCOPIC REPAIR HERNIA VENTRAL;  Surgeon: Kassidy Laguna MD;  [facility]; Service: General    SUBCLAVIAN VEIN ANGIOPLASTY / STENTING Right     TRICUSPID VALVE REPLACEMENT      VASCULAR SURGERY      heart valve replacement     Social History   Social History     Substance and Sexual Activity   Alcohol Use Yes    Frequency: 2-4 times a month    Comment: per [employer] 'occasional alcohol use'     Social History     Substance and Sexual Activity   Drug Use Never    Types: Oxycodone     Social History     Tobacco Use   Smoking Status Never Smoker   Smokeless Tobacco Never Used     Family History   Problem Relation Age of Onset    Heart disease Mother     Breast cancer Maternal Aunt     Heart disease Sister        Meds/Allergies       (Not in a hospital admission)    Allergies   Allergen Reactions    Bromide Ion [Bromine]      Blurred vision  Has preservative that  Pt cannot use    Other Blisters     Adhesive tape    Ibuprofen Palpitations    Penicillins Rash       Objective     Blood pressure 154/70, pulse 74, temperature (!) 96 °F (35 6 °C), temperature source Tympanic, resp  rate 18, height 5' 5" (1 651 m), weight 89 8 kg (198 lb), SpO2 96 %, not currently breastfeeding      PHYSICAL EXAM:    Gen: NAD  CV: S1 & S2 normal, RRR  CHEST: Clear to auscultate  ABD: soft, NT/ND, good bowel sounds  EXT: no edema    ASSESSMENT:     GERD, history of colon polyps, constipation    PLAN:    EGD and colonoscopy

## 2019-12-02 NOTE — ANESTHESIA PREPROCEDURE EVALUATION
Review of Systems/Medical History  Patient summary reviewed  Chart reviewed      Cardiovascular  Hyperlipidemia, Hypertension , CAD , CAD status: 3VD, CHF , NYHA Classification: II compensated CHF,   Pulmonary hypertension Pulmonary  Sleep apnea CPAP,        GI/Hepatic    GERD ,        Negative  ROS        Endo/Other  Diabetes well controlled type 1 Insulin, History of thyroid disease , hypothyroidism,      GYN  Negative gynecology ROS          Hematology  Anemia anemia of chronic disease,     Musculoskeletal    Arthritis     Neurology   Psychology   Depression , being treated for depression,              Physical Exam    Airway    Mallampati score: II  TM Distance: >3 FB  Neck ROM: full     Dental   No notable dental hx upper dentures,     Cardiovascular  Rhythm: regular, Rate: normal, Cardiovascular exam normal    Pulmonary  Pulmonary exam normal Breath sounds clear to auscultation,     Other Findings        Anesthesia Plan  ASA Score- 3     Anesthesia Type- IV sedation with anesthesia with ASA Monitors  Additional Monitors:   Airway Plan:         Plan Factors-    Induction- intravenous  Postoperative Plan- Plan for postoperative opioid use  Informed Consent- Anesthetic plan and risks discussed with patient  I personally reviewed this patient with the CRNA  Discussed and agreed on the Anesthesia Plan with the CRNA  Marcio Moreno

## 2019-12-02 NOTE — ANESTHESIA POSTPROCEDURE EVALUATION
Post-Op Assessment Note    CV Status:  Stable  Pain Score: 0    Pain management: adequate     Mental Status:  Sleepy   Hydration Status:  Stable   PONV Controlled:  None   Airway Patency:  Patent   Post Op Vitals Reviewed: Yes      Staff: Anesthesiologist, CRNA           BP      Temp     Pulse     Resp      SpO2

## 2019-12-09 ENCOUNTER — TELEPHONE (OUTPATIENT)
Dept: GASTROENTEROLOGY | Facility: AMBULARY SURGERY CENTER | Age: 69
End: 2019-12-09

## 2019-12-09 NOTE — TELEPHONE ENCOUNTER
----- Message from Elfego Strauss MD sent at 12/7/2019 10:15 AM EST -----    Gastric biopsies came back as benign and negative for H  pylori  Patient to continue PPI for 3 months and then change to Pepcid and call for follow-up office visit if symptoms persist or PRN

## 2019-12-09 NOTE — LETTER
December 9, 2019    Nichole Jacobs  [address]  [address]      Dear Ms  Mikel Bosch: We have attempted to contact you regarding your results  Please contact our office upon receipt of this letter  Thank you!         [address]  [address] [facility]

## 2019-12-19 ENCOUNTER — FOLLOW UP (OUTPATIENT)
Dept: URBAN - METROPOLITAN AREA CLINIC 27 | Facility: CLINIC | Age: 69
End: 2019-12-19

## 2019-12-19 DIAGNOSIS — E11.3413: ICD-10-CM

## 2019-12-19 DIAGNOSIS — H40.053: ICD-10-CM

## 2019-12-19 DIAGNOSIS — H43.811: ICD-10-CM

## 2019-12-19 PROCEDURE — PFS3 LUCENTIS 0.3MG PREFILLED SYRINGE

## 2019-12-19 PROCEDURE — 92134 CPTRZ OPH DX IMG PST SGM RTA: CPT

## 2019-12-19 PROCEDURE — 67028 INJECTION EYE DRUG: CPT

## 2019-12-19 PROCEDURE — 92012 INTRM OPH EXAM EST PATIENT: CPT | Mod: 25

## 2019-12-19 ASSESSMENT — TONOMETRY
OS_IOP_MMHG: 16
OD_IOP_MMHG: 15

## 2019-12-19 ASSESSMENT — VISUAL ACUITY
OD_CC: 20/100
OS_CC: CF 4FT

## 2019-12-20 DIAGNOSIS — I50.32 CHRONIC DIASTOLIC HEART FAILURE (HCC): ICD-10-CM

## 2019-12-20 RX ORDER — FUROSEMIDE 80 MG
TABLET ORAL
Qty: 30 TABLET | Refills: 5 | Status: SHIPPED | OUTPATIENT
Start: 2019-12-20 | End: 2020-06-18

## 2019-12-24 ENCOUNTER — APPOINTMENT (EMERGENCY)
Dept: RADIOLOGY | Facility: HOSPITAL | Age: 69
End: 2019-12-24
Payer: MEDICARE

## 2019-12-24 ENCOUNTER — APPOINTMENT (OUTPATIENT)
Dept: NON INVASIVE DIAGNOSTICS | Facility: HOSPITAL | Age: 69
End: 2019-12-24
Payer: MEDICARE

## 2019-12-24 ENCOUNTER — APPOINTMENT (OUTPATIENT)
Dept: RADIOLOGY | Facility: HOSPITAL | Age: 69
End: 2019-12-24
Payer: MEDICARE

## 2019-12-24 ENCOUNTER — HOSPITAL ENCOUNTER (OUTPATIENT)
Facility: HOSPITAL | Age: 69
Setting detail: OBSERVATION
Discharge: HOME | End: 2019-12-24
Attending: EMERGENCY MEDICINE | Admitting: INTERNAL MEDICINE
Payer: MEDICARE

## 2019-12-24 VITALS
DIASTOLIC BLOOD PRESSURE: 74 MMHG | WEIGHT: 199.3 LBS | OXYGEN SATURATION: 94 % | RESPIRATION RATE: 18 BRPM | TEMPERATURE: 96.4 F | BODY MASS INDEX: 33.2 KG/M2 | HEART RATE: 60 BPM | SYSTOLIC BLOOD PRESSURE: 165 MMHG | HEIGHT: 65 IN

## 2019-12-24 DIAGNOSIS — R07.9 CHEST PAIN: Primary | ICD-10-CM

## 2019-12-24 DIAGNOSIS — I10 ESSENTIAL HYPERTENSION: ICD-10-CM

## 2019-12-24 PROBLEM — E87.1 HYPONATREMIA: Status: ACTIVE | Noted: 2019-12-24

## 2019-12-24 LAB
ALBUMIN SERPL BCP-MCNC: 3.7 G/DL (ref 3.5–5)
ALP SERPL-CCNC: 135 U/L (ref 46–116)
ALT SERPL W P-5'-P-CCNC: 17 U/L (ref 12–78)
ANION GAP SERPL CALCULATED.3IONS-SCNC: 6 MMOL/L (ref 4–13)
ANION GAP SERPL CALCULATED.3IONS-SCNC: 6 MMOL/L (ref 4–13)
APTT PPP: 38 SECONDS (ref 25–32)
AST SERPL W P-5'-P-CCNC: 19 U/L (ref 5–45)
ATRIAL RATE: 51 BPM
ATRIAL RATE: 59 BPM
ATRIAL RATE: 78 BPM
BASOPHILS # BLD AUTO: 0.04 THOUSANDS/ΜL (ref 0–0.1)
BASOPHILS NFR BLD AUTO: 1 % (ref 0–1)
BILIRUB SERPL-MCNC: 0.9 MG/DL (ref 0.2–1)
BUN SERPL-MCNC: 41 MG/DL (ref 5–25)
BUN SERPL-MCNC: 42 MG/DL (ref 5–25)
CALCIUM SERPL-MCNC: 8.6 MG/DL (ref 8.3–10.1)
CALCIUM SERPL-MCNC: 8.8 MG/DL (ref 8.3–10.1)
CHEST PAIN STATEMENT: NORMAL
CHLORIDE SERPL-SCNC: 100 MMOL/L (ref 100–108)
CHLORIDE SERPL-SCNC: 98 MMOL/L (ref 100–108)
CO2 SERPL-SCNC: 30 MMOL/L (ref 21–32)
CO2 SERPL-SCNC: 31 MMOL/L (ref 21–32)
CREAT SERPL-MCNC: 1.17 MG/DL (ref 0.6–1.3)
CREAT SERPL-MCNC: 1.32 MG/DL (ref 0.6–1.3)
EOSINOPHIL # BLD AUTO: 0.13 THOUSAND/ΜL (ref 0–0.61)
EOSINOPHIL NFR BLD AUTO: 2 % (ref 0–6)
ERYTHROCYTE [DISTWIDTH] IN BLOOD BY AUTOMATED COUNT: 19.1 % (ref 11.6–15.1)
ERYTHROCYTE [DISTWIDTH] IN BLOOD BY AUTOMATED COUNT: 19.6 % (ref 11.6–15.1)
GFR SERPL CREATININE-BSD FRML MDRD: 41 ML/MIN/1.73SQ M
GFR SERPL CREATININE-BSD FRML MDRD: 47 ML/MIN/1.73SQ M
GLUCOSE SERPL-MCNC: 168 MG/DL (ref 65–140)
GLUCOSE SERPL-MCNC: 191 MG/DL (ref 65–140)
GLUCOSE SERPL-MCNC: 211 MG/DL (ref 65–140)
GLUCOSE SERPL-MCNC: 226 MG/DL (ref 65–140)
HCT VFR BLD AUTO: 37.6 % (ref 34.8–46.1)
HCT VFR BLD AUTO: 41.3 % (ref 34.8–46.1)
HGB BLD-MCNC: 11.1 G/DL (ref 11.5–15.4)
HGB BLD-MCNC: 12.1 G/DL (ref 11.5–15.4)
IMM GRANULOCYTES # BLD AUTO: 0.01 THOUSAND/UL (ref 0–0.2)
IMM GRANULOCYTES NFR BLD AUTO: 0 % (ref 0–2)
INR PPP: 1.68 (ref 0.91–1.09)
LYMPHOCYTES # BLD AUTO: 0.89 THOUSANDS/ΜL (ref 0.6–4.47)
LYMPHOCYTES NFR BLD AUTO: 15 % (ref 14–44)
MAGNESIUM SERPL-MCNC: 1.9 MG/DL (ref 1.6–2.6)
MAX DIASTOLIC BP: 72 MMHG
MAX HEART RATE: 67 BPM
MAX PREDICTED HEART RATE: 150 BPM
MAX. SYSTOLIC BP: 165 MMHG
MCH RBC QN AUTO: 25.5 PG (ref 26.8–34.3)
MCH RBC QN AUTO: 25.7 PG (ref 26.8–34.3)
MCHC RBC AUTO-ENTMCNC: 29.3 G/DL (ref 31.4–37.4)
MCHC RBC AUTO-ENTMCNC: 29.5 G/DL (ref 31.4–37.4)
MCV RBC AUTO: 87 FL (ref 82–98)
MCV RBC AUTO: 87 FL (ref 82–98)
MONOCYTES # BLD AUTO: 0.97 THOUSAND/ΜL (ref 0.17–1.22)
MONOCYTES NFR BLD AUTO: 16 % (ref 4–12)
NEUTROPHILS # BLD AUTO: 3.91 THOUSANDS/ΜL (ref 1.85–7.62)
NEUTS SEG NFR BLD AUTO: 66 % (ref 43–75)
NRBC BLD AUTO-RTO: 0 /100 WBCS
NT-PROBNP SERPL-MCNC: 6396 PG/ML
PLATELET # BLD AUTO: 151 THOUSANDS/UL (ref 149–390)
PLATELET # BLD AUTO: 180 THOUSANDS/UL (ref 149–390)
PMV BLD AUTO: 10.3 FL (ref 8.9–12.7)
PMV BLD AUTO: 10.9 FL (ref 8.9–12.7)
POTASSIUM SERPL-SCNC: 3.7 MMOL/L (ref 3.5–5.3)
POTASSIUM SERPL-SCNC: 4.2 MMOL/L (ref 3.5–5.3)
PROT SERPL-MCNC: 8.5 G/DL (ref 6.4–8.2)
PROTHROMBIN TIME: 17.8 SECONDS (ref 9.8–12)
PROTOCOL NAME: NORMAL
QRS AXIS: -86 DEGREES
QRS AXIS: -87 DEGREES
QRS AXIS: 270 DEGREES
QRSD INTERVAL: 186 MS
QRSD INTERVAL: 188 MS
QRSD INTERVAL: 190 MS
QT INTERVAL: 466 MS
QT INTERVAL: 518 MS
QT INTERVAL: 524 MS
QTC INTERVAL: 517 MS
QTC INTERVAL: 521 MS
QTC INTERVAL: 531 MS
RBC # BLD AUTO: 4.32 MILLION/UL (ref 3.81–5.12)
RBC # BLD AUTO: 4.75 MILLION/UL (ref 3.81–5.12)
REASON FOR TERMINATION: NORMAL
SODIUM SERPL-SCNC: 134 MMOL/L (ref 136–145)
SODIUM SERPL-SCNC: 137 MMOL/L (ref 136–145)
T WAVE AXIS: 90 DEGREES
T WAVE AXIS: 94 DEGREES
T WAVE AXIS: 97 DEGREES
TARGET HR FORMULA: NORMAL
TEST INDICATION: NORMAL
TIME IN EXERCISE PHASE: NORMAL
TROPONIN I SERPL-MCNC: <0.02 NG/ML
TROPONIN I SERPL-MCNC: <0.02 NG/ML
VENTRICULAR RATE: 61 BPM
VENTRICULAR RATE: 62 BPM
VENTRICULAR RATE: 74 BPM
WBC # BLD AUTO: 5.15 THOUSAND/UL (ref 4.31–10.16)
WBC # BLD AUTO: 5.95 THOUSAND/UL (ref 4.31–10.16)

## 2019-12-24 PROCEDURE — 93018 CV STRESS TEST I&R ONLY: CPT | Performed by: INTERNAL MEDICINE

## 2019-12-24 PROCEDURE — 78452 HT MUSCLE IMAGE SPECT MULT: CPT | Performed by: INTERNAL MEDICINE

## 2019-12-24 PROCEDURE — 93016 CV STRESS TEST SUPVJ ONLY: CPT | Performed by: INTERNAL MEDICINE

## 2019-12-24 PROCEDURE — 85610 PROTHROMBIN TIME: CPT | Performed by: EMERGENCY MEDICINE

## 2019-12-24 PROCEDURE — A9502 TC99M TETROFOSMIN: HCPCS

## 2019-12-24 PROCEDURE — 71045 X-RAY EXAM CHEST 1 VIEW: CPT

## 2019-12-24 PROCEDURE — G0009 ADMIN PNEUMOCOCCAL VACCINE: HCPCS | Performed by: INTERNAL MEDICINE

## 2019-12-24 PROCEDURE — 83880 ASSAY OF NATRIURETIC PEPTIDE: CPT | Performed by: EMERGENCY MEDICINE

## 2019-12-24 PROCEDURE — 90670 PCV13 VACCINE IM: CPT | Performed by: INTERNAL MEDICINE

## 2019-12-24 PROCEDURE — 84484 ASSAY OF TROPONIN QUANT: CPT | Performed by: EMERGENCY MEDICINE

## 2019-12-24 PROCEDURE — 80048 BASIC METABOLIC PNL TOTAL CA: CPT | Performed by: NURSE PRACTITIONER

## 2019-12-24 PROCEDURE — 85025 COMPLETE CBC W/AUTO DIFF WBC: CPT | Performed by: EMERGENCY MEDICINE

## 2019-12-24 PROCEDURE — 93010 ELECTROCARDIOGRAM REPORT: CPT | Performed by: INTERNAL MEDICINE

## 2019-12-24 PROCEDURE — 82948 REAGENT STRIP/BLOOD GLUCOSE: CPT

## 2019-12-24 PROCEDURE — 78452 HT MUSCLE IMAGE SPECT MULT: CPT

## 2019-12-24 PROCEDURE — 93017 CV STRESS TEST TRACING ONLY: CPT

## 2019-12-24 PROCEDURE — 99236 HOSP IP/OBS SAME DATE HI 85: CPT | Performed by: INTERNAL MEDICINE

## 2019-12-24 PROCEDURE — 36415 COLL VENOUS BLD VENIPUNCTURE: CPT | Performed by: EMERGENCY MEDICINE

## 2019-12-24 PROCEDURE — 99285 EMERGENCY DEPT VISIT HI MDM: CPT

## 2019-12-24 PROCEDURE — 85027 COMPLETE CBC AUTOMATED: CPT | Performed by: NURSE PRACTITIONER

## 2019-12-24 PROCEDURE — 85730 THROMBOPLASTIN TIME PARTIAL: CPT | Performed by: EMERGENCY MEDICINE

## 2019-12-24 PROCEDURE — 80053 COMPREHEN METABOLIC PANEL: CPT | Performed by: EMERGENCY MEDICINE

## 2019-12-24 PROCEDURE — 99215 OFFICE O/P EST HI 40 MIN: CPT | Performed by: INTERNAL MEDICINE

## 2019-12-24 PROCEDURE — 83735 ASSAY OF MAGNESIUM: CPT | Performed by: INTERNAL MEDICINE

## 2019-12-24 PROCEDURE — 93005 ELECTROCARDIOGRAM TRACING: CPT

## 2019-12-24 PROCEDURE — 84484 ASSAY OF TROPONIN QUANT: CPT | Performed by: NURSE PRACTITIONER

## 2019-12-24 RX ORDER — FAMOTIDINE 20 MG/1
20 TABLET, FILM COATED ORAL 2 TIMES DAILY
Status: DISCONTINUED | OUTPATIENT
Start: 2019-12-24 | End: 2019-12-24 | Stop reason: HOSPADM

## 2019-12-24 RX ORDER — VALSARTAN 80 MG/1
160 TABLET ORAL DAILY
Refills: 0
Start: 2019-12-24 | End: 2020-02-25 | Stop reason: SDUPTHER

## 2019-12-24 RX ORDER — FUROSEMIDE 10 MG/ML
40 INJECTION INTRAMUSCULAR; INTRAVENOUS ONCE
Status: COMPLETED | OUTPATIENT
Start: 2019-12-24 | End: 2019-12-24

## 2019-12-24 RX ORDER — TIMOLOL MALEATE 5 MG/ML
1 SOLUTION/ DROPS OPHTHALMIC 2 TIMES DAILY
Status: DISCONTINUED | OUTPATIENT
Start: 2019-12-24 | End: 2019-12-24 | Stop reason: HOSPADM

## 2019-12-24 RX ORDER — FUROSEMIDE 10 MG/ML
20 INJECTION INTRAMUSCULAR; INTRAVENOUS ONCE
Status: DISCONTINUED | OUTPATIENT
Start: 2019-12-24 | End: 2019-12-24

## 2019-12-24 RX ORDER — INSULIN GLARGINE 100 [IU]/ML
16 INJECTION, SOLUTION SUBCUTANEOUS
Status: DISCONTINUED | OUTPATIENT
Start: 2019-12-24 | End: 2019-12-24 | Stop reason: HOSPADM

## 2019-12-24 RX ORDER — LOSARTAN POTASSIUM 50 MG/1
100 TABLET ORAL DAILY
Status: DISCONTINUED | OUTPATIENT
Start: 2019-12-25 | End: 2019-12-24 | Stop reason: HOSPADM

## 2019-12-24 RX ORDER — TRAMADOL HYDROCHLORIDE 50 MG/1
50 TABLET ORAL EVERY 6 HOURS PRN
Status: DISCONTINUED | OUTPATIENT
Start: 2019-12-24 | End: 2019-12-24 | Stop reason: HOSPADM

## 2019-12-24 RX ORDER — PRAVASTATIN SODIUM 40 MG
40 TABLET ORAL
Status: DISCONTINUED | OUTPATIENT
Start: 2019-12-24 | End: 2019-12-24 | Stop reason: HOSPADM

## 2019-12-24 RX ORDER — FUROSEMIDE 80 MG
80 TABLET ORAL DAILY
Status: DISCONTINUED | OUTPATIENT
Start: 2019-12-24 | End: 2019-12-24 | Stop reason: HOSPADM

## 2019-12-24 RX ORDER — LOSARTAN POTASSIUM 50 MG/1
50 TABLET ORAL ONCE
Status: COMPLETED | OUTPATIENT
Start: 2019-12-24 | End: 2019-12-24

## 2019-12-24 RX ORDER — POTASSIUM CHLORIDE 20 MEQ/1
20 TABLET, EXTENDED RELEASE ORAL ONCE
Status: COMPLETED | OUTPATIENT
Start: 2019-12-24 | End: 2019-12-24

## 2019-12-24 RX ORDER — LOSARTAN POTASSIUM 50 MG/1
50 TABLET ORAL DAILY
Status: DISCONTINUED | OUTPATIENT
Start: 2019-12-24 | End: 2019-12-24

## 2019-12-24 RX ADMIN — LOSARTAN POTASSIUM 50 MG: 50 TABLET, FILM COATED ORAL at 10:37

## 2019-12-24 RX ADMIN — INSULIN LISPRO 2 UNITS: 100 INJECTION, SOLUTION INTRAVENOUS; SUBCUTANEOUS at 12:00

## 2019-12-24 RX ADMIN — FUROSEMIDE 40 MG: 10 INJECTION, SOLUTION INTRAMUSCULAR; INTRAVENOUS at 12:00

## 2019-12-24 RX ADMIN — MORPHINE SULFATE 2 MG: 2 INJECTION, SOLUTION INTRAMUSCULAR; INTRAVENOUS at 03:20

## 2019-12-24 RX ADMIN — PNEUMOCOCCAL 13-VALENT CONJUGATE VACCINE 0.5 ML: 2.2; 2.2; 2.2; 2.2; 2.2; 4.4; 2.2; 2.2; 2.2; 2.2; 2.2; 2.2; 2.2 INJECTION, SUSPENSION INTRAMUSCULAR at 14:48

## 2019-12-24 RX ADMIN — REGADENOSON 0.4 MG: 0.08 INJECTION, SOLUTION INTRAVENOUS at 09:35

## 2019-12-24 RX ADMIN — FAMOTIDINE 20 MG: 20 TABLET ORAL at 10:37

## 2019-12-24 RX ADMIN — LOSARTAN POTASSIUM 50 MG: 50 TABLET, FILM COATED ORAL at 11:59

## 2019-12-24 RX ADMIN — FUROSEMIDE 80 MG: 80 TABLET ORAL at 10:37

## 2019-12-24 RX ADMIN — LEVOTHYROXINE SODIUM 137 MCG: 112 TABLET ORAL at 08:43

## 2019-12-24 RX ADMIN — POTASSIUM CHLORIDE 20 MEQ: 1500 TABLET, EXTENDED RELEASE ORAL at 13:01

## 2019-12-24 RX ADMIN — INSULIN LISPRO 1 UNITS: 100 INJECTION, SOLUTION INTRAVENOUS; SUBCUTANEOUS at 08:43

## 2019-12-24 NOTE — NURSING NOTE
Patient discharged in stable condition  AVS reviewed with patient and her son  No concerns at this time

## 2019-12-24 NOTE — ASSESSMENT & PLAN NOTE
Lab Results   Component Value Date    HGBA1C 7 9 (H) 10/01/2019       Recent Labs     12/24/19  0748 12/24/19  1121   POCGLU 168* 211*       Blood Sugar Average: Last 72 hrs:  (P) 189 5   Continue Lantus 16 units subcutaneously daily with NovoLog 12-14 units subcutaneously 3 times a day with meals

## 2019-12-24 NOTE — H&P
H&P- Shanae Aguila 1950, 78 y o  female MRN: [id number]    Unit/Bed#: ED 01 Encounter: [id number]    Primary Care Provider: Kanchan Toscano MD   Date and time admitted to hospital: 12/24/2019  1:02 AM    * Chest pain  Assessment & Plan  Patient presented to the ED with complaints of chest pain across the top of her chest while laying in bed  Pain was pressure like  Patient is on 3L of nasal cannula at baseline  She denies any shortness of breath, nausea, vomiting  Pain improved with tramadol  Troponin negative  EKG is ventricular paced with no change  BNP elevated but CXR with no acute changes and patient does not appear volume overloaded  DONALD 4     · Admit to Medicine  · Serial EKGs and troponin  · Aspirin, statin  · Cardiology consultation  · Lipid panel and hemoglobin A1c in the a m  · Stress test in AM      Hyponatremia  Assessment & Plan  Pseudohyponatremia secondary to hyperglycemia  Monitor     Disease of thyroid gland  Assessment & Plan  Continue synthroid  Check TSH    Chronic atrial fibrillation  Assessment & Plan  Continue xarelto    Obstructive sleep apnea  Assessment & Plan  Intolerant to CPAP  Continue supplemental oxygen at 3L nasal cannula    Morbid obesity with BMI of 40 0-44 9, adult ([facility])  Assessment & Plan  Dietary and lifestyle modifications    CKD (chronic kidney disease) stage 3, GFR 30-59 ml/min ([facility])  Assessment & Plan  Baseline Cr 1 3-1 4  Continue diovan, lasix  Trend creatinine     Type 2 diabetes mellitus with diabetic neuropathy, with long-term current use of insulin ([facility])  Assessment & Plan  Lab Results   Component Value Date    HGBA1C 7 9 (H) 10/01/2019       No results for input(s): POCGLU in the last 72 hours      Blood Sugar Average: Last 72 hrs:  continus lantus and insulin sliding scale    Hyperlipidemia  Assessment & Plan  Continue statin  Check lipid panel    Combined systolic and diastolic congestive heart failure (HCC)  Assessment & Plan  Wt Readings from Last 3 Encounters:   12/24/19 88 5 kg (195 lb)   12/02/19 89 8 kg (198 lb)   11/21/19 93 4 kg (206 lb)   Echo 2/18 with an EF of 40%, [facility] at that time with patent grafts  Clinically appears euvolemic, no weight gain or shortness of breath  BNP is slightly elevated, CXR with no significant change from prior  · Continue home torsemide  · Daily Weight  · Intake and output  · Low sodium diet    Coronary artery disease involving coronary bypass graft of native heart without angina pectoris  Assessment & Plan  Patient had [address] 2/18 which revealed patent grafts  Continue aspirin and statin  Plan per above    HTN (hypertension)  Assessment & Plan  Continue diovan      VTE Prophylaxis: Rivaroxaban (Xarelto)  / sequential compression device   Code Status: Prior    Anticipated Length of Stay:  Patient will be admitted on an Observation basis with an anticipated length of stay of less than 2 midnights  Justification for Hospital Stay: chest pain rule out ACS    Total Time for Visit, including Counseling / Coordination of Care: 20 minutes  Greater than 50% of this total time spent on direct patient counseling and coordination of care  Chief Complaint:   Chest Pain (Pt c/o chest pain radiating from right side of chest to left and down left arm that started about 1/2 hour ago  Pt has hx of 2 open heart surgeries )      History of Present Illness:    Lam Salinas is a 78 y o  female with a PMH of insulin dependent diabetes, coronary artery disease post CABG, congestive heart failure, atrial flutter, hypertension, hyperlipidemia, oxygen dependence who presents with chest pain  Patient reports that she developed pain across the top of her chest while laying in bed  She states that she gets nervous sometimes and walking helps so she got up and walked around with no improvement  She has no shortness of breath, nausea or vomiting  She reports some discomfort in her left wrist but no radiation of the pain    Pain improved with tramadol  She is oxygen dependent at baseline on 3L nasal cannula  CXR with no acute changes  BNP elevated  Troponin negative  EKG paced but unchanged  Patient is admitted for further management  Review of Systems:    Review of Systems   Constitutional: Negative  Negative for unexpected weight change  HENT: Negative  Eyes: Negative  Respiratory: Positive for chest tightness  Negative for shortness of breath  Cardiovascular: Negative  Negative for leg swelling  Gastrointestinal: Negative  Endocrine: Negative  Genitourinary: Negative  Musculoskeletal: Negative  Skin: Negative  Allergic/Immunologic: Negative  Neurological: Negative  Hematological: Negative  Psychiatric/Behavioral: Negative          Past Medical and Surgical History:     Past Medical History:   Diagnosis Date    Arthritis     Atrial flutter ([facility] )     Cardiac disease     Carotid artery occlusion     CHF (congestive heart failure) ([place] [address] )     Cholelithiasis     last assessed 8/8/2016    Coronary artery disease     Diabetes mellitus ([place] [address] )     Disease of thyroid gland     GERD (gastroesophageal reflux disease)     History of transfusion     Hyperlipidemia     Hypertension     Long-term insulin use in type 2 diabetes ([facility] ) 6/4/2016    Other specified diabetes mellitus with diabetic autonomic (poly)neuropathy ([place] [address] )     Pleural effusion 2008    Pulmonary embolism ([place] [address] )     2008    Renal disorder     Retinopathy     bilat    Sleep apnea     Subclavian artery stenosis ([place] [address] )     Umbilical hernia with obstruction, without gangrene     last assessed 7/8/2016       Past Surgical History:   Procedure Laterality Date    ABDOMINAL SURGERY      CARDIAC PACEMAKER PLACEMENT      CARDIAC SURGERY      cabg x 2    CATARACT EXTRACTION      CHOLECYSTECTOMY      CORONARY ARTERY BYPASS GRAFT  2008    EYE SURGERY      FRACTURE SURGERY Right 2010    shoulder    HERNIA REPAIR      umbilical  MO LAP,CHOLECYSTECTOMY N/A 8/10/2016    Procedure: CHOLECYSTECTOMY LAPAROSCOPIC;  Surgeon: Dinesh Gonzales MD;  Location: [facility];  Service: General    MO LARYNGOSCOPY,DIRECT,SCOPE,INJ CORDS Left 11/4/2016    Procedure: Anabell Cabot; LEFT VOCAL FOLD INJECTION ;  Surgeon: Michael Jenkins MD;  [facility]: [facility];  Service: ENT    MO REPAIR UMBILICAL QOMR,8+X/V,BBCJR N/A 8/10/2016    Procedure: Pamela Segovia;  Surgeon: Dinesh Gonzales MD;  Location: [facility];  Service: General    [address] / STENTING Right     TRICUSPID VALVE REPLACEMENT      VASCULAR SURGERY      heart valve replacement       Meds/Allergies:    Prior to Admission medications    Medication Sig Start Date End Date Taking? Authorizing Provider   COMBIGAN 0 2-0 5 % Administer 1 drop to both eyes 2 (two) times a day 3/19/19  Yes Historical Provider, MD   famotidine (PEPCID) 20 mg tablet Take 1 tablet (20 mg total) by mouth 2 (two) times a day 10/15/19  Yes Yi Lopez MD   furosemide (LASIX) 80 mg tablet TAKE 1 TABLET DAILY AS DIRECTED  12/20/19  Yes Audrey Delgadillo MD   insulin aspart (NOVOLOG FLEXPEN) 100 Units/mL injection pen Inject under the [id number] units 3 times a day with meals  Use carbohydrate to insulin ratio 1:3 which means 1 unit of NovoLog for every 3 gm of carbohydrates 9/26/19  Yes Ralph Seo MD   insulin glargine (BASAGLAR KWIKPEN) 100 units/mL injection pen Inject 16 Units under the skin daily 11/4/19  Yes Ralph Seo MD   levothyroxine (SYNTHROID) 137 mcg tablet Take 137 mcg by mouth daily     Yes Historical Provider, MD   linaCLOtide Geroldine Ohm) 145 MCG CAPS Take 1 capsule (145 mcg total) by mouth daily 10/15/19 12/24/19 Yes Yi Lopez MD   simvastatin (ZOCOR) 20 mg tablet TAKE 1 TABLET (20MG) BY ORAL ROUTE EVERY DAY IN THE EVENING 11/22/19 5/20/20 Yes Audrey Delgadillo MD   traMADol (ULTRAM) 50 mg tablet Take 1 tablet (50 mg total) by mouth every 6 (six) hours as needed for moderate pain 6/20/18  Yes Severa Comes, MD   valsartan (DIOVAN) 80 mg tablet TAKE 1 TABLET ORALLY EVERY DAY 6/19/19  Yes Abril Briggs MD   XARELTO 15 MG tablet TAKE 1 TAB BY MOUTH DAILY WITH EVENEING MEAL  10/21/19  Yes Abril Briggs MD   bisacodyl (DULCOLAX) 5 mg EC tablet Take 2 tablets (10 mg total) by mouth once for 1 dose 11/5/19 11/27/19  Alejandro Lamas PA-C   metolazone (ZAROXOLYN) 2 5 mg tablet TAKE 1 TABLET BY MOUTH TWICE WEEKLY  Patient taking differently: 2 (two) times a day as needed  8/20/19   Abril Briggs MD   polyethylene glycol (GOLYTELY) 4000 mL solution Take 4,000 mL by mouth once for 1 dose 11/5/19 11/5/19  Alejandro Lamas PA-C   potassium chloride (K-DUR,KLOR-CON) 10 mEq tablet Take 1 tablet (10 mEq total) by mouth 2 (two) times a week  Patient taking differently: Take 10 mEq by mouth as needed (only taken with the metolazone)  4/8/19   Abril Briggs MD   Aspirin-Caffeine 500-32 5 MG TABS Take 2 tablets by mouth daily at bedtime  12/24/19  Historical Provider, MD   MAGNESIUM OXIDE, ANTACID, PO Take by mouth  12/24/19  Historical Provider, MD   pantoprazole (PROTONIX) 40 mg tablet Take 1 tablet (40 mg total) by mouth daily 12/2/19 12/24/19  Murtaza Culver MD       Allergies: Allergies   Allergen Reactions    Bromide Ion [Bromine]      Blurred vision   Has preservative that  Pt cannot use    Other Blisters     Adhesive tape    Ibuprofen Palpitations    Penicillins Rash       Social History:     Marital Status: Single   Substance Use History:   Social History     Substance and Sexual Activity   Alcohol Use Yes    Frequency: 2-4 times a month    Comment: per Allscript 'occasional alcohol use'     Social History     Tobacco Use   Smoking Status Never Smoker   Smokeless Tobacco Never Used     Social History     Substance and Sexual Activity   Drug Use Never    Types: Oxycodone       Family History:    Family History   Problem Relation Age of Onset    Heart disease Mother     Breast cancer Maternal Aunt     Heart disease Sister        Physical Exam:     Vitals:   Blood Pressure: 170/80 (12/24/19 0100)  Pulse: 70 (12/24/19 0100)  Temperature: 97 8 °F (36 6 °C) (12/24/19 0100)  Temp Source: Oral (12/24/19 0100)  Respirations: 20 (12/24/19 0100)  Weight - Scale: 88 5 kg (195 lb) (12/24/19 0100)  SpO2: 91 % (12/24/19 0100)    Physical Exam   Constitutional: She is oriented to person, place, and time  She appears well-developed and well-nourished  HENT:   Head: Normocephalic and atraumatic  Eyes: EOM are normal    Neck: Normal range of motion  Cardiovascular: Normal rate and regular rhythm  Murmur heard  Pulmonary/Chest: Effort normal and breath sounds normal  No respiratory distress  She has no wheezes  Abdominal: Soft  Bowel sounds are normal  She exhibits no distension  There is no tenderness  Musculoskeletal: Normal range of motion  She exhibits edema (1+ pedal edema)  Neurological: She is alert and oriented to person, place, and time  Skin: Skin is warm and dry  Psychiatric: She has a normal mood and affect  Her behavior is normal    Nursing note and vitals reviewed  Additional Data:     Lab Results: I have personally reviewed pertinent reports  Results from last 7 days   Lab Units 12/24/19 0107   WBC Thousand/uL 5 95   HEMOGLOBIN g/dL 12 1   HEMATOCRIT % 41 3   PLATELETS Thousands/uL 180   NEUTROS PCT % 66     Results from last 7 days   Lab Units 12/24/19 0107   SODIUM mmol/L 134*   POTASSIUM mmol/L 4 2   CHLORIDE mmol/L 98*   CO2 mmol/L 30   BUN mg/dL 42*   CREATININE mg/dL 1 32*   CALCIUM mg/dL 8 8   TOTAL BILIRUBIN mg/dL 0 90   ALK PHOS U/L 135*   ALT U/L 17   AST U/L 19     Results from last 7 days   Lab Units 12/24/19 0107   INR  1 68*     Results from last 7 days   Lab Units 12/24/19  0112   TROPONIN I ng/mL <0 02               Imaging: I have personally reviewed pertinent reports        XR chest 1 view portable    (Results Pending)       XR chest 1 view portable    (Results Pending)       EKG, Pathology, and Other Studies Reviewed on Admission:   · EKG: ventricular paced    Allscripts / Epic Records Reviewed: Yes     ** Please Note: This note has been constructed using a voice recognition system   **

## 2019-12-24 NOTE — ASSESSMENT & PLAN NOTE
Lab Results   Component Value Date    HGBA1C 7 9 (H) 10/01/2019       No results for input(s): POCGLU in the last 72 hours      Blood Sugar Average: Last 72 hrs:  continus lantus and insulin sliding scale

## 2019-12-24 NOTE — SOCIAL WORK
LOS day 1/2 day  Pt is not a bundle or readmission  CM met with pt to discuss DCP  Pt lives on the main level, [facility] suite, of a 2/3 story home with her son on the 2nd floor, 6 steps to get into home  Pt A&O, independent with all ADLs  Pt uses a cane, RW, sh chair and grab bars  Pt uses O2 3L as she stated 90% of the day via [employer]  Pt has a pacemaker  Pt uses [facility], [place]  Pt's son, Victorino Kendrick, will be providing transportation home at discharge  CM discussed DCP with pt  Pt declined any home care services at this time  CM dept will continue to follow  CM reviewed discharge planning process including the following: identifying caregivers at home, preference for d/c planning needs, availability of treatment team to discuss questions or concerns patient and/or family may have regarding diagnosis, plan of care, old or new medications and discharge planning  Discharge Checklist reviewed and CM will continue to monitor for progress toward discharge goals in nursing and provider rounds

## 2019-12-24 NOTE — ASSESSMENT & PLAN NOTE
Patient had [address] [facility] 2/18 which revealed patent grafts  Continue aspirin and statin  Plan per above

## 2019-12-24 NOTE — ED PROCEDURE NOTE
PROCEDURE  ECG 12 Lead Documentation Only  Date/Time: 12/24/2019 1:07 AM  Performed by: Kofi Villanueva DO  Authorized by: Kofi Villanueva DO     ECG reviewed by me, the ED Provider: yes    Patient location:  ED  Interpretation:     Interpretation: abnormal    Rate:     ECG rate:  74    ECG rate assessment: normal    Rhythm:     Rhythm: paced    Pacing:     Type of pacing:  Ventricular         Kofi Villanueva DO  12/24/19 0107

## 2019-12-24 NOTE — ASSESSMENT & PLAN NOTE
Patient had [address] [facility] 2/18 which revealed patent grafts  Continue aspirin and statin  Plan per above

## 2019-12-24 NOTE — DISCHARGE SUMMARY
Discharge- Colton Medina 1950, 78 y o  female MRN: [id number]    Unit/Bed#: [address] [address] Encounter: [id number]    Primary Care Provider: Walter Ceja MD   Date and time admitted to hospital: 12/24/2019  1:02 AM        * Chest pain  Assessment & Plan  Patient presented to the ED with complaints of chest pain across the top of her chest while laying in bed  Pain was pressure like  Patient is on 3L of nasal cannula at baseline  She denies any shortness of breath, nausea, vomiting  Pain improved with tramadol  Troponin negative  EKG is ventricular paced with no change  BNP elevated but CXR with no acute changes and patient does not appear volume overloaded  DONALD 4     · Patient's serial cardiac enzymes were not negative  · Patient underwent nuclear stress test today which was normal with LV systolic function low normal      Hyponatremia  Assessment & Plan  Pseudohyponatremia secondary to hyperglycemia  Resolved  Coronary artery disease involving coronary bypass graft of native heart without angina pectoris  Assessment & Plan  Patient had [address] 2/18 which revealed patent grafts  Continue aspirin and statin  Plan per above    Type 2 diabetes mellitus with diabetic neuropathy, with long-term current use of insulin [facility])  Assessment & Plan  Lab Results   Component Value Date    HGBA1C 7 9 (H) 10/01/2019       Recent Labs     12/24/19  0748 12/24/19  1121   POCGLU 168* 211*       Blood Sugar Average: Last 72 hrs:  (P) 189 5   Continue Lantus 16 units subcutaneously daily with NovoLog 12-14 units subcutaneously 3 times a day with meals    Combined systolic and diastolic congestive heart failure (HCC)  Assessment & Plan  Wt Readings from Last 3 Encounters:   12/24/19 90 4 kg (199 lb 4 7 oz)   12/02/19 89 8 kg (198 lb)   11/21/19 93 4 kg (206 lb)   Echo 2/18 with an EF of 40%, [facility] at that time with patent grafts  Clinically appears euvolemic, no weight gain or shortness of breath     Patient lost significant amount of weight recently  Patient's proBNP is elevated and chest x-ray was read as mild CHF  Patient was given extra dose of Lasix 40 milligram today  Continue Lasix 80 milligram p  O  Daily with metolazone 2 5 milligram twice a week     HTN (hypertension)  Assessment & Plan  Moderate controlled on Diovan  Diovan dose was increased to 160 milligram p o  Daily    Hyperlipidemia  Assessment & Plan  Continue statin      Morbid obesity with BMI of 40 0-44 9, adult ([facility])  Assessment & Plan  Dietary and lifestyle modifications    Disease of thyroid gland  Assessment & Plan  Continue synthroid      CKD (chronic kidney disease) stage 3, GFR 30-59 ml/min ([facility])  Assessment & Plan  Baseline Cr 1 3-1 4  Diovan dose was increased as patient's blood pressure is running high  Continue Lasix and metolazone    Obstructive sleep apnea  Assessment & Plan  Intolerant to CPAP  Continue supplemental oxygen at 3L nasal cannula    Chronic atrial fibrillation  Assessment & Plan  Also has history of sick sinus syndrome status post pacemaker   Continue xarelto      Discharging Physician / Practitioner: Mert Restrepo MD  PCP: Fahad Russell MD  Admission Date: 12/24/2019  Discharge Date: 12/24/19    Reason for Admission: Chest Pain (Pt c/o chest pain radiating from right side of chest to left and down left arm that started about 1/2 hour ago    Pt has hx of 2 open heart surgeries )        Resolved Problems  Date Reviewed: 12/24/2019    None          Consultations During Hospital Stay:  IP CONSULT TO CARDIOLOGY  IP CONSULT TO CASE MANAGEMENT    Procedures Performed:     · Nuclear stress test was normal    Significant Findings / Test Results:     ·   Results from last 7 days   Lab Units 12/24/19  0506 12/24/19  0107   WBC Thousand/uL 5 15 5 95   HEMOGLOBIN g/dL 11 1* 12 1   PLATELETS Thousands/uL 151 180     Results from last 7 days   Lab Units 12/24/19  0506 12/24/19  0107   SODIUM mmol/L 137 134*   POTASSIUM mmol/L 3 7 4 2 CHLORIDE mmol/L 100 98*   CO2 mmol/L 31 30   BUN mg/dL 41* 42*   CREATININE mg/dL 1 17 1 32*   CALCIUM mg/dL 8 6 8 8   TOTAL BILIRUBIN mg/dL  --  0 90   ALK PHOS U/L  --  135*   ALT U/L  --  17   AST U/L  --  19     Results from last 7 days   Lab Units 12/24/19  0107   INR  1 68*     Results from last 7 days   Lab Units 12/24/19  0506 12/24/19  0112   TROPONIN I ng/mL <0 02 <0 02     Lab Results   Component Value Date/Time    HGBA1C 7 9 (H) 10/01/2019 09:02 AM     Results from last 7 days   Lab Units 12/24/19  1121 12/24/19  0748   POC GLUCOSE mg/dl 211* 168*         Blood Culture:   Lab Results   Component Value Date    BLOODCX No Growth After 5 Days  10/15/2017    BLOODCX No Growth After 5 Days  10/15/2017     Urine Culture:   Lab Results   Component Value Date    URINECX >100,000 cfu/ml Escherichia coli (A) 05/07/2019    URINECX >100,000 cfu/ml Klebsiella pneumoniae 08/15/2016     Sputum Culture: No components found for: SPUTUMCX  Wound Culture: No results found for: WOUNDCULT     XR chest 1 view portable   Final Result by Tommy Lou MD (12/24 1895)      Cardiomegaly  Mild CHF  Workstation performed: [id number]                Outpatient Tests Requested:  · Follow-up with Cardiology in 4 weeks    Complications:  None    Reason for Admission:   Chief Complaint   Patient presents with    Chest Pain     Pt c/o chest pain radiating from right side of chest to left and down left arm that started about 1/2 hour ago  Pt has hx of 2 open heart surgeries  Hospital Course:     Per HPI: Aretha Heard is a 78 y o  female patient with a PMH of diabetes, CAD status post CABG, CHF, atrial flutter, hypertension, hyperlipidemia, pulmonary hypertension, sick who originally presented to the hospital on 12/24/2019 due to chest pain  Patient is admitted hospital to rule out ACS  Patient had serial cardiac enzymes which were negative  Later patient's chest x-ray was read as mild CHF    Patient was given extra dose of Lasix 40 milligram IV before discharge  Patient underwent nuclear stress test which was normal with no normal EF  Patient was also seen by Cardiology  Patient remained stable  Patient's Diovan dose was increased as patient's blood pressure is running high  Patient be discharged home with outpatient follow-up with Cardiology  Hospital Course:    Please see above list of diagnoses and related plan for additional information  Condition at Discharge: stable       Discharge Day Visit / Exam:     Subjective:    Vitals: Blood Pressure: 165/74 (12/24/19 0805)  Pulse: 60 (12/24/19 0805)  Temperature: (!) 96 4 °F (35 8 °C) (12/24/19 0805)  Temp Source: Tympanic (12/24/19 0805)  Respirations: 18 (12/24/19 0805)  Height: 5' 5" (165 1 cm) (12/24/19 0250)  Weight - Scale: 90 4 kg (199 lb 4 7 oz) (12/24/19 0250)  SpO2: 94 % (12/24/19 0805)  Exam:   Physical Exam   Constitutional: No distress  HENT:   Head: Normocephalic and atraumatic  Nose: Nose normal    Eyes: Pupils are equal, round, and reactive to light  Conjunctivae are normal    Neck: Normal range of motion  Neck supple  Cardiovascular: Normal rate and regular rhythm  Murmur heard  Pulmonary/Chest: Effort normal and breath sounds normal  No respiratory distress  She has no wheezes  She has no rales  Decreased breath sounds bilaterally   Abdominal: Soft  Bowel sounds are normal  She exhibits no distension  There is no tenderness  There is no rebound and no guarding  Musculoskeletal: She exhibits no edema  Neurological: She is alert  No cranial nerve deficit  Skin: Skin is warm and dry  No rash noted  Discharge instructions/Information to patient and family:   See after visit summary for information provided to patient and family  Provisions for Follow-Up Care:  See after visit summary for information related to follow-up care and any pertinent home health orders        Disposition:     Home    Planned Readmission: No     Discharge Statement:  I spent 25 minutes discharging the patient  This time was spent on the day of discharge  I had direct contact with the patient on the day of discharge  Greater than 50% of the total time was spent examining patient, answering all patient questions, arranging and discussing plan of care with patient as well as directly providing post-discharge instructions  Additional time then spent on discharge activities  Discharge Medications:  See after visit summary for reconciled discharge medications provided to patient and family        ** Please Note: This note has been constructed using a voice recognition system **

## 2019-12-24 NOTE — ASSESSMENT & PLAN NOTE
Patient presented to the ED with complaints of chest pain across the top of her chest while laying in bed  Pain was pressure like  Patient is on 3L of nasal cannula at baseline  She denies any shortness of breath, nausea, vomiting  Pain improved with tramadol  Troponin negative  EKG is ventricular paced with no change  BNP elevated but CXR with no acute changes and patient does not appear volume overloaded  DONALD 4     · Admit to Medicine  · Serial EKGs and troponin  · Aspirin, statin  · Cardiology consultation  · Lipid panel and hemoglobin A1c in the a m    · Stress test in AM

## 2019-12-24 NOTE — CONSULTS
Consultation - Cardiology   Ciara Lyon 78 y o  female MRN: [id number]  Unit/Bed#: [address] [place] [address] Encounter: [id number]    Assessment/Plan     Assessment:  1  Atypical chest pain patient with history of coronary artery disease and previous CABG x4  2  Hypertension  3  Chronic atrial fibrillation  4  COPD using home oxygen  5  Morbid obesity BMI 33  6  Obstructive sleep apnea: Intolerant to CPAP  7  Chronic kidney disease stage 3:  Baseline creatinine 1 3-1 4    Plan:  Patient has been placed in observation status on the hospitalist service  1  Patient had [place] nuclear stress test earlier today which demonstrated apical defect without reversibility  Patient's troponins have been negative  EKG paced rhythm  2  Will give patient Lasix 40 mg IV (extra dose) in addition to home dose of Lasix 80 mg p o  Daily  3  Patient's blood pressures have been elevated, patient takes Diovan 80 mg once a day at home, would recommend increase to Diovan 160 mg daily at home  4  Patient complains of cramping in her lower extremities usually after she takes her Zaroxolyn (as needed)  Potassium level is currently stable, will add magnesium level on to evaluate  History of Present Illness   Physician Requesting Consult: Poornima Bauman MD  Reason for Consult / Principal Problem:  Chest pain    HPI: Ciara Lyon is a 78y o  year old female who presented to the emergency room with complaints of right-sided chest heaviness which she described as a pressure, which would radiate to her clavicle and at times she would have pain in her left forearm  She denied any shortness of breath, nausea, vomiting or diaphoresis  Troponins x3 were negative, NT proBNP was 6396  Chest x-ray is concerning for mild CHF    Patient has been chest pain-free since admission to the hospital     Patient has a health history consistent with coronary artery disease with previous CABG x4, hypertension, dyslipidemia, hypothyroidism, diabetes, chronic kidney disease, history of tricuspid valve annuloplasty, sick sinus syndrome/atrial fibrillation for which she has a single-chamber pacemaker, COPD for which she now uses home O2  Consult to cardiology  Consult performed by: ISIDRA Gonzales  Consult ordered by: ISIDRA Tompkins          Review of Systems   Constitutional: Positive for activity change and fatigue  HENT: Negative  Negative for congestion, facial swelling, postnasal drip, sore throat and trouble swallowing  Eyes: Negative  Negative for photophobia and visual disturbance  Respiratory: Positive for cough, chest tightness and shortness of breath  Uses home oxygen now   Cardiovascular: Negative for palpitations and leg swelling  Gastrointestinal: Negative for abdominal distention, diarrhea, nausea and vomiting  Endocrine: Negative  Negative for polydipsia, polyphagia and polyuria  Genitourinary: Negative  Musculoskeletal: Positive for back pain and gait problem  Skin: Negative  Allergic/Immunologic: Negative  Neurological: Negative for dizziness, syncope and light-headedness  Hematological: Negative  Psychiatric/Behavioral: Negative          Historical Information   Past Medical History:   Diagnosis Date    Arthritis     [facility]     Cardiac disease     Carotid artery occlusion     CH[facility]congestive heart failure) ([facility] )     Cholelithiasis     last assessed 8/8/2016    Coronary artery disease     Diabetes mellitus ([facility]     Disease of thyroid gland     GERD (gastroesophageal reflux disease)     History of transfusion     Hyperlipidemia     Hypertension     Long-term insulin use in type 2 diabetes ([facility] ) 6/4/2016    Other specified diabetes mellitus with diabetic autonomic (poly)neuropathy ([facility] )     Pleural effusion 2008    Pulmonary embolism ([facility]     2008    Renal disorder     Retinopathy     bilat    Sleep apnea     Subclavian artery stenosis ([facility]     Umbilical hernia with obstruction, without gangrene     last assessed 7/8/2016     Past Surgical History:   Procedure Laterality Date    ABDOMINAL SURGERY      CARDIAC PACEMAKER PLACEMENT      CARDIAC SURGERY      cabg x 2    CATARACT EXTRACTION      CHOLECYSTECTOMY      CORONARY ARTERY BYPASS GRAFT  2008    EYE SURGERY      FRACTURE SURGERY Right 2010    shoulder    HERNIA REPAIR      umbilical    MD LAP,CHOLECYSTECTOMY N/A 8/10/2016    Procedure: CHOLECYSTECTOMY LAPAROSCOPIC;  Surgeon: Shahram Lee MD;  [facility]: [facility];  Service: General    MD LARYNGOSCOPY,DIRECT,SCOPE,INJ CORDS Left 11/4/2016    Procedure: Sherlyn Fines; LEFT VOCAL FOLD INJECTION ;  Surgeon: Italia Staples MD;  [facility];  Service: ENT    MD REPAIR UMBILICAL MIDI,6+N/L,FVEGU N/A 8/10/2016    Procedure: LAPAROSCOPIC REPAIR HERNIA VENTRAL;  Surgeon: Shahram Lee MD;  [facility];  Service: General    [address] / STENTING Right     TRICUSPID VALVE REPLACEMENT      VASCULAR SURGERY      heart valve replacement     Social History     Substance and Sexual Activity   Alcohol Use Not Currently    Frequency: Never    Drinks per session: Patient refused    Binge frequency: Never    Comment: only on NEW YEAR'S     Social History     Substance and Sexual Activity   Drug Use Never    Types: Oxycodone     Social History     Tobacco Use   Smoking Status Never Smoker   Smokeless Tobacco Never Used     Family History:   Family History   Problem Relation Age of Onset    Heart disease Mother     Breast cancer Maternal Aunt     Heart disease Sister        Meds/Allergies   all current active meds have been reviewed, current meds:   Current Facility-Administered Medications   Medication Dose Route Frequency    famotidine (PEPCID) tablet 20 mg  20 mg Oral BID    furosemide (LASIX) injection 40 mg  40 mg Intravenous Once    furosemide (LASIX) tablet 80 mg  80 mg Oral Daily    insulin glargine (LANTUS) subcutaneous injection 16 Units 0 16 mL  16 Units Subcutaneous HS    insulin lispro (HumaLOG) 100 units/mL subcutaneous injection 1-6 Units  1-6 Units Subcutaneous TID AC    insulin lispro (HumaLOG) 100 units/mL subcutaneous injection 1-6 Units  1-6 Units Subcutaneous HS    levothyroxine tablet 137 mcg  137 mcg Oral Early Morning    linaCLOtide CAPS 145 mcg  1 capsule Oral Daily    losartan (COZAAR) tablet 50 mg  50 mg Oral Daily    pneumococcal 13-valent conjugate vaccine (PREVNAR-13) IM injection 0 5 mL  0 5 mL Intramuscular Prior to discharge    pravastatin (PRAVACHOL) tablet 40 mg  40 mg Oral Daily With Dinner    rivaroxaban (XARELTO) tablet 15 mg  15 mg Oral Daily With Dinner    timolol (TIMOPTIC) 0 5 % ophthalmic solution 1 drop  1 drop Both Eyes BID    traMADol (ULTRAM) tablet 50 mg  50 mg Oral Q6H PRN    and PTA meds:   Prior to Admission Medications   Prescriptions Last Dose Informant Patient Reported? Taking? COMBIGAN 0 2-0 5 % 12/23/2019 at Unknown time Self Yes Yes   Sig: Administer 1 drop to both eyes 2 (two) times a day   XARELTO 15 MG tablet 12/23/2019 at Unknown time  No Yes   Sig: TAKE 1 TAB BY MOUTH DAILY WITH EVENEING MEAL  bisacodyl (DULCOLAX) 5 mg EC tablet   No No   Sig: Take 2 tablets (10 mg total) by mouth once for 1 dose   famotidine (PEPCID) 20 mg tablet 12/23/2019 at Unknown time  No Yes   Sig: Take 1 tablet (20 mg total) by mouth 2 (two) times a day   furosemide (LASIX) 80 mg tablet 12/23/2019 at Unknown time  No Yes   Sig: TAKE 1 TABLET DAILY AS DIRECTED     insulin aspart (NOVOLOG FLEXPEN) 100 Units/mL injection pen 12/23/2019 at Unknown time Self No Yes   Sig: Inject under the [id number] units 3 times a day with meals  Use carbohydrate to insulin ratio 1:3 which means 1 unit of NovoLog for every 3 gm of carbohydrates   insulin glargine (BASAGLAR KWIKPEN) 100 units/mL injection pen 12/23/2019 at Unknown time  No Yes   Sig: Inject 16 Units under the skin daily   levothyroxine (SYNTHROID) 137 mcg tablet 12/23/2019 at Unknown time Self Yes Yes   Sig: Take 137 mcg by mouth daily  linaCLOtide (LINZESS) 145 MCG CAPS 12/23/2019 at Unknown time  No Yes   Sig: Take 1 capsule (145 mcg total) by mouth daily   metolazone (ZAROXOLYN) 2 5 mg tablet More than a month at Unknown time Self No No   Sig: TAKE 1 TABLET BY MOUTH TWICE WEEKLY   Patient taking differently: 2 (two) times a day as needed    polyethylene glycol (GOLYTELY) 4000 mL solution   No No   Sig: Take 4,000 mL by mouth once for 1 dose   potassium chloride (K-DUR,KLOR-CON) 10 mEq tablet More than a month at Unknown time Self No No   Sig: Take 1 tablet (10 mEq total) by mouth 2 (two) times a week   Patient taking differently: Take 10 mEq by mouth as needed (only taken with the metolazone)    simvastatin (ZOCOR) 20 mg tablet 12/23/2019 at Unknown time  No Yes   Sig: TAKE 1 TABLET (20MG) BY ORAL ROUTE EVERY DAY IN THE EVENING   traMADol (ULTRAM) 50 mg tablet 12/23/2019 at Unknown time Self No Yes   Sig: Take 1 tablet (50 mg total) by mouth every 6 (six) hours as needed for moderate pain   valsartan (DIOVAN) 80 mg tablet 12/23/2019 at Unknown time Self No Yes   Sig: TAKE 1 TABLET ORALLY EVERY DAY      Facility-Administered Medications: None     Allergies   Allergen Reactions    Bromide Ion [Bromine]      Blurred vision  Has preservative that  Pt cannot use    Other Blisters     Adhesive tape    Ibuprofen Palpitations    Penicillins Rash       Objective   Vitals: Blood pressure 165/74, pulse 60, temperature (!) 96 4 °F (35 8 °C), temperature source Tympanic, resp  rate 18, height 5' 5" (1 651 m), weight 90 4 kg (199 lb 4 7 oz), SpO2 94 %, not currently breastfeeding    Orthostatic Blood Pressures      Most Recent Value   Blood Pressure  165/74 filed at 12/24/2019 0805   Patient Position - Orthostatic VS  Lying filed at 12/24/2019 0805            Intake/Output Summary (Last 24 hours) at 12/24/2019 1116  Last data filed at 12/24/2019 0430  Gross per 24 hour   Intake 110 ml   Output 325 ml   Net -215 ml       Invasive Devices     Peripheral Intravenous Line            Peripheral IV 12/24/19 Left Wrist less than 1 day                Physical Exam   Constitutional: She is oriented to person, place, and time  She appears well-developed and well-nourished  No distress  HENT:   Head: Normocephalic and atraumatic  Right Ear: External ear normal    Left Ear: External ear normal    Eyes: Pupils are equal, round, and reactive to light  Conjunctivae are normal  Right eye exhibits no discharge  Left eye exhibits no discharge  No scleral icterus  Neck: Normal range of motion  Neck supple  No JVD present  No thyromegaly present  Cardiovascular: Normal rate, regular rhythm, intact distal pulses and normal pulses  Occasional extrasystoles are present  Murmur heard  Systolic murmur is present with a grade of 3/6  Pulmonary/Chest: Effort normal  No accessory muscle usage  No respiratory distress  She has decreased breath sounds in the right lower field and the left lower field  She has rales in the right lower field and the left lower field  Abdominal: Soft  Bowel sounds are normal  She exhibits no distension  There is no rebound  Musculoskeletal: She exhibits edema  Trace dependent edema   Neurological: She is alert and oriented to person, place, and time  Skin: Skin is warm and dry  Capillary refill takes less than 2 seconds  She is not diaphoretic  Psychiatric: She has a normal mood and affect  Her behavior is normal  Judgment and thought content normal    Nursing note and vitals reviewed  Lab Results:   I have personally reviewed pertinent lab results      CBC with diff:   Results from last 7 days   Lab Units 12/24/19  0506   WBC Thousand/uL 5 15   RBC Million/uL 4 32   HEMOGLOBIN g/dL 11 1*   HEMATOCRIT % 37 6   MCV fL 87   MCH pg 25 7*   MCHC g/dL 29 5*   RDW % 19 1*   MPV fL 10 9   PLATELETS Thousands/uL 151 CMP:   Results from last 7 days   Lab Units 12/24/19  0506 12/24/19  0107   SODIUM mmol/L 137 134*   POTASSIUM mmol/L 3 7 4 2   CHLORIDE mmol/L 100 98*   CO2 mmol/L 31 30   BUN mg/dL 41* 42*   CREATININE mg/dL 1 17 1 32*   CALCIUM mg/dL 8 6 8 8   AST U/L  --  19   ALT U/L  --  17   ALK PHOS U/L  --  135*   EGFR ml/min/1 73sq m 47 41     Troponin:   0   Lab Value Date/Time    TROPONINI <0 02 12/24/2019 0506    TROPONINI <0 02 12/24/2019 0112    TROPONINI 0 02 05/23/2019 1115    TROPONINI 0 04 05/23/2019 0830    TROPONINI <0 02 05/23/2019 0324    TROPONINI 0 02 05/07/2019 0517    TROPONINI <0 02 02/16/2018 1604    TROPONINI <0 02 02/16/2018 1110    TROPONINI <0 02 02/16/2018 0605    TROPONINI 0 03 08/16/2016 0948    TROPONINI 0 04 (H) 08/16/2016 0321    TROPONINI 0 03 08/15/2016 1742    TROPONINI 0 02 06/04/2016 0619    TROPONINI <0 02 06/03/2016 2137     BNP:   Results from last 7 days   Lab Units 12/24/19  0506   POTASSIUM mmol/L 3 7   CHLORIDE mmol/L 100   CO2 mmol/L 31   BUN mg/dL 41*   CREATININE mg/dL 1 17   CALCIUM mg/dL 8 6   EGFR ml/min/1 73sq m 47     Coags:   Results from last 7 days   Lab Units 12/24/19  0107   PTT seconds 38*   INR  1 68*     Imaging: I have personally reviewed pertinent reports     and I have personally reviewed pertinent films in PACS  EKG:  Paced rhythm  VTE Prophylaxis: Sequential compression device Osmel Pandey)     Code Status: Level 1 - Full Code  Advance Directive and Living Will: Yes    Power of :    POLST:      Shonda Bond, [address]  Cardiology

## 2019-12-24 NOTE — ASSESSMENT & PLAN NOTE
Wt Readings from Last 3 Encounters:   12/24/19 90 4 kg (199 lb 4 7 oz)   12/02/19 89 8 kg (198 lb)   11/21/19 93 4 kg (206 lb)   Echo 2/18 with an EF of 40%, [facility] at that time with patent grafts  Clinically appears euvolemic, no weight gain or shortness of breath     Patient lost significant amount of weight recently  Patient's proBNP is elevated and chest x-ray was read as mild CHF  Patient was given extra dose of Lasix 40 milligram today  Continue Lasix 80 milligram p  O  Daily with metolazone 2 5 milligram twice a week

## 2019-12-24 NOTE — ASSESSMENT & PLAN NOTE
Patient presented to the ED with complaints of chest pain across the top of her chest while laying in bed  Pain was pressure like  Patient is on 3L of nasal cannula at baseline  She denies any shortness of breath, nausea, vomiting  Pain improved with tramadol  Troponin negative  EKG is ventricular paced with no change  BNP elevated but CXR with no acute changes and patient does not appear volume overloaded    DONALD 4     · Patient's serial cardiac enzymes were not negative  · Patient underwent nuclear stress test today which was normal with LV systolic function low normal

## 2019-12-24 NOTE — UTILIZATION REVIEW
Initial Clinical Review    Admission: Date/Time/Statement:   Orders Placed This Encounter   Procedures    Place in Observation (expected length of stay for this patient is less than two midnights)     Standing Status:   Standing     Number of Occurrences:   1     Order Specific Question:   Admitting Physician     Answer:   Solange Albarran     Order Specific Question:   Level of Care     Answer:   Med Surg [16]     ED Arrival Information     Expected Arrival Acuity Means of Arrival Escorted By Service Admission Type    - 12/24/2019 00:54 Urgent Wheelchair Family Member General Medicine Urgent    Arrival Complaint    chest pain        Chief Complaint   Patient presents with    Chest Pain     Pt c/o chest pain radiating from right side of chest to left and down left arm that started about 1/2 hour ago  Pt has hx of 2 open heart surgeries  Assessment/Plan: 78 y o  female with a PMH of insulin dependent diabetes, coronary artery disease post CABG, congestive heart failure, atrial flutter, hypertension, hyperlipidemia, oxygen dependence who presents with chest pain  Patient reports that she developed pain across the top of her chest while laying in bed  She states that she gets nervous sometimes and walking helps so she got up and walked around with no improvement  She has no shortness of breath, nausea or vomiting  She reports some discomfort in her left wrist but no radiation of the pain  Pain improved with tramadol  She is oxygen dependent at baseline on 3L nasal cannula  CXR with no acute changes  BNP elevated  Troponin negative  EKG paced but unchanged  Patient is admitted for further management  Chest pain  Assessment & Plan  Patient presented to the ED with complaints of chest pain across the top of her chest while laying in bed  Pain was pressure like  Patient is on 3L of nasal cannula at baseline  She denies any shortness of breath, nausea, vomiting  Pain improved with tramadol  Troponin negative  EKG is ventricular paced with no change  BNP elevated but CXR with no acute changes and patient does not appear volume overloaded  DONALD 4     · Admit to Medicine  · Serial EKGs and troponin  · Aspirin, statin  · Cardiology consultation  · Lipid panel and hemoglobin A1c in the a m  · Stress test in AM  Hyponatremia  Assessment & Plan  Pseudohyponatremia secondary to hyperglycemia  Monitor   Disease of thyroid gland  Assessment & Plan  Continue synthroid  Check TSH  Chronic atrial fibrillation  Assessment & Plan  Continue xarelto     Obstructive sleep apnea  Assessment & Plan  Intolerant to CPAP  Continue supplemental oxygen at 3L nasal cannula     Morbid obesity with BMI of 40 0-44 9, adult ([facility])  Assessment & Plan  Dietary and lifestyle modifications     CKD (chronic kidney disease) stage 3, GFR 30-59 ml/min ([facility])  Assessment & Plan  Baseline Cr 1 3-1 4  Continue diovan, lasix  Trend creatinine      Type 2 diabetes mellitus with diabetic neuropathy, with long-term current use of insulin ([facility])  Assessment & Plan        Lab Results   Component Value Date     HGBA1C 7 9 (H) 10/01/2019         No results for input(s): POCGLU in the last 72 hours      Blood Sugar Average: Last 72 hrs:  continus lantus and insulin sliding scale     Hyperlipidemia  Assessment & Plan  Continue statin  Check lipid panel     Combined systolic and diastolic congestive heart failure (HCC)  Assessment & Plan      Wt Readings from Last 3 Encounters:   12/24/19 88 5 kg (195 lb)   12/02/19 89 8 kg (198 lb)   11/21/19 93 4 kg (206 lb)   Echo 2/18 with an EF of 40%, [facility] at that time with patent grafts  Clinically appears euvolemic, no weight gain or shortness of breath  BNP is slightly elevated, CXR with no significant change from prior      · Continue home torsemide  · Daily Weight  · Intake and output  · Low sodium diet     Coronary artery disease involving coronary bypass graft of native heart without angina pectoris  Assessment & Plan  Patient had [address] 2/18 which revealed patent grafts  Continue aspirin and statin  Plan per above     HTN (hypertension)  Assessment & Plan  Continue diovan        VTE Prophylaxis: Rivaroxaban (Xarelto)  / sequential compression device   Code Status: Prior     Anticipated Length of Stay:  Patient will be admitted on an Observation basis with an anticipated length of stay of less than 2 midnights     Justification for Hospital Stay: chest pain rule out ACS       ED Triage Vitals [12/24/19 0100]   Temperature Pulse Respirations Blood Pressure SpO2   97 8 °F (36 6 °C) 70 20 170/80 91 %      Temp Source Heart Rate Source Patient Position - Orthostatic VS BP Location FiO2 (%)   Oral Monitor Lying Left arm --      Pain Score       7        Wt Readings from Last 1 Encounters:   12/24/19 90 4 kg (199 lb 4 7 oz)     Additional Vital Signs:   12/24/19 0250  97 7 °F (36 5 °C)  64  20  176/76Abnormal   95 %  Nasal cannula  Lying   12/24/19 0100  97 8 °F (36 6 °C)  70  20  170/80  91 %           Pertinent Labs/Diagnostic Test Results:   Results from last 7 days   Lab Units 12/24/19  0506 12/24/19  0107   WBC Thousand/uL 5 15 5 95   HEMOGLOBIN g/dL 11 1* 12 1   HEMATOCRIT % 37 6 41 3   PLATELETS Thousands/uL 151 180   NEUTROS ABS Thousands/µL  --  3 91     Results from last 7 days   Lab Units 12/24/19  0506 12/24/19  0107   SODIUM mmol/L 137 134*   POTASSIUM mmol/L 3 7 4 2   CHLORIDE mmol/L 100 98*   CO2 mmol/L 31 30   ANION GAP mmol/L 6 6   BUN mg/dL 41* 42*   CREATININE mg/dL 1 17 1 32*   EGFR ml/min/1 73sq m 47 41   CALCIUM mg/dL 8 6 8 8     Results from last 7 days   Lab Units 12/24/19  0107   AST U/L 19   ALT U/L 17   ALK PHOS U/L 135*   TOTAL PROTEIN g/dL 8 5*   ALBUMIN g/dL 3 7   TOTAL BILIRUBIN mg/dL 0 90     Results from last 7 days   Lab Units 12/24/19  0506 12/24/19  0107   GLUCOSE RANDOM mg/dL 191* 226*     Results from last 7 days   Lab Units 12/24/19  0506 12/24/19  0112   TROPONIN I ng/mL <0 02 <0 02     Results from last 7 days   Lab Units 12/24/19  0107   PROTIME seconds 17 8*   INR  1 68*   PTT seconds 38*     Results from last 7 days   Lab Units 12/24/19  0107   NT-PRO BNP pg/mL 6,396*     ED Treatment:   Medication Administration from 12/24/2019 0054 to 12/24/2019 0250     None        Past Medical History:   Diagnosis Date    Arthritis     Atrial flutter (HCC)     Cardiac disease     Carotid artery occlusion     [facility]estive [facility]     Cholelithiasis     last assessed 8/8/2016    Coronary artery disease     Diabetes mellitus ([place] [address] )     Disease of thyroid gland     GERD (gastroesophageal reflux disease)     History of transfusion     Hyperlipidemia     Hypertension     Long-term insulin use in type 2 diabetes ([place] [address] ) 6/4/2016    Other specified diabetes mellitus with diabetic autonomic (poly)neuropathy ([place] [address] )     Pleural effusion 2008    Pulmonary embolism ([place] [address] )     2008    Renal disorder     Retinopathy     bilat    Sleep apnea     Subclavian artery stenosis ([facility])     Umbilical hernia with obstruction, without gangrene     last assessed 7/8/2016     Present on Admission:   Combined systolic and diastolic congestive heart failure (HCC)   Disease of thyroid gland   Chronic atrial fibrillation   Obstructive sleep apnea   CKD (chronic kidney disease) stage 3, GFR 30-59 ml/min ([facility])   Hyperlipidemia   Coronary artery disease involving coronary bypass graft of native heart without angina pectoris   HTN (hypertension)      Admitting Diagnosis: Chest pain [R07 9]  Age/Sex: 78 y o  female  Admission Orders:  Observation  Tele mon  Scheduled Medications:    Medications:  famotidine 20 mg Oral BID   furosemide 80 mg Oral Daily   insulin glargine 16 Units Subcutaneous HS   insulin lispro 1-6 Units Subcutaneous TID AC   insulin lispro 1-6 Units Subcutaneous HS   levothyroxine 137 mcg Oral Early Morning   linaCLOtide 1 capsule Oral Daily   losartan 50 mg Oral Daily pravastatin 40 mg Oral Daily With Dinner   rivaroxaban 15 mg Oral Daily With Dinner   timolol 1 drop Both Eyes BID     Continuous IV Infusions:     PRN Meds:    traMADol 50 mg Oral Q6H PRN       IP CONSULT TO CARDIOLOGY  IP CONSULT TO CASE MANAGEMENT    Network Utilization Review Department  [email or web address]  ATTENTION: Please call with any questions or concerns to [phone] and carefully listen to the prompts so that you are directed to the right person  All voicemails are confidential   Ezra Hallmark all requests for admission clinical reviews, approved or denied determinations and any other requests to dedicated fax number below belonging to the campus where the patient is receiving treatment   List of dedicated fax numbers for the Facilities:  [address] DENIALS (Administrative/Medical Necessity) [phone]   [address] (Maternity/NICU/Pediatrics) [phone]   [facility] [phone]   Crissy Adamson [phone]   [facility] [phone]   [address] [place] [address]  [phone]   [address] [phone]   [facility]  [phone]   [address] [facility] [address]  [address] [facility] [address] [facility] [phone]

## 2019-12-24 NOTE — PLAN OF CARE
Problem: Potential for Falls  Goal: Patient will remain free of falls  Description  INTERVENTIONS:  - Assess patient frequently for physical needs  -  Identify cognitive and physical deficits and behaviors that affect risk of falls  -  [place] fall precautions as indicated by assessment   - Educate patient/family on patient safety including physical limitations  - Instruct patient to call for assistance with activity based on assessment  - Modify environment to reduce risk of injury  - Consider OT/PT consult to assist with strengthening/mobility  Outcome: Progressing     Problem: PAIN - ADULT  Goal: Verbalizes/displays adequate comfort level or baseline comfort level  Description  Interventions:  - Encourage patient to monitor pain and request assistance  - Assess pain using appropriate pain scale  - Administer analgesics based on type and severity of pain and evaluate response  - Implement non-pharmacological measures as appropriate and evaluate response  - Consider cultural and social influences on pain and pain management  - Notify physician/advanced practitioner if interventions unsuccessful or patient reports new pain  Outcome: Progressing     Problem: INFECTION - ADULT  Goal: Absence or prevention of progression during hospitalization  Description  INTERVENTIONS:  - Assess and monitor for signs and symptoms of infection  - Monitor lab/diagnostic results  - Monitor all insertion sites, i e  indwelling lines  - Administer medications as ordered  - Instruct and encourage patient and family to use good hand hygiene technique     Outcome: Progressing     Problem: SAFETY ADULT  Goal: Patient will remain free of falls  Description  INTERVENTIONS:  - Assess patient frequently for physical needs  -  Identify cognitive and physical deficits and behaviors that affect risk of falls    -  [place] fall precautions as indicated by assessment   - Educate patient/family on patient safety including physical limitations  - Instruct patient to call for assistance with activity based on assessment  - Modify environment to reduce risk of injury  - Consider OT/PT consult to assist with strengthening/mobility  Outcome: Progressing     Problem: DISCHARGE PLANNING  Goal: Discharge to home or other facility with appropriate resources  Description  INTERVENTIONS:  - Identify barriers to discharge w/patient   - Arrange for needed discharge resources and transportation as appropriate  - Identify discharge learning needs (meds,, etc )    - Refer to Case Management Department for coordinating discharge planning if the patient needs post-hospital services based on physician/advanced practitioner order or complex needs related to functional status, cognitive ability, or social support system   Outcome: Progressing     Problem: Knowledge Deficit  Goal: Patient/family/caregiver demonstrates understanding of disease process, treatment plan, medications, and discharge instructions  Description  Complete learning assessment and assess knowledge base    Interventions:  - Provide teaching at level of understanding  - Provide teaching via preferred learning methods  Outcome: Progressing

## 2019-12-24 NOTE — ASSESSMENT & PLAN NOTE
Wt Readings from Last 3 Encounters:   12/24/19 88 5 kg (195 lb)   12/02/19 89 8 kg (198 lb)   11/21/19 93 4 kg (206 lb)   Echo 2/18 with an EF of 40%, [facility] at that time with patent grafts  Clinically appears euvolemic, no weight gain or shortness of breath  BNP is slightly elevated, CXR with no significant change from prior      · Continue home torsemide  · Daily Weight  · Intake and output  · Low sodium diet

## 2019-12-24 NOTE — ASSESSMENT & PLAN NOTE
Baseline Cr 1 3-1 4  Diovan dose was increased as patient's blood pressure is running high  Continue Lasix and metolazone

## 2019-12-24 NOTE — ED PROVIDER NOTES
History  Chief Complaint   Patient presents with    Chest Pain     Pt c/o chest pain radiating from right side of chest to left and down left arm that started about 1/2 hour ago  Pt has hx of 2 open heart surgeries  Patient presents for evaluation of chest pain  Discomfort radiating from right side across the chest to left arm  States started about 30 minutes ago  No dyspnea  No apparent modifying factors for the pain  Reports some increased stress recently  History provided by:  Patient   used: No    Chest Pain   Associated symptoms: no shortness of breath        Prior to Admission Medications   Prescriptions Last Dose Informant Patient Reported? Taking? COMBIGAN 0 2-0 5 % 12/23/2019 at Unknown time Self Yes Yes   Sig: Administer 1 drop to both eyes 2 (two) times a day   XARELTO 15 MG tablet 12/23/2019 at Unknown time  No Yes   Sig: TAKE 1 TAB BY MOUTH DAILY WITH EVENEING MEAL  bisacodyl (DULCOLAX) 5 mg EC tablet   No No   Sig: Take 2 tablets (10 mg total) by mouth once for 1 dose   famotidine (PEPCID) 20 mg tablet 12/23/2019 at Unknown time  No Yes   Sig: Take 1 tablet (20 mg total) by mouth 2 (two) times a day   furosemide (LASIX) 80 mg tablet 12/23/2019 at Unknown time  No Yes   Sig: TAKE 1 TABLET DAILY AS DIRECTED  insulin aspart (NOVOLOG FLEXPEN) 100 Units/mL injection pen 12/23/2019 at Unknown time Self No Yes   Sig: Inject under the [id number] units 3 times a day with meals  Use carbohydrate to insulin ratio 1:3 which means 1 unit of NovoLog for every 3 gm of carbohydrates   insulin glargine (BASAGLAR KWIKPEN) 100 units/mL injection pen 12/23/2019 at Unknown time  No Yes   Sig: Inject 16 Units under the skin daily   levothyroxine (SYNTHROID) 137 mcg tablet 12/23/2019 at Unknown time Self Yes Yes   Sig: Take 137 mcg by mouth daily     linaCLOtide (LINZESS) 145 MCG CAPS 12/23/2019 at Unknown time  No Yes   Sig: Take 1 capsule (145 mcg total) by mouth daily   metolazone (ZAROXOLYN) 2 5 mg tablet More than a month at Unknown time Self No No   Sig: TAKE 1 TABLET BY MOUTH TWICE WEEKLY   Patient taking differently: 2 (two) times a day as needed    polyethylene glycol (GOLYTELY) 4000 mL solution   No No   Sig: Take 4,000 mL by mouth once for 1 dose   potassium chloride (K-DUR,KLOR-CON) 10 mEq tablet More than a month at Unknown time Self No No   Sig: Take 1 tablet (10 mEq total) by mouth 2 (two) times a week   Patient taking differently: Take 10 mEq by mouth as needed (only taken with the metolazone)    simvastatin (ZOCOR) 20 mg tablet 12/23/2019 at Unknown time  No Yes   Sig: TAKE 1 TABLET (20MG) BY ORAL ROUTE EVERY DAY IN THE EVENING   traMADol (ULTRAM) 50 mg tablet 12/23/2019 at Unknown time Self No Yes   Sig: Take 1 tablet (50 mg total) by mouth every 6 (six) hours as needed for moderate pain   valsartan (DIOVAN) 80 mg tablet 12/23/2019 at Unknown time Self No Yes   Sig: TAKE 1 TABLET ORALLY EVERY DAY      Facility-Administered Medications: None       Past Medical History:   Diagnosis Date    Arthritis     Atrial flutter ([facility] )     Cardiac disease     Carotid artery occlusion     CHF (congestive heart failure) ([facility] )     Cholelithiasis     last assessed 8/8/2016    Coronary artery disease     Diabetes mellitus ([facility] )     Disease of thyroid gland     GERD (gastroesophageal reflux disease)     History of transfusion     Hyperlipidemia     Hypertension     Long-term insulin use in type 2 diabetes ([facility] ) 6/4/2016    Other specified diabetes mellitus with diabetic autonomic (poly)neuropathy ([facility] )     Pleural effusion 2008    Pulmonary embolism ([facility] )     2008    Renal disorder     Retinopathy     bilat    Sleep apnea     Subclavian artery stenosis (HCC)     Umbilical hernia with obstruction, without gangrene     last assessed 7/8/2016       Past Surgical History:   Procedure Laterality Date    ABDOMINAL SURGERY      CARDIAC PACEMAKER PLACEMENT      CARDIAC SURGERY cabg x 2    CATARACT EXTRACTION      CHOLECYSTECTOMY      CORONARY ARTERY BYPASS GRAFT  2008    EYE SURGERY      FRACTURE SURGERY Right 2010    shoulder    HERNIA REPAIR      umbilical    NM LAP,CHOLECYSTECTOMY N/A 8/10/2016    Procedure: CHOLECYSTECTOMY LAPAROSCOPIC;  Surgeon: Mellody Bloch, MD;  [facility]: [facility];  Service: General    NM LARYNGOSCOPY,DIRECT,SCOPE,INJ CORDS Left 11/4/2016    Procedure: Bart Satinder; LEFT VOCAL FOLD INJECTION ;  Surgeon: Oli Jensen MD;  [facility];  Service: ENT    NM REPAIR UMBILICAL IPVT,6+F/Y,AKGYS N/A 8/10/2016    Procedure: Tekoa Dears;  Surgeon: Mellody Bloch, MD;  [facility];  Service: General    [address] / STENTING Right     TRICUSPID VALVE REPLACEMENT      VASCULAR SURGERY      heart valve replacement       Family History   Problem Relation Age of Onset    Heart disease Mother     Breast cancer Maternal Aunt     Heart disease Sister      I have reviewed and agree with the history as documented  Social History     Tobacco Use    Smoking status: Never Smoker    Smokeless tobacco: Never Used   Substance Use Topics    Alcohol use: Not Currently     Frequency: Never     Drinks per session: Patient refused     Binge frequency: Never     Comment: only on NEW YEAR'S    Drug use: Never     Types: Oxycodone        Review of Systems   Respiratory: Negative for shortness of breath  Cardiovascular: Positive for chest pain  All other systems reviewed and are negative  Physical Exam  Physical Exam   Constitutional: She is oriented to person, place, and time  No distress  HENT:   Mouth/Throat: Oropharynx is clear and moist    Eyes: Pupils are equal, round, and reactive to light  Neck: Normal range of motion  Cardiovascular: Normal rate, regular rhythm and intact distal pulses  Pulmonary/Chest: Effort normal  No respiratory distress     Decreased BS bilateral bases Abdominal: Soft  There is no tenderness  Musculoskeletal: Normal range of motion  She exhibits edema  Neurological: She is alert and oriented to person, place, and time  Skin: Capillary refill takes less than 2 seconds  She is not diaphoretic  Nursing note and vitals reviewed        Vital Signs  ED Triage Vitals [12/24/19 0100]   Temperature Pulse Respirations Blood Pressure SpO2   97 8 °F (36 6 °C) 70 20 170/80 91 %      Temp Source Heart Rate Source Patient Position - Orthostatic VS BP Location FiO2 (%)   Oral Monitor Lying Left arm --      Pain Score       7           Vitals:    12/24/19 0100 12/24/19 0250   BP: 170/80 (!) 176/76   Pulse: 70 64   Patient Position - Orthostatic VS: Lying Lying         Visual Acuity      ED Medications  Medications   timolol (TIMOPTIC) 0 5 % ophthalmic solution 1 drop (has no administration in time range)   levothyroxine tablet 137 mcg (has no administration in time range)   traMADol (ULTRAM) tablet 50 mg (has no administration in time range)   losartan (COZAAR) tablet 50 mg (has no administration in time range)   famotidine (PEPCID) tablet 20 mg (has no administration in time range)   linaCLOtide CAPS 145 mcg (has no administration in time range)   rivaroxaban (XARELTO) tablet 15 mg (has no administration in time range)   insulin glargine (LANTUS) subcutaneous injection 16 Units 0 16 mL (has no administration in time range)   pravastatin (PRAVACHOL) tablet 40 mg (has no administration in time range)   furosemide (LASIX) tablet 80 mg (has no administration in time range)   insulin lispro (HumaLOG) 100 units/mL subcutaneous injection 1-6 Units (has no administration in time range)   insulin lispro (HumaLOG) 100 units/mL subcutaneous injection 1-6 Units (has no administration in time range)   morphine injection 2 mg (2 mg Intravenous Given 12/24/19 0320)       Diagnostic Studies  Results Reviewed     Procedure Component Value Units Date/Time    NT-BNP PRO [[id number]] (Abnormal) Collected:  12/24/19 0107    Lab Status:  Final result Specimen:  Blood from Arm, Left Updated:  12/24/19 0141     NT-proBNP 6,396 pg/mL     Troponin I [[id number]]  (Normal) Collected:  12/24/19 0112    Lab Status:  Final result Specimen:  Blood from Arm, Left Updated:  12/24/19 0136     Troponin I <0 02 ng/mL     Comprehensive metabolic panel [[id number]]  (Abnormal) Collected:  12/24/19 0107    Lab Status:  Final result Specimen:  Blood from Arm, Left Updated:  12/24/19 0128     Sodium 134 mmol/L      Potassium 4 2 mmol/L      Chloride 98 mmol/L      CO2 30 mmol/L      ANION GAP 6 mmol/L      BUN 42 mg/dL      Creatinine 1 32 mg/dL      Glucose 226 mg/dL      Calcium 8 8 mg/dL      AST 19 U/L      ALT 17 U/L      Alkaline Phosphatase 135 U/L      Total Protein 8 5 g/dL      Albumin 3 7 g/dL      Total Bilirubin 0 90 mg/dL      eGFR 41 ml/min/1 73sq m     Narrative:       [facility] guidelines for Chronic Kidney Disease (CKD):     Stage 1 with normal or high GFR (GFR > 90 mL/min/1 73 square meters)    Stage 2 Mild CKD (GFR = 60-89 mL/min/1 73 square meters)    Stage 3A Moderate CKD (GFR = 45-59 mL/min/1 73 square meters)    Stage 3B Moderate CKD (GFR = 30-44 mL/min/1 73 square meters)    Stage 4 Severe CKD (GFR = 15-29 mL/min/1 73 square meters)    Stage 5 End Stage CKD (GFR <15 mL/min/1 73 square meters)  Note: GFR calculation is accurate only with a steady state creatinine    Protime-INR [[id number]]  (Abnormal) Collected:  12/24/19 0107    Lab Status:  Final result Specimen:  Blood from Arm, Left Updated:  12/24/19 0128     Protime 17 8 seconds      INR 1 68    APTT [[id number]]  (Abnormal) Collected:  12/24/19 0107    Lab Status:  Final result Specimen:  Blood from Arm, Left Updated:  12/24/19 0128     PTT 38 seconds     CBC and differential [[id number]]  (Abnormal) Collected:  12/24/19 0107    Lab Status:  Final result Specimen:  Blood from Arm, Left Updated:  12/24/19 0111 WBC 5 95 Thousand/uL      RBC 4 75 Million/uL      Hemoglobin 12 1 g/dL      Hematocrit 41 3 %      MCV 87 fL      MCH 25 5 pg      MCHC 29 3 g/dL      RDW 19 6 %      MPV 10 3 fL      Platelets 130 Thousands/uL      nRBC 0 /100 WBCs      Neutrophils Relative 66 %      Immat GRANS % 0 %      Lymphocytes Relative 15 %      Monocytes Relative 16 %      Eosinophils Relative 2 %      Basophils Relative 1 %      Neutrophils Absolute 3 91 Thousands/µL      Immature Grans Absolute 0 01 Thousand/uL      Lymphocytes Absolute 0 89 Thousands/µL      Monocytes Absolute 0 97 Thousand/µL      Eosinophils Absolute 0 13 Thousand/µL      Basophils Absolute 0 04 Thousands/µL                  XR chest 1 view portable    (Results Pending)              Procedures  Procedures         ED Course         HEART Risk Score      Most Recent Value   History  1 Filed at: 12/24/2019 0147   ECG  1 Filed at: 12/24/2019 0147   Age  2 Filed at: 12/24/2019 0147   Risk Factors  2 Filed at: 12/24/2019 0147   Troponin  0 Filed at: 12/24/2019 0147   Heart Score Risk Calculator   History  1 Filed at: 12/24/2019 0147   ECG  1 Filed at: 12/24/2019 0147   Age  2 Filed at: 12/24/2019 0147   Risk Factors  2 Filed at: 12/24/2019 0147   Troponin  0 Filed at: 12/24/2019 0147   HEART Score  6 Filed at: 12/24/2019 0147   HEART Score  6 Filed at: 12/24/2019 0147                    DONALD Risk Score      Most Recent Value   Age >= 72  1 Filed at: 12/24/2019 0201   Known CAD (stenosis >= 50%)  1 Filed at: 12/24/2019 0201   Recent (<=24 hrs) Service Angina  0 Filed at: 12/24/2019 0201   ST Deviation >= 0 5 mm  0 Filed at: 12/24/2019 0201   3+ CAD Risk Factors (FHx, HTN, HLP, DM, Smoker)  1 Filed at: 12/24/2019 0201   Aspirin Use Past 7 Days  1 Filed at: 12/24/2019 0201   Elevated Cardiac Markers  0 Filed at: 12/24/2019 0201   DONALD Risk Score (Calculated)  4 Filed at: 12/24/2019 0201              MDM  Number of Diagnoses or Management Options  Chest pain:   Diagnosis management comments: Pulse ox 95% on RA indicating adequate oxygenation   CXR: PVC, cardiomegaly, similar to prior as read by me         Amount and/or Complexity of Data Reviewed  Clinical lab tests: ordered and reviewed  Tests in the radiology section of CPT®: ordered and reviewed  Decide to obtain previous medical records or to obtain history from someone other than the patient: yes  Review and summarize past medical records: yes  Discuss the patient with other providers: yes  Independent visualization of images, tracings, or specimens: yes    Patient Progress  Patient progress: stable        Disposition  Final diagnoses:   Chest pain     Time reflects when diagnosis was documented in both MDM as applicable and the Disposition within this note     Time User Action Codes Description Comment    12/24/2019  1:56 AM Nedra [place] Add [R07 9] Chest pain       ED Disposition     ED Disposition Condition Date/Time Comment    Admit Stable Tue Dec 24, 2019 0156 Case was discussed with Lauro Nagel and the patient's admission status was agreed to be Admission Status: observation status to the service of Dr Camilo Found  Follow-up Information    None         Current Discharge Medication List      CONTINUE these medications which have NOT CHANGED    Details   COMBIGAN 0 2-0 5 % Administer 1 drop to both eyes 2 (two) times a day      famotidine (PEPCID) 20 mg tablet Take 1 tablet (20 mg total) by mouth 2 (two) times a day  Qty: 60 tablet, Refills: 11    Associated Diagnoses: Gastroesophageal reflux disease without esophagitis      furosemide (LASIX) 80 mg tablet TAKE 1 TABLET DAILY AS DIRECTED    Qty: 30 tablet, Refills: 5    Associated Diagnoses: Chronic diastolic heart failure (HCC)      insulin aspart (NOVOLOG FLEXPEN) 100 Units/mL injection pen Inject under the [id number] units 3 times a day with meals  Use carbohydrate to insulin ratio 1:3 which means 1 unit of NovoLog for every 3 gm of carbohydrates  Qty: 15 pen, Refills: 11    Associated Diagnoses: Type 2 diabetes mellitus with diabetic neuropathy, with long-term current use of insulin ([facility])      insulin glargine (BASAGLAR KWIKPEN) 100 units/mL injection pen Inject 16 Units under the skin daily  Qty: 5 pen, Refills: 3    Associated Diagnoses: Type 2 diabetes mellitus with diabetic neuropathy, with long-term current use of insulin ([facility])      levothyroxine (SYNTHROID) 137 mcg tablet Take 137 mcg by mouth daily  linaCLOtide (LINZESS) 145 MCG CAPS Take 1 capsule (145 mcg total) by mouth daily  Qty: 30 capsule, Refills: 4    Associated Diagnoses: Other constipation      simvastatin (ZOCOR) 20 mg tablet TAKE 1 TABLET (20MG) BY ORAL ROUTE EVERY DAY IN THE EVENING  Qty: 90 tablet, Refills: 2    Associated Diagnoses: Dyslipidemia      traMADol (ULTRAM) 50 mg tablet Take 1 tablet (50 mg total) by mouth every 6 (six) hours as needed for moderate pain  Qty: 30 tablet, Refills: 0    Associated Diagnoses: Arthritis      valsartan (DIOVAN) 80 mg tablet TAKE 1 TABLET ORALLY EVERY DAY  Qty: 90 tablet, Refills: 3    Associated Diagnoses: Essential hypertension      XARELTO 15 MG tablet TAKE 1 TAB BY MOUTH DAILY WITH EVENEING MEAL    Qty: 30 tablet, Refills: 2    Associated Diagnoses: Chronic atrial fibrillation      bisacodyl (DULCOLAX) 5 mg EC tablet Take 2 tablets (10 mg total) by mouth once for 1 dose  Qty: 2 tablet, Refills: 0    Associated Diagnoses: History of colon polyps      metolazone (ZAROXOLYN) 2 5 mg tablet TAKE 1 TABLET BY MOUTH TWICE WEEKLY  Qty: 10 tablet, Refills: 0    Associated Diagnoses: Combined systolic and diastolic congestive heart failure (HCC)      polyethylene glycol (GOLYTELY) 4000 mL solution Take 4,000 mL by mouth once for 1 dose  Qty: 4000 mL, Refills: 0    Associated Diagnoses: History of colon polyps      potassium chloride (K-DUR,KLOR-CON) 10 mEq tablet Take 1 tablet (10 mEq total) by mouth 2 (two) times a week  Qty: 15 tablet, Refills: 3    Associated Diagnoses: Combined systolic and diastolic congestive heart failure (HCC); CKD (chronic kidney disease) stage 3, GFR 30-59 ml/min (HCC)           No discharge procedures on file      ED Provider  Electronically Signed by           Sabrina Balbuena DO  12/24/19 5154

## 2019-12-26 ENCOUNTER — TRANSITIONAL CARE MANAGEMENT (OUTPATIENT)
Dept: FAMILY MEDICINE CLINIC | Facility: CLINIC | Age: 69
End: 2019-12-26

## 2019-12-27 PROBLEM — E66.09 CLASS 1 OBESITY DUE TO EXCESS CALORIES WITH SERIOUS COMORBIDITY IN ADULT: Status: ACTIVE | Noted: 2019-12-27

## 2019-12-27 PROBLEM — R09.02 HYPOXEMIA: Status: ACTIVE | Noted: 2019-12-27

## 2019-12-28 NOTE — ASSESSMENT & PLAN NOTE
She is obese in this is contributing to her shortness of breath  I did encourage her to lose weight

## 2019-12-28 NOTE — ASSESSMENT & PLAN NOTE
Anny Zuleta does have history of obstructive sleep apnea that was diagnosed several years ago at [facility]  She did have a CPAP machine before but had difficulty tolerating it  She presently is using oxygen 4 liters/minute at bedtime  She has no nocturnal dyspnea  I did discuss weight loss with her that this would help with her ADRIAN  I did order pulse oximetry recording to be done with her using 4 L of oxygen at night to see if she has any significant oxygen desaturation with this    At the present time she does not want pursue CPAP again

## 2019-12-28 NOTE — ASSESSMENT & PLAN NOTE
She does have history of left vocal cord paralysis  She states this initially started after she had laparoscopic cholecystectomy in 2016 states was related to her intubation for general anesthesia    She did have a left vocal cord fold injection done November 4, 2016 for this

## 2019-12-28 NOTE — ASSESSMENT & PLAN NOTE
She does have oxygen desaturation with ambulation  Pulse oximetry testing done today on room air at rest showed O2 saturation be 95% and after ambulating 100 ft this decreased 86% on room air  With 2 L of oxygen O2 saturation was 97% at rest and 94% on 2 L of nasal cannula oxygen after ambulating  100 ft    I recommend she use 2 L of oxygen with activity and will order assessment for home portable battery operated concentrator  Her medical supply company is [facility]    She will continue oxygen 4 liters/minute at bedtime and will order nocturnal pulse oximetry at night to see if this is providing adequate oxygenation for her at bedtime  She likely does have alveolar hypoventilation from obesity causing nocturnal sleep-related hypoxemia

## 2019-12-28 NOTE — ASSESSMENT & PLAN NOTE
She does have chronic shortness of breath with SIRS likely due to her obesity, chronic diastolic and systolic cardiac dysfunction, and pulmonary artery hypertension  Last echocardiogram done May 2018 showed left ventricular ejection fraction mildly decreased to 45% evidence of diastolic dysfunction  There was some mild dilatation mild decrease in right ventricle function  She also had mild to moderate aortic stenosis and evidence of pulmonary hypertension with estimated PA pressure of 65 mm Hg  She did have moderate tricuspid regurgitation  She did have prior tricuspid valve angioplasty done at [facility] in January 2012    Spirometry today shows severe restrictive impairment likely due to her being overweight  Forced vital capacity of 1 36 L or 44% of predicted    I personally reviewed chest x-ray from May 23, 2018  This shows moderate cardiomegaly and prominent pulmonary vasculature  No pleural effusions or evidence of interstitial lung disease

## 2019-12-29 NOTE — PROGRESS NOTES
Assessment/Plan:    1  Essential hypertension  Assessment & Plan:  Diovan was increased in the hospital and she will continue higher dose; hypertension is well controlled  2  Coronary artery disease involving coronary bypass graft of native heart without angina pectoris  Assessment & Plan:  She is currently without symptoms and stress test in the hospital was negative  She will continue cardiac medications and follow up  3  Chronic combined systolic and diastolic congestive heart failure (HCC)  Assessment & Plan:  Wt Readings from Last 3 Encounters:   12/30/19 91 6 kg (202 lb)   12/24/19 90 4 kg (199 lb 4 7 oz)   12/02/19 89 8 kg (198 lb)         Cardiology adjusted diuretics while in the hospital and she states she is compliant  Advised to continue daily weights and to call with continued increase, or if any symptoms of increasing edema or dyspnea develop  She continues to see cardiology regularly  4  Mixed hyperlipidemia  Assessment & Plan:  Well controlled on simvastatin  5  Type 2 diabetes mellitus with diabetic neuropathy, with long-term current use of insulin ([facility] [address] )  Assessment & Plan:    Lab Results   Component Value Date    HGBA1C 7 9 (H) 10/01/2019     She is not yet due for A1C  She continues to see endocrinology for management and will continue insulin as ordered  Discussed need for good foot and eye care  6  CKD (chronic kidney disease) stage 3, GFR 30-59 ml/min ([facility])  Assessment & Plan:  Reviewed labs with patient and this is stable  Orders:  -     potassium chloride (K-DUR,KLOR-CON) 10 mEq tablet; Take 1 tablet (10 mEq total) by mouth 2 (two) times a week With the extra furosemide    7  Frequent falls  -     Ambulatory referral to Physical Therapy; Future    8   Combined systolic and diastolic congestive heart failure [facility])  Assessment & Plan:  Wt Readings from Last 3 Encounters:   12/30/19 91 6 kg (202 lb)   12/24/19 90 4 kg (199 lb 4 7 oz)   12/02/19 89 8 kg (198 lb)         Cardiology adjusted diuretics while in the hospital and she states she is compliant  Advised to continue daily weights and to call with continued increase, or if any symptoms of increasing edema or dyspnea develop  She continues to see cardiology regularly  Orders:  -     potassium chloride (K-DUR,KLOR-CON) 10 mEq tablet; Take 1 tablet (10 mEq total) by mouth 2 (two) times a week With the extra furosemide    9  Obstructive sleep apnea  Assessment & Plan:  Continue nighttime oxygen and pulmonology follow up  10  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, with long-term current use of insulin, unspecified laterality, unspecified retinopathy severity ([facility] [address] )  Assessment & Plan:    Lab Results   Component Value Date    HGBA1C 7 9 (H) 10/01/2019   Care per endocrinology and ophthalmology  There are no Patient Instructions on file for this visit  Return for has appointment in February  Subjective:      Patient ID: Jackie Louis is a 78 y o  female  Chief Complaint   Patient presents with    Transition of Care Management     ac/cma      TCM Call (since 11/29/2019)     Date and time call was made  12/26/2019  9:17 AM    Hospital care reviewed  Records reviewed    Patient was hospitialized at  [address] [place] [address]    Date of Admission  12/24/19    Date of discharge  12/24/19    Diagnosis  chest pain prcma     Disposition  Home    Were the patients medications reviewed and updated  Yes    Current Symptoms  None      TCM Call (since 11/29/2019)     Post hospital issues  None    Should patient be enrolled in anticoag monitoring? No    Scheduled for follow up?   Yes    Patients specialists  Cardiologist; Endocrinologist    Cardiologist name   Dr Tunde Mackenzie     Cardiologist contact #  [phone]    Endocrinologist contact #  [phone]    Referrals needed  [address] [place] [address]     Did you obtain your prescribed medications  Yes    Do you need help managing your prescriptions or medications  No    Is transportation to your appointment needed  No    I have advised the patient to call PCP with any new or worsening symptoms  prcma     Living Arrangements  Alone    Support System  Family    The type of support provided  Physical; Emotional    Do you have social support  Yes, as much as I need    Are you recieving any outpatient services  No    Are you recieving home care services  No    Are you using any community resources  No    Current waiver services  No    Have you fallen in the last 12 months  Yes <img src='C:FILES (X86)    How many times  twice     Interperter language line needed  No    Counseling  Patient    Counseling topics  Diagnostic results; Prognosis; Activities of daily living; Importance of RX compliance; Home health agency benefits; Risk factor reduction            Here for TCM  As per TCM note  Was recently in the hospital for chest pain  Stress test was negative, has had no symptoms since  Does follow with cardiology  Continues to weigh herself daily and is up 2 pounds at home, but denies dyspnea or edema  Is compliant with her medications  Has had several falls and feels her legs are weak  She feels unsteady and is afraid of falling  Using her cane  No hypoglycemic episodes  Has checked glucose during periods of weakness and this is normal     Continues to follow with pulmonology for ADRIAN and is on nighttime oxygen  Denies dyspnea or wheezing  The following portions of the patient's history were reviewed and updated as appropriate: allergies, current medications, past family history, past medical history, past social history, past surgical history and problem list     Review of Systems   Constitutional: Negative  Respiratory: Negative  Cardiovascular: Negative  Gastrointestinal: Negative  Musculoskeletal:        Difficulty with balance and has frequent falls  Neurological: Positive for weakness           Current Outpatient Medications   Medication Sig Dispense Refill    COMBIGAN 0 2-0 5 % Administer 1 drop to both eyes 2 (two) times a day      famotidine (PEPCID) 20 mg tablet Take 1 tablet (20 mg total) by mouth 2 (two) times a day 60 tablet 11    furosemide (LASIX) 80 mg tablet TAKE 1 TABLET DAILY AS DIRECTED  (Patient taking differently: Pt taking 120mg daily per Dr Annie Morrow) 30 tablet 5    insulin aspart (NOVOLOG FLEXPEN) 100 Units/mL injection pen Inject under the adly81-43 units 3 times a day with meals  Use carbohydrate to insulin ratio 1:3 which means 1 unit of NovoLog for every 3 gm of carbohydrates 15 pen 11    insulin glargine (BASAGLAR KWIKPEN) 100 units/mL injection pen Inject 16 Units under the skin daily 5 pen 3    levothyroxine (SYNTHROID) 137 mcg tablet Take 137 mcg by mouth daily   metolazone (ZAROXOLYN) 2 5 mg tablet TAKE 1 TABLET BY MOUTH TWICE WEEKLY (Patient taking differently: 2 (two) times a day as needed Pt taking twice weekly per Dr Annie Morrow  12/24/2019) 10 tablet 0    potassium chloride (K-DUR,KLOR-CON) 10 mEq tablet Take 1 tablet (10 mEq total) by mouth 2 (two) times a week With the extra furosemide 15 tablet 3    simvastatin (ZOCOR) 20 mg tablet TAKE 1 TABLET (20MG) BY ORAL ROUTE EVERY DAY IN THE EVENING 90 tablet 2    traMADol (ULTRAM) 50 mg tablet Take 1 tablet (50 mg total) by mouth every 6 (six) hours as needed for moderate pain 30 tablet 0    valsartan (DIOVAN) 80 mg tablet Take 2 tablets (160 mg total) by mouth daily  0    XARELTO 15 MG tablet TAKE 1 TAB BY MOUTH DAILY WITH EVENEING MEAL  30 tablet 2    linaCLOtide (LINZESS) 145 MCG CAPS Take 1 capsule (145 mcg total) by mouth daily 30 capsule 4     No current facility-administered medications for this visit          Objective:    /76   Pulse 76   Temp (!) 95 6 °F (35 3 °C)   Resp 18   Ht 5' 5" (1 651 m)   Wt 91 6 kg (202 lb)   LMP  (LMP Unknown)   BMI 33 61 kg/m²        Physical Exam   Constitutional: She appears well-developed and well-nourished  Eyes: Conjunctivae are normal    Neck: Neck supple  No JVD present  No thyromegaly present  Cardiovascular: Normal rate, regular rhythm, normal heart sounds and intact distal pulses  Exam reveals no gallop and no friction rub  No murmur heard  Pulmonary/Chest: Effort normal and breath sounds normal  She has no wheezes  She has no rales  Abdominal: Soft  Bowel sounds are normal  She exhibits no distension  There is no tenderness  Musculoskeletal: She exhibits no edema     Gait unsteady, using the cane              Severa Comes, MD

## 2019-12-30 ENCOUNTER — OFFICE VISIT (OUTPATIENT)
Dept: FAMILY MEDICINE CLINIC | Facility: CLINIC | Age: 69
End: 2019-12-30
Payer: MEDICARE

## 2019-12-30 VITALS
DIASTOLIC BLOOD PRESSURE: 76 MMHG | SYSTOLIC BLOOD PRESSURE: 120 MMHG | BODY MASS INDEX: 33.66 KG/M2 | HEIGHT: 65 IN | TEMPERATURE: 95.6 F | RESPIRATION RATE: 18 BRPM | HEART RATE: 76 BPM | WEIGHT: 202 LBS

## 2019-12-30 DIAGNOSIS — Z79.4 TYPE 2 DIABETES MELLITUS WITH RETINOPATHY AND MACULAR EDEMA, WITH LONG-TERM CURRENT USE OF INSULIN, UNSPECIFIED LATERALITY, UNSPECIFIED RETINOPATHY SEVERITY (HCC): ICD-10-CM

## 2019-12-30 DIAGNOSIS — N18.30 CKD (CHRONIC KIDNEY DISEASE) STAGE 3, GFR 30-59 ML/MIN (HCC): ICD-10-CM

## 2019-12-30 DIAGNOSIS — E78.2 MIXED HYPERLIPIDEMIA: ICD-10-CM

## 2019-12-30 DIAGNOSIS — E11.40 TYPE 2 DIABETES MELLITUS WITH DIABETIC NEUROPATHY, WITH LONG-TERM CURRENT USE OF INSULIN (HCC): ICD-10-CM

## 2019-12-30 DIAGNOSIS — I25.810 CORONARY ARTERY DISEASE INVOLVING CORONARY BYPASS GRAFT OF NATIVE HEART WITHOUT ANGINA PECTORIS: ICD-10-CM

## 2019-12-30 DIAGNOSIS — I50.40 COMBINED SYSTOLIC AND DIASTOLIC CONGESTIVE HEART FAILURE (HCC): ICD-10-CM

## 2019-12-30 DIAGNOSIS — E11.311 TYPE 2 DIABETES MELLITUS WITH RETINOPATHY AND MACULAR EDEMA, WITH LONG-TERM CURRENT USE OF INSULIN, UNSPECIFIED LATERALITY, UNSPECIFIED RETINOPATHY SEVERITY (HCC): ICD-10-CM

## 2019-12-30 DIAGNOSIS — Z79.4 TYPE 2 DIABETES MELLITUS WITH DIABETIC NEUROPATHY, WITH LONG-TERM CURRENT USE OF INSULIN (HCC): ICD-10-CM

## 2019-12-30 DIAGNOSIS — I10 ESSENTIAL HYPERTENSION: Primary | ICD-10-CM

## 2019-12-30 DIAGNOSIS — R29.6 FREQUENT FALLS: ICD-10-CM

## 2019-12-30 DIAGNOSIS — G47.33 OBSTRUCTIVE SLEEP APNEA: ICD-10-CM

## 2019-12-30 DIAGNOSIS — I50.42 CHRONIC COMBINED SYSTOLIC AND DIASTOLIC CONGESTIVE HEART FAILURE (HCC): ICD-10-CM

## 2019-12-30 PROCEDURE — 99496 TRANSJ CARE MGMT HIGH F2F 7D: CPT | Performed by: INTERNAL MEDICINE

## 2019-12-30 RX ORDER — POTASSIUM CHLORIDE 750 MG/1
10 TABLET, EXTENDED RELEASE ORAL 2 TIMES WEEKLY
Qty: 15 TABLET | Refills: 3
Start: 2019-12-30 | End: 2020-11-02

## 2019-12-30 NOTE — ASSESSMENT & PLAN NOTE
She is currently without symptoms and stress test in the hospital was negative  She will continue cardiac medications and follow up

## 2019-12-30 NOTE — ASSESSMENT & PLAN NOTE
Lab Results   Component Value Date    HGBA1C 7 9 (H) 10/01/2019   Care per endocrinology and ophthalmology

## 2019-12-30 NOTE — ASSESSMENT & PLAN NOTE
Wt Readings from Last 3 Encounters:   12/30/19 91 6 kg (202 lb)   12/24/19 90 4 kg (199 lb 4 7 oz)   12/02/19 89 8 kg (198 lb)         Cardiology adjusted diuretics while in the hospital and she states she is compliant  Advised to continue daily weights and to call with continued increase, or if any symptoms of increasing edema or dyspnea develop  She continues to see cardiology regularly

## 2019-12-30 NOTE — ASSESSMENT & PLAN NOTE
Diovan was increased in the hospital and she will continue higher dose; hypertension is well controlled

## 2019-12-30 NOTE — ASSESSMENT & PLAN NOTE
Lab Results   Component Value Date    HGBA1C 7 9 (H) 10/01/2019     She is not yet due for A1C  She continues to see endocrinology for management and will continue insulin as ordered  Discussed need for good foot and eye care

## 2020-01-07 ENCOUNTER — EVALUATION (OUTPATIENT)
Dept: PHYSICAL THERAPY | Facility: CLINIC | Age: 70
End: 2020-01-07
Payer: MEDICARE

## 2020-01-07 DIAGNOSIS — R29.6 FREQUENT FALLS: ICD-10-CM

## 2020-01-07 PROCEDURE — 97163 PT EVAL HIGH COMPLEX 45 MIN: CPT | Performed by: PHYSICAL THERAPIST

## 2020-01-07 NOTE — PROGRESS NOTES
PT Evaluation     Today's date: 2020  Patient name: Sara Stewart  : 1950  MRN: [id number]  Referring provider: Jovita Solano MD  Dx:   Encounter Diagnosis     ICD-10-CM    1  Frequent falls R29 6 Ambulatory referral to Physical Therapy                  Assessment  Assessment details: Patient is 77 y/o female presenting to skilled PT for frequent falls  She was discharged from balance center in 2018 and since then has had 2 falls, along with other near falls  Patient displays Abnormal muscle strength with overall grade of 3+/5 LLE and 4-/5 RLE  Patient sensation is Abnormal throughout lower extremities due to diabetic neuropathy  Patient balance scores are as follows: 30/56 PRATT, 36 3 seconds TUG with Assistive Device, 10 Meter walk test 0 44 m/sec with Assistive Device with overall results noting high risk for falls  Patient endurance scores are as follows; 275 feet with  6 minute walk test with SPC, 26 3 seconds with 5 x sit to stand test with overall results noting decrease functional endurance and cardio capacity  Patient subjective report notes the following functional limitation with walking, stairs, bending down to pick objects off the floor, household chores, walking outdoors  Patient will benefit from skilled PT to address noted impairments and functional limitations they are causing with overall goal to return patient to highest level possible with reduced risk for falls  Please contact me if you have any questions or recommendations  Thank you for the referral and the opportunity to share in [facility]  Patient verbalized understanding of POC              Impairments: abnormal gait, activity intolerance, impaired balance, impaired physical strength, lacks appropriate home exercise program and safety issue  Understanding of Dx/Px/POC: good   Prognosis: good    Goals  Goals  STG 30 days    1   Patient will improve static balance with feet together Eyes Open Firm surface to 30 seconds indicating reduction in fall risk  2  Patient will achieve 190 feet improvement with overall distance achieved of 465 feet with 6 minute walk test which is Minimal Detectable Change pre current research standards with endurance to demonstrate enhance functional capacity   3  Patient will display 2 3  second improvement with overall score of < 30 seconds  with TUG test or lower with noted improvement being Minimal Detectable Change pre current research standards with fall risk     4  Patient will achieve 36/56 PRATT score with minimal improve by 6 points or more demonstrating Minimal Detectable change per current research standards for this objective test which assess fall risk  5  Patient will achieve > 0 55 m/s with 10 Meter Walk test, demonstrating Minimal Detectable change per current research standards for this objective test which assess fall risk  6  Patient will perform  5 x sit to stand test with overall reduction by seconds to < 23 seconds score indicating improvement with functional endurance    LT days   1  Patient will score low risk for falls with 3/4 fall risk measures   2  Patient will be able to ambulate 1,000 feet with AD during 6 minute walk test   3  Patient will be able to perform floor transfer without physical assistance   4  Patient will be able to carry objects without loss of balance  5  Patient will be able to ambulate outdoors without any loss of balance  6  Patient will negotiate 6 steps w/ 1 HR + SPC mod I without loss of balance       Cut off score   All date taken from [facility] Neuro Section or Rehab Measures    PRATT test: 46/56                                              5 x STS Test:  MDC: 6 points                                                  MDC: 2 3 seconds   age norms                                                                 Age Norms   61-76 year old = M: 54, F: 55                        62-78 year old: 11 4 seconds   66-77 year old = M 47,  F: 48 66-77 year old: 12 6 seconds    80-80 year old = M46,   F: 53                       80-80 year old: 14 8 seconds     TUG test:                                                                     10 Meter Walk Test:  MDC: 4 14 seconds       MDC:  59 ft/sec  Cut off score for Falls                                                  Age Norms  > 13 5 seconds community dwelling adults                20-29; M: 4 56 ft/sec F: 4 62 ft/sec  > 32 2 Frail Elderly                                                     30-39: M 4 76 ft/sec  F: 4 68 ft/sec          40-49: M: 4 79 ft/sec  F: 4 62 ft/sec  6 Minute Walk Test      50-59: M: 4 76 ft/sec  F: 4 56 ft/sec  MDC: 190 feet       60-69: M: 4 56 ft/sec  F: 4 26 ft/sec  Age Norms       70-+    M: 4 36 ft/sec  F: 4 16 ft/sec  60-69:    M: 1876 F: 0413  18-47:    M: 1729 F: 0023  54-79 +: M: 80 F; 1286     Plan  Patient would benefit from: skilled physical therapy  Planned modality interventions: biofeedback  Planned therapy interventions: manual therapy, motor coordination training, neuromuscular re-education, patient education, postural training, sensory integrative techniques, strengthening, stretching, therapeutic activities, therapeutic exercise, gait training, home exercise program, coordination, balance, therapeutic training, transfer training, community reintegration and flexibility  Frequency: 2x week  Duration in weeks: 12  Plan of Care beginning date: 1/7/2020  Plan of Care expiration date: 3/31/2020  Treatment plan discussed with: patient        Subjective Evaluation    History of Present Illness  Mechanism of injury: Pt reports 2 falls over the past year (May 2019 and Dec 2019)  She uses SPC primarily, sometimes furniture walks in her house and at night uses RW because she feels weaker  Both times she fell she was using RW and she fell straight backwards   She is currently wearing R wrist brace for the past day due to her wrist starting to hurt, unrelated to her fall     Pain  No pain reported  Current pain ratin  At best pain ratin  At worst pain ratin    Social Support  Steps to enter house: yes (6 steps to enter)  Stairs in house: no   Lives in: multiple-level home  Lives with: adult children (son Arely Chaparro )    Patient Goals  Patient goals for therapy: improved balance, independence with ADLs/IADLs, return to sport/leisure activities and increased strength          Objective     Strength/Myotome Testing     Left Hip   Planes of Motion   Flexion: 3+  Extension: 3+  Abduction: 3+  Adduction: 3+    Right Hip   Planes of Motion   Flexion: 4-  Extension: 4-  Abduction: 4-  Adduction: 4-    Left Knee   Flexion: 4-  Extension: 4-    Right Knee   Flexion: 4-  Extension: 4-    Left Ankle/Foot   Dorsiflexion: 4  Plantar flexion: 4-    Right Ankle/Foot   Dorsiflexion: 4  Plantar flexion: 4-    Functional Assessment        Comments  Vision: retinopathy 2* diabetes, reports blurry vision @ times  Sensation: neuropathy 2* diabetes  Strength: see MMT     5x STS= 23 76 sec, with UE use  Day= 30/56  TUG= 36 3 sec w/ SPC  Gait speed= 0 44 m/s w/ SPC  6MWT=  275 ft w/ SPC               Precautions:   Past Medical History:   Diagnosis Date    Arthritis     Atrial flutter ([facility] [address] )     Cardiac disease     Carotid artery occlusion     CHF (congestive heart failure) ([facility] [address] )     Cholelithiasis     last assessed 2016    Coronary artery disease     Diabetes mellitus ([facility] [address] [facility]     Disease of thyroid gland     GERD (gastroesophageal reflux disease)     History of transfusion     Hyperlipidemia     Hypertension     Long-term insulin use in type 2 diabetes ([facility] [address] ) 2016    Other specified diabetes mellitus with diabetic autonomic (poly)neuropathy ([facility] [address] )     Pleural effusion     Pulmonary embolism ([facility] [address] )     2008    Renal disorder     Retinopathy     bilat    Sleep apnea     Subclavian artery stenosis ([facility] [address] [facility]     Umbilical hernia with obstruction, without gangrene     last assessed 7/8/2016

## 2020-01-09 ENCOUNTER — OFFICE VISIT (OUTPATIENT)
Dept: PHYSICAL THERAPY | Facility: CLINIC | Age: 70
End: 2020-01-09
Payer: MEDICARE

## 2020-01-09 DIAGNOSIS — R29.6 FREQUENT FALLS: Primary | ICD-10-CM

## 2020-01-09 PROCEDURE — 97110 THERAPEUTIC EXERCISES: CPT | Performed by: PHYSICAL THERAPIST

## 2020-01-09 PROCEDURE — 97112 NEUROMUSCULAR REEDUCATION: CPT | Performed by: PHYSICAL THERAPIST

## 2020-01-09 NOTE — PROGRESS NOTES
Daily Note     Today's date: 2020  Patient name: Noemi Washington  : 1950  MRN: [id number]  Referring provider: Corbin Sharma MD  Dx:   Encounter Diagnosis     ICD-10-CM    1  Frequent falls R29 6                   Subjective: Patient reports that she needed assistance when walking to the clinic from her car  feels very weak  Objective: See treatment diary below      Precautions: fall    Daily Treatment Diary     STS: 15 reps 2 UE   Standing 3 way 2 sets 10 reps   Flexion  Abd  Ext   Step taps: 15 reps FWD  Side Step hurdles: 3 times down and back   FTEC Firm or foam   30   Minisquats: 15 reps     Assessment: Patient tolerated session with focus on maintain stability  Decreased graded control noted due to LE weakness  Decreased endurance noted as per frequent rests  Patient presents with fear of falling and requires assistance when walking out to her car  Patient will continue to benefit from skilled PT to decrease fear of falling, increase LE strength and decrease risk for falls

## 2020-01-10 ENCOUNTER — TELEPHONE (OUTPATIENT)
Dept: GASTROENTEROLOGY | Facility: CLINIC | Age: 70
End: 2020-01-10

## 2020-01-10 NOTE — TELEPHONE ENCOUNTER
Patients GI provider:  Dr Sergio Craft    Number to return call: [phone]    Reason for call: Pt calling stated she can not afford linzess   Patient would like to know if she can have another sample of linzess or prescribe altenative prescription    Scheduled procedure/appointment date if applicable: Apt/procedure n/a

## 2020-01-13 DIAGNOSIS — K59.04 CHRONIC IDIOPATHIC CONSTIPATION: Primary | ICD-10-CM

## 2020-01-13 RX ORDER — LACTULOSE 20 G/30ML
10 SOLUTION ORAL 2 TIMES DAILY
Qty: 236 ML | Refills: 6 | Status: SHIPPED | OUTPATIENT
Start: 2020-01-13 | End: 2020-10-02 | Stop reason: HOSPADM

## 2020-01-13 NOTE — TELEPHONE ENCOUNTER
Hi please advise, I called insurance to see what alternatives may be covered  Linzess is tier 4 costing 363$  Amitiza is teir 3 costing 352$  Emulose - lactulose, 10mg/15mL is teir 2 costing 1$    Please advise and sent new script if possible, let me know and I will call patient    No PA or teir exception can be done  Pt would have to meet 305$ deductible prior to submitting tier exception  Spoke with Nahid Eden at [employer]  and Tito at [facility]    Thank you!

## 2020-01-13 NOTE — TELEPHONE ENCOUNTER
Given linzess and amitiza are not options I would prefer miralax (though unsure if she has trialed BID miralax yet)  Please check, if she has not I will try sending it to her pharmacy if not covered she can get it OTC   If she has trialed BID miralax I can send lactulose

## 2020-01-13 NOTE — TELEPHONE ENCOUNTER
Pt has tried miralax bid no relief- she would like to try lactulose  Also, pt states that since her colonoscopy on 12-2-19 she her bms have changed   she has a bm every 3 days and its very hard stool  being that its over 1 month since procedure - Just wanted to advise you      Thank you

## 2020-01-14 ENCOUNTER — OFFICE VISIT (OUTPATIENT)
Dept: PHYSICAL THERAPY | Facility: CLINIC | Age: 70
End: 2020-01-14
Payer: MEDICARE

## 2020-01-14 DIAGNOSIS — R29.6 FREQUENT FALLS: Primary | ICD-10-CM

## 2020-01-14 PROCEDURE — 97112 NEUROMUSCULAR REEDUCATION: CPT

## 2020-01-14 PROCEDURE — 97110 THERAPEUTIC EXERCISES: CPT

## 2020-01-14 NOTE — PROGRESS NOTES
Daily Note     Today's date: 2020  Patient name: Artjosefa Thomas  : 1950  MRN: [id number]  Referring provider: Javier Regalado MD  Dx:   Encounter Diagnosis     ICD-10-CM    1  Frequent falls R29 6        Start Time:   Stop Time:   Total time in clinic (min): 45 minutes    Subjective: Patient reports "just ok"  Objective: See treatment diary below      Precautions: fall    Daily Treatment Diary     STS: 15 reps 2 UE     Standing 3 way 2 sets 10 reps   Flexion  Abd  Ext     Step taps: 15 reps FWD    Side Step hurdles: 3 times down and back     FTEC Firm or foam   30     Minisquats: 15 reps     Assessment: Patient tolerated session with focus on maintain stability  Pt required use of UE with balance exercises, she also required CG with Sidestepping and Airex FTEC, post sway/L lat sway noted

## 2020-01-16 ENCOUNTER — OFFICE VISIT (OUTPATIENT)
Dept: PHYSICAL THERAPY | Facility: CLINIC | Age: 70
End: 2020-01-16
Payer: MEDICARE

## 2020-01-16 DIAGNOSIS — R29.6 FREQUENT FALLS: Primary | ICD-10-CM

## 2020-01-16 PROCEDURE — 97112 NEUROMUSCULAR REEDUCATION: CPT

## 2020-01-16 PROCEDURE — 97530 THERAPEUTIC ACTIVITIES: CPT

## 2020-01-16 PROCEDURE — 97110 THERAPEUTIC EXERCISES: CPT

## 2020-01-16 NOTE — PROGRESS NOTES
Daily Note     Today's date: 2020  Patient name: Bob August  : 1950  MRN: [id number]  Referring provider: Cristian Cabrera MD  Dx:   Encounter Diagnosis     ICD-10-CM    1  Frequent falls R29 6        Start Time:   Stop Time:   Total time in clinic (min): 45 minutes    Subjective: Patient reports that she is feeling weakness on the L side  Objective: See treatment diary below- 2 lb cuff weight on L LE      Precautions: fall    Daily Treatment Diary     STS: 15 reps 2 UE     Standing 3 way 2 sets 10 reps, 2 lb weight on L LE   Flexion  Abd  Ext     Step taps: 15 reps FWD and Laterally- 6" step each direction    Side Step hurdles: 3 times down and back, 6" hurdles    FTEC Firm and foam   30     Minisquats: 15 reps     Ambulation ascending and descending ramp- 10% grade, 10 minutes    Heel Raises- 20 reps, 3 second holds    Assessment: Patient tolerated session well progressing resistance today  Patient very hesitant to progress her ambulation ascending and descending her ramps today  Patient needs railing near by, ascending is improving but descending is challenging for patient  Patient requires contact guard at times  Patient requires skilled PT in order to improve her gait and balance as well as to decrease fall risk

## 2020-01-21 ENCOUNTER — OFFICE VISIT (OUTPATIENT)
Dept: PHYSICAL THERAPY | Facility: CLINIC | Age: 70
End: 2020-01-21
Payer: MEDICARE

## 2020-01-21 DIAGNOSIS — R29.6 FREQUENT FALLS: Primary | ICD-10-CM

## 2020-01-21 PROCEDURE — 97110 THERAPEUTIC EXERCISES: CPT | Performed by: PHYSICAL THERAPIST

## 2020-01-21 PROCEDURE — 97116 GAIT TRAINING THERAPY: CPT | Performed by: PHYSICAL THERAPIST

## 2020-01-21 PROCEDURE — 97112 NEUROMUSCULAR REEDUCATION: CPT | Performed by: PHYSICAL THERAPIST

## 2020-01-21 NOTE — PROGRESS NOTES
Daily Note     Today's date: 2020  Patient name: Lam Salinas  : 1950  MRN: [id number]  Referring provider: Supa Martin MD  Dx:   Encounter Diagnosis     ICD-10-CM    1  Frequent falls R29 6                   Subjective: Patient reports feeling fine, no changes  Objective: See treatment diary below- 2 lb cuff weight on L LE      Precautions: fall    Daily Treatment Diary     STS: 15 reps 2 UE     Standing 3 way 2 sets 10 reps, 2 lb weight on L LE , 1 UE support  Flexion  Abd  Ext     Step taps: 20 reps FWD and Laterally- 6" step each direction  1 UE support    Step ups: 20 reps FWD and Laterally- 6" step each direction  2 UE support    Heel Raises- 20 reps, 3 second holds    Fwd over 6" hurdles: 4 times down and back    Minisquats: 20 reps     Ambulation ascending and descending ramp- 10% grade, 10 minutes      Assessment: Patient tolerated session well today with decreased rest breaks  Tolerates 2# ankle weight well  She has most difficulty with descending ramp, step to pattern and decreased step length  She also notes difficulty with descending stairs at home- will incorporate into next session  Patient requires skilled PT in order to improve her gait and balance as well as to decrease fall risk  Plan: Continue per POC  Descending ramp and descending stairs

## 2020-01-23 ENCOUNTER — OFFICE VISIT (OUTPATIENT)
Dept: PHYSICAL THERAPY | Facility: CLINIC | Age: 70
End: 2020-01-23
Payer: MEDICARE

## 2020-01-23 DIAGNOSIS — R29.6 FREQUENT FALLS: Primary | ICD-10-CM

## 2020-01-23 PROCEDURE — 97112 NEUROMUSCULAR REEDUCATION: CPT | Performed by: PHYSICAL THERAPIST

## 2020-01-23 NOTE — PROGRESS NOTES
Daily Note     Today's date: 2020  Patient name: Iesha Dong  : 1950  MRN: [id number]  Referring provider: Tone Mendez MD  Dx:   Encounter Diagnosis     ICD-10-CM    1  Frequent falls R29 6                   Subjective: Patient reports feeling fine, no changes  Objective: See treatment diary below- 2 lb cuff weight on L LE      Precautions: fall    Daily Treatment Diary     STS: 15 reps 2 UE     Standing 3 way 2 sets 10 reps, 2 lb weight on L LE , 1 UE support  Flexion  Abd  Ext     Step taps: 20 reps FWD and Laterally- 6" step each direction  1 UE support    Step ups: 20 reps FWD and Laterally- 6" step each direction  2 UE support    Heel Raises- 20 reps, 3 second holds    Fwd over 6" hurdles: 4 times down and back    Minisquats: 20 reps     SP02 dropped to 78% due to patient holding breath with seated rest with recovery to 92+ within 30 seconds       Assessment: focus on general stregnth with continued limited UE support as able  Challenged with stair step ups with 1 to 2 hands at times  SP02 dropped with recovery to normal range within 30 sec with rest breaks  Pt notes unable to breath through nose    Patient requires skilled PT in order to improve her gait and balance as well as to decrease fall risk  Plan: Continue per POC  Descending ramp and descending stairs

## 2020-01-27 ENCOUNTER — TRANSCRIBE ORDERS (OUTPATIENT)
Dept: ADMINISTRATIVE | Facility: HOSPITAL | Age: 70
End: 2020-01-27

## 2020-01-27 ENCOUNTER — APPOINTMENT (OUTPATIENT)
Dept: LAB | Facility: HOSPITAL | Age: 70
End: 2020-01-27
Attending: INTERNAL MEDICINE
Payer: MEDICARE

## 2020-01-27 DIAGNOSIS — E03.9 ACQUIRED HYPOTHYROIDISM: ICD-10-CM

## 2020-01-27 DIAGNOSIS — Z79.4 ENCOUNTER FOR LONG-TERM (CURRENT) USE OF INSULIN (HCC): Primary | ICD-10-CM

## 2020-01-27 DIAGNOSIS — R20.2 PARESTHESIA: ICD-10-CM

## 2020-01-27 DIAGNOSIS — N18.30 CHRONIC KIDNEY DISEASE, STAGE III (MODERATE) (HCC): ICD-10-CM

## 2020-01-27 DIAGNOSIS — N18.30 CKD (CHRONIC KIDNEY DISEASE) STAGE 3, GFR 30-59 ML/MIN (HCC): ICD-10-CM

## 2020-01-27 DIAGNOSIS — Z79.4 TYPE 2 DIABETES MELLITUS WITH DIABETIC NEUROPATHY, WITH LONG-TERM CURRENT USE OF INSULIN (HCC): ICD-10-CM

## 2020-01-27 DIAGNOSIS — I51.9 MYXEDEMA HEART DISEASE: ICD-10-CM

## 2020-01-27 DIAGNOSIS — E03.9 MYXEDEMA HEART DISEASE: ICD-10-CM

## 2020-01-27 DIAGNOSIS — Z79.4 ENCOUNTER FOR LONG-TERM (CURRENT) USE OF INSULIN (HCC): ICD-10-CM

## 2020-01-27 DIAGNOSIS — E11.40 TYPE 2 DIABETES MELLITUS WITH DIABETIC NEUROPATHY, WITH LONG-TERM CURRENT USE OF INSULIN (HCC): ICD-10-CM

## 2020-01-27 LAB
ANION GAP SERPL CALCULATED.3IONS-SCNC: 8 MMOL/L (ref 4–13)
BUN SERPL-MCNC: 81 MG/DL (ref 5–25)
CALCIUM SERPL-MCNC: 8.9 MG/DL (ref 8.3–10.1)
CHLORIDE SERPL-SCNC: 99 MMOL/L (ref 100–108)
CHOLEST SERPL-MCNC: 106 MG/DL (ref 50–200)
CO2 SERPL-SCNC: 30 MMOL/L (ref 21–32)
CREAT SERPL-MCNC: 1.73 MG/DL (ref 0.6–1.3)
EST. AVERAGE GLUCOSE BLD GHB EST-MCNC: 166 MG/DL
GFR SERPL CREATININE-BSD FRML MDRD: 29 ML/MIN/1.73SQ M
GLUCOSE P FAST SERPL-MCNC: 145 MG/DL (ref 65–99)
HBA1C MFR BLD: 7.4 % (ref 4.2–6.3)
HDLC SERPL-MCNC: 48 MG/DL
LDLC SERPL CALC-MCNC: 50 MG/DL (ref 0–100)
NONHDLC SERPL-MCNC: 58 MG/DL
POTASSIUM SERPL-SCNC: 4.2 MMOL/L (ref 3.5–5.3)
SODIUM SERPL-SCNC: 137 MMOL/L (ref 136–145)
T4 FREE SERPL-MCNC: 1.36 NG/DL (ref 0.76–1.46)
TRIGL SERPL-MCNC: 41 MG/DL
TSH SERPL DL<=0.05 MIU/L-ACNC: 0.79 UIU/ML (ref 0.36–3.74)

## 2020-01-27 PROCEDURE — 84439 ASSAY OF FREE THYROXINE: CPT

## 2020-01-27 PROCEDURE — 80061 LIPID PANEL: CPT

## 2020-01-27 PROCEDURE — 36415 COLL VENOUS BLD VENIPUNCTURE: CPT | Performed by: INTERNAL MEDICINE

## 2020-01-27 PROCEDURE — 83036 HEMOGLOBIN GLYCOSYLATED A1C: CPT | Performed by: INTERNAL MEDICINE

## 2020-01-27 PROCEDURE — 84443 ASSAY THYROID STIM HORMONE: CPT

## 2020-01-27 PROCEDURE — 80048 BASIC METABOLIC PNL TOTAL CA: CPT

## 2020-01-29 ENCOUNTER — OFFICE VISIT (OUTPATIENT)
Dept: PHYSICAL THERAPY | Facility: CLINIC | Age: 70
End: 2020-01-29
Payer: MEDICARE

## 2020-01-29 ENCOUNTER — TELEPHONE (OUTPATIENT)
Dept: ENDOCRINOLOGY | Facility: CLINIC | Age: 70
End: 2020-01-29

## 2020-01-29 DIAGNOSIS — N18.30 STAGE 3 CHRONIC KIDNEY DISEASE (HCC): Primary | ICD-10-CM

## 2020-01-29 DIAGNOSIS — R29.6 FREQUENT FALLS: Primary | ICD-10-CM

## 2020-01-29 PROCEDURE — 97530 THERAPEUTIC ACTIVITIES: CPT

## 2020-01-29 PROCEDURE — 97112 NEUROMUSCULAR REEDUCATION: CPT

## 2020-01-29 NOTE — TELEPHONE ENCOUNTER
----- Message from Gilson Galvin MD sent at 1/27/2020  4:45 PM EST -----  Worsening renal function-please advised patient to follow-up with Nephrology  Will discuss other labs during her follow-up visit

## 2020-01-29 NOTE — TELEPHONE ENCOUNTER
Spoke to patient advised please keep appt for 2/10  With Dr Primitivo Santana to discuss lab results  Also, follow with Nephrology for worsening renal function  She stated she does not see Nephrology for renal function  Wanted to know if Dr Primitivo Santana has one in mind

## 2020-01-30 DIAGNOSIS — N18.30 CKD (CHRONIC KIDNEY DISEASE) STAGE 3, GFR 30-59 ML/MIN (HCC): Primary | ICD-10-CM

## 2020-01-30 NOTE — TELEPHONE ENCOUNTER
Called and left patient message that I placed a referral in the mail for Nephrology  If she does not want to see him then she should follow up with her PCP

## 2020-01-30 NOTE — PROGRESS NOTES
Daily Note     Today's date: 2020  Patient name: Sara Stewart  : 1950  MRN: [id number]  Referring provider: Jovita Solano MD  Dx:   Encounter Diagnosis     ICD-10-CM    1  Frequent falls R29 6        Start Time:   Stop Time:   Total time in clinic (min): 45 minutes    Subjective: Patient reports feeling alright, no changes from previous treatment  Objective: See treatment diary below- 2 lb cuff weight on L LE      Precautions: fall    Daily Treatment Diary     STS: 15 reps 2 UE     Standing 3 way 2 sets 10 reps, 2 lb weight on L LE , 1 UE support  Flexion  Abd  Ext     Step taps: 20 reps FWD and Laterally- 6" step each direction  1 UE support    Step ups: 20 reps FWD and Laterally- 6" step each direction  2 UE support    Heel Raises- 20 reps, 3 second holds    Fwd over 6" hurdles: 4 times down and back    Minisquats: 20 reps     Ladderball, 1 UE, 3 sets        Assessment: Patient tolerated treatment well  Patient needs verbal cueing to decrease UE support  Patient also educated on maintaining steady breathing throughout treatment  Patient to progress to decrease UE support in future  Patient will benefit from skilled PT services to improve her gait pattern and balance to decrease her risk of falls  Plan: Continue per POC  Descending ramp and descending stairs

## 2020-02-04 ENCOUNTER — APPOINTMENT (OUTPATIENT)
Dept: PHYSICAL THERAPY | Facility: CLINIC | Age: 70
End: 2020-02-04
Payer: MEDICARE

## 2020-02-06 PROBLEM — K21.9 REFLUX LARYNGITIS: Status: ACTIVE | Noted: 2020-02-06

## 2020-02-06 PROBLEM — J38.01 PARESIS OF LEFT VOCAL FOLD: Status: ACTIVE | Noted: 2020-02-06

## 2020-02-06 PROBLEM — J04.0 REFLUX LARYNGITIS: Status: ACTIVE | Noted: 2020-02-06

## 2020-02-06 PROBLEM — J38.3 GLOTTIC INSUFFICIENCY: Status: ACTIVE | Noted: 2020-02-06

## 2020-02-06 PROBLEM — R49.0 MUSCLE TENSION DYSPHONIA: Status: ACTIVE | Noted: 2020-02-06

## 2020-02-06 PROBLEM — R49.0 DYSPHONIA: Status: ACTIVE | Noted: 2020-02-06

## 2020-02-10 ENCOUNTER — OFFICE VISIT (OUTPATIENT)
Dept: ENDOCRINOLOGY | Facility: CLINIC | Age: 70
End: 2020-02-10
Payer: MEDICARE

## 2020-02-10 VITALS
DIASTOLIC BLOOD PRESSURE: 78 MMHG | WEIGHT: 205.2 LBS | HEIGHT: 65 IN | HEART RATE: 84 BPM | SYSTOLIC BLOOD PRESSURE: 132 MMHG | BODY MASS INDEX: 34.19 KG/M2

## 2020-02-10 DIAGNOSIS — Z79.4 TYPE 2 DIABETES MELLITUS WITH RETINOPATHY AND MACULAR EDEMA, WITH LONG-TERM CURRENT USE OF INSULIN, UNSPECIFIED LATERALITY, UNSPECIFIED RETINOPATHY SEVERITY (HCC): ICD-10-CM

## 2020-02-10 DIAGNOSIS — E11.311 TYPE 2 DIABETES MELLITUS WITH RETINOPATHY AND MACULAR EDEMA, WITH LONG-TERM CURRENT USE OF INSULIN, UNSPECIFIED LATERALITY, UNSPECIFIED RETINOPATHY SEVERITY (HCC): ICD-10-CM

## 2020-02-10 DIAGNOSIS — Z79.4 TYPE 2 DIABETES MELLITUS WITH DIABETIC NEUROPATHY, WITH LONG-TERM CURRENT USE OF INSULIN (HCC): Primary | ICD-10-CM

## 2020-02-10 DIAGNOSIS — I25.810 CORONARY ARTERY DISEASE INVOLVING CORONARY BYPASS GRAFT OF NATIVE HEART WITHOUT ANGINA PECTORIS: ICD-10-CM

## 2020-02-10 DIAGNOSIS — E11.40 TYPE 2 DIABETES MELLITUS WITH DIABETIC NEUROPATHY, WITH LONG-TERM CURRENT USE OF INSULIN (HCC): Primary | ICD-10-CM

## 2020-02-10 DIAGNOSIS — E03.9 ACQUIRED HYPOTHYROIDISM: ICD-10-CM

## 2020-02-10 DIAGNOSIS — E78.2 MIXED HYPERLIPIDEMIA: ICD-10-CM

## 2020-02-10 DIAGNOSIS — I10 ESSENTIAL HYPERTENSION: ICD-10-CM

## 2020-02-10 DIAGNOSIS — N18.30 STAGE 3 CHRONIC KIDNEY DISEASE (HCC): ICD-10-CM

## 2020-02-10 PROCEDURE — 3075F SYST BP GE 130 - 139MM HG: CPT | Performed by: INTERNAL MEDICINE

## 2020-02-10 PROCEDURE — 3078F DIAST BP <80 MM HG: CPT | Performed by: INTERNAL MEDICINE

## 2020-02-10 PROCEDURE — 4040F PNEUMOC VAC/ADMIN/RCVD: CPT | Performed by: INTERNAL MEDICINE

## 2020-02-10 PROCEDURE — 1036F TOBACCO NON-USER: CPT | Performed by: INTERNAL MEDICINE

## 2020-02-10 PROCEDURE — 99214 OFFICE O/P EST MOD 30 MIN: CPT | Performed by: INTERNAL MEDICINE

## 2020-02-10 PROCEDURE — 3008F BODY MASS INDEX DOCD: CPT | Performed by: INTERNAL MEDICINE

## 2020-02-10 PROCEDURE — 1160F RVW MEDS BY RX/DR IN RCRD: CPT | Performed by: INTERNAL MEDICINE

## 2020-02-10 PROCEDURE — 3066F NEPHROPATHY DOC TX: CPT | Performed by: INTERNAL MEDICINE

## 2020-02-10 RX ORDER — VIT A/VIT C/VIT E/ZINC/COPPER 4296-226
CAPSULE ORAL 2 TIMES DAILY
COMMUNITY
End: 2020-06-24 | Stop reason: ALTCHOICE

## 2020-02-10 NOTE — PROGRESS NOTES
Tonia Moon 69 y o  female MRN: [id number]    Encounter: [id number]      Assessment/Plan     Assessment: This is a 69y o -year-old female with history of type 2 diabetes mellitus, hypertension, hyperlipidemia, CKD, CAD, CHF, obesity, hypothyroidism    Plan:  1  Type 2 diabetes mellitus on long-term insulin therapy  Fairly controlled based on A1c of 7 4% however this may not be reliable in the setting of CKD  Has hyperglycemia based on recall of blood sugars    Recommend the following at this time  Recommend continuing current regimen, taking insulin per carb to insulin ratio for all her meals including high carbohydrate containing snacks  Advised to send blood sugar log along with carb intake, insulin dose taken for review  -repeat A1c, BMP, check fructosamine level in 3 months    2  CKD-recent worsening of creatinine   -advised to keep well hydrated   -referred to Nephrology    3  Hyperlipidemia  - continue statin therapy  Check fasting lipid panel    4  Hypertension, CAD, CHF  Blood pressure at goal however on history patient has not been taking her medications as prescribed by Cardiology   -advised to follow-up with cardiology    5  Neuropathy-recommend consult with Podiatry (referral already placed)   6  Retinopathy-follow up with Ophthalmology/retinal specialist regularly    7  Hypothyroidism  Thyroid function test within normal limits  Continue current regimen  -repeat TSH, free T4 in 3        CC: Diabetes    History of Present Illness     HPI:  Tonia Moon is a 69 y o  female presents for a follow-up visit regarding diabetes management        DM history:   Diagnosed around 2751  Has complications of CAD/ CKD  Has a follow up with retinal specialist scheduled for 2/20 for retinopathy, Dr Cristian Roberts in April     Current regimen:   Basaglar 16 units subcutaneously at bedtime  NovoLog per CIR 1:4, average 14,12, 16 per meals  Correction 1:30> 150  Patient says that she does not take more than 16 units with any meal , per carb counting may actually need more   Does not always take insulin with high carbohydrate containing snacks    Does not sleep well; Also sleep walks, tends to snack when up late   Snacks - popsicles icecream, candy    Keeps well hydrated     Appetite is good  Lost weight   Walks at home, going to physical therapy for balance  has numbness and tingling in the feet  No pain; needs follow up with podiatry    Denies changes in vision  No chest pain/ SOB  No diarrhea/ constipation  No urinary complaints  Needs a new nephrologist - has not been taking lasix and metalozone at doses that she is supposed to per cardiology recommn    Home glucose monitoring:on recall   Before breakfast:  120s  Before lunch:  120s  Before dinner: 160s  Bedtime:  200s  No hypoglycemia     All other systems were reviewed and are negative      Review of Systems      Historical Information   Past Medical History:   Diagnosis Date    Arthritis     [facility]     Cardiac disease     Carotid artery occlusion     [facility]     Cholelithiasis     last assessed 8/8/2016    Coronary artery disease     Diabetes mellitus [facility]     Disease of thyroid gland     GERD (gastroesophageal reflux disease)     History of transfusion     Hyperlipidemia     Hypertension     Long-term insulin use in type 2 diabetes ([facility] ) 6/4/2016    Other specified diabetes mellitus with diabetic autonomic (poly)neuropathy ([facility]     Pleural effusion 2008    Pulmonary embolism ([facility]     2008    Renal disorder     Retinopathy     bilat    Sleep apnea     Subclavian artery stenosis ([facility]     Umbilical hernia with obstruction, without gangrene     last assessed 7/8/2016     Past Surgical History:   Procedure Laterality Date    ABDOMINAL SURGERY      CARDIAC PACEMAKER PLACEMENT      CARDIAC SURGERY      cabg x 2    CATARACT EXTRACTION      CHOLECYSTECTOMY      CORONARY ARTERY BYPASS GRAFT  2008    EYE SURGERY      FRACTURE SURGERY Right 2010    shoulder    HERNIA REPAIR      umbilical    VT LAP,CHOLECYSTECTOMY N/A 8/10/2016    Procedure: CHOLECYSTECTOMY LAPAROSCOPIC;  Surgeon: Efrain Clark MD;  [facility];  Service: General    VT LARYNGOSCOPY,DIRECT,SCOPE,INJ CORDS Left 11/4/2016    Procedure: Margot Shown; LEFT VOCAL FOLD INJECTION ;  Surgeon: Bianca Becerra MD;  [facility];  Service: ENT    VT REPAIR UMBILICAL HVBL,2+Y/N,ASJQV N/A 8/10/2016    Procedure: Layshelby Villedaa;  Surgeon: Efrain Clark MD;  [facility];  Service: General    [address] / STENTING Right     TRICUSPID VALVE REPLACEMENT      VASCULAR SURGERY      heart valve replacement     Social History   Social History     Substance and Sexual Activity   Alcohol Use Not Currently    Frequency: Never    Drinks per session: Patient refused    Binge frequency: Never    Comment: only on NEW YEAR'S     Social History     Substance and Sexual Activity   Drug Use Never    Types: Oxycodone     Social History     Tobacco Use   Smoking Status Never Smoker   Smokeless Tobacco Never Used     Family History:   Family History   Problem Relation Age of Onset    Heart disease Mother     Breast cancer Maternal Aunt     Heart disease Sister        Meds/Allergies   Current Outpatient Medications   Medication Sig Dispense Refill    COMBIGAN 0 2-0 5 % Administer 1 drop to both eyes 2 (two) times a day      famotidine (PEPCID) 20 mg tablet Take 1 tablet (20 mg total) by mouth 2 (two) times a day 60 tablet 11    insulin aspart (NOVOLOG FLEXPEN) 100 Units/mL injection pen Inject under the corf09-79 units 3 times a day with meals  Use carbohydrate to insulin ratio 1:3 which means 1 unit of NovoLog for every 3 gm of carbohydrates 15 pen 11    insulin glargine (BASAGLAR KWIKPEN) 100 units/mL injection pen Inject 16 Units under the skin daily (Patient taking differently: Inject 16 Units under the skin daily at bedtime ) 5 pen 3    levothyroxine (SYNTHROID) 137 mcg tablet Take 137 mcg by mouth daily   metolazone (ZAROXOLYN) 2 5 mg tablet TAKE 1 TABLET BY MOUTH TWICE WEEKLY (Patient taking differently: 2 (two) times a day as needed Pt taking twice weekly per Dr Kay Acosta  12/24/2019) 10 tablet 0    Multiple Vitamins-Minerals (PRESERVISION AREDS) CAPS Take by mouth 2 (two) times a day      pantoprazole (PROTONIX) 40 mg tablet Take 40 mg by mouth daily in the early morning       potassium chloride (K-DUR,KLOR-CON) 10 mEq tablet Take 1 tablet (10 mEq total) by mouth 2 (two) times a week With the extra furosemide 15 tablet 3    simvastatin (ZOCOR) 20 mg tablet TAKE 1 TABLET (20MG) BY ORAL ROUTE EVERY DAY IN THE EVENING 90 tablet 2    traMADol (ULTRAM) 50 mg tablet Take 1 tablet (50 mg total) by mouth every 6 (six) hours as needed for moderate pain 30 tablet 0    valsartan (DIOVAN) 80 mg tablet Take 2 tablets (160 mg total) by mouth daily (Patient taking differently: Take 80 mg by mouth daily )  0    XARELTO 15 MG tablet TAKE 1 TAB BY MOUTH DAILY WITH EVENEING MEAL  30 tablet 2    furosemide (LASIX) 80 mg tablet TAKE 1 TABLET DAILY AS DIRECTED  (Patient taking differently: Pt taking 120mg daily per Dr Kay Acosta) 30 tablet 5    lactulose 20 g/30 mL Take 15 mL (10 g total) by mouth 2 (two) times a day (Patient not taking: Reported on 2/10/2020) 236 mL 6    linaCLOtide (LINZESS) 145 MCG CAPS Take 1 capsule (145 mcg total) by mouth daily (Patient not taking: Reported on 2/10/2020) 30 capsule 4     No current facility-administered medications for this visit  Allergies   Allergen Reactions    Bromide Ion [Bromine]      Blurred vision   Has preservative that  Pt cannot use    Other Blisters     Adhesive tape    Ibuprofen Palpitations    Penicillins Rash       Objective   Vitals: Blood pressure 132/78, pulse 84, height 5' 5" (1 651 m), weight 93 1 kg (205 lb 3 2 oz), not currently breastfeeding  Physical Exam   Constitutional: She is oriented to person, place, and time  She appears well-developed and well-nourished  HENT:   Head: Normocephalic and atraumatic  Eyes: Pupils are equal, round, and reactive to light  Conjunctivae and EOM are normal    Neck: Normal range of motion  Neck supple  No thyromegaly present  Cardiovascular: Normal rate, regular rhythm and normal heart sounds  No murmur heard  Pulmonary/Chest: Effort normal and breath sounds normal  She has no wheezes  Abdominal: Soft  She exhibits no distension  There is no tenderness  Musculoskeletal: Normal range of motion  She exhibits edema  Has a cane to ambulate   Neurological: She is alert and oriented to person, place, and time  Skin: Skin is warm and dry  Psychiatric: She has a normal mood and affect  Her behavior is normal    Vitals reviewed  The history was obtained from the review of the chart, patient and family      Lab Results:   Lab Results   Component Value Date/Time    Hemoglobin A1C 7 4 (H) 01/27/2020 07:55 AM    Hemoglobin A1C 7 9 (H) 10/01/2019 09:02 AM    Hemoglobin A1C 7 7 (A) 05/10/2019 08:34 AM    WBC 5 15 12/24/2019 05:06 AM    WBC 5 95 12/24/2019 01:07 AM    WBC 6 28 05/23/2019 03:24 AM    Hemoglobin 11 1 (L) 12/24/2019 05:06 AM    Hemoglobin 12 1 12/24/2019 01:07 AM    Hemoglobin 12 2 05/23/2019 03:24 AM    Hematocrit 37 6 12/24/2019 05:06 AM    Hematocrit 41 3 12/24/2019 01:07 AM    Hematocrit 41 4 05/23/2019 03:24 AM    MCV 87 12/24/2019 05:06 AM    MCV 87 12/24/2019 01:07 AM    MCV 84 05/23/2019 03:24 AM    Platelets 677 17/19/7919 05:06 AM    Platelets 831 80/56/0392 01:07 AM    Platelets 715 06/02/5149 03:24 AM    BUN 81 (H) 01/27/2020 07:55 AM    BUN 41 (H) 12/24/2019 05:06 AM    BUN 42 (H) 12/24/2019 01:07 AM    Potassium 4 2 01/27/2020 07:55 AM    Potassium 3 7 12/24/2019 05:06 AM    Potassium 4 2 12/24/2019 01:07 AM    Chloride 99 (L) 01/27/2020 07:55 AM    Chloride 100 12/24/2019 05:06 AM    Chloride 98 (L) 12/24/2019 01:07 AM    CO2 30 01/27/2020 07:55 AM    CO2 31 12/24/2019 05:06 AM    CO2 30 12/24/2019 01:07 AM    Creatinine 1 73 (H) 01/27/2020 07:55 AM    Creatinine 1 17 12/24/2019 05:06 AM    Creatinine 1 32 (H) 12/24/2019 01:07 AM    AST 19 12/24/2019 01:07 AM    AST 21 05/23/2019 03:24 AM    AST 16 05/07/2019 05:17 AM    ALT 17 12/24/2019 01:07 AM    ALT 13 05/23/2019 03:24 AM    ALT 15 05/07/2019 05:17 AM    Albumin 3 7 12/24/2019 01:07 AM    Albumin 3 4 (L) 05/23/2019 03:24 AM    Albumin 3 4 (L) 05/07/2019 05:17 AM    HDL, Direct 48 01/27/2020 07:55 AM    HDL, Direct 38 (L) 10/01/2019 09:02 AM    HDL, Direct 39 (L) 05/23/2019 08:30 AM    Triglycerides 41 01/27/2020 07:55 AM    Triglycerides 113 10/01/2019 09:02 AM    Triglycerides 83 05/23/2019 08:30 AM         Imaging Studies: I have personally reviewed pertinent reports  Portions of the record may have been created with voice recognition software  Occasional wrong word or "sound a like" substitutions may have occurred due to the inherent limitations of voice recognition software  Read the chart carefully and recognize, using context, where substitutions have occurred

## 2020-02-11 ENCOUNTER — OFFICE VISIT (OUTPATIENT)
Dept: PHYSICAL THERAPY | Facility: CLINIC | Age: 70
End: 2020-02-11
Payer: MEDICARE

## 2020-02-11 DIAGNOSIS — R29.6 FREQUENT FALLS: Primary | ICD-10-CM

## 2020-02-11 PROCEDURE — 97112 NEUROMUSCULAR REEDUCATION: CPT | Performed by: PHYSICAL THERAPIST

## 2020-02-11 PROCEDURE — 97530 THERAPEUTIC ACTIVITIES: CPT | Performed by: PHYSICAL THERAPIST

## 2020-02-11 PROCEDURE — 97116 GAIT TRAINING THERAPY: CPT | Performed by: PHYSICAL THERAPIST

## 2020-02-11 NOTE — PROGRESS NOTES
PT Progress Note    Today's date: 2020  Patient name: Mendez Shook  : 1950  MRN: [id number]  Referring provider: Juana Mccoy MD  Dx:   Encounter Diagnosis     ICD-10-CM    1  Frequent falls R29 6                   Assessment  Assessment details: Patient is 77 y/o female presenting to skilled PT for frequent falls  She has made steady progress since initial evaluation in all outcome measures  She is using SPC in the community and no AD in the home  SpO2 dropped to 85% after 6MWT but increased to 91% with rest break and PLB  She still is fearful of negotiating inclines/ramps  No falls since beginning PT  Based on outcome measures she is still deemed high fall risk  Patient subjective report notes the following functional limitation with walking, stairs, bending down to pick objects off the floor, household chores, walking outdoors  Patient will benefit from skilled PT to address noted impairments and functional limitations they are causing with overall goal to return patient to highest level possible with reduced risk for falls  Impairments: abnormal gait, activity intolerance, impaired balance, impaired physical strength, lacks appropriate home exercise program and safety issue  Understanding of Dx/Px/POC: good   Prognosis: good    Goals  Goals  STG 30 days    1  Patient will improve static balance with feet together Eyes Open Firm surface  to 30 seconds indicating reduction in fall risk -MET  2  Patient will achieve 190 feet improvement with overall distance achieved of 465 feet with 6 minute walk test which is Minimal Detectable Change pre current research standards with endurance to demonstrate enhance functional capacity - NOT MET  3  Patient will display 2 3  second improvement with overall score of < 30 seconds  with TUG test or lower with noted improvement being Minimal Detectable Change pre current research standards with fall risk   MET  4   Patient will achieve 36/56 PRATT score with minimal improve by 6 points or more demonstrating Minimal Detectable change per current research standards for this objective test which assess fall risk  NOT MET  5  Patient will achieve > 0 55 m/s with 10 Meter Walk test, demonstrating Minimal Detectable change per current research standards for this objective test which assess fall risk  MET  6  Patient will perform  5 x sit to stand test with overall reduction by seconds to < 23 seconds score indicating improvement with functional endurance MET    LT days   1  Patient will score low risk for falls with 3/4 fall risk measures   2  Patient will be able to ambulate 1,000 feet with AD during 6 minute walk test   3  Patient will be able to perform floor transfer without physical assistance   4  Patient will be able to carry objects without loss of balance  5  Patient will be able to ambulate outdoors without any loss of balance  6  Patient will negotiate 6 steps w/ 1 HR + SPC mod I without loss of balance       Cut off score   All date taken from [facility] Neuro Section or Rehab Measures    PRATT test: 46/56                                              5 x STS Test:  MDC: 6 points                                                  MDC: 2 3 seconds   age norms                                                                 Age Norms   61-76 year old = M: 54, F: 55                        62-78 year old: 11 4 seconds   66-77 year old = M 47,  F: 50                       71-76 year old: 12 6 seconds    80-80 year old = M46,   F: 53                       80-80 year old: 14 8 seconds     TUG test:                                                                     10 Meter Walk Test:  MDC: 4 14 seconds       MDC:  59 ft/sec  Cut off score for Falls                                                  Age Norms  > 13 5 seconds community dwelling adults                20-29; M: 4 56 ft/sec F: 4 62 ft/sec  > 32 2 Frail Elderly 30-39: M 4 76 ft/sec  F: 4 68 ft/sec          40-49: M: 4 79 ft/sec  F: 4 62 ft/sec  6 Minute Walk Test      50-59: M: 4 76 ft/sec  F: 4 56 ft/sec  MDC: 190 feet       60-69: M: 4 56 ft/sec  F: 4 26 ft/sec  Age Norms       70-+    M: 4 36 ft/sec  F: 4 16 ft/sec  60-69:    M: 1876 F: 6327  04-10:    M: 1729 F: 1545  80-89 +: M: 80 F; 1286     Plan  Patient would benefit from: skilled physical therapy  Planned modality interventions: biofeedback  Planned therapy interventions: manual therapy, motor coordination training, neuromuscular re-education, patient education, postural training, sensory integrative techniques, strengthening, stretching, therapeutic activities, therapeutic exercise, gait training, home exercise program, coordination, balance, therapeutic training, transfer training, community reintegration and flexibility  Frequency: 2x week  Duration in weeks: 12  Plan of Care beginning date: 2020  Plan of Care expiration date: 3/31/2020  Treatment plan discussed with: patient        Subjective Evaluation    History of Present Illness  Mechanism of injury: IE: Pt reports 2 falls over the past year (May 2019 and Dec 2019)  She uses SPC primarily, sometimes furniture walks in her house and at night uses RW because she feels weaker  Both times she fell she was using RW and she fell straight backwards  She is currently wearing R wrist brace for the past day due to her wrist starting to hurt, unrelated to her fall  WY: Pt reports no falls since beginning PT  She has been out for past 2 weeks due to having the flu  Overall feeling much better now     Pain  No pain reported  Current pain ratin  At best pain ratin  At worst pain ratin    Social Support  Steps to enter house: yes (6 steps to enter)  Stairs in house: no   Lives in: multiple-level home  Lives with: adult children (son Reilly Pina )    Patient Goals  Patient goals for therapy: improved balance, independence with ADLs/IADLs, return to sport/leisure activities and increased strength  Patient goal: improve my balance, walk up/down inclines         Objective     Strength/Myotome Testing     Left Hip   Planes of Motion   Flexion: 3+  Extension: 3+  Abduction: 3+  Adduction: 3+    Right Hip   Planes of Motion   Flexion: 4-  Extension: 4-  Abduction: 4-  Adduction: 4-    Left Knee   Flexion: 4-  Extension: 4-    Right Knee   Flexion: 4-  Extension: 4-    Left Ankle/Foot   Dorsiflexion: 4  Plantar flexion: 4-    Right Ankle/Foot   Dorsiflexion: 4  Plantar flexion: 4-    Functional Assessment        Comments  Vision: retinopathy 2* diabetes, reports blurry vision @ times  Sensation: neuropathy 2* diabetes  Strength: see MMT     5x STS= 23 76 sec, with UE use  Day= 30/56  TUG= 36 3 sec w/ SPC  Gait speed= 0 44 m/s w/ SPC  6MWT=  275 ft w/ SPC      TX 2/11:  5x STS= 15 6 sec, with UE use  Day= 35/56   TUG= 20 66 sec w/ SPC  Gait speed= 10m/ 13 55 sec= 0 74 m/s w/ SPC  6MWT= 430 ft w/ SPC (SpO2 85% afterwards)             Precautions:   Past Medical History:   Diagnosis Date    Arthritis     Atrial flutter ([facility])     Cardiac disease     Carotid artery occlusion     [facility]     Cholelithiasis     last assessed 8/8/2016    Coronary artery disease     Diabetes mellitus ([address]     Disease of thyroid gland     GERD (gastroesophageal reflux disease)     History of transfusion     Hyperlipidemia     Hypertension     Long-term insulin use in type 2 diabetes ([address] ) 6/4/2016    Other specified diabetes mellitus with diabetic autonomic (poly)neuropathy ([address]     Pleural effusion 2008    Pulmonary embolism ([address]     2008    Renal disorder     Retinopathy     bilat    Sleep apnea     Subclavian artery stenosis ([address]     Umbilical hernia with obstruction, without gangrene     last assessed 7/8/2016

## 2020-02-13 ENCOUNTER — OFFICE VISIT (OUTPATIENT)
Dept: PHYSICAL THERAPY | Facility: CLINIC | Age: 70
End: 2020-02-13
Payer: MEDICARE

## 2020-02-13 DIAGNOSIS — R29.6 FREQUENT FALLS: Primary | ICD-10-CM

## 2020-02-13 PROCEDURE — 97110 THERAPEUTIC EXERCISES: CPT | Performed by: PHYSICAL THERAPIST

## 2020-02-13 PROCEDURE — 97112 NEUROMUSCULAR REEDUCATION: CPT | Performed by: PHYSICAL THERAPIST

## 2020-02-13 PROCEDURE — 97116 GAIT TRAINING THERAPY: CPT | Performed by: PHYSICAL THERAPIST

## 2020-02-13 NOTE — PROGRESS NOTES
Daily Note     Today's date: 2020  Patient name: Tonia Moon  : 1950  MRN: [id number]  Referring provider: Josephine Kam MD  Dx:   Encounter Diagnosis     ICD-10-CM    1  Frequent falls R29 6                   Subjective: Patient reports she doesn't feel as great today as she did tu  Precautions: fall    Objective: See treatment diary below-   2 lb cuff weight on LLE    STS: 20 reps 2 UE     Standing 3 way 2 sets 10 reps, 2 lb weight on L LE , 1 UE support  Flexion  Abd  Ext     Step taps: 20 reps FWD and Laterally- 6" step each direction  no UE support    Step ups: 20 reps FWD and Laterally- 6" step each direction  1 UE support    Heel Raises- 20 reps, 3 second holds    Fwd and lateral over 6" hurdles: 4 times down and back    Ascend/descend ramp counting steps  7-8 steps ascending  14-16 steps descending       Assessment: Patient tolerated treatment well  Weaning off UE support without LOB  Decreased rest breaks taken  SpO2 remains 90% and higher  She continues to present w/ hesitation and fear when descending ramp, with decreased step length requiring more steps to descend  Patient will benefit from skilled PT services to improve her gait pattern and balance to decrease her risk of falls  Plan: Continue per POC  Descending ramp and descending stairs

## 2020-02-14 ENCOUNTER — DOCUMENTATION (OUTPATIENT)
Dept: NEPHROLOGY | Facility: CLINIC | Age: 70
End: 2020-02-14

## 2020-02-14 NOTE — PROGRESS NOTES
Pt seen by Nephrology/Dr Gaona [phone], last time 2018, called office for records - they don't have any office notes because they have paper charts and doctor dictates upon request All tests done at [facility]

## 2020-02-15 NOTE — PROGRESS NOTES
Assessment/Plan:    1  Type 2 diabetes mellitus with diabetic neuropathy, with long-term current use of insulin [facility])  Assessment & Plan:    Lab Results   Component Value Date    HGBA1C 7 4 (H) 01/27/2020     Her control is adequate  She continues to follow with endocrinology and has been referred to nephrology due to renal insufficiency and probable diabetic nephrosclerosis  Advised on the need for good foot and eye care  2  Arthritis  -     traMADol (ULTRAM) 50 mg tablet; Take 1 tablet (50 mg total) by mouth every 6 (six) hours as needed for moderate pain    3  Acquired hypothyroidism  Assessment & Plan:  Well controlled and she is clinically euthyroid  She follows with endocrinology  4  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, with long-term current use of insulin, unspecified laterality, unspecified retinopathy severity ([address] )    5  Obstructive sleep apnea  Assessment & Plan:  She is no longer using CPAP but was urged to use her O2 every night, states she is compliant  6  Coronary artery disease involving coronary bypass graft of native heart without angina pectoris  Assessment & Plan:  She follows regularly with cardiology and denies current symptoms  7  Essential hypertension  Assessment & Plan:  Well controlled and will continue current medications  8  Stage 3 chronic kidney disease ([address] )  Assessment & Plan:  As above, has been referred to nephrology  9  Sternal deformity  Comments: Will check xray  10  Cervicalgia  Comments: Will use moist heat PRN  There are no Patient Instructions on file for this visit  Return in about 6 months (around 8/20/2020)  Subjective:      Patient ID: Jackie Louis is a 69 y o  female  Chief Complaint   Patient presents with    Follow-up     Alexander Coe        She is here for a follow up of multiple issues  She follows with endocrinology for her DM and this has been well controlled    Also seeing   Avis Mayes from cardiology  She feels her dyspnea and edema are stable  Has been having pain in the neck and back of her head for a few weeks  She did fall right around [place] and CT of the neck showed degenerative changes at that time  She feels R collarbone is swollen  Her sternum feels like it is getting deeper and she is not sure if this is from her previous sternotomy  Is using her O2 at nighttime to sleep with  Last bloodwork showed an elevated creatinine and Dr Avis Mayes is sending her to nephrology, has upcoming appointment  She got a shot in Right eye today and has some subconjunctival hemorrhage usually with this  The following portions of the patient's history were reviewed and updated as appropriate: allergies, current medications, past family history, past medical history, past social history, past surgical history and problem list     Review of Systems   Constitutional: Negative  Respiratory: Negative  Cardiovascular: Negative  Musculoskeletal: Positive for neck stiffness  As per HPI  Current Outpatient Medications   Medication Sig Dispense Refill    ACCU-CHEK CAITLIN PLUS test strip USE BY TEST ROUTE 3 TIMES EVERY DAY      COMBIGAN 0 2-0 5 % Administer 1 drop to both eyes 2 (two) times a day      famotidine (PEPCID) 20 mg tablet Take 1 tablet (20 mg total) by mouth 2 (two) times a day 60 tablet 11    furosemide (LASIX) 80 mg tablet TAKE 1 TABLET DAILY AS DIRECTED  (Patient taking differently: Pt taking 120mg daily per Dr Avis Mayes) 30 tablet 5    insulin aspart (NOVOLOG FLEXPEN) 100 Units/mL injection pen Inject under the [id number] units 3 times a day with meals  Use carbohydrate to insulin ratio 1:3 which means 1 unit of NovoLog for every 3 gm of carbohydrates 15 pen 11    insulin glargine (BASAGLAR KWIKPEN) 100 units/mL injection pen Inject 16 Units under the skin daily 5 pen 3    levothyroxine (SYNTHROID) 137 mcg tablet Take 137 mcg by mouth daily        metolazone (ZAROXOLYN) 2 5 mg tablet TAKE 1 TABLET BY MOUTH TWICE WEEKLY (Patient taking differently: 2 (two) times a day as needed Pt taking twice weekly per Dr Alicia Rider  12/24/2019) 10 tablet 0    Multiple Vitamins-Minerals (PRESERVISION AREDS) CAPS Take by mouth 2 (two) times a day      pantoprazole (PROTONIX) 40 mg tablet Take 40 mg by mouth daily in the early morning       potassium chloride (K-DUR,KLOR-CON) 10 mEq tablet Take 1 tablet (10 mEq total) by mouth 2 (two) times a week With the extra furosemide 15 tablet 3    simvastatin (ZOCOR) 20 mg tablet TAKE 1 TABLET (20MG) BY ORAL ROUTE EVERY DAY IN THE EVENING 90 tablet 2    traMADol (ULTRAM) 50 mg tablet Take 1 tablet (50 mg total) by mouth every 6 (six) hours as needed for moderate pain 30 tablet 0    valsartan (DIOVAN) 80 mg tablet Take 2 tablets (160 mg total) by mouth daily (Patient taking differently: Take 80 mg by mouth daily )  0    XARELTO 15 MG tablet TAKE 1 TAB BY MOUTH DAILY WITH EVENEING MEAL  30 tablet 2    lactulose 20 g/30 mL Take 15 mL (10 g total) by mouth 2 (two) times a day (Patient not taking: Reported on 2/10/2020) 236 mL 6    linaCLOtide (LINZESS) 145 MCG CAPS Take 1 capsule (145 mcg total) by mouth daily (Patient not taking: Reported on 2/10/2020) 30 capsule 4     No current facility-administered medications for this visit  Objective:    /60   Pulse 72   Temp (!) 97 2 °F (36 2 °C)   Resp 16   Ht 5' 5" (1 651 m)   Wt 91 6 kg (202 lb)   LMP  (LMP Unknown)   SpO2 92%   BMI 33 61 kg/m²        Physical Exam   Constitutional: She appears well-developed and well-nourished  Eyes: Conjunctivae are normal    Right subconjunctival hemorrhage   Neck: Neck supple  No JVD present  No thyromegaly present  Cardiovascular: Normal rate, regular rhythm, normal heart sounds and intact distal pulses  Exam reveals no gallop and no friction rub  No murmur heard    Pulmonary/Chest: Effort normal and breath sounds normal  She has no wheezes  She has no rales  Abdominal: Soft  Bowel sounds are normal  She exhibits no distension  There is no tenderness  Musculoskeletal: She exhibits no edema  Cervical back: She exhibits decreased range of motion and spasm     Sternum with palpable depression and palpable wires from previous sternotomy              Terrance Negrete MD

## 2020-02-18 ENCOUNTER — OFFICE VISIT (OUTPATIENT)
Dept: PHYSICAL THERAPY | Facility: CLINIC | Age: 70
End: 2020-02-18
Payer: MEDICARE

## 2020-02-18 DIAGNOSIS — R29.6 FREQUENT FALLS: Primary | ICD-10-CM

## 2020-02-18 PROCEDURE — 97112 NEUROMUSCULAR REEDUCATION: CPT

## 2020-02-18 PROCEDURE — 97110 THERAPEUTIC EXERCISES: CPT

## 2020-02-20 ENCOUNTER — APPOINTMENT (OUTPATIENT)
Dept: RADIOLOGY | Facility: CLINIC | Age: 70
End: 2020-02-20
Payer: MEDICARE

## 2020-02-20 ENCOUNTER — FOLLOW UP (OUTPATIENT)
Dept: URBAN - METROPOLITAN AREA CLINIC 27 | Facility: CLINIC | Age: 70
End: 2020-02-20

## 2020-02-20 ENCOUNTER — OFFICE VISIT (OUTPATIENT)
Dept: FAMILY MEDICINE CLINIC | Facility: CLINIC | Age: 70
End: 2020-02-20
Payer: MEDICARE

## 2020-02-20 VITALS
TEMPERATURE: 97.2 F | DIASTOLIC BLOOD PRESSURE: 60 MMHG | SYSTOLIC BLOOD PRESSURE: 120 MMHG | HEART RATE: 72 BPM | RESPIRATION RATE: 16 BRPM | BODY MASS INDEX: 33.66 KG/M2 | OXYGEN SATURATION: 92 % | HEIGHT: 65 IN | WEIGHT: 202 LBS

## 2020-02-20 DIAGNOSIS — E03.9 ACQUIRED HYPOTHYROIDISM: ICD-10-CM

## 2020-02-20 DIAGNOSIS — E11.311 TYPE 2 DIABETES MELLITUS WITH RETINOPATHY AND MACULAR EDEMA, WITH LONG-TERM CURRENT USE OF INSULIN, UNSPECIFIED LATERALITY, UNSPECIFIED RETINOPATHY SEVERITY (HCC): ICD-10-CM

## 2020-02-20 DIAGNOSIS — M54.2 CERVICALGIA: ICD-10-CM

## 2020-02-20 DIAGNOSIS — M95.4 STERNAL DEFORMITY: ICD-10-CM

## 2020-02-20 DIAGNOSIS — Z79.4 TYPE 2 DIABETES MELLITUS WITH DIABETIC NEUROPATHY, WITH LONG-TERM CURRENT USE OF INSULIN (HCC): Primary | ICD-10-CM

## 2020-02-20 DIAGNOSIS — E11.40 TYPE 2 DIABETES MELLITUS WITH DIABETIC NEUROPATHY, WITH LONG-TERM CURRENT USE OF INSULIN (HCC): Primary | ICD-10-CM

## 2020-02-20 DIAGNOSIS — Z79.4 TYPE 2 DIABETES MELLITUS WITH RETINOPATHY AND MACULAR EDEMA, WITH LONG-TERM CURRENT USE OF INSULIN, UNSPECIFIED LATERALITY, UNSPECIFIED RETINOPATHY SEVERITY (HCC): ICD-10-CM

## 2020-02-20 DIAGNOSIS — M19.90 ARTHRITIS: ICD-10-CM

## 2020-02-20 DIAGNOSIS — E11.3413: ICD-10-CM

## 2020-02-20 DIAGNOSIS — N18.30 STAGE 3 CHRONIC KIDNEY DISEASE (HCC): ICD-10-CM

## 2020-02-20 DIAGNOSIS — H43.811: ICD-10-CM

## 2020-02-20 DIAGNOSIS — I10 ESSENTIAL HYPERTENSION: ICD-10-CM

## 2020-02-20 DIAGNOSIS — I25.810 CORONARY ARTERY DISEASE INVOLVING CORONARY BYPASS GRAFT OF NATIVE HEART WITHOUT ANGINA PECTORIS: ICD-10-CM

## 2020-02-20 DIAGNOSIS — H40.053: ICD-10-CM

## 2020-02-20 DIAGNOSIS — G47.33 OBSTRUCTIVE SLEEP APNEA: ICD-10-CM

## 2020-02-20 PROCEDURE — 4040F PNEUMOC VAC/ADMIN/RCVD: CPT | Performed by: INTERNAL MEDICINE

## 2020-02-20 PROCEDURE — 92134 CPTRZ OPH DX IMG PST SGM RTA: CPT

## 2020-02-20 PROCEDURE — 1160F RVW MEDS BY RX/DR IN RCRD: CPT | Performed by: INTERNAL MEDICINE

## 2020-02-20 PROCEDURE — 67028 INJECTION EYE DRUG: CPT

## 2020-02-20 PROCEDURE — 1036F TOBACCO NON-USER: CPT | Performed by: INTERNAL MEDICINE

## 2020-02-20 PROCEDURE — 3078F DIAST BP <80 MM HG: CPT | Performed by: INTERNAL MEDICINE

## 2020-02-20 PROCEDURE — 92014 COMPRE OPH EXAM EST PT 1/>: CPT | Mod: 25

## 2020-02-20 PROCEDURE — 3066F NEPHROPATHY DOC TX: CPT | Performed by: INTERNAL MEDICINE

## 2020-02-20 PROCEDURE — 3074F SYST BP LT 130 MM HG: CPT | Performed by: INTERNAL MEDICINE

## 2020-02-20 PROCEDURE — 3051F HG A1C>EQUAL 7.0%<8.0%: CPT | Performed by: INTERNAL MEDICINE

## 2020-02-20 PROCEDURE — 99214 OFFICE O/P EST MOD 30 MIN: CPT | Performed by: INTERNAL MEDICINE

## 2020-02-20 PROCEDURE — 71046 X-RAY EXAM CHEST 2 VIEWS: CPT

## 2020-02-20 PROCEDURE — 3008F BODY MASS INDEX DOCD: CPT | Performed by: INTERNAL MEDICINE

## 2020-02-20 RX ORDER — BLOOD SUGAR DIAGNOSTIC
STRIP MISCELLANEOUS
COMMUNITY
Start: 2020-02-07 | End: 2020-09-15 | Stop reason: SDUPTHER

## 2020-02-20 RX ORDER — TRAMADOL HYDROCHLORIDE 50 MG/1
50 TABLET ORAL EVERY 6 HOURS PRN
Qty: 30 TABLET | Refills: 0 | Status: SHIPPED | OUTPATIENT
Start: 2020-02-20 | End: 2020-05-14 | Stop reason: ALTCHOICE

## 2020-02-20 ASSESSMENT — TONOMETRY
OD_IOP_MMHG: 21
OS_IOP_MMHG: 23

## 2020-02-20 ASSESSMENT — VISUAL ACUITY
OS_CC: CF 6FT
OD_CC: 20/100

## 2020-02-20 NOTE — ASSESSMENT & PLAN NOTE
Lab Results   Component Value Date    HGBA1C 7 4 (H) 01/27/2020     Her control is adequate  She continues to follow with endocrinology and has been referred to nephrology due to renal insufficiency and probable diabetic nephrosclerosis  Advised on the need for good foot and eye care

## 2020-02-24 ENCOUNTER — TELEPHONE (OUTPATIENT)
Dept: FAMILY MEDICINE CLINIC | Facility: CLINIC | Age: 70
End: 2020-02-24

## 2020-02-24 NOTE — TELEPHONE ENCOUNTER
Please call her and let her know her xray looks good; the sternum and wires appear to be normal for her surgery

## 2020-02-25 ENCOUNTER — OFFICE VISIT (OUTPATIENT)
Dept: PHYSICAL THERAPY | Facility: CLINIC | Age: 70
End: 2020-02-25
Payer: MEDICARE

## 2020-02-25 DIAGNOSIS — R29.6 FREQUENT FALLS: Primary | ICD-10-CM

## 2020-02-25 DIAGNOSIS — I10 ESSENTIAL HYPERTENSION: ICD-10-CM

## 2020-02-25 PROCEDURE — 97112 NEUROMUSCULAR REEDUCATION: CPT

## 2020-02-25 PROCEDURE — 97110 THERAPEUTIC EXERCISES: CPT

## 2020-02-25 PROCEDURE — 97530 THERAPEUTIC ACTIVITIES: CPT

## 2020-02-25 RX ORDER — VALSARTAN 80 MG/1
160 TABLET ORAL DAILY
Qty: 60 TABLET | Refills: 0 | Status: SHIPPED | OUTPATIENT
Start: 2020-02-25 | End: 2020-03-19 | Stop reason: SDUPTHER

## 2020-02-25 NOTE — PROGRESS NOTES
Daily Note     Today's date: 2020  Patient name: Mabel Thorpe  : 1950  MRN: [id number]  Referring provider: Norma Coles MD  Dx:   Encounter Diagnosis     ICD-10-CM    1  Frequent falls R29 6        Start Time:   Stop Time:   Total time in clinic (min): 45 minutes    Subjective: Patient reports no complaints prior to session, reports that she is very fatigued today  Precautions: fall    Objective: See treatment diary below-     2 lb cuff weight on LLE    STS: 20 reps 1 UE     Standing 3 way 2 sets 10 reps, 2 lb weight on L LE , 1 UE support on foam today  Flexion  Abd  Ext     Step taps: 20 reps FWD and Laterally- 6" step each direction  no UE support, from foam    Step ups: 20 reps FWD and Laterally- 6" step each direction  1 UE support, from foam    Heel Raises- 20 reps, 3 second holds    Fwd, lateral, backwards over 6" hurdles: 4 times down and back each, 1 UE support    Ascend/descend ramp counting steps- with SPC  7 steps ascending  10 steps descending       Assessment: Patient tolerated treatment well today, patient progressed to foam for activities as well as different directions with weight on today, patient continually hesitant to descend ramp, but decrease in number of steps in a non-reciprocal pattern  Patient challenged with unsupported balance activities  Patient requires skilled PT to decrease fall risk and maximize function  Plan: Continue per POC  Descending ramp and descending stairs

## 2020-02-27 ENCOUNTER — PROCEDURE ONLY (OUTPATIENT)
Dept: URBAN - METROPOLITAN AREA CLINIC 27 | Facility: CLINIC | Age: 70
End: 2020-02-27

## 2020-02-27 ENCOUNTER — OFFICE VISIT (OUTPATIENT)
Dept: PHYSICAL THERAPY | Facility: CLINIC | Age: 70
End: 2020-02-27
Payer: MEDICARE

## 2020-02-27 DIAGNOSIS — R29.6 FREQUENT FALLS: Primary | ICD-10-CM

## 2020-02-27 DIAGNOSIS — E11.3413: ICD-10-CM

## 2020-02-27 PROCEDURE — 67028 INJECTION EYE DRUG: CPT

## 2020-02-27 PROCEDURE — 97110 THERAPEUTIC EXERCISES: CPT | Performed by: PHYSICAL THERAPIST

## 2020-02-27 PROCEDURE — 97112 NEUROMUSCULAR REEDUCATION: CPT | Performed by: PHYSICAL THERAPIST

## 2020-02-27 ASSESSMENT — VISUAL ACUITY: OS_CC: 20/160

## 2020-02-27 ASSESSMENT — TONOMETRY: OS_IOP_MMHG: 18

## 2020-02-27 NOTE — PROGRESS NOTES
Daily Note     Today's date: 2020  Patient name: Noemi Washington  : 1950  MRN: [id number]  Referring provider: Corbin Sharma MD  Dx:   Encounter Diagnosis     ICD-10-CM    1  Frequent falls R29 6                 Pt 1 on 1 from 545 to 615p    Subjective: Reports being very worn out today from an eye injection today  Precautions: fall    Objective: See treatment diary below-     2 lb cuff weight on LLE    STS:2x10 1 UE ---     Standing 3 way 2 sets 10 reps, 2 lb weight on L LE , 1 UE support   Flexion  Abd  Ext     Step taps: 20 reps FWD and Laterally- 6" step each direction  2 fingers on rail support, from foam    Step ups: 20 reps FWD and Laterally- 6" step each direction  1 UE support, from foam        Fwd, lateral, over 6" hurdles: 4 times down and back each, 1 UE support        NOT PERFORMED  Heel Raises- 20 reps, 3 second holds  Ascend/descend ramp counting steps- with SPC  7 steps ascending  10 steps descending     Assessment: Patient tolerated treatment fair today  Somewhat more unsteady today needing more UE support during exercises  Requested more rest breaks today due to fatigue, but was able to complete full session duration  Would benefit from continued PT  Plan: Continue per POC  Descending ramp and descending stairs

## 2020-03-03 ENCOUNTER — OFFICE VISIT (OUTPATIENT)
Dept: PHYSICAL THERAPY | Facility: CLINIC | Age: 70
End: 2020-03-03
Payer: MEDICARE

## 2020-03-03 DIAGNOSIS — R29.6 FREQUENT FALLS: Primary | ICD-10-CM

## 2020-03-03 PROCEDURE — 97530 THERAPEUTIC ACTIVITIES: CPT

## 2020-03-03 NOTE — PROGRESS NOTES
Daily Note     Today's date: 3/3/2020  Patient name: Mendez Shook  : 1950  MRN: [id number]  Referring provider: Juana Mccoy MD  Dx:   Encounter Diagnosis     ICD-10-CM    1  Frequent falls R29 6        Start Time:   Stop Time:   Total time in clinic (min): 45 minutes      Subjective: Patient reports feeling good today  Patient states she changed some of her medication so her vision is a little blurrier than normal      Precautions: fall    Objective: See treatment diary below-     2 lb cuff weight on LLE    STS: 2x10 1 UE ---     Heel Raises- 20 reps, 3 second holds    Standing 3 way 2 sets 10 reps, 2 lb weight on L LE , 1 UE support   Flexion  Abd  Ext     Step taps: 20 reps FWD and Laterally- 6" step each direction  2 fingers on rail support, from foam    Step ups: 20 reps FWD and Laterally- 6" step each direction  1 UE support, from foam    Fwd, lateral, over 6" hurdles: 4 times down and back each, 1 UE support    NOT PERFORMED:  Ascend/descend ramp counting steps- with SPC  7 steps ascending  10 steps descending     Assessment: Patient tolerated treatment well today  Patient required 1 rest break during sit to stand activity in order to complete due to increased fatigue  Patient was able to perform 6" hurdles with unilateral hand support, but was unable to decrease hand support during activity  No loss of balance noted with any exercise done today  Oxygen was taken during exercise which was 87%, and went up to 96% with sitting  Patient would benefit from continued PT in order to address remaining deficits  Plan: Continue per POC  Descending ramp and descending stairs

## 2020-03-05 ENCOUNTER — APPOINTMENT (OUTPATIENT)
Dept: PHYSICAL THERAPY | Facility: CLINIC | Age: 70
End: 2020-03-05
Payer: MEDICARE

## 2020-03-10 ENCOUNTER — OFFICE VISIT (OUTPATIENT)
Dept: PHYSICAL THERAPY | Facility: CLINIC | Age: 70
End: 2020-03-10
Payer: MEDICARE

## 2020-03-10 DIAGNOSIS — R29.6 FREQUENT FALLS: Primary | ICD-10-CM

## 2020-03-10 PROCEDURE — 97112 NEUROMUSCULAR REEDUCATION: CPT

## 2020-03-10 PROCEDURE — 97116 GAIT TRAINING THERAPY: CPT

## 2020-03-10 PROCEDURE — 97110 THERAPEUTIC EXERCISES: CPT

## 2020-03-10 NOTE — PROGRESS NOTES
Daily Note     Today's date: 3/10/2020  Patient name: Marek Chaudhry  : 1950  MRN: [id number]  Referring provider: Guillermo Connor MD  Dx:   Encounter Diagnosis     ICD-10-CM    1  Frequent falls R29 6                     Subjective: Patient reports feeling okay today, however expresses concern regarding the Coronavirus  She requested this Thursday to be her last visit until "things with the Coronavirus has calmed down " She noted "I don't want to seem paranoid about it, but I have 3/6 underlying factors that can make the Coronavirus worse and me more susceptible and I don't want to risk it "    Precautions: fall    Objective: See treatment diary below-     2 lb cuff weight on LLE    - STS: 2 sets, 10 reps, 1 UE  - Heel raises: 20 reps, 3 sec holds  - Hip flexion: 20 reps, 1 UE  - Hip abduction: 20 reps, 1 UE  - Hip extension: 20 reps, 1 UE  - Step taps: 6", fwd/lat, 1 UE  - Hurdles: 6", fwd/lat, 8 cycles, 1 UE  - Ascending/descending ramp with SPC: 2 sets        NOT PERFORMED:  Ascend/descend ramp counting steps- with SPC  7 steps ascending  10 steps descending     Assessment: Patient able to tolerate treatment session well today with patient reporting increased concern regarding susceptibility to the Coronavirus and has requested to hold therapy after this Thursday's session  PT and patient discussed HEP and taking video of patient next session performing exercises in the clinic to ensure proper mechanics at home and she was in good verbal agreement  She was challenged with ascending/descending the ramp with resultant decreased step length when descending in order to maintain balance  Patient would benefit from continued PT in order to address remaining deficits  Plan: Continue per POC  Descending ramp and descending stairs

## 2020-03-12 ENCOUNTER — OFFICE VISIT (OUTPATIENT)
Dept: PHYSICAL THERAPY | Facility: CLINIC | Age: 70
End: 2020-03-12
Payer: MEDICARE

## 2020-03-12 DIAGNOSIS — R29.6 FREQUENT FALLS: Primary | ICD-10-CM

## 2020-03-12 PROCEDURE — 97530 THERAPEUTIC ACTIVITIES: CPT | Performed by: PHYSICAL THERAPIST

## 2020-03-12 PROCEDURE — 97112 NEUROMUSCULAR REEDUCATION: CPT | Performed by: PHYSICAL THERAPIST

## 2020-03-12 PROCEDURE — 97116 GAIT TRAINING THERAPY: CPT | Performed by: PHYSICAL THERAPIST

## 2020-03-12 NOTE — PROGRESS NOTES
Cardiology Out Patient Progress Note  [facility]    Read Deven 69 y o  female MRN: [id number]   Encounter: [id number]    1  Chronic atrial fibrillation    2  Chronic combined systolic and diastolic congestive heart failure ([address] )    3  Coronary artery disease involving coronary bypass graft of native heart without angina pectoris    4  Essential hypertension    5  Mild pulmonary hypertension ([address] )    6  H/O coronary artery bypass surgery    7  H/O tricuspid valve annuloplasty    8  Mixed hyperlipidemia    9  Class 1 obesity due to excess calories with serious comorbidity and body mass index (BMI) of 33 0 to 33 9 in adult      Assessment/Plan   1  Combined systolic and diastolic heart failure [place] heart Association class 2/3  Combined systolic and diastolic heart failure with decrease in EF last year in February  Currently seems to compensated  She seems to be euvolemic  Her fluid status appears to be euvolemic  Her recently done labs reviewed creatinine around 1 7  She is 80 mg Lasix and Zaroxolyn as needed  She has not used Zaroxolyn twice a week  If he has continued to decrease in ejection fraction she may need upgrade her pacemaker to biventricular pacemaker as she is 100% paced  Will repeat echo after next visit  2  Coronary artery disease status post coronary artery bypass surgery with 4 grafts  Status post cardiac catheterization, in February of 2018  All grafts are patent she has three-vessel coronary artery disease  Continue medical Rx  No symptoms of angina    3  Chronic atrial fibrillation  Patient has underlying very slow ventricular rate and is status post Medtronic pacemaker which is functioning adequately  She is on Xarelto  Her pacemaker was interrogated today  She is 100% paced  4  Diabetes mellitus  Management as per PMD    5  Chronic renal insufficiency  Her kidney range around 1 6-1 7  Labs reviewed  She is scheduled see Nephrology      6 History of tricuspid annuloplasty  Repeat echo report from February 2018 reviewed  EF was around 45%  PA pressure remains elevated  She has moderate pulmonary hypertension dilated right-sided chambers  May need repeat echo before next visit    7  Dyslipidemia  Continue statins    8  Cardiomyopathy  Lately patient is noted to have low ejection fraction  Partially could be related to being 100% paced  Clinically no evidence of fluid overload  If EF continues to decrease she may need biventricular pacemaker     9  Hypertension  Patient blood pressure is acceptable  She is on valsartan and diuretics along with potassium  Electrolyte acceptable labs reviewed  10  Hypothyroidism  On Synthroid and TSH is acceptable  She is advised to keep her appointment  She has missed a couple appointments  All her questions answered to her satisfaction  Her cath report echo report and previous stress test report reviewed with her  Counseling :   A description of the counseling  Patient is little under stress regarding her sister's death  She was counselled  She was reassured  All her questions were answered  Advised her to lose weight  Diet was advised  Advised to decrease salt intake and decreased fluid intake  Monitor her input output  HPI :     Bob August is a 69y o  year old female who presents for follow-up  She haspast medical history significant for coronary artery disease status post coronary artery bypass surgery with 4 bypasses in 2004 and then tricuspid valve annuloplasty in January 2012, known moderate pulmonary hypertension with mild to moderate TR, recurrent abdominal hernia, history of anemia, chronic atrial fibrillation with underlying sick sinus syndrome status post Medtronic pacemaker on long-term antithrombotic therapy with Xarelto who came for regular follow-up  Patient has some dyspnea on exertion   She hasn't had any recent stress test  She also had some mild CRI creatinine stable around 1 3  He has no fever no chills  No PND, no orthopnea, mostly exertional symptoms  03/16/2020   above reviewed  Patient came for follow-up  She is doing well not been admitted to hospital since May of 2019 at that time she had a syncope episode  Echo shows EF around 45% with paradoxical septal patient and moderate MR and PA pressure was 65  She denies any chest pain  She has history of umbilical hernia, chronic shortness of breath, gait dysfunction, history of tricuspid valve annuloplasty, moderate pulmonary hypertension, CAD with CABG and history of pacemaker which is functioning adequately  She using her oxygen at nighttime as she has nocturnal hypoxemia mostly during day oxygen around 92  Today her blood pressure is acceptable heart rate 67 beats per minute  She denies any chest pain any shortness of breath but chronic shortness breath is not changed  No fever no chills no nausea no vomiting no PND no orthopnea no leg swelling  Her pacemaker was interrogation today  She still have battery life of around 2 5 years and she is ventricular paced 99% of the time  She has blood test done January 27 2020 May shows BUN 30 creatinine 1 7  Her cholesterol profile was acceptable other electrolytes were acceptable  She is scheduled to follow up with Nephrology  Review of Systems   Constitutional: Positive for fatigue  Negative for activity change, chills, diaphoresis, fever and unexpected weight change  HENT: Negative for congestion  Eyes: Negative for discharge and redness  Respiratory: Positive for shortness of breath  Negative for cough, chest tightness and wheezing  Cardiovascular: Positive for leg swelling  Negative for chest pain and palpitations  Much better   Gastrointestinal: Negative for abdominal pain, diarrhea and nausea  Endocrine: Negative  Genitourinary: Negative for decreased urine volume and urgency     Musculoskeletal: Positive for arthralgias and back pain  Negative for gait problem  Skin: Negative for rash and wound  Allergic/Immunologic: Negative  Neurological: Negative for dizziness, seizures, syncope, weakness, light-headedness and headaches  Hematological: Negative  Psychiatric/Behavioral: Negative for agitation and confusion  The patient is nervous/anxious          Historical Information   Past Medical History:   Diagnosis Date    Arthritis     Atrial flutter ([place] [address] )     Cardiac disease     Carotid artery occlusion     CHF (congestive heart failure) ([place] [address] )     Cholelithiasis     last assessed 8/8/2016    Coronary artery disease     Diabetes mellitus ([place] [address] )     Disease of thyroid gland     GERD (gastroesophageal reflux disease)     History of transfusion     Hyperlipidemia     Hypertension     Long-term insulin use in type 2 diabetes ([place] [address] ) 6/4/2016    Other specified diabetes mellitus with diabetic autonomic (poly)neuropathy ([place] [address] )     Pleural effusion 2008    Pulmonary embolism ([place] [address] )     2008    Renal disorder     Retinopathy     bilat    Sleep apnea     Subclavian artery stenosis ([place] [address] )     Umbilical hernia with obstruction, without gangrene     last assessed 7/8/2016     Past Surgical History:   Procedure Laterality Date    ABDOMINAL SURGERY      CARDIAC PACEMAKER PLACEMENT      CARDIAC SURGERY      cabg x 2    CATARACT EXTRACTION      CHOLECYSTECTOMY      CORONARY ARTERY BYPASS GRAFT  2008    EYE SURGERY      FRACTURE SURGERY Right 2010    shoulder    HERNIA REPAIR      umbilical    MD LAP,CHOLECYSTECTOMY N/A 8/10/2016    Procedure: CHOLECYSTECTOMY LAPAROSCOPIC;  Surgeon: Marce Clark MD;  [facility];  Service: General     [address] Left 11/4/2016    Procedure: MICRODIRECT LARYNGOSCOPY; LEFT VOCAL FOLD INJECTION ;  Surgeon: Amaury Lundberg MD;  [facility];  Service: ENT    MD REPAIR UMBILICAL DQER,2+H/J,ZKYPB N/A 8/10/2016    Procedure: LAPAROSCOPIC REPAIR HERNIA VENTRAL;  Surgeon: Branden Cummins MD;  [facility];  Service: General    [address] / STENTING Right     TRICUSPID VALVE REPLACEMENT      VASCULAR SURGERY      heart valve replacement     Social History     Substance and Sexual Activity   Alcohol Use Not Currently    Frequency: Never    Drinks per session: Patient refused    Binge frequency: Never    Comment: only on NEW YEAR'S     Social History     Substance and Sexual Activity   Drug Use Never    Types: Oxycodone     Social History     Tobacco Use   Smoking Status Never Smoker   Smokeless Tobacco Never Used     Family History:   Family History   Problem Relation Age of Onset    Heart disease Mother     Breast cancer Maternal Aunt     Heart disease Sister        Meds/Allergies     Allergies   Allergen Reactions    Bromide Ion [Bromine]      Blurred vision  Has preservative that  Pt cannot use    Other Blisters     Adhesive tape    Ibuprofen Palpitations    Penicillins Rash       Current Outpatient Medications:     ACCU-CHEK CAITLIN PLUS test strip, USE BY TEST ROUTE 3 TIMES EVERY DAY, Disp: , Rfl:     COMBIGAN 0 2-0 5 %, Administer 1 drop to both eyes 2 (two) times a day, Disp: , Rfl:     famotidine (PEPCID) 20 mg tablet, Take 1 tablet (20 mg total) by mouth 2 (two) times a day, Disp: 60 tablet, Rfl: 11    furosemide (LASIX) 80 mg tablet, TAKE 1 TABLET DAILY AS DIRECTED   (Patient taking differently: Take 80 mg by mouth daily Pt taking 120mg daily per Dr Monroy Never), Disp: 30 tablet, Rfl: 5    insulin aspart (NOVOLOG FLEXPEN) 100 Units/mL injection pen, Inject under the [id number] units 3 times a day with meals Use carbohydrate to insulin ratio 1:3 which means 1 unit of NovoLog for every 3 gm of carbohydrates, Disp: 15 pen, Rfl: 11    insulin glargine (BASAGLAR KWIKPEN) 100 units/mL injection pen, Inject 16 Units under the skin daily, Disp: 5 pen, Rfl: 3    lactulose 20 g/30 mL, Take 15 mL (10 g total) by mouth 2 (two) times a day, Disp: 236 mL, Rfl: 6    levothyroxine (SYNTHROID) 137 mcg tablet, Take 137 mcg by mouth daily  , Disp: , Rfl:     metolazone (ZAROXOLYN) 2 5 mg tablet, TAKE 1 TABLET BY MOUTH TWICE WEEKLY (Patient taking differently: 2 (two) times a day as needed Pt taking twice weekly per Dr Annie Morrow  12/24/2019), Disp: 10 tablet, Rfl: 0    Multiple Vitamins-Minerals (PRESERVISION AREDS) CAPS, Take by mouth 2 (two) times a day, Disp: , Rfl:     pantoprazole (PROTONIX) 40 mg tablet, Take 40 mg by mouth daily in the early morning , Disp: , Rfl:     potassium chloride (K-DUR,KLOR-CON) 10 mEq tablet, Take 1 tablet (10 mEq total) by mouth 2 (two) times a week With the extra furosemide, Disp: 15 tablet, Rfl: 3    simvastatin (ZOCOR) 20 mg tablet, TAKE 1 TABLET (20MG) BY ORAL ROUTE EVERY DAY IN THE EVENING, Disp: 90 tablet, Rfl: 2    traMADol (ULTRAM) 50 mg tablet, Take 1 tablet (50 mg total) by mouth every 6 (six) hours as needed for moderate pain, Disp: 30 tablet, Rfl: 0    valsartan (DIOVAN) 80 mg tablet, Take 2 tablets (160 mg total) by mouth daily, Disp: 60 tablet, Rfl: 0    XARELTO 15 MG tablet, TAKE 1 TAB BY MOUTH DAILY WITH EVENEING MEAL , Disp: 30 tablet, Rfl: 2    linaCLOtide (LINZESS) 145 MCG CAPS, Take 1 capsule (145 mcg total) by mouth daily (Patient not taking: Reported on 2/10/2020), Disp: 30 capsule, Rfl: 4    Vitals: Blood pressure 124/74, pulse 71, height 5' 5" (1 651 m), weight 92 5 kg (204 lb), SpO2 94 %, not currently breastfeeding  Body mass index is 33 95 kg/m²  Physical Exam:  Physical Exam   Constitutional: She is oriented to person, place, and time  She appears well-developed and well-nourished  No distress  HENT:   Head: Normocephalic and atraumatic  Eyes: Pupils are equal, round, and reactive to light  Neck: Neck supple  No JVD present  No tracheal deviation present  No thyromegaly present     Cardiovascular: Normal rate, regular rhythm, S1 normal and S2 normal  Exam reveals no gallop, no S3, no S4, no distant heart sounds and no friction rub  Murmur heard  Systolic (ejection) murmur is present with a grade of 2/6  S1-S2 regular as she is 100% paced  There is a pansystolic murmur at left lower sternal border   Pulmonary/Chest: Effort normal  No respiratory distress  She has no wheezes  She has no rales  She exhibits no tenderness  Bilateral air entry coarse breath sound   Abdominal: Soft  Bowel sounds are normal  She exhibits no distension  There is no tenderness  Musculoskeletal: She exhibits no edema or deformity  Minimal edema   Neurological: She is alert and oriented to person, place, and time  Skin: Skin is warm and dry  No rash noted  She is not diaphoretic  No pallor  Psychiatric: She has a normal mood and affect  Her behavior is normal  Judgment normal          Diagnostic cardiac testing review:    Cardiac catheterization:  Cardiac catheterization done in February of 2018 shows severe three-vessel coronary artery disease  Distal left main has 70%  %  Proximal circ 70%, obtuse marginal 100%, RPDA 100% occluded  Patient had patent LIMA to LAD, SVG vein graft to diagonal, circumflex as well as RPDA  LV-gram was not done due to CRI  Echo Doppler:  Echo Doppler done in February of 2018 shows EF around 45%, dilated right-sided chambers, moderate to severe TR with PA pressure 65 mm of mercury, mild AI, mild MR, moderate to markedly dilated left atrium, moderately dilated right atrium Paradoxical septal motion noted    Stress test:  Nuclear stress test done in 2016 was abnormal   EF was 57%  Paradoxical septal motion was noted  Nuclear stress test in March of 2017 shows is fixed apical wall defect with prior infarct  No ischemia EF was 57%    :  Cardiac catheterization  In February of 2018  Findings:     1  Dominance: Right dominant coronary system     2  Left main Coronary artery: Normal size vessels   It bifurcates into large LAD and a nondominant but medium-size circumflex system  Distal left main has around 70% stenosis  I     3  Left anterior descending artery: LAD is a large-size vessel and in the mid it is 100% occluded  Competitive flow is seen in LAD from LIMA  A diagonal has also patent graft from aorta      4  Circumflex Coronary artery: Circumflex is a nondominant medium size vessel, it is still a medium to large size artery  It has proximally around 70%  OM1 is 100% occluded with competitive flow  AV groove circumflex is small         5  Right coronary artery: RCA is normal size vessel and it is 100% occluded in the mid area  SVG vein graft to RPDA is widely patent        5  Left ventriculogram: LV gram was not done due to CRI  LVEDP was around 16  There was no gradient across aortic valve    Pacemaker interrogation:  Patient has Medtronic pacemaker which is functioning adequately its VVI mode  Patient is paced 100% all the time  EC lead EKG done on 2018 shows atrial fibrillation with few paced beats  Heart rate is 65 beats per minute  Patient has known Medtronic pacemaker  It is a VVI type  2018  Repeat EKG shows paced rhythm heart rate 78 beats per minute  Underlying is AFib    Twelve lead EKG done in our office 2019 shows atrial fibrillation underlying with ventricular paced heart rate 66 beats per minute  No change from old EKG  Twelve lead EKG done 2019 shows 100 percent paced rhythm  She has underlying atrial fibrillation  No change from old EKG heart rate 83 beats per minute  Results of echo and stress test reviewed with patient  Labs done 2018 shows sodium 139 potassium 3 4 BUN 67 creatinine 1 45 GFR is 37  Twelve lead EKG 2020 shows ventricular paced heart rate 67 beats per minute underlying atrial fibrillation  She is 100% paced      Lab Review   Lab Results   Component Value Date    WBC 5 15 2019    HGB 11 1 (L) 2019    HCT 37 6 12/24/2019    MCV 87 12/24/2019     12/24/2019     Lab Results   Component Value Date    K 4 2 01/27/2020    CL 99 (L) 01/27/2020    CO2 30 01/27/2020    BUN 81 (H) 01/27/2020    CREATININE 1 73 (H) 01/27/2020    GLUF 145 (H) 01/27/2020    CALCIUM 8 9 01/27/2020    AST 19 12/24/2019    ALT 17 12/24/2019    ALKPHOS 135 (H) 12/24/2019    PROT 7 9 09/21/2016    EGFR 29 01/27/2020     Lab Results   Component Value Date    CALCIUM 8 9 01/27/2020    K 4 2 01/27/2020    CO2 30 01/27/2020    CL 99 (L) 01/27/2020    BUN 81 (H) 01/27/2020    CREATININE 1 73 (H) 01/27/2020     Dr Shaun Miller MD [facility] [place]      "This note has been constructed using a voice recognition system  Therefore there may be syntax, spelling, and/or grammatical errors   Please call if you have any questions  "

## 2020-03-12 NOTE — PROGRESS NOTES
PT Discharge Note    Today's date: 3/12/2020  Patient name: Merlinda Bran  : 1950  MRN: [id number]  Referring provider: Sahra Cabezas MD  Dx:   Encounter Diagnosis     ICD-10-CM    1  Frequent falls R29 6                   Assessment  Assessment details: Patient is 77 y/o female presenting to skilled PT for frequent falls  She has made progress in all outcome measures, except gait speed, since last progress note  Pt choosing to self discharge today due to corona virus and pt wishing to take extra precautions against it  Pt's son present to videotape pt performing exercises to use for HEP  Instructed pt to return to PT once she feels comfortable and safe entering the community again  Impairments: abnormal gait, activity intolerance, impaired balance, impaired physical strength, lacks appropriate home exercise program and safety issue  Understanding of Dx/Px/POC: good   Prognosis: good    Goals  Goals  STG 30 days    1  Patient will improve static balance with feet together Eyes Open Firm surface  to 30 seconds indicating reduction in fall risk -MET  2  Patient will achieve 190 feet improvement with overall distance achieved of 465 feet with 6 minute walk test which is Minimal Detectable Change pre current research standards with endurance to demonstrate enhance functional capacity - NOT MET  3  Patient will display 2 3  second improvement with overall score of < 30 seconds  with TUG test or lower with noted improvement being Minimal Detectable Change pre current research standards with fall risk   MET  4  Patient will achieve 36/56 PRATT score with minimal improve by 6 points or more demonstrating Minimal Detectable change per current research standards for this objective test which assess fall risk  MET  5  Patient will achieve > 0 55 m/s with 10 Meter Walk test, demonstrating Minimal Detectable change per current research standards for this objective test which assess fall risk  MET  6  Patient will perform  5 x sit to stand test with overall reduction by seconds to < 23 seconds score indicating improvement with functional endurance MET    LT days   1  Patient will score low risk for falls with 3/4 fall risk measures -NOT MET  2  Patient will be able to ambulate 1,000 feet with AD during 6 minute walk test -NOT MET  3  Patient will be able to perform floor transfer without physical assistance -NOT MET  4  Patient will be able to carry objects without loss of balance - NOT MET  5  Patient will be able to ambulate outdoors without any loss of balance-NOT MET  6  Patient will negotiate 6 steps w/ 1 HR + SPC mod I without loss of balance   MET    Cut off score   All date taken from [facility] or Rehab Measures    PRATT test: 46/56                                              5 x STS Test:  MDC: 6 points                                                  MDC: 2 3 seconds   age norms                                                                 Age Norms   61-76 year old = M: 54, F: 55                        62-78 year old: 11 4 seconds   66-77 year old = M 47,  F: 50                       71-76 year old: 12 6 seconds    80-80 year old = M46,   F: 53                       80-80 year old: 14 8 seconds     TUG test:                                                                     10 Meter Walk Test:  MDC: 4 14 seconds       MDC:  59 ft/sec  Cut off score for Falls                                                  Age Norms  > 13 5 seconds community dwelling adults                20-29; M: 4 56 ft/sec F: 4 62 ft/sec  > 32 2 Frail Elderly                                                     30-39: M 4 76 ft/sec  F: 4 68 ft/sec          40-49: M: 4 79 ft/sec  F: 4 62 ft/sec  6 Minute Walk Test      50-59: M: 4 76 ft/sec  F: 4 56 ft/sec  MDC: 190 feet       60-69: M: 4 56 ft/sec  F: 4 26 ft/sec  Age Norms       70-+    M: 4 36 ft/sec  F: 4 16 ft/sec  60-69:    M: 1876 F: 1765  70-79:    M: 1729 F: 1545  80-89 +: M: 80 F; 1286     Plan  Patient would benefit from: skilled physical therapy  Planned modality interventions: biofeedback  Planned therapy interventions: manual therapy, motor coordination training, neuromuscular re-education, patient education, postural training, sensory integrative techniques, strengthening, stretching, therapeutic activities, therapeutic exercise, gait training, home exercise program, coordination, balance, therapeutic training, transfer training, community reintegration and flexibility  Frequency: 2x week  Duration in weeks: 12  Plan of Care beginning date: 2020  Plan of Care expiration date: 3/31/2020  Treatment plan discussed with: patient        Subjective Evaluation    History of Present Illness  Mechanism of injury: IE: Pt reports 2 falls over the past year (May 2019 and Dec 2019)  She uses SPC primarily, sometimes furniture walks in her house and at night uses RW because she feels weaker  Both times she fell she was using RW and she fell straight backwards  She is currently wearing R wrist brace for the past day due to her wrist starting to hurt, unrelated to her fall  MI: Pt reports no falls since beginning PT  She has been out for past 2 weeks due to having the flu  Overall feeling much better now  D/C Note: Pt wants to D/C from skilled PT at this time due to wanting to take extra precautions due to corona virus  She will continue w/ HEP     Pain  No pain reported  Current pain ratin  At best pain ratin  At worst pain ratin    Social Support  Steps to enter house: yes (6 steps to enter)  Stairs in house: no   Lives in: multiple-level home  Lives with: adult children (son Maria Elena Lung )    Patient Goals  Patient goals for therapy: improved balance, independence with ADLs/IADLs, return to sport/leisure activities and increased strength  Patient goal: improve my balance, walk up/down inclines         Objective     Strength/Myotome Testing     Left Hip Planes of Motion   Flexion: 3+  Extension: 3+  Abduction: 3+  Adduction: 3+    Right Hip   Planes of Motion   Flexion: 4-  Extension: 4-  Abduction: 4-  Adduction: 4-    Left Knee   Flexion: 4-  Extension: 4-    Right Knee   Flexion: 4-  Extension: 4-    Left Ankle/Foot   Dorsiflexion: 4  Plantar flexion: 4-    Right Ankle/Foot   Dorsiflexion: 4  Plantar flexion: 4-    Functional Assessment        Comments  Vision: retinopathy 2* diabetes, reports blurry vision @ times  Sensation: neuropathy 2* diabetes  Strength: see MMT     5x STS= 23 76 sec, with UE use  Day= 30/56  TUG= 36 3 sec w/ SPC  Gait speed= 0 44 m/s w/ SPC  6MWT=  275 ft w/ SPC      AK 2/11:  5x STS= 15 6 sec, with UE use  Day= 35/56   TUG= 20 66 sec w/ SPC  Gait speed= 10m/ 13 55 sec= 0 74 m/s w/ SPC  6MWT= 430 ft w/ SPC (SpO2 85% afterwards)    Discharge 3/12:  5x STS= 14 02 sec, w/ 1 UE use  Day= 40/56  TUG= 17 30 sec w/ SPC  Gait speed= 10m/ 14 90 sec= 0 67 m/s w/ SPC  6MWT= 410 ft w/ SPC             Precautions:   Past Medical History:   Diagnosis Date    Arthritis     Atrial flutter (HCC)     Cardiac disease     Carotid artery occlusion     CHF (congestive heart failure) (HCC)     Cholelithiasis     last assessed 8/8/2016    Coronary artery disease     Diabetes mellitus ([address] )     Disease of thyroid gland     GERD (gastroesophageal reflux disease)     History of transfusion     Hyperlipidemia     Hypertension     Long-term insulin use in type 2 diabetes ([address] ) 6/4/2016    Other specified diabetes mellitus with diabetic autonomic (poly)neuropathy ([address] )     Pleural effusion 2008    Pulmonary embolism ([address] )     2008    Renal disorder     Retinopathy     bilat    Sleep apnea     Subclavian artery stenosis ([address] )     Umbilical hernia with obstruction, without gangrene     last assessed 7/8/2016

## 2020-03-16 ENCOUNTER — IN-CLINIC DEVICE VISIT (OUTPATIENT)
Dept: CARDIOLOGY CLINIC | Facility: CLINIC | Age: 70
End: 2020-03-16
Payer: MEDICARE

## 2020-03-16 ENCOUNTER — OFFICE VISIT (OUTPATIENT)
Dept: CARDIOLOGY CLINIC | Facility: CLINIC | Age: 70
End: 2020-03-16
Payer: MEDICARE

## 2020-03-16 VITALS
OXYGEN SATURATION: 94 % | HEIGHT: 65 IN | BODY MASS INDEX: 33.99 KG/M2 | DIASTOLIC BLOOD PRESSURE: 74 MMHG | HEART RATE: 71 BPM | SYSTOLIC BLOOD PRESSURE: 124 MMHG | WEIGHT: 204 LBS

## 2020-03-16 DIAGNOSIS — I10 ESSENTIAL HYPERTENSION: ICD-10-CM

## 2020-03-16 DIAGNOSIS — E78.2 MIXED HYPERLIPIDEMIA: ICD-10-CM

## 2020-03-16 DIAGNOSIS — Z95.0 PRESENCE OF PERMANENT CARDIAC PACEMAKER: Primary | ICD-10-CM

## 2020-03-16 DIAGNOSIS — I27.20 MILD PULMONARY HYPERTENSION (HCC): ICD-10-CM

## 2020-03-16 DIAGNOSIS — I25.810 CORONARY ARTERY DISEASE INVOLVING CORONARY BYPASS GRAFT OF NATIVE HEART WITHOUT ANGINA PECTORIS: ICD-10-CM

## 2020-03-16 DIAGNOSIS — Z95.1 H/O CORONARY ARTERY BYPASS SURGERY: ICD-10-CM

## 2020-03-16 DIAGNOSIS — E66.09 CLASS 1 OBESITY DUE TO EXCESS CALORIES WITH SERIOUS COMORBIDITY AND BODY MASS INDEX (BMI) OF 33.0 TO 33.9 IN ADULT: ICD-10-CM

## 2020-03-16 DIAGNOSIS — I50.42 CHRONIC COMBINED SYSTOLIC AND DIASTOLIC CONGESTIVE HEART FAILURE (HCC): ICD-10-CM

## 2020-03-16 DIAGNOSIS — I48.20 CHRONIC ATRIAL FIBRILLATION (HCC): ICD-10-CM

## 2020-03-16 DIAGNOSIS — Z98.890 H/O TRICUSPID VALVE ANNULOPLASTY: ICD-10-CM

## 2020-03-16 PROCEDURE — 3008F BODY MASS INDEX DOCD: CPT | Performed by: INTERNAL MEDICINE

## 2020-03-16 PROCEDURE — 3051F HG A1C>EQUAL 7.0%<8.0%: CPT | Performed by: INTERNAL MEDICINE

## 2020-03-16 PROCEDURE — 93000 ELECTROCARDIOGRAM COMPLETE: CPT | Performed by: INTERNAL MEDICINE

## 2020-03-16 PROCEDURE — 99214 OFFICE O/P EST MOD 30 MIN: CPT | Performed by: INTERNAL MEDICINE

## 2020-03-16 PROCEDURE — 1036F TOBACCO NON-USER: CPT | Performed by: INTERNAL MEDICINE

## 2020-03-16 PROCEDURE — 3078F DIAST BP <80 MM HG: CPT | Performed by: INTERNAL MEDICINE

## 2020-03-16 PROCEDURE — 3074F SYST BP LT 130 MM HG: CPT | Performed by: INTERNAL MEDICINE

## 2020-03-16 PROCEDURE — 93279 PRGRMG DEV EVAL PM/LDLS PM: CPT | Performed by: INTERNAL MEDICINE

## 2020-03-16 PROCEDURE — 3066F NEPHROPATHY DOC TX: CPT | Performed by: INTERNAL MEDICINE

## 2020-03-16 PROCEDURE — 1160F RVW MEDS BY RX/DR IN RCRD: CPT | Performed by: INTERNAL MEDICINE

## 2020-03-16 PROCEDURE — 4040F PNEUMOC VAC/ADMIN/RCVD: CPT | Performed by: INTERNAL MEDICINE

## 2020-03-16 NOTE — PROGRESS NOTES
MDT DUAL PM - NOT MRI CONDITIONAL  DEVICE INTERROGATED IN THE [place] OFFICE:  BATTERY VOLTAGE ADEQUATE (2 5 YR)    99 7% (VVIR 60 PPM)    ALL LEAD PARAMETERS WITHIN NORMAL LIMITS   NO SIGNIFICANT HIGH RATE EPISODES   NO PROGRAMMING CHANGES MADE TO DEVICE PARAMETERS   NORMAL DEVICE FUNCTION   RG

## 2020-03-17 ENCOUNTER — APPOINTMENT (OUTPATIENT)
Dept: PHYSICAL THERAPY | Facility: CLINIC | Age: 70
End: 2020-03-17
Payer: MEDICARE

## 2020-03-19 ENCOUNTER — APPOINTMENT (OUTPATIENT)
Dept: PHYSICAL THERAPY | Facility: CLINIC | Age: 70
End: 2020-03-19
Payer: MEDICARE

## 2020-03-19 DIAGNOSIS — I10 ESSENTIAL HYPERTENSION: Primary | ICD-10-CM

## 2020-03-19 RX ORDER — VALSARTAN 80 MG/1
160 TABLET ORAL DAILY
Qty: 180 TABLET | Refills: 2 | Status: SHIPPED | OUTPATIENT
Start: 2020-03-19 | End: 2020-06-24 | Stop reason: ALTCHOICE

## 2020-03-24 ENCOUNTER — APPOINTMENT (OUTPATIENT)
Dept: PHYSICAL THERAPY | Facility: CLINIC | Age: 70
End: 2020-03-24
Payer: MEDICARE

## 2020-03-26 ENCOUNTER — APPOINTMENT (OUTPATIENT)
Dept: PHYSICAL THERAPY | Facility: CLINIC | Age: 70
End: 2020-03-26
Payer: MEDICARE

## 2020-04-03 PROBLEM — J34.89 DRY NOSE: Status: ACTIVE | Noted: 2020-04-03

## 2020-04-21 DIAGNOSIS — E11.40 TYPE 2 DIABETES MELLITUS WITH DIABETIC NEUROPATHY, WITH LONG-TERM CURRENT USE OF INSULIN (HCC): Primary | ICD-10-CM

## 2020-04-21 DIAGNOSIS — Z79.4 TYPE 2 DIABETES MELLITUS WITH DIABETIC NEUROPATHY, WITH LONG-TERM CURRENT USE OF INSULIN (HCC): Primary | ICD-10-CM

## 2020-04-21 RX ORDER — LEVOTHYROXINE SODIUM 137 UG/1
137 TABLET ORAL DAILY
Qty: 90 TABLET | Refills: 3 | Status: SHIPPED | OUTPATIENT
Start: 2020-04-21

## 2020-04-23 ENCOUNTER — FOLLOW UP (OUTPATIENT)
Dept: URBAN - METROPOLITAN AREA CLINIC 27 | Facility: CLINIC | Age: 70
End: 2020-04-23

## 2020-04-23 DIAGNOSIS — E11.3413: ICD-10-CM

## 2020-04-23 DIAGNOSIS — H40.053: ICD-10-CM

## 2020-04-23 PROCEDURE — 92134 CPTRZ OPH DX IMG PST SGM RTA: CPT

## 2020-04-23 PROCEDURE — PFS3 LUCENTIS 0.3MG PREFILLED SYRINGE

## 2020-04-23 PROCEDURE — 92012 INTRM OPH EXAM EST PATIENT: CPT | Mod: 25

## 2020-04-23 PROCEDURE — 67028 INJECTION EYE DRUG: CPT

## 2020-04-23 ASSESSMENT — VISUAL ACUITY
OD_CC: 20/160
OS_CC: 20/160

## 2020-04-23 ASSESSMENT — TONOMETRY
OS_IOP_MMHG: 17
OD_IOP_MMHG: 15

## 2020-04-27 DIAGNOSIS — I48.20 CHRONIC ATRIAL FIBRILLATION (HCC): ICD-10-CM

## 2020-05-11 ENCOUNTER — TELEMEDICINE (OUTPATIENT)
Dept: ENDOCRINOLOGY | Facility: CLINIC | Age: 70
End: 2020-05-11
Payer: MEDICARE

## 2020-05-11 DIAGNOSIS — I25.810 CORONARY ARTERY DISEASE INVOLVING CORONARY BYPASS GRAFT OF NATIVE HEART WITHOUT ANGINA PECTORIS: ICD-10-CM

## 2020-05-11 DIAGNOSIS — Z79.4 TYPE 2 DIABETES MELLITUS WITH RETINOPATHY AND MACULAR EDEMA, WITH LONG-TERM CURRENT USE OF INSULIN, UNSPECIFIED LATERALITY, UNSPECIFIED RETINOPATHY SEVERITY (HCC): Primary | ICD-10-CM

## 2020-05-11 DIAGNOSIS — Z79.4 TYPE 2 DIABETES MELLITUS WITH DIABETIC NEUROPATHY, WITH LONG-TERM CURRENT USE OF INSULIN (HCC): ICD-10-CM

## 2020-05-11 DIAGNOSIS — E11.65 TYPE 2 DIABETES MELLITUS WITH HYPERGLYCEMIA, WITH LONG-TERM CURRENT USE OF INSULIN (HCC): ICD-10-CM

## 2020-05-11 DIAGNOSIS — Z79.4 TYPE 2 DIABETES MELLITUS WITH HYPERGLYCEMIA, WITH LONG-TERM CURRENT USE OF INSULIN (HCC): ICD-10-CM

## 2020-05-11 DIAGNOSIS — E03.9 ACQUIRED HYPOTHYROIDISM: ICD-10-CM

## 2020-05-11 DIAGNOSIS — E78.2 MIXED HYPERLIPIDEMIA: ICD-10-CM

## 2020-05-11 DIAGNOSIS — N18.30 STAGE 3 CHRONIC KIDNEY DISEASE (HCC): ICD-10-CM

## 2020-05-11 DIAGNOSIS — E11.311 TYPE 2 DIABETES MELLITUS WITH RETINOPATHY AND MACULAR EDEMA, WITH LONG-TERM CURRENT USE OF INSULIN, UNSPECIFIED LATERALITY, UNSPECIFIED RETINOPATHY SEVERITY (HCC): Primary | ICD-10-CM

## 2020-05-11 DIAGNOSIS — I10 ESSENTIAL HYPERTENSION: ICD-10-CM

## 2020-05-11 DIAGNOSIS — E11.40 TYPE 2 DIABETES MELLITUS WITH DIABETIC NEUROPATHY, WITH LONG-TERM CURRENT USE OF INSULIN (HCC): ICD-10-CM

## 2020-05-11 PROCEDURE — 99214 OFFICE O/P EST MOD 30 MIN: CPT | Performed by: INTERNAL MEDICINE

## 2020-05-14 ENCOUNTER — TELEMEDICINE (OUTPATIENT)
Dept: FAMILY MEDICINE CLINIC | Facility: CLINIC | Age: 70
End: 2020-05-14
Payer: MEDICARE
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
## 2020-05-14 PROCEDURE — 99441 PR PHYS/QHP TELEPHONE EVALUATION 5-10 MIN: CPT | Performed by: NURSE PRACTITIONER

## 2020-05-26 ENCOUNTER — TELEMEDICINE (OUTPATIENT)
Dept: ENDOCRINOLOGY | Facility: CLINIC | Age: 70
End: 2020-05-26
Payer: MEDICARE

## 2020-05-26 DIAGNOSIS — E03.9 ACQUIRED HYPOTHYROIDISM: ICD-10-CM

## 2020-05-26 DIAGNOSIS — E11.311 TYPE 2 DIABETES MELLITUS WITH RETINOPATHY AND MACULAR EDEMA, WITH LONG-TERM CURRENT USE OF INSULIN, UNSPECIFIED LATERALITY, UNSPECIFIED RETINOPATHY SEVERITY (HCC): Primary | ICD-10-CM

## 2020-05-26 DIAGNOSIS — Z79.4 TYPE 2 DIABETES MELLITUS WITH HYPERGLYCEMIA, WITH LONG-TERM CURRENT USE OF INSULIN (HCC): ICD-10-CM

## 2020-05-26 DIAGNOSIS — E78.2 MIXED HYPERLIPIDEMIA: ICD-10-CM

## 2020-05-26 DIAGNOSIS — Z79.4 TYPE 2 DIABETES MELLITUS WITH RETINOPATHY AND MACULAR EDEMA, WITH LONG-TERM CURRENT USE OF INSULIN, UNSPECIFIED LATERALITY, UNSPECIFIED RETINOPATHY SEVERITY (HCC): Primary | ICD-10-CM

## 2020-05-26 DIAGNOSIS — N18.30 STAGE 3 CHRONIC KIDNEY DISEASE (HCC): ICD-10-CM

## 2020-05-26 DIAGNOSIS — I10 ESSENTIAL HYPERTENSION: ICD-10-CM

## 2020-05-26 DIAGNOSIS — E11.65 TYPE 2 DIABETES MELLITUS WITH HYPERGLYCEMIA, WITH LONG-TERM CURRENT USE OF INSULIN (HCC): ICD-10-CM

## 2020-05-26 DIAGNOSIS — I25.810 CORONARY ARTERY DISEASE INVOLVING CORONARY BYPASS GRAFT OF NATIVE HEART WITHOUT ANGINA PECTORIS: ICD-10-CM

## 2020-05-26 PROCEDURE — 99214 OFFICE O/P EST MOD 30 MIN: CPT | Performed by: INTERNAL MEDICINE

## 2020-06-01 ENCOUNTER — OFFICE VISIT (OUTPATIENT)
Dept: PULMONOLOGY | Facility: MEDICAL CENTER | Age: 70
End: 2020-06-01
Payer: MEDICARE

## 2020-06-01 VITALS
OXYGEN SATURATION: 81 % | RESPIRATION RATE: 16 BRPM | DIASTOLIC BLOOD PRESSURE: 68 MMHG | WEIGHT: 204 LBS | TEMPERATURE: 96.8 F | SYSTOLIC BLOOD PRESSURE: 130 MMHG | BODY MASS INDEX: 33.99 KG/M2 | HEIGHT: 65 IN | HEART RATE: 65 BPM

## 2020-06-01 DIAGNOSIS — J38.01 PARESIS OF LEFT VOCAL FOLD: ICD-10-CM

## 2020-06-01 DIAGNOSIS — R09.02 HYPOXEMIA: ICD-10-CM

## 2020-06-01 DIAGNOSIS — R06.02 SOB (SHORTNESS OF BREATH) ON EXERTION: Primary | ICD-10-CM

## 2020-06-01 PROCEDURE — 1036F TOBACCO NON-USER: CPT | Performed by: INTERNAL MEDICINE

## 2020-06-01 PROCEDURE — 4040F PNEUMOC VAC/ADMIN/RCVD: CPT | Performed by: INTERNAL MEDICINE

## 2020-06-01 PROCEDURE — 3051F HG A1C>EQUAL 7.0%<8.0%: CPT | Performed by: INTERNAL MEDICINE

## 2020-06-01 PROCEDURE — 3066F NEPHROPATHY DOC TX: CPT | Performed by: INTERNAL MEDICINE

## 2020-06-01 PROCEDURE — 1160F RVW MEDS BY RX/DR IN RCRD: CPT | Performed by: INTERNAL MEDICINE

## 2020-06-01 PROCEDURE — 3078F DIAST BP <80 MM HG: CPT | Performed by: INTERNAL MEDICINE

## 2020-06-01 PROCEDURE — 99214 OFFICE O/P EST MOD 30 MIN: CPT | Performed by: INTERNAL MEDICINE

## 2020-06-01 PROCEDURE — 3075F SYST BP GE 130 - 139MM HG: CPT | Performed by: INTERNAL MEDICINE

## 2020-06-01 RX ORDER — INSULIN GLARGINE 100 [IU]/ML
INJECTION, SOLUTION SUBCUTANEOUS
COMMUNITY
End: 2020-06-24 | Stop reason: DRUGHIGH

## 2020-06-01 RX ORDER — PEN NEEDLE, DIABETIC 31 GX5/16"
NEEDLE, DISPOSABLE MISCELLANEOUS 4 TIMES DAILY
COMMUNITY
Start: 2020-04-06

## 2020-06-01 RX ORDER — OMEPRAZOLE 20 MG/1
20 CAPSULE, DELAYED RELEASE ORAL
COMMUNITY
End: 2020-10-02 | Stop reason: HOSPADM

## 2020-06-01 RX ORDER — ASPIRIN 81 MG/1
TABLET ORAL
COMMUNITY
End: 2020-06-24 | Stop reason: ALTCHOICE

## 2020-06-01 RX ORDER — METOPROLOL SUCCINATE 50 MG/1
50 TABLET, EXTENDED RELEASE ORAL DAILY
COMMUNITY
End: 2021-01-15 | Stop reason: SDUPTHER

## 2020-06-01 RX ORDER — DOCUSATE SODIUM 100 MG/1
CAPSULE, LIQUID FILLED ORAL
COMMUNITY
End: 2020-10-13 | Stop reason: ALTCHOICE

## 2020-06-18 DIAGNOSIS — I50.32 CHRONIC DIASTOLIC HEART FAILURE (HCC): ICD-10-CM

## 2020-06-18 RX ORDER — FUROSEMIDE 80 MG
80 TABLET ORAL DAILY
Qty: 120 TABLET | Refills: 3 | Status: SHIPPED | OUTPATIENT
Start: 2020-06-18 | End: 2020-10-02 | Stop reason: HOSPADM

## 2020-06-19 ENCOUNTER — APPOINTMENT (EMERGENCY)
Dept: RADIOLOGY | Facility: HOSPITAL | Age: 70
End: 2020-06-19
Payer: MEDICARE

## 2020-06-19 ENCOUNTER — HOSPITAL ENCOUNTER (EMERGENCY)
Facility: HOSPITAL | Age: 70
Discharge: HOME | End: 2020-06-19
Attending: EMERGENCY MEDICINE
Payer: MEDICARE

## 2020-06-19 VITALS
HEART RATE: 65 BPM | TEMPERATURE: 97.7 F | RESPIRATION RATE: 24 BRPM | DIASTOLIC BLOOD PRESSURE: 74 MMHG | OXYGEN SATURATION: 96 % | SYSTOLIC BLOOD PRESSURE: 166 MMHG

## 2020-06-19 DIAGNOSIS — I50.9 CHRONIC CONGESTIVE HEART FAILURE (HCC): ICD-10-CM

## 2020-06-19 DIAGNOSIS — H53.9 VISUAL DISTURBANCE: Primary | ICD-10-CM

## 2020-06-19 LAB
ALBUMIN SERPL BCP-MCNC: 3.2 G/DL (ref 3.5–5)
ALP SERPL-CCNC: 110 U/L (ref 46–116)
ALT SERPL W P-5'-P-CCNC: 19 U/L (ref 12–78)
ANION GAP SERPL CALCULATED.3IONS-SCNC: 10 MMOL/L (ref 4–13)
AST SERPL W P-5'-P-CCNC: 42 U/L (ref 5–45)
BACTERIA UR QL AUTO: ABNORMAL /HPF
BASOPHILS # BLD AUTO: 0.07 THOUSANDS/ΜL (ref 0–0.1)
BASOPHILS NFR BLD AUTO: 1 % (ref 0–1)
BILIRUB SERPL-MCNC: 0.7 MG/DL (ref 0.2–1)
BILIRUB UR QL STRIP: NEGATIVE
BUN SERPL-MCNC: 60 MG/DL (ref 5–25)
CALCIUM SERPL-MCNC: 8.5 MG/DL (ref 8.3–10.1)
CHLORIDE SERPL-SCNC: 101 MMOL/L (ref 100–108)
CLARITY UR: ABNORMAL
CO2 SERPL-SCNC: 28 MMOL/L (ref 21–32)
COLOR UR: YELLOW
CREAT SERPL-MCNC: 1.81 MG/DL (ref 0.6–1.3)
EOSINOPHIL # BLD AUTO: 0.23 THOUSAND/ΜL (ref 0–0.61)
EOSINOPHIL NFR BLD AUTO: 4 % (ref 0–6)
ERYTHROCYTE [DISTWIDTH] IN BLOOD BY AUTOMATED COUNT: 21.1 % (ref 11.6–15.1)
GFR SERPL CREATININE-BSD FRML MDRD: 28 ML/MIN/1.73SQ M
GLUCOSE SERPL-MCNC: 190 MG/DL (ref 65–140)
GLUCOSE UR STRIP-MCNC: NEGATIVE MG/DL
HCT VFR BLD AUTO: 40.1 % (ref 34.8–46.1)
HGB BLD-MCNC: 11.9 G/DL (ref 11.5–15.4)
HGB UR QL STRIP.AUTO: ABNORMAL
HYALINE CASTS #/AREA URNS LPF: ABNORMAL /LPF
IMM GRANULOCYTES # BLD AUTO: 0.02 THOUSAND/UL (ref 0–0.2)
IMM GRANULOCYTES NFR BLD AUTO: 0 % (ref 0–2)
KETONES UR STRIP-MCNC: NEGATIVE MG/DL
LEUKOCYTE ESTERASE UR QL STRIP: ABNORMAL
LYMPHOCYTES # BLD AUTO: 0.87 THOUSANDS/ΜL (ref 0.6–4.47)
LYMPHOCYTES NFR BLD AUTO: 16 % (ref 14–44)
MCH RBC QN AUTO: 26.3 PG (ref 26.8–34.3)
MCHC RBC AUTO-ENTMCNC: 29.7 G/DL (ref 31.4–37.4)
MCV RBC AUTO: 89 FL (ref 82–98)
MONOCYTES # BLD AUTO: 0.74 THOUSAND/ΜL (ref 0.17–1.22)
MONOCYTES NFR BLD AUTO: 13 % (ref 4–12)
NEUTROPHILS # BLD AUTO: 3.7 THOUSANDS/ΜL (ref 1.85–7.62)
NEUTS SEG NFR BLD AUTO: 66 % (ref 43–75)
NITRITE UR QL STRIP: NEGATIVE
NON-SQ EPI CELLS URNS QL MICRO: ABNORMAL /HPF
NRBC BLD AUTO-RTO: 0 /100 WBCS
NT-PROBNP SERPL-MCNC: 6607 PG/ML
PH UR STRIP.AUTO: 5.5 [PH]
PLATELET # BLD AUTO: 223 THOUSANDS/UL (ref 149–390)
PMV BLD AUTO: 10.1 FL (ref 8.9–12.7)
POTASSIUM SERPL-SCNC: 4.8 MMOL/L (ref 3.5–5.3)
PROT SERPL-MCNC: 8.4 G/DL (ref 6.4–8.2)
PROT UR STRIP-MCNC: NEGATIVE MG/DL
RBC # BLD AUTO: 4.52 MILLION/UL (ref 3.81–5.12)
RBC #/AREA URNS AUTO: ABNORMAL /HPF
SARS-COV-2 RNA RESP QL NAA+PROBE: NEGATIVE
SODIUM SERPL-SCNC: 139 MMOL/L (ref 136–145)
SP GR UR STRIP.AUTO: 1.01 (ref 1–1.03)
TROPONIN I SERPL-MCNC: <0.02 NG/ML
UROBILINOGEN UR QL STRIP.AUTO: 0.2 E.U./DL
WBC # BLD AUTO: 5.63 THOUSAND/UL (ref 4.31–10.16)
WBC #/AREA URNS AUTO: ABNORMAL /HPF

## 2020-06-19 PROCEDURE — 80053 COMPREHEN METABOLIC PANEL: CPT | Performed by: EMERGENCY MEDICINE

## 2020-06-19 PROCEDURE — 85025 COMPLETE CBC W/AUTO DIFF WBC: CPT | Performed by: EMERGENCY MEDICINE

## 2020-06-19 PROCEDURE — 81001 URINALYSIS AUTO W/SCOPE: CPT | Performed by: EMERGENCY MEDICINE

## 2020-06-19 PROCEDURE — 84484 ASSAY OF TROPONIN QUANT: CPT | Performed by: EMERGENCY MEDICINE

## 2020-06-19 PROCEDURE — 71045 X-RAY EXAM CHEST 1 VIEW: CPT

## 2020-06-19 PROCEDURE — 83880 ASSAY OF NATRIURETIC PEPTIDE: CPT | Performed by: EMERGENCY MEDICINE

## 2020-06-19 PROCEDURE — 87635 SARS-COV-2 COVID-19 AMP PRB: CPT | Performed by: EMERGENCY MEDICINE

## 2020-06-19 PROCEDURE — 93005 ELECTROCARDIOGRAM TRACING: CPT

## 2020-06-19 PROCEDURE — 36415 COLL VENOUS BLD VENIPUNCTURE: CPT | Performed by: EMERGENCY MEDICINE

## 2020-06-19 PROCEDURE — 70450 CT HEAD/BRAIN W/O DYE: CPT

## 2020-06-19 PROCEDURE — 99285 EMERGENCY DEPT VISIT HI MDM: CPT

## 2020-06-19 PROCEDURE — 99285 EMERGENCY DEPT VISIT HI MDM: CPT | Performed by: EMERGENCY MEDICINE

## 2020-06-19 RX ORDER — MULTIVITAMIN
1 TABLET ORAL DAILY
COMMUNITY
End: 2021-02-22 | Stop reason: ALTCHOICE

## 2020-06-20 DIAGNOSIS — E78.5 DYSLIPIDEMIA: ICD-10-CM

## 2020-06-20 RX ORDER — SIMVASTATIN 20 MG
TABLET ORAL
Qty: 90 TABLET | Refills: 2 | Status: SHIPPED | OUTPATIENT
Start: 2020-06-20 | End: 2021-02-05

## 2020-06-22 LAB
ATRIAL RATE: 113 BPM
QRS AXIS: 258 DEGREES
QRSD INTERVAL: 186 MS
QT INTERVAL: 494 MS
QTC INTERVAL: 517 MS
T WAVE AXIS: 80 DEGREES
VENTRICULAR RATE: 66 BPM

## 2020-06-22 PROCEDURE — 93010 ELECTROCARDIOGRAM REPORT: CPT | Performed by: INTERNAL MEDICINE

## 2020-06-24 ENCOUNTER — OFFICE VISIT (OUTPATIENT)
Dept: FAMILY MEDICINE CLINIC | Facility: CLINIC | Age: 70
End: 2020-06-24
Payer: MEDICARE

## 2020-06-24 VITALS
HEIGHT: 65 IN | WEIGHT: 210 LBS | BODY MASS INDEX: 34.99 KG/M2 | OXYGEN SATURATION: 98 % | TEMPERATURE: 97.1 F | RESPIRATION RATE: 16 BRPM | DIASTOLIC BLOOD PRESSURE: 72 MMHG | HEART RATE: 68 BPM | SYSTOLIC BLOOD PRESSURE: 124 MMHG

## 2020-06-24 DIAGNOSIS — R41.89 ALTERED THOUGHT PROCESSES: Primary | ICD-10-CM

## 2020-06-24 DIAGNOSIS — I50.42 CHRONIC COMBINED SYSTOLIC AND DIASTOLIC CONGESTIVE HEART FAILURE (HCC): ICD-10-CM

## 2020-06-24 DIAGNOSIS — E11.311 TYPE 2 DIABETES MELLITUS WITH RETINOPATHY AND MACULAR EDEMA, WITH LONG-TERM CURRENT USE OF INSULIN, UNSPECIFIED LATERALITY, UNSPECIFIED RETINOPATHY SEVERITY (HCC): ICD-10-CM

## 2020-06-24 DIAGNOSIS — N18.30 STAGE 3 CHRONIC KIDNEY DISEASE (HCC): ICD-10-CM

## 2020-06-24 DIAGNOSIS — I48.20 CHRONIC ATRIAL FIBRILLATION (HCC): ICD-10-CM

## 2020-06-24 DIAGNOSIS — Z79.4 TYPE 2 DIABETES MELLITUS WITH RETINOPATHY AND MACULAR EDEMA, WITH LONG-TERM CURRENT USE OF INSULIN, UNSPECIFIED LATERALITY, UNSPECIFIED RETINOPATHY SEVERITY (HCC): ICD-10-CM

## 2020-06-24 PROCEDURE — 3074F SYST BP LT 130 MM HG: CPT | Performed by: NURSE PRACTITIONER

## 2020-06-24 PROCEDURE — 4010F ACE/ARB THERAPY RXD/TAKEN: CPT | Performed by: NURSE PRACTITIONER

## 2020-06-24 PROCEDURE — 99214 OFFICE O/P EST MOD 30 MIN: CPT | Performed by: NURSE PRACTITIONER

## 2020-06-24 PROCEDURE — 1036F TOBACCO NON-USER: CPT | Performed by: NURSE PRACTITIONER

## 2020-06-24 PROCEDURE — 3078F DIAST BP <80 MM HG: CPT | Performed by: NURSE PRACTITIONER

## 2020-06-24 PROCEDURE — 3008F BODY MASS INDEX DOCD: CPT | Performed by: NURSE PRACTITIONER

## 2020-06-24 PROCEDURE — 3066F NEPHROPATHY DOC TX: CPT | Performed by: NURSE PRACTITIONER

## 2020-06-24 PROCEDURE — 3051F HG A1C>EQUAL 7.0%<8.0%: CPT | Performed by: NURSE PRACTITIONER

## 2020-06-24 PROCEDURE — 1160F RVW MEDS BY RX/DR IN RCRD: CPT | Performed by: NURSE PRACTITIONER

## 2020-06-24 PROCEDURE — 4040F PNEUMOC VAC/ADMIN/RCVD: CPT | Performed by: NURSE PRACTITIONER

## 2020-06-24 RX ORDER — VALSARTAN 80 MG/1
80 TABLET ORAL DAILY
Status: ON HOLD | COMMUNITY
End: 2020-10-23 | Stop reason: SDUPTHER

## 2020-06-25 ENCOUNTER — REMOTE DEVICE CLINIC VISIT (OUTPATIENT)
Dept: CARDIOLOGY CLINIC | Facility: CLINIC | Age: 70
End: 2020-06-25
Payer: MEDICARE

## 2020-06-25 ENCOUNTER — FOLLOW UP (OUTPATIENT)
Dept: URBAN - METROPOLITAN AREA CLINIC 27 | Facility: CLINIC | Age: 70
End: 2020-06-25

## 2020-06-25 DIAGNOSIS — E11.3413: ICD-10-CM

## 2020-06-25 DIAGNOSIS — Z95.0 PRESENCE OF PERMANENT CARDIAC PACEMAKER: Primary | ICD-10-CM

## 2020-06-25 DIAGNOSIS — H40.053: ICD-10-CM

## 2020-06-25 DIAGNOSIS — H43.811: ICD-10-CM

## 2020-06-25 PROCEDURE — 93294 REM INTERROG EVL PM/LDLS PM: CPT | Performed by: INTERNAL MEDICINE

## 2020-06-25 PROCEDURE — 92134 CPTRZ OPH DX IMG PST SGM RTA: CPT

## 2020-06-25 PROCEDURE — 92014 COMPRE OPH EXAM EST PT 1/>: CPT | Mod: 25

## 2020-06-25 PROCEDURE — 67028 INJECTION EYE DRUG: CPT

## 2020-06-25 PROCEDURE — 93296 REM INTERROG EVL PM/IDS: CPT | Performed by: INTERNAL MEDICINE

## 2020-06-25 ASSESSMENT — VISUAL ACUITY: OS_CC: 20/160

## 2020-06-25 ASSESSMENT — TONOMETRY
OS_IOP_MMHG: 18
OD_IOP_MMHG: 17

## 2020-07-01 ENCOUNTER — TELEPHONE (OUTPATIENT)
Dept: CARDIOLOGY CLINIC | Facility: CLINIC | Age: 70
End: 2020-07-01

## 2020-07-01 NOTE — TELEPHONE ENCOUNTER
----- Message from Eric Pedroza MD sent at 6/25/2020  9:21 AM EDT -----  Patient's device was interrogated in our office clinic  It shows device function is normal      Please call patient

## 2020-07-04 NOTE — PROGRESS NOTES
Cardiology Out Patient Progress Note  [facility]    Lazaro Balbuena 69 y o  female MRN: [id number]   Encounter: [id number]    1  Chronic combined systolic and diastolic congestive heart failure ([place] [address] )    2  Chronic atrial fibrillation    3  Essential hypertension    4  Mild pulmonary hypertension ([place] [address] )    5  Nonrheumatic tricuspid valve regurgitation    6  Coronary artery disease involving coronary bypass graft of native heart without angina pectoris    7  Mixed hyperlipidemia    8  Stage 3 chronic kidney disease ([place] [address] )    9  Morbid obesity with BMI of 40 0-44 9, adult ([place] [address] )    10  H/O tricuspid valve annuloplasty    11  H/O coronary artery bypass surgery      Assessment/Plan   1  Combined systolic and diastolic heart failure [place] heart Association class 2/3  Patient was recently in the hospital emergency room with shortness of breath  Has chest x-ray which was mildly abnormal   She admits she was not taking her medications  She started taking back medication she has gained some weight which she is losing  Currently she is taking Lasix 80 mg and Zaroxolyn twice a week  Last creatinine was 1 9 repeat labs ordered  She need to take her medications  All those issues were discussed with patient and patient's daughter-in-law  If he continued to have symptomatic and there is decreased ejection fraction we may upgrade her pacemaker to biventricular pacemaker as she is 100% paced  We will get a repeat echo Doppler  Repeat echo Doppler has been ordered  2  Coronary artery disease status post coronary artery bypass surgery with 4 grafts  Status post cardiac catheterization, in February of 2018  All grafts are patent she has three-vessel coronary artery disease  Continue medical Rx     She need to be compliant with her diuretics    3    Chronic atrial fibrillation  Patient has underlying very slow ventricular rate and is status post Medtronic pacemaker which is functioning adequately  She is on Xarelto  Her pacemaker was interrogated she is 100%     4  Diabetes mellitus  Management as per PMD    5  Chronic renal insufficiency  Her serum creatinine now 1 6-1 9  Repeat labs has been ordered Nephrology  6 History of tricuspid annuloplasty  Repeat echo report from February 2018 reviewed  EF was around 45%  PA pressure remains elevated  She has moderate pulmonary hypertension dilated right-sided chambers  Will check repeat echo Doppler    7  Dyslipidemia  Continue statins    8  Cardiomyopathy  Lately patient is noted to have low ejection fraction  Partially could be related to being 100% paced  She has evidence of volume overload  As she was not taking medication currently she is taking it advised her to be compliant will check echo Doppler  9  Hypertension  Patient blood pressure is acceptable  She is on valsartan and diuretics along with potassium  Electrolyte acceptable labs reviewed  10  Hypothyroidism  On Synthroid and TSH is acceptable  11  Chronic Hypoxic respiratory failure  Multifactorial   Scheduled to follow up with Pulmonary  Echo Doppler has been ordered  Need to be compliant with diuretics  She is advised to keep her appointment  She has missed a couple appointments  All her questions answered to her satisfaction  Her cath report echo report and previous stress test report reviewed with her  Counseling :   A description of the counseling  Patient is little under stress regarding her sister's death  She was counselled  She was reassured  All her questions were answered  Advised her to lose weight  Diet was advised  Advised to decrease salt intake and decreased fluid intake  Monitor her input output  HPI :     Aleksandra Rojas is a 69y o  year old female who presents for follow-up    She haspast medical history significant for coronary artery disease status post coronary artery bypass surgery with 4 bypasses in 2004 and then tricuspid valve annuloplasty in January 2012, known moderate pulmonary hypertension with mild to moderate TR, recurrent abdominal hernia, history of anemia, chronic atrial fibrillation with underlying sick sinus syndrome status post Medtronic pacemaker on long-term antithrombotic therapy with Xarelto who came for regular follow-up  Patient has some dyspnea on exertion  She hasn't had any recent stress test  She also had some mild CRI creatinine stable around 1 3  He has no fever no chills  No PND, no orthopnea, mostly exertional symptoms  07/06/2020  Above reviewed  Patient came for follow-up with her daughter-in-law  She was in the emergency room in June  She admits she was not taking her medication for some time  She could breathe she was found to have mild congestive heart failure and was started on oxygen  She will come quickly hypoxic if he does not use her oxygen  Echo shows her EF is around 45% with paradoxical septal patient and moderate MR with high PA pressure  She denies any chest pain  She is hard of hearing  She had history of like a hernia, chronic shortness of breath, gait dysfunction, history of tricuspid valvuloplasty with TR and moderate pulmonary hypertension  She had a CAD with CABG and history of pacemaker which is functioning adequately  Her pacemaker was recently interrogated  She is using her oxygen at nighttime and now she needed to use it more often  Her blood pressure is acceptable  She had a blood test done on 06/19/2020 which shows BUN 28 creatinine 1 8  She was seen in the emergency room in the June  Today she is 100% paced heart rate 76 beats per minute  Her weight is now 2 8 lb  She does her best when she is around 200 lb  Currently she is taking Lasix 80 mg daily and metolazone twice a week before that Lasix  She will only take potassium when she takes metolazone otherwise she does not take  Review of Systems   Constitutional: Positive for fatigue   Negative for activity change, chills, diaphoresis, fever and unexpected weight change  HENT: Negative for congestion  Eyes: Negative for discharge and redness  Respiratory: Positive for shortness of breath  Negative for cough, chest tightness and wheezing  Cardiovascular: Positive for leg swelling  Negative for chest pain and palpitations  Gastrointestinal: Negative for abdominal pain, diarrhea and nausea  Endocrine: Negative  Genitourinary: Negative for decreased urine volume and urgency  Musculoskeletal: Positive for arthralgias, back pain and gait problem  Skin: Negative for rash and wound  Allergic/Immunologic: Negative  Neurological: Negative for dizziness, seizures, syncope, weakness, light-headedness and headaches  Hematological: Negative  Psychiatric/Behavioral: Negative for agitation and confusion  The patient is nervous/anxious          Historical Information   Past Medical History:   Diagnosis Date    Arthritis     [facility] flutter ([facility] )     Cardiac disease     Carotid artery occlusion     CHF (congestive heart failure) ([facility] )     Cholelithiasis     last assessed 8/8/2016    Coronary artery disease     Diabetes mellitus ([facility] )     Disease of thyroid gland     GERD (gastroesophageal reflux disease)     History of transfusion     Hyperlipidemia     Hypertension     Long-term insulin use in type 2 diabetes ([facility] ) 6/4/2016    Other specified diabetes mellitus with diabetic autonomic (poly[facility]neuropathy ([facility]     Pleural effusion 2008    Pulmonary embolism ([facility] )     2008    Renal disorder     Retinopathy     bilat    Sleep apnea     Subcl[facility] artery stenosis ([facility]     Umbilical hernia with obstruction, without gangrene     last assessed 7/8/2016     Past Surgical History:   Procedure Laterality Date    ABDOMINAL SURGERY      CARDIAC PACEMAKER PLACEMENT      CARDIAC SURGERY      cabg x 2    CATARACT EXTRACTION      CHOLECYSTECTOMY      CORONARY ARTERY BYPASS GRAFT  2008  EYE SURGERY      FRACTURE SURGERY Right 2010    shoulder    HERNIA REPAIR      umbilical    VA LAP,CHOLECYSTECTOMY N/A 8/10/2016    Procedure: CHOLECYSTECTOMY LAPAROSCOPIC;  Surgeon: Evelia Bradley MD;  [facility];  Service: General    VA LARYNGOSCOPY,DIRECT,SCOPE,INJ CORDS Left 11/4/2016    Procedure: Eliel Greenbush; LEFT VOCAL FOLD INJECTION ;  Surgeon: Melissa Pinzon MD;  [facility];  Service: ENT    VA REPAIR UMBILICAL QRRB,1+H/P,IRHDE N/A 8/10/2016    Procedure: Prabha Phenes;  Surgeon: Evelia Bradley MD;  [facility];  Service: General    [address] / STENTING Right     TRICUSPID VALVE REPLACEMENT      VASCULAR SURGERY      heart valve replacement     Social History     Substance and Sexual Activity   Alcohol Use Not Currently    Frequency: Never    Drinks per session: Patient refused    Binge frequency: Never    Comment: only on NEW YEAR'S     Social History     Substance and Sexual Activity   Drug Use Never    Types: Oxycodone     Social History     Tobacco Use   Smoking Status Never Smoker   Smokeless Tobacco Never Used     Family History:   Family History   Problem Relation Age of Onset    Heart disease Mother     Breast cancer Maternal Aunt     Heart disease Sister        Meds/Allergies     Allergies   Allergen Reactions    Bromide Ion [Bromine]      Blurred vision   Has preservative that  Pt cannot use    Other Blisters     Adhesive tape    Tramadol      cognition changes    Ibuprofen Palpitations    Penicillins Rash       Current Outpatient Medications:     ACCU-CHEK CAITLIN PLUS test strip, USE BY TEST ROUTE 3 TIMES EVERY DAY, Disp: , Rfl:     ASPIRIN ADULT PO, Take 1,000 mg by mouth 2 (two) times a day, Disp: , Rfl:     B-D UF III MINI PEN NEEDLES 31G X 5 MM MISC, 4 (four) times a day As directed, Disp: , Rfl:     COMBIGAN 0 2-0 5 %, Administer 1 drop to both eyes 2 (two) times a day, Disp: , Rfl:    Cyanocobalamin (VITAMIN B 12 PO), Take by mouth daily, Disp: , Rfl:     famotidine (PEPCID) 20 mg tablet, Take 1 tablet (20 mg total) by mouth 2 (two) times a day, Disp: 60 tablet, Rfl: 11    furosemide (LASIX) 80 mg tablet, Take 1 tablet (80 mg total) by mouth daily Pt taking 120mg daily per Dr Ashish Cheema (Patient taking differently: Take 80 mg by mouth daily ), Disp: 120 tablet, Rfl: 3    insulin aspart (NOVOLOG FLEXPEN) 100 Units/mL injection pen, Inject under the [id number] units 3 times a day with meals Use carbohydrate to insulin ratio 1:3 which means 1 unit of NovoLog for every 3 gm of carbohydrates (Patient taking differently: 18 Units 3 (three) times a day with meals Inject under the [id number] units 3 times a day with meals Use carbohydrate to insulin ratio 1:3 which means 1 unit of NovoLog for every 3 gm of carbohydrates), Disp: 15 pen, Rfl: 11    insulin glargine (BASAGLAR KWIKPEN) 100 units/mL injection pen, Inject 16 Units under the skin daily (Patient taking differently: Inject 22 Units under the skin daily at bedtime ), Disp: 5 pen, Rfl: 3    lactulose 20 g/30 mL, Take 15 mL (10 g total) by mouth 2 (two) times a day, Disp: 236 mL, Rfl: 6    levothyroxine (Synthroid) 137 mcg tablet, Take 1 tablet (137 mcg total) by mouth daily, Disp: 90 tablet, Rfl: 3    linaCLOtide (LINZESS) 145 MCG CAPS, Take 1 capsule (145 mcg total) by mouth daily, Disp: 30 capsule, Rfl: 4    metolazone (ZAROXOLYN) 2 5 mg tablet, TAKE 1 TABLET BY MOUTH TWICE WEEKLY (Patient taking differently: 2 (two) times a day as needed Pt taking twice weekly per Dr Ashish Cheema  12/24/2019), Disp: 10 tablet, Rfl: 0    metoprolol succinate (TOPROL-XL) 50 mg 24 hr tablet, Take 50 mg by mouth daily , Disp: , Rfl:     Multiple Vitamin (MULTIVITAMIN) tablet, Take 1 tablet by mouth daily, Disp: , Rfl:     pantoprazole (PROTONIX) 40 mg tablet, Take 40 mg by mouth daily in the early morning , Disp: , Rfl:     potassium chloride (K-DUR,KLOR-CON) 10 mEq tablet, Take 1 tablet (10 mEq total) by mouth 2 (two) times a week With the extra furosemide, Disp: 15 tablet, Rfl: 3    rivaroxaban (Xarelto) 15 mg tablet, Take 1 tablet (15 mg total) by mouth daily with breakfast, Disp: 90 tablet, Rfl: 3    simvastatin (ZOCOR) 20 mg tablet, TAKE 1 TABLET (20MG) BY ORAL ROUTE EVERY DAY IN THE EVENING, Disp: 90 tablet, Rfl: 2    valsartan (DIOVAN) 80 mg tablet, Take 80 mg by mouth daily, Disp: , Rfl:     docusate sodium (COLACE) 100 mg capsule, Take by mouth, Disp: , Rfl:     omeprazole (PriLOSEC) 20 mg delayed release capsule, Take 20 mg by mouth , Disp: , Rfl:     Vitals: Blood pressure 120/74, pulse 83, temperature 99 1 °F (37 3 °C), temperature source Temporal, height 5' 5" (1 651 m), weight 94 3 kg (208 lb), SpO2 97 %, not currently breastfeeding  Body mass index is 34 61 kg/m²  Physical Exam:  Physical Exam   Constitutional: She is oriented to person, place, and time  She appears well-developed and well-nourished  No distress  HENT:   Head: Normocephalic and atraumatic  Eyes: Pupils are equal, round, and reactive to light  Neck: Neck supple  No JVD present  No tracheal deviation present  No thyromegaly present  Cardiovascular: Normal rate, regular rhythm, S1 normal and S2 normal  Exam reveals no gallop, no S3, no S4, no distant heart sounds and no friction rub  Murmur heard  Systolic (ejection) murmur is present with a grade of 2/6  S1-S2 regular with pansystolic murmur at left lower sternal border  Pulmonary/Chest: Effort normal  No respiratory distress  She has no wheezes  She has no rales  She exhibits no tenderness  Bilateral air entry with coarse breath sounds decreased at bases   Abdominal: Soft  Bowel sounds are normal  She exhibits no distension  There is no tenderness  Musculoskeletal: She exhibits edema  She exhibits no deformity  Neurological: She is alert and oriented to person, place, and time  Skin: Skin is warm and dry   No rash noted  She is not diaphoretic  No pallor  Psychiatric: She has a normal mood and affect  Her behavior is normal  Judgment normal          Diagnostic cardiac testing review:    Cardiac catheterization:  Cardiac catheterization done in February of 2018 shows severe three-vessel coronary artery disease  Distal left main has 70%  %  Proximal circ 70%, obtuse marginal 100%, RPDA 100% occluded  Patient had patent LIMA to LAD, SVG vein graft to diagonal, circumflex as well as RPDA  LV-gram was not done due to CRI  Echo Doppler:  Echo Doppler done in February of 2018 shows EF around 45%, dilated right-sided chambers, moderate to severe TR with PA pressure 65 mm of mercury, mild AI, mild MR, moderate to markedly dilated left atrium, moderately dilated right atrium Paradoxical septal motion noted    Stress test:  Nuclear stress test done in 2016 was abnormal   EF was 57%  Paradoxical septal motion was noted  Nuclear stress test in March of 2017 shows is fixed apical wall defect with prior infarct  No ischemia EF was 57%    :  Cardiac catheterization  In February of 2018  Findings:     1  Dominance: Right dominant coronary system     2  Left main Coronary artery: Normal size vessels  It bifurcates into large LAD and a nondominant but medium-size circumflex system  Distal left main has around 70% stenosis  I     3  Left anterior descending artery: LAD is a large-size vessel and in the mid it is 100% occluded  Competitive flow is seen in LAD from LIMA  A diagonal has also patent graft from aorta      4  Circumflex Coronary artery: Circumflex is a nondominant medium size vessel, it is still a medium to large size artery  It has proximally around 70%  OM1 is 100% occluded with competitive flow  AV groove circumflex is small         5  Right coronary artery: RCA is normal size vessel and it is 100% occluded in the mid area    SVG vein graft to RPDA is widely patent        5  Left ventriculogram: LV gram was not done due to CRI  LVEDP was around 16  There was no gradient across aortic valve    Pacemaker interrogation:  Patient has Medtronic pacemaker which is functioning adequately its VVI mode  Patient is paced 100% all the time  EC lead EKG done on 2018 shows atrial fibrillation with few paced beats  Heart rate is 65 beats per minute  Patient has known Medtronic pacemaker  It is a VVI type  2018  Repeat EKG shows paced rhythm heart rate 78 beats per minute  Underlying is AFib    Twelve lead EKG done in our office 2019 shows atrial fibrillation underlying with ventricular paced heart rate 66 beats per minute  No change from old EKG  Twelve lead EKG done 2019 shows 100 percent paced rhythm  She has underlying atrial fibrillation  No change from old EKG heart rate 83 beats per minute  Results of echo and stress test reviewed with patient  Labs done 2018 shows sodium 139 potassium 3 4 BUN 67 creatinine 1 45 GFR is 37  Twelve lead EKG 2020 shows ventricular paced heart rate 67 beats per minute underlying atrial fibrillation  She is 100% paced      Twelve lead EKG 2020 shows ventricular paced beats heart rate 76 beats per minute she has underlying atrial fibrillation which is not changed    Lab Review   Lab Results   Component Value Date    WBC 5 63 2020    HGB 11 9 2020    HCT 40 1 2020    MCV 89 2020     2020     Lab Results   Component Value Date    K 4 8 2020     2020    CO2 28 2020    BUN 60 (H) 2020    CREATININE 1 81 (H) 2020    GLUF 145 (H) 2020    CALCIUM 8 5 2020    AST 42 2020    ALT 19 2020    ALKPHOS 110 2020    PROT 7 9 2016    EGFR 28 2020     Lab Results   Component Value Date    CALCIUM 8 5 2020    K 4 8 2020    CO2 28 2020     2020    BUN 60 (H) 2020    CREATININE 1 81 (H) 06/19/2020     Dr Aliza Dent MD [facility] [place]      "This note has been constructed using a voice recognition system  Therefore there may be syntax, spelling, and/or grammatical errors   Please call if you have any questions  "

## 2020-07-06 ENCOUNTER — OFFICE VISIT (OUTPATIENT)
Dept: CARDIOLOGY CLINIC | Facility: CLINIC | Age: 70
End: 2020-07-06
Payer: MEDICARE

## 2020-07-06 ENCOUNTER — TELEMEDICINE (OUTPATIENT)
Dept: NEPHROLOGY | Facility: CLINIC | Age: 70
End: 2020-07-06
Payer: MEDICARE

## 2020-07-06 ENCOUNTER — TELEPHONE (OUTPATIENT)
Dept: CARDIOLOGY CLINIC | Facility: CLINIC | Age: 70
End: 2020-07-06

## 2020-07-06 VITALS
DIASTOLIC BLOOD PRESSURE: 74 MMHG | OXYGEN SATURATION: 97 % | WEIGHT: 208 LBS | SYSTOLIC BLOOD PRESSURE: 120 MMHG | TEMPERATURE: 99.1 F | HEIGHT: 65 IN | HEART RATE: 83 BPM | BODY MASS INDEX: 34.66 KG/M2

## 2020-07-06 DIAGNOSIS — N18.30 STAGE 3 CHRONIC KIDNEY DISEASE (HCC): ICD-10-CM

## 2020-07-06 DIAGNOSIS — I48.20 CHRONIC ATRIAL FIBRILLATION (HCC): ICD-10-CM

## 2020-07-06 DIAGNOSIS — I10 ESSENTIAL HYPERTENSION: ICD-10-CM

## 2020-07-06 DIAGNOSIS — I12.9 BENIGN HYPERTENSION WITH CHRONIC KIDNEY DISEASE, STAGE III (HCC): ICD-10-CM

## 2020-07-06 DIAGNOSIS — I25.810 CORONARY ARTERY DISEASE INVOLVING CORONARY BYPASS GRAFT OF NATIVE HEART WITHOUT ANGINA PECTORIS: ICD-10-CM

## 2020-07-06 DIAGNOSIS — N18.9 CHRONIC KIDNEY DISEASE-MINERAL AND BONE DISORDER: ICD-10-CM

## 2020-07-06 DIAGNOSIS — Z95.1 H/O CORONARY ARTERY BYPASS SURGERY: ICD-10-CM

## 2020-07-06 DIAGNOSIS — N18.30 BENIGN HYPERTENSION WITH CHRONIC KIDNEY DISEASE, STAGE III (HCC): ICD-10-CM

## 2020-07-06 DIAGNOSIS — E83.9 CHRONIC KIDNEY DISEASE-MINERAL AND BONE DISORDER: ICD-10-CM

## 2020-07-06 DIAGNOSIS — I50.42 CHRONIC COMBINED SYSTOLIC AND DIASTOLIC CONGESTIVE HEART FAILURE (HCC): ICD-10-CM

## 2020-07-06 DIAGNOSIS — I36.1 NONRHEUMATIC TRICUSPID VALVE REGURGITATION: ICD-10-CM

## 2020-07-06 DIAGNOSIS — E78.2 MIXED HYPERLIPIDEMIA: ICD-10-CM

## 2020-07-06 DIAGNOSIS — Z98.890 H/O TRICUSPID VALVE ANNULOPLASTY: ICD-10-CM

## 2020-07-06 DIAGNOSIS — M89.9 CHRONIC KIDNEY DISEASE-MINERAL AND BONE DISORDER: ICD-10-CM

## 2020-07-06 DIAGNOSIS — N18.30 STAGE 3 CHRONIC KIDNEY DISEASE (HCC): Primary | ICD-10-CM

## 2020-07-06 DIAGNOSIS — I27.20 MILD PULMONARY HYPERTENSION (HCC): ICD-10-CM

## 2020-07-06 DIAGNOSIS — E66.01 MORBID OBESITY WITH BMI OF 40.0-44.9, ADULT (HCC): ICD-10-CM

## 2020-07-06 PROCEDURE — 99214 OFFICE O/P EST MOD 30 MIN: CPT | Performed by: INTERNAL MEDICINE

## 2020-07-06 PROCEDURE — 3078F DIAST BP <80 MM HG: CPT | Performed by: INTERNAL MEDICINE

## 2020-07-06 PROCEDURE — 4040F PNEUMOC VAC/ADMIN/RCVD: CPT | Performed by: INTERNAL MEDICINE

## 2020-07-06 PROCEDURE — 3074F SYST BP LT 130 MM HG: CPT | Performed by: INTERNAL MEDICINE

## 2020-07-06 PROCEDURE — 93000 ELECTROCARDIOGRAM COMPLETE: CPT | Performed by: INTERNAL MEDICINE

## 2020-07-06 PROCEDURE — 3066F NEPHROPATHY DOC TX: CPT | Performed by: INTERNAL MEDICINE

## 2020-07-06 PROCEDURE — 3008F BODY MASS INDEX DOCD: CPT | Performed by: INTERNAL MEDICINE

## 2020-07-06 PROCEDURE — 1160F RVW MEDS BY RX/DR IN RCRD: CPT | Performed by: INTERNAL MEDICINE

## 2020-07-06 PROCEDURE — 3051F HG A1C>EQUAL 7.0%<8.0%: CPT | Performed by: INTERNAL MEDICINE

## 2020-07-06 PROCEDURE — 99204 OFFICE O/P NEW MOD 45 MIN: CPT | Performed by: INTERNAL MEDICINE

## 2020-07-06 PROCEDURE — 1036F TOBACCO NON-USER: CPT | Performed by: INTERNAL MEDICINE

## 2020-07-06 NOTE — LETTER
July 6, 2020     Rosita Ayon MD  [address]   [place] [address]    Patient: Nolan Sargent   YOB: 1950   Date of Visit: 7/6/2020       Dear Dr Eveline Alvse:    Thank you for referring Nolan Sargent to me for evaluation  Below are my notes for this consultation  If you have questions, please do not hesitate to call me  I look forward to following your patient along with you  Sincerely,        Mike Lemos MD        CC: No Recipients  Mike Lemos MD  7/6/2020  3:11 PM  Sign at close encounter  [address] (Video)  Nolan Sargent 69 y o  female MRN: [id number]  DATE: 07/06/20    Reason for visit: CKD  Patient with concern for COVID19 exposure; hence, did not want to come in for an actual appointment  ASSESSMENT & PLAN:  1  Chronic kidney disease, stage III:  · Previous baseline creatinine was around 1 3 to 1 5 in 2019  · In 2020, creatinine has been around 1 7 to 1 8  · Will check records from Dr George Kellogg office  · Repeat BMP next week instead of this week so we can evaluate renal function while on scheduled dosing of Metolazone 2 5 mg twice a week  · If creatinine goes up or stays around 1 8, would recommend renal US  · Will check SPEP, UPEP, free light chains given elevated total protein  · Check UA and UPC ratio to measure urinary protein excretion  · High on differential dx for CKD is cardiorenal syndrome vs diabetic nephropathy vs hypertensive nephrosclerosis  2  Hypertension:  · BP is controlled  · Continue Metoprolol succ 50 mg daily, Valsartan 80 mg daily, Furosemide and Metolazone  · No changes  3  Congestive heart failure:  · Continue Furosemide 80 mg daily and metolazone 2 5 mg twice a week  · Being managed by cardiology, Dr Ken Carlson  4  Mineral and bone disease:  · Check PTH and Vitamin D      5  Elevated total protein level  · Check SPEP, UPEP, free light chains       Patient Instructions Check BMP as ordered by Dr Sita Landrum next week instead of this week  I am adding some blood tests to the one that Dr Sita Landrum ordered  Continue with Furosemide and Metolazone as ordered by Dr Sita Landrum  Follow up in 3 months  The patient was informed that this was a billable visit and they are aware of it and wish to proceed  Encounter provider Goran Morrison MD    Provider located at [address] [place] [id number]    Recent Visits  No visits were found meeting these conditions  Showing recent visits within past 7 days and meeting all other requirements     Future Appointments  No visits were found meeting these conditions  Showing future appointments within next 150 days and meeting all other requirements      The patient was identified by name and date of birth  Corinne Sack was informed that this is a telemedicine visit and that the visit is being conducted through [facility] and patient was informed that this is a secure, HIPAA-compliant platform  She agrees to proceed     My office door was closed  No one else was in the room  She acknowledged consent and understanding of privacy and security of the video platform  The patient has agreed to participate and understands they can discontinue the visit at any time  HISTORY OF PRESENT ILLNESS:  Requesting Physician: Meg Ochoa MD  Reason for consultation: Initial evaluation of CKD    Corinne Sack is a 69 y o  female who was referred for initial evaluation of CKD  Unfortunately, she is not the best historian and information contained in this consult was obtained from her and from the EMR  Keith Hernandez has a history of hypertension, diabetes mellitus, hyperlipidemia, congestive heart failure, coronary artery disease s/p CABG, valvular heart disease s/p TV annuloplasty, atrial fibrillation, atherosclerotic vascular disease, obesity, and chronic kidney disease  Shawna Chen used to see Dr Alexandra Quesada in [place], [place] but recently moved to the area and wants to establish care with us  Based on my review of the records, it seems that her creatinine was in the range of around 1 3 to 1 5 from 2017 to 2019  However, her creatinine had went up to 1 73 in January 2020 and most recent labs from June 19, 2020 revealed a creatinine of 1 81  Due to the rise in the creatinine, a renal consultation was requested  Her CHF is being managed by Dr Peter Haro and she is on Furosemide 80 mg daily  More recently, she was placed on Metolazone 2 5 mg twice a week  She used to take it on a PRN basis but developed edema while on Furosemide prompting the increase in Metolazone to scheduled dosing  Of note, a CXR done on June 19, 2020 revealed vascular congestion  She has chronic respiratory failure and is on home oxygen  She has SOB on exertion  PAST MEDICAL HISTORY:  1  HTN: Diagnosed about 30 years ago  2  DM: Diagnosed about 30 years ago  Currently on insulin  3  HLP: on statins  4  Combined systolic and diastolic CHF: on Furosemide  Echo from May 2019 - EF 40 to 45%, RV volume and pressure overload, mildly dilated RV  5  CAD: s/p CABG 2008  6  Valvular heart disease: s/p Tricuspid valve annuloplasty in 2012  7  Chronic atrial fibrillation  8  R subclavian artery stenosis s/p stent placement  9  GERD  10  ADRIAN: Used to be on CPAP and not on it now after weight loss  11  Obesity  12  Hypothyroidism  13  DJD    No known history of  CVA, COPD, asthma, malignancy  PAST SURGICAL HISTORY:  1  CABG in 2008  2  Tricuspid valve annuloplasty  3  PPM placement  4  Vocal cord surgery  5  Cholecystectomy    ALLERGIES:  Allergies   Allergen Reactions    Bromide Ion [Bromine]      Blurred vision   Has preservative that  Pt cannot use    Other Blisters     Adhesive tape    Tramadol      cognition changes    Ibuprofen Palpitations    Penicillins Rash     SOCIAL HISTORY:  · Non smoker  · No alcohol use  FAMILY HISTORY:  · Negative for ESRD       MEDICATIONS:    Current Outpatient Medications:     ACCU-CHEK CAITLIN PLUS test strip, USE BY TEST ROUTE 3 TIMES EVERY DAY, Disp: , Rfl:     ASPIRIN ADULT PO, Take 1,000 mg by mouth 2 (two) times a day, Disp: , Rfl:     B-D UF III MINI PEN NEEDLES 31G X 5 MM MISC, 4 (four) times a day As directed, Disp: , Rfl:     COMBIGAN 0 2-0 5 %, Administer 1 drop to both eyes 2 (two) times a day, Disp: , Rfl:     Cyanocobalamin (VITAMIN B 12 PO), Take by mouth daily, Disp: , Rfl:     docusate sodium (COLACE) 100 mg capsule, Take by mouth, Disp: , Rfl:     famotidine (PEPCID) 20 mg tablet, Take 1 tablet (20 mg total) by mouth 2 (two) times a day, Disp: 60 tablet, Rfl: 11    furosemide (LASIX) 80 mg tablet, Take 1 tablet (80 mg total) by mouth daily Pt taking 120mg daily per Dr Jacinta Trinh (Patient taking differently: Take 80 mg by mouth daily ), Disp: 120 tablet, Rfl: 3    insulin aspart (NOVOLOG FLEXPEN) 100 Units/mL injection pen, Inject under the [id number] units 3 times a day with meals Use carbohydrate to insulin ratio 1:3 which means 1 unit of NovoLog for every 3 gm of carbohydrates (Patient taking differently: 18 Units 3 (three) times a day with meals Inject under the [id number] units 3 times a day with meals Use carbohydrate to insulin ratio 1:3 which means 1 unit of NovoLog for every 3 gm of carbohydrates), Disp: 15 pen, Rfl: 11    insulin glargine (BASAGLAR KWIKPEN) 100 units/mL injection pen, Inject 16 Units under the skin daily (Patient taking differently: Inject 22 Units under the skin daily at bedtime ), Disp: 5 pen, Rfl: 3    lactulose 20 g/30 mL, Take 15 mL (10 g total) by mouth 2 (two) times a day, Disp: 236 mL, Rfl: 6    levothyroxine (Synthroid) 137 mcg tablet, Take 1 tablet (137 mcg total) by mouth daily, Disp: 90 tablet, Rfl: 3    linaCLOtide (LINZESS) 145 MCG CAPS, Take 1 capsule (145 mcg total) by mouth daily, Disp: 30 capsule, Rfl: 4   metolazone (ZAROXOLYN) 2 5 mg tablet, TAKE 1 TABLET BY MOUTH TWICE WEEKLY (Patient taking differently: 2 (two) times a day as needed Pt taking twice weekly per Dr Phyllis Kramer  12/24/2019), Disp: 10 tablet, Rfl: 0    metoprolol succinate (TOPROL-XL) 50 mg 24 hr tablet, Take 50 mg by mouth daily , Disp: , Rfl:     Multiple Vitamin (MULTIVITAMIN) tablet, Take 1 tablet by mouth daily, Disp: , Rfl:     omeprazole (PriLOSEC) 20 mg delayed release capsule, Take 20 mg by mouth , Disp: , Rfl:     pantoprazole (PROTONIX) 40 mg tablet, Take 40 mg by mouth daily in the early morning , Disp: , Rfl:     potassium chloride (K-DUR,KLOR-CON) 10 mEq tablet, Take 1 tablet (10 mEq total) by mouth 2 (two) times a week With the extra furosemide, Disp: 15 tablet, Rfl: 3    rivaroxaban (Xarelto) 15 mg tablet, Take 1 tablet (15 mg total) by mouth daily with breakfast, Disp: 90 tablet, Rfl: 3    simvastatin (ZOCOR) 20 mg tablet, TAKE 1 TABLET (20MG) BY ORAL ROUTE EVERY DAY IN THE EVENING, Disp: 90 tablet, Rfl: 2    valsartan (DIOVAN) 80 mg tablet, Take 80 mg by mouth daily, Disp: , Rfl:     REVIEW OF SYSTEMS:  Review of Systems   Constitutional: Negative for appetite change, chills and fever  HENT: Negative for rhinorrhea  Eyes: Negative for discharge  Respiratory: Positive for shortness of breath  Negative for cough  On home oxygen   Cardiovascular: Positive for leg swelling  Negative for chest pain  Gastrointestinal: Positive for constipation  Negative for abdominal pain, diarrhea, nausea and vomiting  Endocrine: Negative for polyuria  Genitourinary: Negative for hematuria  Musculoskeletal: Positive for arthralgias  Skin: Negative for rash  Allergic/Immunologic: Negative for immunocompromised state  Neurological: Negative for light-headedness  Hematological: Does not bruise/bleed easily  Psychiatric/Behavioral: The patient is not nervous/anxious      All the systems were reviewed and were negative except as documented on the HPI  PHYSICAL EXAMINATION:  LMP  (LMP Unknown)   Current Weight:   There is no height or weight on file to calculate BMI  Exam was limited by the quality of the video  /70  General: conscious, coherent and not in acute distress over video  Skin: no visible rash  Eyes: appeared normal  ENT: atraumatic, appeared normal externally  Respiratory: talking in full sentences without difficulty, not in respiratory distress, oxygen via nasal cannula was noted  CVS: could not evaluate  GI: could not evaluate  : could not evaluate  MSK: could not evaluate  Neuro: awake, alert and oriented     Psych: appropriate affect    LABORATORY RESULTS:  Results for orders placed or performed during the hospital encounter of 06/19/20   Novel Coronavirus (Covid-19),PCR [facility]   Result Value Ref Range    SARS-CoV-2 Negative Negative   CBC and differential   Result Value Ref Range    WBC 5 63 4 31 - 10 16 Thousand/uL    RBC 4 52 3 81 - 5 12 Million/uL    Hemoglobin 11 9 11 5 - 15 4 g/dL    Hematocrit 40 1 34 8 - 46 1 %    MCV 89 82 - 98 fL    MCH 26 3 (L) 26 8 - 34 3 pg    MCHC 29 7 (L) 31 4 - 37 4 g/dL    RDW 21 1 (H) 11 6 - 15 1 %    MPV 10 1 8 9 - 12 7 fL    Platelets 929 175 - 766 Thousands/uL    nRBC 0 /100 WBCs    Neutrophils Relative 66 43 - 75 %    Immat GRANS % 0 0 - 2 %    Lymphocytes Relative 16 14 - 44 %    Monocytes Relative 13 (H) 4 - 12 %    Eosinophils Relative 4 0 - 6 %    Basophils Relative 1 0 - 1 %    Neutrophils Absolute 3 70 1 85 - 7 62 Thousands/µL    Immature Grans Absolute 0 02 0 00 - 0 20 Thousand/uL    Lymphocytes Absolute 0 87 0 60 - 4 47 Thousands/µL    Monocytes Absolute 0 74 0 17 - 1 22 Thousand/µL    Eosinophils Absolute 0 23 0 00 - 0 61 Thousand/µL    Basophils Absolute 0 07 0 00 - 0 10 Thousands/µL   Comprehensive metabolic panel   Result Value Ref Range    Sodium 139 136 - 145 mmol/L    Potassium 4 8 3 5 - 5 3 mmol/L    Chloride 101 100 - 108 mmol/L    CO2 28 21 - 32 mmol/L    ANION GAP 10 4 - 13 mmol/L    BUN 60 (H) 5 - 25 mg/dL    Creatinine 1 81 (H) 0 60 - 1 30 mg/dL    Glucose 190 (H) 65 - 140 mg/dL    Calcium 8 5 8 3 - 10 1 mg/dL    AST 42 5 - 45 U/L    ALT 19 12 - 78 U/L    Alkaline Phosphatase 110 46 - 116 U/L    Total Protein 8 4 (H) 6 4 - 8 2 g/dL    Albumin 3 2 (L) 3 5 - 5 0 g/dL    Total Bilirubin 0 70 0 20 - 1 00 mg/dL    eGFR 28 ml/min/1 73sq m   Troponin I   Result Value Ref Range    Troponin I <0 02 <=0 04 ng/mL   NT-BNP PRO   Result Value Ref Range    NT-proBNP 6,607 (H) <125 pg/mL   UA w Reflex to Microscopic w Reflex to Culture   Result Value Ref Range    Color, UA Yellow     Clarity, UA Slightly Cloudy     Specific [place], UA 1 015 1 000 - 1 030    pH, UA 5 5 5 0, 5 5, 6 0, 6 5, 7 0, 7 5, 8 0, 8 5, 9 0    Leukocytes, UA Trace (A) Negative    Nitrite, UA Negative Negative    Protein, UA Negative Negative mg/dl    Glucose, UA Negative Negative mg/dl    Ketones, UA Negative Negative mg/dl    Urobilinogen, UA 0 2 0 2, 1 0 E U /dl E U /dl    Bilirubin, UA Negative Negative    Blood, UA Trace-lysed (A) Negative   Urine Microscopic   Result Value Ref Range    RBC, UA None Seen None Seen, 0-5 /hpf    WBC, UA 2-4 (A) None Seen, 0-5, 5-55, 5-65 /hpf    Epithelial Cells Occasional None Seen, Occasional /hpf    Bacteria, UA Innumerable (A) None Seen, Occasional /hpf    Hyaline Casts, UA 4-10 (A) (none) /lpf   ECG 12 lead   Result Value Ref Range    Ventricular Rate 66 BPM    Atrial Rate 113 BPM    NC Interval  ms    QRSD Interval 186 ms    QT Interval 494 ms    QTC Interval 517 ms    P Axis  degrees    QRS Axis 258 degrees    T Wave Axis 80 degrees      IMAGING: none    VIRTUAL VISIT DISCLAIMER    Rosa Maria Torres acknowledges that she has consented to an online visit or consultation   She understands that the online visit is based solely on information provided by her, and that, in the absence of a face-to-face physical evaluation by the physician, the diagnosis she receives is both limited and provisional in terms of accuracy and completeness  This is not intended to replace a full medical face-to-face evaluation by the physician  Esteban Champion understands and accepts these terms

## 2020-07-06 NOTE — PATIENT INSTRUCTIONS
Check BMP as ordered by Dr Timmy Kwong next week instead of this week  I am adding some blood tests to the one that Dr Timmy Kwong ordered  Continue with Furosemide and Metolazone as ordered by Dr Timmy Kwong  Follow up in 3 months

## 2020-07-06 NOTE — PROGRESS NOTES
NEPHROLOGY OUTPATIENT CONSULTATION VIRTUAL VISIT (Video)  Papi Raya 69 y o  female MRN: [id number]  DATE: 07/06/20    Reason for visit: CKD  Patient with concern for COVID19 exposure; hence, did not want to come in for an actual appointment  ASSESSMENT & PLAN:  1  Chronic kidney disease, stage III:  · Previous baseline creatinine was around 1 3 to 1 5 in 2019  · In 2020, creatinine has been around 1 7 to 1 8  · Will check records from Dr Shima Mackay office  · Repeat BMP next week instead of this week so we can evaluate renal function while on scheduled dosing of Metolazone 2 5 mg twice a week  · If creatinine goes up or stays around 1 8, would recommend renal US  · Will check SPEP, UPEP, free light chains given elevated total protein  · Check UA and UPC ratio to measure urinary protein excretion  · High on differential dx for CKD is cardiorenal syndrome vs diabetic nephropathy vs hypertensive nephrosclerosis  2  Hypertension:  · BP is controlled  · Continue Metoprolol succ 50 mg daily, Valsartan 80 mg daily, Furosemide and Metolazone  · No changes  3  Congestive heart failure:  · Continue Furosemide 80 mg daily and metolazone 2 5 mg twice a week  · Being managed by cardiology, Dr Isabel Verduzco  4  Mineral and bone disease:  · Check PTH and Vitamin D      5  Elevated total protein level  · Check SPEP, UPEP, free light chains  Patient Instructions   Check BMP as ordered by Dr Isabel Verduzco next week instead of this week  I am adding some blood tests to the one that Dr Isabel Verduzco ordered  Continue with Furosemide and Metolazone as ordered by Dr Isabel Verduzco  Follow up in 3 months  The patient was informed that this was a billable visit and they are aware of it and wish to proceed       Encounter provider Armando Knapp MD    Provider located at [address] [place] [id number]    Recent Visits  No visits were found meeting these conditions  Showing recent visits within past 7 days and meeting all other requirements     Future Appointments  No visits were found meeting these conditions  Showing future appointments within next 150 days and meeting all other requirements      The patient was identified by name and date of birth  Elvi Carbone was informed that this is a telemedicine visit and that the visit is being conducted through [facility] and patient was informed that this is a secure, HIPAA-compliant platform  She agrees to proceed     My office door was closed  No one else was in the room  She acknowledged consent and understanding of privacy and security of the video platform  The patient has agreed to participate and understands they can discontinue the visit at any time  HISTORY OF PRESENT ILLNESS:  Requesting Physician: Jing Mcdonald MD  Reason for consultation: Initial evaluation of CKD    Elvi Carbone is a 69 y o  female who was referred for initial evaluation of CKD  Unfortunately, she is not the best historian and information contained in this consult was obtained from her and from the EMR  Tg Ryan has a history of hypertension, diabetes mellitus, hyperlipidemia, congestive heart failure, coronary artery disease s/p CABG, valvular heart disease s/p TV annuloplasty, atrial fibrillation, atherosclerotic vascular disease, obesity, and chronic kidney disease  Tg Ryan used to see Dr Monisha Duque in [place], [place] but recently moved to the area and wants to establish care with us  Based on my review of the records, it seems that her creatinine was in the range of around 1 3 to 1 5 from 2017 to 2019  However, her creatinine had went up to 1 73 in January 2020 and most recent labs from June 19, 2020 revealed a creatinine of 1 81  Due to the rise in the creatinine, a renal consultation was requested  Her CHF is being managed by Dr Pastora Michele and she is on Furosemide 80 mg daily   More recently, she was placed on Metolazone 2 5 mg twice a week  She used to take it on a PRN basis but developed edema while on Furosemide prompting the increase in Metolazone to scheduled dosing  Of note, a CXR done on June 19, 2020 revealed vascular congestion  She has chronic respiratory failure and is on home oxygen  She has SOB on exertion  PAST MEDICAL HISTORY:  1  HTN: Diagnosed about 30 years ago  2  DM: Diagnosed about 30 years ago  Currently on insulin  3  HLP: on statins  4  Combined systolic and diastolic CHF: on Furosemide  Echo from May 2019 - EF 40 to 45%, RV volume and pressure overload, mildly dilated RV  5  CAD: s/p CABG 2008  6  Valvular heart disease: s/p Tricuspid valve annuloplasty in 2012  7  Chronic atrial fibrillation  8  R subclavian artery stenosis s/p stent placement  9  GERD  10  ADRIAN: Used to be on CPAP and not on it now after weight loss  11  Obesity  12  Hypothyroidism  13  DJD    No known history of  CVA, COPD, asthma, malignancy  PAST SURGICAL HISTORY:  1  CABG in 2008  2  Tricuspid valve annuloplasty  3  PPM placement  4  Vocal cord surgery  5  Cholecystectomy    ALLERGIES:  Allergies   Allergen Reactions    Bromide Ion [Bromine]      Blurred vision  Has preservative that  Pt cannot use    Other Blisters     Adhesive tape    Tramadol      cognition changes    Ibuprofen Palpitations    Penicillins Rash     SOCIAL HISTORY:  · Non smoker  · No alcohol use  FAMILY HISTORY:  · Negative for ESRD       MEDICATIONS:    Current Outpatient Medications:     ACCU-CHEK CAITLIN PLUS test strip, USE BY TEST ROUTE 3 TIMES EVERY DAY, Disp: , Rfl:     ASPIRIN ADULT PO, Take 1,000 mg by mouth 2 (two) times a day, Disp: , Rfl:     B-D UF III MINI PEN NEEDLES 31G X 5 MM MISC, 4 (four) times a day As directed, Disp: , Rfl:     COMBIGAN 0 2-0 5 %, Administer 1 drop to both eyes 2 (two) times a day, Disp: , Rfl:     Cyanocobalamin (VITAMIN B 12 PO), Take by mouth daily, Disp: , Rfl:    docusate sodium (COLACE) 100 mg capsule, Take by mouth, Disp: , Rfl:     famotidine (PEPCID) 20 mg tablet, Take 1 tablet (20 mg total) by mouth 2 (two) times a day, Disp: 60 tablet, Rfl: 11    furosemide (LASIX) 80 mg tablet, Take 1 tablet (80 mg total) by mouth daily Pt taking 120mg daily per Dr Sam Meckel (Patient taking differently: Take 80 mg by mouth daily ), Disp: 120 tablet, Rfl: 3    insulin aspart (NOVOLOG FLEXPEN) 100 Units/mL injection pen, Inject under the [id number] units 3 times a day with meals Use carbohydrate to insulin ratio 1:3 which means 1 unit of NovoLog for every 3 gm of carbohydrates (Patient taking differently: 18 Units 3 (three) times a day with meals Inject under the [id number] units 3 times a day with meals Use carbohydrate to insulin ratio 1:3 which means 1 unit of NovoLog for every 3 gm of carbohydrates), Disp: 15 pen, Rfl: 11    insulin glargine (BASAGLAR KWIKPEN) 100 units/mL injection pen, Inject 16 Units under the skin daily (Patient taking differently: Inject 22 Units under the skin daily at bedtime ), Disp: 5 pen, Rfl: 3    lactulose 20 g/30 mL, Take 15 mL (10 g total) by mouth 2 (two) times a day, Disp: 236 mL, Rfl: 6    levothyroxine (Synthroid) 137 mcg tablet, Take 1 tablet (137 mcg total) by mouth daily, Disp: 90 tablet, Rfl: 3    linaCLOtide (LINZESS) 145 MCG CAPS, Take 1 capsule (145 mcg total) by mouth daily, Disp: 30 capsule, Rfl: 4    metolazone (ZAROXOLYN) 2 5 mg tablet, TAKE 1 TABLET BY MOUTH TWICE WEEKLY (Patient taking differently: 2 (two) times a day as needed Pt taking twice weekly per Dr Sam Meckel  12/24/2019), Disp: 10 tablet, Rfl: 0    metoprolol succinate (TOPROL-XL) 50 mg 24 hr tablet, Take 50 mg by mouth daily , Disp: , Rfl:     Multiple Vitamin (MULTIVITAMIN) tablet, Take 1 tablet by mouth daily, Disp: , Rfl:     omeprazole (PriLOSEC) 20 mg delayed release capsule, Take 20 mg by mouth , Disp: , Rfl:     pantoprazole (PROTONIX) 40 mg tablet, Take 40 mg by mouth daily in the early morning , Disp: , Rfl:     potassium chloride (K-DUR,KLOR-CON) 10 mEq tablet, Take 1 tablet (10 mEq total) by mouth 2 (two) times a week With the extra furosemide, Disp: 15 tablet, Rfl: 3    rivaroxaban (Xarelto) 15 mg tablet, Take 1 tablet (15 mg total) by mouth daily with breakfast, Disp: 90 tablet, Rfl: 3    simvastatin (ZOCOR) 20 mg tablet, TAKE 1 TABLET (20MG) BY ORAL ROUTE EVERY DAY IN THE EVENING, Disp: 90 tablet, Rfl: 2    valsartan (DIOVAN) 80 mg tablet, Take 80 mg by mouth daily, Disp: , Rfl:     REVIEW OF SYSTEMS:  Review of Systems   Constitutional: Negative for appetite change, chills and fever  HENT: Negative for rhinorrhea  Eyes: Negative for discharge  Respiratory: Positive for shortness of breath  Negative for cough  On home oxygen   Cardiovascular: Positive for leg swelling  Negative for chest pain  Gastrointestinal: Positive for constipation  Negative for abdominal pain, diarrhea, nausea and vomiting  Endocrine: Negative for polyuria  Genitourinary: Negative for hematuria  Musculoskeletal: Positive for arthralgias  Skin: Negative for rash  Allergic/Immunologic: Negative for immunocompromised state  Neurological: Negative for light-headedness  Hematological: Does not bruise/bleed easily  Psychiatric/Behavioral: The patient is not nervous/anxious  All the systems were reviewed and were negative except as documented on the HPI  PHYSICAL EXAMINATION:  LMP  (LMP Unknown)   Current Weight:   There is no height or weight on file to calculate BMI  Exam was limited by the quality of the video  /70  General: conscious, coherent and not in acute distress over video  Skin: no visible rash  Eyes: appeared normal  ENT: atraumatic, appeared normal externally  Respiratory: talking in full sentences without difficulty, not in respiratory distress, oxygen via nasal cannula was noted     CVS: could not evaluate  GI: could not evaluate  : could not evaluate  MSK: could not evaluate  Neuro: awake, alert and oriented     Psych: appropriate affect    LABORATORY RESULTS:  Results for orders placed or performed during the hospital encounter of 06/19/20   Novel Coronavirus (Covid-19),PCR [place] [facility]   Result Value Ref Range    SARS-CoV-2 Negative Negative   CBC and differential   Result Value Ref Range    WBC 5 63 4 31 - 10 16 Thousand/uL    RBC 4 52 3 81 - 5 12 Million/uL    Hemoglobin 11 9 11 5 - 15 4 g/dL    Hematocrit 40 1 34 8 - 46 1 %    MCV 89 82 - 98 fL    MCH 26 3 (L) 26 8 - 34 3 pg    MCHC 29 7 (L) 31 4 - 37 4 g/dL    RDW 21 1 (H) 11 6 - 15 1 %    MPV 10 1 8 9 - 12 7 fL    Platelets 801 378 - 977 Thousands/uL    nRBC 0 /100 WBCs    Neutrophils Relative 66 43 - 75 %    Immat GRANS % 0 0 - 2 %    Lymphocytes Relative 16 14 - 44 %    Monocytes Relative 13 (H) 4 - 12 %    Eosinophils Relative 4 0 - 6 %    Basophils Relative 1 0 - 1 %    Neutrophils Absolute 3 70 1 85 - 7 62 Thousands/µL    Immature Grans Absolute 0 02 0 00 - 0 20 Thousand/uL    Lymphocytes Absolute 0 87 0 60 - 4 47 Thousands/µL    Monocytes Absolute 0 74 0 17 - 1 22 Thousand/µL    Eosinophils Absolute 0 23 0 00 - 0 61 Thousand/µL    Basophils Absolute 0 07 0 00 - 0 10 Thousands/µL   Comprehensive metabolic panel   Result Value Ref Range    Sodium 139 136 - 145 mmol/L    Potassium 4 8 3 5 - 5 3 mmol/L    Chloride 101 100 - 108 mmol/L    CO2 28 21 - 32 mmol/L    ANION GAP 10 4 - 13 mmol/L    BUN 60 (H) 5 - 25 mg/dL    Creatinine 1 81 (H) 0 60 - 1 30 mg/dL    Glucose 190 (H) 65 - 140 mg/dL    Calcium 8 5 8 3 - 10 1 mg/dL    AST 42 5 - 45 U/L    ALT 19 12 - 78 U/L    Alkaline Phosphatase 110 46 - 116 U/L    Total Protein 8 4 (H) 6 4 - 8 2 g/dL    Albumin 3 2 (L) 3 5 - 5 0 g/dL    Total Bilirubin 0 70 0 20 - 1 00 mg/dL    eGFR 28 ml/min/1 73sq m   Troponin I   Result Value Ref Range    Troponin I <0 02 <=0 04 ng/mL   NT-BNP PRO   Result Value Ref Range    NT-proBNP 6,607 (H) <125 pg/mL   UA w Reflex to Microscopic w Reflex to Culture   Result Value Ref Range    Color, UA Yellow     Clarity, UA Slightly Cloudy     Specific [place], UA 1 015 1 000 - 1 030    pH, UA 5 5 5 0, 5 5, 6 0, 6 5, 7 0, 7 5, 8 0, 8 5, 9 0    Leukocytes, UA Trace (A) Negative    Nitrite, UA Negative Negative    Protein, UA Negative Negative mg/dl    Glucose, UA Negative Negative mg/dl    Ketones, UA Negative Negative mg/dl    Urobilinogen, UA 0 2 0 2, 1 0 E U /dl E U /dl    Bilirubin, UA Negative Negative    Blood, UA Trace-lysed (A) Negative   Urine Microscopic   Result Value Ref Range    RBC, UA None Seen None Seen, 0-5 /hpf    WBC, UA 2-4 (A) None Seen, 0-5, 5-55, 5-65 /hpf    Epithelial Cells Occasional None Seen, Occasional /hpf    Bacteria, UA Innumerable (A) None Seen, Occasional /hpf    Hyaline Casts, UA 4-10 (A) (none) /lpf   ECG 12 lead   Result Value Ref Range    Ventricular Rate 66 BPM    Atrial Rate 113 BPM    NY Interval  ms    QRSD Interval 186 ms    QT Interval 494 ms    QTC Interval 517 ms    P Axis  degrees    QRS Axis 258 degrees    T Wave Axis 80 degrees      IMAGING: none    VIRTUAL VISIT DISCLAIMER    Katina Burkett acknowledges that she has consented to an online visit or consultation  She understands that the online visit is based solely on information provided by her, and that, in the absence of a face-to-face physical evaluation by the physician, the diagnosis she receives is both limited and provisional in terms of accuracy and completeness  This is not intended to replace a full medical face-to-face evaluation by the physician  Katina Burkett understands and accepts these terms

## 2020-07-10 ENCOUNTER — TELEPHONE (OUTPATIENT)
Dept: PULMONOLOGY | Facility: MEDICAL CENTER | Age: 70
End: 2020-07-10

## 2020-07-13 ENCOUNTER — TELEPHONE (OUTPATIENT)
Dept: CARDIOLOGY CLINIC | Facility: CLINIC | Age: 70
End: 2020-07-13

## 2020-07-13 ENCOUNTER — APPOINTMENT (OUTPATIENT)
Dept: LAB | Facility: HOSPITAL | Age: 70
End: 2020-07-13
Attending: INTERNAL MEDICINE
Payer: MEDICARE

## 2020-07-13 ENCOUNTER — TELEPHONE (OUTPATIENT)
Dept: NEPHROLOGY | Facility: CLINIC | Age: 70
End: 2020-07-13

## 2020-07-13 ENCOUNTER — TRANSCRIBE ORDERS (OUTPATIENT)
Dept: ADMINISTRATIVE | Facility: HOSPITAL | Age: 70
End: 2020-07-13

## 2020-07-13 DIAGNOSIS — Z95.1 H/O CORONARY ARTERY BYPASS SURGERY: ICD-10-CM

## 2020-07-13 DIAGNOSIS — Z98.890 H/O TRICUSPID VALVE ANNULOPLASTY: ICD-10-CM

## 2020-07-13 DIAGNOSIS — N18.30 STAGE 3 CHRONIC KIDNEY DISEASE (HCC): Primary | ICD-10-CM

## 2020-07-13 DIAGNOSIS — E11.40 TYPE 2 DIABETES MELLITUS WITH DIABETIC NEUROPATHY, WITH LONG-TERM CURRENT USE OF INSULIN (HCC): ICD-10-CM

## 2020-07-13 DIAGNOSIS — I50.42 CHRONIC COMBINED SYSTOLIC AND DIASTOLIC CONGESTIVE HEART FAILURE (HCC): ICD-10-CM

## 2020-07-13 DIAGNOSIS — I48.20 CHRONIC ATRIAL FIBRILLATION (HCC): ICD-10-CM

## 2020-07-13 DIAGNOSIS — I10 ESSENTIAL HYPERTENSION: ICD-10-CM

## 2020-07-13 DIAGNOSIS — E78.2 MIXED HYPERLIPIDEMIA: ICD-10-CM

## 2020-07-13 DIAGNOSIS — Z79.4 TYPE 2 DIABETES MELLITUS WITH DIABETIC NEUROPATHY, WITH LONG-TERM CURRENT USE OF INSULIN (HCC): ICD-10-CM

## 2020-07-13 DIAGNOSIS — E66.01 MORBID OBESITY WITH BMI OF 40.0-44.9, ADULT (HCC): ICD-10-CM

## 2020-07-13 DIAGNOSIS — E03.9 ACQUIRED HYPOTHYROIDISM: ICD-10-CM

## 2020-07-13 DIAGNOSIS — I36.1 NONRHEUMATIC TRICUSPID VALVE REGURGITATION: ICD-10-CM

## 2020-07-13 DIAGNOSIS — I25.810 CORONARY ARTERY DISEASE INVOLVING CORONARY BYPASS GRAFT OF NATIVE HEART WITHOUT ANGINA PECTORIS: ICD-10-CM

## 2020-07-13 DIAGNOSIS — I27.20 MILD PULMONARY HYPERTENSION (HCC): ICD-10-CM

## 2020-07-13 LAB
25(OH)D3 SERPL-MCNC: 37.2 NG/ML (ref 30–100)
BACTERIA UR QL AUTO: ABNORMAL /HPF
BILIRUB UR QL STRIP: NEGATIVE
CLARITY UR: CLEAR
COLOR UR: YELLOW
CREAT UR-MCNC: 33.1 MG/DL
EST. AVERAGE GLUCOSE BLD GHB EST-MCNC: 180 MG/DL
GLUCOSE UR STRIP-MCNC: NEGATIVE MG/DL
HBA1C MFR BLD: 7.9 %
HGB UR QL STRIP.AUTO: ABNORMAL
KETONES UR STRIP-MCNC: NEGATIVE MG/DL
LEUKOCYTE ESTERASE UR QL STRIP: ABNORMAL
MAGNESIUM SERPL-MCNC: 2.2 MG/DL (ref 1.6–2.6)
NITRITE UR QL STRIP: NEGATIVE
NON-SQ EPI CELLS URNS QL MICRO: ABNORMAL /HPF
PH UR STRIP.AUTO: 6 [PH]
PHOSPHATE SERPL-MCNC: 4 MG/DL (ref 2.3–4.1)
PROT UR STRIP-MCNC: NEGATIVE MG/DL
PROT UR-MCNC: 17 MG/DL
PROT/CREAT UR: 0.51 MG/G{CREAT} (ref 0–0.1)
PTH-INTACT SERPL-MCNC: 73.8 PG/ML (ref 18.4–80.1)
RBC #/AREA URNS AUTO: ABNORMAL /HPF
SP GR UR STRIP.AUTO: 1.01 (ref 1–1.03)
UROBILINOGEN UR QL STRIP.AUTO: 2 E.U./DL
WBC #/AREA URNS AUTO: ABNORMAL /HPF

## 2020-07-13 PROCEDURE — 84166 PROTEIN E-PHORESIS/URINE/CSF: CPT | Performed by: PATHOLOGY

## 2020-07-13 PROCEDURE — 82570 ASSAY OF URINE CREATININE: CPT

## 2020-07-13 PROCEDURE — 84156 ASSAY OF PROTEIN URINE: CPT

## 2020-07-13 PROCEDURE — 84100 ASSAY OF PHOSPHORUS: CPT

## 2020-07-13 PROCEDURE — 84165 PROTEIN E-PHORESIS SERUM: CPT

## 2020-07-13 PROCEDURE — 83970 ASSAY OF PARATHORMONE: CPT

## 2020-07-13 PROCEDURE — 84166 PROTEIN E-PHORESIS/URINE/CSF: CPT

## 2020-07-13 PROCEDURE — 83735 ASSAY OF MAGNESIUM: CPT

## 2020-07-13 PROCEDURE — 83883 ASSAY NEPHELOMETRY NOT SPEC: CPT

## 2020-07-13 PROCEDURE — 83036 HEMOGLOBIN GLYCOSYLATED A1C: CPT

## 2020-07-13 PROCEDURE — 82985 ASSAY OF GLYCATED PROTEIN: CPT

## 2020-07-13 PROCEDURE — 82306 VITAMIN D 25 HYDROXY: CPT

## 2020-07-13 PROCEDURE — 84165 PROTEIN E-PHORESIS SERUM: CPT | Performed by: PATHOLOGY

## 2020-07-13 PROCEDURE — 81001 URINALYSIS AUTO W/SCOPE: CPT

## 2020-07-13 NOTE — TELEPHONE ENCOUNTER
Spoke to patient over the phone  Labs are reviewed  Lab Results   Component Value Date    SODIUM 143 07/13/2020    K 3 6 07/13/2020     07/13/2020    CO2 36 (H) 07/13/2020    BUN 69 (H) 07/13/2020    CREATININE 1 83 (H) 07/13/2020    GLUC 190 (H) 06/19/2020    CALCIUM 9 0 07/13/2020     Renal function is stable with a creatinine of around 1 8  However, this is still above historical baseline  We will order a renal ultrasound for completeness  She reports losing weight since going on metolazone 2 5 mg twice a week  I told her that she can take it once a week once she is close to her base weight of 198 lbs but would need to increase back to twice a week if her weight goes above 200 lbs

## 2020-07-13 NOTE — TELEPHONE ENCOUNTER
----- Message from Constanza Kelly MD sent at 7/13/2020 12:34 PM EDT -----  Patient labs are acceptable  Continue same medications  Patient is advised to follow up  appointment regularly  Please call patient about the  about results

## 2020-07-14 ENCOUNTER — TELEPHONE (OUTPATIENT)
Dept: PULMONOLOGY | Facility: MEDICAL CENTER | Age: 70
End: 2020-07-14

## 2020-07-14 LAB
FRUCTOSAMINE SERPL-SCNC: 420 UMOL/L (ref 0–285)
KAPPA LC FREE SER-MCNC: 101.8 MG/L (ref 3.3–19.4)
KAPPA LC FREE/LAMBDA FREE SER: 1.95 {RATIO} (ref 0.26–1.65)
LAMBDA LC FREE SERPL-MCNC: 52.2 MG/L (ref 5.7–26.3)

## 2020-07-15 LAB
ALBUMIN SERPL ELPH-MCNC: 4.13 G/DL (ref 3.5–5)
ALBUMIN SERPL ELPH-MCNC: 49.7 % (ref 52–65)
ALBUMIN UR ELPH-MCNC: 100 %
ALPHA1 GLOB MFR UR ELPH: 0 %
ALPHA1 GLOB SERPL ELPH-MCNC: 0.31 G/DL (ref 0.1–0.4)
ALPHA1 GLOB SERPL ELPH-MCNC: 3.7 % (ref 2.5–5)
ALPHA2 GLOB MFR UR ELPH: 0 %
ALPHA2 GLOB SERPL ELPH-MCNC: 0.77 G/DL (ref 0.4–1.2)
ALPHA2 GLOB SERPL ELPH-MCNC: 9.3 % (ref 7–13)
B-GLOBULIN MFR UR ELPH: 0 %
BETA GLOB ABNORMAL SERPL ELPH-MCNC: 0.5 G/DL (ref 0.4–0.8)
BETA1 GLOB SERPL ELPH-MCNC: 6 % (ref 5–13)
BETA2 GLOB SERPL ELPH-MCNC: 5 % (ref 2–8)
BETA2+GAMMA GLOB SERPL ELPH-MCNC: 0.42 G/DL (ref 0.2–0.5)
GAMMA GLOB ABNORMAL SERPL ELPH-MCNC: 2.18 G/DL (ref 0.5–1.6)
GAMMA GLOB MFR UR ELPH: 0 %
GAMMA GLOB SERPL ELPH-MCNC: 26.3 % (ref 12–22)
IGG/ALB SER: 0.99 {RATIO} (ref 1.1–1.8)
PROT PATTERN SERPL ELPH-IMP: ABNORMAL
PROT PATTERN UR ELPH-IMP: NORMAL
PROT SERPL-MCNC: 8.3 G/DL (ref 6.4–8.2)
PROT UR-MCNC: 17 MG/DL

## 2020-07-16 ENCOUNTER — TELEPHONE (OUTPATIENT)
Dept: ENDOCRINOLOGY | Facility: CLINIC | Age: 70
End: 2020-07-16

## 2020-07-16 NOTE — TELEPHONE ENCOUNTER
----- Message from Suman Martinez MD sent at 7/15/2020 12:13 PM EDT -----  Please call inform patient of results    Fructosamine and A1c have trended up  Please advise patient to send blood sugar log for review

## 2020-07-23 ENCOUNTER — TELEPHONE (OUTPATIENT)
Dept: ENDOCRINOLOGY | Facility: CLINIC | Age: 70
End: 2020-07-23

## 2020-07-24 ENCOUNTER — TELEPHONE (OUTPATIENT)
Dept: ENDOCRINOLOGY | Facility: CLINIC | Age: 70
End: 2020-07-24

## 2020-07-24 NOTE — TELEPHONE ENCOUNTER
I sent you a message yesterday that if she calls back to please schedule her an appt with Dr Nury Ortega to discuss lab results  Please call her back and schedule an appt

## 2020-07-31 ENCOUNTER — OFFICE VISIT (OUTPATIENT)
Dept: ENDOCRINOLOGY | Facility: CLINIC | Age: 70
End: 2020-07-31

## 2020-07-31 DIAGNOSIS — Z79.4 TYPE 2 DIABETES MELLITUS WITH RETINOPATHY AND MACULAR EDEMA, WITH LONG-TERM CURRENT USE OF INSULIN, UNSPECIFIED LATERALITY, UNSPECIFIED RETINOPATHY SEVERITY (HCC): Primary | ICD-10-CM

## 2020-07-31 DIAGNOSIS — E11.311 TYPE 2 DIABETES MELLITUS WITH RETINOPATHY AND MACULAR EDEMA, WITH LONG-TERM CURRENT USE OF INSULIN, UNSPECIFIED LATERALITY, UNSPECIFIED RETINOPATHY SEVERITY (HCC): Primary | ICD-10-CM

## 2020-07-31 PROCEDURE — NC001 PR NO CHARGE: Performed by: INTERNAL MEDICINE

## 2020-07-31 NOTE — PROGRESS NOTES
Call patient for telephone visit however reached voicemail    Left voicemail x2 informing that I called, advised to call the office to reschedule her appointment

## 2020-08-08 ENCOUNTER — HOSPITAL ENCOUNTER (OUTPATIENT)
Dept: RADIOLOGY | Facility: HOSPITAL | Age: 70
Discharge: HOME | End: 2020-08-08
Attending: INTERNAL MEDICINE
Payer: MEDICARE

## 2020-08-08 ENCOUNTER — HOSPITAL ENCOUNTER (OUTPATIENT)
Dept: NON INVASIVE DIAGNOSTICS | Facility: HOSPITAL | Age: 70
Discharge: HOME | End: 2020-08-08
Attending: INTERNAL MEDICINE
Payer: MEDICARE

## 2020-08-08 DIAGNOSIS — I50.42 CHRONIC COMBINED SYSTOLIC AND DIASTOLIC CONGESTIVE HEART FAILURE (HCC): ICD-10-CM

## 2020-08-08 DIAGNOSIS — I36.1 NONRHEUMATIC TRICUSPID VALVE REGURGITATION: ICD-10-CM

## 2020-08-08 DIAGNOSIS — E66.01 MORBID OBESITY WITH BMI OF 40.0-44.9, ADULT (HCC): ICD-10-CM

## 2020-08-08 DIAGNOSIS — N18.30 STAGE 3 CHRONIC KIDNEY DISEASE (HCC): ICD-10-CM

## 2020-08-08 DIAGNOSIS — I48.20 CHRONIC ATRIAL FIBRILLATION (HCC): ICD-10-CM

## 2020-08-08 DIAGNOSIS — E78.2 MIXED HYPERLIPIDEMIA: ICD-10-CM

## 2020-08-08 DIAGNOSIS — I27.20 MILD PULMONARY HYPERTENSION (HCC): ICD-10-CM

## 2020-08-08 DIAGNOSIS — I25.810 CORONARY ARTERY DISEASE INVOLVING CORONARY BYPASS GRAFT OF NATIVE HEART WITHOUT ANGINA PECTORIS: ICD-10-CM

## 2020-08-08 DIAGNOSIS — Z95.1 H/O CORONARY ARTERY BYPASS SURGERY: ICD-10-CM

## 2020-08-08 DIAGNOSIS — I10 ESSENTIAL HYPERTENSION: ICD-10-CM

## 2020-08-08 DIAGNOSIS — Z98.890 H/O TRICUSPID VALVE ANNULOPLASTY: ICD-10-CM

## 2020-08-08 PROCEDURE — 93306 TTE W/DOPPLER COMPLETE: CPT

## 2020-08-08 PROCEDURE — 51798 US URINE CAPACITY MEASURE: CPT

## 2020-08-11 ENCOUNTER — TELEPHONE (OUTPATIENT)
Dept: CARDIOLOGY CLINIC | Facility: CLINIC | Age: 70
End: 2020-08-11

## 2020-08-11 DIAGNOSIS — K21.9 GASTROESOPHAGEAL REFLUX DISEASE WITHOUT ESOPHAGITIS: ICD-10-CM

## 2020-08-11 PROCEDURE — 93306 TTE W/DOPPLER COMPLETE: CPT | Performed by: INTERNAL MEDICINE

## 2020-08-11 RX ORDER — FAMOTIDINE 20 MG/1
TABLET, FILM COATED ORAL
Qty: 60 TABLET | Refills: 11 | Status: SHIPPED | OUTPATIENT
Start: 2020-08-11

## 2020-08-11 NOTE — TELEPHONE ENCOUNTER
----- Message from Jose Calhoun MD sent at 8/11/2020 11:57 AM EDT -----  Patient's echo is abnormal patient has severe aortic stenosis now along with the same he ejection fraction and higher PA pressure  He we should see her in the next 6-8 weeks

## 2020-08-12 ENCOUNTER — TELEPHONE (OUTPATIENT)
Dept: NEPHROLOGY | Facility: CLINIC | Age: 70
End: 2020-08-12

## 2020-08-12 NOTE — TELEPHONE ENCOUNTER
Pt advised that renal US is normal per [facility]     ----- Message from [place], [place] sent at 8/12/2020  3:56 PM EDT -----  Renal ultrasound normal

## 2020-08-18 ENCOUNTER — CONSULT (OUTPATIENT)
Dept: NEUROLOGY | Facility: CLINIC | Age: 70
End: 2020-08-18
Payer: MEDICARE

## 2020-08-18 VITALS
TEMPERATURE: 97.8 F | DIASTOLIC BLOOD PRESSURE: 74 MMHG | WEIGHT: 209.4 LBS | BODY MASS INDEX: 34.85 KG/M2 | HEART RATE: 84 BPM | SYSTOLIC BLOOD PRESSURE: 126 MMHG

## 2020-08-18 DIAGNOSIS — G31.84 MILD COGNITIVE IMPAIRMENT: Primary | ICD-10-CM

## 2020-08-18 DIAGNOSIS — R41.89 ALTERED THOUGHT PROCESSES: ICD-10-CM

## 2020-08-18 PROCEDURE — 1160F RVW MEDS BY RX/DR IN RCRD: CPT | Performed by: PSYCHIATRY & NEUROLOGY

## 2020-08-18 PROCEDURE — 99205 OFFICE O/P NEW HI 60 MIN: CPT | Performed by: PSYCHIATRY & NEUROLOGY

## 2020-08-18 PROCEDURE — 1036F TOBACCO NON-USER: CPT | Performed by: PSYCHIATRY & NEUROLOGY

## 2020-08-18 PROCEDURE — 3051F HG A1C>EQUAL 7.0%<8.0%: CPT | Performed by: PSYCHIATRY & NEUROLOGY

## 2020-08-18 PROCEDURE — 3078F DIAST BP <80 MM HG: CPT | Performed by: PSYCHIATRY & NEUROLOGY

## 2020-08-18 PROCEDURE — 3066F NEPHROPATHY DOC TX: CPT | Performed by: PSYCHIATRY & NEUROLOGY

## 2020-08-18 PROCEDURE — 4040F PNEUMOC VAC/ADMIN/RCVD: CPT | Performed by: PSYCHIATRY & NEUROLOGY

## 2020-08-18 PROCEDURE — 3074F SYST BP LT 130 MM HG: CPT | Performed by: PSYCHIATRY & NEUROLOGY

## 2020-08-18 NOTE — ASSESSMENT & PLAN NOTE
Assessment:   Madelin Jean-Baptiste is a 69 y o  female seen in neurology clinic for  changes in behavior/ though process which have progressively worsened in the last 2 months  Patient is able to perform ADLS, long term memory is intact, patient has some increased difficulty with word finding trouble and reports decreased attention  Given history and PE  possible etiology could be related to small microvascular changes given underlying co-morbidities and non compliance with Oxygen therapy especially given fluctuating characteristic of symptoms  Labs were reviewed  Plan and recommendations as outlined below:  Plan:  · Recommend Lab workup for Vitamin B12 and RPR rule out any underlying etiology  · Recommend MRI with neuroquant given patient has some change in behavior/ personality changes given patient reports her pacemaker is not MRI compatible will hold off  · Recommend amb referral to cognitive rehab   · Patient and son verbalized understanding and all questions were answered  · Continue compliance with Oxygen therapy  · Recommend checking blood pressure or blood sugar during times of decreased attention  · At this time after discussion with patient and son, will hold off on initiating any medications  · Will follow up patient in 3-4 months or soon PRN

## 2020-08-18 NOTE — PROGRESS NOTES
Patient ID: Veronica Taylor is a 69 y o  female  Assessment/Plan:    Altered thought processes  Assessment:   Veronica Taylor is a 69 y o  female seen in neurology clinic for  changes in behavior/ though process which have progressively worsened in the last 2 months  Patient is able to perform ADLS, long term memory is intact, patient has some increased difficulty with word finding trouble and reports decreased attention  Given history and PE  possible etiology could be related to small microvascular changes given underlying co-morbidities and non compliance with Oxygen therapy especially given fluctuating characteristic of symptoms  Labs were reviewed  Plan and recommendations as outlined below:  Plan:  · Recommend Lab workup for Vitamin B12 and RPR rule out any underlying etiology  · Recommend MRI with neuroquant given patient has some change in behavior/ personality changes given patient reports her pacemaker is not MRI compatible will hold off  · Recommend amb referral to cognitive rehab   · Patient and son verbalized understanding and all questions were answered  · Continue compliance with Oxygen therapy  · Recommend checking blood pressure or blood sugar during times of decreased attention  · At this time after discussion with patient and son, will hold off on initiating any medications  · Will follow up patient in 3-4 months or soon PRN  Diagnoses and all orders for this visit:    Altered thought processes  -     Ambulatory referral to Neurology  -     Vitamin B12; Future  -     RPR; Future  -     Ambulatory referral to Speech Therapy; Future  -     Lyme Antibody Profile with reflex to WB; Future    ·        HPI  Veronica Taylor is a 69 y o  female with altered change in thought process/ behavior  Patient was seen with her son  Patient reports she was not on her oxygen and she fell down and she was altered at the time   Patient since that time has been having fogginess, she has been having difficulty finding her words, she has been noted to be more unable to take care of her daily activities  Son reports there were times where the son notes the the patient will be in the middle of a task and unable to report what she is doing  Patient reports she is having difficulty with hearing  She reports hearing loss  She was found to have a normal hearing test  Patient is noted to have decreased attention  She is able to respond and shes engaging  She is here for evaluation and treatment of cognitive problems  She is accompanied by son  Primary caregiver is patient and son  Patient lives with their family  In a separate [facility] apartment division  Majoritiy of her symptoms will occur when patient is not wearing her oxygen  Patient is on 24 hours oxygen  Patient is noted to have no change in her memory or change in personality  Description of memory trouble  When did the memory difficulties begin? 4 months ago, she had some difficulty prior to may but in the last 4-5 months patient has been progressively worsening  The memory problems affects the recent past  Patient's long term memory is not affected  Family has denies any hallucinations and agitation,   How did the symptoms start? gradual  Over time the problem has gradually worsening  Who noticed the problem with your memory? patient, son and daughter  Patient does not have trouble with any of the following:  names and eating, patient has times of decreased eating  Patient is able to perform her ADLs, she denies any change ADLs performances, son reports if she is distracted from her routine, she will sometimes forget to do ADLs  Have there been accidents, injuries, embarrassments related to the memory problem? no  Do you have any problems operating household appliance such as TV remote, kitchen appliances, computer ? no  Do you have trouble finding the right word while speaking?  yes - occasionally she has difficulty with finding words  Do you substitute a wrong word ? no  Do you require repeat information or ask the same question repeatedly? No she does require occional reminding  Functional Assessment  She is independent in the following: ambulation, bathing and hygiene, feeding, continence, grooming, toileting and dressing, she will use a walker occionsally at home   Requires assistance with the following: she will occionsally require   Have you had any recent accidents, citations or getting lost in familiar places? - patient has decreased vision  Do you handle your own financial affairs such as balancing your checkbook, paying bills, investments? no  Do have any difficulties with handling your financial affairs? No patient shes able to do her own   Medication administration: patient self medicates no trouble with medications    Associated mood symptoms  Have you or your family noted any change  in your mood or personality? No   Are you currently or have you been treated in the past for depression or anxiety? No, she has not been diagnosed with depression  Other factors  Fluctuation in alertness? yes - she will occionally will not be paying attention  Is there any history of epilepsy? no  Has the patient lost personal awareness, and social awareness? no  Family member with dementia and what type? yes - patient aunt  No other hx of dementia   Do you have history of myocardial infarction? yes - she has a marie of CABG 2008   Does patient have history of alcohol abuse? no      The following portions of the patient's history were reviewed and updated as appropriate: allergies, current medications, past family history, past medical history, past social history, past surgical history and problem list and review of symptoms reviewed            Objective:    Blood pressure 126/74, pulse 84, temperature 97 8 °F (36 6 °C), weight 95 kg (209 lb 6 4 oz), not currently breastfeeding  Physical Exam  Vitals signs and nursing note reviewed     Constitutional: Appearance: Normal appearance  HENT:      Head: Normocephalic and atraumatic  Eyes:      General: Lids are normal       Extraocular Movements: Extraocular movements intact  Pupils: Pupils are equal, round, and reactive to light  Pulmonary:      Effort: No respiratory distress  Comments: On O2  Neurological:      Mental Status: She is alert  Deep Tendon Reflexes: Strength normal and reflexes are normal and symmetric  Psychiatric:         Speech: Speech normal           Neurological Exam  Mental Status  Alert  Oriented to person, place, time and situation  Recent and remote memory are intact  Speech is normal  Follows two-step commands  Attention and concentration are normal  Fund of knowledge is appropriate for level of education  Cranial Nerves  CN II: Visual acuity is normal  Visual fields full to confrontation  CN III, IV, VI: Extraocular movements intact bilaterally  Normal lids and orbits bilaterally  Pupils equal round and reactive to light bilaterally  CN V: Facial sensation is normal   CN VII: Full and symmetric facial movement  CN VIII: Hearing is normal   CN IX, X: Palate elevates symmetrically  Normal gag reflex  CN XI: Shoulder shrug strength is normal   CN XII: Tongue midline without atrophy or fasciculations  Motor  Normal muscle bulk throughout  No fasciculations present  Normal muscle tone  Strength is 5/5 throughout all four extremities  Sensory  Light touch is normal in upper and lower extremities  Decreased from bilateral ankles down  Decreased ankle down bilaterally   Reflexes  Deep tendon reflexes are 2+ and symmetric in all four extremities with downgoing toes bilaterally  Coordination  Right: Finger-to-nose normal  Rapid alternating movement normal   Left: Finger-to-nose normal  Rapid alternating movement normal     Gait  Casual gait is normal including stance, stride, and arm swing          ROS:    Review of Systems   Constitutional: Positive for fatigue and unexpected weight change  Negative for appetite change and fever  HENT: Negative  Negative for hearing loss, tinnitus, trouble swallowing and voice change  Sinus problems  Hearing loss   Eyes: Positive for visual disturbance  Negative for photophobia and pain  Respiratory: Negative  Negative for shortness of breath  Cardiovascular: Negative  Negative for palpitations  Gastrointestinal: Negative  Negative for nausea and vomiting  Endocrine: Negative  Negative for cold intolerance  Genitourinary: Negative  Negative for dysuria, frequency and urgency  Musculoskeletal: Positive for arthralgias and gait problem  Negative for myalgias and neck pain  Cramping     Skin: Negative  Negative for rash  Neurological: Positive for tremors and numbness  Negative for dizziness, seizures, syncope, facial asymmetry, speech difficulty, weakness, light-headedness and headaches  Tingling  Memory problems     Hematological: Negative  Does not bruise/bleed easily  Psychiatric/Behavioral: Positive for confusion and sleep disturbance  Negative for hallucinations

## 2020-08-19 PROBLEM — G31.84 MILD COGNITIVE IMPAIRMENT: Status: ACTIVE | Noted: 2020-08-19

## 2020-08-19 NOTE — PROGRESS NOTES
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 )  Assessment & Plan:    Lab Results   Component Value Date    HGBA1C 7 9 (H) 07/13/2020     Her control has not been optimal but she is following with endocrinology and they recently adjusted her insulin  She was advised that she missed her last virtual appointment with endocrinology and she will schedule to follow up  Advised on the need for good glucose control, good foot and eye care  2  Acquired hypothyroidism  Assessment & Plan: This has been well controlled and she has had recent TSH done, which was normal   She will continue levothyroxine at current dose  3  Obstructive sleep apnea  Assessment & Plan:  Continue oxygen therapy  4  Stage 3 chronic kidney disease ([facility] )    5  Mixed hyperlipidemia    6  Chronic obstructive pulmonary disease, unspecified COPD type ([facility] )  Assessment & Plan:  She continues home O2, current nebs, and pulmonology follow up  7  Peripheral vascular disease, unspecified ([facility] )  Assessment & Plan:  She currently denies symptoms, but is not very mobile  8  Atherosclerosis of native coronary artery of native heart with angina pectoris ([facility] )          There are no Patient Instructions on file for this visit  Return in about 6 months (around 2/20/2021) for 1/2 hour follow up and AWV  Subjective:      Patient ID: Lazaro Balbuena is a 69 y o  female  Chief Complaint   Patient presents with    Follow-up     follow up on chronic issues [id number]        Here for follow up of multiple issues  Has been seeing neurology for her memory loss  She has to make appointments for cognitive therapy, neurologist recommended  She sees endocrinologist for her DM and cardiologist for aortic stenosis    She does continue to have dyspnea and edema and has follow up appointment with cardiology in September to discuss recent echo and severe AS  She uses her O2 constantly and only occasionally takes it off when she does not realize in the middle of the night  The following portions of the patient's history were reviewed and updated as appropriate: allergies, current medications, past family history, past medical history, past social history, past surgical history and problem list     Review of Systems   Constitutional: Negative  Respiratory: Positive for shortness of breath  Negative for cough and wheezing  Cardiovascular: Positive for leg swelling  Negative for chest pain and palpitations  Endocrine: Negative  Genitourinary: Negative  Neurological:        Memory issues as above            Current Outpatient Medications   Medication Sig Dispense Refill    ACCU-CHEK CAITLIN PLUS test strip USE BY TEST ROUTE 3 TIMES EVERY DAY      ASPIRIN ADULT PO Take 1,000 mg by mouth 2 (two) times a day      B-D UF III MINI PEN NEEDLES 31G X 5 MM MISC 4 (four) times a day As directed      COMBIGAN 0 2-0 5 % Administer 1 drop to both eyes 2 (two) times a day      Cyanocobalamin (VITAMIN B 12 PO) Take by mouth daily      famotidine (PEPCID) 20 mg tablet TAKE 1 TABLET BY MOUTH TWICE A DAY 60 tablet 11    furosemide (LASIX) 80 mg tablet Take 1 tablet (80 mg total) by mouth daily Pt taking 120mg daily per Dr Nury Ortega (Patient taking differently: Take 80 mg by mouth daily ) 120 tablet 3    insulin aspart (NOVOLOG FLEXPEN) 100 Units/mL injection pen Inject under the [id number] units 3 times a day with meals  Use carbohydrate to insulin ratio 1:3 which means 1 unit of NovoLog for every 3 gm of carbohydrates (Patient taking differently: 18 Units 3 (three) times a day with meals Inject under the [id number] units 3 times a day with meals  Use carbohydrate to insulin ratio 1:3 which means 1 unit of NovoLog for every 3 gm of carbohydrates) 15 pen 11    insulin glargine (BASAGLAR KWIKPEN) 100 units/mL injection pen Inject 16 Units under the skin daily (Patient taking differently: Inject 22 Units under the skin daily at bedtime ) 5 pen 3    lactulose 20 g/30 mL Take 15 mL (10 g total) by mouth 2 (two) times a day 236 mL 6    levothyroxine (Synthroid) 137 mcg tablet Take 1 tablet (137 mcg total) by mouth daily 90 tablet 3    metolazone (ZAROXOLYN) 2 5 mg tablet TAKE 1 TABLET BY MOUTH TWICE WEEKLY (Patient taking differently: 2 (two) times a day as needed Pt taking twice weekly per Dr Buckner   12/24/2019) 10 tablet 0    metoprolol succinate (TOPROL-XL) 50 mg 24 hr tablet Take 50 mg by mouth daily       Multiple Vitamin (MULTIVITAMIN) tablet Take 1 tablet by mouth daily      pantoprazole (PROTONIX) 40 mg tablet Take 40 mg by mouth daily in the early morning       potassium chloride (K-DUR,KLOR-CON) 10 mEq tablet Take 1 tablet (10 mEq total) by mouth 2 (two) times a week With the extra furosemide 15 tablet 3    rivaroxaban (Xarelto) 15 mg tablet Take 1 tablet (15 mg total) by mouth daily with breakfast 90 tablet 3    simvastatin (ZOCOR) 20 mg tablet TAKE 1 TABLET (20MG) BY ORAL ROUTE EVERY DAY IN THE EVENING 90 tablet 2    valsartan (DIOVAN) 80 mg tablet Take 80 mg by mouth daily      docusate sodium (COLACE) 100 mg capsule Take by mouth      linaCLOtide (LINZESS) 145 MCG CAPS Take 1 capsule (145 mcg total) by mouth daily 30 capsule 4    omeprazole (PriLOSEC) 20 mg delayed release capsule Take 20 mg by mouth        No current facility-administered medications for this visit  Objective:    /60   Pulse 68   Temp (!) 96 8 °F (36 °C)   Resp 16   Ht 5' 3" (1 6 m)   Wt 94 3 kg (208 lb)   LMP  (LMP Unknown)   SpO2 95%   BMI 36 85 kg/m²        Physical Exam  Constitutional:       Appearance: She is well-developed  She is ill-appearing  Eyes:      Conjunctiva/sclera: Conjunctivae normal    Neck:      Musculoskeletal: Neck supple  Thyroid: No thyromegaly  Vascular: No JVD     Cardiovascular: Rate and Rhythm: Normal rate and regular rhythm  Pulses: no weak pulses          Dorsalis pedis pulses are 2+ on the right side and 2+ on the left side  Heart sounds: Murmur present  No friction rub  No gallop  Pulmonary:      Effort: Pulmonary effort is normal       Breath sounds: Examination of the right-lower field reveals rales  Examination of the left-lower field reveals rales  Decreased breath sounds and rales present  No wheezing  Abdominal:      General: Bowel sounds are normal  There is no distension  Palpations: Abdomen is soft  Tenderness: There is no abdominal tenderness  Feet:      Right foot:      Skin integrity: No ulcer, skin breakdown, erythema, warmth, callus or dry skin  Left foot:      Skin integrity: No ulcer, skin breakdown, erythema, warmth, callus or dry skin  Patient's shoes and socks removed  Right Foot/Ankle   Right Foot Inspection  Skin Exam: skin normal and skin intact no dry skin, no warmth, no callus, no erythema, no maceration, no abnormal color, no pre-ulcer, no ulcer and no callus                          Toe Exam: ROM and strength within normal limits  Sensory       Monofilament testing: intact  Vascular  Capillary refills: < 3 seconds  The right DP pulse is 2+  Left Foot/Ankle  Left Foot Inspection  Skin Exam: skin normal and skin intactno dry skin, no warmth, no erythema, no maceration, normal color, no pre-ulcer, no ulcer and no callus                         Toe Exam: ROM and strength within normal limits                   Sensory       Monofilament: intact  Vascular  Capillary refills: < 3 seconds  The left DP pulse is 2+  Assign Risk Category:  No deformity present; No loss of protective sensation;  No weak pulses       Risk: 0           Van Osborn MD

## 2020-08-20 ENCOUNTER — OFFICE VISIT (OUTPATIENT)
Dept: FAMILY MEDICINE CLINIC | Facility: CLINIC | Age: 70
End: 2020-08-20
Payer: MEDICARE

## 2020-08-20 VITALS
RESPIRATION RATE: 16 BRPM | BODY MASS INDEX: 36.86 KG/M2 | DIASTOLIC BLOOD PRESSURE: 60 MMHG | TEMPERATURE: 96.8 F | WEIGHT: 208 LBS | HEART RATE: 68 BPM | SYSTOLIC BLOOD PRESSURE: 108 MMHG | HEIGHT: 63 IN | OXYGEN SATURATION: 95 %

## 2020-08-20 DIAGNOSIS — E03.9 ACQUIRED HYPOTHYROIDISM: ICD-10-CM

## 2020-08-20 DIAGNOSIS — I25.119 ATHEROSCLEROSIS OF NATIVE CORONARY ARTERY OF NATIVE HEART WITH ANGINA PECTORIS (HCC): ICD-10-CM

## 2020-08-20 DIAGNOSIS — Z79.4 TYPE 2 DIABETES MELLITUS WITH RETINOPATHY AND MACULAR EDEMA, WITH LONG-TERM CURRENT USE OF INSULIN, UNSPECIFIED LATERALITY, UNSPECIFIED RETINOPATHY SEVERITY (HCC): Primary | ICD-10-CM

## 2020-08-20 DIAGNOSIS — I73.9 PERIPHERAL VASCULAR DISEASE, UNSPECIFIED (HCC): ICD-10-CM

## 2020-08-20 DIAGNOSIS — E11.311 TYPE 2 DIABETES MELLITUS WITH RETINOPATHY AND MACULAR EDEMA, WITH LONG-TERM CURRENT USE OF INSULIN, UNSPECIFIED LATERALITY, UNSPECIFIED RETINOPATHY SEVERITY (HCC): Primary | ICD-10-CM

## 2020-08-20 DIAGNOSIS — J44.9 CHRONIC OBSTRUCTIVE PULMONARY DISEASE, UNSPECIFIED COPD TYPE (HCC): ICD-10-CM

## 2020-08-20 DIAGNOSIS — N18.30 STAGE 3 CHRONIC KIDNEY DISEASE (HCC): ICD-10-CM

## 2020-08-20 DIAGNOSIS — E78.2 MIXED HYPERLIPIDEMIA: ICD-10-CM

## 2020-08-20 DIAGNOSIS — G47.33 OBSTRUCTIVE SLEEP APNEA: ICD-10-CM

## 2020-08-20 PROBLEM — I35.0 AORTIC STENOSIS: Status: ACTIVE | Noted: 2020-08-20

## 2020-08-20 PROCEDURE — 4040F PNEUMOC VAC/ADMIN/RCVD: CPT | Performed by: INTERNAL MEDICINE

## 2020-08-20 PROCEDURE — 3008F BODY MASS INDEX DOCD: CPT | Performed by: INTERNAL MEDICINE

## 2020-08-20 PROCEDURE — 3051F HG A1C>EQUAL 7.0%<8.0%: CPT | Performed by: INTERNAL MEDICINE

## 2020-08-20 PROCEDURE — 3066F NEPHROPATHY DOC TX: CPT | Performed by: INTERNAL MEDICINE

## 2020-08-20 PROCEDURE — 99214 OFFICE O/P EST MOD 30 MIN: CPT | Performed by: INTERNAL MEDICINE

## 2020-08-20 PROCEDURE — 3078F DIAST BP <80 MM HG: CPT | Performed by: INTERNAL MEDICINE

## 2020-08-20 PROCEDURE — 3074F SYST BP LT 130 MM HG: CPT | Performed by: INTERNAL MEDICINE

## 2020-08-20 PROCEDURE — 1036F TOBACCO NON-USER: CPT | Performed by: INTERNAL MEDICINE

## 2020-08-20 PROCEDURE — 1160F RVW MEDS BY RX/DR IN RCRD: CPT | Performed by: INTERNAL MEDICINE

## 2020-08-20 NOTE — ASSESSMENT & PLAN NOTE
This has been well controlled and she has had recent TSH done, which was normal   She will continue levothyroxine at current dose

## 2020-08-20 NOTE — ASSESSMENT & PLAN NOTE
Lab Results   Component Value Date    HGBA1C 7 9 (H) 07/13/2020     Her control has not been optimal but she is following with endocrinology and they recently adjusted her insulin  She was advised that she missed her last virtual appointment with endocrinology and she will schedule to follow up  Advised on the need for good glucose control, good foot and eye care

## 2020-08-20 NOTE — ASSESSMENT & PLAN NOTE
This was read as severe on most recent echo and she has upcoming appointment to discuss this with her cardiologist

## 2020-08-24 ENCOUNTER — TELEPHONE (OUTPATIENT)
Dept: OTHER | Facility: OTHER | Age: 70
End: 2020-08-24

## 2020-08-25 ENCOUNTER — TELEPHONE (OUTPATIENT)
Dept: NEPHROLOGY | Facility: CLINIC | Age: 70
End: 2020-08-25

## 2020-08-25 NOTE — TELEPHONE ENCOUNTER
Office called patient in response to a message that was sent to the Clerical pool  Patient wants to reschedule 9/30 appointment with provider  When office called, there was no answer  The office did leave a message requesting a call back to the office to help the patient schedule the appointment with provider

## 2020-08-27 ENCOUNTER — FOLLOW UP (OUTPATIENT)
Dept: URBAN - METROPOLITAN AREA CLINIC 27 | Facility: CLINIC | Age: 70
End: 2020-08-27

## 2020-08-27 DIAGNOSIS — E11.3413: ICD-10-CM

## 2020-08-27 DIAGNOSIS — H40.053: ICD-10-CM

## 2020-08-27 PROCEDURE — 92134 CPTRZ OPH DX IMG PST SGM RTA: CPT

## 2020-08-27 PROCEDURE — PFS3 LUCENTIS 0.3MG PREFILLED SYRINGE

## 2020-08-27 PROCEDURE — 67028 INJECTION EYE DRUG: CPT

## 2020-08-27 PROCEDURE — 92012 INTRM OPH EXAM EST PATIENT: CPT | Mod: 25

## 2020-08-27 ASSESSMENT — VISUAL ACUITY
OS_CC: 20/160
OD_CC: 20/160

## 2020-08-27 ASSESSMENT — TONOMETRY
OD_IOP_MMHG: 19
OS_IOP_MMHG: 21

## 2020-08-29 ENCOUNTER — TRANSCRIBE ORDERS (OUTPATIENT)
Dept: ADMINISTRATIVE | Facility: HOSPITAL | Age: 70
End: 2020-08-29

## 2020-08-29 ENCOUNTER — APPOINTMENT (OUTPATIENT)
Dept: LAB | Facility: HOSPITAL | Age: 70
End: 2020-08-29
Payer: MEDICARE

## 2020-08-29 DIAGNOSIS — R41.89 ALTERED THOUGHT PROCESSES: ICD-10-CM

## 2020-08-29 LAB — VIT B12 SERPL-MCNC: 2736 PG/ML (ref 100–900)

## 2020-08-29 PROCEDURE — 82607 VITAMIN B-12: CPT

## 2020-08-29 PROCEDURE — 86618 LYME DISEASE ANTIBODY: CPT

## 2020-08-29 PROCEDURE — 86592 SYPHILIS TEST NON-TREP QUAL: CPT

## 2020-08-29 PROCEDURE — 36415 COLL VENOUS BLD VENIPUNCTURE: CPT

## 2020-08-31 ENCOUNTER — TELEPHONE (OUTPATIENT)
Dept: NEUROLOGY | Facility: CLINIC | Age: 70
End: 2020-08-31

## 2020-08-31 LAB
B BURGDOR IGG+IGM SER-ACNC: <0.91 ISR (ref 0–0.9)
RPR SER QL: NORMAL

## 2020-08-31 NOTE — TELEPHONE ENCOUNTER
----- Message from [facility], RN sent at 8/31/2020 10:24 AM EDT -----    ----- Message -----  From: Edna Upton MD  Sent: 8/30/2020  10:08 AM EDT  To: [facility] [place] [facility]    Please call the patient regarding her abnormal result  Please call patient about high levels of Vitamin B-12 and to discontinue taking supplements for it as it is too high     Thank you

## 2020-09-01 DIAGNOSIS — E11.40 TYPE 2 DIABETES MELLITUS WITH DIABETIC NEUROPATHY, WITH LONG-TERM CURRENT USE OF INSULIN (HCC): ICD-10-CM

## 2020-09-01 DIAGNOSIS — Z79.4 TYPE 2 DIABETES MELLITUS WITH DIABETIC NEUROPATHY, WITH LONG-TERM CURRENT USE OF INSULIN (HCC): ICD-10-CM

## 2020-09-01 NOTE — TELEPHONE ENCOUNTER
Spoke with patient and reviewed bg log    She understood and will call if having multiple bg's in the 70's

## 2020-09-01 NOTE — TELEPHONE ENCOUNTER
Covering for dr Condon Earing Please let pt know I reviewed BG log    -BGs are quite variable ranging from 70s-400s    Some days the BGs are well controlled but other days it is high or low    Continue same insulin regimen for now    Let us know if you start to get a lot of low BGs <70

## 2020-09-04 ENCOUNTER — HOSPITAL ENCOUNTER (EMERGENCY)
Facility: HOSPITAL | Age: 70
Discharge: HOME | End: 2020-09-04
Attending: EMERGENCY MEDICINE | Admitting: EMERGENCY MEDICINE
Payer: MEDICARE

## 2020-09-04 ENCOUNTER — APPOINTMENT (EMERGENCY)
Dept: RADIOLOGY | Facility: HOSPITAL | Age: 70
End: 2020-09-04
Payer: MEDICARE

## 2020-09-04 VITALS
OXYGEN SATURATION: 98 % | DIASTOLIC BLOOD PRESSURE: 59 MMHG | SYSTOLIC BLOOD PRESSURE: 143 MMHG | HEART RATE: 60 BPM | RESPIRATION RATE: 16 BRPM

## 2020-09-04 DIAGNOSIS — R07.89 CHEST WALL PAIN: Primary | ICD-10-CM

## 2020-09-04 LAB
ALBUMIN SERPL BCP-MCNC: 3.6 G/DL (ref 3.5–5)
ALP SERPL-CCNC: 121 U/L (ref 46–116)
ALT SERPL W P-5'-P-CCNC: 19 U/L (ref 12–78)
ANION GAP SERPL CALCULATED.3IONS-SCNC: 4 MMOL/L (ref 4–13)
APTT PPP: 33 SECONDS (ref 23–37)
AST SERPL W P-5'-P-CCNC: 23 U/L (ref 5–45)
BASOPHILS # BLD AUTO: 0.05 THOUSANDS/ΜL (ref 0–0.1)
BASOPHILS NFR BLD AUTO: 1 % (ref 0–1)
BILIRUB SERPL-MCNC: 0.7 MG/DL (ref 0.2–1)
BUN SERPL-MCNC: 75 MG/DL (ref 5–25)
CALCIUM SERPL-MCNC: 9.2 MG/DL (ref 8.3–10.1)
CHLORIDE SERPL-SCNC: 100 MMOL/L (ref 100–108)
CO2 SERPL-SCNC: 34 MMOL/L (ref 21–32)
CREAT SERPL-MCNC: 2.18 MG/DL (ref 0.6–1.3)
EOSINOPHIL # BLD AUTO: 0.32 THOUSAND/ΜL (ref 0–0.61)
EOSINOPHIL NFR BLD AUTO: 7 % (ref 0–6)
ERYTHROCYTE [DISTWIDTH] IN BLOOD BY AUTOMATED COUNT: 18.9 % (ref 11.6–15.1)
GFR SERPL CREATININE-BSD FRML MDRD: 22 ML/MIN/1.73SQ M
GLUCOSE SERPL-MCNC: 100 MG/DL (ref 65–140)
HCT VFR BLD AUTO: 37.6 % (ref 34.8–46.1)
HGB BLD-MCNC: 11.3 G/DL (ref 11.5–15.4)
IMM GRANULOCYTES # BLD AUTO: 0.01 THOUSAND/UL (ref 0–0.2)
IMM GRANULOCYTES NFR BLD AUTO: 0 % (ref 0–2)
INR PPP: 1.22 (ref 0.84–1.19)
LYMPHOCYTES # BLD AUTO: 0.63 THOUSANDS/ΜL (ref 0.6–4.47)
LYMPHOCYTES NFR BLD AUTO: 13 % (ref 14–44)
MCH RBC QN AUTO: 29.4 PG (ref 26.8–34.3)
MCHC RBC AUTO-ENTMCNC: 30.1 G/DL (ref 31.4–37.4)
MCV RBC AUTO: 98 FL (ref 82–98)
MONOCYTES # BLD AUTO: 0.64 THOUSAND/ΜL (ref 0.17–1.22)
MONOCYTES NFR BLD AUTO: 14 % (ref 4–12)
NEUTROPHILS # BLD AUTO: 3.04 THOUSANDS/ΜL (ref 1.85–7.62)
NEUTS SEG NFR BLD AUTO: 65 % (ref 43–75)
NRBC BLD AUTO-RTO: 0 /100 WBCS
PLATELET # BLD AUTO: 141 THOUSANDS/UL (ref 149–390)
PMV BLD AUTO: 10.4 FL (ref 8.9–12.7)
POTASSIUM SERPL-SCNC: 3.8 MMOL/L (ref 3.5–5.3)
PROT SERPL-MCNC: 8.7 G/DL (ref 6.4–8.2)
PROTHROMBIN TIME: 15.3 SECONDS (ref 11.6–14.5)
RBC # BLD AUTO: 3.84 MILLION/UL (ref 3.81–5.12)
SODIUM SERPL-SCNC: 138 MMOL/L (ref 136–145)
TROPONIN I SERPL-MCNC: <0.02 NG/ML
TROPONIN I SERPL-MCNC: <0.02 NG/ML
WBC # BLD AUTO: 4.69 THOUSAND/UL (ref 4.31–10.16)

## 2020-09-04 PROCEDURE — 93005 ELECTROCARDIOGRAM TRACING: CPT

## 2020-09-04 PROCEDURE — 85025 COMPLETE CBC W/AUTO DIFF WBC: CPT | Performed by: EMERGENCY MEDICINE

## 2020-09-04 PROCEDURE — 96374 THER/PROPH/DIAG INJ IV PUSH: CPT

## 2020-09-04 PROCEDURE — 99285 EMERGENCY DEPT VISIT HI MDM: CPT

## 2020-09-04 PROCEDURE — 80053 COMPREHEN METABOLIC PANEL: CPT | Performed by: EMERGENCY MEDICINE

## 2020-09-04 PROCEDURE — 99285 EMERGENCY DEPT VISIT HI MDM: CPT | Performed by: EMERGENCY MEDICINE

## 2020-09-04 PROCEDURE — 84484 ASSAY OF TROPONIN QUANT: CPT | Performed by: EMERGENCY MEDICINE

## 2020-09-04 PROCEDURE — 71045 X-RAY EXAM CHEST 1 VIEW: CPT

## 2020-09-04 PROCEDURE — 85610 PROTHROMBIN TIME: CPT | Performed by: EMERGENCY MEDICINE

## 2020-09-04 PROCEDURE — 85730 THROMBOPLASTIN TIME PARTIAL: CPT | Performed by: EMERGENCY MEDICINE

## 2020-09-04 PROCEDURE — 36415 COLL VENOUS BLD VENIPUNCTURE: CPT | Performed by: EMERGENCY MEDICINE

## 2020-09-04 RX ORDER — CYCLOBENZAPRINE HCL 10 MG
10 TABLET ORAL 2 TIMES DAILY PRN
Qty: 20 TABLET | Refills: 0 | Status: SHIPPED | OUTPATIENT
Start: 2020-09-04 | End: 2020-09-12 | Stop reason: ALTCHOICE

## 2020-09-04 RX ORDER — HYDROMORPHONE HCL/PF 1 MG/ML
0.5 SYRINGE (ML) INJECTION ONCE
Status: COMPLETED | OUTPATIENT
Start: 2020-09-04 | End: 2020-09-04

## 2020-09-04 RX ADMIN — HYDROMORPHONE HYDROCHLORIDE 0.5 MG: 1 INJECTION, SOLUTION INTRAMUSCULAR; INTRAVENOUS; SUBCUTANEOUS at 14:11

## 2020-09-04 NOTE — ED PROVIDER NOTES
History  Chief Complaint   Patient presents with    Chest Pain     PT c/o CP starting around noon, pt was sleeping when pain began  Pt states that pt goes away when she doesn't move     Patient states she was watching TV and fell asleep today  She was awakened by pain that went across the chest from the right shoulder to the left  The pain is worse with movement of the neck when she turns to the side  She does not have shortness of breath nausea vomiting or diarrhea  No diaphoresis  Patient states the pain is not bad when she is just lying still  Prior to Admission Medications   Prescriptions Last Dose Informant Patient Reported? Taking?    ACCU-CHEK CAITLIN PLUS test strip  Self Yes No   Sig: USE BY TEST ROUTE 3 TIMES EVERY DAY   ASPIRIN ADULT PO  Self Yes No   Sig: Take 1,000 mg by mouth 2 (two) times a day   B-D UF III MINI PEN NEEDLES 31G X 5 MM MISC  Self Yes No   Si (four) times a day As directed   COMBIGAN 0 2-0 5 %  Self Yes No   Sig: Administer 1 drop to both eyes 2 (two) times a day   Cyanocobalamin (VITAMIN B 12 PO)  Self Yes No   Sig: Take by mouth daily   Multiple Vitamin (MULTIVITAMIN) tablet  Self Yes No   Sig: Take 1 tablet by mouth daily   docusate sodium (COLACE) 100 mg capsule  Self Yes No   Sig: Take by mouth   famotidine (PEPCID) 20 mg tablet   No No   Sig: TAKE 1 TABLET BY MOUTH TWICE A DAY   furosemide (LASIX) 80 mg tablet  Self No No   Sig: Take 1 tablet (80 mg total) by mouth daily Pt taking 120mg daily per Dr Wyatt Potter   Patient taking differently: Take 80 mg by mouth daily    insulin aspart (NOVOLOG FLEXPEN) 100 Units/mL injection pen  Self No No   Sig: Inject under the [id number] units 3 times a day with meals  Use carbohydrate to insulin ratio 1:3 which means 1 unit of NovoLog for every 3 gm of carbohydrates   Patient taking differently: 18 Units 3 (three) times a day with meals Inject under the [id number] units 3 times a day with meals  Use carbohydrate to insulin ratio 1:3 which means 1 unit of NovoLog for every 3 gm of carbohydrates   insulin glargine (BASAGLAR KWIKPEN) 100 units/mL injection pen  Self No No   Sig: Inject 16 Units under the skin daily   Patient taking differently: Inject 22 Units under the skin daily at bedtime    lactulose 20 g/30 mL  Self No No   Sig: Take 15 mL (10 g total) by mouth 2 (two) times a day   levothyroxine (Synthroid) 137 mcg tablet  Self No No   Sig: Take 1 tablet (137 mcg total) by mouth daily   linaCLOtide (LINZESS) 145 MCG CAPS   No No   Sig: Take 1 capsule (145 mcg total) by mouth daily   metolazone (ZAROXOLYN) 2 5 mg tablet  Self No No   Sig: TAKE 1 TABLET BY MOUTH TWICE WEEKLY   Patient taking differently: 2 (two) times a day as needed Pt taking twice weekly per Dr Phyllis Kramer  12/24/2019   metoprolol succinate (TOPROL-XL) 50 mg 24 hr tablet  Self Yes No   Sig: Take 50 mg by mouth daily    omeprazole (PriLOSEC) 20 mg delayed release capsule  Self Yes No   Sig: Take 20 mg by mouth    pantoprazole (PROTONIX) 40 mg tablet  Self Yes No   Sig: Take 40 mg by mouth daily in the early morning    potassium chloride (K-DUR,KLOR-CON) 10 mEq tablet  Self No No   Sig: Take 1 tablet (10 mEq total) by mouth 2 (two) times a week With the extra furosemide   rivaroxaban (Xarelto) 15 mg tablet  Self No No   Sig: Take 1 tablet (15 mg total) by mouth daily with breakfast   simvastatin (ZOCOR) 20 mg tablet  Self No No   Sig: TAKE 1 TABLET (20MG) BY ORAL ROUTE EVERY DAY IN THE EVENING   valsartan (DIOVAN) 80 mg tablet  Self Yes No   Sig: Take 80 mg by mouth daily      Facility-Administered Medications: None       Past Medical History:   Diagnosis Date    Arthritis     Atrial flutter (HCC)     Cardiac disease     Carotid artery occlusion     CHF (congestive heart failure) (HCC)     Cholelithiasis     last assessed 8/8/2016    Coronary artery disease     Diabetes mellitus ([facility] )     Disease of thyroid gland     Essential hypertension 8/15/2016    GERD (gastroesophageal reflux disease)     History of transfusion     Hyperlipidemia     Hypertension     Long-term insulin use in type 2 diabetes ([facility] ) 6/4/2016    Other specified diabetes mellitus with diabetic autonomic (poly)neuropathy ([facility] [facility] )     Pleural effusion 2008    [facility]     2008    Renal disorder     Retinopathy     bilat    Sleep apnea     Subclavian artery stenosis (HCC)     Umbilical hernia with obstruction, without gangrene     last assessed 7/8/2016       Past Surgical History:   Procedure Laterality Date    ABDOMINAL SURGERY      CARDIAC PACEMAKER PLACEMENT      CARDIAC SURGERY      cabg x 2    CATARACT EXTRACTION      CHOLECYSTECTOMY      CORONARY ARTERY BYPASS GRAFT  2008    EYE SURGERY      FRACTURE SURGERY Right 2010    shoulder    HERNIA REPAIR      umbilical    IL LAP,CHOLECYSTECTOMY N/A 8/10/2016    Procedure: CHOLECYSTECTOMY LAPAROSCOPIC;  Surgeon: Isabelle Ellis MD;  [facility];  Service: General     [address] Left 11/4/2016    Procedure: Pati Rodas; LEFT VOCAL FOLD INJECTION ;  Surgeon: Sinan Galeana MD;  [facility];  Service: ENT    IL REPAIR UMBILICAL VTYT,8+F/N,VJUKU N/A 8/10/2016    Procedure: LAPAROSCOPIC REPAIR HERNIA VENTRAL;  Surgeon: Isabelle Ellis MD;  [facility];  Service: General    [address] / STENTING Right     TRICUSPID VALVE REPLACEMENT      VASCULAR SURGERY      heart valve replacement       Family History   Problem Relation Age of Onset    Heart disease Mother     Breast cancer Maternal Aunt     Heart disease Sister      I have reviewed and agree with the history as documented      E-Cigarette/Vaping    E-Cigarette Use Never User      E-Cigarette/Vaping Substances     Social History     Tobacco Use    Smoking status: Never Smoker    Smokeless tobacco: Never Used   Substance Use Topics    Alcohol use: Not Currently     Frequency: Never Drinks per session: Patient refused     Binge frequency: Never     Comment: only on NEW YEAR'S    Drug use: Never     Types: Oxycodone       Review of Systems   Constitutional: Negative for chills and fever  HENT: Negative for congestion and sore throat  Eyes: Negative for visual disturbance  Respiratory: Negative for cough and shortness of breath  Cardiovascular: Positive for chest pain  Genitourinary: Negative for dysuria  Musculoskeletal: Positive for arthralgias, neck pain and neck stiffness  Negative for back pain  Skin: Negative for rash  Neurological: Negative for weakness and numbness  Hematological: Does not bruise/bleed easily  Psychiatric/Behavioral: Negative for confusion  All other systems reviewed and are negative  Physical Exam  Physical Exam  Vitals signs and nursing note reviewed  Constitutional:       Appearance: She is well-developed  HENT:      Head: Normocephalic and atraumatic  Eyes:      Pupils: Pupils are equal, round, and reactive to light  Neck:      Musculoskeletal: Normal range of motion and neck supple  Cardiovascular:      Rate and Rhythm: Normal rate and regular rhythm  Heart sounds: Murmur present  Systolic murmur present  Pulmonary:      Effort: Pulmonary effort is normal       Breath sounds: Normal breath sounds  Chest:      Chest wall: No tenderness or crepitus  Abdominal:      Palpations: Abdomen is soft  Tenderness: There is no abdominal tenderness  Musculoskeletal: Normal range of motion  Right lower leg: Edema present  Left lower leg: Edema present  Skin:     General: Skin is warm and dry  Capillary Refill: Capillary refill takes less than 2 seconds  Neurological:      General: No focal deficit present  Mental Status: She is alert     Psychiatric:         Mood and Affect: Mood normal          Behavior: Behavior normal          Vital Signs  ED Triage Vitals   Temp Pulse Respirations Blood Pressure SpO2   -- 09/04/20 1415 09/04/20 1415 09/04/20 1415 09/04/20 1415    64 18 153/68 94 %      Temp src Heart Rate Source Patient Position - Orthostatic VS BP Location FiO2 (%)   -- 09/04/20 1415 09/04/20 1415 09/04/20 1415 --    Monitor Lying Right arm       Pain Score       09/04/20 1411       7           Vitals:    09/04/20 1415 09/04/20 1500 09/04/20 1645   BP: 153/68 165/82 143/59   Pulse: 64 62 60   Patient Position - Orthostatic VS: Lying Lying Lying         Visual Acuity      ED Medications  Medications   HYDROmorphone (DILAUDID) injection 0 5 mg (0 5 mg Intravenous Given 9/4/20 1411)       Diagnostic Studies  Results Reviewed     Procedure Component Value Units Date/Time    Troponin I repeat in 3hrs [[id number]]  (Normal) Collected:  09/04/20 1633    Lab Status:  Final result Specimen:  Blood from Arm, Right Updated:  09/04/20 1657     Troponin I <0 02 ng/mL     Troponin I [[id number]]  (Normal) Collected:  09/04/20 1220    Lab Status:  Final result Specimen:  Blood from Arm, Right Updated:  09/04/20 1356     Troponin I <0 02 ng/mL     Comprehensive metabolic panel [[id number]]  (Abnormal) Collected:  09/04/20 1220    Lab Status:  Final result Specimen:  Blood from Arm, Right Updated:  09/04/20 1354     Sodium 138 mmol/L      Potassium 3 8 mmol/L      Chloride 100 mmol/L      CO2 34 mmol/L      ANION GAP 4 mmol/L      BUN 75 mg/dL      Creatinine 2 18 mg/dL      Glucose 100 mg/dL      Calcium 9 2 mg/dL      AST 23 U/L      ALT 19 U/L      Alkaline Phosphatase 121 U/L      Total Protein 8 7 g/dL      Albumin 3 6 g/dL      Total Bilirubin 0 70 mg/dL      eGFR 22 ml/min/1 73sq m     Narrative:       Katerine guidelines for Chronic Kidney Disease (CKD):     Stage 1 with normal or high GFR (GFR > 90 mL/min/1 73 square meters)    Stage 2 Mild CKD (GFR = 60-89 mL/min/1 73 square meters)    Stage 3A Moderate CKD (GFR = 45-59 mL/min/1 73 square meters)    Stage 3B Moderate CKD (GFR = 30-44 mL/min/1 73 square meters)    Stage 4 Severe CKD (GFR = 15-29 mL/min/1 73 square meters)    Stage 5 End Stage CKD (GFR <15 mL/min/1 73 square meters)  Note: GFR calculation is accurate only with a steady state creatinine    Protime-INR [[id number]]  (Abnormal) Collected:  09/04/20 1220    Lab Status:  Final result Specimen:  Blood from Arm, Right Updated:  09/04/20 1351     Protime 15 3 seconds      INR 1 22    APTT [[id number]]  (Normal) Collected:  09/04/20 1220    Lab Status:  Final result Specimen:  Blood from Arm, Right Updated:  09/04/20 1351     PTT 33 seconds     CBC and differential [[id number]]  (Abnormal) Collected:  09/04/20 1220    Lab Status:  Final result Specimen:  Blood from Arm, Right Updated:  09/04/20 1333     WBC 4 69 Thousand/uL      RBC 3 84 Million/uL      Hemoglobin 11 3 g/dL      Hematocrit 37 6 %      MCV 98 fL      MCH 29 4 pg      MCHC 30 1 g/dL      RDW 18 9 %      MPV 10 4 fL      Platelets 009 Thousands/uL      nRBC 0 /100 WBCs      Neutrophils Relative 65 %      Immat GRANS % 0 %      Lymphocytes Relative 13 %      Monocytes Relative 14 %      Eosinophils Relative 7 %      Basophils Relative 1 %      Neutrophils Absolute 3 04 Thousands/µL      Immature Grans Absolute 0 01 Thousand/uL      Lymphocytes Absolute 0 63 Thousands/µL      Monocytes Absolute 0 64 Thousand/µL      Eosinophils Absolute 0 32 Thousand/µL      Basophils Absolute 0 05 Thousands/µL                  XR chest 1 view portable   Final Result by Yonatan Bowser DO (09/04 1509)      Mild pulmonary vascular congestion similar to the prior study              Workstation performed: [id number]                    Procedures  ECG 12 Lead Documentation Only    Date/Time: 9/4/2020 1:04 PM  Performed by: Awilda Matthews MD  Authorized by: Awilda Matthews MD     Indications / Diagnosis:  Chest pain  ECG reviewed by me, the ED Provider: yes    Patient location:  ED  Interpretation:     Interpretation: abnormal    Rate:     ECG rate:  63    ECG rate assessment: normal    Rhythm:     Rhythm: paced    Pacing:     Capture:  Complete    Type of pacing:  Ventricular  Ectopy:     Ectopy: none               ED Course             HEART Risk Score      Most Recent Value   Heart Score Risk Calculator   History  0 Filed at: 09/04/2020 1642   ECG  0 Filed at: 09/04/2020 1642   Age  2 Filed at: 09/04/2020 1642   Risk Factors  2 Filed at: 09/04/2020 1642   Troponin  0 Filed at: 09/04/2020 1642   HEART Score  4 Filed at: 09/04/2020 1642                                      MDM  Number of Diagnoses or Management Options  Diagnosis management comments: Patient has reproducible pain dependent on movement and position  Unlikely to be cardiac in origin however she has a significant cardiac history        Disposition  Final diagnoses:   Chest wall pain     Time reflects when diagnosis was documented in both MDM as applicable and the Disposition within this note     Time User Action Codes Description Comment    9/4/2020  4:41 PM Munira Valles Add [R07 89] Chest wall pain       ED Disposition     ED Disposition Condition Date/Time Comment    Discharge Stable Fri Sep 4, 2020  5:02 PM Salome Harman discharge to home/self care  Follow-up Information     Follow up With Specialties Details Why Contact Info    Tavia Reza MD Internal Medicine, Family Medicine Schedule an appointment as soon as possible for a visit   [address]  [address]  [phone]            Patient's Medications   Discharge Prescriptions    CYCLOBENZAPRINE (FLEXERIL) 10 MG TABLET    Take 1 tablet (10 mg total) by mouth 2 (two) times a day as needed for muscle spasms       Start Date: 9/4/2020  End Date: --       Order Dose: 10 mg       Quantity: 20 tablet    Refills: 0     No discharge procedures on file      PDMP Review     None          ED Provider  Electronically Signed by           Rich Travis MD  09/04/20 4145

## 2020-09-08 LAB
ATRIAL RATE: 66 BPM
QRS AXIS: 263 DEGREES
QRSD INTERVAL: 194 MS
QT INTERVAL: 498 MS
QTC INTERVAL: 509 MS
T WAVE AXIS: 80 DEGREES
VENTRICULAR RATE: 63 BPM

## 2020-09-08 PROCEDURE — 93010 ELECTROCARDIOGRAM REPORT: CPT | Performed by: INTERNAL MEDICINE

## 2020-09-12 ENCOUNTER — OFFICE VISIT (OUTPATIENT)
Dept: FAMILY MEDICINE CLINIC | Facility: CLINIC | Age: 70
End: 2020-09-12
Payer: MEDICARE

## 2020-09-12 VITALS
OXYGEN SATURATION: 96 % | RESPIRATION RATE: 16 BRPM | HEIGHT: 63 IN | HEART RATE: 61 BPM | DIASTOLIC BLOOD PRESSURE: 70 MMHG | WEIGHT: 207 LBS | BODY MASS INDEX: 36.68 KG/M2 | SYSTOLIC BLOOD PRESSURE: 110 MMHG | TEMPERATURE: 95.2 F

## 2020-09-12 DIAGNOSIS — G62.9 NEUROPATHY: ICD-10-CM

## 2020-09-12 DIAGNOSIS — L03.116 CELLULITIS OF LEFT LOWER EXTREMITY: ICD-10-CM

## 2020-09-12 DIAGNOSIS — Z23 NEED FOR VACCINATION: Primary | ICD-10-CM

## 2020-09-12 PROCEDURE — 90662 IIV NO PRSV INCREASED AG IM: CPT

## 2020-09-12 PROCEDURE — 99213 OFFICE O/P EST LOW 20 MIN: CPT | Performed by: FAMILY MEDICINE

## 2020-09-12 PROCEDURE — G0008 ADMIN INFLUENZA VIRUS VAC: HCPCS

## 2020-09-12 RX ORDER — GABAPENTIN 300 MG/1
300 CAPSULE ORAL
Qty: 30 CAPSULE | Refills: 0 | Status: SHIPPED | OUTPATIENT
Start: 2020-09-12 | End: 2020-10-07

## 2020-09-12 RX ORDER — CEPHALEXIN 500 MG/1
500 CAPSULE ORAL EVERY 12 HOURS SCHEDULED
Qty: 20 CAPSULE | Refills: 0 | Status: SHIPPED | OUTPATIENT
Start: 2020-09-12 | End: 2020-09-22

## 2020-09-12 RX ORDER — METHOCARBAMOL 500 MG/1
500 TABLET, FILM COATED ORAL 2 TIMES DAILY
COMMUNITY
Start: 2020-09-04 | End: 2020-12-01 | Stop reason: HOSPADM

## 2020-09-12 NOTE — PROGRESS NOTES
Assessment/Plan:     Diagnoses and all orders for this visit:    Need for vaccination  -     influenza vaccine, high-dose, PF 0 7 mL (FLUZONE HIGH-DOSE)    Neuropathy  -     gabapentin (NEURONTIN) 300 mg capsule; Take 1 capsule (300 mg total) by mouth daily at bedtime  - Unclear etiology of neuropathy  Pt is following with a neurologist, recommend calling and discussing in further detail  Will start gabapentin today for neuropathic pain  Cellulitis of left lower extremity  -     cephalexin (KEFLEX) 500 mg capsule; Take 1 capsule (500 mg total) by mouth every 12 (twelve) hours for 10 days  - Return if sx worsen or fail to improve  Other orders  -     methocarbamol (ROBAXIN) 500 mg tablet; Take 500 mg by mouth 2 (two) times a day        Subjective:      Patient ID: Mellisa Mosquera is a 69 y o  female  HPI  Stephany Larry presents today for c/o numbness/tingling/sharp pains throughout her body which have been chronic  States she has had these for a while, but they are progressively getting worse  Takes aspirin multiple times a day for the pain  Does follow with a neurologist and was told a while ago to start lyrica, but pt refused at the time because she was worried about side effects  States that because of the pain and discomfort, she has been itching her legs a lot  Noted a few days ago that her left lower extremity started to get red, swollen, tender  Denies warmth to touch, drainage, fevers, chills  Has had cellulitis in the past and this feels similar  The following portions of the patient's history were reviewed and updated as appropriate: allergies, current medications, past family history, past medical history, past social history, past surgical history and problem list     Review of Systems   Constitutional: Negative for activity change, appetite change, chills, diaphoresis, fatigue and fever  HENT: Negative      Respiratory: Negative for cough, choking, chest tightness, shortness of breath and wheezing  Cardiovascular: Negative for chest pain, palpitations and leg swelling  Gastrointestinal: Negative for abdominal distention, abdominal pain, constipation, diarrhea, nausea and vomiting  Genitourinary: Negative for difficulty urinating, dyspareunia, dysuria, enuresis, flank pain, frequency, menstrual problem, pelvic pain and urgency  Musculoskeletal: Negative for arthralgias, back pain, gait problem, joint swelling, myalgias, neck pain and neck stiffness  Leg pain   Skin: Positive for color change (left leg)  Neurological: Positive for numbness  Negative for dizziness, tremors, syncope, facial asymmetry, weakness, light-headedness and headaches  Psychiatric/Behavioral: Negative  Objective:      /70   Pulse 61   Temp (!) 95 2 °F (35 1 °C)   Resp 16   Ht 5' 3" (1 6 m)   Wt 93 9 kg (207 lb)   LMP  (LMP Unknown)   SpO2 96%   BMI 36 67 kg/m²          Physical Exam  Constitutional:       General: She is not in acute distress  Appearance: She is well-developed  She is not diaphoretic  HENT:      Head: Normocephalic and atraumatic  Eyes:      General: No scleral icterus  Right eye: No discharge  Left eye: No discharge  Conjunctiva/sclera: Conjunctivae normal    Neck:      Musculoskeletal: Normal range of motion  Pulmonary:      Effort: Pulmonary effort is normal    Skin:     General: Skin is warm  Findings: Erythema (left leg as noted in photo below) present  Neurological:      Mental Status: She is alert and oriented to person, place, and time  Psychiatric:         Behavior: Behavior normal          Thought Content:  Thought content normal          Judgment: Judgment normal

## 2020-09-14 ENCOUNTER — TELEPHONE (OUTPATIENT)
Dept: FAMILY MEDICINE CLINIC | Facility: CLINIC | Age: 70
End: 2020-09-14

## 2020-09-14 NOTE — TELEPHONE ENCOUNTER
PT states she saw Dr Debra Pedraza on 9/12 and she prescribed muscle relaxer's  Pt was told to take these at night  Pt states she slept till 11:45am today and then fell asleep and woke up again a little bit ago  Pt states she has no muscle control and can barely move  Pt asked if she was going to die   TRIAGED to [facility]

## 2020-09-14 NOTE — TELEPHONE ENCOUNTER
Spoke with pt, she states she took gabapentin for the first time last night and is extremely tired and having muscle fatigue today  Pt states she feels like her head is a cotton ball and she needs to sleep  Pt states she isnt sure if she was having a reaction to the antibiotic or muscle relaxer     Advised pt, per Dr Bairon Pereira, to continue antibiotic as prescribed but discontinue gabapentin and see how she feels tomorrow  [facility]

## 2020-09-15 DIAGNOSIS — Z79.4 TYPE 2 DIABETES MELLITUS WITH RETINOPATHY AND MACULAR EDEMA, WITH LONG-TERM CURRENT USE OF INSULIN, UNSPECIFIED LATERALITY, UNSPECIFIED RETINOPATHY SEVERITY (HCC): Primary | ICD-10-CM

## 2020-09-15 DIAGNOSIS — Z79.4 TYPE 2 DIABETES MELLITUS WITH HYPERGLYCEMIA, WITH LONG-TERM CURRENT USE OF INSULIN (HCC): ICD-10-CM

## 2020-09-15 DIAGNOSIS — E11.311 TYPE 2 DIABETES MELLITUS WITH RETINOPATHY AND MACULAR EDEMA, WITH LONG-TERM CURRENT USE OF INSULIN, UNSPECIFIED LATERALITY, UNSPECIFIED RETINOPATHY SEVERITY (HCC): Primary | ICD-10-CM

## 2020-09-15 DIAGNOSIS — E11.65 TYPE 2 DIABETES MELLITUS WITH HYPERGLYCEMIA, WITH LONG-TERM CURRENT USE OF INSULIN (HCC): ICD-10-CM

## 2020-09-15 RX ORDER — BLOOD SUGAR DIAGNOSTIC
STRIP MISCELLANEOUS
Qty: 100 EACH | Refills: 3 | Status: SHIPPED | OUTPATIENT
Start: 2020-09-15 | End: 2020-10-07 | Stop reason: SDUPTHER

## 2020-09-25 ENCOUNTER — TELEPHONE (OUTPATIENT)
Dept: CARDIOLOGY CLINIC | Facility: CLINIC | Age: 70
End: 2020-09-25

## 2020-09-25 ENCOUNTER — TELEPHONE (OUTPATIENT)
Dept: OTHER | Facility: OTHER | Age: 70
End: 2020-09-25

## 2020-09-25 ENCOUNTER — REMOTE DEVICE CLINIC VISIT (OUTPATIENT)
Dept: CARDIOLOGY CLINIC | Facility: CLINIC | Age: 70
End: 2020-09-25
Payer: MEDICARE

## 2020-09-25 ENCOUNTER — HOSPITAL ENCOUNTER (EMERGENCY)
Facility: HOSPITAL | Age: 70
Discharge: HOME | DRG: 306 | End: 2020-09-25
Attending: EMERGENCY MEDICINE | Admitting: EMERGENCY MEDICINE
Payer: MEDICARE

## 2020-09-25 ENCOUNTER — APPOINTMENT (EMERGENCY)
Dept: RADIOLOGY | Facility: HOSPITAL | Age: 70
DRG: 306 | End: 2020-09-25
Payer: MEDICARE

## 2020-09-25 VITALS
SYSTOLIC BLOOD PRESSURE: 136 MMHG | RESPIRATION RATE: 24 BRPM | OXYGEN SATURATION: 98 % | WEIGHT: 217 LBS | BODY MASS INDEX: 38.44 KG/M2 | DIASTOLIC BLOOD PRESSURE: 62 MMHG | HEART RATE: 60 BPM | TEMPERATURE: 95.6 F

## 2020-09-25 DIAGNOSIS — I50.9 CHF (CONGESTIVE HEART FAILURE) (HCC): Primary | ICD-10-CM

## 2020-09-25 DIAGNOSIS — Z95.0 PRESENCE OF PERMANENT CARDIAC PACEMAKER: Primary | ICD-10-CM

## 2020-09-25 LAB
ALBUMIN SERPL BCP-MCNC: 3.2 G/DL (ref 3.5–5)
ALP SERPL-CCNC: 150 U/L (ref 46–116)
ALT SERPL W P-5'-P-CCNC: 26 U/L (ref 12–78)
ANION GAP SERPL CALCULATED.3IONS-SCNC: 6 MMOL/L (ref 4–13)
APTT PPP: 39 SECONDS (ref 23–37)
AST SERPL W P-5'-P-CCNC: 32 U/L (ref 5–45)
BASOPHILS # BLD AUTO: 0.05 THOUSANDS/ΜL (ref 0–0.1)
BASOPHILS NFR BLD AUTO: 1 % (ref 0–1)
BILIRUB SERPL-MCNC: 0.8 MG/DL (ref 0.2–1)
BILIRUB UR QL STRIP: NEGATIVE
BUN SERPL-MCNC: 113 MG/DL (ref 5–25)
CALCIUM ALBUM COR SERPL-MCNC: 9.5 MG/DL (ref 8.3–10.1)
CALCIUM SERPL-MCNC: 8.9 MG/DL (ref 8.3–10.1)
CHLORIDE SERPL-SCNC: 101 MMOL/L (ref 100–108)
CLARITY UR: CLEAR
CO2 SERPL-SCNC: 32 MMOL/L (ref 21–32)
COLOR UR: NORMAL
CREAT SERPL-MCNC: 2.53 MG/DL (ref 0.6–1.3)
EOSINOPHIL # BLD AUTO: 0.65 THOUSAND/ΜL (ref 0–0.61)
EOSINOPHIL NFR BLD AUTO: 11 % (ref 0–6)
ERYTHROCYTE [DISTWIDTH] IN BLOOD BY AUTOMATED COUNT: 17 % (ref 11.6–15.1)
GFR SERPL CREATININE-BSD FRML MDRD: 18 ML/MIN/1.73SQ M
GLUCOSE SERPL-MCNC: 165 MG/DL (ref 65–140)
GLUCOSE UR STRIP-MCNC: NEGATIVE MG/DL
HCT VFR BLD AUTO: 37 % (ref 34.8–46.1)
HGB BLD-MCNC: 11.2 G/DL (ref 11.5–15.4)
HGB UR QL STRIP.AUTO: NEGATIVE
IMM GRANULOCYTES # BLD AUTO: 0.01 THOUSAND/UL (ref 0–0.2)
IMM GRANULOCYTES NFR BLD AUTO: 0 % (ref 0–2)
INR PPP: 2.22 (ref 0.84–1.19)
KETONES UR STRIP-MCNC: NEGATIVE MG/DL
LEUKOCYTE ESTERASE UR QL STRIP: NEGATIVE
LYMPHOCYTES # BLD AUTO: 0.69 THOUSANDS/ΜL (ref 0.6–4.47)
LYMPHOCYTES NFR BLD AUTO: 11 % (ref 14–44)
MCH RBC QN AUTO: 30.4 PG (ref 26.8–34.3)
MCHC RBC AUTO-ENTMCNC: 30.3 G/DL (ref 31.4–37.4)
MCV RBC AUTO: 100 FL (ref 82–98)
MONOCYTES # BLD AUTO: 0.88 THOUSAND/ΜL (ref 0.17–1.22)
MONOCYTES NFR BLD AUTO: 15 % (ref 4–12)
NEUTROPHILS # BLD AUTO: 3.8 THOUSANDS/ΜL (ref 1.85–7.62)
NEUTS SEG NFR BLD AUTO: 62 % (ref 43–75)
NITRITE UR QL STRIP: NEGATIVE
NRBC BLD AUTO-RTO: 0 /100 WBCS
NT-PROBNP SERPL-MCNC: 6650 PG/ML
PH UR STRIP.AUTO: 6 [PH]
PLATELET # BLD AUTO: 146 THOUSANDS/UL (ref 149–390)
PMV BLD AUTO: 11 FL (ref 8.9–12.7)
POTASSIUM SERPL-SCNC: 4.4 MMOL/L (ref 3.5–5.3)
PROT SERPL-MCNC: 8 G/DL (ref 6.4–8.2)
PROT UR STRIP-MCNC: NEGATIVE MG/DL
PROTHROMBIN TIME: 24.3 SECONDS (ref 11.6–14.5)
RBC # BLD AUTO: 3.69 MILLION/UL (ref 3.81–5.12)
SODIUM SERPL-SCNC: 139 MMOL/L (ref 136–145)
SP GR UR STRIP.AUTO: 1.01 (ref 1–1.03)
TROPONIN I SERPL-MCNC: <0.02 NG/ML
UROBILINOGEN UR QL STRIP.AUTO: 0.2 E.U./DL
WBC # BLD AUTO: 6.08 THOUSAND/UL (ref 4.31–10.16)

## 2020-09-25 PROCEDURE — 85610 PROTHROMBIN TIME: CPT | Performed by: EMERGENCY MEDICINE

## 2020-09-25 PROCEDURE — 96374 THER/PROPH/DIAG INJ IV PUSH: CPT

## 2020-09-25 PROCEDURE — 85730 THROMBOPLASTIN TIME PARTIAL: CPT | Performed by: EMERGENCY MEDICINE

## 2020-09-25 PROCEDURE — 36415 COLL VENOUS BLD VENIPUNCTURE: CPT | Performed by: EMERGENCY MEDICINE

## 2020-09-25 PROCEDURE — 93294 REM INTERROG EVL PM/LDLS PM: CPT | Performed by: INTERNAL MEDICINE

## 2020-09-25 PROCEDURE — 84484 ASSAY OF TROPONIN QUANT: CPT | Performed by: EMERGENCY MEDICINE

## 2020-09-25 PROCEDURE — 85025 COMPLETE CBC W/AUTO DIFF WBC: CPT | Performed by: EMERGENCY MEDICINE

## 2020-09-25 PROCEDURE — 83880 ASSAY OF NATRIURETIC PEPTIDE: CPT | Performed by: EMERGENCY MEDICINE

## 2020-09-25 PROCEDURE — 93005 ELECTROCARDIOGRAM TRACING: CPT

## 2020-09-25 PROCEDURE — 99285 EMERGENCY DEPT VISIT HI MDM: CPT

## 2020-09-25 PROCEDURE — 93296 REM INTERROG EVL PM/IDS: CPT | Performed by: INTERNAL MEDICINE

## 2020-09-25 PROCEDURE — 80053 COMPREHEN METABOLIC PANEL: CPT | Performed by: EMERGENCY MEDICINE

## 2020-09-25 PROCEDURE — 81003 URINALYSIS AUTO W/O SCOPE: CPT | Performed by: EMERGENCY MEDICINE

## 2020-09-25 PROCEDURE — 71045 X-RAY EXAM CHEST 1 VIEW: CPT

## 2020-09-25 PROCEDURE — 99284 EMERGENCY DEPT VISIT MOD MDM: CPT | Performed by: EMERGENCY MEDICINE

## 2020-09-25 RX ORDER — GABAPENTIN 300 MG/1
300 CAPSULE ORAL ONCE
Status: COMPLETED | OUTPATIENT
Start: 2020-09-25 | End: 2020-09-25

## 2020-09-25 RX ORDER — FUROSEMIDE 10 MG/ML
40 INJECTION INTRAMUSCULAR; INTRAVENOUS ONCE
Status: COMPLETED | OUTPATIENT
Start: 2020-09-25 | End: 2020-09-25

## 2020-09-25 RX ADMIN — FUROSEMIDE 40 MG: 10 INJECTION, SOLUTION INTRAMUSCULAR; INTRAVENOUS at 19:28

## 2020-09-25 RX ADMIN — GABAPENTIN 300 MG: 300 CAPSULE ORAL at 19:51

## 2020-09-25 NOTE — TELEPHONE ENCOUNTER
Phone # [phone] PT: Esteban Champion : 0- Reason: Kelby Bailey called because she gained 6 Pounds in 2 days, she has CHF and is concerned  She would  like the on call provider to call her   Thank You

## 2020-09-25 NOTE — TELEPHONE ENCOUNTER
----- Message from Constanza Kelly MD sent at 9/25/2020  1:35 PM EDT -----  Patient's device was interrogated in our office clinic  It shows device function is normal      Please call patient

## 2020-09-25 NOTE — PROGRESS NOTES
Results for orders placed or performed in visit on 09/25/20   Cardiac EP device report    Narrative    MDT DUAL PM - NOT MRI CONDITIONAL  CARELINK TRANSMISSION: BATTERY VOLTAGE ADEQUATE  (23 months)   100%  ALL AVAILABLE LEAD PARAMETERS WITHIN NORMAL LIMITS  NO SIGNIFICANT HIGH RATE EPISODES  NORMAL DEVICE FUNCTION  ---JHAVERI

## 2020-09-27 ENCOUNTER — HOSPITAL ENCOUNTER (INPATIENT)
Facility: HOSPITAL | Age: 70
LOS: 5 days | Discharge: HOME HEALTH | DRG: 306 | End: 2020-10-02
Attending: EMERGENCY MEDICINE | Admitting: FAMILY MEDICINE
Payer: MEDICARE

## 2020-09-27 ENCOUNTER — APPOINTMENT (EMERGENCY)
Dept: RADIOLOGY | Facility: HOSPITAL | Age: 70
DRG: 306 | End: 2020-09-27
Payer: MEDICARE

## 2020-09-27 DIAGNOSIS — Z79.4 TYPE 2 DIABETES MELLITUS WITH DIABETIC NEUROPATHY, WITH LONG-TERM CURRENT USE OF INSULIN (HCC): ICD-10-CM

## 2020-09-27 DIAGNOSIS — E11.40 TYPE 2 DIABETES MELLITUS WITH DIABETIC NEUROPATHY, WITH LONG-TERM CURRENT USE OF INSULIN (HCC): ICD-10-CM

## 2020-09-27 DIAGNOSIS — I50.9 CHF (CONGESTIVE HEART FAILURE) (HCC): Primary | ICD-10-CM

## 2020-09-27 DIAGNOSIS — I48.20 CHRONIC ATRIAL FIBRILLATION (HCC): ICD-10-CM

## 2020-09-27 DIAGNOSIS — I50.43 ACUTE ON CHRONIC COMBINED SYSTOLIC AND DIASTOLIC CONGESTIVE HEART FAILURE (HCC): ICD-10-CM

## 2020-09-27 DIAGNOSIS — R06.00 DYSPNEA: ICD-10-CM

## 2020-09-27 DIAGNOSIS — J96.20 ACUTE ON CHRONIC RESPIRATORY FAILURE (HCC): ICD-10-CM

## 2020-09-27 PROBLEM — N17.9 ACUTE RENAL FAILURE SUPERIMPOSED ON STAGE 3 CHRONIC KIDNEY DISEASE (HCC): Status: ACTIVE | Noted: 2018-02-16

## 2020-09-27 LAB
ALBUMIN SERPL BCP-MCNC: 3.1 G/DL (ref 3.5–5)
ALP SERPL-CCNC: 138 U/L (ref 46–116)
ALT SERPL W P-5'-P-CCNC: 22 U/L (ref 12–78)
ANION GAP SERPL CALCULATED.3IONS-SCNC: 7 MMOL/L (ref 4–13)
AST SERPL W P-5'-P-CCNC: 23 U/L (ref 5–45)
BASOPHILS # BLD AUTO: 0.04 THOUSANDS/ΜL (ref 0–0.1)
BASOPHILS NFR BLD AUTO: 1 % (ref 0–1)
BILIRUB SERPL-MCNC: 0.8 MG/DL (ref 0.2–1)
BUN SERPL-MCNC: 131 MG/DL (ref 5–25)
CALCIUM ALBUM COR SERPL-MCNC: 9.4 MG/DL (ref 8.3–10.1)
CALCIUM SERPL-MCNC: 8.7 MG/DL (ref 8.3–10.1)
CHLORIDE SERPL-SCNC: 99 MMOL/L (ref 100–108)
CO2 SERPL-SCNC: 32 MMOL/L (ref 21–32)
CREAT SERPL-MCNC: 3 MG/DL (ref 0.6–1.3)
EOSINOPHIL # BLD AUTO: 0.4 THOUSAND/ΜL (ref 0–0.61)
EOSINOPHIL NFR BLD AUTO: 6 % (ref 0–6)
ERYTHROCYTE [DISTWIDTH] IN BLOOD BY AUTOMATED COUNT: 16.7 % (ref 11.6–15.1)
GFR SERPL CREATININE-BSD FRML MDRD: 15 ML/MIN/1.73SQ M
GLUCOSE SERPL-MCNC: 68 MG/DL (ref 65–140)
GLUCOSE SERPL-MCNC: 86 MG/DL (ref 65–140)
HCT VFR BLD AUTO: 35.1 % (ref 34.8–46.1)
HGB BLD-MCNC: 10.6 G/DL (ref 11.5–15.4)
IMM GRANULOCYTES # BLD AUTO: 0.01 THOUSAND/UL (ref 0–0.2)
IMM GRANULOCYTES NFR BLD AUTO: 0 % (ref 0–2)
LYMPHOCYTES # BLD AUTO: 0.65 THOUSANDS/ΜL (ref 0.6–4.47)
LYMPHOCYTES NFR BLD AUTO: 10 % (ref 14–44)
MAGNESIUM SERPL-MCNC: 2.3 MG/DL (ref 1.6–2.6)
MCH RBC QN AUTO: 30.5 PG (ref 26.8–34.3)
MCHC RBC AUTO-ENTMCNC: 30.2 G/DL (ref 31.4–37.4)
MCV RBC AUTO: 101 FL (ref 82–98)
MONOCYTES # BLD AUTO: 0.95 THOUSAND/ΜL (ref 0.17–1.22)
MONOCYTES NFR BLD AUTO: 15 % (ref 4–12)
NEUTROPHILS # BLD AUTO: 4.27 THOUSANDS/ΜL (ref 1.85–7.62)
NEUTS SEG NFR BLD AUTO: 68 % (ref 43–75)
NRBC BLD AUTO-RTO: 0 /100 WBCS
NT-PROBNP SERPL-MCNC: 8466 PG/ML
PLATELET # BLD AUTO: 139 THOUSANDS/UL (ref 149–390)
PMV BLD AUTO: 10.5 FL (ref 8.9–12.7)
POTASSIUM SERPL-SCNC: 4.3 MMOL/L (ref 3.5–5.3)
PROT SERPL-MCNC: 7.6 G/DL (ref 6.4–8.2)
RBC # BLD AUTO: 3.48 MILLION/UL (ref 3.81–5.12)
SODIUM SERPL-SCNC: 138 MMOL/L (ref 136–145)
TROPONIN I SERPL-MCNC: 0.02 NG/ML
WBC # BLD AUTO: 6.32 THOUSAND/UL (ref 4.31–10.16)

## 2020-09-27 PROCEDURE — 83735 ASSAY OF MAGNESIUM: CPT | Performed by: EMERGENCY MEDICINE

## 2020-09-27 PROCEDURE — 96374 THER/PROPH/DIAG INJ IV PUSH: CPT

## 2020-09-27 PROCEDURE — 93005 ELECTROCARDIOGRAM TRACING: CPT

## 2020-09-27 PROCEDURE — 99285 EMERGENCY DEPT VISIT HI MDM: CPT | Performed by: EMERGENCY MEDICINE

## 2020-09-27 PROCEDURE — 1124F ACP DISCUSS-NO DSCNMKR DOCD: CPT | Performed by: EMERGENCY MEDICINE

## 2020-09-27 PROCEDURE — 85025 COMPLETE CBC W/AUTO DIFF WBC: CPT | Performed by: EMERGENCY MEDICINE

## 2020-09-27 PROCEDURE — 84484 ASSAY OF TROPONIN QUANT: CPT | Performed by: EMERGENCY MEDICINE

## 2020-09-27 PROCEDURE — 80053 COMPREHEN METABOLIC PANEL: CPT | Performed by: EMERGENCY MEDICINE

## 2020-09-27 PROCEDURE — 36415 COLL VENOUS BLD VENIPUNCTURE: CPT | Performed by: EMERGENCY MEDICINE

## 2020-09-27 PROCEDURE — 83880 ASSAY OF NATRIURETIC PEPTIDE: CPT | Performed by: EMERGENCY MEDICINE

## 2020-09-27 PROCEDURE — 99222 1ST HOSP IP/OBS MODERATE 55: CPT | Performed by: PHYSICIAN ASSISTANT

## 2020-09-27 PROCEDURE — 71045 X-RAY EXAM CHEST 1 VIEW: CPT

## 2020-09-27 PROCEDURE — 82948 REAGENT STRIP/BLOOD GLUCOSE: CPT

## 2020-09-27 PROCEDURE — 99285 EMERGENCY DEPT VISIT HI MDM: CPT

## 2020-09-27 RX ORDER — FUROSEMIDE 10 MG/ML
40 INJECTION INTRAMUSCULAR; INTRAVENOUS ONCE
Status: COMPLETED | OUTPATIENT
Start: 2020-09-27 | End: 2020-09-27

## 2020-09-27 RX ORDER — INSULIN GLARGINE 100 [IU]/ML
22 INJECTION, SOLUTION SUBCUTANEOUS
Status: DISCONTINUED | OUTPATIENT
Start: 2020-09-27 | End: 2020-09-28

## 2020-09-27 RX ORDER — POTASSIUM CHLORIDE 750 MG/1
10 TABLET, EXTENDED RELEASE ORAL 2 TIMES WEEKLY
Status: DISCONTINUED | OUTPATIENT
Start: 2020-09-28 | End: 2020-10-02 | Stop reason: HOSPADM

## 2020-09-27 RX ORDER — PANTOPRAZOLE SODIUM 40 MG/1
40 TABLET, DELAYED RELEASE ORAL
Status: DISCONTINUED | OUTPATIENT
Start: 2020-09-28 | End: 2020-10-02 | Stop reason: HOSPADM

## 2020-09-27 RX ORDER — GABAPENTIN 300 MG/1
300 CAPSULE ORAL 2 TIMES DAILY
Status: DISCONTINUED | OUTPATIENT
Start: 2020-09-28 | End: 2020-09-27

## 2020-09-27 RX ORDER — METOPROLOL SUCCINATE 50 MG/1
50 TABLET, EXTENDED RELEASE ORAL DAILY
Status: DISCONTINUED | OUTPATIENT
Start: 2020-09-28 | End: 2020-10-02 | Stop reason: HOSPADM

## 2020-09-27 RX ORDER — ACETAMINOPHEN 325 MG/1
650 TABLET ORAL EVERY 6 HOURS PRN
Status: DISCONTINUED | OUTPATIENT
Start: 2020-09-27 | End: 2020-10-02 | Stop reason: HOSPADM

## 2020-09-27 RX ORDER — FUROSEMIDE 10 MG/ML
40 INJECTION INTRAMUSCULAR; INTRAVENOUS DAILY
Status: DISCONTINUED | OUTPATIENT
Start: 2020-09-28 | End: 2020-09-28

## 2020-09-27 RX ORDER — BRIMONIDINE TARTRATE, TIMOLOL MALEATE 2; 5 MG/ML; MG/ML
1 SOLUTION/ DROPS OPHTHALMIC 2 TIMES DAILY
Status: DISCONTINUED | OUTPATIENT
Start: 2020-09-28 | End: 2020-09-28

## 2020-09-27 RX ORDER — FAMOTIDINE 20 MG/1
20 TABLET, FILM COATED ORAL 2 TIMES DAILY
Status: DISCONTINUED | OUTPATIENT
Start: 2020-09-28 | End: 2020-10-02 | Stop reason: HOSPADM

## 2020-09-27 RX ORDER — GABAPENTIN 300 MG/1
300 CAPSULE ORAL 2 TIMES DAILY
Status: DISCONTINUED | OUTPATIENT
Start: 2020-09-27 | End: 2020-09-30

## 2020-09-27 RX ORDER — PRAVASTATIN SODIUM 40 MG
40 TABLET ORAL
Status: DISCONTINUED | OUTPATIENT
Start: 2020-09-28 | End: 2020-10-02 | Stop reason: HOSPADM

## 2020-09-27 RX ORDER — METHOCARBAMOL 500 MG/1
500 TABLET, FILM COATED ORAL 2 TIMES DAILY
Status: DISCONTINUED | OUTPATIENT
Start: 2020-09-28 | End: 2020-10-02 | Stop reason: HOSPADM

## 2020-09-27 RX ADMIN — FUROSEMIDE 40 MG: 10 INJECTION, SOLUTION INTRAMUSCULAR; INTRAVENOUS at 20:25

## 2020-09-27 RX ADMIN — GABAPENTIN 300 MG: 300 CAPSULE ORAL at 23:55

## 2020-09-27 RX ADMIN — INSULIN GLARGINE 22 UNITS: 100 INJECTION, SOLUTION SUBCUTANEOUS at 23:33

## 2020-09-28 LAB
ANION GAP SERPL CALCULATED.3IONS-SCNC: 8 MMOL/L (ref 4–13)
BASOPHILS # BLD AUTO: 0.06 THOUSANDS/ΜL (ref 0–0.1)
BASOPHILS NFR BLD AUTO: 1 % (ref 0–1)
BILIRUB UR QL STRIP: NEGATIVE
BUN SERPL-MCNC: 131 MG/DL (ref 5–25)
CALCIUM SERPL-MCNC: 8.5 MG/DL (ref 8.3–10.1)
CHLORIDE SERPL-SCNC: 100 MMOL/L (ref 100–108)
CLARITY UR: CLEAR
CO2 SERPL-SCNC: 31 MMOL/L (ref 21–32)
COLOR UR: NORMAL
CREAT SERPL-MCNC: 2.67 MG/DL (ref 0.6–1.3)
EOSINOPHIL # BLD AUTO: 0.43 THOUSAND/ΜL (ref 0–0.61)
EOSINOPHIL NFR BLD AUTO: 7 % (ref 0–6)
ERYTHROCYTE [DISTWIDTH] IN BLOOD BY AUTOMATED COUNT: 16.6 % (ref 11.6–15.1)
GFR SERPL CREATININE-BSD FRML MDRD: 17 ML/MIN/1.73SQ M
GLUCOSE SERPL-MCNC: 115 MG/DL (ref 65–140)
GLUCOSE SERPL-MCNC: 117 MG/DL (ref 65–140)
GLUCOSE SERPL-MCNC: 60 MG/DL (ref 65–140)
GLUCOSE SERPL-MCNC: 87 MG/DL (ref 65–140)
GLUCOSE SERPL-MCNC: 93 MG/DL (ref 65–140)
GLUCOSE SERPL-MCNC: 97 MG/DL (ref 65–140)
GLUCOSE UR STRIP-MCNC: NEGATIVE MG/DL
HCT VFR BLD AUTO: 34.4 % (ref 34.8–46.1)
HGB BLD-MCNC: 10.4 G/DL (ref 11.5–15.4)
HGB UR QL STRIP.AUTO: NEGATIVE
IMM GRANULOCYTES # BLD AUTO: 0.02 THOUSAND/UL (ref 0–0.2)
IMM GRANULOCYTES NFR BLD AUTO: 0 % (ref 0–2)
KETONES UR STRIP-MCNC: NEGATIVE MG/DL
LEUKOCYTE ESTERASE UR QL STRIP: NEGATIVE
LYMPHOCYTES # BLD AUTO: 0.63 THOUSANDS/ΜL (ref 0.6–4.47)
LYMPHOCYTES NFR BLD AUTO: 11 % (ref 14–44)
MCH RBC QN AUTO: 30.3 PG (ref 26.8–34.3)
MCHC RBC AUTO-ENTMCNC: 30.2 G/DL (ref 31.4–37.4)
MCV RBC AUTO: 100 FL (ref 82–98)
MONOCYTES # BLD AUTO: 0.94 THOUSAND/ΜL (ref 0.17–1.22)
MONOCYTES NFR BLD AUTO: 16 % (ref 4–12)
NEUTROPHILS # BLD AUTO: 3.89 THOUSANDS/ΜL (ref 1.85–7.62)
NEUTS SEG NFR BLD AUTO: 65 % (ref 43–75)
NITRITE UR QL STRIP: NEGATIVE
NRBC BLD AUTO-RTO: 0 /100 WBCS
PH UR STRIP.AUTO: 6 [PH]
PLATELET # BLD AUTO: 125 THOUSANDS/UL (ref 149–390)
PMV BLD AUTO: 11.2 FL (ref 8.9–12.7)
POTASSIUM SERPL-SCNC: 3.9 MMOL/L (ref 3.5–5.3)
PROT UR STRIP-MCNC: NEGATIVE MG/DL
RBC # BLD AUTO: 3.43 MILLION/UL (ref 3.81–5.12)
SODIUM SERPL-SCNC: 139 MMOL/L (ref 136–145)
SP GR UR STRIP.AUTO: 1.01 (ref 1–1.03)
UROBILINOGEN UR QL STRIP.AUTO: 0.2 E.U./DL
WBC # BLD AUTO: 5.97 THOUSAND/UL (ref 4.31–10.16)

## 2020-09-28 PROCEDURE — 97535 SELF CARE MNGMENT TRAINING: CPT

## 2020-09-28 PROCEDURE — 97163 PT EVAL HIGH COMPLEX 45 MIN: CPT

## 2020-09-28 PROCEDURE — 85025 COMPLETE CBC W/AUTO DIFF WBC: CPT | Performed by: PHYSICIAN ASSISTANT

## 2020-09-28 PROCEDURE — 99223 1ST HOSP IP/OBS HIGH 75: CPT | Performed by: INTERNAL MEDICINE

## 2020-09-28 PROCEDURE — 97166 OT EVAL MOD COMPLEX 45 MIN: CPT

## 2020-09-28 PROCEDURE — 80048 BASIC METABOLIC PNL TOTAL CA: CPT | Performed by: PHYSICIAN ASSISTANT

## 2020-09-28 PROCEDURE — 97110 THERAPEUTIC EXERCISES: CPT

## 2020-09-28 PROCEDURE — 82948 REAGENT STRIP/BLOOD GLUCOSE: CPT

## 2020-09-28 PROCEDURE — 99232 SBSQ HOSP IP/OBS MODERATE 35: CPT | Performed by: FAMILY MEDICINE

## 2020-09-28 PROCEDURE — 81003 URINALYSIS AUTO W/O SCOPE: CPT | Performed by: PHYSICIAN ASSISTANT

## 2020-09-28 RX ORDER — BRIMONIDINE TARTRATE, TIMOLOL MALEATE 2; 5 MG/ML; MG/ML
1 SOLUTION/ DROPS OPHTHALMIC 2 TIMES DAILY
Status: DISCONTINUED | OUTPATIENT
Start: 2020-09-28 | End: 2020-10-02 | Stop reason: HOSPADM

## 2020-09-28 RX ORDER — ALBUMIN (HUMAN) 12.5 G/50ML
12.5 SOLUTION INTRAVENOUS ONCE
Status: COMPLETED | OUTPATIENT
Start: 2020-09-28 | End: 2020-09-28

## 2020-09-28 RX ORDER — TIMOLOL MALEATE 5 MG/ML
1 SOLUTION/ DROPS OPHTHALMIC 2 TIMES DAILY
Status: DISCONTINUED | OUTPATIENT
Start: 2020-09-28 | End: 2020-09-29 | Stop reason: SDUPTHER

## 2020-09-28 RX ORDER — FUROSEMIDE 10 MG/ML
3 SYRINGE (ML) INJECTION CONTINUOUS
Status: DISCONTINUED | OUTPATIENT
Start: 2020-09-28 | End: 2020-10-01

## 2020-09-28 RX ORDER — NYSTATIN 100000 [USP'U]/G
1 POWDER TOPICAL 2 TIMES DAILY
Status: DISCONTINUED | OUTPATIENT
Start: 2020-09-28 | End: 2020-10-02 | Stop reason: HOSPADM

## 2020-09-28 RX ORDER — BRIMONIDINE TARTRATE 0.15 %
1 DROPS OPHTHALMIC (EYE) 2 TIMES DAILY
Status: DISCONTINUED | OUTPATIENT
Start: 2020-09-28 | End: 2020-09-28

## 2020-09-28 RX ORDER — INSULIN GLARGINE 100 [IU]/ML
11 INJECTION, SOLUTION SUBCUTANEOUS
Status: DISCONTINUED | OUTPATIENT
Start: 2020-09-28 | End: 2020-10-01

## 2020-09-28 RX ORDER — FUROSEMIDE 10 MG/ML
40 INJECTION INTRAMUSCULAR; INTRAVENOUS ONCE
Status: COMPLETED | OUTPATIENT
Start: 2020-09-28 | End: 2020-09-28

## 2020-09-28 RX ORDER — FUROSEMIDE 10 MG/ML
40 INJECTION INTRAMUSCULAR; INTRAVENOUS DAILY
Status: DISCONTINUED | OUTPATIENT
Start: 2020-09-28 | End: 2020-09-28

## 2020-09-28 RX ORDER — FUROSEMIDE 10 MG/ML
20 INJECTION INTRAMUSCULAR; INTRAVENOUS ONCE
Status: DISCONTINUED | OUTPATIENT
Start: 2020-09-28 | End: 2020-09-28

## 2020-09-28 RX ORDER — ALBUMIN (HUMAN) 12.5 G/50ML
12.5 SOLUTION INTRAVENOUS ONCE
Status: DISCONTINUED | OUTPATIENT
Start: 2020-09-28 | End: 2020-09-28

## 2020-09-28 RX ADMIN — Medication 5 MG/HR: at 09:32

## 2020-09-28 RX ADMIN — INSULIN LISPRO 18 UNITS: 100 INJECTION, SOLUTION INTRAVENOUS; SUBCUTANEOUS at 09:39

## 2020-09-28 RX ADMIN — METHOCARBAMOL TABLETS 500 MG: 500 TABLET, COATED ORAL at 16:00

## 2020-09-28 RX ADMIN — PANTOPRAZOLE SODIUM 40 MG: 40 TABLET, DELAYED RELEASE ORAL at 06:15

## 2020-09-28 RX ADMIN — METOPROLOL SUCCINATE 50 MG: 50 TABLET, EXTENDED RELEASE ORAL at 09:40

## 2020-09-28 RX ADMIN — GABAPENTIN 300 MG: 300 CAPSULE ORAL at 22:32

## 2020-09-28 RX ADMIN — FAMOTIDINE 20 MG: 20 TABLET ORAL at 09:41

## 2020-09-28 RX ADMIN — NYSTATIN 1 APPLICATION: 100000 POWDER TOPICAL at 16:00

## 2020-09-28 RX ADMIN — PRAVASTATIN SODIUM 40 MG: 40 TABLET ORAL at 22:32

## 2020-09-28 RX ADMIN — ALBUMIN (HUMAN) 12.5 G: 0.25 INJECTION, SOLUTION INTRAVENOUS at 09:30

## 2020-09-28 RX ADMIN — INSULIN GLARGINE 11 UNITS: 100 INJECTION, SOLUTION SUBCUTANEOUS at 22:33

## 2020-09-28 RX ADMIN — Medication 5 MG/HR: at 09:34

## 2020-09-28 RX ADMIN — FUROSEMIDE 40 MG: 10 INJECTION, SOLUTION INTRAMUSCULAR; INTRAVENOUS at 09:41

## 2020-09-28 RX ADMIN — POTASSIUM CHLORIDE 10 MEQ: 750 TABLET, EXTENDED RELEASE ORAL at 09:41

## 2020-09-28 RX ADMIN — FUROSEMIDE 40 MG: 10 INJECTION, SOLUTION INTRAMUSCULAR; INTRAVENOUS at 06:14

## 2020-09-28 RX ADMIN — LEVOTHYROXINE SODIUM 137 MCG: 112 TABLET ORAL at 06:15

## 2020-09-28 RX ADMIN — NYSTATIN 1 APPLICATION: 100000 POWDER TOPICAL at 22:35

## 2020-09-28 RX ADMIN — BRIMONIDINE TARTRATE, TIMOLOL MALEATE 1 DROP: 2; 5 SOLUTION/ DROPS OPHTHALMIC at 22:33

## 2020-09-28 RX ADMIN — GABAPENTIN 300 MG: 300 CAPSULE ORAL at 09:40

## 2020-09-28 RX ADMIN — FAMOTIDINE 20 MG: 20 TABLET ORAL at 22:32

## 2020-09-29 PROBLEM — J96.20 ACUTE ON CHRONIC RESPIRATORY FAILURE (HCC): Status: ACTIVE | Noted: 2020-09-29

## 2020-09-29 LAB
ANION GAP SERPL CALCULATED.3IONS-SCNC: 8 MMOL/L (ref 4–13)
BUN SERPL-MCNC: 129 MG/DL (ref 5–25)
CALCIUM SERPL-MCNC: 8.8 MG/DL (ref 8.3–10.1)
CHLORIDE SERPL-SCNC: 98 MMOL/L (ref 100–108)
CO2 SERPL-SCNC: 33 MMOL/L (ref 21–32)
CREAT SERPL-MCNC: 2.41 MG/DL (ref 0.6–1.3)
ERYTHROCYTE [DISTWIDTH] IN BLOOD BY AUTOMATED COUNT: 16.4 % (ref 11.6–15.1)
GFR SERPL CREATININE-BSD FRML MDRD: 20 ML/MIN/1.73SQ M
GLUCOSE SERPL-MCNC: 128 MG/DL (ref 65–140)
GLUCOSE SERPL-MCNC: 136 MG/DL (ref 65–140)
GLUCOSE SERPL-MCNC: 146 MG/DL (ref 65–140)
GLUCOSE SERPL-MCNC: 173 MG/DL (ref 65–140)
GLUCOSE SERPL-MCNC: 179 MG/DL (ref 65–140)
HCT VFR BLD AUTO: 35.4 % (ref 34.8–46.1)
HGB BLD-MCNC: 10.8 G/DL (ref 11.5–15.4)
MCH RBC QN AUTO: 30.3 PG (ref 26.8–34.3)
MCHC RBC AUTO-ENTMCNC: 30.5 G/DL (ref 31.4–37.4)
MCV RBC AUTO: 99 FL (ref 82–98)
PLATELET # BLD AUTO: 133 THOUSANDS/UL (ref 149–390)
PMV BLD AUTO: 10.5 FL (ref 8.9–12.7)
POTASSIUM SERPL-SCNC: 3.7 MMOL/L (ref 3.5–5.3)
RBC # BLD AUTO: 3.56 MILLION/UL (ref 3.81–5.12)
SODIUM SERPL-SCNC: 139 MMOL/L (ref 136–145)
WBC # BLD AUTO: 6.41 THOUSAND/UL (ref 4.31–10.16)

## 2020-09-29 PROCEDURE — 97530 THERAPEUTIC ACTIVITIES: CPT

## 2020-09-29 PROCEDURE — 82948 REAGENT STRIP/BLOOD GLUCOSE: CPT

## 2020-09-29 PROCEDURE — 99232 SBSQ HOSP IP/OBS MODERATE 35: CPT | Performed by: INTERNAL MEDICINE

## 2020-09-29 PROCEDURE — 80048 BASIC METABOLIC PNL TOTAL CA: CPT | Performed by: FAMILY MEDICINE

## 2020-09-29 PROCEDURE — 94664 DEMO&/EVAL PT USE INHALER: CPT

## 2020-09-29 PROCEDURE — 97110 THERAPEUTIC EXERCISES: CPT

## 2020-09-29 PROCEDURE — 94760 N-INVAS EAR/PLS OXIMETRY 1: CPT

## 2020-09-29 PROCEDURE — 97535 SELF CARE MNGMENT TRAINING: CPT

## 2020-09-29 PROCEDURE — 85027 COMPLETE CBC AUTOMATED: CPT | Performed by: FAMILY MEDICINE

## 2020-09-29 RX ORDER — HEPARIN SODIUM 5000 [USP'U]/ML
5000 INJECTION, SOLUTION INTRAVENOUS; SUBCUTANEOUS EVERY 8 HOURS SCHEDULED
Status: DISCONTINUED | OUTPATIENT
Start: 2020-09-29 | End: 2020-09-30

## 2020-09-29 RX ORDER — ALBUMIN (HUMAN) 12.5 G/50ML
12.5 SOLUTION INTRAVENOUS ONCE
Status: COMPLETED | OUTPATIENT
Start: 2020-09-29 | End: 2020-09-29

## 2020-09-29 RX ADMIN — PANTOPRAZOLE SODIUM 40 MG: 40 TABLET, DELAYED RELEASE ORAL at 11:05

## 2020-09-29 RX ADMIN — HEPARIN SODIUM 5000 UNITS: 5000 INJECTION INTRAVENOUS; SUBCUTANEOUS at 22:44

## 2020-09-29 RX ADMIN — METOPROLOL SUCCINATE 50 MG: 50 TABLET, EXTENDED RELEASE ORAL at 10:00

## 2020-09-29 RX ADMIN — INSULIN LISPRO 1 UNITS: 100 INJECTION, SOLUTION INTRAVENOUS; SUBCUTANEOUS at 12:16

## 2020-09-29 RX ADMIN — ALBUMIN (HUMAN) 12.5 G: 0.25 INJECTION, SOLUTION INTRAVENOUS at 01:11

## 2020-09-29 RX ADMIN — BRIMONIDINE TARTRATE, TIMOLOL MALEATE 1 DROP: 2; 5 SOLUTION/ DROPS OPHTHALMIC at 17:27

## 2020-09-29 RX ADMIN — GABAPENTIN 300 MG: 300 CAPSULE ORAL at 17:17

## 2020-09-29 RX ADMIN — GABAPENTIN 300 MG: 300 CAPSULE ORAL at 10:00

## 2020-09-29 RX ADMIN — PRAVASTATIN SODIUM 40 MG: 40 TABLET ORAL at 17:17

## 2020-09-29 RX ADMIN — FAMOTIDINE 20 MG: 20 TABLET ORAL at 10:00

## 2020-09-29 RX ADMIN — BRIMONIDINE TARTRATE, TIMOLOL MALEATE 1 DROP: 2; 5 SOLUTION/ DROPS OPHTHALMIC at 06:26

## 2020-09-29 RX ADMIN — FAMOTIDINE 20 MG: 20 TABLET ORAL at 17:17

## 2020-09-29 RX ADMIN — INSULIN LISPRO 1 UNITS: 100 INJECTION, SOLUTION INTRAVENOUS; SUBCUTANEOUS at 22:44

## 2020-09-29 RX ADMIN — INSULIN GLARGINE 11 UNITS: 100 INJECTION, SOLUTION SUBCUTANEOUS at 23:20

## 2020-09-29 RX ADMIN — NYSTATIN 1 APPLICATION: 100000 POWDER TOPICAL at 10:01

## 2020-09-29 RX ADMIN — LEVOTHYROXINE SODIUM 137 MCG: 112 TABLET ORAL at 06:25

## 2020-09-30 LAB
ANION GAP SERPL CALCULATED.3IONS-SCNC: 8 MMOL/L (ref 4–13)
BUN SERPL-MCNC: 136 MG/DL (ref 5–25)
CALCIUM SERPL-MCNC: 8.7 MG/DL (ref 8.3–10.1)
CHLORIDE SERPL-SCNC: 97 MMOL/L (ref 100–108)
CO2 SERPL-SCNC: 33 MMOL/L (ref 21–32)
CREAT SERPL-MCNC: 2.65 MG/DL (ref 0.6–1.3)
GFR SERPL CREATININE-BSD FRML MDRD: 17 ML/MIN/1.73SQ M
GLUCOSE SERPL-MCNC: 120 MG/DL (ref 65–140)
GLUCOSE SERPL-MCNC: 133 MG/DL (ref 65–140)
GLUCOSE SERPL-MCNC: 228 MG/DL (ref 65–140)
GLUCOSE SERPL-MCNC: 249 MG/DL (ref 65–140)
GLUCOSE SERPL-MCNC: 251 MG/DL (ref 65–140)
POTASSIUM SERPL-SCNC: 3.6 MMOL/L (ref 3.5–5.3)
SODIUM SERPL-SCNC: 138 MMOL/L (ref 136–145)

## 2020-09-30 PROCEDURE — 82948 REAGENT STRIP/BLOOD GLUCOSE: CPT

## 2020-09-30 PROCEDURE — 80048 BASIC METABOLIC PNL TOTAL CA: CPT | Performed by: INTERNAL MEDICINE

## 2020-09-30 PROCEDURE — 99232 SBSQ HOSP IP/OBS MODERATE 35: CPT | Performed by: INTERNAL MEDICINE

## 2020-09-30 RX ORDER — POTASSIUM CHLORIDE 20 MEQ/1
40 TABLET, EXTENDED RELEASE ORAL ONCE
Status: COMPLETED | OUTPATIENT
Start: 2020-09-30 | End: 2020-09-30

## 2020-09-30 RX ORDER — ALBUMIN (HUMAN) 12.5 G/50ML
12.5 SOLUTION INTRAVENOUS ONCE
Status: COMPLETED | OUTPATIENT
Start: 2020-09-30 | End: 2020-10-01

## 2020-09-30 RX ORDER — GABAPENTIN 100 MG/1
100 CAPSULE ORAL ONCE
Status: COMPLETED | OUTPATIENT
Start: 2020-09-30 | End: 2020-09-30

## 2020-09-30 RX ORDER — GABAPENTIN 300 MG/1
300 CAPSULE ORAL 2 TIMES DAILY PRN
Status: DISCONTINUED | OUTPATIENT
Start: 2020-09-30 | End: 2020-10-02 | Stop reason: HOSPADM

## 2020-09-30 RX ORDER — GABAPENTIN 300 MG/1
300 CAPSULE ORAL 2 TIMES DAILY
Status: DISCONTINUED | OUTPATIENT
Start: 2020-09-30 | End: 2020-10-02 | Stop reason: HOSPADM

## 2020-09-30 RX ORDER — GABAPENTIN 300 MG/1
300 CAPSULE ORAL 2 TIMES DAILY
Status: DISCONTINUED | OUTPATIENT
Start: 2020-09-30 | End: 2020-09-30

## 2020-09-30 RX ADMIN — PRAVASTATIN SODIUM 40 MG: 40 TABLET ORAL at 17:03

## 2020-09-30 RX ADMIN — NYSTATIN 1 APPLICATION: 100000 POWDER TOPICAL at 09:31

## 2020-09-30 RX ADMIN — POTASSIUM CHLORIDE 40 MEQ: 1500 TABLET, EXTENDED RELEASE ORAL at 09:27

## 2020-09-30 RX ADMIN — FAMOTIDINE 20 MG: 20 TABLET ORAL at 17:03

## 2020-09-30 RX ADMIN — INSULIN LISPRO 3 UNITS: 100 INJECTION, SOLUTION INTRAVENOUS; SUBCUTANEOUS at 21:21

## 2020-09-30 RX ADMIN — GABAPENTIN 300 MG: 300 CAPSULE ORAL at 17:04

## 2020-09-30 RX ADMIN — METHOCARBAMOL TABLETS 500 MG: 500 TABLET, COATED ORAL at 09:28

## 2020-09-30 RX ADMIN — ALBUMIN (HUMAN) 12.5 G: 0.25 INJECTION, SOLUTION INTRAVENOUS at 09:22

## 2020-09-30 RX ADMIN — FAMOTIDINE 20 MG: 20 TABLET ORAL at 09:27

## 2020-09-30 RX ADMIN — PANTOPRAZOLE SODIUM 40 MG: 40 TABLET, DELAYED RELEASE ORAL at 09:38

## 2020-09-30 RX ADMIN — NYSTATIN 1 APPLICATION: 100000 POWDER TOPICAL at 21:23

## 2020-09-30 RX ADMIN — BRIMONIDINE TARTRATE, TIMOLOL MALEATE 1 DROP: 2; 5 SOLUTION/ DROPS OPHTHALMIC at 17:09

## 2020-09-30 RX ADMIN — METOPROLOL SUCCINATE 50 MG: 50 TABLET, EXTENDED RELEASE ORAL at 09:28

## 2020-09-30 RX ADMIN — Medication 5 MG/HR: at 03:11

## 2020-09-30 RX ADMIN — METHOCARBAMOL TABLETS 500 MG: 500 TABLET, COATED ORAL at 17:03

## 2020-09-30 RX ADMIN — BRIMONIDINE TARTRATE, TIMOLOL MALEATE 1 DROP: 2; 5 SOLUTION/ DROPS OPHTHALMIC at 09:38

## 2020-09-30 RX ADMIN — GABAPENTIN 100 MG: 100 CAPSULE ORAL at 01:01

## 2020-09-30 RX ADMIN — INSULIN GLARGINE 11 UNITS: 100 INJECTION, SOLUTION SUBCUTANEOUS at 21:21

## 2020-09-30 RX ADMIN — INSULIN LISPRO 3 UNITS: 100 INJECTION, SOLUTION INTRAVENOUS; SUBCUTANEOUS at 17:04

## 2020-09-30 RX ADMIN — APIXABAN 5 MG: 5 TABLET, FILM COATED ORAL at 11:00

## 2020-09-30 RX ADMIN — LEVOTHYROXINE SODIUM 137 MCG: 112 TABLET ORAL at 05:54

## 2020-10-01 LAB
ANION GAP SERPL CALCULATED.3IONS-SCNC: 6 MMOL/L (ref 4–13)
ATRIAL RATE: 66 BPM
BUN SERPL-MCNC: 147 MG/DL (ref 5–25)
CALCIUM SERPL-MCNC: 8.6 MG/DL (ref 8.3–10.1)
CHLORIDE SERPL-SCNC: 98 MMOL/L (ref 100–108)
CO2 SERPL-SCNC: 32 MMOL/L (ref 21–32)
CREAT SERPL-MCNC: 2.73 MG/DL (ref 0.6–1.3)
GFR SERPL CREATININE-BSD FRML MDRD: 17 ML/MIN/1.73SQ M
GLUCOSE SERPL-MCNC: 106 MG/DL (ref 65–140)
GLUCOSE SERPL-MCNC: 162 MG/DL (ref 65–140)
GLUCOSE SERPL-MCNC: 172 MG/DL (ref 65–140)
GLUCOSE SERPL-MCNC: 198 MG/DL (ref 65–140)
GLUCOSE SERPL-MCNC: 222 MG/DL (ref 65–140)
GLUCOSE SERPL-MCNC: 87 MG/DL (ref 65–140)
POTASSIUM SERPL-SCNC: 4.1 MMOL/L (ref 3.5–5.3)
QRS AXIS: 257 DEGREES
QRSD INTERVAL: 194 MS
QT INTERVAL: 532 MS
QTC INTERVAL: 535 MS
SODIUM SERPL-SCNC: 136 MMOL/L (ref 136–145)
T WAVE AXIS: 67 DEGREES
VENTRICULAR RATE: 61 BPM

## 2020-10-01 PROCEDURE — 97110 THERAPEUTIC EXERCISES: CPT

## 2020-10-01 PROCEDURE — 99232 SBSQ HOSP IP/OBS MODERATE 35: CPT | Performed by: INTERNAL MEDICINE

## 2020-10-01 PROCEDURE — 99233 SBSQ HOSP IP/OBS HIGH 50: CPT | Performed by: INTERNAL MEDICINE

## 2020-10-01 PROCEDURE — 82948 REAGENT STRIP/BLOOD GLUCOSE: CPT

## 2020-10-01 PROCEDURE — 80048 BASIC METABOLIC PNL TOTAL CA: CPT | Performed by: INTERNAL MEDICINE

## 2020-10-01 PROCEDURE — 97116 GAIT TRAINING THERAPY: CPT

## 2020-10-01 PROCEDURE — 93010 ELECTROCARDIOGRAM REPORT: CPT | Performed by: INTERNAL MEDICINE

## 2020-10-01 RX ORDER — TORSEMIDE 20 MG/1
40 TABLET ORAL DAILY
Status: DISCONTINUED | OUTPATIENT
Start: 2020-10-02 | End: 2020-10-02 | Stop reason: HOSPADM

## 2020-10-01 RX ORDER — DABIGATRAN ETEXILATE 75 MG/1
75 CAPSULE, COATED PELLETS ORAL EVERY 12 HOURS SCHEDULED
Status: DISCONTINUED | OUTPATIENT
Start: 2020-10-01 | End: 2020-10-02 | Stop reason: HOSPADM

## 2020-10-01 RX ORDER — INSULIN GLARGINE 100 [IU]/ML
15 INJECTION, SOLUTION SUBCUTANEOUS
Status: DISCONTINUED | OUTPATIENT
Start: 2020-10-01 | End: 2020-10-02 | Stop reason: HOSPADM

## 2020-10-01 RX ADMIN — BRIMONIDINE TARTRATE, TIMOLOL MALEATE 1 DROP: 2; 5 SOLUTION/ DROPS OPHTHALMIC at 08:53

## 2020-10-01 RX ADMIN — METOPROLOL SUCCINATE 50 MG: 50 TABLET, EXTENDED RELEASE ORAL at 08:53

## 2020-10-01 RX ADMIN — INSULIN LISPRO 2 UNITS: 100 INJECTION, SOLUTION INTRAVENOUS; SUBCUTANEOUS at 17:09

## 2020-10-01 RX ADMIN — GABAPENTIN 300 MG: 300 CAPSULE ORAL at 17:09

## 2020-10-01 RX ADMIN — PRAVASTATIN SODIUM 40 MG: 40 TABLET ORAL at 17:09

## 2020-10-01 RX ADMIN — BRIMONIDINE TARTRATE, TIMOLOL MALEATE 1 DROP: 2; 5 SOLUTION/ DROPS OPHTHALMIC at 17:09

## 2020-10-01 RX ADMIN — FAMOTIDINE 20 MG: 20 TABLET ORAL at 08:53

## 2020-10-01 RX ADMIN — NYSTATIN 1 APPLICATION: 100000 POWDER TOPICAL at 20:18

## 2020-10-01 RX ADMIN — INSULIN LISPRO 2 UNITS: 100 INJECTION, SOLUTION INTRAVENOUS; SUBCUTANEOUS at 12:26

## 2020-10-01 RX ADMIN — PANTOPRAZOLE SODIUM 40 MG: 40 TABLET, DELAYED RELEASE ORAL at 09:02

## 2020-10-01 RX ADMIN — GABAPENTIN 300 MG: 300 CAPSULE ORAL at 08:53

## 2020-10-01 RX ADMIN — DABIGATRAN ETEXILATE MESYLATE 75 MG: 75 CAPSULE ORAL at 20:18

## 2020-10-01 RX ADMIN — INSULIN GLARGINE 15 UNITS: 100 INJECTION, SOLUTION SUBCUTANEOUS at 22:45

## 2020-10-01 RX ADMIN — DABIGATRAN ETEXILATE MESYLATE 75 MG: 75 CAPSULE ORAL at 11:04

## 2020-10-01 RX ADMIN — LEVOTHYROXINE SODIUM 137 MCG: 112 TABLET ORAL at 06:09

## 2020-10-01 RX ADMIN — NYSTATIN 1 APPLICATION: 100000 POWDER TOPICAL at 08:53

## 2020-10-01 RX ADMIN — POTASSIUM CHLORIDE 10 MEQ: 750 TABLET, EXTENDED RELEASE ORAL at 08:53

## 2020-10-01 RX ADMIN — INSULIN LISPRO 1 UNITS: 100 INJECTION, SOLUTION INTRAVENOUS; SUBCUTANEOUS at 08:53

## 2020-10-01 RX ADMIN — METHOCARBAMOL TABLETS 500 MG: 500 TABLET, COATED ORAL at 08:53

## 2020-10-01 RX ADMIN — FAMOTIDINE 20 MG: 20 TABLET ORAL at 17:09

## 2020-10-02 ENCOUNTER — TELEPHONE (OUTPATIENT)
Dept: ENDOCRINOLOGY | Facility: CLINIC | Age: 70
End: 2020-10-02

## 2020-10-02 VITALS
BODY MASS INDEX: 37.5 KG/M2 | OXYGEN SATURATION: 94 % | SYSTOLIC BLOOD PRESSURE: 128 MMHG | DIASTOLIC BLOOD PRESSURE: 63 MMHG | TEMPERATURE: 97.5 F | HEART RATE: 60 BPM | RESPIRATION RATE: 16 BRPM | HEIGHT: 63 IN | WEIGHT: 211.64 LBS

## 2020-10-02 LAB
ANION GAP SERPL CALCULATED.3IONS-SCNC: 11 MMOL/L (ref 4–13)
ATRIAL RATE: 250 BPM
BUN SERPL-MCNC: 158 MG/DL (ref 5–25)
CALCIUM SERPL-MCNC: 8.7 MG/DL (ref 8.3–10.1)
CHLORIDE SERPL-SCNC: 96 MMOL/L (ref 100–108)
CO2 SERPL-SCNC: 28 MMOL/L (ref 21–32)
CREAT SERPL-MCNC: 2.65 MG/DL (ref 0.6–1.3)
GFR SERPL CREATININE-BSD FRML MDRD: 17 ML/MIN/1.73SQ M
GLUCOSE SERPL-MCNC: 136 MG/DL (ref 65–140)
GLUCOSE SERPL-MCNC: 137 MG/DL (ref 65–140)
GLUCOSE SERPL-MCNC: 291 MG/DL (ref 65–140)
POTASSIUM SERPL-SCNC: 4.6 MMOL/L (ref 3.5–5.3)
QRS AXIS: 261 DEGREES
QRSD INTERVAL: 186 MS
QT INTERVAL: 490 MS
QTC INTERVAL: 517 MS
SODIUM SERPL-SCNC: 135 MMOL/L (ref 136–145)
T WAVE AXIS: 82 DEGREES
VENTRICULAR RATE: 67 BPM

## 2020-10-02 PROCEDURE — 93010 ELECTROCARDIOGRAM REPORT: CPT | Performed by: INTERNAL MEDICINE

## 2020-10-02 PROCEDURE — 99239 HOSP IP/OBS DSCHRG MGMT >30: CPT | Performed by: INTERNAL MEDICINE

## 2020-10-02 PROCEDURE — 99232 SBSQ HOSP IP/OBS MODERATE 35: CPT | Performed by: INTERNAL MEDICINE

## 2020-10-02 PROCEDURE — 82948 REAGENT STRIP/BLOOD GLUCOSE: CPT

## 2020-10-02 PROCEDURE — 80048 BASIC METABOLIC PNL TOTAL CA: CPT | Performed by: INTERNAL MEDICINE

## 2020-10-02 RX ORDER — INSULIN GLARGINE 100 [IU]/ML
16 INJECTION, SOLUTION SUBCUTANEOUS DAILY
Qty: 5 PEN | Refills: 0 | Status: ON HOLD
Start: 2020-10-02 | End: 2020-10-23 | Stop reason: SDUPTHER

## 2020-10-02 RX ORDER — DABIGATRAN ETEXILATE 75 MG/1
75 CAPSULE, COATED PELLETS ORAL EVERY 12 HOURS SCHEDULED
Qty: 60 CAPSULE | Refills: 0 | Status: ON HOLD | OUTPATIENT
Start: 2020-10-02 | End: 2020-11-13 | Stop reason: SDUPTHER

## 2020-10-02 RX ORDER — TORSEMIDE 20 MG/1
40 TABLET ORAL DAILY
Qty: 30 TABLET | Refills: 0 | Status: ON HOLD | OUTPATIENT
Start: 2020-10-03 | End: 2020-10-23 | Stop reason: SDUPTHER

## 2020-10-02 RX ADMIN — METHOCARBAMOL TABLETS 500 MG: 500 TABLET, COATED ORAL at 09:50

## 2020-10-02 RX ADMIN — FAMOTIDINE 20 MG: 20 TABLET ORAL at 09:49

## 2020-10-02 RX ADMIN — BRIMONIDINE TARTRATE, TIMOLOL MALEATE 1 DROP: 2; 5 SOLUTION/ DROPS OPHTHALMIC at 17:22

## 2020-10-02 RX ADMIN — DABIGATRAN ETEXILATE MESYLATE 75 MG: 75 CAPSULE ORAL at 09:49

## 2020-10-02 RX ADMIN — PANTOPRAZOLE SODIUM 40 MG: 40 TABLET, DELAYED RELEASE ORAL at 09:57

## 2020-10-02 RX ADMIN — TORSEMIDE 40 MG: 20 TABLET ORAL at 09:49

## 2020-10-02 RX ADMIN — NYSTATIN 1 APPLICATION: 100000 POWDER TOPICAL at 09:43

## 2020-10-02 RX ADMIN — GABAPENTIN 300 MG: 300 CAPSULE ORAL at 09:50

## 2020-10-02 RX ADMIN — INSULIN LISPRO 4 UNITS: 100 INJECTION, SOLUTION INTRAVENOUS; SUBCUTANEOUS at 13:56

## 2020-10-02 RX ADMIN — METOPROLOL SUCCINATE 50 MG: 50 TABLET, EXTENDED RELEASE ORAL at 09:50

## 2020-10-02 RX ADMIN — LEVOTHYROXINE SODIUM 137 MCG: 112 TABLET ORAL at 05:17

## 2020-10-05 ENCOUNTER — TRANSITIONAL CARE MANAGEMENT (OUTPATIENT)
Dept: FAMILY MEDICINE CLINIC | Facility: CLINIC | Age: 70
End: 2020-10-05

## 2020-10-07 ENCOUNTER — TELEPHONE (OUTPATIENT)
Dept: OTHER | Facility: OTHER | Age: 70
End: 2020-10-07

## 2020-10-07 DIAGNOSIS — G62.9 NEUROPATHY: ICD-10-CM

## 2020-10-07 DIAGNOSIS — Z79.4 TYPE 2 DIABETES MELLITUS WITH RETINOPATHY AND MACULAR EDEMA, WITH LONG-TERM CURRENT USE OF INSULIN, UNSPECIFIED LATERALITY, UNSPECIFIED RETINOPATHY SEVERITY (HCC): ICD-10-CM

## 2020-10-07 DIAGNOSIS — E11.311 TYPE 2 DIABETES MELLITUS WITH RETINOPATHY AND MACULAR EDEMA, WITH LONG-TERM CURRENT USE OF INSULIN, UNSPECIFIED LATERALITY, UNSPECIFIED RETINOPATHY SEVERITY (HCC): ICD-10-CM

## 2020-10-07 DIAGNOSIS — E11.65 TYPE 2 DIABETES MELLITUS WITH HYPERGLYCEMIA, WITH LONG-TERM CURRENT USE OF INSULIN (HCC): Primary | ICD-10-CM

## 2020-10-07 DIAGNOSIS — Z79.4 TYPE 2 DIABETES MELLITUS WITH HYPERGLYCEMIA, WITH LONG-TERM CURRENT USE OF INSULIN (HCC): Primary | ICD-10-CM

## 2020-10-07 RX ORDER — INSULIN ASPART 100 [IU]/ML
10 INJECTION, SOLUTION INTRAVENOUS; SUBCUTANEOUS
Qty: 5 PEN | Refills: 3 | Status: ON HOLD | OUTPATIENT
Start: 2020-10-07 | End: 2020-10-23 | Stop reason: SDUPTHER

## 2020-10-07 RX ORDER — GABAPENTIN 300 MG/1
CAPSULE ORAL
Qty: 30 CAPSULE | Refills: 0 | Status: SHIPPED | OUTPATIENT
Start: 2020-10-07 | End: 2020-11-12 | Stop reason: SDUPTHER

## 2020-10-07 RX ORDER — BLOOD SUGAR DIAGNOSTIC
STRIP MISCELLANEOUS
Qty: 100 EACH | Refills: 3 | Status: SHIPPED | OUTPATIENT
Start: 2020-10-07 | End: 2020-10-16 | Stop reason: SDUPTHER

## 2020-10-08 ENCOUNTER — TELEPHONE (OUTPATIENT)
Dept: NEPHROLOGY | Facility: CLINIC | Age: 70
End: 2020-10-08

## 2020-10-08 ENCOUNTER — TRANSCRIBE ORDERS (OUTPATIENT)
Dept: ADMINISTRATIVE | Facility: HOSPITAL | Age: 70
End: 2020-10-08

## 2020-10-08 ENCOUNTER — APPOINTMENT (OUTPATIENT)
Dept: LAB | Facility: HOSPITAL | Age: 70
End: 2020-10-08
Attending: INTERNAL MEDICINE
Payer: MEDICARE

## 2020-10-08 ENCOUNTER — OFFICE VISIT (OUTPATIENT)
Dept: CARDIAC SURGERY | Facility: CLINIC | Age: 70
End: 2020-10-08
Payer: MEDICARE

## 2020-10-08 VITALS
HEIGHT: 63 IN | DIASTOLIC BLOOD PRESSURE: 76 MMHG | RESPIRATION RATE: 20 BRPM | TEMPERATURE: 98.3 F | BODY MASS INDEX: 39.34 KG/M2 | SYSTOLIC BLOOD PRESSURE: 128 MMHG | HEART RATE: 73 BPM | WEIGHT: 222 LBS

## 2020-10-08 DIAGNOSIS — I35.0 NONRHEUMATIC AORTIC VALVE STENOSIS: ICD-10-CM

## 2020-10-08 DIAGNOSIS — R06.02 SHORTNESS OF BREATH: ICD-10-CM

## 2020-10-08 DIAGNOSIS — J96.21 ACUTE ON CHRONIC RESPIRATORY FAILURE WITH HYPOXIA (HCC): Primary | ICD-10-CM

## 2020-10-08 DIAGNOSIS — N18.32 STAGE 3B CHRONIC KIDNEY DISEASE (HCC): Primary | ICD-10-CM

## 2020-10-08 DIAGNOSIS — I50.43 ACUTE ON CHRONIC COMBINED SYSTOLIC AND DIASTOLIC CONGESTIVE HEART FAILURE (HCC): ICD-10-CM

## 2020-10-08 DIAGNOSIS — R09.89 BRUIT: ICD-10-CM

## 2020-10-08 DIAGNOSIS — Z01.810 PRE-OPERATIVE CARDIOVASCULAR EXAMINATION: ICD-10-CM

## 2020-10-08 LAB
ALBUMIN SERPL BCP-MCNC: 3.1 G/DL (ref 3.5–5)
ANION GAP SERPL CALCULATED.3IONS-SCNC: 6 MMOL/L (ref 4–13)
BUN SERPL-MCNC: 121 MG/DL (ref 5–25)
CALCIUM ALBUM COR SERPL-MCNC: 9.6 MG/DL (ref 8.3–10.1)
CALCIUM SERPL-MCNC: 8.9 MG/DL (ref 8.3–10.1)
CHLORIDE SERPL-SCNC: 100 MMOL/L (ref 100–108)
CO2 SERPL-SCNC: 34 MMOL/L (ref 21–32)
CREAT SERPL-MCNC: 2.01 MG/DL (ref 0.6–1.3)
CREAT UR-MCNC: 23.3 MG/DL
ERYTHROCYTE [DISTWIDTH] IN BLOOD BY AUTOMATED COUNT: 15.2 % (ref 11.6–15.1)
GFR SERPL CREATININE-BSD FRML MDRD: 24 ML/MIN/1.73SQ M
GLUCOSE SERPL-MCNC: 156 MG/DL (ref 65–140)
HCT VFR BLD AUTO: 37.2 % (ref 34.8–46.1)
HGB BLD-MCNC: 11.2 G/DL (ref 11.5–15.4)
MAGNESIUM SERPL-MCNC: 2.3 MG/DL (ref 1.6–2.6)
MCH RBC QN AUTO: 30.2 PG (ref 26.8–34.3)
MCHC RBC AUTO-ENTMCNC: 30.1 G/DL (ref 31.4–37.4)
MCV RBC AUTO: 100 FL (ref 82–98)
PHOSPHATE SERPL-MCNC: 3.4 MG/DL (ref 2.3–4.1)
PLATELET # BLD AUTO: 187 THOUSANDS/UL (ref 149–390)
PMV BLD AUTO: 11.2 FL (ref 8.9–12.7)
POTASSIUM SERPL-SCNC: 4.2 MMOL/L (ref 3.5–5.3)
PROT UR-MCNC: <6 MG/DL
PROT/CREAT UR: <0.26 MG/G{CREAT} (ref 0–0.1)
RBC # BLD AUTO: 3.71 MILLION/UL (ref 3.81–5.12)
SODIUM SERPL-SCNC: 140 MMOL/L (ref 136–145)
WBC # BLD AUTO: 6.91 THOUSAND/UL (ref 4.31–10.16)

## 2020-10-08 PROCEDURE — 82570 ASSAY OF URINE CREATININE: CPT

## 2020-10-08 PROCEDURE — 99205 OFFICE O/P NEW HI 60 MIN: CPT | Performed by: PHYSICIAN ASSISTANT

## 2020-10-08 PROCEDURE — 84156 ASSAY OF PROTEIN URINE: CPT

## 2020-10-08 PROCEDURE — 80069 RENAL FUNCTION PANEL: CPT

## 2020-10-08 PROCEDURE — 36415 COLL VENOUS BLD VENIPUNCTURE: CPT

## 2020-10-08 PROCEDURE — 83735 ASSAY OF MAGNESIUM: CPT

## 2020-10-08 PROCEDURE — 85027 COMPLETE CBC AUTOMATED: CPT

## 2020-10-13 ENCOUNTER — OFFICE VISIT (OUTPATIENT)
Dept: FAMILY MEDICINE CLINIC | Facility: CLINIC | Age: 70
End: 2020-10-13
Payer: MEDICARE

## 2020-10-13 ENCOUNTER — TELEPHONE (OUTPATIENT)
Dept: INPATIENT UNIT | Facility: HOSPITAL | Age: 70
End: 2020-10-13

## 2020-10-13 VITALS
WEIGHT: 225 LBS | TEMPERATURE: 95 F | HEIGHT: 63 IN | SYSTOLIC BLOOD PRESSURE: 130 MMHG | DIASTOLIC BLOOD PRESSURE: 76 MMHG | RESPIRATION RATE: 22 BRPM | HEART RATE: 72 BPM | BODY MASS INDEX: 39.87 KG/M2

## 2020-10-13 DIAGNOSIS — I35.0 NONRHEUMATIC AORTIC VALVE STENOSIS: Primary | ICD-10-CM

## 2020-10-13 DIAGNOSIS — E11.311 TYPE 2 DIABETES MELLITUS WITH RETINOPATHY AND MACULAR EDEMA, WITH LONG-TERM CURRENT USE OF INSULIN, UNSPECIFIED LATERALITY, UNSPECIFIED RETINOPATHY SEVERITY (HCC): ICD-10-CM

## 2020-10-13 DIAGNOSIS — I35.0 NONRHEUMATIC AORTIC VALVE STENOSIS: ICD-10-CM

## 2020-10-13 DIAGNOSIS — I50.43 ACUTE ON CHRONIC COMBINED SYSTOLIC AND DIASTOLIC CONGESTIVE HEART FAILURE (HCC): Primary | ICD-10-CM

## 2020-10-13 DIAGNOSIS — N17.9 ACUTE RENAL FAILURE SUPERIMPOSED ON STAGE 3 CHRONIC KIDNEY DISEASE, UNSPECIFIED ACUTE RENAL FAILURE TYPE, UNSPECIFIED WHETHER STAGE 3A OR 3B CKD (HCC): ICD-10-CM

## 2020-10-13 DIAGNOSIS — N18.30 ACUTE RENAL FAILURE SUPERIMPOSED ON STAGE 3 CHRONIC KIDNEY DISEASE, UNSPECIFIED ACUTE RENAL FAILURE TYPE, UNSPECIFIED WHETHER STAGE 3A OR 3B CKD (HCC): ICD-10-CM

## 2020-10-13 DIAGNOSIS — Z79.4 TYPE 2 DIABETES MELLITUS WITH RETINOPATHY AND MACULAR EDEMA, WITH LONG-TERM CURRENT USE OF INSULIN, UNSPECIFIED LATERALITY, UNSPECIFIED RETINOPATHY SEVERITY (HCC): ICD-10-CM

## 2020-10-13 PROCEDURE — 99495 TRANSJ CARE MGMT MOD F2F 14D: CPT | Performed by: FAMILY MEDICINE

## 2020-10-16 ENCOUNTER — OFFICE VISIT (OUTPATIENT)
Dept: FAMILY MEDICINE CLINIC | Facility: CLINIC | Age: 70
End: 2020-10-16
Payer: MEDICARE

## 2020-10-16 VITALS
SYSTOLIC BLOOD PRESSURE: 110 MMHG | DIASTOLIC BLOOD PRESSURE: 80 MMHG | TEMPERATURE: 97.5 F | BODY MASS INDEX: 39.87 KG/M2 | RESPIRATION RATE: 16 BRPM | HEART RATE: 67 BPM | OXYGEN SATURATION: 90 % | HEIGHT: 63 IN | WEIGHT: 225 LBS

## 2020-10-16 DIAGNOSIS — Z79.4 TYPE 2 DIABETES MELLITUS WITH HYPERGLYCEMIA, WITH LONG-TERM CURRENT USE OF INSULIN (HCC): ICD-10-CM

## 2020-10-16 DIAGNOSIS — Z79.4 TYPE 2 DIABETES MELLITUS WITH RETINOPATHY AND MACULAR EDEMA, WITH LONG-TERM CURRENT USE OF INSULIN, UNSPECIFIED LATERALITY, UNSPECIFIED RETINOPATHY SEVERITY (HCC): ICD-10-CM

## 2020-10-16 DIAGNOSIS — I50.43 ACUTE ON CHRONIC COMBINED SYSTOLIC AND DIASTOLIC CONGESTIVE HEART FAILURE (HCC): Primary | ICD-10-CM

## 2020-10-16 DIAGNOSIS — E11.311 TYPE 2 DIABETES MELLITUS WITH RETINOPATHY AND MACULAR EDEMA, WITH LONG-TERM CURRENT USE OF INSULIN, UNSPECIFIED LATERALITY, UNSPECIFIED RETINOPATHY SEVERITY (HCC): ICD-10-CM

## 2020-10-16 DIAGNOSIS — E11.65 TYPE 2 DIABETES MELLITUS WITH HYPERGLYCEMIA, WITH LONG-TERM CURRENT USE OF INSULIN (HCC): ICD-10-CM

## 2020-10-16 PROCEDURE — 99213 OFFICE O/P EST LOW 20 MIN: CPT | Performed by: FAMILY MEDICINE

## 2020-10-16 RX ORDER — BLOOD SUGAR DIAGNOSTIC
STRIP MISCELLANEOUS
Qty: 300 EACH | Refills: 3 | Status: SHIPPED | OUTPATIENT
Start: 2020-10-16

## 2020-10-22 ENCOUNTER — HOSPITAL ENCOUNTER (OUTPATIENT)
Dept: NON INVASIVE DIAGNOSTICS | Facility: HOSPITAL | Age: 70
Discharge: HOME | End: 2020-10-22
Payer: MEDICARE

## 2020-10-22 ENCOUNTER — HOSPITAL ENCOUNTER (OUTPATIENT)
Facility: HOSPITAL | Age: 70
Discharge: HOME | End: 2020-10-23
Attending: INTERNAL MEDICINE | Admitting: INTERNAL MEDICINE
Payer: MEDICARE

## 2020-10-22 ENCOUNTER — HOSPITAL ENCOUNTER (OUTPATIENT)
Dept: PULMONOLOGY | Facility: HOSPITAL | Age: 70
Discharge: HOME | End: 2020-10-22
Payer: MEDICARE

## 2020-10-22 DIAGNOSIS — Z01.810 PRE-OPERATIVE CARDIOVASCULAR EXAMINATION: ICD-10-CM

## 2020-10-22 DIAGNOSIS — R06.02 SHORTNESS OF BREATH: ICD-10-CM

## 2020-10-22 DIAGNOSIS — R09.89 BRUIT: ICD-10-CM

## 2020-10-22 DIAGNOSIS — I35.0 NONRHEUMATIC AORTIC VALVE STENOSIS: ICD-10-CM

## 2020-10-22 DIAGNOSIS — Z79.4 TYPE 2 DIABETES MELLITUS WITH HYPERGLYCEMIA, WITH LONG-TERM CURRENT USE OF INSULIN (HCC): ICD-10-CM

## 2020-10-22 DIAGNOSIS — N18.4 CKD (CHRONIC KIDNEY DISEASE) STAGE 4, GFR 15-29 ML/MIN (HCC): ICD-10-CM

## 2020-10-22 DIAGNOSIS — J96.21 ACUTE ON CHRONIC RESPIRATORY FAILURE WITH HYPOXIA (HCC): ICD-10-CM

## 2020-10-22 DIAGNOSIS — E11.40 TYPE 2 DIABETES MELLITUS WITH DIABETIC NEUROPATHY, WITH LONG-TERM CURRENT USE OF INSULIN (HCC): ICD-10-CM

## 2020-10-22 DIAGNOSIS — Z79.4 TYPE 2 DIABETES MELLITUS WITH DIABETIC NEUROPATHY, WITH LONG-TERM CURRENT USE OF INSULIN (HCC): ICD-10-CM

## 2020-10-22 DIAGNOSIS — I27.20 MILD PULMONARY HYPERTENSION (HCC): ICD-10-CM

## 2020-10-22 DIAGNOSIS — I50.43 ACUTE ON CHRONIC COMBINED SYSTOLIC AND DIASTOLIC CONGESTIVE HEART FAILURE (HCC): ICD-10-CM

## 2020-10-22 DIAGNOSIS — I10 HYPERTENSION: Primary | ICD-10-CM

## 2020-10-22 DIAGNOSIS — E11.65 TYPE 2 DIABETES MELLITUS WITH HYPERGLYCEMIA, WITH LONG-TERM CURRENT USE OF INSULIN (HCC): ICD-10-CM

## 2020-10-22 PROBLEM — E87.6 HYPOKALEMIA: Status: ACTIVE | Noted: 2020-10-22

## 2020-10-22 LAB
ANION GAP SERPL CALCULATED.3IONS-SCNC: 5 MMOL/L (ref 4–13)
BUN SERPL-MCNC: 83 MG/DL (ref 5–25)
CALCIUM SERPL-MCNC: 8.9 MG/DL (ref 8.3–10.1)
CHLORIDE SERPL-SCNC: 102 MMOL/L (ref 100–108)
CO2 SERPL-SCNC: 34 MMOL/L (ref 21–32)
CREAT SERPL-MCNC: 1.64 MG/DL (ref 0.6–1.3)
GFR SERPL CREATININE-BSD FRML MDRD: 31 ML/MIN/1.73SQ M
GLUCOSE SERPL-MCNC: 231 MG/DL (ref 65–140)
GLUCOSE SERPL-MCNC: 45 MG/DL (ref 65–140)
GLUCOSE SERPL-MCNC: 59 MG/DL (ref 65–140)
GLUCOSE SERPL-MCNC: 81 MG/DL (ref 65–140)
POTASSIUM SERPL-SCNC: 3.2 MMOL/L (ref 3.5–5.3)
SODIUM SERPL-SCNC: 141 MMOL/L (ref 136–145)

## 2020-10-22 PROCEDURE — 93880 EXTRACRANIAL BILAT STUDY: CPT

## 2020-10-22 PROCEDURE — 82948 REAGENT STRIP/BLOOD GLUCOSE: CPT

## 2020-10-22 PROCEDURE — 94010 BREATHING CAPACITY TEST: CPT | Performed by: INTERNAL MEDICINE

## 2020-10-22 PROCEDURE — NC001 PR NO CHARGE: Performed by: INTERNAL MEDICINE

## 2020-10-22 PROCEDURE — 93880 EXTRACRANIAL BILAT STUDY: CPT | Performed by: SURGERY

## 2020-10-22 PROCEDURE — 94726 PLETHYSMOGRAPHY LUNG VOLUMES: CPT

## 2020-10-22 PROCEDURE — G0239 OTH RESP PROC, GROUP: HCPCS

## 2020-10-22 PROCEDURE — 99215 OFFICE O/P EST HI 40 MIN: CPT | Performed by: INTERNAL MEDICINE

## 2020-10-22 PROCEDURE — 94010 BREATHING CAPACITY TEST: CPT

## 2020-10-22 PROCEDURE — 94726 PLETHYSMOGRAPHY LUNG VOLUMES: CPT | Performed by: INTERNAL MEDICINE

## 2020-10-22 PROCEDURE — 80048 BASIC METABOLIC PNL TOTAL CA: CPT | Performed by: INTERNAL MEDICINE

## 2020-10-22 RX ORDER — GABAPENTIN 300 MG/1
300 CAPSULE ORAL
Status: DISCONTINUED | OUTPATIENT
Start: 2020-10-22 | End: 2020-10-23 | Stop reason: HOSPADM

## 2020-10-22 RX ORDER — GLIMEPIRIDE 2 MG/1
1 TABLET ORAL 2 TIMES DAILY
Status: DISCONTINUED | OUTPATIENT
Start: 2020-10-22 | End: 2020-10-23 | Stop reason: HOSPADM

## 2020-10-22 RX ORDER — ACETYLCYSTEINE 200 MG/ML
1200 SOLUTION ORAL; RESPIRATORY (INHALATION) 2 TIMES DAILY
Status: DISCONTINUED | OUTPATIENT
Start: 2020-10-22 | End: 2020-10-23 | Stop reason: HOSPADM

## 2020-10-22 RX ORDER — ACETAMINOPHEN 325 MG/1
650 TABLET ORAL EVERY 6 HOURS PRN
Status: DISCONTINUED | OUTPATIENT
Start: 2020-10-22 | End: 2020-10-23 | Stop reason: HOSPADM

## 2020-10-22 RX ORDER — ASPIRIN 325 MG
325 TABLET ORAL ONCE
Status: COMPLETED | OUTPATIENT
Start: 2020-10-22 | End: 2020-10-22

## 2020-10-22 RX ORDER — INSULIN GLARGINE 100 [IU]/ML
22 INJECTION, SOLUTION SUBCUTANEOUS
Status: DISCONTINUED | OUTPATIENT
Start: 2020-10-23 | End: 2020-10-23 | Stop reason: HOSPADM

## 2020-10-22 RX ORDER — POTASSIUM CHLORIDE 20 MEQ/1
40 TABLET, EXTENDED RELEASE ORAL ONCE
Status: COMPLETED | OUTPATIENT
Start: 2020-10-22 | End: 2020-10-22

## 2020-10-22 RX ORDER — FAMOTIDINE 20 MG/1
20 TABLET, FILM COATED ORAL 2 TIMES DAILY
Status: DISCONTINUED | OUTPATIENT
Start: 2020-10-22 | End: 2020-10-23 | Stop reason: HOSPADM

## 2020-10-22 RX ORDER — INSULIN GLARGINE 100 [IU]/ML
11 INJECTION, SOLUTION SUBCUTANEOUS
Status: COMPLETED | OUTPATIENT
Start: 2020-10-22 | End: 2020-10-22

## 2020-10-22 RX ORDER — PANTOPRAZOLE SODIUM 40 MG/1
40 TABLET, DELAYED RELEASE ORAL
Status: DISCONTINUED | OUTPATIENT
Start: 2020-10-23 | End: 2020-10-23 | Stop reason: HOSPADM

## 2020-10-22 RX ORDER — DABIGATRAN ETEXILATE 75 MG/1
75 CAPSULE, COATED PELLETS ORAL EVERY 12 HOURS SCHEDULED
Status: DISCONTINUED | OUTPATIENT
Start: 2020-10-22 | End: 2020-10-23 | Stop reason: HOSPADM

## 2020-10-22 RX ORDER — METOPROLOL SUCCINATE 50 MG/1
50 TABLET, EXTENDED RELEASE ORAL DAILY
Status: DISCONTINUED | OUTPATIENT
Start: 2020-10-22 | End: 2020-10-23 | Stop reason: HOSPADM

## 2020-10-22 RX ORDER — METHOCARBAMOL 500 MG/1
500 TABLET, FILM COATED ORAL 2 TIMES DAILY
Status: DISCONTINUED | OUTPATIENT
Start: 2020-10-22 | End: 2020-10-23 | Stop reason: HOSPADM

## 2020-10-22 RX ADMIN — FAMOTIDINE 20 MG: 20 TABLET ORAL at 17:12

## 2020-10-22 RX ADMIN — POTASSIUM CHLORIDE 40 MEQ: 1500 TABLET, EXTENDED RELEASE ORAL at 15:28

## 2020-10-22 RX ADMIN — GABAPENTIN 300 MG: 300 CAPSULE ORAL at 19:31

## 2020-10-22 RX ADMIN — ACETYLCYSTEINE 1200 MG: 200 SOLUTION ORAL; RESPIRATORY (INHALATION) at 17:20

## 2020-10-22 RX ADMIN — ASPIRIN 325 MG ORAL TABLET 325 MG: 325 PILL ORAL at 21:56

## 2020-10-22 RX ADMIN — DABIGATRAN ETEXILATE MESYLATE 75 MG: 75 CAPSULE ORAL at 21:09

## 2020-10-22 RX ADMIN — INSULIN GLARGINE 11 UNITS: 100 INJECTION, SOLUTION SUBCUTANEOUS at 21:09

## 2020-10-23 ENCOUNTER — APPOINTMENT (OUTPATIENT)
Dept: RADIOLOGY | Facility: HOSPITAL | Age: 70
End: 2020-10-23
Payer: MEDICARE

## 2020-10-23 VITALS
TEMPERATURE: 97.8 F | WEIGHT: 220.6 LBS | HEART RATE: 62 BPM | DIASTOLIC BLOOD PRESSURE: 73 MMHG | SYSTOLIC BLOOD PRESSURE: 152 MMHG | HEIGHT: 63 IN | OXYGEN SATURATION: 90 % | RESPIRATION RATE: 18 BRPM | BODY MASS INDEX: 39.09 KG/M2

## 2020-10-23 PROBLEM — E87.6 HYPOKALEMIA: Status: RESOLVED | Noted: 2020-10-22 | Resolved: 2020-10-23

## 2020-10-23 LAB
ANION GAP SERPL CALCULATED.3IONS-SCNC: 4 MMOL/L (ref 4–13)
BUN SERPL-MCNC: 79 MG/DL (ref 5–25)
CALCIUM SERPL-MCNC: 8.6 MG/DL (ref 8.3–10.1)
CHLORIDE SERPL-SCNC: 104 MMOL/L (ref 100–108)
CO2 SERPL-SCNC: 33 MMOL/L (ref 21–32)
CREAT SERPL-MCNC: 1.5 MG/DL (ref 0.6–1.3)
GFR SERPL CREATININE-BSD FRML MDRD: 35 ML/MIN/1.73SQ M
GLUCOSE P FAST SERPL-MCNC: 123 MG/DL (ref 65–99)
GLUCOSE SERPL-MCNC: 123 MG/DL (ref 65–140)
GLUCOSE SERPL-MCNC: 123 MG/DL (ref 65–140)
GLUCOSE SERPL-MCNC: 127 MG/DL (ref 65–140)
MAGNESIUM SERPL-MCNC: 2.5 MG/DL (ref 1.6–2.6)
POTASSIUM SERPL-SCNC: 3.9 MMOL/L (ref 3.5–5.3)
SODIUM SERPL-SCNC: 141 MMOL/L (ref 136–145)

## 2020-10-23 PROCEDURE — 82948 REAGENT STRIP/BLOOD GLUCOSE: CPT

## 2020-10-23 PROCEDURE — G1004 CDSM NDSC: HCPCS

## 2020-10-23 PROCEDURE — 74174 CTA ABD&PLVS W/CONTRAST: CPT

## 2020-10-23 PROCEDURE — 80048 BASIC METABOLIC PNL TOTAL CA: CPT | Performed by: INTERNAL MEDICINE

## 2020-10-23 PROCEDURE — 99214 OFFICE O/P EST MOD 30 MIN: CPT | Performed by: INTERNAL MEDICINE

## 2020-10-23 PROCEDURE — NC001 PR NO CHARGE: Performed by: INTERNAL MEDICINE

## 2020-10-23 PROCEDURE — 83735 ASSAY OF MAGNESIUM: CPT | Performed by: INTERNAL MEDICINE

## 2020-10-23 PROCEDURE — 75572 CT HRT W/3D IMAGE: CPT

## 2020-10-23 RX ORDER — TORSEMIDE 20 MG/1
40 TABLET ORAL DAILY
Qty: 30 TABLET | Refills: 0
Start: 2020-10-24 | End: 2020-10-24 | Stop reason: SDUPTHER

## 2020-10-23 RX ORDER — METOLAZONE 2.5 MG/1
2.5 TABLET ORAL ONCE
Refills: 0
Start: 2020-10-24 | End: 2020-10-27 | Stop reason: SDUPTHER

## 2020-10-23 RX ORDER — INSULIN ASPART 100 [IU]/ML
18 INJECTION, SOLUTION INTRAVENOUS; SUBCUTANEOUS
Qty: 5 PEN | Refills: 0
Start: 2020-10-23 | End: 2020-12-01 | Stop reason: HOSPADM

## 2020-10-23 RX ORDER — VALSARTAN 80 MG/1
80 TABLET ORAL DAILY
Refills: 0
Start: 2020-10-24 | End: 2020-10-27

## 2020-10-23 RX ORDER — INSULIN GLARGINE 100 [IU]/ML
22 INJECTION, SOLUTION SUBCUTANEOUS DAILY
Qty: 5 PEN | Refills: 0 | Status: ON HOLD
Start: 2020-10-23 | End: 2020-12-01 | Stop reason: SDUPTHER

## 2020-10-23 RX ADMIN — ACETYLCYSTEINE 1200 MG: 200 SOLUTION ORAL; RESPIRATORY (INHALATION) at 06:35

## 2020-10-23 RX ADMIN — IODIXANOL 120 ML: 320 INJECTION, SOLUTION INTRAVASCULAR at 09:39

## 2020-10-23 RX ADMIN — INSULIN LISPRO 18 UNITS: 100 INJECTION, SOLUTION INTRAVENOUS; SUBCUTANEOUS at 11:01

## 2020-10-23 RX ADMIN — METOPROLOL SUCCINATE 50 MG: 50 TABLET, EXTENDED RELEASE ORAL at 10:23

## 2020-10-23 RX ADMIN — LEVOTHYROXINE SODIUM 137 MCG: 112 TABLET ORAL at 06:34

## 2020-10-23 RX ADMIN — FAMOTIDINE 20 MG: 20 TABLET ORAL at 10:23

## 2020-10-23 RX ADMIN — PANTOPRAZOLE SODIUM 40 MG: 40 TABLET, DELAYED RELEASE ORAL at 06:35

## 2020-10-23 RX ADMIN — SODIUM CHLORIDE 250 ML: 0.9 INJECTION, SOLUTION INTRAVENOUS at 06:33

## 2020-10-23 RX ADMIN — DABIGATRAN ETEXILATE MESYLATE 75 MG: 75 CAPSULE ORAL at 10:23

## 2020-10-24 ENCOUNTER — APPOINTMENT (OUTPATIENT)
Dept: LAB | Facility: HOSPITAL | Age: 70
End: 2020-10-24
Payer: MEDICARE

## 2020-10-24 ENCOUNTER — TRANSCRIBE ORDERS (OUTPATIENT)
Dept: ADMINISTRATIVE | Facility: HOSPITAL | Age: 70
End: 2020-10-24

## 2020-10-24 ENCOUNTER — TELEPHONE (OUTPATIENT)
Dept: OTHER | Facility: OTHER | Age: 70
End: 2020-10-24

## 2020-10-24 DIAGNOSIS — N18.4 CKD (CHRONIC KIDNEY DISEASE) STAGE 4, GFR 15-29 ML/MIN (HCC): ICD-10-CM

## 2020-10-24 DIAGNOSIS — I27.20 MILD PULMONARY HYPERTENSION (HCC): ICD-10-CM

## 2020-10-24 DIAGNOSIS — N18.32 STAGE 3B CHRONIC KIDNEY DISEASE (HCC): ICD-10-CM

## 2020-10-24 DIAGNOSIS — I50.43 ACUTE ON CHRONIC COMBINED SYSTOLIC AND DIASTOLIC CONGESTIVE HEART FAILURE (HCC): ICD-10-CM

## 2020-10-24 LAB
ANION GAP SERPL CALCULATED.3IONS-SCNC: 6 MMOL/L (ref 4–13)
BUN SERPL-MCNC: 63 MG/DL (ref 5–25)
CALCIUM SERPL-MCNC: 8.8 MG/DL (ref 8.3–10.1)
CHLORIDE SERPL-SCNC: 103 MMOL/L (ref 100–108)
CO2 SERPL-SCNC: 32 MMOL/L (ref 21–32)
CREAT SERPL-MCNC: 1.5 MG/DL (ref 0.6–1.3)
GFR SERPL CREATININE-BSD FRML MDRD: 35 ML/MIN/1.73SQ M
GLUCOSE P FAST SERPL-MCNC: 96 MG/DL (ref 65–99)
POTASSIUM SERPL-SCNC: 4.4 MMOL/L (ref 3.5–5.3)
SODIUM SERPL-SCNC: 141 MMOL/L (ref 136–145)

## 2020-10-24 PROCEDURE — 36415 COLL VENOUS BLD VENIPUNCTURE: CPT

## 2020-10-24 PROCEDURE — 80048 BASIC METABOLIC PNL TOTAL CA: CPT

## 2020-10-24 RX ORDER — TORSEMIDE 20 MG/1
40 TABLET ORAL DAILY
Qty: 60 TABLET | Refills: 0 | Status: SHIPPED | OUTPATIENT
Start: 2020-10-24 | End: 2020-10-27 | Stop reason: SDUPTHER

## 2020-10-26 RX ORDER — TORSEMIDE 20 MG/1
40 TABLET ORAL DAILY
Qty: 60 TABLET | Refills: 0 | Status: CANCELLED | OUTPATIENT
Start: 2020-10-26

## 2020-10-27 ENCOUNTER — LAB (OUTPATIENT)
Dept: LAB | Facility: CLINIC | Age: 70
End: 2020-10-27
Payer: MEDICARE

## 2020-10-27 ENCOUNTER — OFFICE VISIT (OUTPATIENT)
Dept: NEPHROLOGY | Facility: CLINIC | Age: 70
End: 2020-10-27
Payer: MEDICARE

## 2020-10-27 ENCOUNTER — TRANSCRIBE ORDERS (OUTPATIENT)
Dept: LAB | Facility: CLINIC | Age: 70
End: 2020-10-27

## 2020-10-27 VITALS
WEIGHT: 234 LBS | BODY MASS INDEX: 41.46 KG/M2 | SYSTOLIC BLOOD PRESSURE: 110 MMHG | DIASTOLIC BLOOD PRESSURE: 58 MMHG | HEIGHT: 63 IN | TEMPERATURE: 96.4 F

## 2020-10-27 DIAGNOSIS — N18.30 BENIGN HYPERTENSION WITH CHRONIC KIDNEY DISEASE, STAGE III (HCC): ICD-10-CM

## 2020-10-27 DIAGNOSIS — I50.43 ACUTE ON CHRONIC COMBINED SYSTOLIC AND DIASTOLIC CONGESTIVE HEART FAILURE (HCC): ICD-10-CM

## 2020-10-27 DIAGNOSIS — N17.0 ACUTE RENAL FAILURE WITH TUBULAR NECROSIS (HCC): ICD-10-CM

## 2020-10-27 DIAGNOSIS — M89.9 CHRONIC KIDNEY DISEASE-MINERAL AND BONE DISORDER: ICD-10-CM

## 2020-10-27 DIAGNOSIS — I12.9 BENIGN HYPERTENSION WITH CHRONIC KIDNEY DISEASE, STAGE III (HCC): ICD-10-CM

## 2020-10-27 DIAGNOSIS — E83.9 CHRONIC KIDNEY DISEASE-MINERAL AND BONE DISORDER: ICD-10-CM

## 2020-10-27 DIAGNOSIS — E87.6 HYPOKALEMIA: ICD-10-CM

## 2020-10-27 DIAGNOSIS — N18.9 CHRONIC KIDNEY DISEASE-MINERAL AND BONE DISORDER: ICD-10-CM

## 2020-10-27 DIAGNOSIS — N17.0 ACUTE RENAL FAILURE WITH TUBULAR NECROSIS (HCC): Primary | ICD-10-CM

## 2020-10-27 DIAGNOSIS — N18.4 CKD (CHRONIC KIDNEY DISEASE) STAGE 4, GFR 15-29 ML/MIN (HCC): Primary | ICD-10-CM

## 2020-10-27 LAB
ANION GAP SERPL CALCULATED.3IONS-SCNC: 4 MMOL/L (ref 4–13)
BUN SERPL-MCNC: 81 MG/DL (ref 5–25)
CALCIUM SERPL-MCNC: 8.9 MG/DL (ref 8.3–10.1)
CHLORIDE SERPL-SCNC: 103 MMOL/L (ref 100–108)
CO2 SERPL-SCNC: 30 MMOL/L (ref 21–32)
CREAT SERPL-MCNC: 1.93 MG/DL (ref 0.6–1.3)
GFR SERPL CREATININE-BSD FRML MDRD: 26 ML/MIN/1.73SQ M
GLUCOSE P FAST SERPL-MCNC: 128 MG/DL (ref 65–99)
POTASSIUM SERPL-SCNC: 4.5 MMOL/L (ref 3.5–5.3)
SODIUM SERPL-SCNC: 137 MMOL/L (ref 136–145)

## 2020-10-27 PROCEDURE — 99214 OFFICE O/P EST MOD 30 MIN: CPT | Performed by: INTERNAL MEDICINE

## 2020-10-27 PROCEDURE — 36415 COLL VENOUS BLD VENIPUNCTURE: CPT

## 2020-10-27 PROCEDURE — 80048 BASIC METABOLIC PNL TOTAL CA: CPT

## 2020-10-27 RX ORDER — TORSEMIDE 20 MG/1
40 TABLET ORAL DAILY
Qty: 180 TABLET | Refills: 3 | Status: ON HOLD | OUTPATIENT
Start: 2020-10-27 | End: 2020-11-13 | Stop reason: SDUPTHER

## 2020-10-27 RX ORDER — METOLAZONE 2.5 MG/1
2.5 TABLET ORAL EVERY OTHER DAY
Qty: 15 TABLET | Refills: 3 | Status: ON HOLD | OUTPATIENT
Start: 2020-10-27 | End: 2020-11-13 | Stop reason: SDUPTHER

## 2020-10-29 ENCOUNTER — FOLLOW UP (OUTPATIENT)
Dept: URBAN - METROPOLITAN AREA CLINIC 27 | Facility: CLINIC | Age: 70
End: 2020-10-29

## 2020-10-29 DIAGNOSIS — H43.811: ICD-10-CM

## 2020-10-29 DIAGNOSIS — H40.053: ICD-10-CM

## 2020-10-29 DIAGNOSIS — E11.3413: ICD-10-CM

## 2020-10-29 PROCEDURE — 92134 CPTRZ OPH DX IMG PST SGM RTA: CPT

## 2020-10-29 PROCEDURE — 67028 INJECTION EYE DRUG: CPT

## 2020-10-29 PROCEDURE — 92014 COMPRE OPH EXAM EST PT 1/>: CPT | Mod: 25

## 2020-10-29 ASSESSMENT — TONOMETRY
OS_IOP_MMHG: 20
OD_IOP_MMHG: 18

## 2020-10-29 ASSESSMENT — VISUAL ACUITY
OS_PH: 20/160
OD_CC: 20/100-1
OS_CC: CF 3FT

## 2020-10-30 ENCOUNTER — TELEPHONE (OUTPATIENT)
Dept: CARDIOLOGY CLINIC | Facility: CLINIC | Age: 70
End: 2020-10-30

## 2020-10-30 DIAGNOSIS — I36.1 NONRHEUMATIC TRICUSPID VALVE REGURGITATION: ICD-10-CM

## 2020-10-30 DIAGNOSIS — I50.43 ACUTE ON CHRONIC COMBINED SYSTOLIC AND DIASTOLIC CONGESTIVE HEART FAILURE (HCC): Primary | ICD-10-CM

## 2020-11-02 DIAGNOSIS — N18.30 CKD (CHRONIC KIDNEY DISEASE) STAGE 3, GFR 30-59 ML/MIN (HCC): ICD-10-CM

## 2020-11-02 DIAGNOSIS — I50.40 COMBINED SYSTOLIC AND DIASTOLIC CONGESTIVE HEART FAILURE (HCC): ICD-10-CM

## 2020-11-02 RX ORDER — POTASSIUM CHLORIDE 750 MG/1
10 TABLET, EXTENDED RELEASE ORAL EVERY OTHER DAY
Qty: 45 TABLET | Refills: 1 | Status: SHIPPED | OUTPATIENT
Start: 2020-11-02 | End: 2020-12-21

## 2020-11-04 ENCOUNTER — OFFICE VISIT (OUTPATIENT)
Dept: CARDIOLOGY CLINIC | Facility: CLINIC | Age: 70
End: 2020-11-04
Payer: MEDICARE

## 2020-11-04 VITALS
TEMPERATURE: 97.5 F | BODY MASS INDEX: 41.82 KG/M2 | OXYGEN SATURATION: 93 % | SYSTOLIC BLOOD PRESSURE: 130 MMHG | DIASTOLIC BLOOD PRESSURE: 76 MMHG | HEIGHT: 63 IN | HEART RATE: 67 BPM | WEIGHT: 236 LBS

## 2020-11-04 DIAGNOSIS — R06.02 SOB (SHORTNESS OF BREATH): ICD-10-CM

## 2020-11-04 DIAGNOSIS — Z98.890 H/O TRICUSPID VALVE ANNULOPLASTY: ICD-10-CM

## 2020-11-04 DIAGNOSIS — I48.20 CHRONIC ATRIAL FIBRILLATION (HCC): ICD-10-CM

## 2020-11-04 DIAGNOSIS — Z95.0 STATUS POST PLACEMENT OF CARDIAC PACEMAKER: ICD-10-CM

## 2020-11-04 DIAGNOSIS — I35.0 NONRHEUMATIC AORTIC VALVE STENOSIS: ICD-10-CM

## 2020-11-04 DIAGNOSIS — N18.4 CKD (CHRONIC KIDNEY DISEASE) STAGE 4, GFR 15-29 ML/MIN (HCC): ICD-10-CM

## 2020-11-04 DIAGNOSIS — E78.2 MIXED HYPERLIPIDEMIA: ICD-10-CM

## 2020-11-04 DIAGNOSIS — Z95.1 H/O CORONARY ARTERY BYPASS SURGERY: ICD-10-CM

## 2020-11-04 DIAGNOSIS — I25.810 CORONARY ARTERY DISEASE INVOLVING CORONARY BYPASS GRAFT OF NATIVE HEART WITHOUT ANGINA PECTORIS: ICD-10-CM

## 2020-11-04 DIAGNOSIS — I50.42 CHRONIC COMBINED SYSTOLIC AND DIASTOLIC CONGESTIVE HEART FAILURE (HCC): ICD-10-CM

## 2020-11-04 PROCEDURE — 93000 ELECTROCARDIOGRAM COMPLETE: CPT | Performed by: INTERNAL MEDICINE

## 2020-11-04 PROCEDURE — 99214 OFFICE O/P EST MOD 30 MIN: CPT | Performed by: INTERNAL MEDICINE

## 2020-11-06 ENCOUNTER — LAB (OUTPATIENT)
Dept: LAB | Facility: CLINIC | Age: 70
End: 2020-11-06
Payer: MEDICARE

## 2020-11-06 LAB
ALBUMIN SERPL BCP-MCNC: 2.9 G/DL (ref 3.5–5)
ALP SERPL-CCNC: 187 U/L (ref 46–116)
ALT SERPL W P-5'-P-CCNC: 20 U/L (ref 12–78)
ANION GAP SERPL CALCULATED.3IONS-SCNC: 2 MMOL/L (ref 4–13)
AST SERPL W P-5'-P-CCNC: 18 U/L (ref 5–45)
BILIRUB SERPL-MCNC: 0.88 MG/DL (ref 0.2–1)
BUN SERPL-MCNC: 78 MG/DL (ref 5–25)
CALCIUM ALBUM COR SERPL-MCNC: 10.1 MG/DL (ref 8.3–10.1)
CALCIUM SERPL-MCNC: 9.2 MG/DL (ref 8.3–10.1)
CHLORIDE SERPL-SCNC: 100 MMOL/L (ref 100–108)
CO2 SERPL-SCNC: 37 MMOL/L (ref 21–32)
CREAT SERPL-MCNC: 1.65 MG/DL (ref 0.6–1.3)
GFR SERPL CREATININE-BSD FRML MDRD: 31 ML/MIN/1.73SQ M
GLUCOSE P FAST SERPL-MCNC: 115 MG/DL (ref 65–99)
POTASSIUM SERPL-SCNC: 3.5 MMOL/L (ref 3.5–5.3)
PROT SERPL-MCNC: 7.6 G/DL (ref 6.4–8.2)
SODIUM SERPL-SCNC: 139 MMOL/L (ref 136–145)

## 2020-11-06 PROCEDURE — 80053 COMPREHEN METABOLIC PANEL: CPT

## 2020-11-06 PROCEDURE — 36415 COLL VENOUS BLD VENIPUNCTURE: CPT

## 2020-11-09 ENCOUNTER — TRANSCRIBE ORDERS (OUTPATIENT)
Dept: LAB | Facility: CLINIC | Age: 70
End: 2020-11-09

## 2020-11-09 ENCOUNTER — APPOINTMENT (OUTPATIENT)
Dept: LAB | Facility: CLINIC | Age: 70
End: 2020-11-09
Payer: MEDICARE

## 2020-11-09 ENCOUNTER — TELEPHONE (OUTPATIENT)
Dept: OTHER | Facility: HOSPITAL | Age: 70
End: 2020-11-09

## 2020-11-09 DIAGNOSIS — I36.1 TRICUSPID VALVE INSUFFICIENCY, NON-RHEUMATIC: Primary | ICD-10-CM

## 2020-11-09 LAB
ALBUMIN SERPL BCP-MCNC: 2.8 G/DL (ref 3.5–5)
ALP SERPL-CCNC: 178 U/L (ref 46–116)
ALT SERPL W P-5'-P-CCNC: 17 U/L (ref 12–78)
ANION GAP SERPL CALCULATED.3IONS-SCNC: 4 MMOL/L (ref 4–13)
AST SERPL W P-5'-P-CCNC: 18 U/L (ref 5–45)
BASOPHILS # BLD AUTO: 0.06 THOUSANDS/ΜL (ref 0–0.1)
BASOPHILS NFR BLD AUTO: 1 % (ref 0–1)
BILIRUB SERPL-MCNC: 1.1 MG/DL (ref 0.2–1)
BUN SERPL-MCNC: 80 MG/DL (ref 5–25)
CALCIUM ALBUM COR SERPL-MCNC: 10 MG/DL (ref 8.3–10.1)
CALCIUM SERPL-MCNC: 9 MG/DL (ref 8.3–10.1)
CHLORIDE SERPL-SCNC: 99 MMOL/L (ref 100–108)
CO2 SERPL-SCNC: 38 MMOL/L (ref 21–32)
CREAT SERPL-MCNC: 1.63 MG/DL (ref 0.6–1.3)
EOSINOPHIL # BLD AUTO: 0.19 THOUSAND/ΜL (ref 0–0.61)
EOSINOPHIL NFR BLD AUTO: 3 % (ref 0–6)
ERYTHROCYTE [DISTWIDTH] IN BLOOD BY AUTOMATED COUNT: 15.1 % (ref 11.6–15.1)
GFR SERPL CREATININE-BSD FRML MDRD: 31 ML/MIN/1.73SQ M
GLUCOSE SERPL-MCNC: 190 MG/DL (ref 65–140)
HCT VFR BLD AUTO: 39.6 % (ref 34.8–46.1)
HGB BLD-MCNC: 11.8 G/DL (ref 11.5–15.4)
IMM GRANULOCYTES # BLD AUTO: 0.01 THOUSAND/UL (ref 0–0.2)
IMM GRANULOCYTES NFR BLD AUTO: 0 % (ref 0–2)
LYMPHOCYTES # BLD AUTO: 0.61 THOUSANDS/ΜL (ref 0.6–4.47)
LYMPHOCYTES NFR BLD AUTO: 10 % (ref 14–44)
MCH RBC QN AUTO: 29.4 PG (ref 26.8–34.3)
MCHC RBC AUTO-ENTMCNC: 29.8 G/DL (ref 31.4–37.4)
MCV RBC AUTO: 99 FL (ref 82–98)
MONOCYTES # BLD AUTO: 0.99 THOUSAND/ΜL (ref 0.17–1.22)
MONOCYTES NFR BLD AUTO: 16 % (ref 4–12)
NEUTROPHILS # BLD AUTO: 4.41 THOUSANDS/ΜL (ref 1.85–7.62)
NEUTS SEG NFR BLD AUTO: 70 % (ref 43–75)
NRBC BLD AUTO-RTO: 0 /100 WBCS
PLATELET # BLD AUTO: 178 THOUSANDS/UL (ref 149–390)
PMV BLD AUTO: 10.4 FL (ref 8.9–12.7)
POTASSIUM SERPL-SCNC: 3.3 MMOL/L (ref 3.5–5.3)
PROT SERPL-MCNC: 7.2 G/DL (ref 6.4–8.2)
RBC # BLD AUTO: 4.02 MILLION/UL (ref 3.81–5.12)
SODIUM SERPL-SCNC: 141 MMOL/L (ref 136–145)
WBC # BLD AUTO: 6.27 THOUSAND/UL (ref 4.31–10.16)

## 2020-11-09 PROCEDURE — 36415 COLL VENOUS BLD VENIPUNCTURE: CPT

## 2020-11-09 PROCEDURE — 80053 COMPREHEN METABOLIC PANEL: CPT

## 2020-11-09 PROCEDURE — 85025 COMPLETE CBC W/AUTO DIFF WBC: CPT

## 2020-11-12 ENCOUNTER — TELEPHONE (OUTPATIENT)
Dept: INPATIENT UNIT | Facility: HOSPITAL | Age: 70
End: 2020-11-12

## 2020-11-12 DIAGNOSIS — G62.9 NEUROPATHY: ICD-10-CM

## 2020-11-12 DIAGNOSIS — N18.30 CKD (CHRONIC KIDNEY DISEASE) STAGE 3, GFR 30-59 ML/MIN (HCC): Primary | ICD-10-CM

## 2020-11-12 DIAGNOSIS — I35.0 SEVERE AORTIC STENOSIS: ICD-10-CM

## 2020-11-12 RX ORDER — ASPIRIN 81 MG/1
324 TABLET, CHEWABLE ORAL ONCE
Status: CANCELLED | OUTPATIENT
Start: 2020-11-12 | End: 2020-11-12

## 2020-11-12 RX ORDER — GABAPENTIN 300 MG/1
300 CAPSULE ORAL
Qty: 30 CAPSULE | Refills: 3 | Status: SHIPPED | OUTPATIENT
Start: 2020-11-12

## 2020-11-13 ENCOUNTER — HOSPITAL ENCOUNTER (OUTPATIENT)
Dept: NON INVASIVE DIAGNOSTICS | Facility: HOSPITAL | Age: 70
Discharge: HOME | End: 2020-11-13
Attending: INTERNAL MEDICINE | Admitting: INTERNAL MEDICINE
Payer: MEDICARE

## 2020-11-13 VITALS
BODY MASS INDEX: 39.68 KG/M2 | RESPIRATION RATE: 18 BRPM | TEMPERATURE: 98.1 F | OXYGEN SATURATION: 90 % | DIASTOLIC BLOOD PRESSURE: 68 MMHG | HEART RATE: 67 BPM | SYSTOLIC BLOOD PRESSURE: 141 MMHG | WEIGHT: 224 LBS

## 2020-11-13 DIAGNOSIS — N18.4 STAGE 4 CHRONIC KIDNEY DISEASE (HCC): ICD-10-CM

## 2020-11-13 DIAGNOSIS — I50.43 ACUTE ON CHRONIC COMBINED SYSTOLIC AND DIASTOLIC CONGESTIVE HEART FAILURE (HCC): ICD-10-CM

## 2020-11-13 DIAGNOSIS — J96.21 ACUTE ON CHRONIC RESPIRATORY FAILURE WITH HYPOXIA (HCC): ICD-10-CM

## 2020-11-13 DIAGNOSIS — N18.30 CKD (CHRONIC KIDNEY DISEASE) STAGE 3, GFR 30-59 ML/MIN (HCC): ICD-10-CM

## 2020-11-13 DIAGNOSIS — N17.9 ACUTE RENAL FAILURE SUPERIMPOSED ON STAGE 3 CHRONIC KIDNEY DISEASE (HCC): Primary | ICD-10-CM

## 2020-11-13 DIAGNOSIS — I48.20 CHRONIC ATRIAL FIBRILLATION (HCC): ICD-10-CM

## 2020-11-13 DIAGNOSIS — I35.0 NONRHEUMATIC AORTIC VALVE STENOSIS: Primary | ICD-10-CM

## 2020-11-13 DIAGNOSIS — N18.30 ACUTE RENAL FAILURE SUPERIMPOSED ON STAGE 3 CHRONIC KIDNEY DISEASE (HCC): Primary | ICD-10-CM

## 2020-11-13 DIAGNOSIS — R06.02 SHORTNESS OF BREATH: ICD-10-CM

## 2020-11-13 DIAGNOSIS — R09.89 BRUIT: ICD-10-CM

## 2020-11-13 DIAGNOSIS — N18.4 CKD (CHRONIC KIDNEY DISEASE) STAGE 4, GFR 15-29 ML/MIN (HCC): ICD-10-CM

## 2020-11-13 DIAGNOSIS — I35.0 NONRHEUMATIC AORTIC VALVE STENOSIS: ICD-10-CM

## 2020-11-13 DIAGNOSIS — Z01.810 PRE-OPERATIVE CARDIOVASCULAR EXAMINATION: ICD-10-CM

## 2020-11-13 LAB
ANION GAP SERPL CALCULATED.3IONS-SCNC: 4 MMOL/L (ref 4–13)
ATRIAL RATE: 62 BPM
BACTERIA UR QL AUTO: ABNORMAL /HPF
BILIRUB UR QL STRIP: NEGATIVE
BUN SERPL-MCNC: 85 MG/DL (ref 5–25)
CALCIUM SERPL-MCNC: 9 MG/DL (ref 8.3–10.1)
CHLORIDE SERPL-SCNC: 95 MMOL/L (ref 100–108)
CLARITY UR: CLEAR
CO2 SERPL-SCNC: 40 MMOL/L (ref 21–32)
COLOR UR: YELLOW
CREAT SERPL-MCNC: 1.73 MG/DL (ref 0.6–1.3)
GFR SERPL CREATININE-BSD FRML MDRD: 29 ML/MIN/1.73SQ M
GLUCOSE P FAST SERPL-MCNC: 188 MG/DL (ref 65–99)
GLUCOSE SERPL-MCNC: 188 MG/DL (ref 65–140)
GLUCOSE UR STRIP-MCNC: NEGATIVE MG/DL
HGB UR QL STRIP.AUTO: ABNORMAL
HYALINE CASTS #/AREA URNS LPF: ABNORMAL /LPF
KETONES UR STRIP-MCNC: NEGATIVE MG/DL
LEUKOCYTE ESTERASE UR QL STRIP: ABNORMAL
NITRITE UR QL STRIP: NEGATIVE
NON-SQ EPI CELLS URNS QL MICRO: ABNORMAL /HPF
PH UR STRIP.AUTO: 6.5 [PH]
POTASSIUM SERPL-SCNC: 2.9 MMOL/L (ref 3.5–5.3)
PROT UR STRIP-MCNC: NEGATIVE MG/DL
QRS AXIS: 259 DEGREES
QRSD INTERVAL: 208 MS
QT INTERVAL: 566 MS
QTC INTERVAL: 566 MS
RBC #/AREA URNS AUTO: ABNORMAL /HPF
SODIUM SERPL-SCNC: 139 MMOL/L (ref 136–145)
SP GR UR STRIP.AUTO: 1.02 (ref 1–1.03)
T WAVE AXIS: 86 DEGREES
UROBILINOGEN UR QL STRIP.AUTO: 1 E.U./DL
VENTRICULAR RATE: 60 BPM
WBC #/AREA URNS AUTO: ABNORMAL /HPF

## 2020-11-13 PROCEDURE — 80048 BASIC METABOLIC PNL TOTAL CA: CPT | Performed by: INTERNAL MEDICINE

## 2020-11-13 PROCEDURE — 81001 URINALYSIS AUTO W/SCOPE: CPT | Performed by: PHYSICIAN ASSISTANT

## 2020-11-13 PROCEDURE — 87081 CULTURE SCREEN ONLY: CPT | Performed by: PHYSICIAN ASSISTANT

## 2020-11-13 PROCEDURE — C1894 INTRO/SHEATH, NON-LASER: HCPCS | Performed by: PHYSICIAN ASSISTANT

## 2020-11-13 PROCEDURE — 93459 L HRT ART/GRFT ANGIO: CPT | Performed by: PHYSICIAN ASSISTANT

## 2020-11-13 PROCEDURE — 93459 L HRT ART/GRFT ANGIO: CPT | Performed by: INTERNAL MEDICINE

## 2020-11-13 PROCEDURE — 99153 MOD SED SAME PHYS/QHP EA: CPT | Performed by: PHYSICIAN ASSISTANT

## 2020-11-13 PROCEDURE — 99215 OFFICE O/P EST HI 40 MIN: CPT | Performed by: INTERNAL MEDICINE

## 2020-11-13 PROCEDURE — 99152 MOD SED SAME PHYS/QHP 5/>YRS: CPT | Performed by: INTERNAL MEDICINE

## 2020-11-13 PROCEDURE — 93005 ELECTROCARDIOGRAM TRACING: CPT

## 2020-11-13 PROCEDURE — 99152 MOD SED SAME PHYS/QHP 5/>YRS: CPT | Performed by: PHYSICIAN ASSISTANT

## 2020-11-13 PROCEDURE — 93010 ELECTROCARDIOGRAM REPORT: CPT | Performed by: INTERNAL MEDICINE

## 2020-11-13 PROCEDURE — C1769 GUIDE WIRE: HCPCS | Performed by: PHYSICIAN ASSISTANT

## 2020-11-13 PROCEDURE — C1760 CLOSURE DEV, VASC: HCPCS | Performed by: PHYSICIAN ASSISTANT

## 2020-11-13 RX ORDER — SODIUM CHLORIDE 9 MG/ML
75 INJECTION, SOLUTION INTRAVENOUS CONTINUOUS
Status: DISCONTINUED | OUTPATIENT
Start: 2020-11-13 | End: 2020-11-13 | Stop reason: HOSPADM

## 2020-11-13 RX ORDER — ONDANSETRON 2 MG/ML
4 INJECTION INTRAMUSCULAR; INTRAVENOUS EVERY 8 HOURS PRN
Status: DISCONTINUED | OUTPATIENT
Start: 2020-11-13 | End: 2020-11-13 | Stop reason: HOSPADM

## 2020-11-13 RX ORDER — POTASSIUM CHLORIDE 20 MEQ/1
40 TABLET, EXTENDED RELEASE ORAL ONCE
Status: COMPLETED | OUTPATIENT
Start: 2020-11-13 | End: 2020-11-13

## 2020-11-13 RX ORDER — ASPIRIN 81 MG/1
324 TABLET, CHEWABLE ORAL ONCE
Status: COMPLETED | OUTPATIENT
Start: 2020-11-13 | End: 2020-11-13

## 2020-11-13 RX ORDER — MIDAZOLAM HYDROCHLORIDE 2 MG/2ML
INJECTION, SOLUTION INTRAMUSCULAR; INTRAVENOUS CODE/TRAUMA/SEDATION MEDICATION
Status: COMPLETED | OUTPATIENT
Start: 2020-11-13 | End: 2020-11-13

## 2020-11-13 RX ORDER — FENTANYL CITRATE 50 UG/ML
INJECTION, SOLUTION INTRAMUSCULAR; INTRAVENOUS CODE/TRAUMA/SEDATION MEDICATION
Status: COMPLETED | OUTPATIENT
Start: 2020-11-13 | End: 2020-11-13

## 2020-11-13 RX ORDER — LIDOCAINE HYDROCHLORIDE 10 MG/ML
INJECTION, SOLUTION EPIDURAL; INFILTRATION; INTRACAUDAL; PERINEURAL CODE/TRAUMA/SEDATION MEDICATION
Status: COMPLETED | OUTPATIENT
Start: 2020-11-13 | End: 2020-11-13

## 2020-11-13 RX ORDER — ONDANSETRON 2 MG/ML
4 INJECTION INTRAMUSCULAR; INTRAVENOUS EVERY 8 HOURS PRN
Status: CANCELLED | OUTPATIENT
Start: 2020-11-13

## 2020-11-13 RX ORDER — SODIUM CHLORIDE 9 MG/ML
125 INJECTION, SOLUTION INTRAVENOUS CONTINUOUS
Status: CANCELLED | OUTPATIENT
Start: 2020-11-13

## 2020-11-13 RX ORDER — ACETAMINOPHEN 325 MG/1
650 TABLET ORAL EVERY 6 HOURS PRN
Status: CANCELLED | OUTPATIENT
Start: 2020-11-13

## 2020-11-13 RX ORDER — TORSEMIDE 20 MG/1
40 TABLET ORAL DAILY
Qty: 180 TABLET | Refills: 0
Start: 2020-11-14 | End: 2021-02-10 | Stop reason: SDUPTHER

## 2020-11-13 RX ORDER — ACETAMINOPHEN 325 MG/1
650 TABLET ORAL EVERY 6 HOURS PRN
Status: DISCONTINUED | OUTPATIENT
Start: 2020-11-13 | End: 2020-11-13 | Stop reason: HOSPADM

## 2020-11-13 RX ORDER — METOLAZONE 2.5 MG/1
2.5 TABLET ORAL EVERY OTHER DAY
Qty: 15 TABLET | Refills: 0
Start: 2020-11-14 | End: 2021-02-04 | Stop reason: HOSPADM

## 2020-11-13 RX ORDER — DABIGATRAN ETEXILATE 75 MG/1
75 CAPSULE, COATED PELLETS ORAL EVERY 12 HOURS SCHEDULED
Qty: 60 CAPSULE | Refills: 0
Start: 2020-11-14 | End: 2021-02-11 | Stop reason: SDUPTHER

## 2020-11-13 RX ADMIN — LIDOCAINE HYDROCHLORIDE 10 ML: 10 INJECTION, SOLUTION EPIDURAL; INFILTRATION; INTRACAUDAL; PERINEURAL at 11:43

## 2020-11-13 RX ADMIN — MIDAZOLAM 1 MG: 1 INJECTION INTRAMUSCULAR; INTRAVENOUS at 11:41

## 2020-11-13 RX ADMIN — FENTANYL CITRATE 25 MCG: 50 INJECTION, SOLUTION INTRAMUSCULAR; INTRAVENOUS at 11:41

## 2020-11-13 RX ADMIN — SODIUM CHLORIDE 306 ML: 9 INJECTION, SOLUTION INTRAVENOUS at 08:44

## 2020-11-13 RX ADMIN — POTASSIUM CHLORIDE 40 MEQ: 1500 TABLET, EXTENDED RELEASE ORAL at 08:50

## 2020-11-13 RX ADMIN — IOHEXOL 70 ML: 350 INJECTION, SOLUTION INTRAVENOUS at 12:01

## 2020-11-13 RX ADMIN — POTASSIUM CHLORIDE 40 MEQ: 1500 TABLET, EXTENDED RELEASE ORAL at 15:44

## 2020-11-13 RX ADMIN — ASPIRIN 324 MG: 81 TABLET, CHEWABLE ORAL at 07:43

## 2020-11-15 LAB — MRSA NOSE QL CULT: NORMAL

## 2020-11-16 ENCOUNTER — LAB (OUTPATIENT)
Dept: LAB | Facility: CLINIC | Age: 70
End: 2020-11-16
Payer: MEDICARE

## 2020-11-16 DIAGNOSIS — N18.30 ACUTE RENAL FAILURE SUPERIMPOSED ON STAGE 3 CHRONIC KIDNEY DISEASE (HCC): ICD-10-CM

## 2020-11-16 DIAGNOSIS — N17.9 ACUTE RENAL FAILURE SUPERIMPOSED ON STAGE 3 CHRONIC KIDNEY DISEASE (HCC): ICD-10-CM

## 2020-11-16 LAB
ANION GAP SERPL CALCULATED.3IONS-SCNC: 4 MMOL/L (ref 4–13)
BUN SERPL-MCNC: 92 MG/DL (ref 5–25)
CALCIUM SERPL-MCNC: 9.1 MG/DL (ref 8.3–10.1)
CHLORIDE SERPL-SCNC: 97 MMOL/L (ref 100–108)
CO2 SERPL-SCNC: 36 MMOL/L (ref 21–32)
CREAT SERPL-MCNC: 2.12 MG/DL (ref 0.6–1.3)
GFR SERPL CREATININE-BSD FRML MDRD: 23 ML/MIN/1.73SQ M
GLUCOSE SERPL-MCNC: 79 MG/DL (ref 65–140)
POTASSIUM SERPL-SCNC: 3.5 MMOL/L (ref 3.5–5.3)
SODIUM SERPL-SCNC: 137 MMOL/L (ref 136–145)

## 2020-11-16 PROCEDURE — 36415 COLL VENOUS BLD VENIPUNCTURE: CPT

## 2020-11-16 PROCEDURE — 80048 BASIC METABOLIC PNL TOTAL CA: CPT

## 2020-11-18 ENCOUNTER — TELEMEDICINE (OUTPATIENT)
Dept: NEPHROLOGY | Facility: CLINIC | Age: 70
End: 2020-11-18
Payer: MEDICARE

## 2020-11-18 DIAGNOSIS — N18.30 BENIGN HYPERTENSION WITH CHRONIC KIDNEY DISEASE, STAGE III (HCC): ICD-10-CM

## 2020-11-18 DIAGNOSIS — D64.9 ANEMIA, UNSPECIFIED TYPE: ICD-10-CM

## 2020-11-18 DIAGNOSIS — M89.9 CHRONIC KIDNEY DISEASE-MINERAL AND BONE DISORDER: Primary | ICD-10-CM

## 2020-11-18 DIAGNOSIS — N18.4 CKD (CHRONIC KIDNEY DISEASE) STAGE 4, GFR 15-29 ML/MIN (HCC): ICD-10-CM

## 2020-11-18 DIAGNOSIS — I50.32 CHRONIC DIASTOLIC CHF (CONGESTIVE HEART FAILURE), NYHA CLASS 3 (HCC): ICD-10-CM

## 2020-11-18 DIAGNOSIS — I48.20 CHRONIC ATRIAL FIBRILLATION (HCC): ICD-10-CM

## 2020-11-18 DIAGNOSIS — N18.9 CHRONIC KIDNEY DISEASE-MINERAL AND BONE DISORDER: Primary | ICD-10-CM

## 2020-11-18 DIAGNOSIS — E83.9 CHRONIC KIDNEY DISEASE-MINERAL AND BONE DISORDER: Primary | ICD-10-CM

## 2020-11-18 DIAGNOSIS — I12.9 BENIGN HYPERTENSION WITH CHRONIC KIDNEY DISEASE, STAGE III (HCC): ICD-10-CM

## 2020-11-18 DIAGNOSIS — E87.6 HYPOKALEMIA: ICD-10-CM

## 2020-11-18 DIAGNOSIS — I50.43 ACUTE ON CHRONIC COMBINED SYSTOLIC AND DIASTOLIC CONGESTIVE HEART FAILURE (HCC): ICD-10-CM

## 2020-11-18 PROCEDURE — 99213 OFFICE O/P EST LOW 20 MIN: CPT | Performed by: NURSE PRACTITIONER

## 2020-11-22 ENCOUNTER — APPOINTMENT (EMERGENCY)
Dept: RADIOLOGY | Facility: HOSPITAL | Age: 70
End: 2020-11-22
Payer: MEDICARE

## 2020-11-22 ENCOUNTER — HOSPITAL ENCOUNTER (EMERGENCY)
Facility: HOSPITAL | Age: 70
Discharge: HOME | End: 2020-11-22
Attending: EMERGENCY MEDICINE | Admitting: EMERGENCY MEDICINE
Payer: MEDICARE

## 2020-11-22 VITALS
SYSTOLIC BLOOD PRESSURE: 122 MMHG | RESPIRATION RATE: 18 BRPM | HEART RATE: 60 BPM | DIASTOLIC BLOOD PRESSURE: 58 MMHG | OXYGEN SATURATION: 94 % | WEIGHT: 224 LBS | TEMPERATURE: 97.7 F | BODY MASS INDEX: 39.68 KG/M2

## 2020-11-22 DIAGNOSIS — M54.9 BACK PAIN: ICD-10-CM
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
## 2020-11-22 DIAGNOSIS — S70.00XA CONTUSION OF HIP: ICD-10-CM

## 2020-11-22 PROCEDURE — 99284 EMERGENCY DEPT VISIT MOD MDM: CPT | Performed by: EMERGENCY MEDICINE

## 2020-11-22 PROCEDURE — 73502 X-RAY EXAM HIP UNI 2-3 VIEWS: CPT

## 2020-11-22 PROCEDURE — 99283 EMERGENCY DEPT VISIT LOW MDM: CPT

## 2020-11-22 PROCEDURE — 72100 X-RAY EXAM L-S SPINE 2/3 VWS: CPT

## 2020-11-22 RX ORDER — CYCLOBENZAPRINE HCL 10 MG
10 TABLET ORAL 2 TIMES DAILY PRN
Qty: 20 TABLET | Refills: 0 | Status: SHIPPED | OUTPATIENT
Start: 2020-11-22 | End: 2020-12-01 | Stop reason: HOSPADM

## 2020-11-22 RX ORDER — GINSENG 100 MG
1 CAPSULE ORAL ONCE
Status: DISCONTINUED | OUTPATIENT
Start: 2020-11-22 | End: 2020-11-22 | Stop reason: HOSPADM

## 2020-11-22 RX ORDER — OXYCODONE HYDROCHLORIDE AND ACETAMINOPHEN 5; 325 MG/1; MG/1
1 TABLET ORAL ONCE
Status: COMPLETED | OUTPATIENT
Start: 2020-11-22 | End: 2020-11-22

## 2020-11-22 RX ADMIN — OXYCODONE HYDROCHLORIDE AND ACETAMINOPHEN 1 TABLET: 5; 325 TABLET ORAL at 20:26

## 2020-11-23 ENCOUNTER — LAB (OUTPATIENT)
Dept: LAB | Facility: HOSPITAL | Age: 70
End: 2020-11-23
Payer: MEDICARE

## 2020-11-23 DIAGNOSIS — E87.6 HYPOKALEMIA: ICD-10-CM

## 2020-11-23 DIAGNOSIS — M89.9 CHRONIC KIDNEY DISEASE-MINERAL AND BONE DISORDER: ICD-10-CM

## 2020-11-23 DIAGNOSIS — N18.9 CHRONIC KIDNEY DISEASE-MINERAL AND BONE DISORDER: ICD-10-CM

## 2020-11-23 DIAGNOSIS — I48.20 CHRONIC ATRIAL FIBRILLATION (HCC): ICD-10-CM

## 2020-11-23 DIAGNOSIS — N18.4 CKD (CHRONIC KIDNEY DISEASE) STAGE 4, GFR 15-29 ML/MIN (HCC): ICD-10-CM

## 2020-11-23 DIAGNOSIS — I50.43 ACUTE ON CHRONIC COMBINED SYSTOLIC AND DIASTOLIC CONGESTIVE HEART FAILURE (HCC): ICD-10-CM

## 2020-11-23 DIAGNOSIS — I35.0 NONRHEUMATIC AORTIC VALVE STENOSIS: Primary | ICD-10-CM

## 2020-11-23 DIAGNOSIS — D64.9 ANEMIA, UNSPECIFIED TYPE: ICD-10-CM

## 2020-11-23 DIAGNOSIS — N18.30 BENIGN HYPERTENSION WITH CHRONIC KIDNEY DISEASE, STAGE III (HCC): ICD-10-CM

## 2020-11-23 DIAGNOSIS — E83.9 CHRONIC KIDNEY DISEASE-MINERAL AND BONE DISORDER: ICD-10-CM

## 2020-11-23 DIAGNOSIS — I50.32 CHRONIC DIASTOLIC CHF (CONGESTIVE HEART FAILURE), NYHA CLASS 3 (HCC): ICD-10-CM

## 2020-11-23 DIAGNOSIS — I12.9 BENIGN HYPERTENSION WITH CHRONIC KIDNEY DISEASE, STAGE III (HCC): ICD-10-CM

## 2020-11-23 LAB
ANION GAP SERPL CALCULATED.3IONS-SCNC: 8 MMOL/L (ref 4–13)
BUN SERPL-MCNC: 93 MG/DL (ref 5–25)
CALCIUM SERPL-MCNC: 8.7 MG/DL (ref 8.3–10.1)
CHLORIDE SERPL-SCNC: 96 MMOL/L (ref 100–108)
CO2 SERPL-SCNC: 32 MMOL/L (ref 21–32)
CREAT SERPL-MCNC: 2.03 MG/DL (ref 0.6–1.3)
GFR SERPL CREATININE-BSD FRML MDRD: 24 ML/MIN/1.73SQ M
GLUCOSE SERPL-MCNC: 280 MG/DL (ref 65–140)
POTASSIUM SERPL-SCNC: 4.3 MMOL/L (ref 3.5–5.3)
SODIUM SERPL-SCNC: 136 MMOL/L (ref 136–145)

## 2020-11-23 PROCEDURE — 80048 BASIC METABOLIC PNL TOTAL CA: CPT

## 2020-11-23 PROCEDURE — 0241U HB NFCT DS VIR RESP RNA 4 TRGT: CPT | Performed by: PHYSICIAN ASSISTANT

## 2020-11-23 PROCEDURE — 36415 COLL VENOUS BLD VENIPUNCTURE: CPT

## 2020-11-24 ENCOUNTER — TELEPHONE (OUTPATIENT)
Dept: NEPHROLOGY | Facility: CLINIC | Age: 70
End: 2020-11-24

## 2020-11-24 ENCOUNTER — TELEPHONE (OUTPATIENT)
Dept: OTHER | Facility: HOSPITAL | Age: 70
End: 2020-11-24

## 2020-11-24 DIAGNOSIS — M89.9 CHRONIC KIDNEY DISEASE-MINERAL AND BONE DISORDER: Primary | ICD-10-CM

## 2020-11-24 DIAGNOSIS — N18.9 CHRONIC KIDNEY DISEASE-MINERAL AND BONE DISORDER: Primary | ICD-10-CM

## 2020-11-24 DIAGNOSIS — E83.9 CHRONIC KIDNEY DISEASE-MINERAL AND BONE DISORDER: Primary | ICD-10-CM

## 2020-11-24 LAB
FLUAV RNA RESP QL NAA+PROBE: NEGATIVE
FLUBV RNA RESP QL NAA+PROBE: NEGATIVE
RSV RNA RESP QL NAA+PROBE: NEGATIVE
SARS-COV-2 RNA RESP QL NAA+PROBE: NEGATIVE

## 2020-11-25 ENCOUNTER — TELEMEDICINE (OUTPATIENT)
Dept: FAMILY MEDICINE CLINIC | Facility: CLINIC | Age: 70
End: 2020-11-25
Payer: MEDICARE

## 2020-11-25 DIAGNOSIS — I35.0 AORTIC VALVE STENOSIS, ETIOLOGY OF CARDIAC VALVE DISEASE UNSPECIFIED: Primary | ICD-10-CM

## 2020-11-25 DIAGNOSIS — S32.030D CLOSED COMPRESSION FRACTURE OF L3 LUMBAR VERTEBRA WITH ROUTINE HEALING, SUBSEQUENT ENCOUNTER: Primary | ICD-10-CM

## 2020-11-25 PROCEDURE — 99442 PR PHYS/QHP TELEPHONE EVALUATION 11-20 MIN: CPT | Performed by: FAMILY MEDICINE

## 2020-11-25 RX ORDER — OXYCODONE HYDROCHLORIDE AND ACETAMINOPHEN 5; 325 MG/1; MG/1
0.5 TABLET ORAL EVERY 6 HOURS PRN
Qty: 14 TABLET | Refills: 0 | Status: ON HOLD | OUTPATIENT
Start: 2020-11-25 | End: 2020-12-01 | Stop reason: SDUPTHER

## 2020-11-30 ENCOUNTER — APPOINTMENT (EMERGENCY)
Dept: RADIOLOGY | Facility: HOSPITAL | Age: 70
End: 2020-11-30
Payer: MEDICARE

## 2020-11-30 ENCOUNTER — HOSPITAL ENCOUNTER (OUTPATIENT)
Facility: HOSPITAL | Age: 70
Setting detail: OBSERVATION
Discharge: NURSING, REHAB OR LONG-TERM CARE | End: 2020-12-01
Attending: EMERGENCY MEDICINE | Admitting: INTERNAL MEDICINE
Payer: MEDICARE

## 2020-11-30 DIAGNOSIS — E11.311 TYPE 2 DIABETES MELLITUS WITH RETINOPATHY AND MACULAR EDEMA, WITH LONG-TERM CURRENT USE OF INSULIN, UNSPECIFIED LATERALITY, UNSPECIFIED RETINOPATHY SEVERITY (HCC): ICD-10-CM

## 2020-11-30 DIAGNOSIS — E11.40 TYPE 2 DIABETES MELLITUS WITH DIABETIC NEUROPATHY, WITH LONG-TERM CURRENT USE OF INSULIN (HCC): ICD-10-CM

## 2020-11-30 DIAGNOSIS — Z79.4 TYPE 2 DIABETES MELLITUS WITH RETINOPATHY AND MACULAR EDEMA, WITH LONG-TERM CURRENT USE OF INSULIN, UNSPECIFIED LATERALITY, UNSPECIFIED RETINOPATHY SEVERITY (HCC): ICD-10-CM

## 2020-11-30 DIAGNOSIS — Z79.4 TYPE 2 DIABETES MELLITUS WITH DIABETIC NEUROPATHY, WITH LONG-TERM CURRENT USE OF INSULIN (HCC): ICD-10-CM

## 2020-11-30 DIAGNOSIS — E16.2 HYPOGLYCEMIA: Primary | ICD-10-CM

## 2020-11-30 DIAGNOSIS — S32.030D CLOSED COMPRESSION FRACTURE OF L3 LUMBAR VERTEBRA WITH ROUTINE HEALING, SUBSEQUENT ENCOUNTER: ICD-10-CM

## 2020-11-30 DIAGNOSIS — I50.9 CHF (CONGESTIVE HEART FAILURE) (HCC): ICD-10-CM

## 2020-11-30 LAB
ALBUMIN SERPL BCP-MCNC: 2.5 G/DL (ref 3.5–5)
ALP SERPL-CCNC: 197 U/L (ref 46–116)
ALT SERPL W P-5'-P-CCNC: 22 U/L (ref 12–78)
ANION GAP SERPL CALCULATED.3IONS-SCNC: 3 MMOL/L (ref 4–13)
APTT PPP: 42 SECONDS (ref 23–37)
AST SERPL W P-5'-P-CCNC: 73 U/L (ref 5–45)
BASOPHILS # BLD AUTO: 0.06 THOUSANDS/ΜL (ref 0–0.1)
BASOPHILS NFR BLD AUTO: 1 % (ref 0–1)
BILIRUB SERPL-MCNC: 1.2 MG/DL (ref 0.2–1)
BUN SERPL-MCNC: 61 MG/DL (ref 5–25)
CALCIUM ALBUM COR SERPL-MCNC: 9.9 MG/DL (ref 8.3–10.1)
CALCIUM SERPL-MCNC: 8.7 MG/DL (ref 8.3–10.1)
CHLORIDE SERPL-SCNC: 98 MMOL/L (ref 100–108)
CK SERPL-CCNC: 87 U/L (ref 26–192)
CO2 SERPL-SCNC: 35 MMOL/L (ref 21–32)
CREAT SERPL-MCNC: 1.46 MG/DL (ref 0.6–1.3)
EOSINOPHIL # BLD AUTO: 0.19 THOUSAND/ΜL (ref 0–0.61)
EOSINOPHIL NFR BLD AUTO: 3 % (ref 0–6)
ERYTHROCYTE [DISTWIDTH] IN BLOOD BY AUTOMATED COUNT: 14.8 % (ref 11.6–15.1)
GFR SERPL CREATININE-BSD FRML MDRD: 36 ML/MIN/1.73SQ M
GLUCOSE SERPL-MCNC: 103 MG/DL (ref 65–140)
GLUCOSE SERPL-MCNC: 104 MG/DL (ref 65–140)
GLUCOSE SERPL-MCNC: 184 MG/DL (ref 65–140)
GLUCOSE SERPL-MCNC: 243 MG/DL (ref 65–140)
GLUCOSE SERPL-MCNC: 91 MG/DL (ref 65–140)
HCT VFR BLD AUTO: 43.5 % (ref 34.8–46.1)
HGB BLD-MCNC: 12.4 G/DL (ref 11.5–15.4)
IMM GRANULOCYTES # BLD AUTO: 0.02 THOUSAND/UL (ref 0–0.2)
IMM GRANULOCYTES NFR BLD AUTO: 0 % (ref 0–2)
INR PPP: 1.42 (ref 0.84–1.19)
LYMPHOCYTES # BLD AUTO: 0.54 THOUSANDS/ΜL (ref 0.6–4.47)
LYMPHOCYTES NFR BLD AUTO: 8 % (ref 14–44)
MCH RBC QN AUTO: 27.9 PG (ref 26.8–34.3)
MCHC RBC AUTO-ENTMCNC: 28.5 G/DL (ref 31.4–37.4)
MCV RBC AUTO: 98 FL (ref 82–98)
MONOCYTES # BLD AUTO: 0.81 THOUSAND/ΜL (ref 0.17–1.22)
MONOCYTES NFR BLD AUTO: 13 % (ref 4–12)
NEUTROPHILS # BLD AUTO: 4.79 THOUSANDS/ΜL (ref 1.85–7.62)
NEUTS SEG NFR BLD AUTO: 75 % (ref 43–75)
NRBC BLD AUTO-RTO: 0 /100 WBCS
PLATELET # BLD AUTO: 196 THOUSANDS/UL (ref 149–390)
PMV BLD AUTO: 10 FL (ref 8.9–12.7)
POTASSIUM SERPL-SCNC: 4.7 MMOL/L (ref 3.5–5.3)
PROT SERPL-MCNC: 7.8 G/DL (ref 6.4–8.2)
PROTHROMBIN TIME: 17.2 SECONDS (ref 11.6–14.5)
RBC # BLD AUTO: 4.44 MILLION/UL (ref 3.81–5.12)
SODIUM SERPL-SCNC: 136 MMOL/L (ref 136–145)
TROPONIN I SERPL-MCNC: 0.03 NG/ML
WBC # BLD AUTO: 6.41 THOUSAND/UL (ref 4.31–10.16)

## 2020-11-30 PROCEDURE — 80053 COMPREHEN METABOLIC PANEL: CPT | Performed by: EMERGENCY MEDICINE

## 2020-11-30 PROCEDURE — 99285 EMERGENCY DEPT VISIT HI MDM: CPT

## 2020-11-30 PROCEDURE — 93005 ELECTROCARDIOGRAM TRACING: CPT

## 2020-11-30 PROCEDURE — 84484 ASSAY OF TROPONIN QUANT: CPT | Performed by: EMERGENCY MEDICINE

## 2020-11-30 PROCEDURE — 99220 PR INITIAL OBSERVATION CARE/DAY 70 MINUTES: CPT | Performed by: INTERNAL MEDICINE

## 2020-11-30 PROCEDURE — 71045 X-RAY EXAM CHEST 1 VIEW: CPT

## 2020-11-30 PROCEDURE — 82550 ASSAY OF CK (CPK): CPT | Performed by: EMERGENCY MEDICINE

## 2020-11-30 PROCEDURE — 96361 HYDRATE IV INFUSION ADD-ON: CPT

## 2020-11-30 PROCEDURE — 36415 COLL VENOUS BLD VENIPUNCTURE: CPT | Performed by: EMERGENCY MEDICINE

## 2020-11-30 PROCEDURE — 99285 EMERGENCY DEPT VISIT HI MDM: CPT | Performed by: EMERGENCY MEDICINE

## 2020-11-30 PROCEDURE — G1004 CDSM NDSC: HCPCS

## 2020-11-30 PROCEDURE — 96360 HYDRATION IV INFUSION INIT: CPT

## 2020-11-30 PROCEDURE — 85610 PROTHROMBIN TIME: CPT | Performed by: EMERGENCY MEDICINE

## 2020-11-30 PROCEDURE — 82948 REAGENT STRIP/BLOOD GLUCOSE: CPT

## 2020-11-30 PROCEDURE — 85730 THROMBOPLASTIN TIME PARTIAL: CPT | Performed by: EMERGENCY MEDICINE

## 2020-11-30 PROCEDURE — 99215 OFFICE O/P EST HI 40 MIN: CPT | Performed by: INTERNAL MEDICINE

## 2020-11-30 PROCEDURE — 85025 COMPLETE CBC W/AUTO DIFF WBC: CPT | Performed by: EMERGENCY MEDICINE

## 2020-11-30 PROCEDURE — 70450 CT HEAD/BRAIN W/O DYE: CPT

## 2020-11-30 RX ORDER — INSULIN GLARGINE 100 [IU]/ML
15 INJECTION, SOLUTION SUBCUTANEOUS
Status: DISCONTINUED | OUTPATIENT
Start: 2020-11-30 | End: 2020-12-01 | Stop reason: HOSPADM

## 2020-11-30 RX ORDER — ACETAMINOPHEN 325 MG/1
650 TABLET ORAL EVERY 4 HOURS PRN
Status: DISCONTINUED | OUTPATIENT
Start: 2020-11-30 | End: 2020-12-01 | Stop reason: HOSPADM

## 2020-11-30 RX ORDER — DABIGATRAN ETEXILATE 75 MG/1
75 CAPSULE, COATED PELLETS ORAL EVERY 12 HOURS SCHEDULED
Status: DISCONTINUED | OUTPATIENT
Start: 2020-11-30 | End: 2020-12-01 | Stop reason: HOSPADM

## 2020-11-30 RX ORDER — PANTOPRAZOLE SODIUM 40 MG/1
40 TABLET, DELAYED RELEASE ORAL
Status: DISCONTINUED | OUTPATIENT
Start: 2020-12-01 | End: 2020-12-01 | Stop reason: HOSPADM

## 2020-11-30 RX ORDER — FAMOTIDINE 20 MG/1
20 TABLET, FILM COATED ORAL DAILY
Status: DISCONTINUED | OUTPATIENT
Start: 2020-12-01 | End: 2020-12-01 | Stop reason: HOSPADM

## 2020-11-30 RX ORDER — POTASSIUM CHLORIDE 750 MG/1
10 TABLET, EXTENDED RELEASE ORAL EVERY OTHER DAY
Status: DISCONTINUED | OUTPATIENT
Start: 2020-11-30 | End: 2020-12-01 | Stop reason: HOSPADM

## 2020-11-30 RX ORDER — SODIUM CHLORIDE 9 MG/ML
125 INJECTION, SOLUTION INTRAVENOUS CONTINUOUS
Status: DISCONTINUED | OUTPATIENT
Start: 2020-11-30 | End: 2020-11-30

## 2020-11-30 RX ORDER — CYCLOBENZAPRINE HCL 5 MG
5 TABLET ORAL 2 TIMES DAILY PRN
Status: DISCONTINUED | OUTPATIENT
Start: 2020-11-30 | End: 2020-12-01 | Stop reason: HOSPADM

## 2020-11-30 RX ORDER — DORZOLAMIDE HYDROCHLORIDE AND TIMOLOL MALEATE 20; 5 MG/ML; MG/ML
1 SOLUTION/ DROPS OPHTHALMIC 2 TIMES DAILY
Status: DISCONTINUED | OUTPATIENT
Start: 2020-11-30 | End: 2020-12-01 | Stop reason: HOSPADM

## 2020-11-30 RX ORDER — PRAVASTATIN SODIUM 40 MG
40 TABLET ORAL
Status: DISCONTINUED | OUTPATIENT
Start: 2020-11-30 | End: 2020-12-01 | Stop reason: HOSPADM

## 2020-11-30 RX ORDER — METHOCARBAMOL 500 MG/1
500 TABLET, FILM COATED ORAL 2 TIMES DAILY
Status: DISCONTINUED | OUTPATIENT
Start: 2020-11-30 | End: 2020-12-01 | Stop reason: HOSPADM

## 2020-11-30 RX ORDER — METOLAZONE 5 MG/1
2.5 TABLET ORAL EVERY OTHER DAY
Status: DISCONTINUED | OUTPATIENT
Start: 2020-11-30 | End: 2020-12-01 | Stop reason: HOSPADM

## 2020-11-30 RX ORDER — METOPROLOL SUCCINATE 50 MG/1
50 TABLET, EXTENDED RELEASE ORAL DAILY
Status: DISCONTINUED | OUTPATIENT
Start: 2020-12-01 | End: 2020-12-01 | Stop reason: HOSPADM

## 2020-11-30 RX ORDER — OXYCODONE HYDROCHLORIDE AND ACETAMINOPHEN 5; 325 MG/1; MG/1
0.5 TABLET ORAL EVERY 6 HOURS PRN
Status: DISCONTINUED | OUTPATIENT
Start: 2020-11-30 | End: 2020-12-01 | Stop reason: HOSPADM

## 2020-11-30 RX ORDER — TORSEMIDE 20 MG/1
40 TABLET ORAL DAILY
Status: DISCONTINUED | OUTPATIENT
Start: 2020-12-01 | End: 2020-12-01 | Stop reason: HOSPADM

## 2020-11-30 RX ADMIN — DABIGATRAN ETEXILATE MESYLATE 75 MG: 75 CAPSULE ORAL at 21:37

## 2020-11-30 RX ADMIN — INSULIN GLARGINE 15 UNITS: 100 INJECTION, SOLUTION SUBCUTANEOUS at 21:37

## 2020-11-30 RX ADMIN — INSULIN LISPRO 1 UNITS: 100 INJECTION, SOLUTION INTRAVENOUS; SUBCUTANEOUS at 18:15

## 2020-11-30 RX ADMIN — INSULIN LISPRO 10 UNITS: 100 INJECTION, SOLUTION INTRAVENOUS; SUBCUTANEOUS at 18:18

## 2020-11-30 RX ADMIN — SODIUM CHLORIDE 125 ML/HR: 0.9 INJECTION, SOLUTION INTRAVENOUS at 12:00

## 2020-11-30 RX ADMIN — POTASSIUM CHLORIDE 10 MEQ: 750 TABLET, EXTENDED RELEASE ORAL at 18:13

## 2020-11-30 RX ADMIN — PRAVASTATIN SODIUM 40 MG: 40 TABLET ORAL at 18:13

## 2020-11-30 RX ADMIN — METHOCARBAMOL TABLETS 500 MG: 500 TABLET, COATED ORAL at 18:13

## 2020-12-01 VITALS
HEIGHT: 63 IN | TEMPERATURE: 98.2 F | OXYGEN SATURATION: 94 % | DIASTOLIC BLOOD PRESSURE: 67 MMHG | WEIGHT: 230.82 LBS | HEART RATE: 60 BPM | BODY MASS INDEX: 40.9 KG/M2 | RESPIRATION RATE: 18 BRPM | SYSTOLIC BLOOD PRESSURE: 145 MMHG

## 2020-12-01 PROBLEM — E16.2 HYPOGLYCEMIA: Status: RESOLVED | Noted: 2020-11-30 | Resolved: 2020-12-01

## 2020-12-01 LAB
ANION GAP SERPL CALCULATED.3IONS-SCNC: 2 MMOL/L (ref 4–13)
BASOPHILS # BLD AUTO: 0.04 THOUSANDS/ΜL (ref 0–0.1)
BASOPHILS NFR BLD AUTO: 1 % (ref 0–1)
BUN SERPL-MCNC: 58 MG/DL (ref 5–25)
CALCIUM SERPL-MCNC: 8.3 MG/DL (ref 8.3–10.1)
CHLORIDE SERPL-SCNC: 101 MMOL/L (ref 100–108)
CO2 SERPL-SCNC: 36 MMOL/L (ref 21–32)
CREAT SERPL-MCNC: 1.64 MG/DL (ref 0.6–1.3)
EOSINOPHIL # BLD AUTO: 0.14 THOUSAND/ΜL (ref 0–0.61)
EOSINOPHIL NFR BLD AUTO: 3 % (ref 0–6)
ERYTHROCYTE [DISTWIDTH] IN BLOOD BY AUTOMATED COUNT: 15.1 % (ref 11.6–15.1)
EST. AVERAGE GLUCOSE BLD GHB EST-MCNC: 146 MG/DL
GFR SERPL CREATININE-BSD FRML MDRD: 31 ML/MIN/1.73SQ M
GLUCOSE SERPL-MCNC: 139 MG/DL (ref 65–140)
GLUCOSE SERPL-MCNC: 139 MG/DL (ref 65–140)
GLUCOSE SERPL-MCNC: 163 MG/DL (ref 65–140)
GLUCOSE SERPL-MCNC: 167 MG/DL (ref 65–140)
GLUCOSE SERPL-MCNC: 170 MG/DL (ref 65–140)
HBA1C MFR BLD: 6.7 %
HCT VFR BLD AUTO: 41.7 % (ref 34.8–46.1)
HGB BLD-MCNC: 11.7 G/DL (ref 11.5–15.4)
IMM GRANULOCYTES # BLD AUTO: 0.01 THOUSAND/UL (ref 0–0.2)
IMM GRANULOCYTES NFR BLD AUTO: 0 % (ref 0–2)
LYMPHOCYTES # BLD AUTO: 0.53 THOUSANDS/ΜL (ref 0.6–4.47)
LYMPHOCYTES NFR BLD AUTO: 11 % (ref 14–44)
MCH RBC QN AUTO: 27.4 PG (ref 26.8–34.3)
MCHC RBC AUTO-ENTMCNC: 28.1 G/DL (ref 31.4–37.4)
MCV RBC AUTO: 98 FL (ref 82–98)
MONOCYTES # BLD AUTO: 0.78 THOUSAND/ΜL (ref 0.17–1.22)
MONOCYTES NFR BLD AUTO: 16 % (ref 4–12)
NEUTROPHILS # BLD AUTO: 3.47 THOUSANDS/ΜL (ref 1.85–7.62)
NEUTS SEG NFR BLD AUTO: 69 % (ref 43–75)
NRBC BLD AUTO-RTO: 0 /100 WBCS
PLATELET # BLD AUTO: 153 THOUSANDS/UL (ref 149–390)
PMV BLD AUTO: 10.1 FL (ref 8.9–12.7)
POTASSIUM SERPL-SCNC: 3.3 MMOL/L (ref 3.5–5.3)
RBC # BLD AUTO: 4.27 MILLION/UL (ref 3.81–5.12)
SODIUM SERPL-SCNC: 139 MMOL/L (ref 136–145)
T4 FREE SERPL-MCNC: 1.07 NG/DL (ref 0.76–1.46)
TSH SERPL DL<=0.05 MIU/L-ACNC: 3.93 UIU/ML (ref 0.36–3.74)
WBC # BLD AUTO: 4.97 THOUSAND/UL (ref 4.31–10.16)

## 2020-12-01 PROCEDURE — 99239 HOSP IP/OBS DSCHRG MGMT >30: CPT | Performed by: INTERNAL MEDICINE

## 2020-12-01 PROCEDURE — 82948 REAGENT STRIP/BLOOD GLUCOSE: CPT

## 2020-12-01 PROCEDURE — 97167 OT EVAL HIGH COMPLEX 60 MIN: CPT

## 2020-12-01 PROCEDURE — 85025 COMPLETE CBC W/AUTO DIFF WBC: CPT | Performed by: INTERNAL MEDICINE

## 2020-12-01 PROCEDURE — 80048 BASIC METABOLIC PNL TOTAL CA: CPT | Performed by: INTERNAL MEDICINE

## 2020-12-01 PROCEDURE — 84443 ASSAY THYROID STIM HORMONE: CPT | Performed by: INTERNAL MEDICINE

## 2020-12-01 PROCEDURE — 97163 PT EVAL HIGH COMPLEX 45 MIN: CPT

## 2020-12-01 PROCEDURE — 84439 ASSAY OF FREE THYROXINE: CPT | Performed by: INTERNAL MEDICINE

## 2020-12-01 PROCEDURE — 83036 HEMOGLOBIN GLYCOSYLATED A1C: CPT | Performed by: INTERNAL MEDICINE

## 2020-12-01 RX ORDER — GABAPENTIN 300 MG/1
300 CAPSULE ORAL
Status: DISCONTINUED | OUTPATIENT
Start: 2020-12-01 | End: 2020-12-01 | Stop reason: HOSPADM

## 2020-12-01 RX ORDER — ACETAMINOPHEN 325 MG/1
650 TABLET ORAL EVERY 4 HOURS PRN
Refills: 0
Start: 2020-12-01

## 2020-12-01 RX ORDER — OXYCODONE HYDROCHLORIDE AND ACETAMINOPHEN 5; 325 MG/1; MG/1
0.5 TABLET ORAL EVERY 6 HOURS PRN
Qty: 14 TABLET | Refills: 0 | Status: SHIPPED | OUTPATIENT
Start: 2020-12-01 | End: 2020-12-11

## 2020-12-01 RX ORDER — INSULIN GLARGINE 100 [IU]/ML
15 INJECTION, SOLUTION SUBCUTANEOUS DAILY
Qty: 5 PEN | Refills: 0
Start: 2020-12-01 | End: 2021-01-07 | Stop reason: SDUPTHER

## 2020-12-01 RX ADMIN — INSULIN LISPRO 1 UNITS: 100 INJECTION, SOLUTION INTRAVENOUS; SUBCUTANEOUS at 08:21

## 2020-12-01 RX ADMIN — PANTOPRAZOLE SODIUM 40 MG: 40 TABLET, DELAYED RELEASE ORAL at 06:04

## 2020-12-01 RX ADMIN — METHOCARBAMOL TABLETS 500 MG: 500 TABLET, COATED ORAL at 17:13

## 2020-12-01 RX ADMIN — METHOCARBAMOL TABLETS 500 MG: 500 TABLET, COATED ORAL at 08:19

## 2020-12-01 RX ADMIN — INSULIN LISPRO 10 UNITS: 100 INJECTION, SOLUTION INTRAVENOUS; SUBCUTANEOUS at 12:56

## 2020-12-01 RX ADMIN — METOPROLOL SUCCINATE 50 MG: 50 TABLET, EXTENDED RELEASE ORAL at 08:21

## 2020-12-01 RX ADMIN — PRAVASTATIN SODIUM 40 MG: 40 TABLET ORAL at 17:12

## 2020-12-01 RX ADMIN — FAMOTIDINE 20 MG: 20 TABLET ORAL at 08:22

## 2020-12-01 RX ADMIN — DABIGATRAN ETEXILATE MESYLATE 75 MG: 75 CAPSULE ORAL at 08:22

## 2020-12-01 RX ADMIN — LEVOTHYROXINE SODIUM 137 MCG: 112 TABLET ORAL at 06:04

## 2020-12-01 RX ADMIN — TORSEMIDE 40 MG: 20 TABLET ORAL at 08:22

## 2020-12-01 RX ADMIN — INSULIN LISPRO 10 UNITS: 100 INJECTION, SOLUTION INTRAVENOUS; SUBCUTANEOUS at 08:21

## 2020-12-01 RX ADMIN — INSULIN LISPRO 10 UNITS: 100 INJECTION, SOLUTION INTRAVENOUS; SUBCUTANEOUS at 17:12

## 2020-12-01 RX ADMIN — INSULIN LISPRO 1 UNITS: 100 INJECTION, SOLUTION INTRAVENOUS; SUBCUTANEOUS at 17:11

## 2020-12-03 ENCOUNTER — TELEPHONE (OUTPATIENT)
Dept: FAMILY MEDICINE CLINIC | Facility: CLINIC | Age: 70
End: 2020-12-03

## 2020-12-04 LAB
ATRIAL RATE: 60 BPM
P AXIS: 79 DEGREES
QRS AXIS: 251 DEGREES
QRSD INTERVAL: 202 MS
QT INTERVAL: 568 MS
QTC INTERVAL: 568 MS
T WAVE AXIS: 76 DEGREES
VENTRICULAR RATE: 60 BPM

## 2020-12-04 PROCEDURE — 93010 ELECTROCARDIOGRAM REPORT: CPT | Performed by: INTERNAL MEDICINE

## 2020-12-08 ENCOUNTER — TELEPHONE (OUTPATIENT)
Dept: NEUROLOGY | Facility: CLINIC | Age: 70
End: 2020-12-08

## 2020-12-09 ENCOUNTER — TELEPHONE (OUTPATIENT)
Dept: FAMILY MEDICINE CLINIC | Facility: CLINIC | Age: 70
End: 2020-12-09

## 2020-12-21 ENCOUNTER — TELEMEDICINE (OUTPATIENT)
Dept: NEPHROLOGY | Facility: CLINIC | Age: 70
End: 2020-12-21
Payer: MEDICARE

## 2020-12-21 DIAGNOSIS — E83.9 CHRONIC KIDNEY DISEASE-MINERAL AND BONE DISORDER: ICD-10-CM

## 2020-12-21 DIAGNOSIS — M89.9 CHRONIC KIDNEY DISEASE-MINERAL AND BONE DISORDER: ICD-10-CM

## 2020-12-21 DIAGNOSIS — E87.6 HYPOKALEMIA: ICD-10-CM

## 2020-12-21 DIAGNOSIS — I12.9 BENIGN HYPERTENSION WITH CHRONIC KIDNEY DISEASE, STAGE III (HCC): ICD-10-CM

## 2020-12-21 DIAGNOSIS — N18.9 CHRONIC KIDNEY DISEASE-MINERAL AND BONE DISORDER: ICD-10-CM

## 2020-12-21 DIAGNOSIS — N18.30 CKD (CHRONIC KIDNEY DISEASE) STAGE 3, GFR 30-59 ML/MIN (HCC): ICD-10-CM

## 2020-12-21 DIAGNOSIS — I50.40 COMBINED SYSTOLIC AND DIASTOLIC CONGESTIVE HEART FAILURE (HCC): ICD-10-CM

## 2020-12-21 DIAGNOSIS — N18.4 CKD (CHRONIC KIDNEY DISEASE) STAGE 4, GFR 15-29 ML/MIN (HCC): Primary | ICD-10-CM

## 2020-12-21 DIAGNOSIS — I50.43 ACUTE ON CHRONIC COMBINED SYSTOLIC AND DIASTOLIC CONGESTIVE HEART FAILURE (HCC): ICD-10-CM

## 2020-12-21 DIAGNOSIS — N18.30 BENIGN HYPERTENSION WITH CHRONIC KIDNEY DISEASE, STAGE III (HCC): ICD-10-CM

## 2020-12-21 PROBLEM — N17.9 ACUTE RENAL FAILURE SUPERIMPOSED ON STAGE 3 CHRONIC KIDNEY DISEASE (HCC): Status: RESOLVED | Noted: 2018-02-16 | Resolved: 2020-12-21

## 2020-12-21 PROCEDURE — 99214 OFFICE O/P EST MOD 30 MIN: CPT | Performed by: INTERNAL MEDICINE

## 2020-12-21 RX ORDER — POTASSIUM CHLORIDE 750 MG/1
20 TABLET, EXTENDED RELEASE ORAL DAILY
Qty: 45 TABLET | Refills: 1
Start: 2020-12-21 | End: 2021-03-01 | Stop reason: HOSPADM

## 2020-12-24 ENCOUNTER — REMOTE DEVICE CLINIC VISIT (OUTPATIENT)
Dept: CARDIOLOGY CLINIC | Facility: CLINIC | Age: 70
End: 2020-12-24
Payer: MEDICARE

## 2020-12-24 DIAGNOSIS — Z95.0 CARDIAC PACEMAKER IN SITU: Primary | ICD-10-CM

## 2020-12-24 PROCEDURE — 93296 REM INTERROG EVL PM/IDS: CPT | Performed by: INTERNAL MEDICINE

## 2020-12-24 PROCEDURE — 93294 REM INTERROG EVL PM/LDLS PM: CPT | Performed by: INTERNAL MEDICINE

## 2020-12-29 ENCOUNTER — TELEPHONE (OUTPATIENT)
Dept: CARDIOLOGY CLINIC | Facility: CLINIC | Age: 70
End: 2020-12-29

## 2021-01-06 ENCOUNTER — TELEPHONE (OUTPATIENT)
Dept: ENDOCRINOLOGY | Facility: CLINIC | Age: 71
End: 2021-01-06

## 2021-01-06 NOTE — TELEPHONE ENCOUNTER
This is Dr Stanislaw Landers patient    What were her doses before the hospitalization? When was her hospitalization? What was she hospitalized for? How is she eating now?     She is overdue for follow up appt- schedule with Dr Horacio Kilpatrick me BG log so I can give her recommendations on the insulin doses

## 2021-01-06 NOTE — TELEPHONE ENCOUNTER
Patient's son Katherin Fitch called and stated that pt was in hospital and they changed her doses for Basaglar and Humalog  Basaglar: 15 units daily  Humalog there are two different dosing: 10 units 3 tines a day with meals and sliding scaled: 1-5 Units under the skin 3 (three) times a day before meals    Which dosing should he follow for the Humalog?  And he does not understand how to do a sliding scale     And she will need a refill for Basaglar to go to [address] [place], [place] [address]oe [place], [place]

## 2021-01-07 DIAGNOSIS — Z79.4 TYPE 2 DIABETES MELLITUS WITH DIABETIC NEUROPATHY, WITH LONG-TERM CURRENT USE OF INSULIN (HCC): ICD-10-CM

## 2021-01-07 DIAGNOSIS — E11.40 TYPE 2 DIABETES MELLITUS WITH DIABETIC NEUROPATHY, WITH LONG-TERM CURRENT USE OF INSULIN (HCC): ICD-10-CM

## 2021-01-07 RX ORDER — INSULIN GLARGINE 100 [IU]/ML
15 INJECTION, SOLUTION SUBCUTANEOUS DAILY
Qty: 5 PEN | Refills: 2 | Status: ON HOLD | OUTPATIENT
Start: 2021-01-07 | End: 2021-03-01 | Stop reason: SDUPTHER

## 2021-01-07 NOTE — TELEPHONE ENCOUNTER
Please continue the insulin doses that were prescribed on discharge from the rehab Basaglar 15 units and NovoLog 10 units    She does not have to use the sliding scale    Please send a blood sugar log to the office in 4 weeks    Call if if any hypoglycemia less than 70 or hyperglycemia more than 350 develops persistently    Please schedule her follow-up in February with Dr Ariana Meyers instead of me as she was seeing Dr Ariana Meyers before

## 2021-01-07 NOTE — TELEPHONE ENCOUNTER
Spoke to son Jose Ramirez, provided info regarding insulin regimen, send BG in 4 weeks for review, explained about high/low BG  Will reschedule appt to Dr Eve Solano  He understood

## 2021-01-07 NOTE — TELEPHONE ENCOUNTER
Called and spoke to patient's son Nahun Contreras  What were her doses before the hospitalization? Basaglar 22 units at bedtime and Novolog 18 units 3 times a day with meals  When was her hospitalization? She fell in Nov 2020 and was taken to ER, was admitted and spent 3 days in hospital  She fell again in Dec 2020 due to low BG and was hospitalized and was then sent to rehab and was there from Dec 15th to Jan 5th  How is she eating now? Son not sure of her eating habits while in rehab, since she has been home for breakfast she eats cereal and she eats small meals for lunch and dinner  She does not have a a appetite most of the time  He does not have any BG readings because she was just released from the rehab on Jan 5th  He did state that on Jan 6th BG was 180 in the am and 170 and dinner     She already has appt scheduled with Dr Promise Avila on Feb 24th

## 2021-01-15 ENCOUNTER — OFFICE VISIT (OUTPATIENT)
Dept: FAMILY MEDICINE CLINIC | Facility: CLINIC | Age: 71
End: 2021-01-15
Payer: MEDICARE

## 2021-01-15 VITALS
HEIGHT: 63 IN | TEMPERATURE: 98 F | WEIGHT: 205 LBS | HEART RATE: 68 BPM | RESPIRATION RATE: 20 BRPM | SYSTOLIC BLOOD PRESSURE: 118 MMHG | DIASTOLIC BLOOD PRESSURE: 70 MMHG | BODY MASS INDEX: 36.32 KG/M2

## 2021-01-15 DIAGNOSIS — Z00.00 MEDICARE ANNUAL WELLNESS VISIT, SUBSEQUENT: Primary | ICD-10-CM

## 2021-01-15 DIAGNOSIS — I50.9 CONGESTIVE HEART FAILURE, UNSPECIFIED HF CHRONICITY, UNSPECIFIED HEART FAILURE TYPE (HCC): ICD-10-CM

## 2021-01-15 DIAGNOSIS — J44.9 CHRONIC OBSTRUCTIVE PULMONARY DISEASE, UNSPECIFIED COPD TYPE (HCC): ICD-10-CM

## 2021-01-15 DIAGNOSIS — I48.20 CHRONIC ATRIAL FIBRILLATION, UNSPECIFIED (HCC): ICD-10-CM

## 2021-01-15 DIAGNOSIS — J96.21 ACUTE ON CHRONIC RESPIRATORY FAILURE WITH HYPOXIA (HCC): ICD-10-CM

## 2021-01-15 DIAGNOSIS — N18.4 CKD (CHRONIC KIDNEY DISEASE) STAGE 4, GFR 15-29 ML/MIN (HCC): ICD-10-CM

## 2021-01-15 DIAGNOSIS — Z11.59 ENCOUNTER FOR HEPATITIS C SCREENING TEST FOR LOW RISK PATIENT: ICD-10-CM

## 2021-01-15 DIAGNOSIS — Z79.4 TYPE 2 DIABETES MELLITUS WITH RETINOPATHY AND MACULAR EDEMA, WITH LONG-TERM CURRENT USE OF INSULIN, UNSPECIFIED LATERALITY, UNSPECIFIED RETINOPATHY SEVERITY (HCC): ICD-10-CM

## 2021-01-15 DIAGNOSIS — E11.311 TYPE 2 DIABETES MELLITUS WITH RETINOPATHY AND MACULAR EDEMA, WITH LONG-TERM CURRENT USE OF INSULIN, UNSPECIFIED LATERALITY, UNSPECIFIED RETINOPATHY SEVERITY (HCC): ICD-10-CM

## 2021-01-15 PROCEDURE — 99214 OFFICE O/P EST MOD 30 MIN: CPT | Performed by: FAMILY MEDICINE

## 2021-01-15 RX ORDER — METOPROLOL SUCCINATE 50 MG/1
50 TABLET, EXTENDED RELEASE ORAL DAILY
Qty: 30 TABLET | Refills: 0 | Status: SHIPPED | OUTPATIENT
Start: 2021-01-15 | End: 2021-02-08

## 2021-01-15 RX ORDER — INSULIN ASPART 100 [IU]/ML
10 INJECTION, SOLUTION INTRAVENOUS; SUBCUTANEOUS
Status: ON HOLD | COMMUNITY
End: 2021-02-04 | Stop reason: SDUPTHER

## 2021-01-15 NOTE — PROGRESS NOTES
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, with long-term current use of insulin (HCC)    Relevant Medications    Insulin Aspart (NovoLOG PenFill) 100 UNITS/ML cartridge for injection       Respiratory    Chronic obstructive pulmonary disease (HCC)    Acute on chronic respiratory failure (HCC)       Genitourinary    CKD (chronic kidney disease) stage 4, GFR 15-29 ml/min (HCC)      Other Visit Diagnoses     Medicare annual wellness visit, subsequent    -  Primary    Congestive heart failure, unspecified HF chronicity, unspecified heart failure type (HCC)        Relevant Medications    metoprolol succinate (TOPROL-XL) 50 mg 24 hr tablet    Chronic atrial fibrillation, unspecified (HCC)        Relevant Medications    metoprolol succinate (TOPROL-XL) 50 mg 24 hr tablet    Body mass index (BMI)40 0-44 9, adult ([facility] )        Encounter for hepatitis C screening test for low risk patient        Relevant Orders    Hepatitis C antibody        BMI Counseling: Body mass index is 36 31 kg/m²  The BMI is above normal  Nutrition recommendations include decreasing portion sizes, encouraging healthy choices of fruits and vegetables, decreasing fast food intake, consuming healthier snacks and limiting drinks that contain sugar  Exercise recommendations include strength training exercises  Falls Plan of Care: referral to physical therapy  Preventive health issues were discussed with patient, and age appropriate screening tests were ordered as noted in patient's After Visit Summary  Personalized health advice and appropriate referrals for health education or preventive services given if needed, as noted in patient's After Visit Summary       History of Present Illness:     Patient presents for Medicare Annual Wellness visit    Patient Care Team:  Alexa Nieves MD as PCP - General (Internal Medicine)  Hyacinth Carver DO  MD Tam Garcia MD Viviano Roulette, MD Renaldo Raya, MD (Endocrinology)     Problem List:     Patient Active Problem List   Diagnosis    Gallbladder calculus without cholecystitis    Umbilical hernia    SOB (shortness of breath)    Anemia    Coronary artery disease involving coronary bypass graft of native heart without angina pectoris    Acute on chronic combined systolic and diastolic congestive heart failure (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, with long-term current use of insulin ([facility] )    Morbid obesity with BMI of 40 0-44 9, adult ([facility] )    H/O tricuspid valve annuloplasty    Obstructive sleep apnea    Atherosclerosis of native coronary artery of native heart with angina pectoris (HCC)    Arthritis    Chronic atrial fibrillation ([facility] )    Acquired hypothyroidism    Mild pulmonary hypertension ([facility] )    Ventral hernia without obstruction or gangrene    Syncope    Benign hypertension with chronic kidney disease, stage III    Disease of thyroid gland    Encounter for long-term (current) use of insulin ([facility] )    Leg cramps    Paresthesia    Status post placement of cardiac pacemaker    History of colon polyps    Other constipation    Gastroesophageal reflux disease without esophagitis    Anticoagulant long-term use    Hernia of anterior abdominal wall    H/O coronary artery bypass surgery    Tricuspid regurgitation    Peripheral vascular disease, unspecified ([facility] )    Dysthymia    Chest pain    Hyponatremia    Class 1 obesity due to excess calories with serious comorbidity in adult    Hypoxemia    Dysphonia    Paresis of left vocal fold-severe    Glottic insufficiency    Muscle tension dysphonia    Reflux laryngitis    Dry nose    Chronic kidney disease-mineral and bone disorder    Altered thought processes    Mild cognitive impairment    Chronic obstructive pulmonary disease ([facility] )    Aortic stenosis    Acute on chronic respiratory failure (HCC)    CKD (chronic kidney disease) stage 4, GFR 15-29 ml/min ([facility])    Hypokalemia      Past Medical and Surgical History:     Past Medical History:   Diagnosis Date    Arthritis     Atrial flutter ([place] [address] )     Cardiac disease     Carotid artery occlusion     CHF (congestive heart failure) ([place] [address] )     Cholelithiasis     last assessed 8/8/2016    Coronary artery disease     Diabetes mellitus ([place] [address] )     Disease of thyroid gland     Essential hypertension 8/15/2016    GERD (gastroesophageal reflux disease)     History of transfusion     Hyperlipidemia     Hypertension     Long-term insulin use in type 2 diabetes ([place] [address] ) 6/4/2016    Other specified diabetes mellitus with diabetic autonomic (poly)neuropathy ([place] [address] )     Pleural effusion 2008    Pulmonary embolism ([place] [address] )     2008    Renal disorder     Retinopathy     bilat    Sleep apnea     USES 3 LITER O2 CONTINOUIS    Subclavian artery stenosis ([place] [address] )     Umbilical hernia with obstruction, without gangrene     last assessed 7/8/2016     Past Surgical History:   Procedure Laterality Date    ABDOMINAL SURGERY      CARDIAC PACEMAKER PLACEMENT      CARDIAC SURGERY      cabg x 2    CATARACT EXTRACTION      CHOLECYSTECTOMY      COLONOSCOPY      CORONARY ARTERY BYPASS GRAFT  2008    EYE SURGERY      FRACTURE SURGERY Right 2010    shoulder    HERNIA REPAIR      umbilical    RI LAP,CHOLECYSTECTOMY N/A 8/10/2016    Procedure: CHOLECYSTECTOMY LAPAROSCOPIC;  Surgeon: Mariaelena Bates MD;  Location: [facility];  Service: General     [address] Left 11/4/2016    Procedure: MICRODIRECT LARYNGOSCOPY; LEFT VOCAL FOLD INJECTION ;  Surgeon: Diana Croft MD;  [facility];  Service: ENT    RI REPAIR UMBILICAL MZNH,9+T/Z,IUPYC N/A 8/10/2016    Procedure: LAPAROSCOPIC REPAIR HERNIA VENTRAL;  Surgeon: Mariaelena Bates MD;  [facility];  Service: [address] [place] [address] ANGIOPLASTY / STENTING Right     TRICUSPID VALVE REPLACEMENT      VASCULAR SURGERY      heart valve replacement      Family History:     Family History   Problem Relation Age of Onset    Heart disease Mother     Breast cancer Maternal Aunt     Heart disease Sister       Social History:     E-Cigarette/Vaping    E-Cigarette Use Never User      E-Cigarette/Vaping Substances    Nicotine No     THC No     CBD No     Flavoring No     Other No     Unknown No      Social History     Socioeconomic History    Marital status: Single     Spouse name: None    Number of children: None    Years of education: None    Highest education level: None   Occupational History    None   Social Needs    Financial resource strain: None    Food insecurity     Worry: None     Inability: None    Transportation needs     Medical: None     Non-medical: None   Tobacco Use    Smoking status: Never Smoker    Smokeless tobacco: Never Used   Substance and Sexual Activity    Alcohol use: Not Currently     Frequency: Never     Drinks per session: Patient refused     Binge frequency: Never     Comment: only on NEW YEAR'S    Drug use: Never     Types: Oxycodone    Sexual activity: Not Currently   Lifestyle    Physical activity     Days per week: None     Minutes per session: None    Stress: None   Relationships    Social connections     Talks on phone: None     Gets together: None     Attends Synagogue service: None     Active member of club or organization: None     Attends meetings of clubs or organizations: None     Relationship status: None    Intimate partner violence     Fear of current or ex partner: None     Emotionally abused: None     Physically abused: None     Forced sexual activity: None   Other Topics Concern    None   Social History Narrative    Always uses seat belt      Medications and Allergies:     Current Outpatient Medications   Medication Sig Dispense Refill    Accu-Chek Virgie Plus test strip TEST BLOOD SUGAR 3 TIMES EVERY DAY  DX: E11 65 300 each 3    acetaminophen (TYLENOL) 325 mg tablet Take 2 tablets (650 mg total) by mouth every 4 (four) hours as needed for mild pain or fever  0    B-D UF III MINI PEN NEEDLES 31G X 5 MM MISC 4 (four) times a day As directed      COMBIGAN 0 2-0 5 % Administer 1 drop to both eyes 2 (two) times a day      Cyanocobalamin (VITAMIN B 12 PO) Take by mouth daily      dabigatran etexilate (PRADAXA) 75 mg capsule Take 1 capsule (75 mg total) by mouth every 12 (twelve) hours 60 capsule 0    famotidine (PEPCID) 20 mg tablet TAKE 1 TABLET BY MOUTH TWICE A DAY 60 tablet 11    gabapentin (NEURONTIN) 300 mg capsule Take 1 capsule (300 mg total) by mouth daily at bedtime 30 capsule 3    Insulin Aspart (NovoLOG PenFill) 100 UNITS/ML cartridge for injection Inject 10 Units under the skin 3 (three) times a day with meals      insulin glargine (Basaglar KwikPen) 100 units/mL injection pen Inject 15 Units under the skin daily 5 pen 2    levothyroxine (Synthroid) 137 mcg tablet Take 1 tablet (137 mcg total) by mouth daily 90 tablet 3    metolazone (ZAROXOLYN) 2 5 mg tablet Take 1 tablet (2 5 mg total) by mouth every other day 15 tablet 0    metoprolol succinate (TOPROL-XL) 50 mg 24 hr tablet Take 1 tablet (50 mg total) by mouth daily 30 tablet 0    pantoprazole (PROTONIX) 40 mg tablet Take 40 mg by mouth daily in the early morning       potassium chloride (Klor-Con M10) 10 mEq tablet Take 2 tablets (20 mEq total) by mouth daily 45 tablet 1    simvastatin (ZOCOR) 20 mg tablet TAKE 1 TABLET (20MG) BY ORAL ROUTE EVERY DAY IN THE EVENING 90 tablet 2    torsemide (DEMADEX) 20 mg tablet Take 2 tablets (40 mg total) by mouth daily 180 tablet 0    Multiple Vitamin (MULTIVITAMIN) tablet Take 1 tablet by mouth daily       No current facility-administered medications for this visit  Allergies   Allergen Reactions    Bromide Ion [Bromine]      Blurred vision   Has preservative that Pt cannot use    Other Blisters     Adhesive tape    Timolol      Per pt, allergic to steroid in medication    Tramadol      cognition changes    Ibuprofen Palpitations    Penicillins Rash      Immunizations:     Immunization History   Administered Date(s) Administered    INFLUENZA 11/01/2018    Influenza, high dose seasonal 0 7 mL 10/17/2019, 09/12/2020    Pneumococcal Conjugate 13-Valent 12/24/2019      Health Maintenance:         Topic Date Due    Hepatitis C Screening  1950    Colonoscopy Surveillance  12/02/2022    Colorectal Cancer Screening  12/02/2029         Topic Date Due    DTaP,Tdap,and Td Vaccines (1 - Tdap) 01/28/1970    Pneumococcal Vaccine: 65+ Years (2 of 2 - PPSV23) 12/24/2020      Medicare Health Risk Assessment:     /70   Pulse 68   Temp 98 °F (36 7 °C)   Resp 20   Ht 5' 3" (1 6 m)   Wt 93 kg (205 lb)   LMP  (LMP Unknown)   BMI 36 31 kg/m²          Health Risk Assessment:   Patient rates overall health as good  Patient feels that their physical health rating is slightly worse  Eyesight was rated as same  Hearing was rated as same  Patient feels that their emotional and mental health rating is same  Pain experienced in the last 7 days has been a lot  Patient's pain rating has been 8/10  Patient states that she has experienced weight loss or gain in last 6 months  Depression Screening:   PHQ-2 Score: 0  PHQ-9 Score: 3      Fall Risk Screening: In the past year, patient has experienced: history of falling in past year    Number of falls: 2 or more  Injured during fall?: Yes    Feels unsteady when standing or walking?: Yes    Worried about falling?: Yes      Urinary Incontinence Screening:   Patient has leaked urine accidently in the last six months  Home Safety:  Patient has trouble with stairs inside or outside of their home  Patient has working smoke alarms and has working carbon monoxide detector  Home safety hazards include: none       Nutrition: Current diet is Diabetic  Medications:   Patient is currently taking over-the-counter supplements  OTC medications include: see medication list  Patient is able to manage medications  Son assists     Activities of Daily Living (ADLs)/Instrumental Activities of Daily Living (IADLs):   Walk and transfer into and out of bed and chair?: Yes  Dress and groom yourself?: No    Bathe or shower yourself?: No    Feed yourself?  Yes  Do your laundry/housekeeping?: No  Manage your money, pay your bills and track your expenses?: No  Make your own meals?: No    Do your own shopping?: No    ADL comments: Son assists with all ADL's    Previous Hospitalizations:   Any hospitalizations or ED visits within the last 12 months?: Yes    How many hospitalizations have you had in the last year?: 3-4    Advance Care Planning:   Living will: Yes    Durable POA for healthcare: No    Advanced directive: Yes      PREVENTIVE SCREENINGS      Cardiovascular Screening:    General: Screening Not Indicated and History Lipid Disorder      Diabetes Screening:     General: Screening Not Indicated and History Diabetes      Colorectal Cancer Screening:     General: Screening Current      Breast Cancer Screening:     General: Screening Not Indicated      Cervical Cancer Screening:    General: Screening Not Indicated      Osteoporosis Screening:    General: Patient Declines      Abdominal Aortic Aneurysm (AAA) Screening:        General: Screening Not Indicated      Lung Cancer Screening:     General: Screening Not Indicated      Hepatitis C Screening:    General: Risks and Benefits Discussed    Hep C Screening Accepted: Yes        Shayla Claudio MD

## 2021-01-15 NOTE — PROGRESS NOTES
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, with long-term current use of insulin, unspecified laterality, unspecified retinopathy severity ([facility] )  Comments:  diabetes managed by endocrinology  continue current medication regimen  Chronic obstructive pulmonary disease, unspecified COPD type ([facility] )  Comments:  on 3L of O2  controlled  Congestive heart failure, unspecified HF chronicity, unspecified heart failure type [facility])  Comments:  managed by cardiology  no evidence of excessive fluid overload at this time  Orders:  -     metoprolol succinate (TOPROL-XL) 50 mg 24 hr tablet; Take 1 tablet (50 mg total) by mouth daily    CKD (chronic kidney disease) stage 4, GFR 15-29 ml/min ([facility])  Comments:  managed by nephrology    Encounter for hepatitis C screening test for low risk patient  -     Hepatitis C antibody; Future    Other orders  -     Insulin Aspart (NovoLOG PenFill) 100 UNITS/ML cartridge for injection; Inject 10 Units under the skin 3 (three) times a day with meals        Subjective:      Patient ID: Jesse Torres is a 69 y o  female  HPI  Jessica Nagel presents today for follow up after recent hospitalization and rehab stay  She was hospitalized at [facility] from 11/30 to 12/1/20 for symptomatic hypoglycemia  During hospitalization her insulin doses were adjusted and she was discharged to [facility]  Since discharge, she has been following up with her endocrinologist who monitors and controls her insulin levels  Has appointment coming up  She is currently awaiting surgery for aortic valve  A TAVR was initially scheduled, but had to be postponed due to hospitalization  Today she states she feels better day by day  Has been monitoring her blood sugars and they have mostly been within range  Had one level that was 350, but that was due to what she ate  No recent episodes of passing out, headaches, dizziness, vision changes   Appetite is good  The following portions of the patient's history were reviewed and updated as appropriate: allergies, current medications, past family history, past medical history, past social history, past surgical history and problem list     Review of Systems   Constitutional: Negative  HENT: Negative  Eyes: Negative  Respiratory: Negative  Cardiovascular: Negative  Gastrointestinal: Negative  Endocrine: Negative  Genitourinary: Negative  Musculoskeletal: Negative  Skin: Negative  Allergic/Immunologic: Negative  Neurological: Negative  Hematological: Negative  Psychiatric/Behavioral: Negative  Objective:      /70   Pulse 68   Temp 98 °F (36 7 °C)   Resp 20   Ht 5' 3" (1 6 m)   Wt 93 kg (205 lb)   LMP  (LMP Unknown)   BMI 36 31 kg/m²          Physical Exam  Constitutional:       General: She is not in acute distress  Appearance: She is well-developed  She is not diaphoretic  Comments: On 3L of home O2   HENT:      Head: Normocephalic and atraumatic  Neck:      Musculoskeletal: Normal range of motion and neck supple  Cardiovascular:      Rate and Rhythm: Normal rate and regular rhythm  Heart sounds: Murmur present  No friction rub  No gallop  Pulmonary:      Effort: Pulmonary effort is normal  No respiratory distress  Breath sounds: Normal breath sounds  No wheezing or rales  Chest:      Chest wall: No tenderness  Abdominal:      General: Bowel sounds are normal  There is no distension  Palpations: Abdomen is soft  There is no mass  Tenderness: There is no abdominal tenderness  There is no guarding or rebound  Musculoskeletal: Normal range of motion  General: No deformity  Skin:     General: Skin is warm and dry  Neurological:      Mental Status: She is alert and oriented to person, place, and time  Psychiatric:         Behavior: Behavior normal          Thought Content:  Thought content normal  Judgment: Judgment normal

## 2021-01-15 NOTE — PATIENT INSTRUCTIONS
Medicare Preventive Visit Patient Instructions  Thank you for completing your Welcome to Medicare Visit or Medicare Annual Wellness Visit today  Your next wellness visit will be due in one year (1/15/2022)  The screening/preventive services that you may require over the next 5-10 years are detailed below  Some tests may not apply to you based off risk factors and/or age  Screening tests ordered at today's visit but not completed yet may show as past due  Also, please note that scanned in results may not display below  Preventive Screenings:  Service Recommendations Previous Testing/Comments   Colorectal Cancer Screening  * Colonoscopy    * Fecal Occult Blood Test (FOBT)/Fecal Immunochemical Test (FIT)  * Fecal DNA/Cologuard Test  * Flexible Sigmoidoscopy Age: 54-65 years old   Colonoscopy: every 10 years (may be performed more frequently if at higher risk)  OR  FOBT/FIT: every 1 year  OR  Cologuard: every 3 years  OR  Sigmoidoscopy: every 5 years  Screening may be recommended earlier than age 48 if at higher risk for colorectal cancer  Also, an individualized decision between you and your healthcare provider will decide whether screening between the ages of 74-80 would be appropriate  Colonoscopy: 12/02/2019  FOBT/FIT: Not on file  Cologuard: Not on file  Sigmoidoscopy: Not on file    Screening Current     Breast Cancer Screening Age: 36 years old  Frequency: every 1-2 years  Not required if history of left and right mastectomy Mammogram: Not on file       Cervical Cancer Screening Between the ages of 21-29, pap smear recommended once every 3 years  Between the ages of 33-67, can perform pap smear with HPV co-testing every 5 years     Recommendations may differ for women with a history of total hysterectomy, cervical cancer, or abnormal pap smears in past  Pap Smear: Not on file    Screening Not Indicated   Hepatitis C Screening Once for adults born between 1945 and 1965  More frequently in patients at high risk for Hepatitis C Hep C Antibody: Not on file       Diabetes Screening 1-2 times per year if you're at risk for diabetes or have pre-diabetes Fasting glucose: 188 mg/dL   A1C: 6 7 %    Screening Not Indicated  History Diabetes   Cholesterol Screening Once every 5 years if you don't have a lipid disorder  May order more often based on risk factors  Lipid panel: 07/13/2020    Screening Not Indicated  History Lipid Disorder     Other Preventive Screenings Covered by Medicare:  1  Abdominal Aortic Aneurysm (AAA) Screening: covered once if your at risk  You're considered to be at risk if you have a family history of AAA  2  Lung Cancer Screening: covers low dose CT scan once per year if you meet all of the following conditions: (1) Age 50-69; (2) No signs or symptoms of lung cancer; (3) Current smoker or have quit smoking within the last 15 years; (4) You have a tobacco smoking history of at least 30 pack years (packs per day multiplied by number of years you smoked); (5) You get a written order from a healthcare provider  3  Glaucoma Screening: covered annually if you're considered high risk: (1) You have diabetes OR (2) Family history of glaucoma OR (3)  aged 48 and older OR (3)  American aged 72 and older  3  Osteoporosis Screening: covered every 2 years if you meet one of the following conditions: (1) You're estrogen deficient and at risk for osteoporosis based off medical history and other findings; (2) Have a vertebral abnormality; (3) On glucocorticoid therapy for more than 3 months; (4) Have primary hyperparathyroidism; (5) On osteoporosis medications and need to assess response to drug therapy  · Last bone density test (DXA Scan): Not on file  5  HIV Screening: covered annually if you're between the age of 12-76  Also covered annually if you are younger than 13 and older than 72 with risk factors for HIV infection   For pregnant patients, it is covered up to 3 times per pregnancy  Immunizations:  Immunization Recommendations   Influenza Vaccine Annual influenza vaccination during flu season is recommended for all persons aged >= 6 months who do not have contraindications   Pneumococcal Vaccine (Prevnar and Pneumovax)  * Prevnar = PCV13  * Pneumovax = PPSV23   Adults 25-60 years old: 1-3 doses may be recommended based on certain risk factors  Adults 72 years old: Prevnar (PCV13) vaccine recommended followed by Pneumovax (PPSV23) vaccine  If already received PPSV23 since turning 65, then PCV13 recommended at least one year after PPSV23 dose  Hepatitis B Vaccine 3 dose series if at intermediate or high risk (ex: diabetes, end stage renal disease, liver disease)   Tetanus (Td) Vaccine - COST NOT COVERED BY MEDICARE PART B Following completion of primary series, a booster dose should be given every 10 years to maintain immunity against tetanus  Td may also be given as tetanus wound prophylaxis  Tdap Vaccine - COST NOT COVERED BY MEDICARE PART B Recommended at least once for all adults  For pregnant patients, recommended with each pregnancy  Shingles Vaccine (Shingrix) - COST NOT COVERED BY MEDICARE PART B  2 shot series recommended in those aged 48 and above     Health Maintenance Due:      Topic Date Due    Hepatitis C Screening  1950    Colonoscopy Surveillance  12/02/2022    Colorectal Cancer Screening  12/02/2029     Immunizations Due:      Topic Date Due    DTaP,Tdap,and Td Vaccines (1 - Tdap) 01/28/1970    Pneumococcal Vaccine: 65+ Years (2 of 2 - PPSV23) 12/24/2020     Advance Directives   What are advance directives? Advance directives are legal documents that state your wishes and plans for medical care  These plans are made ahead of time in case you lose your ability to make decisions for yourself  Advance directives can apply to any medical decision, such as the treatments you want, and if you want to donate organs     What are the types of advance directives? There are many types of advance directives, and each state has rules about how to use them  You may choose a combination of any of the following:  · Living will: This is a written record of the treatment you want  You can also choose which treatments you do not want, which to limit, and which to stop at a certain time  This includes surgery, medicine, IV fluid, and tube feedings  · Durable power of  for healthcare [facility]): This is a written record that states who you want to make healthcare choices for you when you are unable to make them for yourself  This person, called a proxy, is usually a family member or a friend  You may choose more than 1 proxy  · Do not resuscitate (DNR) order:  A DNR order is used in case your heart stops beating or you stop breathing  It is a request not to have certain forms of treatment, such as CPR  A DNR order may be included in other types of advance directives  · Medical directive: This covers the care that you want if you are in a coma, near death, or unable to make decisions for yourself  You can list the treatments you want for each condition  Treatment may include pain medicine, surgery, blood transfusions, dialysis, IV or tube feedings, and a ventilator (breathing machine)  · Values history: This document has questions about your views, beliefs, and how you feel and think about life  This information can help others choose the care that you would choose  Why are advance directives important? An advance directive helps you control your care  Although spoken wishes may be used, it is better to have your wishes written down  Spoken wishes can be misunderstood, or not followed  Treatments may be given even if you do not want them  An advance directive may make it easier for your family to make difficult choices about your care  Fall Prevention    Fall prevention  includes ways to make your home and other areas safer   It also includes ways you can move more carefully to prevent a fall  Health conditions that cause changes in your blood pressure, vision, or muscle strength and coordination may increase your risk for falls  Medicines may also increase your risk for falls if they make you dizzy, weak, or sleepy  Fall prevention tips:   · Stand or sit up slowly  · Use assistive devices as directed  · Wear shoes that fit well and have soles that   · Wear a personal alarm  · Stay active  · Manage your medical conditions  Home Safety Tips:  · Add items to prevent falls in the bathroom  · Keep paths clear  · Install bright lights in your home  · Keep items you use often on shelves within reach  · Paint or place reflective tape on the edges of your stairs  Urinary Incontinence   Urinary incontinence (UI)  is when you lose control of your bladder  UI develops because your bladder cannot store or empty urine properly  The 3 most common types of UI are stress incontinence, urge incontinence, or both  Medicines:   · May be given to help strengthen your bladder control  Report any side effects of medication to your healthcare provider  Do pelvic muscle exercises often:  Your pelvic muscles help you stop urinating  Squeeze these muscles tight for 5 seconds, then relax for 5 seconds  Gradually work up to squeezing for 10 seconds  Do 3 sets of 15 repetitions a day, or as directed  This will help strengthen your pelvic muscles and improve bladder control  Train your bladder:  Go to the bathroom at set times, such as every 2 hours, even if you do not feel the urge to go  You can also try to hold your urine when you feel the urge to go  For example, hold your urine for 5 minutes when you feel the urge to go  As that becomes easier, hold your urine for 10 minutes  Self-care:   · Keep a UI record  Write down how often you leak urine and how much you leak  Make a note of what you were doing when you leaked urine    · Drink liquids as directed  You may need to limit the amount of liquid you drink to help control your urine leakage  Do not drink any liquid right before you go to bed  Limit or do not have drinks that contain caffeine or alcohol  · Prevent constipation  Eat a variety of high-fiber foods  Good examples are high-fiber cereals, beans, vegetables, and whole-grain breads  Walking is the best way to trigger your intestines to have a bowel movement  · Exercise regularly and maintain a healthy weight  Weight loss and exercise will decrease pressure on your bladder and help you control your leakage  · Use a catheter as directed  to help empty your bladder  A catheter is a tiny, plastic tube that is put into your bladder to drain your urine  · Go to behavior therapy as directed  Behavior therapy may be used to help you learn to control your urge to urinate  Weight Management   Why it is important to manage your weight:  Being overweight increases your risk of health conditions such as heart disease, high blood pressure, type 2 diabetes, and certain types of cancer  It can also increase your risk for osteoarthritis, sleep apnea, and other respiratory problems  Aim for a slow, steady weight loss  Even a small amount of weight loss can lower your risk of health problems  How to lose weight safely:  A safe and healthy way to lose weight is to eat fewer calories and get regular exercise  You can lose up about 1 pound a week by decreasing the number of calories you eat by 500 calories each day  Healthy meal plan for weight management:  A healthy meal plan includes a variety of foods, contains fewer calories, and helps you stay healthy  A healthy meal plan includes the following:  · Eat whole-grain foods more often  A healthy meal plan should contain fiber  Fiber is the part of grains, fruits, and vegetables that is not broken down by your body  Whole-grain foods are healthy and provide extra fiber in your diet   Some examples of whole-grain foods are whole-wheat breads and pastas, oatmeal, brown rice, and bulgur  · Eat a variety of vegetables every day  Include dark, leafy greens such as spinach, kale, bijal greens, and mustard greens  Eat yellow and orange vegetables such as carrots, sweet potatoes, and winter squash  · Eat a variety of fruits every day  Choose fresh or canned fruit (canned in its own juice or light syrup) instead of juice  Fruit juice has very little or no fiber  · Eat low-fat dairy foods  Drink fat-free (skim) milk or 1% milk  Eat fat-free yogurt and low-fat cottage cheese  Try low-fat cheeses such as mozzarella and other reduced-fat cheeses  · Choose meat and other protein foods that are low in fat  Choose beans or other legumes such as split peas or lentils  Choose fish, skinless poultry (chicken or turkey), or lean cuts of red meat (beef or pork)  Before you cook meat or poultry, cut off any visible fat  · Use less fat and oil  Try baking foods instead of frying them  Add less fat, such as margarine, sour cream, regular salad dressing and mayonnaise to foods  Eat fewer high-fat foods  Some examples of high-fat foods include french fries, doughnuts, ice cream, and cakes  · Eat fewer sweets  Limit foods and drinks that are high in sugar  This includes candy, cookies, regular soda, and sweetened drinks  Exercise:  Exercise at least 30 minutes per day on most days of the week  Some examples of exercise include walking, biking, dancing, and swimming  You can also fit in more physical activity by taking the stairs instead of the elevator or parking farther away from stores  Ask your healthcare provider about the best exercise plan for you  © Copyright [employer] 2018 Information is for End User's use only and may not be sold, redistributed or otherwise used for commercial purposes   All illustrations and images included in CareNotes® are the copyrighted property of A D [employer] M [employer]  or [facility]

## 2021-01-22 NOTE — DISCHARGE INSTRUCTIONS
Please take a list of all of your medications and discharge paperwork with you to all of your follow-up medical visits  Please take all of your medications as directed  Please continue to take her Keflex until you complete the course  Please call your family doctor or return to the ER if you have increased shortness of breath, chest pain, fevers, chills, nausea, vomiting, diarrhea, or any other worsening symptoms  Cellulitis   WHAT YOU NEED TO KNOW:   Cellulitis is a skin infection caused by bacteria  Cellulitis may go away on its own or you may need treatment  Your healthcare provider may draw a [place] around the outside edges of your cellulitis  If your cellulitis spreads, your healthcare provider will see it outside of the [place]  DISCHARGE INSTRUCTIONS:   Call 911 if:   · You have sudden trouble breathing or chest pain  Return to the emergency department if:   · Your wound gets larger and more painful  · You feel a crackling under your skin when you touch it  · You have purple dots or bumps on your skin, or you see bleeding under your skin  · You have new swelling and pain in your legs  · The red, warm, swollen area gets larger  · You see red streaks coming from the infected area  Contact your healthcare provider if:   · You have a fever  · Your fever or pain does not go away or gets worse  · The area does not get smaller after 2 days of antibiotics  · Your skin is flaking or peeling off  · You have questions or concerns about your condition or care  Medicines:   · Antibiotics  help treat the bacterial infection  · NSAIDs , such as ibuprofen, help decrease swelling, pain, and fever  NSAIDs can cause stomach bleeding or kidney problems in certain people  If you take blood thinner medicine, always ask if NSAIDs are safe for you  Always read the medicine label and follow directions   Do not give these medicines to children under 10months of age without direction She feels that the branded Synthroid work better than the generic but otherwise is doing well from your child's healthcare provider  · Acetaminophen  decreases pain and fever  It is available without a doctor's order  Ask how much to take and how often to take it  Follow directions  Read the labels of all other medicines you are using to see if they also contain acetaminophen, or ask your doctor or pharmacist  Acetaminophen can cause liver damage if not taken correctly  Do not use more than 4 grams (4,000 milligrams) total of acetaminophen in one day  · Take your medicine as directed  Contact your healthcare provider if you think your medicine is not helping or if you have side effects  Tell him or her if you are allergic to any medicine  Keep a list of the medicines, vitamins, and herbs you take  Include the amounts, and when and why you take them  Bring the list or the pill bottles to follow-up visits  Carry your medicine list with you in case of an emergency  Self-care:   · Elevate the area above the level of your heart  as often as you can  This will help decrease swelling and pain  Prop the area on pillows or blankets to keep it elevated comfortably  · Clean the area daily until the wound scabs over  Gently wash the area with soap and water  Pat dry  Use dressings as directed  · Place cool or warm, wet cloths on the area as directed  Use clean cloths and clean water  Leave it on the area until the cloth is room temperature  Pat the area dry with a clean, dry cloth  The cloths may help decrease pain  Prevent cellulitis:   · Do not scratch bug bites or areas of injury  You increase your risk for cellulitis by scratching these areas  · Do not share personal items, such as towels, clothing, and razors  · Clean exercise equipment  with germ-killing  before and after you use it  · Wash your hands often  Use soap and water  Wash your hands after you use the bathroom, change a child's diapers, or sneeze  Wash your hands before you prepare or eat food   Use lotion to prevent dry, cracked skin  · Wear pressure stockings as directed  You may be told to wear the stockings if you have peripheral edema  The stockings improve blood flow and decrease swelling  · Treat athlete's foot  This can help prevent the spread of a bacterial skin infection  Follow up with your healthcare provider within 3 days, or as directed: Your healthcare provider will check if your cellulitis is getting better  You may need different medicine  Write down your questions so you remember to ask them during your visits  © 2017 [facility] Information is for End User's use only and may not be sold, redistributed or otherwise used for commercial purposes  All illustrations and images included in CareNotes® are the copyrighted property of [employer]  or [employer]  The above information is an  only  It is not intended as medical advice for individual conditions or treatments  Talk to your doctor, nurse or pharmacist before following any medical regimen to see if it is safe and effective for you

## 2021-01-25 ENCOUNTER — HOSPITAL ENCOUNTER (OUTPATIENT)
Facility: HOSPITAL | Age: 71
Setting detail: OUTPATIENT SURGERY
End: 2021-01-25
Attending: DENTIST | Admitting: DENTIST
Payer: MEDICARE

## 2021-01-25 ENCOUNTER — TELEPHONE (OUTPATIENT)
Dept: CARDIAC SURGERY | Facility: CLINIC | Age: 71
End: 2021-01-25

## 2021-01-25 ENCOUNTER — APPOINTMENT (OUTPATIENT)
Dept: LAB | Facility: CLINIC | Age: 71
End: 2021-01-25
Payer: MEDICARE

## 2021-01-25 DIAGNOSIS — Z11.59 ENCOUNTER FOR HEPATITIS C SCREENING TEST FOR LOW RISK PATIENT: ICD-10-CM

## 2021-01-25 DIAGNOSIS — N18.4 CKD (CHRONIC KIDNEY DISEASE) STAGE 4, GFR 15-29 ML/MIN (HCC): ICD-10-CM

## 2021-01-25 LAB
ALBUMIN SERPL BCP-MCNC: 3.1 G/DL (ref 3.5–5)
ANION GAP SERPL CALCULATED.3IONS-SCNC: 7 MMOL/L (ref 4–13)
BUN SERPL-MCNC: 103 MG/DL (ref 5–25)
CALCIUM ALBUM COR SERPL-MCNC: 10.3 MG/DL (ref 8.3–10.1)
CALCIUM SERPL-MCNC: 9.6 MG/DL (ref 8.3–10.1)
CHLORIDE SERPL-SCNC: 98 MMOL/L (ref 100–108)
CO2 SERPL-SCNC: 34 MMOL/L (ref 21–32)
CREAT SERPL-MCNC: 1.69 MG/DL (ref 0.6–1.3)
ERYTHROCYTE [DISTWIDTH] IN BLOOD BY AUTOMATED COUNT: 19.6 % (ref 11.6–15.1)
GFR SERPL CREATININE-BSD FRML MDRD: 30 ML/MIN/1.73SQ M
GLUCOSE SERPL-MCNC: 147 MG/DL (ref 65–140)
HCT VFR BLD AUTO: 38.5 % (ref 34.8–46.1)
HCV AB SER QL: NORMAL
HGB BLD-MCNC: 11.7 G/DL (ref 11.5–15.4)
MAGNESIUM SERPL-MCNC: 2.5 MG/DL (ref 1.6–2.6)
MCH RBC QN AUTO: 28.1 PG (ref 26.8–34.3)
MCHC RBC AUTO-ENTMCNC: 30.4 G/DL (ref 31.4–37.4)
MCV RBC AUTO: 92 FL (ref 82–98)
PHOSPHATE SERPL-MCNC: 4 MG/DL (ref 2.3–4.1)
PLATELET # BLD AUTO: 183 THOUSANDS/UL (ref 149–390)
PMV BLD AUTO: 10.7 FL (ref 8.9–12.7)
POTASSIUM SERPL-SCNC: 3.3 MMOL/L (ref 3.5–5.3)
RBC # BLD AUTO: 4.17 MILLION/UL (ref 3.81–5.12)
SODIUM SERPL-SCNC: 139 MMOL/L (ref 136–145)
WBC # BLD AUTO: 4.6 THOUSAND/UL (ref 4.31–10.16)

## 2021-01-25 PROCEDURE — 83735 ASSAY OF MAGNESIUM: CPT

## 2021-01-25 PROCEDURE — 85027 COMPLETE CBC AUTOMATED: CPT

## 2021-01-25 PROCEDURE — 36415 COLL VENOUS BLD VENIPUNCTURE: CPT

## 2021-01-25 PROCEDURE — 86803 HEPATITIS C AB TEST: CPT

## 2021-01-25 PROCEDURE — 80069 RENAL FUNCTION PANEL: CPT

## 2021-01-25 NOTE — TELEPHONE ENCOUNTER
Sandip Tarango, patient's son to discuss TAVR scheduled for 2/2/2021  Patient will be admitted on Monday 2/1/21 after oral surgery done at [facility]  Instructed son patient is to stop multivitamins now but son states patient is not taking  Instructed to stop Pradaxa on 1/28/21  Informed son all [facility] Mann labs will be ordered on Monday 2/1/21 when patient is here in hospital  Instructed son to call our office with any questions  Son verbalized understanding of all

## 2021-01-26 ENCOUNTER — TELEPHONE (OUTPATIENT)
Dept: FAMILY MEDICINE CLINIC | Facility: CLINIC | Age: 71
End: 2021-01-26

## 2021-01-26 ENCOUNTER — HOSPITAL ENCOUNTER (OUTPATIENT)
Facility: HOSPITAL | Age: 71
Setting detail: SURGERY ADMIT
End: 2021-01-26
Attending: THORACIC SURGERY (CARDIOTHORACIC VASCULAR SURGERY) | Admitting: THORACIC SURGERY (CARDIOTHORACIC VASCULAR SURGERY)
Payer: MEDICARE

## 2021-01-26 DIAGNOSIS — K59.04 CHRONIC IDIOPATHIC CONSTIPATION: Primary | ICD-10-CM

## 2021-01-26 DIAGNOSIS — I35.0 NONRHEUMATIC AORTIC VALVE STENOSIS: Primary | ICD-10-CM

## 2021-01-26 NOTE — TELEPHONE ENCOUNTER
I do not see this on the medication list       Lactulose 10GM/15ml   Take 15 ml by mouth two times a day  Sending to Dr Manuel Mccoy

## 2021-01-27 DIAGNOSIS — K59.04 CHRONIC IDIOPATHIC CONSTIPATION: ICD-10-CM

## 2021-01-27 DIAGNOSIS — Z11.59 SCREENING FOR VIRAL DISEASE: Primary | ICD-10-CM

## 2021-01-27 PROCEDURE — U0003 INFECTIOUS AGENT DETECTION BY NUCLEIC ACID (DNA OR RNA); SEVERE ACUTE RESPIRATORY SYNDROME CORONAVIRUS 2 (SARS-COV-2) (CORONAVIRUS DISEASE [COVID-19]), AMPLIFIED PROBE TECHNIQUE, MAKING USE OF HIGH THROUGHPUT TECHNOLOGIES AS DESCRIBED BY CMS-2020-01-R: HCPCS | Performed by: THORACIC SURGERY (CARDIOTHORACIC VASCULAR SURGERY)

## 2021-01-27 PROCEDURE — U0005 INFEC AGEN DETEC AMPLI PROBE: HCPCS | Performed by: THORACIC SURGERY (CARDIOTHORACIC VASCULAR SURGERY)

## 2021-01-27 RX ORDER — LACTULOSE 10 G/10G
10 SOLUTION ORAL 2 TIMES DAILY
Qty: 30 EACH | Refills: 0 | Status: SHIPPED | OUTPATIENT
Start: 2021-01-27 | End: 2021-01-27

## 2021-01-27 RX ORDER — LACTULOSE 10 G/15ML
SOLUTION ORAL
Qty: 1 ML | Refills: 0 | Status: SHIPPED | OUTPATIENT
Start: 2021-01-27 | End: 2021-01-29

## 2021-01-28 LAB — SARS-COV-2 RNA RESP QL NAA+PROBE: NEGATIVE

## 2021-01-28 NOTE — TELEPHONE ENCOUNTER
Please call patient to clarify  We did not have this on her med list but Dr Jose Ramon Correa tried to reorder  Did she get the original rx from GI? If so, she may need to request from them as the pharmacy seems to be having a hard time filling it

## 2021-01-29 RX ORDER — LACTULOSE 10 G/15ML
SOLUTION ORAL
Qty: 1 ML | Refills: 1 | Status: SHIPPED | OUTPATIENT
Start: 2021-01-29 | End: 2021-02-18

## 2021-01-30 ENCOUNTER — APPOINTMENT (EMERGENCY)
Dept: RADIOLOGY | Facility: HOSPITAL | Age: 71
DRG: 637 | End: 2021-01-30
Payer: MEDICARE

## 2021-01-30 ENCOUNTER — HOSPITAL ENCOUNTER (INPATIENT)
Facility: HOSPITAL | Age: 71
LOS: 1 days | DRG: 637 | End: 2021-01-31
Attending: EMERGENCY MEDICINE | Admitting: FAMILY MEDICINE
Payer: MEDICARE

## 2021-01-30 DIAGNOSIS — E16.2 HYPOGLYCEMIA: Primary | ICD-10-CM

## 2021-01-30 DIAGNOSIS — T68.XXXA HYPOTHERMIA: ICD-10-CM

## 2021-01-30 DIAGNOSIS — I50.42 CHRONIC COMBINED SYSTOLIC AND DIASTOLIC CONGESTIVE HEART FAILURE (HCC): ICD-10-CM

## 2021-01-30 DIAGNOSIS — R41.82 ALTERED MENTAL STATUS: ICD-10-CM

## 2021-01-30 DIAGNOSIS — I35.0 NONRHEUMATIC AORTIC VALVE STENOSIS: ICD-10-CM

## 2021-01-30 DIAGNOSIS — N19 UREMIA: ICD-10-CM

## 2021-01-30 PROBLEM — G93.40 ENCEPHALOPATHY ACUTE: Status: ACTIVE | Noted: 2021-01-30

## 2021-01-30 LAB
ALBUMIN SERPL BCP-MCNC: 2.7 G/DL (ref 3.5–5)
ALP SERPL-CCNC: 214 U/L (ref 46–116)
ALT SERPL W P-5'-P-CCNC: 23 U/L (ref 12–78)
AMMONIA PLAS-SCNC: 77 UMOL/L (ref 11–35)
AMPHETAMINES SERPL QL SCN: NEGATIVE
ANION GAP SERPL CALCULATED.3IONS-SCNC: 1 MMOL/L (ref 4–13)
ANION GAP SERPL CALCULATED.3IONS-SCNC: 6 MMOL/L (ref 4–13)
APAP SERPL-MCNC: <2 UG/ML (ref 10–20)
APTT PPP: 31 SECONDS (ref 23–37)
ARTERIAL PATENCY WRIST A: YES
AST SERPL W P-5'-P-CCNC: 41 U/L (ref 5–45)
BACTERIA UR QL AUTO: ABNORMAL /HPF
BARBITURATES UR QL: NEGATIVE
BASE EXCESS BLDA CALC-SCNC: 12.3 MMOL/L
BASOPHILS # BLD AUTO: 0.05 THOUSANDS/ΜL (ref 0–0.1)
BASOPHILS NFR BLD AUTO: 1 % (ref 0–1)
BENZODIAZ UR QL: NEGATIVE
BILIRUB SERPL-MCNC: 1.6 MG/DL (ref 0.2–1)
BILIRUB UR QL STRIP: NEGATIVE
BODY TEMPERATURE: 95.8 DEGREES FEHRENHEIT
BUN SERPL-MCNC: 114 MG/DL (ref 5–25)
BUN SERPL-MCNC: 121 MG/DL (ref 5–25)
CALCIUM ALBUM COR SERPL-MCNC: 9.2 MG/DL (ref 8.3–10.1)
CALCIUM SERPL-MCNC: 8.2 MG/DL (ref 8.3–10.1)
CALCIUM SERPL-MCNC: 9.4 MG/DL (ref 8.3–10.1)
CHLORIDE SERPL-SCNC: 98 MMOL/L (ref 100–108)
CHLORIDE SERPL-SCNC: 99 MMOL/L (ref 100–108)
CLARITY UR: CLEAR
CO2 SERPL-SCNC: 37 MMOL/L (ref 21–32)
CO2 SERPL-SCNC: 37 MMOL/L (ref 21–32)
COCAINE UR QL: NEGATIVE
COLOR UR: ABNORMAL
CREAT SERPL-MCNC: 1.77 MG/DL (ref 0.6–1.3)
CREAT SERPL-MCNC: 1.87 MG/DL (ref 0.6–1.3)
EOSINOPHIL # BLD AUTO: 0.19 THOUSAND/ΜL (ref 0–0.61)
EOSINOPHIL NFR BLD AUTO: 3 % (ref 0–6)
ERYTHROCYTE [DISTWIDTH] IN BLOOD BY AUTOMATED COUNT: 19.2 % (ref 11.6–15.1)
ERYTHROCYTE [DISTWIDTH] IN BLOOD BY AUTOMATED COUNT: 19.4 % (ref 11.6–15.1)
ETHANOL SERPL-MCNC: <3 MG/DL (ref 0–3)
FLUAV RNA RESP QL NAA+PROBE: NEGATIVE
FLUBV RNA RESP QL NAA+PROBE: NEGATIVE
GFR SERPL CREATININE-BSD FRML MDRD: 26 ML/MIN/1.73SQ M
GFR SERPL CREATININE-BSD FRML MDRD: 28 ML/MIN/1.73SQ M
GLUCOSE SERPL-MCNC: 180 MG/DL (ref 65–140)
GLUCOSE SERPL-MCNC: 199 MG/DL (ref 65–140)
GLUCOSE SERPL-MCNC: 225 MG/DL (ref 65–140)
GLUCOSE SERPL-MCNC: 35 MG/DL (ref 65–140)
GLUCOSE SERPL-MCNC: 67 MG/DL (ref 65–140)
GLUCOSE SERPL-MCNC: 80 MG/DL (ref 65–140)
GLUCOSE SERPL-MCNC: 86 MG/DL (ref 65–140)
GLUCOSE SERPL-MCNC: 87 MG/DL (ref 65–140)
GLUCOSE SERPL-MCNC: 98 MG/DL (ref 65–140)
GLUCOSE UR STRIP-MCNC: NEGATIVE MG/DL
HCO3 BLDA-SCNC: 38.3 MMOL/L (ref 22–28)
HCT VFR BLD AUTO: 39 % (ref 34.8–46.1)
HCT VFR BLD AUTO: 42.5 % (ref 34.8–46.1)
HGB BLD-MCNC: 11.3 G/DL (ref 11.5–15.4)
HGB BLD-MCNC: 12.2 G/DL (ref 11.5–15.4)
HGB UR QL STRIP.AUTO: ABNORMAL
IMM GRANULOCYTES # BLD AUTO: 0.02 THOUSAND/UL (ref 0–0.2)
IMM GRANULOCYTES NFR BLD AUTO: 0 % (ref 0–2)
INR PPP: 1.43 (ref 0.84–1.19)
KETONES UR STRIP-MCNC: NEGATIVE MG/DL
LEUKOCYTE ESTERASE UR QL STRIP: NEGATIVE
LYMPHOCYTES # BLD AUTO: 0.91 THOUSANDS/ΜL (ref 0.6–4.47)
LYMPHOCYTES NFR BLD AUTO: 15 % (ref 14–44)
MCH RBC QN AUTO: 26.9 PG (ref 26.8–34.3)
MCH RBC QN AUTO: 27.2 PG (ref 26.8–34.3)
MCHC RBC AUTO-ENTMCNC: 28.7 G/DL (ref 31.4–37.4)
MCHC RBC AUTO-ENTMCNC: 29 G/DL (ref 31.4–37.4)
MCV RBC AUTO: 94 FL (ref 82–98)
MCV RBC AUTO: 94 FL (ref 82–98)
METHADONE UR QL: NEGATIVE
MONOCYTES # BLD AUTO: 0.95 THOUSAND/ΜL (ref 0.17–1.22)
MONOCYTES NFR BLD AUTO: 16 % (ref 4–12)
NASAL CANNULA: 2
NEUTROPHILS # BLD AUTO: 3.81 THOUSANDS/ΜL (ref 1.85–7.62)
NEUTS SEG NFR BLD AUTO: 65 % (ref 43–75)
NITRITE UR QL STRIP: NEGATIVE
NON-SQ EPI CELLS URNS QL MICRO: ABNORMAL /HPF
NRBC BLD AUTO-RTO: 0 /100 WBCS
NT-PROBNP SERPL-MCNC: ABNORMAL PG/ML
O2 CT BLDA-SCNC: 16.4 ML/DL (ref 16–23)
OPIATES UR QL SCN: NEGATIVE
OXYCODONE+OXYMORPHONE UR QL SCN: NEGATIVE
OXYHGB MFR BLDA: 97.4 % (ref 94–97)
PCO2 BLDA: 56.2 MM HG (ref 36–44)
PCO2 TEMP ADJ BLDA: 52.4 MM HG (ref 36–44)
PCP UR QL: NEGATIVE
PH BLD: 7.47 [PH] (ref 7.35–7.45)
PH BLDA: 7.45 [PH] (ref 7.35–7.45)
PH UR STRIP.AUTO: 6.5 [PH]
PLATELET # BLD AUTO: 160 THOUSANDS/UL (ref 149–390)
PLATELET # BLD AUTO: 251 THOUSANDS/UL (ref 149–390)
PMV BLD AUTO: 11.8 FL (ref 8.9–12.7)
PMV BLD AUTO: 9.7 FL (ref 8.9–12.7)
PO2 BLD: 157 MM HG (ref 75–129)
PO2 BLDA: 165.8 MM HG (ref 75–129)
POTASSIUM SERPL-SCNC: 2.7 MMOL/L (ref 3.5–5.3)
POTASSIUM SERPL-SCNC: 3.4 MMOL/L (ref 3.5–5.3)
PROT SERPL-MCNC: 8.3 G/DL (ref 6.4–8.2)
PROT UR STRIP-MCNC: NEGATIVE MG/DL
PROTHROMBIN TIME: 17.2 SECONDS (ref 11.6–14.5)
RBC # BLD AUTO: 4.16 MILLION/UL (ref 3.81–5.12)
RBC # BLD AUTO: 4.53 MILLION/UL (ref 3.81–5.12)
RBC #/AREA URNS AUTO: ABNORMAL /HPF
RSV RNA RESP QL NAA+PROBE: NEGATIVE
SALICYLATES SERPL-MCNC: <3 MG/DL (ref 3–20)
SARS-COV-2 RNA RESP QL NAA+PROBE: NEGATIVE
SODIUM SERPL-SCNC: 136 MMOL/L (ref 136–145)
SODIUM SERPL-SCNC: 142 MMOL/L (ref 136–145)
SP GR UR STRIP.AUTO: 1.01 (ref 1–1.03)
SPECIMEN SOURCE: ABNORMAL
THC UR QL: NEGATIVE
TROPONIN I SERPL-MCNC: 0.03 NG/ML
TSH SERPL DL<=0.05 MIU/L-ACNC: 2.36 UIU/ML (ref 0.36–3.74)
UROBILINOGEN UR QL STRIP.AUTO: 0.2 E.U./DL
WBC # BLD AUTO: 5.17 THOUSAND/UL (ref 4.31–10.16)
WBC # BLD AUTO: 5.93 THOUSAND/UL (ref 4.31–10.16)
WBC #/AREA URNS AUTO: ABNORMAL /HPF

## 2021-01-30 PROCEDURE — 87040 BLOOD CULTURE FOR BACTERIA: CPT | Performed by: EMERGENCY MEDICINE

## 2021-01-30 PROCEDURE — 82077 ASSAY SPEC XCP UR&BREATH IA: CPT | Performed by: EMERGENCY MEDICINE

## 2021-01-30 PROCEDURE — 85610 PROTHROMBIN TIME: CPT | Performed by: EMERGENCY MEDICINE

## 2021-01-30 PROCEDURE — 84443 ASSAY THYROID STIM HORMONE: CPT | Performed by: EMERGENCY MEDICINE

## 2021-01-30 PROCEDURE — 80143 DRUG ASSAY ACETAMINOPHEN: CPT | Performed by: EMERGENCY MEDICINE

## 2021-01-30 PROCEDURE — 93005 ELECTROCARDIOGRAM TRACING: CPT

## 2021-01-30 PROCEDURE — 84484 ASSAY OF TROPONIN QUANT: CPT | Performed by: EMERGENCY MEDICINE

## 2021-01-30 PROCEDURE — 96374 THER/PROPH/DIAG INJ IV PUSH: CPT

## 2021-01-30 PROCEDURE — 36415 COLL VENOUS BLD VENIPUNCTURE: CPT | Performed by: EMERGENCY MEDICINE

## 2021-01-30 PROCEDURE — 82948 REAGENT STRIP/BLOOD GLUCOSE: CPT

## 2021-01-30 PROCEDURE — G1004 CDSM NDSC: HCPCS

## 2021-01-30 PROCEDURE — 80179 DRUG ASSAY SALICYLATE: CPT | Performed by: EMERGENCY MEDICINE

## 2021-01-30 PROCEDURE — 80048 BASIC METABOLIC PNL TOTAL CA: CPT | Performed by: INTERNAL MEDICINE

## 2021-01-30 PROCEDURE — 85027 COMPLETE CBC AUTOMATED: CPT | Performed by: INTERNAL MEDICINE

## 2021-01-30 PROCEDURE — 82140 ASSAY OF AMMONIA: CPT | Performed by: EMERGENCY MEDICINE

## 2021-01-30 PROCEDURE — 80307 DRUG TEST PRSMV CHEM ANLYZR: CPT | Performed by: EMERGENCY MEDICINE

## 2021-01-30 PROCEDURE — 81001 URINALYSIS AUTO W/SCOPE: CPT | Performed by: EMERGENCY MEDICINE

## 2021-01-30 PROCEDURE — 99223 1ST HOSP IP/OBS HIGH 75: CPT | Performed by: INTERNAL MEDICINE

## 2021-01-30 PROCEDURE — 80053 COMPREHEN METABOLIC PANEL: CPT | Performed by: EMERGENCY MEDICINE

## 2021-01-30 PROCEDURE — 99291 CRITICAL CARE FIRST HOUR: CPT | Performed by: EMERGENCY MEDICINE

## 2021-01-30 PROCEDURE — 85730 THROMBOPLASTIN TIME PARTIAL: CPT | Performed by: EMERGENCY MEDICINE

## 2021-01-30 PROCEDURE — 36600 WITHDRAWAL OF ARTERIAL BLOOD: CPT

## 2021-01-30 PROCEDURE — 0241U HB NFCT DS VIR RESP RNA 4 TRGT: CPT | Performed by: EMERGENCY MEDICINE

## 2021-01-30 PROCEDURE — 83880 ASSAY OF NATRIURETIC PEPTIDE: CPT | Performed by: EMERGENCY MEDICINE

## 2021-01-30 PROCEDURE — 70450 CT HEAD/BRAIN W/O DYE: CPT

## 2021-01-30 PROCEDURE — 82805 BLOOD GASES W/O2 SATURATION: CPT | Performed by: EMERGENCY MEDICINE

## 2021-01-30 PROCEDURE — 85025 COMPLETE CBC W/AUTO DIFF WBC: CPT | Performed by: EMERGENCY MEDICINE

## 2021-01-30 PROCEDURE — 99285 EMERGENCY DEPT VISIT HI MDM: CPT

## 2021-01-30 PROCEDURE — 96376 TX/PRO/DX INJ SAME DRUG ADON: CPT

## 2021-01-30 PROCEDURE — 71045 X-RAY EXAM CHEST 1 VIEW: CPT

## 2021-01-30 RX ORDER — POTASSIUM CHLORIDE 20 MEQ/1
40 TABLET, EXTENDED RELEASE ORAL ONCE
Status: COMPLETED | OUTPATIENT
Start: 2021-01-30 | End: 2021-01-30

## 2021-01-30 RX ORDER — LACTULOSE 10 G/15ML
10 SOLUTION ORAL 2 TIMES DAILY PRN
Status: DISCONTINUED | OUTPATIENT
Start: 2021-01-30 | End: 2021-01-31

## 2021-01-30 RX ORDER — MULTIVITAMIN
1 TABLET ORAL DAILY
Status: DISCONTINUED | OUTPATIENT
Start: 2021-01-31 | End: 2021-01-30 | Stop reason: SDUPTHER

## 2021-01-30 RX ORDER — DEXTROSE MONOHYDRATE 25 G/50ML
50 INJECTION, SOLUTION INTRAVENOUS ONCE
Status: COMPLETED | OUTPATIENT
Start: 2021-01-30 | End: 2021-01-30

## 2021-01-30 RX ORDER — BRIMONIDINE TARTRATE, TIMOLOL MALEATE 2; 5 MG/ML; MG/ML
1 SOLUTION/ DROPS OPHTHALMIC 2 TIMES DAILY
Status: DISCONTINUED | OUTPATIENT
Start: 2021-01-30 | End: 2021-01-31 | Stop reason: HOSPADM

## 2021-01-30 RX ORDER — ACETAMINOPHEN 325 MG/1
650 TABLET ORAL EVERY 6 HOURS PRN
Status: DISCONTINUED | OUTPATIENT
Start: 2021-01-30 | End: 2021-01-31 | Stop reason: HOSPADM

## 2021-01-30 RX ORDER — METOPROLOL SUCCINATE 50 MG/1
50 TABLET, EXTENDED RELEASE ORAL DAILY
Status: DISCONTINUED | OUTPATIENT
Start: 2021-01-31 | End: 2021-01-31 | Stop reason: HOSPADM

## 2021-01-30 RX ORDER — ATORVASTATIN CALCIUM 20 MG/1
20 TABLET, FILM COATED ORAL
Status: DISCONTINUED | OUTPATIENT
Start: 2021-01-31 | End: 2021-01-31 | Stop reason: HOSPADM

## 2021-01-30 RX ORDER — HEPARIN SODIUM 5000 [USP'U]/ML
5000 INJECTION, SOLUTION INTRAVENOUS; SUBCUTANEOUS EVERY 8 HOURS SCHEDULED
Status: DISCONTINUED | OUTPATIENT
Start: 2021-01-31 | End: 2021-01-31 | Stop reason: HOSPADM

## 2021-01-30 RX ORDER — POTASSIUM CHLORIDE 14.9 MG/ML
20 INJECTION INTRAVENOUS ONCE
Status: COMPLETED | OUTPATIENT
Start: 2021-01-30 | End: 2021-01-31

## 2021-01-30 RX ORDER — DEXTROSE MONOHYDRATE 25 G/50ML
25 INJECTION, SOLUTION INTRAVENOUS ONCE
Status: COMPLETED | OUTPATIENT
Start: 2021-01-30 | End: 2021-01-30

## 2021-01-30 RX ORDER — NALOXONE HYDROCHLORIDE 1 MG/ML
2 INJECTION INTRAMUSCULAR; INTRAVENOUS; SUBCUTANEOUS ONCE
Status: DISCONTINUED | OUTPATIENT
Start: 2021-01-30 | End: 2021-01-30

## 2021-01-30 RX ORDER — DEXTROSE MONOHYDRATE 25 G/50ML
INJECTION, SOLUTION INTRAVENOUS
Status: COMPLETED
Start: 2021-01-30 | End: 2021-01-30

## 2021-01-30 RX ORDER — PANTOPRAZOLE SODIUM 40 MG/1
40 TABLET, DELAYED RELEASE ORAL
Status: DISCONTINUED | OUTPATIENT
Start: 2021-01-31 | End: 2021-01-31 | Stop reason: HOSPADM

## 2021-01-30 RX ORDER — GABAPENTIN 300 MG/1
300 CAPSULE ORAL
Status: DISCONTINUED | OUTPATIENT
Start: 2021-01-31 | End: 2021-01-31 | Stop reason: HOSPADM

## 2021-01-30 RX ADMIN — INSULIN LISPRO 1 UNITS: 100 INJECTION, SOLUTION INTRAVENOUS; SUBCUTANEOUS at 21:39

## 2021-01-30 RX ADMIN — DEXTROSE MONOHYDRATE 25 ML: 500 INJECTION PARENTERAL at 15:02

## 2021-01-30 RX ADMIN — SODIUM CHLORIDE 1000 ML: 0.9 INJECTION, SOLUTION INTRAVENOUS at 17:39

## 2021-01-30 RX ADMIN — ACETAMINOPHEN 650 MG: 325 TABLET, FILM COATED ORAL at 23:18

## 2021-01-30 RX ADMIN — POTASSIUM CHLORIDE 40 MEQ: 1500 TABLET, EXTENDED RELEASE ORAL at 22:58

## 2021-01-30 RX ADMIN — DEXTROSE MONOHYDRATE 50 ML: 25 INJECTION, SOLUTION INTRAVENOUS at 13:45

## 2021-01-30 RX ADMIN — POTASSIUM CHLORIDE 20 MEQ: 14.9 INJECTION, SOLUTION INTRAVENOUS at 22:51

## 2021-01-30 NOTE — ED PROVIDER NOTES
History  Chief Complaint   Patient presents with    Hypoglycemia - Symptomatic     EMS called for an altered mental  Patient arrives with BS <35  Given an amp of dextrose during triage and patient was then able to communicate with staff  Stopped blood thinner recently in prep for oral, cardiac, and bowel surgery  71F hx DM, COPD, CHF, CKD4, AFib on Pradaxa - held past 2days for dental extraction Monday and TAVR Tuesday, noted to be lethargic this AM around 6:00 gradually worsening until 11am was unresponsive  Son called EMS  BS en route 68, on arrival here 35  Mental status transiently improved with 1amp D50 on arrival but still somnolent  Not providing hx  Prior to Admission Medications   Prescriptions Last Dose Informant Patient Reported? Taking?    Accu-Chek Virgie Plus test strip  Child No No   Sig: TEST BLOOD SUGAR 3 TIMES EVERY DAY  DX: E11 65   B-D UF III MINI PEN NEEDLES 31G X 5 MM MISC  Child Yes No   Si (four) times a day As directed   COMBIGAN 0 2-0 5 %  Child Yes No   Sig: Administer 1 drop to both eyes 2 (two) times a day   Cyanocobalamin (VITAMIN B 12 PO)  Child Yes No   Sig: Take by mouth daily   Insulin Aspart (NovoLOG PenFill) 100 UNITS/ML cartridge for injection   Yes No   Sig: Inject 10 Units under the skin 3 (three) times a day with meals   Multiple Vitamin (MULTIVITAMIN) tablet  Child Yes No   Sig: Take 1 tablet by mouth daily   acetaminophen (TYLENOL) 325 mg tablet   No No   Sig: Take 2 tablets (650 mg total) by mouth every 4 (four) hours as needed for mild pain or fever   dabigatran etexilate (PRADAXA) 75 mg capsule   No No   Sig: Take 1 capsule (75 mg total) by mouth every 12 (twelve) hours   famotidine (PEPCID) 20 mg tablet  Child No No   Sig: TAKE 1 TABLET BY MOUTH TWICE A DAY   gabapentin (NEURONTIN) 300 mg capsule   No No   Sig: Take 1 capsule (300 mg total) by mouth daily at bedtime   insulin glargine (Basaglar KwikPen) 100 units/mL injection pen   No No   Sig: Inject 15 Units under the skin daily   lactulose (CHRONULAC) 10 g/15 mL solution   No No   Sig: PLEASE SPECIFY DIRECTIONS, REFILLS AND QUANTITY   levothyroxine (Synthroid) 137 mcg tablet  Child No No   Sig: Take 1 tablet (137 mcg total) by mouth daily   metolazone (ZAROXOLYN) 2 5 mg tablet   No No   Sig: Take 1 tablet (2 5 mg total) by mouth every other day   metoprolol succinate (TOPROL-XL) 50 mg 24 hr tablet   No No   Sig: Take 1 tablet (50 mg total) by mouth daily   pantoprazole (PROTONIX) 40 mg tablet  Child Yes No   Sig: Take 40 mg by mouth daily in the early morning    potassium chloride (Klor-Con M10) 10 mEq tablet   No No   Sig: Take 2 tablets (20 mEq total) by mouth daily   simvastatin (ZOCOR) 20 mg tablet  Child No No   Sig: TAKE 1 TABLET (20MG) BY ORAL ROUTE EVERY DAY IN THE EVENING   torsemide (DEMADEX) 20 mg tablet   No No   Sig: Take 2 tablets (40 mg total) by mouth daily      Facility-Administered Medications: None       Past Medical History:   Diagnosis Date    Arthritis     Atrial flutter (HCC)     Cardiac disease     Carotid artery occlusion     CHF (congestive heart failure) (HCC)     Cholelithiasis     last assessed 8/8/2016    Coronary artery disease     Diabetes mellitus ([facility] [address] )     Disease of thyroid gland     Essential hypertension 8/15/2016    GERD (gastroesophageal reflux disease)     History of transfusion     Hyperlipidemia     Hypertension     Long-term insulin use in type 2 diabetes ([facility] [address] ) 6/4/2016    Other specified diabetes mellitus with diabetic autonomic (poly)neuropathy (HCC)     Oxygen dependent     3L    Pleural effusion 2008    Pulmonary embolism ([facility] [address] )     2008    Renal disorder     Retinopathy     bilat    Sleep apnea     USES 3 LITER O2 [place]    Subclavian artery stenosis (HCC)     Umbilical hernia with obstruction, without gangrene     last assessed 7/8/2016       Past Surgical History:   Procedure Laterality Date    ABDOMINAL SURGERY      CARDIAC PACEMAKER PLACEMENT      CARDIAC SURGERY      cabg x 2    CATARACT EXTRACTION      CHOLECYSTECTOMY      COLONOSCOPY      CORONARY ARTERY BYPASS GRAFT  2008    EYE SURGERY      FRACTURE SURGERY Right 2010    shoulder    HERNIA REPAIR      umbilical    GA LAP,CHOLECYSTECTOMY N/A 8/10/2016    Procedure: CHOLECYSTECTOMY LAPAROSCOPIC;  Surgeon: Mariel Scanlon MD;  [facility];  Service: General    GA LARYNGOSCOPY,DIRECT,SCOPE,INJ CORDS Left 11/4/2016    Procedure: [place]; LEFT VOCAL FOLD INJECTION ;  Surgeon: Raúl Maldonado MD;  [facility];  Service: ENT    GA REPAIR UMBILICAL HMSS,5+R/O,WKNZU N/A 8/10/2016    Procedure: LAPAROSCOPIC REPAIR HERNIA VENTRAL;  Surgeon: Mariel Scanlon MD;  [facility];  Service: General    [address] / STENTING Right     TRICUSPID VALVE REPLACEMENT      VASCULAR SURGERY      heart valve replacement       Family History   Problem Relation Age of Onset    Heart disease Mother     Breast cancer Maternal Aunt     Heart disease Sister      I have reviewed and agree with the history as documented  E-Cigarette/Vaping    E-Cigarette Use Never User      E-Cigarette/Vaping Substances    Nicotine No     THC No     CBD No     Flavoring No     Other No     Unknown No      Social History     Tobacco Use    Smoking status: Never Smoker    Smokeless tobacco: Never Used   Substance Use Topics    Alcohol use: Not Currently     Frequency: Never     Drinks per session: Patient refused     Binge frequency: Never     Comment: only on NEW YEAR'S    Drug use: Never     Types: Oxycodone       Review of Systems   Unable to perform ROS: Mental status change       Physical Exam  Physical Exam  Vitals signs reviewed  Constitutional:       Appearance: She is well-developed  She is ill-appearing  Comments: unresponsive   HENT:      Head: Normocephalic and atraumatic        Right Ear: External ear normal       Left Ear: External ear normal       Nose: Nose normal  No rhinorrhea  Mouth/Throat:      Mouth: Mucous membranes are dry  Eyes:      Conjunctiva/sclera: Conjunctivae normal    Neck:      Musculoskeletal: Neck supple  Cardiovascular:      Rate and Rhythm: Normal rate and regular rhythm  Pulmonary:      Effort: Pulmonary effort is normal       Breath sounds: Rales (mild) present  No wheezing  Abdominal:      Palpations: Abdomen is soft  Tenderness: There is no abdominal tenderness  Musculoskeletal:      Right lower leg: No edema  Left lower leg: No edema  Skin:     General: Skin is dry  Comments: Cool to touch   Neurological:      Comments: Somnolent arousable to voice, moves all extremities with some encouragement  Oriented x 1           Vital Signs  ED Triage Vitals   Temperature Pulse Respirations Blood Pressure SpO2   01/30/21 1400 01/30/21 1345 01/30/21 1400 01/30/21 1400 01/30/21 1345   (!) 94 9 °F (34 9 °C) 60 17 125/59 (!) 88 %      Temp Source Heart Rate Source Patient Position - Orthostatic VS BP Location FiO2 (%)   01/30/21 1400 -- 01/30/21 1715 01/30/21 1715 --   Tympanic  Lying Left arm       Pain Score       --                  Vitals:    01/30/21 1800 01/30/21 1815 01/30/21 1830 01/30/21 1857   BP:  124/60 125/62    Pulse: 60 60 62 62   Patient Position - Orthostatic VS:             Visual Acuity      ED Medications  Medications   dextrose 50 % IV solution 50 mL (50 mL Intravenous Given 1/30/21 1345)   dextrose 50 % IV solution 25 mL (25 mL Intravenous Given 1/30/21 1502)   sodium chloride 0 9 % bolus 1,000 mL (1,000 mL Intravenous New Bag 1/30/21 1739)       Diagnostic Studies  Results Reviewed     Procedure Component Value Units Date/Time    Rapid drug screen, urine [[id number]]  (Normal) Collected: 01/30/21 1725    Lab Status: Final result Specimen: Urine, Catheter Updated: 01/30/21 1812     Amph/Meth UR Negative     Barbiturate Ur Negative     Benzodiazepine Urine Negative Cocaine Urine Negative     Methadone Urine Negative     Opiate Urine Negative     PCP Ur Negative     THC Urine Negative     Oxycodone Urine Negative    Narrative:      FOR MEDICAL PURPOSES ONLY  IF CONFIRMATION NEEDED PLEASE CONTACT THE LAB WITHIN 5 DAYS      Drug Screen Cutoff Levels:  AMPHETAMINE/METHAMPHETAMINES  1000 ng/mL  BARBITURATES     200 ng/mL  BENZODIAZEPINES     200 ng/mL  COCAINE      300 ng/mL  METHADONE      300 ng/mL  OPIATES      300 ng/mL  PHENCYCLIDINE     25 ng/mL  THC       50 ng/mL  OXYCODONE      100 ng/mL    Urine Microscopic [[id number]]  (Abnormal) Collected: 01/30/21 1723    Lab Status: Final result Specimen: Urine, Straight Cath Updated: 01/30/21 1746     RBC, UA 2-4 /hpf      WBC, UA 2-4 /hpf      Epithelial Cells Occasional /hpf      Bacteria, UA Moderate /hpf     Fingerstick Glucose (POCT) [[id number]]  (Normal) Collected: 01/30/21 1735    Lab Status: Final result Updated: 01/30/21 1744     POC Glucose 80 mg/dl     UA w Reflex to Microscopic w Reflex to Culture [[id number]]  (Abnormal) Collected: 01/30/21 1723    Lab Status: Final result Specimen: Urine, Straight Cath Updated: 01/30/21 1736     Color, UA Light Yellow     Clarity, UA Clear     Specific Gravity, UA 1 010     pH, UA 6 5     Leukocytes, UA Negative     Nitrite, UA Negative     Protein, UA Negative mg/dl      Glucose, UA Negative mg/dl      Ketones, UA Negative mg/dl      Urobilinogen, UA 0 2 E U /dl      Bilirubin, UA Negative     Blood, UA Trace-Intact    Blood gas, arterial [[id number]]  (Abnormal) Collected: 01/30/21 1540    Lab Status: Final result Specimen: Blood, Arterial from Radial, Right Updated: 01/30/21 1553     pH, Arterial 7 451     PH ART TC 7 475     pCO2, Arterial 56 2 mm Hg      PCO2 (TC) Arterial 52 4 mm Hg      pO2, Arterial 165 8 mm Hg      PO2 (TC) Arterial 157 0 mm Hg      HCO3, Arterial 38 3 mmol/L      Base Excess, Arterial 12 3 mmol/L      O2 Content, Arterial 16 4 mL/dL      O2 HGB,Arterial  97 4 % SOURCE Radial, Right     MARLENA TEST Yes     Temperature 95 8 Degrees Fehrenheit      Nasal Cannula 2    COVID19, Influenza A/B, RSV PCR, [facility] [[id number]]  (Normal) Collected: 01/30/21 1432    Lab Status: Final result Specimen: Nares from Nasopharyngeal Swab Updated: 01/30/21 1533     SARS-CoV-2 Negative     INFLUENZA A PCR Negative     INFLUENZA B PCR Negative     RSV PCR Negative    Narrative: This test has been authorized by FDA under an EUA (Emergency Use Assay) for use by authorized laboratories  Clinical caution and judgement should be used with the interpretation of these results with consideration of the clinical impression and other laboratory testing  Testing reported as "Positive" or "Negative" has been proven to be accurate according to standard laboratory validation requirements  All testing is performed with control materials showing appropriate reactivity at standard intervals      Fingerstick Glucose (POCT) [[id number]]  (Normal) Collected: 01/30/21 1526    Lab Status: Final result Updated: 01/30/21 1528     POC Glucose 86 mg/dl     Fingerstick Glucose (POCT) [[id number]]  (Normal) Collected: 01/30/21 1458    Lab Status: Final result Updated: 01/30/21 1500     POC Glucose 67 mg/dl     Comprehensive metabolic panel [[id number]]  (Abnormal) Collected: 01/30/21 1410    Lab Status: Final result Specimen: Blood from Arm, Right Updated: 01/30/21 1447     Sodium 136 mmol/L      Potassium 3 4 mmol/L      Chloride 98 mmol/L      CO2 37 mmol/L      ANION GAP 1 mmol/L       mg/dL      Creatinine 1 77 mg/dL      Glucose 225 mg/dL      Calcium 8 2 mg/dL      Corrected Calcium 9 2 mg/dL      AST 41 U/L      ALT 23 U/L      Alkaline Phosphatase 214 U/L      Total Protein 8 3 g/dL      Albumin 2 7 g/dL      Total Bilirubin 1 60 mg/dL      eGFR 28 ml/min/1 73sq m     Narrative:      [place] guidelines for Chronic Kidney Disease (CKD):     Stage 1 with normal or high GFR (GFR > 90 mL/min/1 73 square meters)    Stage 2 Mild CKD (GFR = 60-89 mL/min/1 73 square meters)    Stage 3A Moderate CKD (GFR = 45-59 mL/min/1 73 square meters)    Stage 3B Moderate CKD (GFR = 30-44 mL/min/1 73 square meters)    Stage 4 Severe CKD (GFR = 15-29 mL/min/1 73 square meters)    Stage 5 End Stage CKD (GFR <15 mL/min/1 73 square meters)  Note: GFR calculation is accurate only with a steady state creatinine    TSH [[id number]]  (Normal) Collected: 01/30/21 1410    Lab Status: Final result Specimen: Blood from Arm, Right Updated: 01/30/21 1447     TSH 3RD GENERATON 2 360 uIU/mL     Narrative:      Patients undergoing fluorescein dye angiography may retain small amounts of fluorescein in the body for 48-72 hours post procedure  Samples containing fluorescein can produce falsely depressed TSH values  If the patient had this procedure,a specimen should be resubmitted post fluorescein clearance        NT-BNP PRO [[id number]]  (Abnormal) Collected: 01/30/21 1410    Lab Status: Final result Specimen: Blood from Arm, Right Updated: 01/30/21 1447     NT-proBNP 11,414 pg/mL     Acetaminophen level-"If concentration is detectable, please discuss with medical  on call " [[id number]]  (Abnormal) Collected: 01/30/21 1410    Lab Status: Final result Specimen: Blood from Arm, Right Updated: 01/30/21 1447     Acetaminophen Level <4 1 ug/mL     Salicylate level [[id number]]  (Abnormal) Collected: 01/30/21 1410    Lab Status: Final result Specimen: Blood from Arm, Right Updated: 62/72/12 1344     Salicylate Lvl <3 2 mg/dL     Troponin I [[id number]]  (Normal) Collected: 01/30/21 1410    Lab Status: Final result Specimen: Blood from Arm, Right Updated: 01/30/21 1440     Troponin I 0 03 ng/mL     Ethanol [[id number]]  (Normal) Collected: 01/30/21 1410    Lab Status: Final result Specimen: Blood from Arm, Right Updated: 01/30/21 1439     Ethanol Lvl <3 mg/dL     Protime-INR [[id number]]  (Abnormal) Collected: 01/30/21 1410    Lab Status: Final result Specimen: Blood from Arm, Right Updated: 01/30/21 1437     Protime 17 2 seconds      INR 1 43    APTT [[id number]]  (Normal) Collected: 01/30/21 1410    Lab Status: Final result Specimen: Blood from Arm, Right Updated: 01/30/21 1437     PTT 31 seconds     Ammonia [[id number]]  (Abnormal) Collected: 01/30/21 1410    Lab Status: Final result Specimen: Blood from Arm, Right Updated: 01/30/21 1434     Ammonia 77 umol/L     CBC and differential [[id number]]  (Abnormal) Collected: 01/30/21 1410    Lab Status: Final result Specimen: Blood from Arm, Right Updated: 01/30/21 1421     WBC 5 93 Thousand/uL      RBC 4 16 Million/uL      Hemoglobin 11 3 g/dL      Hematocrit 39 0 %      MCV 94 fL      MCH 27 2 pg      MCHC 29 0 g/dL      RDW 19 4 %      MPV 11 8 fL      Platelets 410 Thousands/uL      nRBC 0 /100 WBCs      Neutrophils Relative 65 %      Immat GRANS % 0 %      Lymphocytes Relative 15 %      Monocytes Relative 16 %      Eosinophils Relative 3 %      Basophils Relative 1 %      Neutrophils Absolute 3 81 Thousands/µL      Immature Grans Absolute 0 02 Thousand/uL      Lymphocytes Absolute 0 91 Thousands/µL      Monocytes Absolute 0 95 Thousand/µL      Eosinophils Absolute 0 19 Thousand/µL      Basophils Absolute 0 05 Thousands/µL     Fingerstick Glucose (POCT) [[id number]]  (Abnormal) Collected: 01/30/21 1350    Lab Status: Final result Updated: 01/30/21 1418     POC Glucose 199 mg/dl     Fingerstick Glucose (POCT) [[id number]]  (Abnormal) Collected: 01/30/21 1343    Lab Status: Final result Updated: 01/30/21 1418     POC Glucose 35 mg/dl     Blood culture #2 [[id number]] Collected: 01/30/21 1410    Lab Status: In process Specimen: Blood from Arm, Right Updated: 01/30/21 1418    Blood culture #1 [[id number]] Collected: 01/30/21 1410    Lab Status:  In process Specimen: Blood from Arm, Right Updated: 01/30/21 1418                 CT head without contrast   Final Result by Ian Duffy MD (01/30 1652)      No acute intracranial abnormality  Workstation performed: [id number]         XR chest 1 view portable   Final Result by Cris Mascorro MD (01/30 1644)      Vascular congestion  Workstation performed: [id number]                    Procedures  CriticalCare Time  Performed by: Brennon Arango DO  Authorized by: Brennon Arango DO     Critical care provider statement:     Critical care time (minutes):  72    Critical care was necessary to treat or prevent imminent or life-threatening deterioration of the following conditions:  Metabolic crisis    Critical care was time spent personally by me on the following activities:  Obtaining history from patient or surrogate, development of treatment plan with patient or surrogate, examination of patient, ordering and performing treatments and interventions, ordering and review of laboratory studies, ordering and review of radiographic studies, re-evaluation of patient's condition and review of old charts             ED Course                                           MDM  Number of Diagnoses or Management Options  Altered mental status:   Hypoglycemia:   Hypothermia:   Uremia:   Diagnosis management comments: Pt seen immediately for unresponsiveness, BS 26, Temp 94 9  After 1 amp D50 she opened her eyes and answered a few questions, then fell back asleep  Arousable to sternal rub  Oriented x 1  Moves all extremities equally with much encouragement  CT head negative  CXR appears unchanged, read as vascular congestion  BS dropped again after 2 hours to 60, given repeat amp  ABG with hypercarbia 54 but doesn't explain degree of AMS and work of breathing is normal    Labs remarkable for severely elevated  with increased Cr to a lesser degree, 1 7  Ammonia level elevated at 77, no hx of liver disease  Pt's mental status spontaneously improved after about 4 hours in the ED    Now easily arousable, oriented x 2, does not recall events  Suspect she administered her AM insulin without eating breakfast   Also recently increased diuretics likely causing dehydration / uremia affecting mental status  Ordered 1L NS bolus, admitted to Dr Rosendo Watts for further mgmt  Disposition  Final diagnoses:   Hypoglycemia   Altered mental status   Hypothermia   Uremia     Time reflects when diagnosis was documented in both MDM as applicable and the Disposition within this note     Time User Action Codes Description Comment    1/30/2021  5:46 PM Zeeshan Colon [E16 2] Hypoglycemia     1/30/2021  5:46 PM Feliz Elias [R41 82] Altered mental status     1/30/2021  5:46 PM Luisvalentín Beae  XXXA] Hypothermia     1/30/2021  5:46 PM Marleni, [address] Uremia       ED Disposition     ED Disposition Condition Date/Time Comment    Admit Stable Sat Jan 30, 2021  5:46 PM Case was discussed with Dr Rosendo Watts and the patient's admission status was agreed to be Admission Status: inpatient status to the service of Dr Rosendo Watts  Follow-up Information    None         Current Discharge Medication List      CONTINUE these medications which have NOT CHANGED    Details   Accu-Chek Virgie Plus test strip TEST BLOOD SUGAR 3 TIMES EVERY DAY  DX: E11 65  Qty: 300 each, Refills: 3    Associated Diagnoses: 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, with long-term current use of insulin, unspecified laterality, unspecified retinopathy severity ([address] );  Type 2 diabetes mellitus with hyperglycemia, with long-term current use of insulin ([facility])      acetaminophen (TYLENOL) 325 mg tablet Take 2 tablets (650 mg total) by mouth every 4 (four) hours as needed for mild pain or fever  Qty:  , Refills: 0    Associated Diagnoses: Closed compression fracture of L3 lumbar vertebra with routine healing, subsequent encounter      B-D UF III MINI PEN NEEDLES 31G X 5 MM MISC 4 (four) times a day As directed      COMBIGAN 0 2-0 5 % Administer 1 drop to both eyes 2 (two) times a day      Cyanocobalamin (VITAMIN B 12 PO) Take by mouth daily      dabigatran etexilate (PRADAXA) 75 mg capsule Take 1 capsule (75 mg total) by mouth every 12 (twelve) hours  Qty: 60 capsule, Refills: 0    Associated Diagnoses: Chronic atrial fibrillation (HCC)      famotidine (PEPCID) 20 mg tablet TAKE 1 TABLET BY MOUTH TWICE A DAY  Qty: 60 tablet, Refills: 11    Associated Diagnoses: Gastroesophageal reflux disease without esophagitis      gabapentin (NEURONTIN) 300 mg capsule Take 1 capsule (300 mg total) by mouth daily at bedtime  Qty: 30 capsule, Refills: 3    Associated Diagnoses: Neuropathy      Insulin Aspart (NovoLOG PenFill) 100 UNITS/ML cartridge for injection Inject 10 Units under the skin 3 (three) times a day with meals      insulin glargine (Basaglar KwikPen) 100 units/mL injection pen Inject 15 Units under the skin daily  Qty: 5 pen, Refills: 2    Associated Diagnoses: Type 2 diabetes mellitus with diabetic neuropathy, with long-term current use of insulin ([facility])      lactulose (CHRONULAC) 10 g/15 mL solution PLEASE SPECIFY DIRECTIONS, REFILLS AND QUANTITY  Qty: 1 mL, Refills: 1    Associated Diagnoses: Chronic idiopathic constipation      levothyroxine (Synthroid) 137 mcg tablet Take 1 tablet (137 mcg total) by mouth daily  Qty: 90 tablet, Refills: 3    Associated Diagnoses: Type 2 diabetes mellitus with diabetic neuropathy, with long-term current use of insulin ([facility])      metolazone (ZAROXOLYN) 2 5 mg tablet Take 1 tablet (2 5 mg total) by mouth every other day  Qty: 15 tablet, Refills: 0    Associated Diagnoses: CKD (chronic kidney disease) stage 4, GFR 15-29 ml/min ([facility])      metoprolol succinate (TOPROL-XL) 50 mg 24 hr tablet Take 1 tablet (50 mg total) by mouth daily  Qty: 30 tablet, Refills: 0    Associated Diagnoses: Congestive heart failure, unspecified HF chronicity, unspecified heart failure type ([facility])      Multiple Vitamin (MULTIVITAMIN) tablet Take 1 tablet by mouth daily      pantoprazole (PROTONIX) 40 mg tablet Take 40 mg by mouth daily in the early morning       potassium chloride (Klor-Con M10) 10 mEq tablet Take 2 tablets (20 mEq total) by mouth daily  Qty: 45 tablet, Refills: 1    Comments: DX Code Needed    Associated Diagnoses: Combined systolic and diastolic congestive heart failure (HCC); CKD (chronic kidney disease) stage 3, GFR 30-59 ml/min      simvastatin (ZOCOR) 20 mg tablet TAKE 1 TABLET (20MG) BY ORAL ROUTE EVERY DAY IN THE EVENING  Qty: 90 tablet, Refills: 2    Associated Diagnoses: Dyslipidemia      torsemide (DEMADEX) 20 mg tablet Take 2 tablets (40 mg total) by mouth daily  Qty: 180 tablet, Refills: 0    Associated Diagnoses: Acute on chronic combined systolic and diastolic congestive heart failure (HCC)           No discharge procedures on file      PDMP Review     None          ED Provider  Electronically Signed by           Jessica Chauhan DO  01/30/21 5248

## 2021-01-31 ENCOUNTER — HOSPITAL ENCOUNTER (INPATIENT)
Facility: HOSPITAL | Age: 71
LOS: 4 days | Discharge: HOME HEALTH | DRG: 306 | End: 2021-02-04
Attending: INTERNAL MEDICINE | Admitting: INTERNAL MEDICINE
Payer: MEDICARE

## 2021-01-31 VITALS
TEMPERATURE: 97.3 F | RESPIRATION RATE: 15 BRPM | HEIGHT: 63 IN | HEART RATE: 62 BPM | DIASTOLIC BLOOD PRESSURE: 63 MMHG | SYSTOLIC BLOOD PRESSURE: 121 MMHG | OXYGEN SATURATION: 91 % | BODY MASS INDEX: 34.8 KG/M2 | WEIGHT: 196.43 LBS

## 2021-01-31 DIAGNOSIS — K74.60 CIRRHOSIS (HCC): ICD-10-CM

## 2021-01-31 DIAGNOSIS — Z79.4 TYPE 2 DIABETES MELLITUS WITH RETINOPATHY AND MACULAR EDEMA, WITH LONG-TERM CURRENT USE OF INSULIN, UNSPECIFIED LATERALITY, UNSPECIFIED RETINOPATHY SEVERITY (HCC): ICD-10-CM

## 2021-01-31 DIAGNOSIS — E83.9 CHRONIC KIDNEY DISEASE-MINERAL AND BONE DISORDER: Primary | ICD-10-CM

## 2021-01-31 DIAGNOSIS — E11.311 TYPE 2 DIABETES MELLITUS WITH RETINOPATHY AND MACULAR EDEMA, WITH LONG-TERM CURRENT USE OF INSULIN, UNSPECIFIED LATERALITY, UNSPECIFIED RETINOPATHY SEVERITY (HCC): ICD-10-CM

## 2021-01-31 DIAGNOSIS — M89.9 CHRONIC KIDNEY DISEASE-MINERAL AND BONE DISORDER: Primary | ICD-10-CM

## 2021-01-31 DIAGNOSIS — I35.0 NONRHEUMATIC AORTIC VALVE STENOSIS: ICD-10-CM

## 2021-01-31 DIAGNOSIS — N18.9 CHRONIC KIDNEY DISEASE-MINERAL AND BONE DISORDER: Primary | ICD-10-CM

## 2021-01-31 DIAGNOSIS — I50.42 CHRONIC COMBINED SYSTOLIC AND DIASTOLIC CONGESTIVE HEART FAILURE (HCC): ICD-10-CM

## 2021-01-31 DIAGNOSIS — J96.11 CHRONIC RESPIRATORY FAILURE WITH HYPOXIA (HCC): Chronic | ICD-10-CM

## 2021-01-31 DIAGNOSIS — I35.0 AORTIC VALVE STENOSIS: ICD-10-CM

## 2021-01-31 DIAGNOSIS — E72.20 HYPERAMMONEMIA (HCC): ICD-10-CM

## 2021-01-31 PROBLEM — T68.XXXA HYPOTHERMIA: Status: RESOLVED | Noted: 2021-01-30 | Resolved: 2021-01-31

## 2021-01-31 PROBLEM — E16.2 HYPOGLYCEMIA: Status: RESOLVED | Noted: 2020-11-30 | Resolved: 2021-01-31

## 2021-01-31 PROBLEM — R79.89 INCREASED AMMONIA LEVEL: Status: ACTIVE | Noted: 2021-01-31

## 2021-01-31 PROBLEM — R79.89 INCREASED AMMONIA LEVEL: Status: RESOLVED | Noted: 2021-01-31 | Resolved: 2021-01-31

## 2021-01-31 PROBLEM — N18.30 STAGE 3 CHRONIC KIDNEY DISEASE (HCC): Status: ACTIVE | Noted: 2020-10-22

## 2021-01-31 PROBLEM — R79.89 PRERENAL AZOTEMIA: Status: ACTIVE | Noted: 2021-01-30

## 2021-01-31 PROBLEM — G93.40 ENCEPHALOPATHY ACUTE: Status: RESOLVED | Noted: 2021-01-30 | Resolved: 2021-01-31

## 2021-01-31 LAB
AMMONIA PLAS-SCNC: 24 UMOL/L (ref 11–35)
ANION GAP SERPL CALCULATED.3IONS-SCNC: 3 MMOL/L (ref 4–13)
BUN SERPL-MCNC: 115 MG/DL (ref 5–25)
CALCIUM SERPL-MCNC: 8.7 MG/DL (ref 8.3–10.1)
CHLORIDE SERPL-SCNC: 101 MMOL/L (ref 100–108)
CO2 SERPL-SCNC: 35 MMOL/L (ref 21–32)
CREAT SERPL-MCNC: 2.1 MG/DL (ref 0.6–1.3)
ERYTHROCYTE [DISTWIDTH] IN BLOOD BY AUTOMATED COUNT: 19.3 % (ref 11.6–15.1)
GFR SERPL CREATININE-BSD FRML MDRD: 23 ML/MIN/1.73SQ M
GLUCOSE SERPL-MCNC: 191 MG/DL (ref 65–140)
GLUCOSE SERPL-MCNC: 206 MG/DL (ref 65–140)
GLUCOSE SERPL-MCNC: 215 MG/DL (ref 65–140)
GLUCOSE SERPL-MCNC: 222 MG/DL (ref 65–140)
GLUCOSE SERPL-MCNC: 306 MG/DL (ref 65–140)
GLUCOSE SERPL-MCNC: 317 MG/DL (ref 65–140)
GLUCOSE SERPL-MCNC: 331 MG/DL (ref 65–140)
HCT VFR BLD AUTO: 39 % (ref 34.8–46.1)
HGB BLD-MCNC: 11.2 G/DL (ref 11.5–15.4)
MCH RBC QN AUTO: 27.2 PG (ref 26.8–34.3)
MCHC RBC AUTO-ENTMCNC: 28.7 G/DL (ref 31.4–37.4)
MCV RBC AUTO: 95 FL (ref 82–98)
PLATELET # BLD AUTO: 151 THOUSANDS/UL (ref 149–390)
PMV BLD AUTO: 10.1 FL (ref 8.9–12.7)
POTASSIUM SERPL-SCNC: 3.2 MMOL/L (ref 3.5–5.3)
RBC # BLD AUTO: 4.12 MILLION/UL (ref 3.81–5.12)
SODIUM SERPL-SCNC: 139 MMOL/L (ref 136–145)
TSH SERPL DL<=0.05 MIU/L-ACNC: 1.16 UIU/ML (ref 0.36–3.74)
WBC # BLD AUTO: 4.66 THOUSAND/UL (ref 4.31–10.16)

## 2021-01-31 PROCEDURE — 80048 BASIC METABOLIC PNL TOTAL CA: CPT | Performed by: INTERNAL MEDICINE

## 2021-01-31 PROCEDURE — 84443 ASSAY THYROID STIM HORMONE: CPT | Performed by: INTERNAL MEDICINE

## 2021-01-31 PROCEDURE — 85027 COMPLETE CBC AUTOMATED: CPT | Performed by: INTERNAL MEDICINE

## 2021-01-31 PROCEDURE — 82140 ASSAY OF AMMONIA: CPT | Performed by: INTERNAL MEDICINE

## 2021-01-31 PROCEDURE — 82948 REAGENT STRIP/BLOOD GLUCOSE: CPT

## 2021-01-31 PROCEDURE — 99223 1ST HOSP IP/OBS HIGH 75: CPT | Performed by: INTERNAL MEDICINE

## 2021-01-31 PROCEDURE — 99239 HOSP IP/OBS DSCHRG MGMT >30: CPT | Performed by: INTERNAL MEDICINE

## 2021-01-31 RX ORDER — SODIUM CHLORIDE 9 MG/ML
75 INJECTION, SOLUTION INTRAVENOUS CONTINUOUS
Status: DISCONTINUED | OUTPATIENT
Start: 2021-01-31 | End: 2021-01-31 | Stop reason: HOSPADM

## 2021-01-31 RX ORDER — SODIUM CHLORIDE 9 MG/ML
75 INJECTION, SOLUTION INTRAVENOUS CONTINUOUS
Status: CANCELLED | OUTPATIENT
Start: 2021-01-31

## 2021-01-31 RX ORDER — PANTOPRAZOLE SODIUM 40 MG/1
40 TABLET, DELAYED RELEASE ORAL
Status: CANCELLED | OUTPATIENT
Start: 2021-02-01

## 2021-01-31 RX ORDER — TORSEMIDE 20 MG/1
20 TABLET ORAL 2 TIMES DAILY
Status: DISCONTINUED | OUTPATIENT
Start: 2021-01-31 | End: 2021-02-04 | Stop reason: HOSPADM

## 2021-01-31 RX ORDER — BISACODYL 10 MG
10 SUPPOSITORY, RECTAL RECTAL DAILY PRN
Status: DISCONTINUED | OUTPATIENT
Start: 2021-01-31 | End: 2021-02-04 | Stop reason: HOSPADM

## 2021-01-31 RX ORDER — SODIUM CHLORIDE 9 MG/ML
75 INJECTION, SOLUTION INTRAVENOUS CONTINUOUS
Status: DISCONTINUED | OUTPATIENT
Start: 2021-01-31 | End: 2021-01-31

## 2021-01-31 RX ORDER — BISACODYL 10 MG
10 SUPPOSITORY, RECTAL RECTAL DAILY PRN
Status: CANCELLED | OUTPATIENT
Start: 2021-01-31

## 2021-01-31 RX ORDER — ATORVASTATIN CALCIUM 20 MG/1
20 TABLET, FILM COATED ORAL
Status: CANCELLED | OUTPATIENT
Start: 2021-01-31

## 2021-01-31 RX ORDER — INSULIN GLARGINE 100 [IU]/ML
10 INJECTION, SOLUTION SUBCUTANEOUS
Status: CANCELLED | OUTPATIENT
Start: 2021-01-31

## 2021-01-31 RX ORDER — LACTULOSE 10 G/15ML
10 SOLUTION ORAL 2 TIMES DAILY
Status: DISCONTINUED | OUTPATIENT
Start: 2021-01-31 | End: 2021-01-31 | Stop reason: HOSPADM

## 2021-01-31 RX ORDER — BRIMONIDINE TARTRATE 0.15 %
1 DROPS OPHTHALMIC (EYE) 2 TIMES DAILY
Status: DISCONTINUED | OUTPATIENT
Start: 2021-01-31 | End: 2021-02-04 | Stop reason: HOSPADM

## 2021-01-31 RX ORDER — TIMOLOL MALEATE 5 MG/ML
1 SOLUTION/ DROPS OPHTHALMIC 2 TIMES DAILY
Status: DISCONTINUED | OUTPATIENT
Start: 2021-01-31 | End: 2021-02-04 | Stop reason: HOSPADM

## 2021-01-31 RX ORDER — INSULIN GLARGINE 100 [IU]/ML
10 INJECTION, SOLUTION SUBCUTANEOUS
Status: DISCONTINUED | OUTPATIENT
Start: 2021-01-31 | End: 2021-02-01

## 2021-01-31 RX ORDER — LACTULOSE 20 G/30ML
10 SOLUTION ORAL 2 TIMES DAILY
Status: DISCONTINUED | OUTPATIENT
Start: 2021-01-31 | End: 2021-02-04 | Stop reason: HOSPADM

## 2021-01-31 RX ORDER — HEPARIN SODIUM 5000 [USP'U]/ML
5000 INJECTION, SOLUTION INTRAVENOUS; SUBCUTANEOUS EVERY 8 HOURS SCHEDULED
Status: DISCONTINUED | OUTPATIENT
Start: 2021-01-31 | End: 2021-02-02

## 2021-01-31 RX ORDER — GABAPENTIN 300 MG/1
300 CAPSULE ORAL
Status: DISCONTINUED | OUTPATIENT
Start: 2021-01-31 | End: 2021-02-04 | Stop reason: HOSPADM

## 2021-01-31 RX ORDER — ATORVASTATIN CALCIUM 20 MG/1
20 TABLET, FILM COATED ORAL
Status: DISCONTINUED | OUTPATIENT
Start: 2021-01-31 | End: 2021-02-04 | Stop reason: HOSPADM

## 2021-01-31 RX ORDER — BRIMONIDINE TARTRATE, TIMOLOL MALEATE 2; 5 MG/ML; MG/ML
1 SOLUTION/ DROPS OPHTHALMIC 2 TIMES DAILY
Status: DISCONTINUED | OUTPATIENT
Start: 2021-01-31 | End: 2021-01-31 | Stop reason: CLARIF

## 2021-01-31 RX ORDER — METOPROLOL SUCCINATE 50 MG/1
50 TABLET, EXTENDED RELEASE ORAL DAILY
Status: CANCELLED | OUTPATIENT
Start: 2021-02-01

## 2021-01-31 RX ORDER — ACETAMINOPHEN 325 MG/1
650 TABLET ORAL EVERY 6 HOURS PRN
Status: DISCONTINUED | OUTPATIENT
Start: 2021-01-31 | End: 2021-02-04 | Stop reason: HOSPADM

## 2021-01-31 RX ORDER — POTASSIUM CHLORIDE 20 MEQ/1
40 TABLET, EXTENDED RELEASE ORAL ONCE
Status: COMPLETED | OUTPATIENT
Start: 2021-01-31 | End: 2021-01-31

## 2021-01-31 RX ORDER — METOPROLOL SUCCINATE 50 MG/1
50 TABLET, EXTENDED RELEASE ORAL DAILY
Status: DISCONTINUED | OUTPATIENT
Start: 2021-02-01 | End: 2021-02-04 | Stop reason: HOSPADM

## 2021-01-31 RX ORDER — PANTOPRAZOLE SODIUM 40 MG/1
40 TABLET, DELAYED RELEASE ORAL
Status: DISCONTINUED | OUTPATIENT
Start: 2021-02-01 | End: 2021-02-04 | Stop reason: HOSPADM

## 2021-01-31 RX ORDER — ACETAMINOPHEN 325 MG/1
650 TABLET ORAL EVERY 6 HOURS PRN
Status: CANCELLED | OUTPATIENT
Start: 2021-01-31

## 2021-01-31 RX ORDER — LACTULOSE 10 G/15ML
10 SOLUTION ORAL 2 TIMES DAILY
Status: CANCELLED | OUTPATIENT
Start: 2021-01-31

## 2021-01-31 RX ORDER — HEPARIN SODIUM 5000 [USP'U]/ML
5000 INJECTION, SOLUTION INTRAVENOUS; SUBCUTANEOUS EVERY 8 HOURS SCHEDULED
Status: CANCELLED | OUTPATIENT
Start: 2021-01-31

## 2021-01-31 RX ORDER — BRIMONIDINE TARTRATE, TIMOLOL MALEATE 2; 5 MG/ML; MG/ML
1 SOLUTION/ DROPS OPHTHALMIC 2 TIMES DAILY
Status: CANCELLED | OUTPATIENT
Start: 2021-01-31

## 2021-01-31 RX ORDER — INSULIN GLARGINE 100 [IU]/ML
10 INJECTION, SOLUTION SUBCUTANEOUS
Status: DISCONTINUED | OUTPATIENT
Start: 2021-01-31 | End: 2021-01-31 | Stop reason: HOSPADM

## 2021-01-31 RX ORDER — BISACODYL 10 MG
10 SUPPOSITORY, RECTAL RECTAL DAILY PRN
Status: DISCONTINUED | OUTPATIENT
Start: 2021-01-31 | End: 2021-01-31 | Stop reason: HOSPADM

## 2021-01-31 RX ORDER — GABAPENTIN 300 MG/1
300 CAPSULE ORAL
Status: CANCELLED | OUTPATIENT
Start: 2021-01-31

## 2021-01-31 RX ADMIN — HEPARIN SODIUM 5000 UNITS: 5000 INJECTION INTRAVENOUS; SUBCUTANEOUS at 14:45

## 2021-01-31 RX ADMIN — INSULIN LISPRO 3 UNITS: 100 INJECTION, SOLUTION INTRAVENOUS; SUBCUTANEOUS at 22:31

## 2021-01-31 RX ADMIN — ATORVASTATIN CALCIUM 20 MG: 20 TABLET, FILM COATED ORAL at 17:49

## 2021-01-31 RX ADMIN — LACTULOSE 10 G: 10 SOLUTION ORAL at 09:14

## 2021-01-31 RX ADMIN — LEVOTHYROXINE SODIUM 137 MCG: 25 TABLET ORAL at 06:14

## 2021-01-31 RX ADMIN — SODIUM CHLORIDE 75 ML/HR: 0.9 INJECTION, SOLUTION INTRAVENOUS at 15:05

## 2021-01-31 RX ADMIN — LACTULOSE 10 G: 10 SOLUTION ORAL at 17:49

## 2021-01-31 RX ADMIN — METOPROLOL SUCCINATE 50 MG: 50 TABLET, EXTENDED RELEASE ORAL at 09:15

## 2021-01-31 RX ADMIN — INSULIN GLARGINE 10 UNITS: 100 INJECTION, SOLUTION SUBCUTANEOUS at 22:30

## 2021-01-31 RX ADMIN — INSULIN LISPRO 1 UNITS: 100 INJECTION, SOLUTION INTRAVENOUS; SUBCUTANEOUS at 02:20

## 2021-01-31 RX ADMIN — INSULIN LISPRO 5 UNITS: 100 INJECTION, SOLUTION INTRAVENOUS; SUBCUTANEOUS at 17:49

## 2021-01-31 RX ADMIN — PANTOPRAZOLE SODIUM 40 MG: 40 TABLET, DELAYED RELEASE ORAL at 06:15

## 2021-01-31 RX ADMIN — HEPARIN SODIUM 5000 UNITS: 5000 INJECTION INTRAVENOUS; SUBCUTANEOUS at 06:15

## 2021-01-31 RX ADMIN — INSULIN LISPRO 1 UNITS: 100 INJECTION, SOLUTION INTRAVENOUS; SUBCUTANEOUS at 09:26

## 2021-01-31 RX ADMIN — POTASSIUM CHLORIDE 40 MEQ: 1500 TABLET, EXTENDED RELEASE ORAL at 14:45

## 2021-01-31 RX ADMIN — TORSEMIDE 20 MG: 20 TABLET ORAL at 17:49

## 2021-01-31 RX ADMIN — INSULIN LISPRO 3 UNITS: 100 INJECTION, SOLUTION INTRAVENOUS; SUBCUTANEOUS at 12:21

## 2021-01-31 RX ADMIN — GABAPENTIN 300 MG: 300 CAPSULE ORAL at 22:30

## 2021-01-31 NOTE — EMTALA/ACUTE CARE TRANSFER
[address]  Dept: [phone]      ACUTE CARE TRANSFER CONSENT    NAME Noemi Washington                                         1950                              MRN [id number]    I have been informed of my rights regarding examination, treatment, and transfer   by Dr Romayne Sickle, MD    Benefits: Specialized equipment and/or services available at the receiving facility (Include comment)________________________, Continuity of care    Risks: Potential for delay in receiving treatment, Loss of IV, Potential deterioration of medical condition, Increased discomfort during transfer, Possible worsening of condition or death during transfer      Transfer Request:  I acknowledge that my medical condition has been evaluated and explained to me by the treating physician or other qualified medical person and/or my attending physician who has recommended and offered to me further medical examination and treatment  I understand the Hospital's obligation with respect to the treatment and stabilization of my medical condition  I nevertheless request to be transferred  I release the Hospital, the doctor, and any other persons caring for me from all responsibility or liability for any injury or ill effects that may result from my transfer and agree to accept all responsibility for the consequences of my choice to transfer, rather than receive stabilizing treatment at the Hospital  I understand that because the transfer is my request, my insurance may not provide reimbursement for the services  The Hospital will assist and direct me and my family in how to make arrangements for transfer, but the hospital is not liable for any fees charged by the transport service    In spite of this understanding, I refuse to consent to further medical examination and treatment which has been offered to me, and request transfer to  Laura Shanks Name, [address] : RAMAKR[facility]NA  I authorize the performance of emergency medical procedures and treatments upon me in both transit and upon arrival at the receiving facility  Additionally, I authorize the release of any and all medical records to the receiving facility and request they be transported with me, if possible  I authorize the performance of emergency medical procedures and treatments upon me in both transit and upon arrival at the receiving facility  Additionally, I authorize the release of any and all medical records to the receiving facility and request they be transported with me, if possible  I understand that the safest mode of transportation during a medical emergency is an ambulance and that the Hospital advocates the use of this mode of transport  Risks of traveling to the receiving facility by car, including absence of medical control, life sustaining equipment, such as oxygen, and medical personnel has been explained to me and I fully understand them  (EMMA CORRECT BOX BELOW)  [ X ]  I consent to the stated transfer and to be transported by ambulance/helicopter  [  ]  I consent to the stated transfer, but refuse transportation by ambulance and accept full responsibility for my transportation by car  I understand the risks of non-ambulance transfers and I exonerate the Hospital and its staff from any deterioration in my condition that results from this refusal     X___________________________________________    DATE  21  TIME________  Signature of patient or legally responsible individual signing on patient behalf           RELATIONSHIP TO PATIENT_________________________          Provider Certification    NAME Nichole Jacobs                                        [facility] 1950                              MRN [id number]    A medical screening exam was performed on the above named patient    Based on the examination:    Condition Necessitating Transfer aortic stenosis requiring TAVR    Patient Condition: The patient has been stabilized such that within reasonable medical probability, no material deterioration of the patient condition or the condition of the unborn child(marco) is likely to result from the transfer    Reason for Transfer: Level of Care needed not available at this facility    Transfer Requirements: Facility [facility]   · Space available and qualified personnel available for treatment as acknowledged by    · Agreed to accept transfer and to provide appropriate medical treatment as acknowledged by       Dr Alison Moore, Dr Josefa Holbrook  · Appropriate medical records of the examination and treatment of the patient are provided at the time of transfer   [facility], [address] _______  · Transfer will be performed by qualified personnel from    and appropriate transfer equipment as required, including the use of necessary and appropriate life support measures      Provider Certification: I have examined the patient and explained the following risks and benefits of being transferred/refusing transfer to the patient/family:  General risk, such as traffic hazards, adverse weather conditions, rough terrain or turbulence, possible failure of equipment (including vehicle or aircraft), or consequences of actions of persons outside the control of the transport personnel, Unanticipated needs of medical equipment and personnel during transport, The possibility of a transport vehicle being unavailable, Risk of worsening condition      Based on these reasonable risks and benefits to the patient and/or the unborn child(marco), and based upon the information available at the time of the patients examination, I certify that the medical benefits reasonably to be expected from the provision of appropriate medical treatments at another medical facility outweigh the increasing risks, if any, to the individuals medical condition, and in the case of labor to the unborn child, from effecting the transfer      X____________________________________________ DATE 01/31/21        TIME_______      ORIGINAL - SEND TO MEDICAL RECORDS   COPY - SEND WITH PATIENT DURING TRANSFER

## 2021-01-31 NOTE — ASSESSMENT & PLAN NOTE
Wt Readings from Last 3 Encounters:   01/31/21 89 1 kg (196 lb 6 9 oz)   01/15/21 93 kg (205 lb)   12/01/20 105 kg (230 lb 13 2 oz)     Prior echo-8/20 -EF 40-45%, severe aortic stenosis, PA pressure 80 mmHg  Currently appears volume depleted, weight lower than prior baseline  · Monitor with IV fluids  · Monitor daily weight, intake output  · Hold torsemide and metolazone for now  · Patient is awaiting TAVR, scheduled on 02/02  Discussed with Cardiothoracic surgery , Dr Fawad Silva for consideration for the transfer for scheduled surgery  Recommended to transfer under Internal Medicine Service    Patient will be transferred to [facility]

## 2021-01-31 NOTE — ASSESSMENT & PLAN NOTE
Ammonia level was noted to be elevated at 77  Patient does not appear to have history of or evidence of cirrhosis on prior imaging  LFT with mildly elevated bilirubin and low albumin  INR mildly elevated at 1 4  Patient does report that she takes lactulose on the regular basis due to history of constipation but has not been taking over last 1 week as she ran out her prescription  Mental status improved after correction of hyperglycemia    Repeat ammonia normal  · Resumed lactulose  · Consider repeat ultrasound of right upper quadrant as outpatient

## 2021-01-31 NOTE — COVID-19 HEALTH CARE FACILITY TRANSFER FORM
[facility] to [address] Transfer - COVID-19 Assessment             Name of Patient: Ignacio Nichols                : 1950          Transport Date: 21       Has the patient been laboratory tested for COVID-19? []  NO  If No,Test was not indicated per  CDC Testing Criteria   May Transfer Patient   [x] YES  If Tested Results below     COVID-19 References              SARS-CoV-2   Date/Time Value Ref Range Status   2021 02:32 PM Negative Negative Final            Question is to be completed for any patient who tests positive for COVID-19        1  [] Yes [May Transfer] [] No [May Not Transfer]          Question is to be completed for any patient who is tested for COVID-19            2    [] Yes [May Not Transfer] [] No [May Transfer]          Signature of Physician or Health Care Professional: Jasmyn Roach MD 21          Form updated as of 3/24/2020

## 2021-01-31 NOTE — ASSESSMENT & PLAN NOTE
Lab Results   Component Value Date    HGBA1C 6 7 (H) 12/01/2020       Recent Labs     01/30/21  1526 01/30/21  1735 01/30/21  1950 01/30/21  2112   POCGLU 86 80 87 180*       Blood Sugar Average: Last 72 hrs:  (P) 161 [id number]     Presented with episode of hypoglycemia, similar episode in the past  Check hemoglobin A1c  Patient was previously on Lantus 15 units q h s  And NovoLog 10 units pre meal  Will start patient on Lantus 10 units q h s   And NovoLog 5 units pre meal with sliding scale  Monitor

## 2021-01-31 NOTE — ASSESSMENT & PLAN NOTE
Patient was noted to have BUN of 121  Likely secondary to prerenal azotemia  No evidence of GI bleeding    Status post 1 L of IV fluid in ED  Continue IV fluid  · Will hold diuretics  · Monitor hemoglobin  · Monitor urine output after Lynch catheter removed

## 2021-01-31 NOTE — ASSESSMENT & PLAN NOTE
Patient is awaiting TAVR, scheduled on 02/02  Previous provider Discussed with Cardiothoracic surgery , Dr Joan Shelley for consideration for the transfer for scheduled surgery  Recommended to transfer under Internal Medicine Service    Patient will be transferred to [facility]

## 2021-01-31 NOTE — ASSESSMENT & PLAN NOTE
Wt Readings from Last 3 Encounters:   01/31/21 89 1 kg (196 lb 6 9 oz)   01/15/21 93 kg (205 lb)   12/01/20 105 kg (230 lb 13 2 oz)     Prior echo-8/20 -EF 40-45%, severe aortic stenosis, PA pressure 80 mmHg  Currently appears volume depleted, weight lower than prior baseline  · Monitor with IV fluids  · Monitor daily weight, intake output  · Hold torsemide and metolazone for now  · Patient is awaiting TAVR, scheduled on 02/02  Discussed with Cardiothoracic surgery , Dr Annie Beatty for consideration for the transfer for scheduled surgery  Recommended to transfer under Internal Medicine Service    Patient will be transferred to [facility]

## 2021-01-31 NOTE — ASSESSMENT & PLAN NOTE
Lab Results   Component Value Date    EGFR 23 01/31/2021    EGFR 26 01/30/2021    EGFR 28 01/30/2021    CREATININE 2 10 (H) 01/31/2021    CREATININE 1 87 (H) 01/30/2021    CREATININE 1 77 (H) 01/30/2021     Baseline creatinine appears to be 1 5-2 0 as per prior records  Creatinine appears to be at high normal range  Rule out secondary to cardiorenal syndrome  DC IV fluid  Restart Demadex  Monitor

## 2021-01-31 NOTE — ASSESSMENT & PLAN NOTE
Patient was noted to be hypothermic on presentation likely secondary to hypoglycemia and uremia  ?  Cold exposure  No evidence of sepsis  Improved  · Follow-up blood culture  · TSH-normal  · Monitor

## 2021-01-31 NOTE — DISCHARGE SUMMARY
Discharge Summary - [facility] Internal Medicine    Patient Information: Ciara Lyon 66 y o  female MRN: [id number]  Unit/Bed#: [address] [facility] Encounter: [id number]    Discharging Physician / Practitioner: Evin Ruth MD  PCP: Terrance Negrete MD  Admission Date: 1/30/2021  Discharge Date: 01/31/21    Reason for Admission: Hypoglycemia - Symptomatic (EMS called for an altered mental  Patient arrives with BS <35  Given an amp of dextrose during triage and patient was then able to communicate with staff  Stopped blood thinner recently in prep for oral, cardiac, and bowel surgery )      Discharge Diagnoses:     Principal Problem (Resolved):    Encephalopathy acute, metabolic  Active Problems:    Coronary artery disease involving coronary bypass graft of native heart without angina pectoris    Chronic combined systolic and diastolic congestive heart failure (HCC)    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, with long-term current use of insulin (HCC)    Chronic atrial fibrillation (HCC)    Stage 3 chronic kidney disease    Prerenal azotemia    Mixed hyperlipidemia    Obstructive sleep apnea    Disease of thyroid gland    Gastroesophageal reflux disease without esophagitis    Mild cognitive impairment  Resolved Problems:    Hypoglycemia    Hypothermia    Increased ammonia level        Hypoglycemiaresolved as of 1/31/2021  Assessment & Plan  Patient presented with altered mental status, noted to have blood sugar of less than 35 on arrival on ED  Initially improved with the D 50 but subsequently dropped again requiring further dextrose  History of similar episode in the past   Patient also claims that she had episodes of blood sugar range of 70s and 90s over last 1 week  Reports compliance with insulin regimen  Now hyperglycemic    Will initiate lispro and Lantus at lower dose as below  · Monitor blood sugar closely  · Endocrinology follow-up, may benefit CGM  · Patient may require more supervision with her medications    * Encephalopathy acute, metabolicresolved as of 7/24/7750  Assessment & Plan  Presented with altered mental status, noted to hypoglycemia, hypothermia, azotemia and elevated ammonia level  CT head without any acute abnormality  Neuro exam appears nonfocal  Mental status has improved after correction of hypoglycemia and IV hydration, now at baseline  Currently appears close to baseline  · Monitor mental status  · Supportive care  · Follow-up workup as below      Prerenal azotemia  Assessment & Plan  Patient was noted to have BUN of 121  Likely secondary to prerenal azotemia  No evidence of GI bleeding  Status post 1 L of IV fluid in ED  Continue IV fluid  · Will hold diuretics  · Monitor hemoglobin  · Monitor urine output after Lynch catheter removed    Stage 3 chronic kidney disease  Assessment & Plan  Baseline creatinine appears to be 1 5-2 0 as per prior records  Creatinine appears to be at high normal range  Monitor with IV fluids    Chronic atrial fibrillation (HCC)  Assessment & Plan  Rate controlled, history of sick sinus status post pacemaker  Continue metoprolol  Holding Pradaxa at present awaiting surgery      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, with long-term current use of insulin [facility])  Assessment & Plan  Lab Results   Component Value Date    HGBA1C 6 7 (H) 12/01/2020       Recent Labs     01/30/21  1526 01/30/21  1735 01/30/21  1950 01/30/21  2112   POCGLU 86 80 87 180*       Blood Sugar Average: Last 72 hrs:  (P) 998 [id number]     Presented with episode of hypoglycemia, similar episode in the past  Check hemoglobin A1c  Patient was previously on Lantus 15 units q h s  And NovoLog 10 units pre meal  Will start patient on Lantus 10 units q h s   And NovoLog 5 units pre meal with sliding scale  Monitor     Chronic combined systolic and diastolic congestive heart failure (HCC)  Assessment & Plan  Wt Readings from Last 3 Encounters: 01/31/21 89 1 kg (196 lb 6 9 oz)   01/15/21 93 kg (205 lb)   12/01/20 105 kg (230 lb 13 2 oz)     Prior echo-8/20 -EF 40-45%, severe aortic stenosis, PA pressure 80 mmHg  Currently appears volume depleted, weight lower than prior baseline  · Monitor with IV fluids  · Monitor daily weight, intake output  · Hold torsemide and metolazone for now  · Patient is awaiting TAVR, scheduled on 02/02  Discussed with Cardiothoracic surgery , Dr Otoniel Melgar for consideration for the transfer for scheduled surgery  Recommended to transfer under Internal Medicine Service  Patient will be transferred to [facility]     Coronary artery disease involving coronary bypass graft of native heart without angina pectoris  Assessment & Plan  Continue metoprolol, statin    Increased ammonia levelresolved as of 1/31/2021  Assessment & Plan  Ammonia level was noted to be elevated at 77  Patient does not appear to have history of or evidence of cirrhosis on prior imaging  LFT with mildly elevated bilirubin and low albumin  INR mildly elevated at 1 4  Patient does report that she takes lactulose on the regular basis due to history of constipation but has not been taking over last 1 week as she ran out her prescription  Mental status improved after correction of hyperglycemia  Repeat ammonia normal  · Resumed lactulose  · Consider repeat ultrasound of right upper quadrant as outpatient    Hypothermiaresolved as of 1/31/2021  Assessment & Plan  Patient was noted to be hypothermic on presentation likely secondary to hypoglycemia and uremia  ?  Cold exposure  No evidence of sepsis  Improved  · Follow-up blood culture  · TSH-normal  · Monitor    Obstructive sleep apnea  Assessment & Plan  History of ADRIAN, intolerant to CPAP    On supplemental oxygen    Mixed hyperlipidemia  Assessment & Plan  On statin    Gastroesophageal reflux disease without esophagitis  Assessment & Plan  Continue PPI    Disease of thyroid gland  Assessment & Plan  On Synthroid  TSH normal      Consultations During Hospital Stay:  None    Procedures Performed:     · None    Significant Findings:     · Refer to hospital course and above listed diagnosis related plan for details    Imaging while in hospital:    Xr Chest 1 View Portable    Result Date: 1/30/2021  Narrative: CHEST INDICATION:   ams  COMPARISON:  11/30/2020 EXAM PERFORMED/VIEWS:  XR CHEST PORTABLE FINDINGS:  Left-sided chest wall intracardiac device is identified  Leads are intact  Cardiomediastinal silhouette is within normal limits status post CABG  Vascular congestion  No pneumothorax or pleural effusion  Sternal wires are present  Visualized osseous structures appear otherwise unremarkable for the patient's age  Impression: Vascular congestion  Workstation performed: [id number]     Ct Head Without Contrast    Result Date: 1/30/2021  Narrative: CT BRAIN - WITHOUT CONTRAST INDICATION:   ams  COMPARISON:  11/30/2020 TECHNIQUE:  CT examination of the brain was performed  In addition to axial images, sagittal and coronal 2D reformatted images were created and submitted for interpretation  Radiation dose length product (DLP) for this visit:  1812 mGy-cm   This examination, like all CT scans performed in the [facility], was performed utilizing techniques to minimize radiation dose exposure, including the use of iterative reconstruction and automated exposure control  IMAGE QUALITY:  Diagnostic  FINDINGS: PARENCHYMA: Decreased attenuation is noted in periventricular and subcortical white matter demonstrating an appearance that is statistically most likely to represent mild microangiopathic change  No CT signs of acute infarction  No intracranial mass, mass effect or midline shift  No acute parenchymal hemorrhage  VENTRICLES AND EXTRA-AXIAL SPACES:  Normal for the patient's age  VISUALIZED ORBITS AND PARANASAL SINUSES:  Unremarkable   CALVARIUM AND EXTRACRANIAL SOFT TISSUES:  Normal  Impression: No acute intracranial abnormality  Workstation performed: [id number]       Incidental Findings:   · None    Test Results Pending at Discharge (will require follow up):   · As per After Visit Summary     Outpatient Tests Requested:  · Not applicable    Complications:  Refer to hospital course and above listed diagnosis related plan, if any    Hospital Course:     Anna Marie Taylor is a 66 y o  female patient with multiple comorbidities including CHF, CAD status post CABG, atrial fibrillation status post pacemaker placement, hypertension dyslipidemia, diabetes mellitus type 2, CKD, hypothyroidism, chronic respiratory/ADRIAN who originally presented to the hospital on 1/30/2021 due to altered mental status  Patient was found lethargic at home EMS was called  Patient blood sugar was noted to be 68 when patient arrived to ED she was noted to have blood sugar of less than 35, patient received dextrose blood sugar improved transiently but patient subsequently remained somnolent  CT head was unremarkable  EKG chest x-ray did not reveal any acute abnormality  Patient was noted to have elevated BUN, elevated ammonia level  Tylenol, salicylate alcohol level were negative  U tox was negative  ABG revealed metabolic alkalosis with respiratory acidosis  Patient was noted to be hypothermic, no definitive evidence of infection noted  Blood cultures were obtained  While in ED blood sugar again dropped to 60 requiring more D50  Patient received 1 L IV bolus and mental status gradually improved  Patient was subsequently admitted  Please see above list of diagnoses and related plan for additional information         Condition at Discharge: stable     Discharge Day Visit / Exam:     Subjective:  Feels much better  Alert awake oriented , at her baseline  Reported discomfort with Lynch catheter overnight, which was removed earlier today reports having constipation  Denies chest pain or shortness of breath  Denies any nausea vomiting abdominal pain      Vitals: Blood Pressure: 134/62 (01/31/21 0813)  Pulse: 60 (01/31/21 0813)  Temperature: (!) 96 7 °F (35 9 °C) (01/31/21 0813)  Temp Source: Tympanic (01/30/21 2318)  Respirations: 19 (01/31/21 0813)  Height: 5' 3" (160 cm) (01/30/21 1958)  Weight - Scale: 89 1 kg (196 lb 6 9 oz) (01/31/21 0600)  SpO2: 95 % (01/31/21 0813)RA  Exam:   Physical Exam  Constitutional:       General: She is not in acute distress  Comments: Chronically ill   HENT:      Head: Normocephalic and atraumatic  Mouth/Throat:      Mouth: Mucous membranes are dry  Neck:      Musculoskeletal: Neck supple  Cardiovascular:      Rate and Rhythm: Normal rate  Heart sounds: Murmur present  Pulmonary:      Effort: Pulmonary effort is normal  No respiratory distress  Breath sounds: Normal breath sounds  No wheezing or rales  Abdominal:      General: Bowel sounds are normal  There is no distension  Palpations: Abdomen is soft  Tenderness: There is no abdominal tenderness  There is no guarding or rebound  Musculoskeletal:      Right lower leg: No edema  Left lower leg: No edema  Skin:     General: Skin is warm and dry  Findings: No rash  Neurological:      General: No focal deficit present  Mental Status: She is alert  Mental status is at baseline  Psychiatric:         Mood and Affect: Mood normal          Discharge instructions/Information to patient and family:(Discharge Medications and Follow up):   See after visit summary for information provided to patient and family  Provisions for Follow-Up Care:  See after visit summary for information related to follow-up care and any pertinent home health orders  Disposition: Being transferred to [facility]    Planned Readmission:  No, being transferred to [facility] for higher level of care     Discharge Statement:  I spent 55 minutes discharging the patient   This time was spent on the day of discharge  I had direct contact with the patient on the day of discharge  Greater than 50% of the total time was spent examining patient, answering all patient questions, arranging and discussing plan of care with patient as well as directly providing post-discharge instructions  Additional time then spent on discharge activities  Coordinated with cardiothoracic surgical team and accepting physician  Discussed with patient's son at length  Discharge Medications:  See after visit summary for reconciled discharge medications provided to patient and family  ** Please Note: "This note has been constructed using a voice recognition system  Therefore there may be syntax, spelling, and/or grammatical errors   Please call if you have any questions  "**

## 2021-01-31 NOTE — ASSESSMENT & PLAN NOTE
Patient presented with altered mental status, noted to have blood sugar of less than 35 on arrival on ED  Initially improved with the D 50 but subsequently dropped again requiring further dextrose  History of similar episode in the past   Patient also claims that she had episodes of blood sugar range of 70s and 90s over last 1 week  Reports compliance with insulin regimen  Now hyperglycemic    Will initiate lispro and Lantus at lower dose as below  · Monitor blood sugar closely  · Endocrinology follow-up, may benefit CGM  · Patient may require more supervision with her medications

## 2021-01-31 NOTE — ASSESSMENT & PLAN NOTE
Presented with altered mental status, noted to hypoglycemia, hypothermia, azotemia and elevated ammonia level  CT head without any acute abnormality    Neuro exam appears nonfocal  Mental status has improved after correction of hypoglycemia and IV hydration, now at baseline  Currently appears close to baseline  · Monitor mental status  · Supportive care  · Follow-up workup as below

## 2021-01-31 NOTE — ASSESSMENT & PLAN NOTE
Lab Results   Component Value Date    HGBA1C 6 7 (H) 12/01/2020       Recent Labs     01/30/21  2321 01/31/21  0215 01/31/21  0801 01/31/21  1137   POCGLU 191* 206* 215* 306*       Blood Sugar Average: Last 72 hrs:  Presented with episode of hypoglycemia, similar episode in the past  Resolved  Patient was previously on Lantus 15 units q h s  And NovoLog 10 units pre meal  Will start patient on Lantus 10 units q h s   And NovoLog 5 units pre meal with sliding scale  Monitor

## 2021-01-31 NOTE — PLAN OF CARE
Problem: Potential for Falls  Goal: Patient will remain free of falls  Description: INTERVENTIONS:  - Assess patient frequently for physical needs  -  Identify cognitive and physical deficits and behaviors that affect risk of falls    -  [place] fall precautions as indicated by assessment   - Educate patient/family on patient safety including physical limitations  - Instruct patient to call for assistance with activity based on assessment  - Modify environment to reduce risk of injury  - Consider OT/PT consult to assist with strengthening/mobility  Outcome: Progressing

## 2021-01-31 NOTE — H&P
H&P- Nichole Jacobs 1950, 66 y o  female MRN: [id number]    Unit/Bed#: CW2 210-01 Encounter: [id number]    Primary Care Provider: Beckie Vazquez MD   Date and time admitted to hospital: 1/31/2021  4:40 PM        * Aortic stenosis  Assessment & Plan  Patient is awaiting TAVR, scheduled on 02/02  Previous provider Discussed with Cardiothoracic surgery , Dr Natalie Cast for consideration for the transfer for scheduled surgery  Recommended to transfer under Internal Medicine Service  Patient will be transferred to [facility]    Encephalopathy acute, metabolic  Assessment & Plan  Presented with altered mental status, noted to hypoglycemia, hypothermia, azotemia and elevated ammonia level  CT head without any acute abnormality  Neuro exam appears nonfocal  Mental status has improved after correction of hypoglycemia and IV hydration, now at baseline  Currently appears close to baseline  · Monitor mental status  · Supportive care  · Follow-up workup as below    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, with long-term current use of insulin [facility])  Assessment & Plan  Lab Results   Component Value Date    HGBA1C 6 7 (H) 12/01/2020       Recent Labs     01/30/21  2321 01/31/21  0215 01/31/21  0801 01/31/21  1137   POCGLU 191* 206* 215* 306*       Blood Sugar Average: Last 72 hrs:  Presented with episode of hypoglycemia, similar episode in the past  Resolved  Patient was previously on Lantus 15 units q h s  And NovoLog 10 units pre meal  Will start patient on Lantus 10 units q h s  And NovoLog 5 units pre meal with sliding scale  Monitor     Hyperammonemia (HCC)  Assessment & Plan  Ammonia level was noted to be elevated at 77  Patient does not appear to have history of or evidence of cirrhosis on prior imaging  LFT with mildly elevated bilirubin and low albumin    INR mildly elevated at 1 4  Patient does report that she takes lactulose on the regular basis due to history of constipation but has not been taking over last 1 week as she ran out her prescription  Mental status improved after correction of hyperglycemia  Repeat ammonia normal  · Resumed lactulose  · Ammonia level improving    Stage 3 chronic kidney disease  Assessment & Plan  Lab Results   Component Value Date    EGFR 23 01/31/2021    EGFR 26 01/30/2021    EGFR 28 01/30/2021    CREATININE 2 10 (H) 01/31/2021    CREATININE 1 87 (H) 01/30/2021    CREATININE 1 77 (H) 01/30/2021     Baseline creatinine appears to be 1 5-2 0 as per prior records  Creatinine appears to be at high normal range  Rule out secondary to cardiorenal syndrome  DC IV fluid  Restart Demadex  Monitor    Acquired hypothyroidism  Assessment & Plan  Continue levothyroxine    Chronic atrial fibrillation (HCC)  Assessment & Plan  Rate controlled, history of sick sinus status post pacemaker  Continue metoprolol  Holding Pradaxa at present awaiting surgery    Chronic combined systolic and diastolic congestive heart failure ([facility] [address] )  Assessment & Plan  Wt Readings from Last 3 Encounters:   01/31/21 89 1 kg (196 lb 6 9 oz)   01/15/21 93 kg (205 lb)   12/01/20 105 kg (230 lb 13 2 oz)     Prior echo-8/20 -EF 40-45%, severe aortic stenosis, PA pressure 80 mmHg  Currently appears volume depleted, weight lower than prior baseline  · Monitor with IV fluids  · Monitor daily weight, intake output  · Hold torsemide and metolazone for now  · Patient is awaiting TAVR, scheduled on 02/02  Discussed with Cardiothoracic surgery , Dr Maxwell Sotomayor for consideration for the transfer for scheduled surgery  Recommended to transfer under Internal Medicine Service    Patient will be transferred to [facility]         Coronary artery disease involving coronary bypass graft of native heart without angina pectoris  Assessment & Plan  Continue statin and metoprolol    VTE Prophylaxis: Heparin  / sequential compression device   Code Status: full   POLST: There is no POLST form on file for this patient (pre-hospital)  Discussion with family:     Anticipated Length of Stay:  Patient will be admitted on an Inpatient basis with an anticipated length of stay of  > 2 midnights  Justification for Hospital Stay:  Above    Total Time for Visit, including Counseling / Coordination of Care: 1 hour  Greater than 50% of this total time spent on direct patient counseling and coordination of care  Chief Complaint:    Severe aortic stenosis   Patient was transferred to [facility], she was already scheduled for TAVR    History of Present Illness:    Iesha Dong is a 66 y o  female who presents as a transfer from [facility] for TAVR evaluation, patient was already scheduled for surgery tomorrow  Patient with multiple comorbidities including CHF, CAD status post CABG, atrial fibrillation status post pacemaker placement, hypertension, dyslipidemia, diabetes mellitus type 2, CKD, hypothyroidism, ADRIAN who originally presented to [facility] on 1/30/2021 due to altered mental status  Patient was found lethargic at home EMS was called  Patient blood sugar was noted to be 68 when patient arrived to ED she was noted to have blood sugar of less than 35, patient received dextrose blood sugar improved transiently but patient subsequently remained somnolent  CT head was unremarkable  EKG chest x-ray did not reveal any acute abnormality  Patient was noted to have elevated BUN, elevated ammonia level  Tylenol, salicylate alcohol level were negative  U tox was negative  Patient was noted to be hypothermic, no definitive evidence of infection noted  Blood cultures were obtained  While in ED blood sugar again dropped to 60 requiring more D50  Patient received 1 L IV bolus and mental status gradually improved  Patient was subsequently admitted      Patient was transferred to [facility] for cardiac surgery evaluation patient was already scheduled for TAVR tomorrow  Review of Systems:    Review of Systems   Constitutional: Negative for chills and fever  Respiratory: Negative for cough and shortness of breath  Cardiovascular: Negative for chest pain, palpitations and leg swelling  Gastrointestinal: Positive for constipation  Negative for abdominal pain, blood in stool, diarrhea, nausea and vomiting  Genitourinary: Negative for dysuria  Neurological: Negative for dizziness, speech difficulty and headaches  Psychiatric/Behavioral: Positive for confusion  All other systems reviewed and are negative        Past Medical and Surgical History:     Past Medical History:   Diagnosis Date    Arthritis     Atrial flutter ([place] [address] )     Cardiac disease     Carotid artery occlusion     CHF (congestive heart failure) ([place] [address] )     Cholelithiasis     last assessed 8/8/2016    Coronary artery disease     Diabetes mellitus ([place] [address] )     Disease of thyroid gland     Essential hypertension 8/15/2016    GERD (gastroesophageal reflux disease)     History of transfusion     Hyperlipidemia     Hypertension     Long-term insulin use in type 2 diabetes ([place] [address] ) 6/4/2016    Other specified diabetes mellitus with diabetic autonomic (poly)neuropathy ([place] [address] )     Oxygen dependent     3L    Pleural effusion 2008    Pulmonary embolism ([place] [address] )     2008    Renal disorder     Retinopathy     bilat    Sleep apnea     USES 3 LITER O2 CONTINOUIS    Subclavian artery stenosis ([place] [address] )     Umbilical hernia with obstruction, without gangrene     last assessed 7/8/2016       Past Surgical History:   Procedure Laterality Date    ABDOMINAL SURGERY      CARDIAC PACEMAKER PLACEMENT      CARDIAC SURGERY      cabg x 2    CATARACT EXTRACTION      CHOLECYSTECTOMY      COLONOSCOPY      CORONARY ARTERY BYPASS GRAFT  2008    EYE SURGERY      FRACTURE SURGERY Right 2010    shoulder    HERNIA REPAIR      umbilical    CA LAP,CHOLECYSTECTOMY N/A 8/10/2016    Procedure: CHOLECYSTECTOMY LAPAROSCOPIC;  Surgeon: Judy Cowart Bekah Mazariegos MD;  Location: [facility];  Service: General    NM LARYNGOSCOPY,DIRECT,SCOPE,INJ CORDS Left 11/4/2016    Procedure: Jahaira Batista; LEFT VOCAL FOLD INJECTION ;  Surgeon: Fish Moreira MD;  Location: [facility];  Service: ENT    NM REPAIR UMBILICAL ZPIQ,1+Q/M,XOMHM N/A 8/10/2016    Procedure: LAPAROSCOPIC REPAIR HERNIA VENTRAL;  Surgeon: Samreen Ayoub MD;  Location[facility];  Service: General    [address] / STENTING Right     TRICUSPID VALVE REPLACEMENT      VASCULAR SURGERY      heart valve replacement       Meds/Allergies:    Prior to Admission medications    Medication Sig Start Date End Date Taking?  Authorizing Provider   Accu-Chek Virgie Plus test strip TEST BLOOD SUGAR 3 TIMES EVERY DAY  DX: E11 65 10/16/20   Hakan Hdez MD   acetaminophen (TYLENOL) 325 mg tablet Take 2 tablets (650 mg total) by mouth every 4 (four) hours as needed for mild pain or fever 12/1/20   Arely Maynard MD   B-D UF III MINI PEN NEEDLES 31G X 5 MM MISC 4 (four) times a day As directed 4/6/20   Historical Provider, MD   COMBIGAN 0 2-0 5 % Administer 1 drop to both eyes 2 (two) times a day 3/19/19   Historical Provider, MD   Cyanocobalamin (VITAMIN B 12 PO) Take by mouth daily    Historical Provider, MD   dabigatran etexilate (PRADAXA) 75 mg capsule Take 1 capsule (75 mg total) by mouth every 12 (twelve) hours 11/14/20   ISIDRA Mcgee   famotidine (PEPCID) 20 mg tablet TAKE 1 TABLET BY MOUTH TWICE A DAY 8/11/20   Lluvia Burton MD   gabapentin (NEURONTIN) 300 mg capsule Take 1 capsule (300 mg total) by mouth daily at bedtime 11/12/20   Robson Bernal MD   Insulin Aspart (NovoLOG PenFill) 100 UNITS/ML cartridge for injection Inject 10 Units under the skin 3 (three) times a day with meals    Historical Provider, MD   insulin glargine (Basaglar KwikPen) 100 units/mL injection pen Inject 15 Units under the skin daily 1/7/21   Jasmyn Matthews MD   lactulose (CHRONULAC) 10 g/15 mL solution PLEASE SPECIFY DIRECTIONS, REFILLS AND QUANTITY 1/29/21   Preeti Andrade MD   levothyroxine (Synthroid) 137 mcg tablet Take 1 tablet (137 mcg total) by mouth daily 4/21/20   Kyle Valdez MD   metolazone (ZAROXOLYN) 2 5 mg tablet Take 1 tablet (2 5 mg total) by mouth every other day 11/14/20   ISIDRA Gerber   metoprolol succinate (TOPROL-XL) 50 mg 24 hr tablet Take 1 tablet (50 mg total) by mouth daily 1/15/21   Preeti nAdrade MD   Multiple Vitamin (MULTIVITAMIN) tablet Take 1 tablet by mouth daily    Historical Provider, MD   pantoprazole (PROTONIX) 40 mg tablet Take 40 mg by mouth daily in the early morning  1/3/20   Historical Provider, MD   potassium chloride (Klor-Con M10) 10 mEq tablet Take 2 tablets (20 mEq total) by mouth daily 12/21/20   Jocelynn Gutierrez MD   simvastatin (ZOCOR) 20 mg tablet TAKE 1 TABLET (20MG) BY ORAL ROUTE EVERY DAY IN THE EVENING 6/20/20 1/30/21  Rian Frank MD   torsemide (DEMADEX) 20 mg tablet Take 2 tablets (40 mg total) by mouth daily 11/14/20   ISIDRA Gerber     I have reviewed home medications with a medical source (PCP, Pharmacy, other)  Allergies: Allergies   Allergen Reactions    Bromide Ion [Bromine]      Blurred vision   Has preservative that  Pt cannot use    Other Blisters     Adhesive tape    Timolol      Per pt, allergic to steroid in medication    Tramadol      cognition changes    Ibuprofen Palpitations    Penicillins Rash       Social History:     Marital Status: Single     Substance Use History:   Social History     Substance and Sexual Activity   Alcohol Use Not Currently    Alcohol/week: 0 0 standard drinks    Frequency: Never    Drinks per session: Patient refused    Binge frequency: Never    Comment: only on NEW YEAR'S     Social History     Tobacco Use   Smoking Status Never Smoker   Smokeless Tobacco Never Used     Social History     Substance and Sexual Activity   Drug Use Never    Types: Oxycodone       Family History:      Family History   Problem Relation Age of Onset    Heart disease Mother     Breast cancer Maternal Aunt     Heart disease Sister      Physical Exam:     Vitals:   Blood Pressure: 153/72 (01/31/21 1658)  Pulse: 62 (01/31/21 1658)  Temperature: 97 5 °F (36 4 °C) (01/31/21 1658)  Respirations: 18 (01/31/21 1658)  SpO2: 92 % (01/31/21 1658)    Physical Exam  Vitals signs reviewed  Constitutional:       Appearance: Normal appearance  Comments: Chronically ill-appearing   HENT:      Head: Normocephalic and atraumatic  Mouth/Throat:      Mouth: Mucous membranes are moist       Pharynx: No oropharyngeal exudate  Eyes:      General: No scleral icterus  Extraocular Movements: Extraocular movements intact  Conjunctiva/sclera: Conjunctivae normal       Pupils: Pupils are equal, round, and reactive to light  Neck:      Musculoskeletal: Normal range of motion and neck supple  Cardiovascular:      Rate and Rhythm: Regular rhythm  Pulses: Normal pulses  Heart sounds: Murmur (Systolic murmur) present  Pulmonary:      Effort: Pulmonary effort is normal       Breath sounds: Normal breath sounds  No wheezing  Comments: Decreased breath sounds bilateral  Abdominal:      General: Bowel sounds are normal  There is no distension  Palpations: Abdomen is soft  Tenderness: There is no abdominal tenderness  Musculoskeletal: Normal range of motion  Right lower leg: No edema  Left lower leg: No edema  Skin:     General: Skin is warm and dry  Findings: No rash  Neurological:      General: No focal deficit present  Mental Status: She is alert  Cranial Nerves: No cranial nerve deficit  Additional Data:     Lab Results: I have personally reviewed pertinent reports        Results from last 7 days   Lab Units 01/31/21  0626  01/30/21  1410   WBC Thousand/uL 4 66   < > 5 93   HEMOGLOBIN g/dL 11 2*   < > 11 3*   HEMATOCRIT % 39 0   < > 39 0 PLATELETS Thousands/uL 151   < > 251   NEUTROS PCT %  --   --  65   LYMPHS PCT %  --   --  15   MONOS PCT %  --   --  16*   EOS PCT %  --   --  3    < > = values in this interval not displayed  Results from last 7 days   Lab Units 01/31/21  0626  01/30/21  1410   SODIUM mmol/L 139   < > 136   POTASSIUM mmol/L 3 2*   < > 3 4*   CHLORIDE mmol/L 101   < > 98*   CO2 mmol/L 35*   < > 37*   BUN mg/dL 115*   < > 121*   CREATININE mg/dL 2 10*   < > 1 77*   ANION GAP mmol/L 3*   < > 1*   CALCIUM mg/dL 8 7   < > 8 2*   ALBUMIN g/dL  --   --  2 7*   TOTAL BILIRUBIN mg/dL  --   --  1 60*   ALK PHOS U/L  --   --  214*   ALT U/L  --   --  23   AST U/L  --   --  41   GLUCOSE RANDOM mg/dL 222*   < > 225*    < > = values in this interval not displayed  Results from last 7 days   Lab Units 01/30/21  1410   INR  1 43*     Results from last 7 days   Lab Units 01/31/21  1710 01/31/21  1137 01/31/21  0801 01/31/21  0215 01/30/21  2321 01/30/21  2112 01/30/21  1950 01/30/21  1735 01/30/21  1526 01/30/21  1458 01/30/21  1350 01/30/21  1343   POC GLUCOSE mg/dl 331* 306* 215* 206* 191* 180* 87 80 86 67 199* 35*               Imaging: I have personally reviewed pertinent reports  No orders to display       EKG, Pathology, and Other Studies Reviewed on Admission:   · yes    Allscripts / Epic Records Reviewed: Yes     ** Please Note: This note has been constructed using a voice recognition system   **

## 2021-01-31 NOTE — ASSESSMENT & PLAN NOTE
Baseline creatinine appears to be 1 5-2 0 as per prior records  Creatinine appears to be at high normal range  Monitor with IV fluids

## 2021-01-31 NOTE — ASSESSMENT & PLAN NOTE
Rate controlled, history of sick sinus status post pacemaker  Continue metoprolol  Holding Pradaxa at present awaiting surgery

## 2021-01-31 NOTE — ASSESSMENT & PLAN NOTE
Ammonia level was noted to be elevated at 77  Patient does not appear to have history of or evidence of cirrhosis on prior imaging  LFT with mildly elevated bilirubin and low albumin  INR mildly elevated at 1 4  Patient does report that she takes lactulose on the regular basis due to history of constipation but has not been taking over last 1 week as she ran out her prescription  Mental status improved after correction of hyperglycemia    Repeat ammonia normal  · Resumed lactulose  · Ammonia level improving

## 2021-02-01 ENCOUNTER — APPOINTMENT (INPATIENT)
Dept: RADIOLOGY | Facility: HOSPITAL | Age: 71
DRG: 306 | End: 2021-02-01
Payer: MEDICARE

## 2021-02-01 PROBLEM — J96.11 CHRONIC RESPIRATORY FAILURE WITH HYPOXIA (HCC): Chronic | Status: ACTIVE | Noted: 2021-02-01

## 2021-02-01 LAB
ABO GROUP BLD: NORMAL
ALBUMIN SERPL BCP-MCNC: 2.8 G/DL (ref 3.5–5)
ALP SERPL-CCNC: 213 U/L (ref 46–116)
ALT SERPL W P-5'-P-CCNC: 20 U/L (ref 12–78)
ANION GAP SERPL CALCULATED.3IONS-SCNC: 5 MMOL/L (ref 4–13)
APTT PPP: 34 SECONDS (ref 23–37)
AST SERPL W P-5'-P-CCNC: 20 U/L (ref 5–45)
ATRIAL RATE: 63 BPM
BASOPHILS # BLD AUTO: 0.03 THOUSANDS/ΜL (ref 0–0.1)
BASOPHILS NFR BLD AUTO: 1 % (ref 0–1)
BILIRUB SERPL-MCNC: 1.84 MG/DL (ref 0.2–1)
BLD GP AB SCN SERPL QL: NEGATIVE
BUN SERPL-MCNC: 106 MG/DL (ref 5–25)
CALCIUM ALBUM COR SERPL-MCNC: 10.5 MG/DL (ref 8.3–10.1)
CALCIUM SERPL-MCNC: 9.5 MG/DL (ref 8.3–10.1)
CHLORIDE SERPL-SCNC: 101 MMOL/L (ref 100–108)
CO2 SERPL-SCNC: 35 MMOL/L (ref 21–32)
CREAT SERPL-MCNC: 1.76 MG/DL (ref 0.6–1.3)
EOSINOPHIL # BLD AUTO: 0.1 THOUSAND/ΜL (ref 0–0.61)
EOSINOPHIL NFR BLD AUTO: 2 % (ref 0–6)
ERYTHROCYTE [DISTWIDTH] IN BLOOD BY AUTOMATED COUNT: 20 % (ref 11.6–15.1)
EST. AVERAGE GLUCOSE BLD GHB EST-MCNC: 157 MG/DL
GFR SERPL CREATININE-BSD FRML MDRD: 28 ML/MIN/1.73SQ M
GLUCOSE SERPL-MCNC: 175 MG/DL (ref 65–140)
GLUCOSE SERPL-MCNC: 249 MG/DL (ref 65–140)
GLUCOSE SERPL-MCNC: 261 MG/DL (ref 65–140)
GLUCOSE SERPL-MCNC: 275 MG/DL (ref 65–140)
GLUCOSE SERPL-MCNC: 279 MG/DL (ref 65–140)
GLUCOSE SERPL-MCNC: 295 MG/DL (ref 65–140)
GLUCOSE SERPL-MCNC: 301 MG/DL (ref 65–140)
GLUCOSE SERPL-MCNC: 345 MG/DL (ref 65–140)
HBA1C MFR BLD: 7.1 %
HCT VFR BLD AUTO: 40.1 % (ref 34.8–46.1)
HGB BLD-MCNC: 11.8 G/DL (ref 11.5–15.4)
IMM GRANULOCYTES # BLD AUTO: 0.01 THOUSAND/UL (ref 0–0.2)
IMM GRANULOCYTES NFR BLD AUTO: 0 % (ref 0–2)
INR PPP: 1.38 (ref 0.84–1.19)
LYMPHOCYTES # BLD AUTO: 0.85 THOUSANDS/ΜL (ref 0.6–4.47)
LYMPHOCYTES NFR BLD AUTO: 15 % (ref 14–44)
MAGNESIUM SERPL-MCNC: 2.1 MG/DL (ref 1.6–2.6)
MCH RBC QN AUTO: 27.6 PG (ref 26.8–34.3)
MCHC RBC AUTO-ENTMCNC: 29.4 G/DL (ref 31.4–37.4)
MCV RBC AUTO: 94 FL (ref 82–98)
MONOCYTES # BLD AUTO: 1.04 THOUSAND/ΜL (ref 0.17–1.22)
MONOCYTES NFR BLD AUTO: 19 % (ref 4–12)
NEUTROPHILS # BLD AUTO: 3.59 THOUSANDS/ΜL (ref 1.85–7.62)
NEUTS SEG NFR BLD AUTO: 63 % (ref 43–75)
NRBC BLD AUTO-RTO: 0 /100 WBCS
PLATELET # BLD AUTO: 146 THOUSANDS/UL (ref 149–390)
PMV BLD AUTO: 10.5 FL (ref 8.9–12.7)
POTASSIUM SERPL-SCNC: 3.4 MMOL/L (ref 3.5–5.3)
PROT SERPL-MCNC: 8.3 G/DL (ref 6.4–8.2)
PROTHROMBIN TIME: 17 SECONDS (ref 11.6–14.5)
QRS AXIS: 250 DEGREES
QRSD INTERVAL: 214 MS
QT INTERVAL: 584 MS
QTC INTERVAL: 597 MS
RBC # BLD AUTO: 4.27 MILLION/UL (ref 3.81–5.12)
RH BLD: POSITIVE
SODIUM SERPL-SCNC: 141 MMOL/L (ref 136–145)
SPECIMEN EXPIRATION DATE: NORMAL
T WAVE AXIS: 81 DEGREES
VENTRICULAR RATE: 63 BPM
WBC # BLD AUTO: 5.62 THOUSAND/UL (ref 4.31–10.16)

## 2021-02-01 PROCEDURE — 70355 PANORAMIC X-RAY OF JAWS: CPT

## 2021-02-01 PROCEDURE — 99223 1ST HOSP IP/OBS HIGH 75: CPT | Performed by: PHYSICIAN ASSISTANT

## 2021-02-01 PROCEDURE — 86923 COMPATIBILITY TEST ELECTRIC: CPT

## 2021-02-01 PROCEDURE — 99223 1ST HOSP IP/OBS HIGH 75: CPT | Performed by: INTERNAL MEDICINE

## 2021-02-01 PROCEDURE — 93010 ELECTROCARDIOGRAM REPORT: CPT | Performed by: INTERNAL MEDICINE

## 2021-02-01 PROCEDURE — 87081 CULTURE SCREEN ONLY: CPT | Performed by: PHYSICIAN ASSISTANT

## 2021-02-01 PROCEDURE — 99232 SBSQ HOSP IP/OBS MODERATE 35: CPT | Performed by: INTERNAL MEDICINE

## 2021-02-01 PROCEDURE — 83036 HEMOGLOBIN GLYCOSYLATED A1C: CPT | Performed by: INTERNAL MEDICINE

## 2021-02-01 PROCEDURE — 86901 BLOOD TYPING SEROLOGIC RH(D): CPT | Performed by: PHYSICIAN ASSISTANT

## 2021-02-01 PROCEDURE — 86850 RBC ANTIBODY SCREEN: CPT | Performed by: PHYSICIAN ASSISTANT

## 2021-02-01 PROCEDURE — 86900 BLOOD TYPING SEROLOGIC ABO: CPT | Performed by: PHYSICIAN ASSISTANT

## 2021-02-01 PROCEDURE — 85025 COMPLETE CBC W/AUTO DIFF WBC: CPT | Performed by: INTERNAL MEDICINE

## 2021-02-01 PROCEDURE — 85730 THROMBOPLASTIN TIME PARTIAL: CPT | Performed by: PHYSICIAN ASSISTANT

## 2021-02-01 PROCEDURE — 82948 REAGENT STRIP/BLOOD GLUCOSE: CPT

## 2021-02-01 PROCEDURE — 83735 ASSAY OF MAGNESIUM: CPT | Performed by: INTERNAL MEDICINE

## 2021-02-01 PROCEDURE — 99222 1ST HOSP IP/OBS MODERATE 55: CPT | Performed by: INTERNAL MEDICINE

## 2021-02-01 PROCEDURE — 80053 COMPREHEN METABOLIC PANEL: CPT | Performed by: INTERNAL MEDICINE

## 2021-02-01 PROCEDURE — 94760 N-INVAS EAR/PLS OXIMETRY 1: CPT

## 2021-02-01 PROCEDURE — 85610 PROTHROMBIN TIME: CPT | Performed by: PHYSICIAN ASSISTANT

## 2021-02-01 RX ORDER — CHLORHEXIDINE GLUCONATE 0.12 MG/ML
15 RINSE ORAL ONCE
Status: DISCONTINUED | OUTPATIENT
Start: 2021-02-02 | End: 2021-02-01

## 2021-02-01 RX ORDER — POTASSIUM CHLORIDE 20 MEQ/1
40 TABLET, EXTENDED RELEASE ORAL ONCE
Status: COMPLETED | OUTPATIENT
Start: 2021-02-01 | End: 2021-02-01

## 2021-02-01 RX ORDER — INSULIN GLARGINE 100 [IU]/ML
18 INJECTION, SOLUTION SUBCUTANEOUS
Status: DISCONTINUED | OUTPATIENT
Start: 2021-02-01 | End: 2021-02-02

## 2021-02-01 RX ORDER — CEFAZOLIN SODIUM 2 G/50ML
2000 SOLUTION INTRAVENOUS ONCE
Status: CANCELLED | OUTPATIENT
Start: 2021-02-01 | End: 2021-02-01

## 2021-02-01 RX ORDER — CHLORHEXIDINE GLUCONATE 0.12 MG/ML
15 RINSE ORAL EVERY 12 HOURS SCHEDULED
Status: DISCONTINUED | OUTPATIENT
Start: 2021-02-01 | End: 2021-02-04 | Stop reason: HOSPADM

## 2021-02-01 RX ADMIN — HEPARIN SODIUM 5000 UNITS: 5000 INJECTION INTRAVENOUS; SUBCUTANEOUS at 22:02

## 2021-02-01 RX ADMIN — HEPARIN SODIUM 5000 UNITS: 5000 INJECTION INTRAVENOUS; SUBCUTANEOUS at 06:12

## 2021-02-01 RX ADMIN — ATORVASTATIN CALCIUM 20 MG: 20 TABLET, FILM COATED ORAL at 17:47

## 2021-02-01 RX ADMIN — INSULIN LISPRO 1 UNITS: 100 INJECTION, SOLUTION INTRAVENOUS; SUBCUTANEOUS at 22:01

## 2021-02-01 RX ADMIN — METOPROLOL SUCCINATE 50 MG: 50 TABLET, EXTENDED RELEASE ORAL at 09:18

## 2021-02-01 RX ADMIN — INSULIN LISPRO 4 UNITS: 100 INJECTION, SOLUTION INTRAVENOUS; SUBCUTANEOUS at 17:48

## 2021-02-01 RX ADMIN — INSULIN HUMAN 8 UNITS: 100 INJECTION, SUSPENSION SUBCUTANEOUS at 13:40

## 2021-02-01 RX ADMIN — GABAPENTIN 300 MG: 300 CAPSULE ORAL at 22:02

## 2021-02-01 RX ADMIN — TORSEMIDE 20 MG: 20 TABLET ORAL at 17:48

## 2021-02-01 RX ADMIN — INSULIN LISPRO 3 UNITS: 100 INJECTION, SOLUTION INTRAVENOUS; SUBCUTANEOUS at 11:30

## 2021-02-01 RX ADMIN — MUPIROCIN 1 APPLICATION: 20 OINTMENT TOPICAL at 22:41

## 2021-02-01 RX ADMIN — LACTULOSE 10 G: 10 SOLUTION ORAL at 17:46

## 2021-02-01 RX ADMIN — INSULIN GLARGINE 18 UNITS: 100 INJECTION, SOLUTION SUBCUTANEOUS at 22:09

## 2021-02-01 RX ADMIN — INSULIN LISPRO 5 UNITS: 100 INJECTION, SOLUTION INTRAVENOUS; SUBCUTANEOUS at 11:30

## 2021-02-01 RX ADMIN — POTASSIUM CHLORIDE 40 MEQ: 1500 TABLET, EXTENDED RELEASE ORAL at 11:27

## 2021-02-01 RX ADMIN — PANTOPRAZOLE SODIUM 40 MG: 40 TABLET, DELAYED RELEASE ORAL at 06:12

## 2021-02-01 RX ADMIN — INSULIN LISPRO 2 UNITS: 100 INJECTION, SOLUTION INTRAVENOUS; SUBCUTANEOUS at 09:18

## 2021-02-01 RX ADMIN — LEVOTHYROXINE SODIUM 137 MCG: 25 TABLET ORAL at 06:12

## 2021-02-01 RX ADMIN — INSULIN LISPRO 2 UNITS: 100 INJECTION, SOLUTION INTRAVENOUS; SUBCUTANEOUS at 02:33

## 2021-02-01 RX ADMIN — HEPARIN SODIUM 5000 UNITS: 5000 INJECTION INTRAVENOUS; SUBCUTANEOUS at 13:40

## 2021-02-01 RX ADMIN — CHLORHEXIDINE GLUCONATE 0.12% ORAL RINSE 15 ML: 1.2 LIQUID ORAL at 22:02

## 2021-02-01 NOTE — PHYSICAL THERAPY NOTE
PT orders received  Chart reviewed  Pt pending OR  Will hold PT at this time   Will continue to follow  James Garrison, Pt, DPT       02/01/21 0739   PT Last Visit   PT Visit Date 02/01/21   Note Type   Note type Evaluation   Cancel Reasons Medical status

## 2021-02-01 NOTE — ASSESSMENT & PLAN NOTE
Patient is awaiting TAVR, previously scheduled on 02/02 as outpatient, however presented to [facility] with AMS/Hypogylcemia which has since resolved   · Previous provider at [facility] discussed with Cardiothoracic surgery, recommended transfer for consideration for scheduled surgery  · Appreciate CT surgery input

## 2021-02-01 NOTE — ASSESSMENT & PLAN NOTE
Ammonia level was noted to be elevated at 77 at [facility]  Patient does not appear to have history of cirrhosis or evidence of cirrhosis on prior imaging  · LFTs with mildly elevated bilirubin and low albumin  INR was mildly elevated at 1 4  · Patient does report that she takes lactulose on the regular basis due to history of constipation but has not been taking over last 1 week as she ran out her prescription  · Mental status improved after correction of hyperglycemia    Repeat ammonia normal  · Resumed lactulose

## 2021-02-01 NOTE — PROGRESS NOTES
Progress Note - Mendez Shook 1950, 66 y o  female MRN: [id number]    Unit/Bed#: CW2 210-01 Encounter: [id number]    Primary Care Provider: Reji Duggan MD   Date and time admitted to hospital: 1/31/2021  4:40 PM    D/W CT surgery  States she is supposed to have dental extraction today at 1pm  OMFS not consulted that I can see  TT'D Role for OMFS Dr Kerwin Moon to make him aware  Patient made NPO, however she did already eat breakfast       * Aortic stenosis  Assessment & Plan  Patient is awaiting TAVR, previously scheduled on 02/02 as outpatient, however presented to [facility]  with AMS/Hypogylcemia which has since resolved   · Previous provider at [facility] discussed with Cardiothoracic surgery, recommended transfer for consideration for scheduled surgery  · Appreciate CT surgery input    Encephalopathy acute, metabolic  Assessment & Plan  Presented with altered mental status, hypoglycemia, hypothermia, azotemia and elevated ammonia level to [facility]  Mental status improved after correction of hypoglycemia  · CT head negative, exam nonfocal  · Monitor mental status    Stage 3 chronic kidney disease  Assessment & Plan  Lab Results   Component Value Date    EGFR 23 01/31/2021    EGFR 26 01/30/2021    EGFR 28 01/30/2021    CREATININE 2 10 (H) 01/31/2021    CREATININE 1 87 (H) 01/30/2021    CREATININE 1 77 (H) 01/30/2021     Baseline creatinine appears to be 1 5-2 0 as per prior records  · Creatinine on 1/31 at higher end of normal  · Diuretics were held at [facility] and was given fluids  · Now back on diuretics  · Awaiting AM labs  · Consult renal for assistance    Chronic combined systolic and diastolic congestive heart failure [facility])  Assessment & Plan  Wt Readings from Last 3 Encounters:   02/01/21 85 7 kg (189 lb)   01/31/21 89 1 kg (196 lb 6 9 oz)   01/15/21 93 kg (205 lb)     Prior echo-8/20 -EF 40-45%, severe aortic stenosis, PA pressure 80 mmHg   Got some fluids on arrival to [facility] , now off and back on diuretics  · Monitor daily weight, intake output  · Patient is awaiting TAVR, scheduled on 02/02 as outpatient previously, pending CT surgery consult   · Awaiting AM labs         Chronic respiratory failure with hypoxia [facility])  Assessment & Plan  Reports she wears 3L ATC at home  Currently on 4L   Wean as able     Hyperammonemia ([address] )  Assessment & Plan  Ammonia level was noted to be elevated at 77 at [facility]  Patient does not appear to have history of cirrhosis or evidence of cirrhosis on prior imaging  · LFTs with mildly elevated bilirubin and low albumin  INR was mildly elevated at 1 4  · Patient does report that she takes lactulose on the regular basis due to history of constipation but has not been taking over last 1 week as she ran out her prescription  · Mental status improved after correction of hyperglycemia  Repeat ammonia normal  · Resumed lactulose    Acquired hypothyroidism  Assessment & Plan  · Continue levothyroxine    Chronic atrial fibrillation (HCC)  Assessment & Plan  Rate controlled, history of sick sinus status post pacemaker  · Continue metoprolol  · Holding Pradaxa at present awaiting surgery    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, with long-term current use of insulin [facility])  Assessment & Plan  Lab Results   Component Value Date    HGBA1C 6 7 (H) 12/01/2020       Recent Labs     01/31/21  1710 01/31/21  2226 02/01/21  0213 02/01/21  0458   POCGLU 331* 317* 275* 249*       Blood Sugar Average: Last 72 hrs:  (P) 315 [id number]   · Presented with episode of hypoglycemia, similar episodes in the past  · Now resolved  · Patient was previously on Lantus 15 units q h s  And NovoLog 10 units pre meal  · Started on lower dose of insulin (Lantus 10 units q h s   And NovoLog 5 units pre meal with sliding scale)  · Monitor     Coronary artery disease involving coronary bypass graft of native heart without angina pectoris  Assessment & Plan  · Continue statin and metoprolol      VTE Pharmacologic Prophylaxis:   Pharmacologic: Heparin  Mechanical VTE Prophylaxis in Place: Yes    Patient Centered Rounds: I have performed bedside rounds with nursing staff today  Discussions with Specialists or Other Care Team Provider: appreciate CT surgery input    Education and Discussions with Family / Patient: patient  Called son  Time Spent for Care: 30 minutes  More than 50% of total time spent on counseling and coordination of care as described above  Current Length of Stay: 1 day(s)    Current Patient Status: Inpatient   Certification Statement: The patient will continue to require additional inpatient hospital stay due to further w/u for possible TAVR     Discharge Plan: pending  Code Status: Level 1 - Full Code      Subjective:   Doing ok  Denies CP, SOB  Overall c/o generalized weakness  Had BM yesterday  Feels less confused  Objective:     Vitals:   Temp (24hrs), Av 6 °F (36 4 °C), Min:97 3 °F (36 3 °C), Max:97 8 °F (36 6 °C)    Temp:  [97 3 °F (36 3 °C)-97 8 °F (36 6 °C)] 97 3 °F (36 3 °C)  HR:  [60-68] 60  Resp:  [15-19] 17  BP: (115-154)/(58-73) 135/62  SpO2:  [91 %-95 %] 95 %  Body mass index is 33 48 kg/m²  Input and Output Summary (last 24 hours): Intake/Output Summary (Last 24 hours) at 2021 0825  Last data filed at 2021 0820  Gross per 24 hour   Intake 600 ml   Output 488 ml   Net 112 ml       Physical Exam:     Physical Exam  Vitals signs and nursing note reviewed  Constitutional:       Appearance: She is obese  Comments: On nasal cannula    Cardiovascular:      Rate and Rhythm: Rhythm irregular  Heart sounds: Murmur present  Pulmonary:      Effort: No respiratory distress  Abdominal:      General: There is no distension  Tenderness: There is no abdominal tenderness  Hernia: A hernia is present  Musculoskeletal:      Right lower leg: No edema  Left lower leg: No edema     Neurological:      Mental Status: She is oriented to person, place, and time  Psychiatric:         Mood and Affect: Mood normal          Additional Data:     Labs:    Results from last 7 days   Lab Units 01/31/21  0626  01/30/21  1410   WBC Thousand/uL 4 66   < > 5 93   HEMOGLOBIN g/dL 11 2*   < > 11 3*   HEMATOCRIT % 39 0   < > 39 0   PLATELETS Thousands/uL 151   < > 251   NEUTROS PCT %  --   --  65   LYMPHS PCT %  --   --  15   MONOS PCT %  --   --  16*   EOS PCT %  --   --  3    < > = values in this interval not displayed  Results from last 7 days   Lab Units 01/31/21  0626  01/30/21  1410   POTASSIUM mmol/L 3 2*   < > 3 4*   CHLORIDE mmol/L 101   < > 98*   CO2 mmol/L 35*   < > 37*   BUN mg/dL 115*   < > 121*   CREATININE mg/dL 2 10*   < > 1 77*   CALCIUM mg/dL 8 7   < > 8 2*   ALK PHOS U/L  --   --  214*   ALT U/L  --   --  23   AST U/L  --   --  41    < > = values in this interval not displayed  Results from last 7 days   Lab Units 01/30/21  1410   INR  1 43*       * I Have Reviewed All Lab Data Listed Above  * Additional Pertinent Lab Tests Reviewed: All Labs Within Last 24 Hours Reviewed    Imaging:    Imaging Reports Reviewed Today Include: all  Imaging Personally Reviewed by Myself Includes:  none    Recent Cultures (last 7 days):     Results from last 7 days   Lab Units 01/30/21  1410   BLOOD CULTURE  No Growth at 24 hrs  No Growth at 24 hrs         Last 24 Hours Medication List:   Current Facility-Administered Medications   Medication Dose Route Frequency Provider Last Rate    acetaminophen  650 mg Oral Q6H PRN Nadege Colace, DO      atorvastatin  20 mg Oral Daily With Sparks, DO      bisacodyl  10 mg Rectal Daily PRN Nadege Colace, DO      timolol  1 drop Both Eyes BID Nadege Colace, DO      And    brimonidine  1 drop Both Eyes BID Nadege Colace, DO      gabapentin  300 mg Oral HS Nadege Colace, DO      heparin (porcine)  5,000 Units Subcutaneous [facility] Nadege Colace, DO      insulin glargine  10 Units Subcutaneous HS Rhetta Aymilex, DO      insulin lispro  1-5 Units Subcutaneous HS Rhetta Yamilex, DO      insulin lispro  1-5 Units Subcutaneous 0200 Rhetta Yamilex, DO      insulin lispro  1-5 Units Subcutaneous TID [facility] Rhetta Yamilex, DO      insulin lispro  5 Units Subcutaneous TID With Meals Rhetta Yamilex, DO      lactulose  10 g Oral BID Rhetta Yamilex, DO      levothyroxine  137 mcg Oral Daily Rhetta Yamilex, DO      metoprolol succinate  50 mg Oral Daily Rhetta Yamilex, DO      pantoprazole  40 mg Oral Daily Before Breakfast Rhetta Yamilex, DO      torsemide  20 mg Oral BID Rhetta Yamilex, DO          Today, Patient Was Seen By: Navi Harrison PA-C    ** Please Note: Dictation voice to text software may have been used in the creation of this document   **

## 2021-02-01 NOTE — OCCUPATIONAL THERAPY NOTE
OT CANCEL NOTE    OT orders received  Chart reviewed  Pt is planned for OR tomorrow for "REPLACEMENT AORTIC VALVE TRANSCATHETER (TAVR) TRANSFEMORAL W/ 26MM KOCH HEATH S3 ULTRA VALVE(ACCESS ON LEFT); TRANSESOPHAGEAL ECHOCARDIOGRAM (LAILA)  " Will hold initial OT evaluation  Will continue to follow pt on caseload and see pt when medically stable and as clinically appropriate, post-op         02/01/21 7447   Note Type   Cancel Reasons Patient to operating room       Gen Rahman MS, OTR/L

## 2021-02-01 NOTE — PLAN OF CARE
Problem: Potential for Falls  Goal: Patient will remain free of falls  Description: INTERVENTIONS:  - Assess patient frequently for physical needs  -  Identify cognitive and physical deficits and behaviors that affect risk of falls    -  Harrison fall precautions as indicated by assessment   - Educate patient/family on patient safety including physical limitations  - Instruct patient to call for assistance with activity based on assessment  - Modify environment to reduce risk of injury  - Consider OT/PT consult to assist with strengthening/mobility  Outcome: Progressing

## 2021-02-01 NOTE — ASSESSMENT & PLAN NOTE
Lab Results   Component Value Date    EGFR 23 01/31/2021    EGFR 26 01/30/2021    EGFR 28 01/30/2021    CREATININE 2 10 (H) 01/31/2021    CREATININE 1 87 (H) 01/30/2021    CREATININE 1 77 (H) 01/30/2021     Baseline creatinine appears to be 1 5-2 0 as per prior records  · Creatinine on 1/31 at higher end of normal  · Diuretics were held at [facility] and was given fluids  · Now back on diuretics  · Awaiting AM labs  · Consult renal for assistance

## 2021-02-01 NOTE — CONSULTS
Consultation - Cardiothoracic Surgery   Marquita Madrid 66 y o  female MRN: [id number]  Unit/Bed#: CW2 210-01 Encounter: [id number]      History of Present Illness   Physician Requesting Consult: Judith Forbes MD  Reason for Consult / Principal Problem: aortic stenosis    HPI: Marquita Madrid is a 66y o  year old female with an extensive past medical history including CAD underwent CABG x 4 in 2008, redo sternotomy tricuspid valve repair in 2012, morbid obesity BMI 33 4, O2 dependence, CKD stage III (baseline Cr 1 5-2 0/GFR 17), IDDM multiple admissions for hypoglycemia, recent fall with vertebral compression, chronic atrial flutter / fibrillation on Xarelto last dose 1/28/2021 and pulmonary hypertension who was brought to the ED with altered mental status  She was found lethargic at home and EMS was called  Her blood glucose was noted to be <35 in the ED  She was treated and still remained somnolent, head CT was unremarkable  She had elevated BUN and elevated ammonia  Ammonia level now normal   BUN stable elevated at 115 yesterday, Cr 2 10 trending up - this AM labs are pending  She is very weak on examination  When asked when she walked last, she said at home before I was admitted but I have difficulty getting around and lose my balance  Her appetite is fair  She is currently made NPO for dental extractions today of remaining teeth  She wear oxygen at all times  Consults    Review of Systems   Constitutional: Positive for activity change and fatigue  HENT: Positive for dental problem  Eyes: Negative  Respiratory: Positive for shortness of breath and wheezing  Cardiovascular: Positive for leg swelling  Gastrointestinal: Positive for abdominal distention and constipation  Endocrine: Negative  Genitourinary: Negative  Musculoskeletal: Positive for back pain and gait problem  Skin: Negative  Allergic/Immunologic: Negative      Neurological: Positive for dizziness, weakness and light-headedness  Hematological: Negative  Psychiatric/Behavioral: Positive for confusion and decreased concentration         Historical Information   Past Medical History:   Diagnosis Date    Arthritis     Atrial flutter ([place] [address] )     Cardiac disease     Carotid artery occlusion     CHF (congestive heart failure) ([place] [address] )     Cholelithiasis     last assessed 8/8/2016    Coronary artery disease     Diabetes mellitus ([place] [address] )     Disease of thyroid gland     Essential hypertension 8/15/2016    GERD (gastroesophageal reflux disease)     History of transfusion     Hyperlipidemia     Hypertension     Long-term insulin use in type 2 diabetes ([place] [address] ) 6/4/2016    Other specified diabetes mellitus with diabetic autonomic (poly)neuropathy ([place] [address] )     Oxygen dependent     3L    Pleural effusion 2008    Pulmonary embolism ([place] [address] )     2008    Renal disorder     Retinopathy     bilat    Sleep apnea     USES 3 LITER O2 CONTINOUIS    Subclavian artery stenosis ([place] [address] )     Umbilical hernia with obstruction, without gangrene     last assessed 7/8/2016     Past Surgical History:   Procedure Laterality Date    ABDOMINAL SURGERY      CARDIAC PACEMAKER PLACEMENT      CARDIAC SURGERY      cabg x 2    CATARACT EXTRACTION      CHOLECYSTECTOMY      COLONOSCOPY      CORONARY ARTERY BYPASS GRAFT  2008    EYE SURGERY      FRACTURE SURGERY Right 2010    shoulder    HERNIA REPAIR      umbilical    CO LAP,CHOLECYSTECTOMY N/A 8/10/2016    Procedure: CHOLECYSTECTOMY LAPAROSCOPIC;  Surgeon: Pete Freeman MD;  [facility];  Service: General     [address] Left 11/4/2016    Procedure: MICRODIRECT LARYNGOSCOPY; LEFT VOCAL FOLD INJECTION ;  Surgeon: Manuelito Arellano MD;  [facility];  Service: ENT    CO REPAIR UMBILICAL HZMJ,0+Z/Z,AYAME N/A 8/10/2016    Procedure: LAPAROSCOPIC REPAIR HERNIA VENTRAL;  Surgeon: Pete Freeman MD;  [facility];  Service: General    SUBCLAVIAN VEIN ANGIOPLASTY / STENTING Right     TRICUSPID VALVE REPLACEMENT      VASCULAR SURGERY      heart valve replacement     Social History     Substance and Sexual Activity   Alcohol Use Not Currently    Alcohol/week: 0 0 standard drinks    Frequency: Never    Drinks per session: Patient refused    Binge frequency: Never    Comment: only on NEW YEAR'S     Social History     Substance and Sexual Activity   Drug Use Never    Types: Oxycodone     Social History     Tobacco Use   Smoking Status Never Smoker   Smokeless Tobacco Never Used     Family History:   Family History   Problem Relation Age of Onset    Heart disease Mother     Breast cancer Maternal Aunt     Heart disease Sister        Meds/Allergies   all current active meds have been reviewed  Allergies   Allergen Reactions    Bromide Ion [Bromine]      Blurred vision  Has preservative that  Pt cannot use    Other Blisters     Adhesive tape    Timolol      Per pt, allergic to steroid in medication    Tramadol      cognition changes    Ibuprofen Palpitations    Penicillins Rash       Objective   Vitals: Blood pressure 135/62, pulse 60, temperature (!) 97 3 °F (36 3 °C), resp  rate 17, weight 85 7 kg (189 lb), SpO2 95 %, not currently breastfeeding  Invasive Devices     Peripheral Intravenous Line            Peripheral IV 01/30/21 Right Forearm 1 day                Physical Exam  Constitutional:       Appearance: She is obese  She is ill-appearing  HENT:      Head: Normocephalic and atraumatic  Right Ear: External ear normal       Left Ear: External ear normal       Nose: Nose normal       Mouth/Throat:      Mouth: Mucous membranes are moist       Comments: Poor dentition  Eyes:      General:         Right eye: No discharge  Left eye: No discharge  Extraocular Movements: Extraocular movements intact  Conjunctiva/sclera: Conjunctivae normal       Pupils: Pupils are equal, round, and reactive to light     Neck: Musculoskeletal: Normal range of motion and neck supple  Vascular: Carotid bruit present  Cardiovascular:      Rate and Rhythm: Normal rate  Heart sounds: Murmur present  Pulmonary:      Breath sounds: Normal breath sounds  Comments: Poor effort, decrease breath sounds throughout  Abdominal:      General: Bowel sounds are normal  There is distension  Tenderness: There is no abdominal tenderness  Hernia: A hernia is present  Musculoskeletal:      Right lower leg: Edema present  Left lower leg: Edema present  Skin:     General: Skin is warm and dry  Coloration: Skin is not pale  Findings: No erythema or rash  Comments: Bilateral varicose veins, PAD noted bilateral lower legs from mid calf    Neurological:      Mental Status: She is alert and oriented to person, place, and time  Cranial Nerves: No cranial nerve deficit  Motor: Weakness present  Gait: Gait abnormal       Comments: Oriented to self, place and time   Psychiatric:         Mood and Affect: Mood normal          Behavior: Behavior normal          Thought Content:  Thought content normal          Judgment: Judgment normal          Lab Results:   Results from last 7 days   Lab Units 01/31/21  0626 01/30/21 2128 01/30/21  1410   WBC Thousand/uL 4 66 5 17 5 93   HEMOGLOBIN g/dL 11 2* 12 2 11 3*   HEMATOCRIT % 39 0 42 5 39 0   PLATELETS Thousands/uL 151 160 251     Results from last 7 days   Lab Units 01/31/21  0626 01/30/21 2128 01/30/21  1410   POTASSIUM mmol/L 3 2* 2 7* 3 4*   CHLORIDE mmol/L 101 99* 98*   CO2 mmol/L 35* 37* 37*   BUN mg/dL 115* 114* 121*   CREATININE mg/dL 2 10* 1 87* 1 77*   CALCIUM mg/dL 8 7 9 4 8 2*     Results from last 7 days   Lab Units 01/30/21  1410   INR  1 43*   PTT seconds 31     Lab Results   Component Value Date    HGBA1C 6 7 (H) 12/01/2020     Lab Results   Component Value Date    CKTOTAL 87 11/30/2020    CKMB 1 4 08/15/2016    CKMBINDEX <1 0 08/15/2016 TROPONINI 0 03 01/30/2021       Imaging Studies:  LEFT VENTRICLE: The ventricle was mildly dilated  Systolic function was mild to moderately reduced  Ejection fraction was estimated in the range of 40 % to 45 % to be 45 %  There was mild diffuse hypokinesis  Wall thickness was mildly to  moderately increased  There was mild concentric hypertrophy  DOPPLER: Doppler parameters were consistent with high ventricular filling pressure      VENTRICULAR SEPTUM: Thickness was mildly increased  There was paradoxical motion  These changes are consistent with right ventricular pacing      RIGHT VENTRICLE: The ventricle was mildly to moderately dilated  Systolic function was mildly reduced  Wall thickness was mildly increased  DOPPLER: Systolic pressure was markedly increased      LEFT ATRIUM: The atrium was moderately dilated      RIGHT ATRIUM: The atrium was moderately dilated      MITRAL VALVE: There was mild to moderate annular calcification  There was normal leaflet separation  DOPPLER: The transmitral velocity was within the normal range  There was no evidence for stenosis  There was mild regurgitation      AORTIC VALVE: The valve was trileaflet  Leaflets exhibited moderately increased thickness, moderate calcification, and markedly reduced cuspal separation  DOPPLER: Transaortic velocity was increased due to valvular stenosis  There was  severe stenosis  TROY around 0 85  LVOT velocity 0 65 m/sec and Peak AV 2 36 m/sec and DI  26  Peak na d mean gradient are low, need to rule out low gradient severe AS There was no regurgitation      TRICUSPID VALVE: DOPPLER: There was moderate regurgitation  Estimated peak PA pressure was 80 mmHg      PULMONIC VALVE: DOPPLER: There was mild regurgitation      PERICARDIUM: There was no thickening or calcification  There was no pericardial effusion      AORTA: The root exhibited normal size  CORONARY VESSELS:   --  Left main: Angiography showed minor luminal irregularities    --  Mid LAD: There was a diffuse 60 % stenosis  --  1st diagonal: There was a 100 % stenosis  --  Mid circumflex: There was a 70 % stenosis  --  Mid RCA: There was a 100 % stenosis  This lesion is a chronic total occlusion  --  Graft to the mid LAD: The graft was a LIMA  Graft angiography showed no evidence of disease  --  Graft to the 1st diagonal: The graft was a saphenous vein graft from the aorta  Graft angiography showed minor luminal irregularities  --  Graft to the 1st obtuse marginal: The graft was a saphenous vein graft from the aorta  Graft angiography showed minor luminal irregularities  --  Graft to the RPDA: The graft was a saphenous vein graft from the aorta  Graft angiography showed minor luminal irregularities  CTA TAVR  FINDINGS:     VASCULAR STRUCTURES:       Annulus: diameter 29 x 22 mm      area: 549 sq mm    Annulus to LCA: 11 mm    Annulus to RCA: 15 mm    Minimal diameter right iliofemoral segment: 7 6 mm    Minimal diameter left iliofemoral segment: 7 4 mm     The ascending aorta is nonaneurysmal measuring 35 mm with moderate atherosclerosis  There is a type 1 aortic arch with variant branching anatomy and no stenosis in the visualized great vessels  The left vertebral artery arises directly from the arch  The   aortic arch is nonaneurysmal with moderate atherosclerosis  The descending thoracic aorta is nonaneurysmal with mild  atherosclerosis      The abdominal aorta is moderately atherosclerotic with calcified plaque  The celiac artery, superior mesenteric artery, and inferior mesenteric artery are patent with extensive calcifications  The renal arteries are patent bilaterally with calcified   plaque at their origins resulting in a high-grade right renal artery stenosis and mild left renal artery stenosis  The right and left iliofemoral segments are diffusely atherosclerotic, but patent and the internal iliac arteries are patent        Cardiac findings:   There is mild calcification of the aortic valve  The left ventricle is normal   The ventricular septum is normal   No prior valvular surgery  Multiple pass grafts arise from the aorta and left internal mammary artery  No pericardial effusion  No cardiac   mass or thrombus      OTHER FINDINGS:      CHEST     LUNGS:  Groundglass perihilar infiltrates likely related to pulmonary edema  Bilateral subsegmental atelectasis     PLEURA:  Tiny bilateral pleural effusions      MEDIASTINUM AND JAIRO:  No pathologically enlarged mediastinal and hilar lymph nodes  Small hiatal hernia is noted      CHEST WALL AND LOWER NECK:   Status post median sternotomy  Status post left pacer      ABDOMEN     LIVER/BILIARY TREE:  Unremarkable      GALLBLADDER:  Gallbladder is surgically absent      SPLEEN:  Unremarkable      PANCREAS:  Unremarkable      ADRENAL GLANDS:  Unremarkable      KIDNEYS/URETERS:  Unremarkable  No hydronephrosis      STOMACH AND BOWEL:  Unremarkable      APPENDIX:  No findings to suggest appendicitis      ABDOMINOPELVIC CAVITY:  Moderate amount of ascites within abdominal cavity and hernia sac      PELVIS     REPRODUCTIVE ORGANS:  Unremarkable for patient's age      URINARY BLADDER:  Unremarkable      ABDOMINAL WALL/INGUINAL REGIONS:  Large ventral wall hernia containing colon and small bowel      OSSEOUS STRUCTURES:  No acute fracture or destructive osseous lesion      IMPRESSION:     TVAR measurements as above      High-grade right renal artery stenosis      Status post multiple coronary artery bypass surgeries      Bilateral perihilar groundglass densities most consistent with heart failure      Stable large ventral wall hernia containing large and small bowel without evidence for obstruction      Moderate ascites      Assessment:  Patient Active Problem List    Diagnosis Date Noted    Hyperammonemia ([facility] ) 01/31/2021    Encephalopathy acute, metabolic 98/40/0058    Prerenal azotemia 01/30/2021    Stage 3 chronic kidney disease 10/22/2020    Hypokalemia 10/22/2020    Acute on chronic respiratory failure ([facility] ) 09/29/2020    [facility] 08/20/2020    Aortic stenosis 08/20/2020    Mild cognitive impairment 08/19/2020    Altered thought processes 08/18/2020    Chronic kidney disease-mineral and bone disorder 07/06/2020    Dry nose 04/03/2020    Dysphonia 02/06/2020    Paresis of left vocal fold-severe 02/06/2020    Glottic insufficiency 02/06/2020    Muscle tension dysphonia 02/06/2020    Reflux laryngitis 02/06/2020    Class 1 obesity due to excess calories with serious comorbidity in adult 12/27/2019    Hypoxemia 12/27/2019    Chest pain 12/24/2019    Hyponatremia 12/24/2019    H/O coronary artery bypass surgery 10/17/2019    Tricuspid regurgitation 10/17/2019    Peripheral vascular disease, unspecified ([facility] ) 10/17/2019    Dysthymia 10/17/2019    History of colon polyps 10/15/2019    Other constipation 10/15/2019    Gastroesophageal reflux disease without esophagitis 10/15/2019    Anticoagulant long-term use 10/15/2019    Hernia of anterior abdominal wall 10/15/2019    Status post placement of cardiac pacemaker 09/23/2019    Encounter for long-term (current) use of insulin ([facility] ) 07/01/2019    Leg cramps 07/01/2019    Paresthesia 07/01/2019    Syncope 05/23/2019    Benign hypertension with chronic kidney disease, stage III     Disease of thyroid gland     Ventral hernia without obstruction or gangrene 05/09/2019    Chronic diastolic CHF (congestive heart failure), NYHA class 3 ([facility] ) 03/03/2018    Obstructive sleep apnea 02/20/2018    H/O tricuspid valve annuloplasty 02/18/2018    Morbid obesity with BMI of 40 0-44 9, adult ([facility] ) 02/17/2018    Mixed hyperlipidemia     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, with long-term current use of insulin ([facility] )     Atherosclerosis of native coronary artery of native heart with angina pectoris ([facility] ) 03/16/2017    Chronic atrial fibrillation ([facility] ) 03/16/2017    [facility] 03/16/2017    Anemia 08/17/2016    Coronary artery disease involving coronary bypass graft of native heart without angina pectoris 08/17/2016    Chronic combined systolic and diastolic congestive heart failure ([facility] ) 08/17/2016    SOB (shortness of breath) 20/23/7421    Umbilical hernia 14/39/3060    Arthritis 07/08/2016    Acquired hypothyroidism 07/08/2016    Gallbladder calculus without cholecystitis 06/06/2016       Plan:  Dental extractions planned for today for her preoperative clearance for TAVR  She is on the schedule for TAVR tomorrow morning  Will need nephrology to evaluate and determine suitability for TAVR tomorrow based on current rise in kidney function  The patient was comfortable with our recommendations, and their questions were answered to their satisfaction  We will continue to evaluate the patient daily with further recommendations as work up is completed  Thank you for allowing us to participate in the care of this patient       [place], [place]  7:33 AM  02/01/21

## 2021-02-01 NOTE — PLAN OF CARE
Problem: Potential for Falls  Goal: Patient will remain free of falls  Description: INTERVENTIONS:  - Assess patient frequently for physical needs  -  Identify cognitive and physical deficits and behaviors that affect risk of falls    -  [place] fall precautions as indicated by assessment   - Educate patient/family on patient safety including physical limitations  - Instruct patient to call for assistance with activity based on assessment  - Modify environment to reduce risk of injury  - Consider OT/PT consult to assist with strengthening/mobility  Outcome: Progressing     Problem: RESPIRATORY - ADULT  Goal: Achieves optimal ventilation and oxygenation  Description: INTERVENTIONS:  - Assess for changes in respiratory status  - Assess for changes in mentation and behavior  - Position to facilitate oxygenation and minimize respiratory effort  - Oxygen administered by appropriate delivery if ordered  - Initiate smoking cessation education as indicated  - Encourage broncho-pulmonary hygiene including cough, deep breathe, Incentive Spirometry  - Assess the need for suctioning and aspirate as needed  - Assess and instruct to report SOB or any respiratory difficulty  - Respiratory Therapy support as indicated  Outcome: Progressing     Problem: SKIN/TISSUE INTEGRITY - ADULT  Goal: Incision(s), wounds(s) or drain site(s) healing without S/S of infection  Description: INTERVENTIONS  - Assess and document risk factors for skin impairment   - Assess and document dressing, incision, wound bed, drain sites and surrounding tissue  - Consider nutrition services referral as needed  - Oral mucous membranes remain intact  - Provide patient/ family education  Outcome: Progressing  Goal: Oral mucous membranes remain intact  Description: INTERVENTIONS  - Assess oral mucosa and hygiene practices  - Implement preventative oral hygiene regimen  - Implement oral medicated treatments as ordered  - Initiate Nutrition services referral as needed  Outcome: Progressing

## 2021-02-01 NOTE — CONSULTS
Consultation - Nephrology   Jesse Torres 66 y o  female MRN: [id number]  Unit/Bed#: CW2 210-01 Encounter: [id number]      ASSESSMENT & PLAN    1  Stage 3 chronic kidney disease likely secondary to cardiorenal syndrome  o Baseline creatinine 1 5-2 0  o Urinalysis from January 30th shows trace blood, otherwise bland  o CT a from October 2020 shows unremarkable kidneys with no hydronephrosis an unremarkable adrenal glands  o Creatinine at baseline okay to proceed with TAVR when medically appropriate  o Was give restarted on diuretics once data TAVR schedule Will plan adjustments to diuretic    2  Electrolytes/Acid Base  o Hypokalemia -Potassium 3 4-will give 40 mEq of potassium chloride now    3  Blood Pressure  o Acceptable blood pressures with diuresis with a map that stable  o History of aortic stenosis requiring TAVR-remains on metoprolol 12 5 mg twice daily  o Volume overload-overall does wear chronic oxygen weight agree with holding IV fluids continue torsemide 20 mg b i d     4  Anemia of CKD  o Continue to trend hemoglobin currently stable    5  CKD-BMD  o Monitor parameters as an outpatient    6   Clinical Course/CV Risk Reduction/Health maintenance/communication  o Needs dental extraction prior to TAVR    HISTORY OF PRESENT ILLNESS:  Requesting Physician: Red Martinez MD  Reason for Consult:  Chronic kidney disease    Jesse Torres is a 66y o  year old female who was admitted for dental extraction for potential TAVR    She follows with my colleague Dr Severiano Corwin he has a history of stage 3 chronic kidney disease chronic diastolic congestive heart failure, severe aortic stenosis she wears chronic oxygen and has had a workup for the TAVR, came today for dental extraction prior to procedure, she is not worsening shortness of breath she states her urine output has been stable she did eat this morning is currently NPO but did eat breakfast this morning    PAST MEDICAL HISTORY:  Past Medical History: Diagnosis Date    Arthritis     Atrial flutter ([place] [address] )     Cardiac disease     Carotid artery occlusion     CHF (congestive heart failure) ([place] [address] )     Cholelithiasis     last assessed 8/8/2016    Coronary artery disease     Diabetes mellitus ([place] [address] )     Disease of thyroid gland     Essential hypertension 8/15/2016    GERD (gastroesophageal reflux disease)     History of transfusion     Hyperlipidemia     Hypertension     Long-term insulin use in type 2 diabetes ([place] [address] ) 6/4/2016    Other specified diabetes mellitus with diabetic autonomic (poly)neuropathy ([place] [address] )     Oxygen dependent     3L    Pleural effusion 2008    Pulmonary embolism ([place] [address] )     2008    Renal disorder     Retinopathy     bilat    Sleep apnea     USES 3 LITER O2 CONTINOUIS    Subclavian artery stenosis ([place] [address] )     Umbilical hernia with obstruction, without gangrene     last assessed 7/8/2016       PAST SURGICAL HISTORY:  Past Surgical History:   Procedure Laterality Date    ABDOMINAL SURGERY      CARDIAC PACEMAKER PLACEMENT      CARDIAC SURGERY      cabg x 2    CATARACT EXTRACTION      CHOLECYSTECTOMY      COLONOSCOPY      CORONARY ARTERY BYPASS GRAFT  2008    EYE SURGERY      FRACTURE SURGERY Right 2010    shoulder    HERNIA REPAIR      umbilical    [place] LAP,CHOLECYSTECTOMY N/A 8/10/2016    Procedure: CHOLECYSTECTOMY LAPAROSCOPIC;  Surgeon: Kassidy Laguna MD;  [facility];  Service: General     [address] Left 11/4/2016    Procedure: MICRODIRECT LARYNGOSCOPY; LEFT VOCAL FOLD INJECTION ;  Surgeon: Roger Collins MD;  [facility];  Service: ENT    [place] REPAIR UMBILICAL ELWN,6+D/B,LTHGR N/A 8/10/2016    Procedure: LAPAROSCOPIC REPAIR HERNIA VENTRAL;  Surgeon: Kassidy Laguna MD;  [facility];  Service: General    [address] / STENTING Right     TRICUSPID VALVE REPLACEMENT      VASCULAR SURGERY      heart valve replacement       ALLERGIES:  Allergies Allergen Reactions    Bromide Ion [Bromine]      Blurred vision   Has preservative that  Pt cannot use    Other Blisters     Adhesive tape    Timolol      Per pt, allergic to steroid in medication    Tramadol      cognition changes    Ibuprofen Palpitations    Penicillins Rash       SOCIAL HISTORY:  Social History     Substance and Sexual Activity   Alcohol Use Not Currently    Alcohol/week: 0 0 standard drinks    Frequency: Never    Drinks per session: Patient refused    Binge frequency: Never    Comment: only on NEW YEAR'S     Social History     Substance and Sexual Activity   Drug Use Never    Types: Oxycodone     Social History     Tobacco Use   Smoking Status Never Smoker   Smokeless Tobacco Never Used       FAMILY HISTORY:  Family History   Problem Relation Age of Onset    Heart disease Mother     Breast cancer Maternal Aunt     Heart disease Sister        MEDICATIONS:    Current Facility-Administered Medications:     acetaminophen (TYLENOL) tablet 650 mg, 650 mg, Oral, Q6H PRN, Yuri Perez DO    atorvastatin (LIPITOR) tablet 20 mg, 20 mg, Oral, Daily With Mark Velasco DO, 20 mg at 01/31/21 1749    bisacodyl (DULCOLAX) rectal suppository 10 mg, 10 mg, Rectal, Daily PRN, Yuri Perez DO    timolol (TIMOPTIC) 0 5 % ophthalmic solution 1 drop, 1 drop, Both Eyes, BID, 1 drop at 02/01/21 0918 **AND** brimonidine (ALPHAGAN P) 0 15 % ophthalmic solution 1 drop, 1 drop, Both Eyes, BID, Yuri Perez DO, 1 drop at 02/01/21 0918    [START ON 2/2/2021] chlorhexidine (PERIDEX) 0 12 % oral rinse 15 mL, 15 mL, Swish & Spit, Once, Meme Meals Silvia Calderon PA-C    gabapentin (NEURONTIN) capsule 300 mg, 300 mg, Oral, HS, Yuri Perez DO, 300 mg at 01/31/21 2230    heparin (porcine) subcutaneous injection 5,000 Units, 5,000 Units, Subcutaneous, Q8H [facility], Yuri Perez DO, 5,000 Units at 02/01/21 0612    insulin glargine (LANTUS) subcutaneous injection 10 Units 0 1 mL, 10 Units, Subcutaneous, HS, Zettie Catena, DO, 10 Units at 01/31/21 2230    insulin lispro (HumaLOG) 100 units/mL subcutaneous injection 1-5 Units, 1-5 Units, Subcutaneous, HS, Zettie Catena, DO, 3 Units at 01/31/21 2231    insulin lispro (HumaLOG) 100 units/mL subcutaneous injection 1-5 Units, 1-5 Units, Subcutaneous, 0200, Zettie Catena, DO, 2 Units at 02/01/21 0233    insulin lispro (HumaLOG) 100 units/mL subcutaneous injection 1-5 Units, 1-5 Units, Subcutaneous, TID AC, 2 Units at 02/01/21 0918 **AND** Fingerstick Glucose (POCT), , , TID AC, Zettie Catena, DO    insulin lispro (HumaLOG) 100 units/mL subcutaneous injection 5 Units, 5 Units, Subcutaneous, TID With Meals, Zettie Catena, DO, 5 Units at 01/31/21 1749    lactulose oral solution 10 g, 10 g, Oral, BID, Zettie Catena, DO, 10 g at 01/31/21 1749    levothyroxine tablet 137 mcg, 137 mcg, Oral, Daily, Zettie Catena, DO, 137 mcg at 02/01/21 0612    metoprolol succinate (TOPROL-XL) 24 hr tablet 50 mg, 50 mg, Oral, Daily, Zettie Catena, DO, 50 mg at 02/01/21 0918    [START ON 2/2/2021] metoprolol tartrate (LOPRESSOR) partial tablet 12 5 mg, 12 5 mg, Oral, Once, Daniel Bang PA-C    mupirocin (BACTROBAN) 2 % nasal ointment 1 application, 1 application, Nasal, Once, Daniel Bang PA-C    pantoprazole (PROTONIX) EC tablet 40 mg, 40 mg, Oral, Daily Before Breakfast, Zettie Catena, DO, 40 mg at 02/01/21 0612    torsemide (DEMADEX) tablet 20 mg, 20 mg, Oral, BID, Zettie Catena, DO, 20 mg at 01/31/21 1749    REVIEW OF SYSTEMS:  All the systems were reviewed and were negative except as documented on the HPI        PHYSICAL EXAM:  Current Weight: Weight - Scale: 85 7 kg (189 lb)  First Weight: Weight - Scale: 85 7 kg (189 lb)  Vitals:    01/31/21 2246 02/01/21 0222 02/01/21 0537 02/01/21 0711   BP: 127/62 115/58  135/62   BP Location:    Left arm   Pulse: 60 68  60   Resp: 18   17   Temp: 97 8 °F (36 6 °C) 97 7 °F (36 5 °C)  (!) 97 3 °F (36 3 °C)   TempSrc:    Axillary   SpO2: 94% 95% 95%   Weight:   85 7 kg (189 lb)        Intake/Output Summary (Last 24 hours) at 2/1/2021 2596  Last data filed at 2/1/2021 0820  Gross per 24 hour   Intake 600 ml   Output 488 ml   Net 112 ml       General: conscious, cooperative, in no acute distress  Eyes: conjunctivae pink, anicteric sclerae  ENT:  Oral mucosa dry  Neck: supple, no JVD  Chest:  No respiratory distress, no accessory muscle use normal respiratory effort  CVS:  Normal rate with no pericardial friction rub  Abdomen: soft, non-tender, non-distended, normoactive bowel sounds  Extremities: no edema of both legs  Skin: no rash  Neuro: awake, alert, oriented  Psych:  Pleasant affect      Lab Results:   Results from last 7 days   Lab Units 02/01/21  0844 01/31/21  0626 01/30/21  2128 01/30/21  1723 01/30/21  1410 01/25/21  1325   WBC Thousand/uL 5 62 4 66 5 17  --  5 93 4 60   HEMOGLOBIN g/dL 11 8 11 2* 12 2  --  11 3* 11 7   HEMATOCRIT % 40 1 39 0 42 5  --  39 0 38 5   PLATELETS Thousands/uL 146* 151 160  --  251 183   POTASSIUM mmol/L 3 4* 3 2* 2 7*  --  3 4* 3 3*   CHLORIDE mmol/L 101 101 99*  --  98* 98*   CO2 mmol/L 35* 35* 37*  --  37* 34*   BUN mg/dL 106* 115* 114*  --  121* 103*   CREATININE mg/dL 1 76* 2 10* 1 87*  --  1 77* 1 69*   CALCIUM mg/dL 9 5 8 7 9 4  --  8 2* 9 6   MAGNESIUM mg/dL 2 1  --   --   --   --  2 5   PHOSPHORUS mg/dL  --   --   --   --   --  4 0   ALK PHOS U/L 213*  --   --   --  214*  --    ALT U/L 20  --   --   --  23  --    AST U/L 20  --   --   --  41  --    BLOOD CULTURE   --   --   --   --  No Growth at 24 hrs  No Growth at 24 hrs   --    NITRITE UA   --   --   --  Negative  --   --    BLOOD UA   --   --   --  Trace-Intact*  --   --    LEUKOCYTES UA   --   --   --  Negative  --   --          Portions of the record may have been created with voice recognition software  Occasional wrong word or "sound a like" substitutions may have occurred due to the inherent limitations of voice recognition software   Read the chart carefully and recognize, using context, where substitutions have occurred  If you have any questions, please contact the dictating provider

## 2021-02-01 NOTE — CONSULTS
Consultation - [facility]    Patient Information: Anna Marie Taylor 66 y o  female MRN: [id number]  Unit/Bed#: CW2 210-01 Encounter: [id number]  Admitting Physician: Vickie Adair MD  PCP: Galo Hernandez MD  Date of Consultation:  02/01/21    It was my intention to do a video conference call but patient is unavalable and in Xray for OMFS/ tooth extraction  D/w primary service  ASSESSMENT:  66 yr female with uncontrolled Type 2 diabetes, CAD, AS, CKD3 and multiple co-morbidities was admitted with encephalopathy in context of hypoglycemia and hyperammonemia  PLAN:    1  Type 2 diabetes  A1c 7 1 % but seems to have hyperglycemia and hypoglycemia  Note home medication include Basaglar 15u bedtime and Novolog 10u tidac  No oral agents  Follows Dr Angie Calero Last office visit was 7/31/20  Presently she has significant hyperglycemia of 250-320mg/dL range in context of under dosing insulin  She seems to be on bolus heavy dosing at home  Eventually will benefit from a glucose sensor to improve data about hypoglycemia  May consider GLP1 agonist as outpt  For now, Change to Lantus 18u bedtime and HUmalog 8u tidac plus correction  Will give 1 dose of NPH 8u now to cover till next dose lantus  D/w Nursing staff  Will monitor and titrate insulin as needed  2  Hyperammonemia  No Hx of cirrhosis  May contribute to change in MS  R/o other causes of portosystemic shunt  Suggest GI input  3  Hypothyroidism  On levothyroxine 137mcg qdaily  TSH 1 1uIU/mL  Continue  4  AS  Waiting TAVR after tooth extraction  CT surgery managing  Total time spent was 55min of which >50% was in coordination of care        Reason for consultation:   Hypoglycemia/ diabetes management    History of Present Illness:    Anna Marie Taylor is a 66 y o  female with an extensive past medical history including Type 2 diabetes, retinopathy, recurrent hypoglycemia, CAD s/p CABG x 4 in 2008, redo sternotomy tricuspid valve repair in 2012, morbid obesity BMI 33 4, O2 dependence, CKD stage III (baseline Cr 1 5-2 0/GFR 17), recurrent falls with vertebral compression fracture, chronic atrial flutter / fibrillation on Xarelto and pulmonary hypertension who was brought to the ED with altered mental status  Review of Systems:    Review of Systems   Constitutional: Positive for fatigue  HENT: Negative  Eyes: Negative  Respiratory: Negative  Cardiovascular: Negative  Gastrointestinal: Negative  Endocrine: Negative  Musculoskeletal: Negative  Skin: Negative  Allergic/Immunologic: Negative  Neurological: Negative  Hematological: Negative  Psychiatric/Behavioral: Negative          Past Medical and Surgical History:     Past Medical History:   Diagnosis Date    Arthritis     [facility] flutter ([facility]     Cardiac disease     Carotid artery occlusion     [facility]     Cholelithiasis     last assessed 8/8/2016    Coronary artery disease     Diabetes [facility]     Disease of thyroid gland     Essential hypertension 8/15/2016    GERD (gastroesophageal reflux disease)     History of transfusion     Hyperlipidemia     Hypertension     Long-term insulin use in type 2 diabetes ([facility] ) 6/4/2016    Other specified diabetes mellitus with diabetic autonomic (poly)neuropathy ([facility]     Oxygen dependent     3L    Pleural effusion 2008    [facility]     2008    Renal disorder     Retinopathy     bilat    Sleep apnea     USES 3 LITER O2 CONTINOUIS    Subclavian artery stenosis ([facility]     Umbilical hernia with obstruction, without gangrene     last assessed 7/8/2016       Past Surgical History:   Procedure Laterality Date    ABDOMINAL SURGERY      CARDIAC PACEMAKER PLACEMENT      CARDIAC SURGERY      cabg x 2    CATARACT EXTRACTION      CHOLECYSTECTOMY      COLONOSCOPY      CORONARY ARTERY BYPASS GRAFT  2008    EYE SURGERY      FRACTURE SURGERY Right 2010 shoulder    HERNIA REPAIR      umbilical    [place] LAP,CHOLECYSTECTOMY N/A 8/10/2016    Procedure: CHOLECYSTECTOMY LAPAROSCOPIC;  Surgeon: Ben Nash MD;  [facility];  Service: General    PA LARYNGOSCOPY,DIRECT,SCOPE,INJ CORDS Left 2016    Procedure: Savannah Simpson; LEFT VOCAL FOLD INJECTION ;  Surgeon: Morse Cockayne, MD;  [facility];  Service: ENT    PA REPAIR UMBILICAL CDBF,3+F/K,LEYTY N/A 8/10/2016    Procedure: LAPAROSCOPIC REPAIR HERNIA VENTRAL;  Surgeon: Ben Nash MD;  [facility];  Service: General    [address] / STENTING Right     TRICUSPID VALVE REPLACEMENT      VASCULAR SURGERY      heart valve replacement       Meds/Allergies:    PTA meds:   Prior to Admission Medications   Prescriptions Last Dose Informant Patient Reported? Taking?    Accu-Chek Virgie Plus test strip 2021 at Unknown time Child No Yes   Sig: TEST BLOOD SUGAR 3 TIMES EVERY DAY  DX: E11 65   B-D UF III MINI PEN NEEDLES 31G X 5 MM MISC 2021 at Unknown time Child Yes Yes   Si (four) times a day As directed   COMBIGAN 0 2-0 5 % 2021 at Unknown time Child Yes Yes   Sig: Administer 1 drop to both eyes 2 (two) times a day   Cyanocobalamin (VITAMIN B 12 PO) 2021 at Unknown time Child Yes Yes   Sig: Take by mouth daily   Insulin Aspart (NovoLOG PenFill) 100 UNITS/ML cartridge for injection 2021 at Unknown time  Yes Yes   Sig: Inject 10 Units under the skin 3 (three) times a day with meals   Multiple Vitamin (MULTIVITAMIN) tablet 2021 at Unknown time Child Yes Yes   Sig: Take 1 tablet by mouth daily   acetaminophen (TYLENOL) 325 mg tablet 2021 at Unknown time  No Yes   Sig: Take 2 tablets (650 mg total) by mouth every 4 (four) hours as needed for mild pain or fever   dabigatran etexilate (PRADAXA) 75 mg capsule   No No   Sig: Take 1 capsule (75 mg total) by mouth every 12 (twelve) hours   famotidine (PEPCID) 20 mg tablet 2021 at Unknown time Child No Yes   Sig: TAKE 1 TABLET BY MOUTH TWICE A DAY   gabapentin (NEURONTIN) 300 mg capsule 1/30/2021 at Unknown time  No Yes   Sig: Take 1 capsule (300 mg total) by mouth daily at bedtime   insulin glargine (Basaglar KwikPen) 100 units/mL injection pen 1/30/2021 at Unknown time  No Yes   Sig: Inject 15 Units under the skin daily   lactulose (CHRONULAC) 10 g/15 mL solution 1/31/2021 at Unknown time  No Yes   Sig: PLEASE SPECIFY DIRECTIONS, REFILLS AND QUANTITY   levothyroxine (Synthroid) 137 mcg tablet 1/31/2021 at Unknown time Child No Yes   Sig: Take 1 tablet (137 mcg total) by mouth daily   metolazone (ZAROXOLYN) 2 5 mg tablet 1/31/2021 at Unknown time  No Yes   Sig: Take 1 tablet (2 5 mg total) by mouth every other day   metoprolol succinate (TOPROL-XL) 50 mg 24 hr tablet 1/31/2021 at Unknown time  No Yes   Sig: Take 1 tablet (50 mg total) by mouth daily   pantoprazole (PROTONIX) 40 mg tablet 1/31/2021 at Unknown time Child Yes Yes   Sig: Take 40 mg by mouth daily in the early morning    potassium chloride (Klor-Con M10) 10 mEq tablet 1/31/2021 at Unknown time  No Yes   Sig: Take 2 tablets (20 mEq total) by mouth daily   simvastatin (ZOCOR) 20 mg tablet  Child No No   Sig: TAKE 1 TABLET (20MG) BY ORAL ROUTE EVERY DAY IN THE EVENING   torsemide (DEMADEX) 20 mg tablet 1/31/2021 at Unknown time  No Yes   Sig: Take 2 tablets (40 mg total) by mouth daily      Facility-Administered Medications: None       Allergies: Allergies   Allergen Reactions    Bromide Ion [Bromine]      Blurred vision   Has preservative that  Pt cannot use    Other Blisters     Adhesive tape    Timolol      Per pt, allergic to steroid in medication    Tramadol      cognition changes    Ibuprofen Palpitations    Penicillins Rash     History:     Marital Status: Single   Occupation:   Substance Use History:   Social History     Substance and Sexual Activity   Alcohol Use Not Currently    Alcohol/week: 0 0 standard drinks  Frequency: Never    Drinks per session: Patient refused    Binge frequency: Never    Comment: only on NEW YEAR'S     Social History     Tobacco Use   Smoking Status Never Smoker   Smokeless Tobacco Never Used     Social History     Substance and Sexual Activity   Drug Use Never    Types: Oxycodone       Family History:    Family History   Problem Relation Age of Onset    Heart disease Mother     Breast cancer Maternal Aunt     Heart disease Sister        Physical Exam:     Vitals:   Blood Pressure: 159/87 (02/01/21 1143)  Pulse: 67 (02/01/21 1143)  Temperature: 97 5 °F (36 4 °C) (02/01/21 1143)  Temp Source: Axillary (02/01/21 0711)  Respirations: 18 (02/01/21 1143)  Weight - Scale: 85 7 kg (189 lb) (02/01/21 0537)  SpO2: 99 % (02/01/21 1143)    Physical Exam      Lab and Imaging Results: I have personally reviewed pertinent films in PACS    Results from last 7 days   Lab Units 02/01/21  0844   WBC Thousand/uL 5 62   HEMOGLOBIN g/dL 11 8   HEMATOCRIT % 40 1   PLATELETS Thousands/uL 146*   NEUTROS PCT % 63   LYMPHS PCT % 15   MONOS PCT % 19*   EOS PCT % 2     Results from last 7 days   Lab Units 02/01/21  0844   POTASSIUM mmol/L 3 4*   CHLORIDE mmol/L 101   CO2 mmol/L 35*   BUN mg/dL 106*   CREATININE mg/dL 1 76*   CALCIUM mg/dL 9 5   ALK PHOS U/L 213*   ALT U/L 20   AST U/L 20     Results from last 7 days   Lab Units 02/01/21  0929   INR  1 38*     POC Glucose (mg/dl)   Date Value   02/01/2021 345 (H)   02/01/2021 295 (H)   02/01/2021 249 (H)   02/01/2021 275 (H)   01/31/2021 317 (H)   01/31/2021 331 (H)   01/31/2021 306 (H)   01/31/2021 215 (H)   01/31/2021 206 (H)   01/30/2021 191 (H)         ** Please Note: Dragon 360 Dictation voice to text software may have been used in the creation of this document   **

## 2021-02-01 NOTE — ASSESSMENT & PLAN NOTE
Lab Results   Component Value Date    HGBA1C 6 7 (H) 12/01/2020       Recent Labs     01/31/21  1710 01/31/21  2226 02/01/21  0213 02/01/21  0458   POCGLU 331* 317* 275* 249*       Blood Sugar Average: Last 72 hrs:  (P) 428 [id number]   · Presented with episode of hypoglycemia, similar episodes in the past  · Now resolved  · Patient was previously on Lantus 15 units q h s  And NovoLog 10 units pre meal  · Started on lower dose of insulin (Lantus 10 units q h s   And NovoLog 5 units pre meal with sliding scale)  · Monitor

## 2021-02-01 NOTE — RESPIRATORY THERAPY NOTE
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, with long-term current use of insulin (HCC)    Chronic atrial fibrillation (HCC)    Acquired hypothyroidism    Stage 3 chronic kidney disease    Encephalopathy acute, metabolic    Hyperammonemia (HCC)    Chronic respiratory failure with hypoxia (HCC)      Home Pulmonary Medications:  None    Home Devices/Therapy: Home O2(3L continuous O2)    Past Medical History:   Diagnosis Date    Arthritis     Atrial flutter [facility] )     Cardiac disease     Carotid artery occlusion     CHF (congestive heart failure) ([facility] )     Cholelithiasis     last assessed 8/8/2016    Coronary artery disease     Diabetes mellitus ([facility] )     Disease of thyroid gland     Essential hypertension 8/15/2016    GERD (gastroesophageal reflux disease)     History of transfusion     Hyperlipidemia     Hypertension     Long-term insulin use in type 2 diabetes ([facility] ) 6/4/2016    Other specified diabetes mellitus with diabetic autonomic (poly)neuropathy ([facility] )     Oxygen dependent     3L    Pleural effusion 2008    Pulmonary embolism ([facility] )     2008    Renal disorder     Retinopathy     bilat    Sleep apnea     USES 3 LITER O2 [place]IN[place]IS    [facility]clavian artery stenosis ([facility])     Umbilical hernia with obstruction, without gangrene     last assessed 7/8/2016     Social History     Socioeconomic History    Marital status: Single     Spouse name: None    Number of children: None    Years of education: None    Highest education level: None   Occupational History    None   Social Needs    Financial resource strain: None   Dyess-Padmini insecurity     Worry: None     Inability: None    Transportation needs     Medical: None     Non-medical: None   Tobacco Use    Smoking status: Never Smoker    Smokeless tobacco: Never Used   Substance and Sexual Activity    Alcohol use: Not Currently     Alcohol/week: 0 0 standard drinks     Frequency: Never     Drinks per session: Patient refused     Binge frequency: Never     Comment: only on NEW YEAR'S    Drug use: Never     Types: Oxycodone    Sexual activity: Not Currently     Partners: Male   Lifestyle    Physical activity     Days per week: None     Minutes per session: None    Stress: None   Relationships    Social connections     Talks on phone: None     Gets together: None     Attends Voodoo service: None     Active member of club or organization: None     Attends meetings of clubs or organizations: None     Relationship status: None    Intimate partner violence     Fear of current or ex partner: None     Emotionally abused: None     Physically abused: None     Forced sexual activity: None   Other Topics Concern    None   Social History Narrative    Always uses seat belt       Subjective         Objective    Physical Exam:   Assessment Type: Assess only  General Appearance: Alert, Awake  Respiratory Pattern: Normal  Chest Assessment: Chest expansion symmetrical  Bilateral Breath Sounds: Clear    Vitals:  Blood pressure 135/62, pulse 63, temperature (!) 97 3 °F (36 3 °C), temperature source Axillary, resp  rate 17, weight 85 7 kg (189 lb), SpO2 98 %, not currently breastfeeding  Results from last 7 days   Lab Units 01/30/21  1540   PH ART  7 451*   PCO2 ART mm Hg 56 2*   PO2 ART mm Hg 165 8*   HCO3 ART mmol/L 38 3*   BASE EXC ART mmol/L 12 3   O2 CONTENT ART mL/dL 16 4   O2 HGB, ARTERIAL % 97 4*   ABG SOURCE  Radial, Right   MARLENA TEST  Yes       Imaging and other studies: I have personally reviewed pertinent reports              Plan    Respiratory Plan: (TAVR scheduled 2/2)        Resp Comments: Pt scheduled for TAVR 2/2  Per chart and pt she has no pulmonary HX and takes no resp medications at home  Pt does wear continuous 3L of O2 at home and is currently on 3L NC at this time  SpO2 WNL  At this time no resp intervetnions are indicated  Resp protocol will remain open and pt will be assessed post surgery

## 2021-02-01 NOTE — ASSESSMENT & PLAN NOTE
Wt Readings from Last 3 Encounters:   02/01/21 85 7 kg (189 lb)   01/31/21 89 1 kg (196 lb 6 9 oz)   01/15/21 93 kg (205 lb)     Prior echo-8/20 -EF 40-45%, severe aortic stenosis, PA pressure 80 mmHg   Got some fluids on arrival to [facility], now off and back on diuretics  · Monitor daily weight, intake output  · Patient is awaiting TAVR, scheduled on 02/02 as outpatient previously, pending CT surgery consult   · Awaiting AM labs

## 2021-02-01 NOTE — ASSESSMENT & PLAN NOTE
Rate controlled, history of sick sinus status post pacemaker  · Continue metoprolol  · Holding Pradaxa at present awaiting surgery

## 2021-02-01 NOTE — ASSESSMENT & PLAN NOTE
Presented with altered mental status, hypoglycemia, hypothermia, azotemia and elevated ammonia level to LUIS ALBERTO Wise   Mental status improved after correction of hypoglycemia  · CT head negative, exam nonfocal  · Monitor mental status

## 2021-02-02 ENCOUNTER — APPOINTMENT (INPATIENT)
Dept: RADIOLOGY | Facility: HOSPITAL | Age: 71
DRG: 306 | End: 2021-02-02
Payer: MEDICARE

## 2021-02-02 ENCOUNTER — APPOINTMENT (OUTPATIENT)
Dept: NON INVASIVE DIAGNOSTICS | Facility: HOSPITAL | Age: 71
DRG: 306 | End: 2021-02-02
Attending: THORACIC SURGERY (CARDIOTHORACIC VASCULAR SURGERY)
Payer: MEDICARE

## 2021-02-02 LAB
AMMONIA PLAS-SCNC: 56 UMOL/L (ref 11–35)
ANION GAP SERPL CALCULATED.3IONS-SCNC: 3 MMOL/L (ref 4–13)
BASOPHILS # BLD AUTO: 0.03 THOUSANDS/ΜL (ref 0–0.1)
BASOPHILS NFR BLD AUTO: 1 % (ref 0–1)
BUN SERPL-MCNC: 96 MG/DL (ref 5–25)
CALCIUM SERPL-MCNC: 9.5 MG/DL (ref 8.3–10.1)
CHLORIDE SERPL-SCNC: 104 MMOL/L (ref 100–108)
CO2 SERPL-SCNC: 38 MMOL/L (ref 21–32)
CREAT SERPL-MCNC: 1.4 MG/DL (ref 0.6–1.3)
EOSINOPHIL # BLD AUTO: 0.17 THOUSAND/ΜL (ref 0–0.61)
EOSINOPHIL NFR BLD AUTO: 3 % (ref 0–6)
ERYTHROCYTE [DISTWIDTH] IN BLOOD BY AUTOMATED COUNT: 19.9 % (ref 11.6–15.1)
FERRITIN SERPL-MCNC: 74 NG/ML (ref 8–388)
GFR SERPL CREATININE-BSD FRML MDRD: 38 ML/MIN/1.73SQ M
GLUCOSE SERPL-MCNC: 142 MG/DL (ref 65–140)
GLUCOSE SERPL-MCNC: 218 MG/DL (ref 65–140)
GLUCOSE SERPL-MCNC: 65 MG/DL (ref 65–140)
GLUCOSE SERPL-MCNC: 73 MG/DL (ref 65–140)
GLUCOSE SERPL-MCNC: 77 MG/DL (ref 65–140)
GLUCOSE SERPL-MCNC: 90 MG/DL (ref 65–140)
HAV AB SER QL IA: REACTIVE
HBV CORE AB SER QL: NORMAL
HBV SURFACE AB SER-ACNC: <3.1 MIU/ML
HBV SURFACE AG SER QL: NORMAL
HCT VFR BLD AUTO: 39.3 % (ref 34.8–46.1)
HGB BLD-MCNC: 11.5 G/DL (ref 11.5–15.4)
IGA SERPL-MCNC: 437 MG/DL (ref 70–400)
IGG SERPL-MCNC: 2910 MG/DL (ref 700–1600)
IGM SERPL-MCNC: 165 MG/DL (ref 40–230)
IMM GRANULOCYTES # BLD AUTO: 0.01 THOUSAND/UL (ref 0–0.2)
IMM GRANULOCYTES NFR BLD AUTO: 0 % (ref 0–2)
IRON SATN MFR SERPL: 10 %
IRON SERPL-MCNC: 37 UG/DL (ref 50–170)
LYMPHOCYTES # BLD AUTO: 0.84 THOUSANDS/ΜL (ref 0.6–4.47)
LYMPHOCYTES NFR BLD AUTO: 14 % (ref 14–44)
MCH RBC QN AUTO: 27.4 PG (ref 26.8–34.3)
MCHC RBC AUTO-ENTMCNC: 29.3 G/DL (ref 31.4–37.4)
MCV RBC AUTO: 94 FL (ref 82–98)
MONOCYTES # BLD AUTO: 0.9 THOUSAND/ΜL (ref 0.17–1.22)
MONOCYTES NFR BLD AUTO: 15 % (ref 4–12)
MRSA NOSE QL CULT: NORMAL
NEUTROPHILS # BLD AUTO: 4.08 THOUSANDS/ΜL (ref 1.85–7.62)
NEUTS SEG NFR BLD AUTO: 67 % (ref 43–75)
NRBC BLD AUTO-RTO: 0 /100 WBCS
PLATELET # BLD AUTO: 142 THOUSANDS/UL (ref 149–390)
PMV BLD AUTO: 10.4 FL (ref 8.9–12.7)
POTASSIUM SERPL-SCNC: 3.4 MMOL/L (ref 3.5–5.3)
RBC # BLD AUTO: 4.19 MILLION/UL (ref 3.81–5.12)
SODIUM SERPL-SCNC: 145 MMOL/L (ref 136–145)
TIBC SERPL-MCNC: 388 UG/DL (ref 250–450)
WBC # BLD AUTO: 6.03 THOUSAND/UL (ref 4.31–10.16)

## 2021-02-02 PROCEDURE — 86708 HEPATITIS A ANTIBODY: CPT | Performed by: INTERNAL MEDICINE

## 2021-02-02 PROCEDURE — 82784 ASSAY IGA/IGD/IGG/IGM EACH: CPT | Performed by: INTERNAL MEDICINE

## 2021-02-02 PROCEDURE — 99232 SBSQ HOSP IP/OBS MODERATE 35: CPT | Performed by: INTERNAL MEDICINE

## 2021-02-02 PROCEDURE — 86038 ANTINUCLEAR ANTIBODIES: CPT | Performed by: INTERNAL MEDICINE

## 2021-02-02 PROCEDURE — 76981 USE PARENCHYMA: CPT

## 2021-02-02 PROCEDURE — 97163 PT EVAL HIGH COMPLEX 45 MIN: CPT

## 2021-02-02 PROCEDURE — 83550 IRON BINDING TEST: CPT | Performed by: INTERNAL MEDICINE

## 2021-02-02 PROCEDURE — 86706 HEP B SURFACE ANTIBODY: CPT | Performed by: INTERNAL MEDICINE

## 2021-02-02 PROCEDURE — 82390 ASSAY OF CERULOPLASMIN: CPT | Performed by: INTERNAL MEDICINE

## 2021-02-02 PROCEDURE — 86235 NUCLEAR ANTIGEN ANTIBODY: CPT | Performed by: INTERNAL MEDICINE

## 2021-02-02 PROCEDURE — 87340 HEPATITIS B SURFACE AG IA: CPT | Performed by: INTERNAL MEDICINE

## 2021-02-02 PROCEDURE — 86256 FLUORESCENT ANTIBODY TITER: CPT | Performed by: INTERNAL MEDICINE

## 2021-02-02 PROCEDURE — 82104 ALPHA-1-ANTITRYPSIN PHENO: CPT | Performed by: INTERNAL MEDICINE

## 2021-02-02 PROCEDURE — 99231 SBSQ HOSP IP/OBS SF/LOW 25: CPT | Performed by: THORACIC SURGERY (CARDIOTHORACIC VASCULAR SURGERY)

## 2021-02-02 PROCEDURE — 83516 IMMUNOASSAY NONANTIBODY: CPT | Performed by: INTERNAL MEDICINE

## 2021-02-02 PROCEDURE — 82728 ASSAY OF FERRITIN: CPT | Performed by: INTERNAL MEDICINE

## 2021-02-02 PROCEDURE — 86704 HEP B CORE ANTIBODY TOTAL: CPT | Performed by: INTERNAL MEDICINE

## 2021-02-02 PROCEDURE — 82140 ASSAY OF AMMONIA: CPT | Performed by: INTERNAL MEDICINE

## 2021-02-02 PROCEDURE — 80048 BASIC METABOLIC PNL TOTAL CA: CPT | Performed by: INTERNAL MEDICINE

## 2021-02-02 PROCEDURE — 82103 ALPHA-1-ANTITRYPSIN TOTAL: CPT | Performed by: INTERNAL MEDICINE

## 2021-02-02 PROCEDURE — 85025 COMPLETE CBC W/AUTO DIFF WBC: CPT | Performed by: INTERNAL MEDICINE

## 2021-02-02 PROCEDURE — 86255 FLUORESCENT ANTIBODY SCREEN: CPT | Performed by: INTERNAL MEDICINE

## 2021-02-02 PROCEDURE — 83540 ASSAY OF IRON: CPT | Performed by: INTERNAL MEDICINE

## 2021-02-02 PROCEDURE — 76705 ECHO EXAM OF ABDOMEN: CPT

## 2021-02-02 PROCEDURE — 82948 REAGENT STRIP/BLOOD GLUCOSE: CPT

## 2021-02-02 RX ORDER — DABIGATRAN ETEXILATE 75 MG/1
75 CAPSULE, COATED PELLETS ORAL EVERY 12 HOURS SCHEDULED
Status: DISCONTINUED | OUTPATIENT
Start: 2021-02-02 | End: 2021-02-04 | Stop reason: HOSPADM

## 2021-02-02 RX ORDER — INSULIN GLARGINE 100 [IU]/ML
15 INJECTION, SOLUTION SUBCUTANEOUS
Status: DISCONTINUED | OUTPATIENT
Start: 2021-02-02 | End: 2021-02-04 | Stop reason: HOSPADM

## 2021-02-02 RX ADMIN — CHLORHEXIDINE GLUCONATE 0.12% ORAL RINSE 15 ML: 1.2 LIQUID ORAL at 21:45

## 2021-02-02 RX ADMIN — DABIGATRAN ETEXILATE MESYLATE 75 MG: 75 CAPSULE ORAL at 21:45

## 2021-02-02 RX ADMIN — ATORVASTATIN CALCIUM 20 MG: 20 TABLET, FILM COATED ORAL at 16:24

## 2021-02-02 RX ADMIN — TORSEMIDE 20 MG: 20 TABLET ORAL at 09:56

## 2021-02-02 RX ADMIN — PANTOPRAZOLE SODIUM 40 MG: 40 TABLET, DELAYED RELEASE ORAL at 07:05

## 2021-02-02 RX ADMIN — INSULIN GLARGINE 15 UNITS: 100 INJECTION, SOLUTION SUBCUTANEOUS at 21:46

## 2021-02-02 RX ADMIN — CHLORHEXIDINE GLUCONATE 0.12% ORAL RINSE 15 ML: 1.2 LIQUID ORAL at 09:56

## 2021-02-02 RX ADMIN — LEVOTHYROXINE SODIUM 137 MCG: 25 TABLET ORAL at 07:05

## 2021-02-02 RX ADMIN — INSULIN LISPRO 2 UNITS: 100 INJECTION, SOLUTION INTRAVENOUS; SUBCUTANEOUS at 16:26

## 2021-02-02 RX ADMIN — MUPIROCIN 1 APPLICATION: 20 OINTMENT TOPICAL at 21:45

## 2021-02-02 RX ADMIN — METOPROLOL SUCCINATE 50 MG: 50 TABLET, EXTENDED RELEASE ORAL at 09:56

## 2021-02-02 RX ADMIN — LACTULOSE 10 G: 10 SOLUTION ORAL at 09:54

## 2021-02-02 RX ADMIN — LACTULOSE 10 G: 10 SOLUTION ORAL at 17:18

## 2021-02-02 RX ADMIN — DABIGATRAN ETEXILATE MESYLATE 75 MG: 75 CAPSULE ORAL at 13:39

## 2021-02-02 RX ADMIN — HEPARIN SODIUM 5000 UNITS: 5000 INJECTION INTRAVENOUS; SUBCUTANEOUS at 07:05

## 2021-02-02 RX ADMIN — GABAPENTIN 300 MG: 300 CAPSULE ORAL at 21:45

## 2021-02-02 RX ADMIN — TORSEMIDE 20 MG: 20 TABLET ORAL at 17:18

## 2021-02-02 NOTE — ASSESSMENT & PLAN NOTE
Wt Readings from Last 3 Encounters:   02/02/21 89 1 kg (196 lb 6 4 oz)   01/31/21 89 1 kg (196 lb 6 9 oz)   01/15/21 93 kg (205 lb)     Prior echo-8/20 -EF 40-45%, severe aortic stenosis, PA pressure 80 mmHg  Got some fluids on arrival to [facility], now off and back on diuretics  · Monitor daily weight, intake output  · Patient was awaiting TAVR, scheduled on 02/02 as outpatient previously, CT surgery input appreciated and have now decided to hold off on surgery and plan for medical management vs palliative care consult?

## 2021-02-02 NOTE — ASSESSMENT & PLAN NOTE
Ammonia level was noted to be elevated at 77 at [facility]  Patient does not appear to have history of cirrhosis or evidence of cirrhosis on prior imaging  · LFTs with mildly elevated bilirubin and low albumin  INR was mildly elevated at 1 4  · Patient does report that she takes lactulose on the regular basis due to history of constipation but was not taking over last 1 week as she ran out her prescription  · Mental status improved after correction of hyperglycemia      · Repeat ammonia was normal, now slightly elevated again  · Resumed lactulose  · Now that there are no surgical plans, will ask for GI input

## 2021-02-02 NOTE — PROGRESS NOTES
NEPHROLOGY PROGRESS NOTE   Noemi Washington 66 y o  female MRN: [id number]  Unit/Bed#: CW2 210-01 Encounter: [id number]      ASSESSMENT & PLAN    1  Stage 3 chronic kidney disease likely secondary to cardiorenal syndrome  ? Baseline creatinine 1 5-2 0  ? Urinalysis from January 30th shows trace blood, otherwise bland  ? CT a from October 2020 shows unremarkable kidneys with no hydronephrosis an unremarkable adrenal glands  ? Creatinine at baseline okay to proceed with TAVR when medically appropriate  ? Restarted on diuretics and creatinine is stable will continue for now until definitive plans are made for TAVR  ? Right upper quadrant ultrasound reveals no ascites no focal liver mass     2  Electrolytes/Acid Base  ? Hypokalemia -Potassium 3 4-will give 40 mEq of potassium chloride now     3  Blood Pressure  ? Acceptable blood pressures with diuresis with a map that stable  ? History of aortic stenosis requiring TAVR-remains on metoprolol 12 5 mg twice daily  ? Volume overload-overall does wear chronic oxygen weight agree with holding IV fluids continue torsemide 20 mg b i d      4  Anemia of CKD  ? Continue to trend hemoglobin currently stable     5  CKD-BMD  ? Monitor parameters as an outpatient     6  Clinical Course/CV Risk Reduction/Health maintenance/communication  ?  Needs dental extraction prior to TAVR    SUBJECTIVE:    Patient was seen today  Sitting up resting comfortably  Denies any acute chest pain or shortness of breath    OBJECTIVE:  Current Weight: Weight - Scale: 89 1 kg (196 lb 6 4 oz)  @  Vitals:    02/02/21 0456 02/02/21 0500 02/02/21 0600 02/02/21 0701   BP: 94/61   146/73   BP Location:       Pulse: 66   61   Resp:    19   Temp:    98 1 °F (36 7 °C)   TempSrc:       SpO2: 93%   91%   Weight:  89 1 kg (196 lb 6 4 oz) 89 1 kg (196 lb 6 4 oz)        Intake/Output Summary (Last 24 hours) at 2/2/2021 1257  Last data filed at 2/2/2021 0800  Gross per 24 hour   Intake 0 ml   Output 800 ml   Net -800 ml Weight (last 2 days)     Date/Time   Weight    02/02/21 0600   89 1 (196 4)    02/02/21 0500   89 1 (196 4)    02/01/21 0537   85 7 (189)              General: conscious, cooperative, in no acute distress  Eyes: conjunctivae pink, anicteric sclerae  ENT: lips and mucous membranes moist  Neck: supple, no JVD  Chest: no respiratory distress, no accessory muscle use, normal respiratory effort  CVS: normal heart rate, no friction rub  Abdomen: soft, non-tender, non-distended, normoactive bowel sounds  Extremities: no edema of both legs  Skin: no rash  Neuro: awake, alert, oriented      Medications:    Current Facility-Administered Medications:     acetaminophen (TYLENOL) tablet 650 mg, 650 mg, Oral, Q6H PRN, Selma Owen DO    atorvastatin (LIPITOR) tablet 20 mg, 20 mg, Oral, Daily With Imtiaz Bach, , 20 mg at 02/01/21 1747    bisacodyl (DULCOLAX) rectal suppository 10 mg, 10 mg, Rectal, Daily PRN, Selma Owen DO    timolol (TIMOPTIC) 0 5 % ophthalmic solution 1 drop, 1 drop, Both Eyes, BID **AND** brimonidine (ALPHAGAN P) 0 15 % ophthalmic solution 1 drop, 1 drop, Both Eyes, BID, Selma Owen DO    chlorhexidine (PERIDEX) 0 12 % oral rinse 15 mL, 15 mL, [employer] [place], Q12H [address], Shahab Mccarthy PA-C, 15 mL at 02/02/21 0702    dabigatran etexilate (PRADAXA) capsule 75 mg, 75 mg, Oral, Q12H [address], Gabriela Green PA-C    gabapentin (NEURONTIN) capsule 300 mg, 300 mg, Oral, HS, Selma Owen DO, 300 mg at 02/01/21 2202    insulin glargine (LANTUS) subcutaneous injection 18 Units 0 18 mL, 18 Units, Subcutaneous, Linda LOPEZ MD, 18 Units at 02/01/21 2209    insulin lispro (HumaLOG) 100 units/mL subcutaneous injection 1-5 Units, 1-5 Units, Subcutaneous, Linda LOPEZ MD, 1 Units at 02/01/21 2201    insulin lispro (HumaLOG) 100 units/mL subcutaneous injection 1-6 Units, 1-6 Units, Subcutaneous, TID AC, 4 Units at 02/01/21 1748 **AND** Fingerstick Glucose (POCT), , , TID AC, Xiang Contreras MD    insulin lispro (HumaLOG) 100 units/mL subcutaneous injection 8 Units, 8 Units, Subcutaneous, TID With Meals, Xiang Contreras MD, 8 Units at 02/01/21 1748    lactulose oral solution 10 g, 10 g, Oral, BID, Smith [place], DO, 10 g at 02/02/21 1103    levothyroxine tablet 137 mcg, 137 mcg, Oral, Daily, [place], DO, 137 mcg at 02/02/21 0705    metoprolol succinate (TOPROL-XL) 24 hr tablet 50 mg, 50 mg, Oral, Daily, Smith Butts, DO, 50 mg at 02/02/21 7829    metoprolol tartrate (LOPRESSOR) partial tablet 12 5 mg, 12 5 mg, Oral, Once, Yayo Allison PA-C, Stopped at 02/02/21 0702    mupirocin (BACTROBAN) 2 % nasal ointment 1 application, 1 application, Nasal, Once, Yayo Allison PA-C    mupirocin (BACTROBAN) 2 % nasal ointment, , Nasal, Q12H [facility] MEENAKSHI Mccarthy, 1 application at 52/99/58 2241    pantoprazole (PROTONIX) EC tablet 40 mg, 40 mg, Oral, Daily Before Breakfast, Smith Butts, DO, 40 mg at 02/02/21 0705    torsemide [facility]) tablet 20 mg, 20 mg, Oral, BID, Smith Butts, DO, 20 mg at 02/02/21 7438     Lab Results:   Results from last 7 days   Lab Units 02/02/21  0510 02/01/21  0844 01/31/21  0626 01/30/21  2128 01/30/21  1723 01/30/21  1410   WBC Thousand/uL 6 03 5 62 4 66 5 17  --  5 93   HEMOGLOBIN g/dL 11 5 11 8 11 2* 12 2  --  11 3*   HEMATOCRIT % 39 3 40 1 39 0 42 5  --  39 0   PLATELETS Thousands/uL 142* 146* 151 160  --  251   POTASSIUM mmol/L 3 4* 3 4* 3 2* 2 7*  --  3 4*   CHLORIDE mmol/L 104 101 101 99*  --  98*   CO2 mmol/L 38* 35* 35* 37*  --  37*   BUN mg/dL 96* 106* 115* 114*  --  121*   CREATININE mg/dL 1 40* 1 76* 2 10* 1 87*  --  1 77*   CALCIUM mg/dL 9 5 9 5 8 7 9 4  --  8 2*   MAGNESIUM mg/dL  --  2 1  --   --   --   --    ALK PHOS U/L  --  213*  --   --   --  214*   ALT U/L  --  20  --   --   --  23   AST U/L  --  20  --   --   --  41   BLOOD CULTURE   --   --   --   --   --  No Growth at 48 hrs  No Growth at 48 hrs  LEUKOCYTES UA   --   --   --   --  Negative  --    BLOOD UA   --   --   --   --  Trace-Intact*  --          Portions of the record may have been created with voice recognition software  Occasional wrong word or "sound a like" substitutions may have occurred due to the inherent limitations of voice recognition software  Read the chart carefully and recognize, using context, where substitutions have occurred  If you have any questions, please contact the dictating provider

## 2021-02-02 NOTE — PHYSICAL THERAPY NOTE
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     Cardiac disease     Carotid artery occlusion     [facility]     Cholelithiasis     last assessed 8/8/2016    Coronary artery disease     Diabetes [facility]     Disease of thyroid gland     Essential hypertension 8/15/2016    GERD (gastroesophageal reflux disease)     History of transfusion     Hyperlipidemia     Hypertension     Long-term insulin use in type 2 diabetes ([facility] ) 6/4/2016    Other specified diabetes mellitus with diabetic autonomic (poly)neuropathy ([facility]     Oxygen dependent     3L    Pleural effusion 2008    Pulmonary embolism ([facility] )     2008    Renal disorder     Retinopathy     bilat    Sleep apnea     USES 3 LITER O2 CONTINOUIS    Subclavian artery stenosis [facility]     Umbilical hernia with obstruction, without gangrene     last assessed 7/8/2016        Past Surgical History  Past Surgical History:   Procedure Laterality Date    ABDOMINAL SURGERY      CARDIAC PACEMAKER PLACEMENT      CARDIAC SURGERY      cabg x 2    CATARACT EXTRACTION      CHOLECYSTECTOMY      COLONOSCOPY      CORONARY ARTERY BYPASS GRAFT  2008    EYE SURGERY      FRACTURE SURGERY Right 2010    shoulder    HERNIA REPAIR      umbilical    [place] LAP,CHOLECYSTECTOMY N/A 8/10/2016    Procedure: CHOLECYSTECTOMY LAPAROSCOPIC;  Surgeon: Sanjuanita Polk MD;  [facility]: [facility];  Service: General    [place] LARYNGOSCOPY,DIRECT,SCOPE,INJ CORDS Left 11/4/2016    Procedure: Debera Perking; LEFT VOCAL FOLD INJECTION ;  Surgeon: Ania Sandhu MD;  [facility];  Service: ENT    [place] REPAIR UMBILICAL INBC,0+A/F,TOFDZ N/A 8/10/2016    Procedure: LAPAROSCOPIC REPAIR HERNIA VENTRAL;  Surgeon: Sanjuanita Polk MD;  [facility]: [facility];  Service: General    [address] / STENTING Right     TRICUSPID VALVE REPLACEMENT      VASCULAR SURGERY      heart valve replacement           02/02/21 1252   PT Last Visit   PT Visit Date 02/02/21   Note Type   Note type Evaluation   Pain Assessment   Pain Assessment Tool Pain Assessment not indicated - pt denies pain   Pain Score No Pain   Home Living   Type of Home House   Home Layout Multi-level   Additional Comments Pt lives in [facility] with [address] with her son and DIL  Pt required A with ADL's and mobility prior  Pt used a RW at baseline  Pt ambulates in the community as well  Pt's DIL is present throughout the day but typically stays on the 3rd floor  Pt has a FFSU  PT also owns a w/c that she uses to sit in or roll around the house  Per son patient has VNA services    Prior Function   Level of [place] Needs assistance with ADLs and functional mobility   Lives With Son  (DIL)   Receives Help From Home health;Friend(s)   ADL Assistance Needs assistance   IADLs Needs assistance   Vocational Retired   Restrictions/Precautions   Other Precautions Cognitive; Chair Alarm; Bed Alarm;Multiple lines;O2;Fall Risk   General   Family/Caregiver Present No   Cognition   Overall Cognitive Status Impaired   Arousal/Participation Alert   Orientation Level Oriented to person;Oriented to place;Oriented to situation   RLE Assessment   RLE Assessment WFL   LLE Assessment   LLE Assessment WFL   Bed Mobility   Additional Comments pt sitting OOB at the begining of the session   Transfers   Sit to Stand 5  Supervision  (CGA)   Stand to Sit 5  Supervision  (CGA)   Ambulation/Elevation   Gait pattern Foward flexed; Shuffling; Forward Flexion   Gait Assistance 4  Minimal assist   Additional items Assist x 1   Assistive Device Rolling walker   Distance 40ftx1   Balance   Static Sitting Fair +   Dynamic Sitting Fair   Static Standing Fair -   Dynamic Standing Fair -   Ambulatory Poor +   Endurance Deficit   Endurance Deficit Yes   Activity Tolerance   Activity Tolerance Patient limited by fatigue   Medical Staff Made Aware restorative maryann   Nurse Made Aware nurse approved therapy session   Assessment   Prognosis Good   Problem List Decreased mobility   Assessment Pt is a 65 yo female admitted to [place] [address] on 1/31/2021 s/p hypoglycemia  DX: aortic stenosis, hyperammonemia, encephalopathy, CK, CHF, hypoxia, a fib, hypothyroidism, DM, CAD  Two patient identifiers were used to confirm  Pt lives in [facility] with [address] with her son and DIL  Pt used a RW at baseline and a w/c for sitting in or around the home  Pt was ambulating in the community  Pt's SANJANA is able to assist and is home during the day  Pt's son works  Pt's impairments include reduced mobility, high risk of falling, O2 dependent, gait abnormalities, confusion  These impairments limit the ability of the patient to perform mobility without increased assistance, return to OF and participate in everyday life activities  Pt would benefit from continued skilled therapy while in the hospital to improve overall mobility and work towards a safe d/c  Recommend discharge to home with home PT and increased assistance from family  At the end of the session the patient was left in seated position with call bell and phone within reach   The patient's AM-PAC Basic Mobility Inpatient Short Form Raw Score is 18, Standardized Score is 41 05  A standardized score less than 42 9 suggests the patient may benefit from discharge to home  Please also refer to the recommendation of the Physical Therapist for safe discharge planning  Due to the patient's family present at home during the day d/c home with home PT  If family is not able to provide assistance than consider rehab placement  Per notes patient is not having surgery during this admission  Barriers to Discharge Inaccessible home environment   Goals   STG Expiration Date 02/16/21   Short Term Goal #1 STG 1: Pt will perform transfers at a I level to return to baseline of function  STG 2: Pt will ambulate 300ft with RW at a I level to reduce the level of assistance needed upon d/c home  STG 3: Pt will negotiate 6 steps with HR at a I level to ensure safety with activity when able to ambulate 150ft at a min A   STG 4: Pt will perform bed mobility at a I to safety return to PLOF  PT Treatment Day 0   Plan   Treatment/Interventions Functional transfer training;LE strengthening/ROM; Therapeutic exercise; Endurance training;Bed mobility;Gait training   PT Frequency Other (Comment)  (3-5xwk)   Recommendation   PT Discharge Recommendation Home with skilled therapy  (home PT and increased support from family )   PT - OK to Discharge No   Additional Comments need to trial steps and ambulate fruther    AM-PAC Basic Mobility Inpatient   Turning in Bed Without Bedrails 3   Lying on Back to Sitting on Edge of Flat Bed 3   Moving Bed to Chair 3   Standing Up From Chair 3   Walk in Room 3   Climb 3-5 Stairs 3   Basic Mobility Inpatient Raw Score 18   Basic Mobility Standardized Score 41 05   Juan Rodarte, Pt, DPT

## 2021-02-02 NOTE — ASSESSMENT & PLAN NOTE
Rate controlled, history of sick sinus status post pacemaker  · Continue metoprolol  · We had been holding Pradaxa while awaiting surgery, will resume now that there are no plans for intervention

## 2021-02-02 NOTE — ASSESSMENT & PLAN NOTE
Lab Results   Component Value Date    EGFR 38 02/02/2021    EGFR 28 02/01/2021    EGFR 23 01/31/2021    CREATININE 1 40 (H) 02/02/2021    CREATININE 1 76 (H) 02/01/2021    CREATININE 2 10 (H) 01/31/2021     Baseline creatinine appears to be 1 5-2 0 as per prior records  · Creatinine on 1/31 at higher end of normal  · Diuretics were held at [facility] and was given fluids  · Now back on diuretics  · Creatinine 1 40 today  · Appreciate renal input for assistance

## 2021-02-02 NOTE — CASE MANAGEMENT
Pt is not a <30 day readmission or a bundle  Risk of unplanned readmission score is 31 (yellow)  Pt [facility] due to [facility]ogly[facility] and was transferred to \[facility]\"" due to awaiting TAVR scheduled 2/2/21  Surgery currently on hold due to weather  CM attempted to contact pt in her hospital room and on her cell phone ([phone]) however pt did not answer  CM called pt's emergency Cliff Owens ([phone]) however he was receiving a call from a physician at the same time and requested CM call back to complete initial assessment  Per chart review, pt lives with her son and his family in a 3 story house with a 1st floor setup  Chart review indicates that pt was independent with ADLs and uses a rolling walker for ambulation PTA  It is indicated that pt [facility] a [facility] and a history of STR at [facility]  CM to call son back to confirm above information  1000    CM spoke with pt's son who confirmed pt's living situation however he informed that pt requires assistance with ADLs at baseline  Pt is current with [facility] however pt's son believes pt may require STR again  He informed that he does not want pt to go back to [facility] and requested a STR list be emailed to [employer]  List emailed as requested  Awaiting final therapy recommendations  CM department to follow  CM reviewed d/c planning process including the following: identifying help at home, patient preference for d/c planning needs, Discharge Lounge, [facility] Meds to Bed program, availability of treatment team to discuss questions or concerns patient and/or family may have regarding understanding medications and recognizing signs and symptoms once discharged  CM also encouraged patient to follow up with all recommended appointments after discharge  Patient advised of importance for patient and family to participate in managing patients medical well being

## 2021-02-02 NOTE — PROGRESS NOTES
Progress Note - Cardiothoracic Surgery   Jeffery Valdes 66 y o  female MRN: [id number]  Unit/Bed#: CW2 210-01 Encounter: [id number]      24 Hour Events: No dental extractions yesterday  TAVR canceled for today (weather)  Scr at baseline, renal following  NH3 remains elevated although improved  Glucose elevated, endocrine following       Medications:   Scheduled Meds:  Current Facility-Administered Medications   Medication Dose Route Frequency Provider Last Rate    acetaminophen  650 mg Oral Q6H PRN Yuri Perez, DO      atorvastatin  20 mg Oral Daily With [employer], DO      bisacodyl  10 mg Rectal Daily PRN Yuri Perez, DO      timolol  1 drop Both Eyes BID Yuri Perez, DO      And    brimonidine  1 drop Both Eyes BID Yuri Perez, DO      chlorhexidine  15 mL Swish & Spit Q12H [address] [place], [place]      gabapentin  300 mg Oral HS Yuri Perez,       heparin (porcine)  5,000 Units Subcutaneous [facility] Yuri Perez,       insulin glargine  18 Units Subcutaneous HS Bran Santos MD      insulin lispro  1-5 Units Subcutaneous HS Bran Santos MD      insulin lispro  1-6 Units Subcutaneous TID AC Bran Santos MD      insulin lispro  8 Units Subcutaneous TID With Meals Bran Santos MD      lactulose  10 g Oral BID Yuri Perez, DO      levothyroxine  137 mcg Oral Daily Yuri Perez, DO      metoprolol succinate  50 mg Oral Daily Yuri Perez,       metoprolol tartrate  12 5 mg Oral Once Colby Crouch PA-C      mupirocin  1 application Nasal Once Colby Crouch PA-C      mupirocin   Nasal Q12H [address] [place], [place]      pantoprazole  40 mg Oral Daily Before Breakfast Yuri Perez, DO      torsemide  20 mg Oral BID Yuri Perez,        Continuous Infusions:     Results:   Results from last 7 days   Lab Units 02/02/21  0510 02/01/21  0844 01/31/21  0626   WBC Thousand/uL 6 03 5 62 4 66   HEMOGLOBIN g/dL 11 5 11 8 11 2*   HEMATOCRIT % 39 3 40 1 39 0   PLATELETS Thousands/uL 142* 146* 151     Results from last 7 days   Lab Units 02/02/21  0510 02/01/21  0844 01/31/21  0626   POTASSIUM mmol/L 3 4* 3 4* 3 2*   CHLORIDE mmol/L 104 101 101   CO2 mmol/L 38* 35* 35*   BUN mg/dL 96* 106* 115*   CREATININE mg/dL 1 40* 1 76* 2 10*   CALCIUM mg/dL 9 5 9 5 8 7     Results from last 7 days   Lab Units 02/01/21  0929 01/30/21  1410   INR  1 38* 1 43*   PTT seconds 34 31       Studies:   Imaging Studies:  LEFT VENTRICLE: The ventricle was mildly dilated  Systolic function was mild to moderately reduced  Ejection fraction was estimated in the range of 40 % to 45 % to be 45 %  There was mild diffuse hypokinesis  Wall thickness was mildly to  moderately increased  There was mild concentric hypertrophy  DOPPLER: Doppler parameters were consistent with high ventricular filling pressure      VENTRICULAR SEPTUM: Thickness was mildly increased  There was paradoxical motion  These changes are consistent with right ventricular pacing      RIGHT VENTRICLE: The ventricle was mildly to moderately dilated  Systolic function was mildly reduced  Wall thickness was mildly increased  DOPPLER: Systolic pressure was markedly increased      LEFT ATRIUM: The atrium was moderately dilated      RIGHT ATRIUM: The atrium was moderately dilated      MITRAL VALVE: There was mild to moderate annular calcification  There was normal leaflet separation  DOPPLER: The transmitral velocity was within the normal range  There was no evidence for stenosis  There was mild regurgitation      AORTIC VALVE: The valve was trileaflet  Leaflets exhibited moderately increased thickness, moderate calcification, and markedly reduced cuspal separation  DOPPLER: Transaortic velocity was increased due to valvular stenosis  There was  severe stenosis  TROY around 0 85  LVOT velocity 0 65 m/sec and Peak AV 2 36 m/sec and DI  26   Peak na d mean gradient are low, need to rule out low gradient severe AS There was no regurgitation      TRICUSPID VALVE: DOPPLER: There was moderate regurgitation  Estimated peak PA pressure was 80 mmHg      PULMONIC VALVE: DOPPLER: There was mild regurgitation      PERICARDIUM: There was no thickening or calcification  There was no pericardial effusion      AORTA: The root exhibited normal size      CORONARY VESSELS:   --  Left main: Angiography showed minor luminal irregularities  --  Mid LAD: There was a diffuse 60 % stenosis  --  1st diagonal: There was a 100 % stenosis  --  Mid circumflex: There was a 70 % stenosis  --  Mid RCA: There was a 100 % stenosis  This lesion is a chronic total occlusion  --  Graft to the mid LAD: The graft was a LIMA  Graft angiography showed no evidence of disease  --  Graft to the 1st diagonal: The graft was a saphenous vein graft from the aorta  Graft angiography showed minor luminal irregularities  --  Graft to the 1st obtuse marginal: The graft was a saphenous vein graft from the aorta  Graft angiography showed minor luminal irregularities  --  Graft to the RPDA: The graft was a saphenous vein graft from the aorta  Graft angiography showed minor luminal irregularities      CTA TAVR  FINDINGS:     VASCULAR STRUCTURES:       Annulus: diameter 29 x 22 mm      area: 549 sq mm    Annulus to LCA: 11 mm    Annulus to RCA: 15 mm    Minimal diameter right iliofemoral segment: 7 6 mm    Minimal diameter left iliofemoral segment: 7 4 mm     The ascending aorta is nonaneurysmal measuring 35 mm with moderate atherosclerosis  There is a type 1 aortic arch with variant branching anatomy and no stenosis in the visualized great vessels  The left vertebral artery arises directly from the arch  The   aortic arch is nonaneurysmal with moderate atherosclerosis  The descending thoracic aorta is nonaneurysmal with mild  atherosclerosis      The abdominal aorta is moderately atherosclerotic with calcified plaque   The celiac artery, superior mesenteric artery, and inferior mesenteric artery are patent with extensive calcifications  The renal arteries are patent bilaterally with calcified   plaque at their origins resulting in a high-grade right renal artery stenosis and mild left renal artery stenosis  The right and left iliofemoral segments are diffusely atherosclerotic, but patent and the internal iliac arteries are patent        Cardiac findings: There is mild calcification of the aortic valve  The left ventricle is normal   The ventricular septum is normal   No prior valvular surgery  Multiple pass grafts arise from the aorta and left internal mammary artery  No pericardial effusion  No cardiac   mass or thrombus      OTHER FINDINGS:      CHEST     LUNGS:  Groundglass perihilar infiltrates likely related to pulmonary edema  Bilateral subsegmental atelectasis     PLEURA:  Tiny bilateral pleural effusions      MEDIASTINUM AND JAIRO:  No pathologically enlarged mediastinal and hilar lymph nodes  Small hiatal hernia is noted      CHEST WALL AND LOWER NECK:   Status post median sternotomy  Status post left pacer      ABDOMEN     LIVER/BILIARY TREE:  Unremarkable      GALLBLADDER:  Gallbladder is surgically absent      SPLEEN:  Unremarkable      PANCREAS:  Unremarkable      ADRENAL GLANDS:  Unremarkable      KIDNEYS/URETERS:  Unremarkable   No hydronephrosis      STOMACH AND BOWEL:  Unremarkable      APPENDIX:  No findings to suggest appendicitis      ABDOMINOPELVIC CAVITY:  Moderate amount of ascites within abdominal cavity and hernia sac      PELVIS     REPRODUCTIVE ORGANS:  Unremarkable for patient's age      URINARY BLADDER:  Unremarkable      ABDOMINAL WALL/INGUINAL REGIONS:  Large ventral wall hernia containing colon and small bowel      OSSEOUS STRUCTURES:  No acute fracture or destructive osseous lesion      IMPRESSION:     TVAR measurements as above      High-grade right renal artery stenosis      Status post multiple coronary artery bypass surgeries      Bilateral perihilar groundglass densities most consistent with heart failure      Stable large ventral wall hernia containing large and small bowel without evidence for obstruction      Moderate ascites  Vitals:   Vitals:    02/02/21 0456 02/02/21 0500 02/02/21 0600 02/02/21 0701   BP: 94/61   146/73   BP Location:       Pulse: 66   61   Resp:    19   Temp:    98 1 °F (36 7 °C)   TempSrc:       SpO2: 93%   91%   Weight:  89 1 kg (196 lb 6 4 oz) 89 1 kg (196 lb 6 4 oz)        Physical Exam:    GENERAL: Awake and oriented  NAD  HEENT/NECK:  PERRLA  No jugular venous distention  Cardiac: Regular rate and rhythm  No rubs/murmurs/gallops  Pulmonary:  Breath sounds slightly diminished at the bases bilaterally  Abdomen:  Non-tender, Non-distended  Positive bowel sounds  Lower extremities: Extremities warm/dry  Radial/PT/DP pulses 2+ bilaterally  1+ edema B/L  Neuro: Alert and oriented X 3  Sensation is grossly intact  No focal deficits  Skin: Warm/Dry, without rashes or lesions  Assessment:    Severe aortic stenosis; Ongoing TAVR workup    Plan:  Continue medical management  No plans to proceed with TAVR at this time  Consider palliative care consultation  May follow up in the office after discharge for further discussion  Will sign off for now  Call with questions  SIGNATURE: Chuy Balbuena PA-C  DATE: February 2, 2021  TIME: 8:00 AM    * This note was completed in part utilizing m-[employer] fluency direct voice recognition software  Grammatical errors, random word insertion, spelling mistakes, and incomplete sentences may be an occasional consequence of the system secondary to software limitations, ambient noise and hardware issues  At the time of dictation, efforts were made to edit, clarify and /or correct errors  Please read the chart carefully and recognize, using context, where substitutions have occurred    If you have any questions or concerns about the context, text or information contained within the body of this dictation, please contact myself, the provider, for further clarification

## 2021-02-02 NOTE — OCCUPATIONAL THERAPY NOTE
OT CANCEL NOTE    OT orders received  Chart reviewed  Pt is pending OR for TAVR procedure  Will hold initial OT evaluation  Will continue to follow pt on caseload and see pt when medically stable and as clinically appropriate, post-op         02/02/21 7542   Note Type   Cancel Reasons Patient to operating room       Joana Diaz MS, OTR/L

## 2021-02-02 NOTE — ASSESSMENT & PLAN NOTE
Patient was awaiting TAVR, previously scheduled on 02/02 as outpatient, however presented to [facility] with AMS/Hypogylcemia which has since resolved  Provider at [facility] discussed with Cardiothoracic surgery, who recommended transfer for consideration for scheduled surgery  · Appreciate CT surgery input   · As of today, they are not recommending TAVR   Consider outpatient f/u vs palliative care  · Asked CT surgery to update son  · Will medically optimize and work on rehab planning

## 2021-02-02 NOTE — ASSESSMENT & PLAN NOTE
Lab Results   Component Value Date    HGBA1C 7 1 (H) 02/01/2021       Recent Labs     02/01/21  1602 02/01/21  1629 02/01/21  2040 02/02/21  0450   POCGLU 279* 301* 175* 73       Blood Sugar Average: Last 72 hrs:  (P) 184 [id number]   · Presented with episode of hypoglycemia, similar episodes in the past  · Now resolved  · Endocrinology consulted, see note for insulin recs

## 2021-02-02 NOTE — PROGRESS NOTES
Progress Note - Maggie Gibson 1950, 66 y o  female MRN: [id number]    Unit/Bed#: Grace Cook 210-01 Encounter: [id number]    Primary Care Provider: Shannan Chapin MD   Date and time admitted to hospital: 1/31/2021  4:40 PM    Son updated by myself and CT surgery in regards to cancelled surgery  At this point he would like to medically optimize her, plan for rehab (if indicated) and f/u outpatient for candidacy  * Aortic stenosis  Assessment & Plan  Patient was awaiting TAVR, previously scheduled on 02/02 as outpatient, however presented to [facility] with AMS/Hypogylcemia which has since resolved  Provider at [facility] discussed with Cardiothoracic surgery, who recommended transfer for consideration for scheduled surgery  · Appreciate CT surgery input   · As of today, they are not recommending TAVR  Consider outpatient f/u vs palliative care  · Asked CT surgery to update son  · Will medically optimize and work on rehab planning    Hyperammonemia [facility])  Assessment & Plan  Ammonia level was noted to be elevated at 77 at [facility]  Patient does not appear to have history of cirrhosis or evidence of cirrhosis on prior imaging  · LFTs with mildly elevated bilirubin and low albumin  INR was mildly elevated at 1 4  · Patient does report that she takes lactulose on the regular basis due to history of constipation but was not taking over last 1 week as she ran out her prescription  · Mental status improved after correction of hyperglycemia  · Repeat ammonia was normal, now slightly elevated again  · Resumed lactulose  · Now that there are no surgical plans, will ask for GI input    Encephalopathy acute, metabolic  Assessment & Plan  Presented with altered mental status, hypoglycemia, hypothermia, azotemia and elevated ammonia level to  Frandy   Mental status improved after correction of hypoglycemia  · CT head negative, exam nonfocal  · Monitor mental status    Stage 3 chronic kidney disease  Assessment & Plan  Lab Results   Component Value Date    EGFR 38 02/02/2021    EGFR 28 02/01/2021    EGFR 23 01/31/2021    CREATININE 1 40 (H) 02/02/2021    CREATININE 1 76 (H) 02/01/2021    CREATININE 2 10 (H) 01/31/2021     Baseline creatinine appears to be 1 5-2 0 as per prior records  · Creatinine on 1/31 at higher end of normal  · Diuretics were held at [facility] and was given fluids  · Now back on diuretics  · Creatinine 1 40 today  · Appreciate renal input for assistance    Chronic combined systolic and diastolic congestive heart failure [facility])  Assessment & Plan  Wt Readings from Last 3 Encounters:   02/02/21 89 1 kg (196 lb 6 4 oz)   01/31/21 89 1 kg (196 lb 6 9 oz)   01/15/21 93 kg (205 lb)     Prior echo-8/20 -EF 40-45%, severe aortic stenosis, PA pressure 80 mmHg  Got some fluids on arrival to [facility], now off and back on diuretics  · Monitor daily weight, intake output  · Patient was awaiting TAVR, scheduled on 02/02 as outpatient previously, CT surgery input appreciated and have now decided to hold off on surgery and plan for medical management vs palliative care consult?         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, with long-term current use of insulin [facility])  Assessment & Plan  Lab Results   Component Value Date    HGBA1C 7 1 (H) 02/01/2021       Recent Labs     02/01/21  1602 02/01/21  1629 02/01/21  2040 02/02/21  0450   POCGLU 279* 301* 175* 73       Blood Sugar Average: Last 72 hrs:  (P) 583 [id number]   · Presented with episode of hypoglycemia, similar episodes in the past  · Now resolved  · Endocrinology consulted, see note for insulin recs     Coronary artery disease involving coronary bypass graft of native heart without angina pectoris  Assessment & Plan  · Continue statin and metoprolol    VTE Pharmacologic Prophylaxis:   Pharmacologic: Heparin  Mechanical VTE Prophylaxis in Place: Yes    Patient Centered Rounds: I have performed bedside rounds with nursing staff today  Discussions with Specialists or Other Care Team Provider: Appreciate CT surgery input, renal input, d/w CM    Education and Discussions with Family / Patient: patient and son via phone  Time Spent for Care: 30 minutes  More than 50% of total time spent on counseling and coordination of care as described above  Current Length of Stay: 2 day(s)    Current Patient Status: Inpatient   Certification Statement: The patient will continue to require additional inpatient hospital stay due to as above     Discharge Plan: none at this time  Code Status: Level 1 - Full Code      Subjective:   Pt upset that her surgery was cancelled  She otherwise has no complaints  Objective:     Vitals:   Temp (24hrs), Av °F (36 7 °C), Min:97 5 °F (36 4 °C), Max:98 5 °F (36 9 °C)    Temp:  [97 5 °F (36 4 °C)-98 5 °F (36 9 °C)] 98 1 °F (36 7 °C)  HR:  [61-67] 61  Resp:  [18-19] 19  BP: ()/(61-87) 146/73  SpO2:  [91 %-99 %] 91 %  Body mass index is 34 79 kg/m²  Input and Output Summary (last 24 hours): Intake/Output Summary (Last 24 hours) at 2021 0920  Last data filed at 2021 2100  Gross per 24 hour   Intake --   Output 800 ml   Net -800 ml       Physical Exam:     Physical Exam  Vitals signs and nursing note reviewed  Constitutional:       General: She is not in acute distress  Appearance: She is obese  Comments: On 4L via NC   Eyes:      General: No scleral icterus  Cardiovascular:      Rate and Rhythm: Rhythm irregular        Heart sounds: Murmur present  Abdominal:      General: There is no distension  Tenderness: There is no abdominal tenderness  Hernia: A hernia is present  Musculoskeletal:      Right lower leg: No edema  Left lower leg: No edema  Neurological:      Mental Status: She is oriented to person, place, and time  Comments: Follow commands, no asterixis          Additional Data:     Labs:    Results from last 7 days   Lab Units 02/02/21  0510   WBC Thousand/uL 6 03   HEMOGLOBIN g/dL 11 5   HEMATOCRIT % 39 3   PLATELETS Thousands/uL 142*   NEUTROS PCT % 67   LYMPHS PCT % 14   MONOS PCT % 15*   EOS PCT % 3     Results from last 7 days   Lab Units 02/02/21  0510 02/01/21  0844   POTASSIUM mmol/L 3 4* 3 4*   CHLORIDE mmol/L 104 101   CO2 mmol/L 38* 35*   BUN mg/dL 96* 106*   CREATININE mg/dL 1 40* 1 76*   CALCIUM mg/dL 9 5 9 5   ALK PHOS U/L  --  213*   ALT U/L  --  20   AST U/L  --  20     Results from last 7 days   Lab Units 02/01/21  0929   INR  1 38*       * I Have Reviewed All Lab Data Listed Above  * Additional Pertinent Lab Tests Reviewed: All Labs Within Last 24 Hours Reviewed    Imaging:    Imaging Reports Reviewed Today Include: all  Imaging Personally Reviewed by Myself Includes:  none    Recent Cultures (last 7 days):     Results from last 7 days   Lab Units 01/30/21  1410   BLOOD CULTURE  No Growth at 48 hrs  No Growth at 48 hrs         Last 24 Hours Medication List:   Current Facility-Administered Medications   Medication Dose Route Frequency Provider Last Rate    acetaminophen  650 mg Oral Q6H PRN Jeralyn Chiles, DO      atorvastatin  20 mg Oral Daily With [employer]Â®, DO      bisacodyl  10 mg Rectal Daily PRN Jeralyn Chiles, DO      timolol  1 drop Both Eyes BID Jeralyn Chiles, DO      And    brimonidine  1 drop Both Eyes BID Jeralyn Chiles, DO      chlorhexidine  15 mL Swish & Spit Q12H [address] [place], [place]      gabapentin  300 mg Oral HS Jeralyn Chiles, DO      heparin (porcine)  5,000 Units Subcutaneous [facility] Zettie Catena, DO      insulin glargine  18 Units Subcutaneous HS [address], [place]      insulin lispro  1-5 Units Subcutaneous HS [address], [place]      insulin lispro  1-6 Units Subcutaneous TID AC [address] MD      insulin lispro  8 Units Subcutaneous TID With Meals [address] MD      lactulose  10 g Oral BID Zettie Catena, DO      levothyroxine  137 mcg Oral Daily Zettie Catena, DO      metoprolol succinate  50 mg Oral Daily Zettie Catena, DO      metoprolol tartrate  12 5 mg Oral Once [employer] PA-C      mupirocin  1 application Nasal Once Grupojuan Cesar PA-C      mupirocin   Nasal Q12H [address] [place], [place]      pantoprazole  40 mg Oral Daily Before Breakfast Zettie Catena, DO      torsemide  20 mg Oral BID Zettie Catcolton, DO          Today, Patient Was Seen By: Anton Decker PA-C    ** Please Note: Dictation voice to text software may have been used in the creation of this document   **

## 2021-02-02 NOTE — PLAN OF CARE
Problem: PHYSICAL THERAPY ADULT  Goal: Performs mobility at highest level of function for planned discharge setting  See evaluation for individualized goals  Description: Treatment/Interventions: Functional transfer training, LE strengthening/ROM, Therapeutic exercise, Endurance training, Bed mobility, Gait training          See flowsheet documentation for full assessment, interventions and recommendations  Note: Prognosis: Good  Problem List: Decreased mobility  Assessment: Pt is a 67 yo female admitted to [place] [address] on 1/31/2021 s/p hypoglycemia  DX: aortic stenosis, hyperammonemia, encephalopathy, CK, CHF, hypoxia, a fib, hypothyroidism, DM, CAD  Two patient identifiers were used to confirm  Pt lives in [facility] with [address] with her son and DIL  Pt used a RW at baseline and a w/c for sitting in or around the home  Pt was ambulating in the community  Pt's SANJANA is able to assist and is home during the day  Pt's son works  Pt's impairments include reduced mobility, high risk of falling, O2 dependent, gait abnormalities, confusion  These impairments limit the ability of the patient to perform mobility without increased assistance, return to OF and participate in everyday life activities  Pt would benefit from continued skilled therapy while in the hospital to improve overall mobility and work towards a safe d/c  Recommend discharge to home with home PT and increased assistance from family  At the end of the session the patient was left in seated position with call bell and phone within reach  The patient's AM-PAC Basic Mobility Inpatient Short Form Raw Score is 18, Standardized Score is 41 05  A standardized score less than 42 9 suggests the patient may benefit from discharge to home  Please also refer to the recommendation of the Physical Therapist for safe discharge planning  Due to the patient's family present at home during the day d/c home with home PT   If family is not able to provide assistance than consider rehab placement  Barriers to Discharge: Inaccessible home environment     PT Discharge Recommendation: Home with skilled therapy(home PT and increased support from family )     PT - OK to Discharge: No    See flowsheet documentation for full assessment

## 2021-02-02 NOTE — PROGRESS NOTES
Inpatient follow-up progress note        Assessment:  66 yr female with uncontrolled Type 2 diabetes, CAD, AS, CKD3 and multiple co-morbidities was admitted with encephalopathy in context of hypoglycemia and hyperammonemia      PLAN:     1  Type 2 diabetes  A1c 7 1 % but seems to have hyperglycemia and hypoglycemia  Note home medication include Basaglar 15u bedtime and Novolog 10u tidac  No oral agents  Follows Dr Alexandrea Rider Last office visit was 7/31/20      Presently she has fasting hypoglycemia this morning  Change to Lantus 15u bedtime and Humalog 8u tidac plus correction  Will monitor and titrate insulin as needed  Eventually will benefit from a glucose sensor to improve data about hypoglycemia  May consider GLP1 agonist as outpt      2  Hyperammonemia  No Hx of cirrhosis  May contribute to change in MS  R/o other causes of portosystemic shunt  Suggest GI input      3  Hypothyroidism  On levothyroxine 137mcg qdaily  TSH 1 1uIU/mL  Continue      4  AS  Waiting TAVR after tooth extraction  CT surgery managing  S:  No complaints  O:  Patient seen and examined personally  /73   Pulse 61   Temp 98 1 °F (36 7 °C)   Resp 19   Wt 89 1 kg (196 lb 6 4 oz)   LMP  (LMP Unknown)   SpO2 91%   BMI 34 79 kg/m²     Physical Exam  Vitals signs reviewed  HENT:      Head: Normocephalic  Eyes:      Pupils: Pupils are equal, round, and reactive to light  Musculoskeletal:         General: No deformity  Neurological:      Mental Status: She is alert and oriented to person, place, and time           Current Facility-Administered Medications   Medication Dose Route Frequency Provider Last Rate    acetaminophen  650 mg Oral Q6H PRN Kyra Martins DO      atorvastatin  20 mg Oral Daily With Sadiq Turner DO      bisacodyl  10 mg Rectal Daily PRN Kyra Martins DO      timolol  1 drop Both Eyes BID Kyra Martins DO      And    brimonidine  1 drop Both Eyes BID Kyra Martins DO      chlorhexidine  15 mL Swish & Spit Q12H [facility] [place], [place]      dabigatran etexilate  75 mg Oral Q12H [facility], MEENAKSHI      gabapentin  300 mg Oral HS Kyra Martins DO      insulin glargine  15 Units Subcutaneous HS Jess Al MD      insulin lispro  1-5 Units Subcutaneous HS Jess Al MD      insulin lispro  1-6 Units Subcutaneous TID AC Jess Al MD      insulin lispro  8 Units Subcutaneous TID With Meals Jess Al MD      lactulose  10 g Oral BID Kyra Martins DO      levothyroxine  137 mcg Oral Daily Kyra Martins DO      metoprolol succinate  50 mg Oral Daily Kyra Martins DO      metoprolol tartrate  12 5 mg Oral Once Christelle JuMEENAKSHI head      mupirocin  1 application Nasal Once Christelle Yu, MEENAKSHI      mupirocin   Nasal Q12H [place], [place]      pantoprazole  40 mg Oral Daily Before Breakfast Kyra Martins DO      torsemide  20 mg Oral BID Kyra Martins DO          Lab, Imaging and other studies:   POC Glucose (mg/dl)   Date Value   02/02/2021 90   02/02/2021 73   02/01/2021 175 (H)   02/01/2021 301 (H)   02/01/2021 279 (H)   02/01/2021 345 (H)   02/01/2021 295 (H)   02/01/2021 249 (H)   02/01/2021 275 (H)   01/31/2021 317 (H)

## 2021-02-03 PROBLEM — K74.60 CIRRHOSIS (HCC): Status: ACTIVE | Noted: 2021-02-03

## 2021-02-03 LAB
ACTIN IGG SERPL-ACNC: 26 UNITS (ref 0–19)
ALBUMIN SERPL BCP-MCNC: 2.6 G/DL (ref 3.5–5)
ALP SERPL-CCNC: 217 U/L (ref 46–116)
ALT SERPL W P-5'-P-CCNC: 18 U/L (ref 12–78)
ANION GAP SERPL CALCULATED.3IONS-SCNC: 4 MMOL/L (ref 4–13)
AST SERPL W P-5'-P-CCNC: 22 U/L (ref 5–45)
BASOPHILS # BLD AUTO: 0.02 THOUSANDS/ΜL (ref 0–0.1)
BASOPHILS NFR BLD AUTO: 0 % (ref 0–1)
BILIRUB SERPL-MCNC: 1.98 MG/DL (ref 0.2–1)
BUN SERPL-MCNC: 91 MG/DL (ref 5–25)
CALCIUM ALBUM COR SERPL-MCNC: 10.3 MG/DL (ref 8.3–10.1)
CALCIUM SERPL-MCNC: 9.2 MG/DL (ref 8.3–10.1)
CERULOPLASMIN SERPL-MCNC: 42.6 MG/DL (ref 19–39)
CHLORIDE SERPL-SCNC: 101 MMOL/L (ref 100–108)
CO2 SERPL-SCNC: 37 MMOL/L (ref 21–32)
CREAT SERPL-MCNC: 1.71 MG/DL (ref 0.6–1.3)
ENDOMYSIUM IGA SER QL: NEGATIVE
EOSINOPHIL # BLD AUTO: 0.16 THOUSAND/ΜL (ref 0–0.61)
EOSINOPHIL NFR BLD AUTO: 3 % (ref 0–6)
ERYTHROCYTE [DISTWIDTH] IN BLOOD BY AUTOMATED COUNT: 20 % (ref 11.6–15.1)
GFR SERPL CREATININE-BSD FRML MDRD: 29 ML/MIN/1.73SQ M
GLIADIN PEPTIDE IGA SER-ACNC: 5 UNITS (ref 0–19)
GLIADIN PEPTIDE IGG SER-ACNC: 2 UNITS (ref 0–19)
GLUCOSE SERPL-MCNC: 129 MG/DL (ref 65–140)
GLUCOSE SERPL-MCNC: 141 MG/DL (ref 65–140)
GLUCOSE SERPL-MCNC: 143 MG/DL (ref 65–140)
GLUCOSE SERPL-MCNC: 235 MG/DL (ref 65–140)
GLUCOSE SERPL-MCNC: 255 MG/DL (ref 65–140)
GLUCOSE SERPL-MCNC: 307 MG/DL (ref 65–140)
HCT VFR BLD AUTO: 37.3 % (ref 34.8–46.1)
HGB BLD-MCNC: 10.8 G/DL (ref 11.5–15.4)
IGA SERPL-MCNC: 538 MG/DL (ref 64–422)
IMM GRANULOCYTES # BLD AUTO: 0.02 THOUSAND/UL (ref 0–0.2)
IMM GRANULOCYTES NFR BLD AUTO: 0 % (ref 0–2)
LYMPHOCYTES # BLD AUTO: 0.59 THOUSANDS/ΜL (ref 0.6–4.47)
LYMPHOCYTES NFR BLD AUTO: 12 % (ref 14–44)
MCH RBC QN AUTO: 27.3 PG (ref 26.8–34.3)
MCHC RBC AUTO-ENTMCNC: 29 G/DL (ref 31.4–37.4)
MCV RBC AUTO: 94 FL (ref 82–98)
MITOCHONDRIA M2 IGG SER-ACNC: <20 UNITS (ref 0–20)
MONOCYTES # BLD AUTO: 0.74 THOUSAND/ΜL (ref 0.17–1.22)
MONOCYTES NFR BLD AUTO: 15 % (ref 4–12)
NEUTROPHILS # BLD AUTO: 3.4 THOUSANDS/ΜL (ref 1.85–7.62)
NEUTS SEG NFR BLD AUTO: 70 % (ref 43–75)
NRBC BLD AUTO-RTO: 0 /100 WBCS
PLATELET # BLD AUTO: 122 THOUSANDS/UL (ref 149–390)
PMV BLD AUTO: 10.8 FL (ref 8.9–12.7)
POTASSIUM SERPL-SCNC: 3.4 MMOL/L (ref 3.5–5.3)
PROT SERPL-MCNC: 7.9 G/DL (ref 6.4–8.2)
RBC # BLD AUTO: 3.95 MILLION/UL (ref 3.81–5.12)
SODIUM SERPL-SCNC: 142 MMOL/L (ref 136–145)
TTG IGA SER-ACNC: <2 U/ML (ref 0–3)
TTG IGG SER-ACNC: 5 U/ML (ref 0–5)
WBC # BLD AUTO: 4.93 THOUSAND/UL (ref 4.31–10.16)

## 2021-02-03 PROCEDURE — 82948 REAGENT STRIP/BLOOD GLUCOSE: CPT

## 2021-02-03 PROCEDURE — 85025 COMPLETE CBC W/AUTO DIFF WBC: CPT | Performed by: INTERNAL MEDICINE

## 2021-02-03 PROCEDURE — 97116 GAIT TRAINING THERAPY: CPT

## 2021-02-03 PROCEDURE — 99232 SBSQ HOSP IP/OBS MODERATE 35: CPT | Performed by: INTERNAL MEDICINE

## 2021-02-03 PROCEDURE — 99222 1ST HOSP IP/OBS MODERATE 55: CPT | Performed by: INTERNAL MEDICINE

## 2021-02-03 PROCEDURE — 97530 THERAPEUTIC ACTIVITIES: CPT

## 2021-02-03 PROCEDURE — 97166 OT EVAL MOD COMPLEX 45 MIN: CPT

## 2021-02-03 PROCEDURE — 97535 SELF CARE MNGMENT TRAINING: CPT

## 2021-02-03 PROCEDURE — 80053 COMPREHEN METABOLIC PANEL: CPT | Performed by: INTERNAL MEDICINE

## 2021-02-03 RX ORDER — SPIRONOLACTONE 25 MG/1
25 TABLET ORAL DAILY
Status: DISCONTINUED | OUTPATIENT
Start: 2021-02-03 | End: 2021-02-04 | Stop reason: HOSPADM

## 2021-02-03 RX ORDER — ALPRAZOLAM 0.25 MG/1
0.25 TABLET ORAL 2 TIMES DAILY PRN
Status: DISCONTINUED | OUTPATIENT
Start: 2021-02-03 | End: 2021-02-04 | Stop reason: HOSPADM

## 2021-02-03 RX ORDER — POTASSIUM CHLORIDE 20 MEQ/1
40 TABLET, EXTENDED RELEASE ORAL ONCE
Status: COMPLETED | OUTPATIENT
Start: 2021-02-03 | End: 2021-02-03

## 2021-02-03 RX ADMIN — TORSEMIDE 20 MG: 20 TABLET ORAL at 09:05

## 2021-02-03 RX ADMIN — LACTULOSE 10 G: 10 SOLUTION ORAL at 17:15

## 2021-02-03 RX ADMIN — ALPRAZOLAM 0.25 MG: 0.25 TABLET ORAL at 11:34

## 2021-02-03 RX ADMIN — BRIMONIDINE TARTRATE 1 DROP: 1.5 SOLUTION OPHTHALMIC at 17:17

## 2021-02-03 RX ADMIN — MUPIROCIN 1 APPLICATION: 20 OINTMENT TOPICAL at 21:03

## 2021-02-03 RX ADMIN — CHLORHEXIDINE GLUCONATE 0.12% ORAL RINSE 15 ML: 1.2 LIQUID ORAL at 09:04

## 2021-02-03 RX ADMIN — INSULIN GLARGINE 15 UNITS: 100 INJECTION, SOLUTION SUBCUTANEOUS at 21:04

## 2021-02-03 RX ADMIN — ATORVASTATIN CALCIUM 20 MG: 20 TABLET, FILM COATED ORAL at 17:16

## 2021-02-03 RX ADMIN — TIMOLOL MALEATE 1 DROP: 5 SOLUTION/ DROPS OPHTHALMIC at 17:17

## 2021-02-03 RX ADMIN — METOPROLOL SUCCINATE 50 MG: 50 TABLET, EXTENDED RELEASE ORAL at 09:05

## 2021-02-03 RX ADMIN — CHLORHEXIDINE GLUCONATE 0.12% ORAL RINSE 15 ML: 1.2 LIQUID ORAL at 21:04

## 2021-02-03 RX ADMIN — LACTULOSE 10 G: 10 SOLUTION ORAL at 09:05

## 2021-02-03 RX ADMIN — TORSEMIDE 20 MG: 20 TABLET ORAL at 17:16

## 2021-02-03 RX ADMIN — DABIGATRAN ETEXILATE MESYLATE 75 MG: 75 CAPSULE ORAL at 09:05

## 2021-02-03 RX ADMIN — TIMOLOL MALEATE 1 DROP: 5 SOLUTION/ DROPS OPHTHALMIC at 09:05

## 2021-02-03 RX ADMIN — POTASSIUM CHLORIDE 40 MEQ: 1500 TABLET, EXTENDED RELEASE ORAL at 09:05

## 2021-02-03 RX ADMIN — GABAPENTIN 300 MG: 300 CAPSULE ORAL at 21:03

## 2021-02-03 RX ADMIN — BRIMONIDINE TARTRATE 1 DROP: 1.5 SOLUTION OPHTHALMIC at 09:05

## 2021-02-03 RX ADMIN — INSULIN LISPRO 3 UNITS: 100 INJECTION, SOLUTION INTRAVENOUS; SUBCUTANEOUS at 17:18

## 2021-02-03 RX ADMIN — INSULIN LISPRO 2 UNITS: 100 INJECTION, SOLUTION INTRAVENOUS; SUBCUTANEOUS at 21:04

## 2021-02-03 RX ADMIN — INSULIN LISPRO 4 UNITS: 100 INJECTION, SOLUTION INTRAVENOUS; SUBCUTANEOUS at 11:35

## 2021-02-03 RX ADMIN — DABIGATRAN ETEXILATE MESYLATE 75 MG: 75 CAPSULE ORAL at 21:03

## 2021-02-03 RX ADMIN — LEVOTHYROXINE SODIUM 137 MCG: 25 TABLET ORAL at 06:05

## 2021-02-03 RX ADMIN — MUPIROCIN 1 APPLICATION: 20 OINTMENT TOPICAL at 09:06

## 2021-02-03 RX ADMIN — SPIRONOLACTONE 25 MG: 25 TABLET, FILM COATED ORAL at 13:19

## 2021-02-03 RX ADMIN — PANTOPRAZOLE SODIUM 40 MG: 40 TABLET, DELAYED RELEASE ORAL at 06:05

## 2021-02-03 NOTE — ASSESSMENT & PLAN NOTE
Possible given abnormal lab tests  · RUQ U/S showing mildly enlarged liver, diffuse coarsened heterogeneous echotexture suggesting underlying cirrhotic changes  · GI consulted, appreciate input  · Hep A reactive, likely hx of prior infection given elevated IgG  · Likely CHUNG, will f/u outpatient

## 2021-02-03 NOTE — PLAN OF CARE
Problem: PHYSICAL THERAPY ADULT  Goal: Performs mobility at highest level of function for planned discharge setting  See evaluation for individualized goals  Description: Treatment/Interventions: Functional transfer training, LE strengthening/ROM, Therapeutic exercise, Endurance training, Bed mobility, Gait training          See flowsheet documentation for full assessment, interventions and recommendations  Outcome: Progressing  Note: Prognosis: Good  Problem List: Decreased strength, Decreased endurance, Impaired balance, Decreased mobility, Obesity  Assessment: PT INITIATED TREATMENT SESSION IN ORDER TO ASSIST PATIENT IN ACHIEVING GOALS TO IMPROVE ACTIVITY TOLERANCE AND AMBULATION AND TO TRIAL STAIRS AS APPROPRIATE  PATIENT MOBILIZES GENERALLY AT A VERY REDUCED SPEED AND REQUIRES RE-DIRECTION TO TASK (VERY COMMUNICATIVE)  SHE WAS ABLE TO PERFORM SIT<-->STAND TRANSFERS AND AMBULATION AT A SUPERVISION LEVEL W/ USE OF RW  SHE AMBULATED 10 FEET (SEATED REST BREAK) + 10 FEET PRESENTING WITH REDUCED LE CLEARANCE AND GAIT SPEED  REQUIRES INCREASED REST BREAKS DURING MOBILITY TASKS SECONDARY TO FATIGUE  DID NOT AMBULATE FURTHER TO CONSERVE ENERGY FOR STAIR NAVIGATION  WC AND O2 TANK RETRIEVED TO TRANSPORT PATIENT TO STAIRS  PT EDUCATED/DEMONSTRATED NONRECIPROCAL PERFORMANCE WITH B/L UE ON R SIDED HAND RAIL  PATIENT ABLE TO NEGOTIATE 1 STEP UP/DOWN WITH MIN-AX1 FROM THERAPIST X2 PERFORMANCES  PATIENT MILDLY ANXIOUS ON STAIRWELL OF 10 STEPS AND DECISION WAS MADE NOT TO HAVE HER ASCEND 6 AND HAVE TO TURN ON STEPS  SHE SAFELY AND EFFICIENTLY WAS ABLE TO CLEAR B/L LE ON ONE STEP AND I ANTICIPATE SHE WILL DO SO SAFELY AT D/C  AT BASELINE SHE REPORTS HER SON "STANDS BEHIND ME AND PUSHES MY BUTT UP THE STEPS"  WILL HAVE ASSISTANCE FROM SON UPON D/C  PT D/C RECOMMENDATION REMAINS FOR RETURN HOME WITH HHPT  PATIENT WILL BENEFIT FROM CONTINUED SKILLED PT THIS ADMISSION TO ACHIEVE MAXIMAL FUNCTION AND SAFETY    Barriers to Discharge: Inaccessible home environment     PT Discharge Recommendation: Home with skilled therapy, Return to previous environment with social support(HHPT)     PT - OK to Discharge: Yes(HOME W/ FAMILY ASSIST PRN + HHPT )    See flowsheet documentation for full assessment

## 2021-02-03 NOTE — ASSESSMENT & PLAN NOTE
Patient was awaiting TAVR, previously scheduled on 02/02 as outpatient, however [facility] which has since resolved  Provider at [facility] discussed with Cardiothoracic surgery, who recommended transfer for consideration for scheduled surgery  · Appreciate CT surgery input   · As of 2/2, they are not recommending TAVR  Consider outpatient f/u vs palliative care given multiple other medical issues  They have spoke with son    · Will medically optimize for now including GI w/u (see below) and have PT/OT evaluate for possible home vs rehab

## 2021-02-03 NOTE — ASSESSMENT & PLAN NOTE
Wt Readings from Last 3 Encounters:   02/03/21 89 1 kg (196 lb 6 2 oz)   01/31/21 89 1 kg (196 lb 6 9 oz)   01/15/21 93 kg (205 lb)     Prior echo-8/20 -EF 40-45%, severe aortic stenosis, PA pressure 80 mmHg   Got some fluids on arrival to [facility], now off and back on diuretics  · Monitor daily weight, intake output  · Patient was awaiting TAVR, scheduled on 02/02 as outpatient previously, CT surgery saw patient and currently have no plans for TAVR at this time  · Can f/u outpatient if medically improved

## 2021-02-03 NOTE — PROGRESS NOTES
Progress Note - Noemi Washington 1950, 66 y o  female MRN: [id number]    Unit/Bed#: 2 210-01 Encounter: [id number]    Primary Care Provider: Kristopher Knutson MD   Date and time admitted to hospital: 1/31/2021  4:40 PM      * Aortic stenosis  Assessment & Plan  Patient was awaiting TAVR, previously scheduled on 02/02 as outpatient, however presented to [facility] with AMS/Hypogylcemia which has since resolved  Provider at [facility] discussed with Cardiothoracic surgery, who recommended transfer for consideration for scheduled surgery  · Appreciate CT surgery input   · As of 2/2, they are not recommending TAVR  Consider outpatient f/u vs palliative care given multiple other medical issues  They have spoke with son  · Will medically optimize for now including GI w/u (see below) and have PT/OT evaluate for possible home vs rehab     Cirrhosis [facility])  Assessment & Plan  Possible given abnormal lab tests  · RUQ U/S showing mildly enlarged liver, diffuse coarsened heterogeneous echotexture suggesting underlying cirrhotic changes  · GI consulted, appreciate input  · Hep A reactive? · Also noted to have elevated IgA and IgG    Hyperammonemia ([facility] [address] )  Assessment & Plan  Ammonia level was noted to be elevated at 77 at [facility]  Patient does not appear to have history of cirrhosis or evidence of cirrhosis on prior imaging however with abnormal LFTs and INR, working up further as below  · Patient does report that she takes lactulose on the regular basis due to history of constipation but was not taking over last 1 week as she ran out her prescription  · Resumed lactulose  · GI input appreciated  · Noted to have elevated IgA, IgG and reactive Hep A? Encephalopathy acute, metabolic  Assessment & Plan  Presented to [facility] with AMS, hypoglycemia, hypothermia, azotemia and elevated ammonia level   Mental status improved after correction of hypoglycemia  · CT head negative, exam nonfocal  · Monitor mental status    Stage 3 chronic kidney disease  Assessment & Plan  Lab Results   Component Value Date    EGFR 29 02/03/2021    EGFR 38 02/02/2021    EGFR 28 02/01/2021    CREATININE 1 71 (H) 02/03/2021    CREATININE 1 40 (H) 02/02/2021    CREATININE 1 76 (H) 02/01/2021     Baseline creatinine appears to be 1 5-2 0 as per prior records  · Creatinine on arrival to [facility] (1/31) at higher end of normal  · Diuretics were held at [facility] and was given fluids  · Has been back on diuretics  · Creat stable  · Renal following     Chronic combined systolic and diastolic congestive heart failure [facility])  Assessment & Plan  Wt Readings from Last 3 Encounters:   02/03/21 89 1 kg (196 lb 6 2 oz)   01/31/21 89 1 kg (196 lb 6 9 oz)   01/15/21 93 kg (205 lb)     Prior echo-8/20 -EF 40-45%, severe aortic stenosis, PA pressure 80 mmHg   Got some fluids on arrival to [facility], now off and back on diuretics  · Monitor daily weight, intake output  · Patient was awaiting TAVR, scheduled on 02/02 as outpatient previously, CT surgery saw patient and currently have no plans for TAVR at this time  · Can f/u outpatient if medically improved         Chronic respiratory failure with hypoxia [facility])  Assessment & Plan  Reports she wears 3L ATC at home  · Currently on 4L   · Wean as able     Acquired hypothyroidism  Assessment & Plan  · Continue levothyroxine    Chronic atrial fibrillation ([facility] [address] )  Assessment & Plan  Rate controlled, history of sick sinus status post pacemaker  · Continue metoprolol  · We had been holding Pradaxa while awaiting surgery but was resumed on 2/2 as there are no plans for TAVR    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, with long-term current use of insulin [facility])  Assessment & Plan  Lab Results   Component Value Date    HGBA1C 7 1 (H) 02/01/2021       Recent Labs     02/02/21  1625 02/02/21  2103 02/03/21  0557 02/03/21  0629   POCGLU 218* 142* 129 143*       Blood Sugar Average: Last 72 hrs:  (P) 207 2   · Presented with episode of hypoglycemia, similar episodes in the past  · Now resolved  · Endocrinology consulted, see note for insulin recs     Coronary artery disease involving coronary bypass graft of native heart without angina pectoris  Assessment & Plan  · Continue statin and metoprolol      VTE Pharmacologic Prophylaxis:   Pharmacologic: Dabigatran (Pradaxa)  Mechanical VTE Prophylaxis in Place: Yes    Patient Centered Rounds: I have performed bedside rounds with nursing staff today  Discussions with Specialists or Other Care Team Provider: GI, CM    Education and Discussions with Family / Patient: Patient  Called son  Time Spent for Care: 30 minutes  More than 50% of total time spent on counseling and coordination of care as described above  Current Length of Stay: 3 day(s)    Current Patient Status: Inpatient   Certification Statement: The patient will continue to require additional inpatient hospital stay due to w/u as above     Discharge Plan: none at this time  Not medically stable  Then will be home vs rehab pending PT progress    Code Status: Level 1 - Full Code      Subjective:   OOB in chair  Asking to go home  Has no complaints  Objective:     Vitals:   Temp (24hrs), Av 8 °F (36 6 °C), Min:97 7 °F (36 5 °C), Max:98 °F (36 7 °C)    Temp:  [97 7 °F (36 5 °C)-98 °F (36 7 °C)] 97 7 °F (36 5 °C)  HR:  [60-66] 62  Resp:  [17-20] 18  BP: (120-144)/(48-92) 141/73  SpO2:  [95 %-100 %] 97 %  Body mass index is 34 79 kg/m²  Input and Output Summary (last 24 hours): Intake/Output Summary (Last 24 hours) at 2/3/2021 0753  Last data filed at 2/3/2021 0401  Gross per 24 hour   Intake 480 ml   Output 920 ml   Net -440 ml       Physical Exam:     Physical Exam  Vitals signs and nursing note reviewed  Constitutional:       General: She is not in acute distress  Comments: On oxygen (chronic)    Cardiovascular:      Rate and Rhythm: Normal rate  Heart sounds: Murmur present     Pulmonary:      Effort: No respiratory distress  Abdominal:      General: There is no distension  Tenderness: There is no abdominal tenderness  Musculoskeletal:      Right lower leg: No edema  Left lower leg: No edema  Neurological:      Comments: Oriented to self and place, unclear of year    Psychiatric:      Comments: Very flat          Additional Data:     Labs:    Results from last 7 days   Lab Units 02/03/21  0603   WBC Thousand/uL 4 93   HEMOGLOBIN g/dL 10 8*   HEMATOCRIT % 37 3   PLATELETS Thousands/uL 122*   NEUTROS PCT % 70   LYMPHS PCT % 12*   MONOS PCT % 15*   EOS PCT % 3     Results from last 7 days   Lab Units 02/03/21  0603   POTASSIUM mmol/L 3 4*   CHLORIDE mmol/L 101   CO2 mmol/L 37*   BUN mg/dL 91*   CREATININE mg/dL 1 71*   CALCIUM mg/dL 9 2   ALK PHOS U/L 217*   ALT U/L 18   AST U/L 22     Results from last 7 days   Lab Units 02/01/21  0929   INR  1 38*       * I Have Reviewed All Lab Data Listed Above  * Additional Pertinent Lab Tests Reviewed: All Labs Within Last 24 Hours Reviewed    Imaging:    Imaging Reports Reviewed Today Include: all  Imaging Personally Reviewed by Myself Includes:  none    Recent Cultures (last 7 days):     Results from last 7 days   Lab Units 01/30/21  1410   BLOOD CULTURE  No Growth at 72 hrs  No Growth at 72 hrs         Last 24 Hours Medication List:   Current Facility-Administered Medications   Medication Dose Route Frequency Provider Last Rate    acetaminophen  650 mg Oral Q6H PRN Sue Connor, DO      atorvastatin  20 mg Oral Daily With [place], DO      bisacodyl  10 mg Rectal Daily PRN Sue Connor, DO      timolol  1 drop Both Eyes BID Sue Connor, DO      And    brimonidine  1 drop Both Eyes BID Sue Connor, DO      chlorhexidine  15 mL Swish & Spit Q12H [facility] [place]      dabigatran etexilate  75 mg Oral Q12H [facility] Gabriela Green PA-C      gabapentin  300 mg Oral HS Sue Connor, DO      insulin glargine  15 Units Subcutaneous HS Vickie Adair MD      insulin lispro  1-5 Units Subcutaneous HS Vickie Adair MD      insulin lispro  1-6 Units Subcutaneous TID AC Vickie Adair MD      insulin lispro  8 Units Subcutaneous TID With Meals Vickie Adair MD      lactulose  10 g Oral BID Zettie Catena, DO      levothyroxine  137 mcg Oral Daily Zettie Catena, DO      metoprolol succinate  50 mg Oral Daily Zettie Catena, DO      metoprolol tartrate  12 5 mg Oral Once [employer] PA-C      mupirocin  1 application Nasal Once [place] Creek[place]SHI beltran      mupirocin   Nasal Q12H [address] [place], [place]      pantoprazole  40 mg Oral Daily Before Breakfast Zettie Catena, DO      potassium chloride  40 mEq Oral Once Mason Rowland PA-C      torsemide  20 mg Oral BID Zettie Catena, DO          Today, Patient Was Seen By: Mason Rowland PA-C    ** Please Note: Dictation voice to text software may have been used in the creation of this document   **

## 2021-02-03 NOTE — ASSESSMENT & PLAN NOTE
Presented to [facility] with AMS, hypoglycemia, hypothermia, azotemia and elevated ammonia level   Mental status improved after correction of hypoglycemia  · CT head negative, exam nonfocal  · Monitor mental status

## 2021-02-03 NOTE — ASSESSMENT & PLAN NOTE
Ammonia level was noted to be elevated at 77 at [facility]   Patient does not appear to have history of cirrhosis or evidence of cirrhosis on prior imaging however with abnormal LFTs and INR, working up further as below  · Patient does report that she takes lactulose on the regular basis due to history of constipation but was not taking over last 1 week as she ran out her prescription  · Resumed lactulose  · GI input appreciated  · Likely has some cirrhosis, probably CHUNG and will require outpatient f/u

## 2021-02-03 NOTE — PROGRESS NOTES
Inpatient follow-up progress note        Assessment:  66 yr female with uncontrolled Type 2 diabetes, CAD, AS, CKD3 and multiple co-morbidities was admitted with encephalopathy in context of hypoglycemia and hyperammonemia      PLAN:     1  Type 2 diabetes  A1c 7 1 % but seems to have hyperglycemia and hypoglycemia  Note home medication include Basaglar 15u bedtime and Novolog 10u tidac  No oral agents  Follows Dr Daren Berkowitz Last office visit was 7/31/20      Stable plasma glucose overnight  Had post breakfast fingerstick 307mg/dL but patient had juice  Continue Lantus 15u bedtime and Humalog 8u tidac plus correction  She may be discharged on her current insulin regimen  Eventually will benefit from a glucose sensor to improve data about hypoglycemia  May consider GLP1 agonist as outpt      2  Hypothyroidism  On levothyroxine 137mcg qdaily  TSH 1 1uIU/mL  Continue      4  AS  Waiting TAVR after tooth extraction  CT surgery managing  S:  No complaints  O:  Patient seen and examined personally  /73   Pulse 62   Temp 97 7 °F (36 5 °C)   Resp 18   Wt 89 1 kg (196 lb 6 2 oz)   LMP  (LMP Unknown)   SpO2 97%   BMI 34 79 kg/m²     Physical Exam  Vitals signs reviewed  HENT:      Head: Normocephalic  Eyes:      Pupils: Pupils are equal, round, and reactive to light  Musculoskeletal:         General: No deformity  Neurological:      Mental Status: She is alert and oriented to person, place, and time           Current Facility-Administered Medications   Medication Dose Route Frequency Provider Last Rate    acetaminophen  650 mg Oral Q6H PRN Viri Novel, DO      ALPRAZolam  0 25 mg Oral BID PRN Walter Julio PA-C      atorvastatin  20 mg Oral Daily With [place], DO      bisacodyl  10 mg Rectal Daily PRN Viri Novel, DO      timolol  1 drop Both Eyes BID Viri Novel, DO      And    brimonidine  1 drop Both Eyes BID Viri Novel, DO      chlorhexidine  15 mL Swish & Spit Q12H [facility], MEENAKSHI      dabigatran etexilate  75 mg Oral Q12H [address] Awilda Wiseman PA-C      gabapentin  300 mg Oral HS Megan Bennett, DO      insulin glargine  15 Units Subcutaneous HS Austin Gerard MD      insulin lispro  1-5 Units Subcutaneous HS Austin Gerard MD      insulin lispro  1-6 Units Subcutaneous TID AC Austin Gerard MD      insulin lispro  8 Units Subcutaneous TID With Meals Austin Gerard MD      lactulose  10 g Oral BID Megan Bennett, DO      levothyroxine  137 mcg Oral Daily Megan Bennett, DO      metoprolol succinate  50 mg Oral Daily Megan Bennett, DO      metoprolol tartrate  12 5 mg Oral Once Watson PaleMEENAKSHI      mupirocin  1 application Nasal Once Wats[place]hanika Garcia PA-C      mupirocin   Nasal Q12H [address] [place], [place]      pantoprazole  40 mg Oral Daily Before Breakfast Megan Bennett, DO      spironolactone  25 mg Oral Daily Modesto Olsen, DO      torsemide  20 mg Oral BID Megan Bennett, DO          Lab, Imaging and other studies:   POC Glucose (mg/dl)   Date Value   02/03/2021 307 (H)   02/03/2021 143 (H)   02/03/2021 129   02/02/2021 142 (H)   02/02/2021 218 (H)   02/02/2021 90   02/02/2021 77   02/02/2021 73   02/01/2021 175 (H)   02/01/2021 301 (H)

## 2021-02-03 NOTE — ASSESSMENT & PLAN NOTE
Lab Results   Component Value Date    EGFR 29 02/03/2021    EGFR 38 02/02/2021    EGFR 28 02/01/2021    CREATININE 1 71 (H) 02/03/2021    CREATININE 1 40 (H) 02/02/2021    CREATININE 1 76 (H) 02/01/2021     Baseline creatinine appears to be 1 5-2 0 as per prior records  · Creatinine on arrival to [facility] (1/31) at higher end of normal  · Diuretics were held at [facility] and was given fluids  · Has been back on diuretics  · Creat stable  · Renal following

## 2021-02-03 NOTE — CONSULTS
Oral and Maxillofacial Surgery Consult    Pt seen 02/03/21 11:44 AM     Assessment  66 y o  female  evaluated for dental extractions prior to possible TAVR, now not recommended  On bedside dental exam, patient with generalized poor oral hygiene and retained roots  Plan:  - Please re-consult for dental extraction if necessary prior to TAVR  Otherwise patient may present as outpatient  - Encourage good oral hygiene    D/w OMFS attg on call          Inpatient consult to Oral and Maxillofacial Surgery     Date/Time 2/3/2021 11:44 AM     Performed by  Raissa Han DDS     Authorized by Maria Luisa Mullins PA-C               HPI: 66 y o  female w PMH listed below  Pt currently admitted for possible TAVR, now not recommended  Consult requested  for dental extractions prior to TAVR  Pt denies dental pain 0/10 Pt does not receive regular dental care  Denies fever, chills, nausea, odynophagia, dysphagia, dyspnea, shortness of breath  PMH:   Past Medical History:   Diagnosis Date    Arthritis     [facility]     Cardiac disease     Carotid artery occlusion     CH[facility] (congestive heart failure) ([facility]     Cholelithiasis     last assessed 8/8/2016    Coronary artery disease     Diabetes mellitus [facility]     Disease of thyroid gland     Essential hypertension 8/15/2016    GERD (gastroesophageal reflux disease)     History of transfusion     Hyperlipidemia     Hypertension     Long-term insulin use in type 2 diabetes ([facility] ) 6/4/2016    Other specified diabetes mellitus with diabetic autonomic (poly)neuropathy ([facility] )     Oxygen dependent     3L    Pleural effusion 2008    Pulmonary embolism ([facility]     2008    Renal disorder     Retinopathy     bilat    Sleep apnea     USES 3 LITER O2 CONTINOUIS    Subclavian artery stenosis (HCC)     Umbilical hernia with obstruction, without gangrene     last assessed 7/8/2016        Allergies:    Allergies   Allergen Reactions    Bromide Ion [Bromine] Blurred vision   Has preservative that  Pt cannot use    Other Blisters     Adhesive tape    Timolol      Per pt, allergic to steroid in medication    Tramadol      cognition changes    Ibuprofen Palpitations    Penicillins Rash       Meds:     Current Facility-Administered Medications:     acetaminophen (TYLENOL) tablet 650 mg, 650 mg, Oral, Q6H PRN, Shanna Gutierres DO    ALPRAZolam [place]) tablet 0 25 mg, 0 25 mg, Oral, BID PRN, Jacinta Uriostegui PA-C, 0 25 mg at 02/03/21 1134    atorvastatin (LIPITOR) tablet 20 mg, 20 mg, Oral, Daily With Dario Jules DO, 20 mg at 02/02/21 1624    bisacodyl (DULCOLAX) rectal suppository 10 mg, 10 mg, Rectal, Daily PRN, Shanna Gutierres DO    timolol (TIMOPTIC) 0 5 % ophthalmic solution 1 drop, 1 drop, Both Eyes, BID, 1 drop at 02/03/21 0905 **AND** brimonidine (ALPHAGAN P) 0 15 % ophthalmic solution 1 drop, 1 drop, Both Eyes, BID, Shanna Gutierres DO, 1 drop at 02/03/21 0905    chlorhexidine (PERIDEX) 0 12 % oral rinse 15 mL, 15 mL, Swish & [place], Q12H [facility] [place], [place], 15 mL at 02/03/21 9445    dabigatran etexilate (PRADAXA) capsule 75 mg, 75 mg, Oral, Q12H [facility], Gabriela Green PA-C, 75 mg at 02/03/21 6156    gabapentin (NEURONTIN) capsule 300 mg, 300 mg, Oral, HS, Shanna Gutierres DO, 300 mg at 02/02/21 2145    insulin glargine (LANTUS) subcutaneous injection 15 Units 0 15 mL, 15 Units, Subcutaneous, William MD, 15 Units at 02/02/21 2146    insulin lispro (HumaLOG) 100 units/mL subcutaneous injection 1-5 Units, 1-5 Units, Subcutaneous, HSWilliam MD, 1 Units at 02/01/21 2201    insulin lispro (HumaLOG) 100 units/mL subcutaneous injection 1-6 Units, 1-6 Units, Subcutaneous, TID AC, 4 Units at 02/03/21 1135 **AND** Fingerstick Glucose (POCT), , , TID AC, Sebastian Miramontes MD    insulin lispro (HumaLOG) 100 units/mL subcutaneous injection 8 Units, 8 Units, Subcutaneous, TID With Meals, William Christensen MD, 8 Units at 02/03/21 1135    lactulose oral solution 10 g, 10 g, Oral, BID, Ulus Hernandez, DO, 10 g at 02/03/21 5483    levothyroxine tablet 137 mcg, 137 mcg, Oral, Daily, Ulus Hernandez, DO, 137 mcg at 02/03/21 0605    metoprolol succinate (TOPROL-XL) 24 hr tablet 50 mg, 50 mg, Oral, Daily, Ulus Hernandez, DO, 50 mg at 02/03/21 0905    metoprolol tartrate (LOPRESSOR) partial tablet 12 5 mg, 12 5 mg, Oral, Once, Imtiaz Morrison PA-C, Stopped at 02/02/21 0702    mupirocin (BACTROBAN) 2 % nasal ointment 1 application, 1 application, Nasal, Once, Imtiaz Morrison PA-C    mupirocin (BACTROBAN) 2 % nasal ointment, , Nasal, Q12H [facility] halfway, [facility] Jackson Mccarthy PA-C, 1 application at 02/98/74 3549    pantoprazole (PROTONIX) EC tablet 40 mg, 40 mg, Oral, Daily Before Breakfast, Ulus Hernandez, DO, 40 mg at 02/03/21 0605    spironolactone (ALDACTONE) tablet 25 mg, 25 mg, Oral, Daily, Donnell Jody, DO    torsemide (DEMADEX) tablet 20 mg, 20 mg, Oral, BID, Ulus Hernandez, DO, 20 mg at 02/03/21 0905    PSH:   Past Surgical History:   Procedure Laterality Date    ABDOMINAL SURGERY      CARDIAC PACEMAKER PLACEMENT      CARDIAC SURGERY      cabg x 2    CATARACT EXTRACTION      CHOLECYSTECTOMY      COLONOSCOPY      CORONARY ARTERY BYPASS GRAFT  2008    EYE SURGERY      FRACTURE SURGERY Right 2010    shoulder    HERNIA REPAIR      umbilical    LA LAP,CHOLECYSTECTOMY N/A 8/10/2016    Procedure: CHOLECYSTECTOMY LAPAROSCOPIC;  Surgeon: Murali Daniels MD;  [facility];  Service: General    LA LARYNGOSCOPY,DIRECT,SCOPE,INJ CORDS Left 11/4/2016    Procedure: Marleni Ludwig; LEFT VOCAL FOLD INJECTION ;  Surgeon: Yady Dykes MD;  [facility];  Service: ENT    LA REPAIR UMBILICAL WRQQ,3+Y/U,WPPDO N/A 8/10/2016    Procedure: LAPAROSCOPIC REPAIR HERNIA VENTRAL;  Surgeon: Murali Daniels MD;  [facility];  Service: General    SUBCLAVIAN VEIN ANGIOPLASTY / STENTING Right     TRICUSPID VALVE REPLACEMENT  VASCULAR SURGERY      heart valve replacement      Family History   Problem Relation Age of Onset    Heart disease Mother     Breast cancer Maternal Aunt     Heart disease Sister         Review of Systems   HENT: Positive for dental problem (cavities and fractured teeth throughout mouth)  Negative for facial swelling, trouble swallowing and voice change  All other systems reviewed and are negative  Temp:  [97 7 °F (36 5 °C)-98 °F (36 7 °C)] 97 7 °F (36 5 °C)  HR:  [60-66] 62  Resp:  [17-20] 18  BP: (120-144)/(48-92) 141/73  SpO2:  [95 %-100 %] 97 %       Intake/Output Summary (Last 24 hours) at 2/3/2021 1144  Last data filed at 2/3/2021 0900  Gross per 24 hour   Intake 720 ml   Output 1420 ml   Net -700 ml        Physical Exam:  GE: AAOx3  NAD  HEENT:    Head: No  facial swelling  No trismus  No tenderness to palpation  NO LAD  Inferior border of the mandible is palpable  Mouth: ~40 mm KIESHA  Dentition intact, poor with multiple retained roots throughout mouth  No vestibular swelling  No purulence  No sinus tract  FOM soft/non-tender/non-elevated  Uvula midline  Resp: no respiratory distress on RA  CVS: RRR    Lab Results: I have personally reviewed pertinent lab results  Imaging: I have personally reviewed pertinent reports  EKG, Pathology, and Other Studies: I have personally reviewed pertinent reports  Counseling / Coordination of Care  Total floor / unit time spent today 20 minutes  Greater than 50% of total time was spent with the patient and / or family counseling and / or coordination of care

## 2021-02-03 NOTE — ASSESSMENT & PLAN NOTE
Wt Readings from Last 3 Encounters:   02/03/21 89 1 kg (196 lb 6 2 oz)   01/31/21 89 1 kg (196 lb 6 9 oz)   01/15/21 93 kg (205 lb)     Prior echo-8/20 -EF 40-45%, severe aortic stenosis, PA pressure 80 mmHg   Got some fluids on arrival to [facility], now off and back on diuretics  · Monitor daily weight, intake output  · Patient was awaiting TAVR, scheduled on 02/02 as outpatient previously, CT surgery saw patient and currently have no plans for TAVR at this time  · Can f/u outpatient if medically improved

## 2021-02-03 NOTE — PROGRESS NOTES
NEPHROLOGY PROGRESS NOTE   Felicia Romero 66 y o  female MRN: [id number]  Unit/Bed#: CW2 210-01 Encounter: [id number]      ASSESSMENT & PLAN    1  Stage 3 chronic kidney disease likely secondary to cardiorenal syndrome/cirrhosis  ? Baseline creatinine 1 5-2 0  ? Urinalysis from January 30th shows trace blood, otherwise bland  ? CT a from October 2020 shows unremarkable kidneys with no hydronephrosis an unremarkable adrenal glands  ? Restarted on diuretics  ? Right upper quadrant ultrasound reveals no ascites no focal liver mass  ? Workup for liver disease ongoing by GI  ? TAVR workup when medical conditions stabilized     2  Electrolytes/Acid Base  ? Hypokalemia -Potassium 3 4-will give 40 mEq of potassium chloride now  · Start Spironolactone 25 mg daily as well     3  Blood Pressure  ? Acceptable blood pressures with diuresis with a map that stable  ? History of aortic stenosis requiring TAVR-remains on metoprolol XL 50 mg daily  ? Volume overload-overall does wear chronic oxygen weight agree with holding IV fluids continue torsemide 20 mg b i d  added spironolactone 25 mg daily     4  Anemia of CKD  ? Continue to trend hemoglobin currently stable     5  CKD-BMD  ? Monitor parameters as an outpatient     6  Clinical Course/CV Risk Reduction/Health maintenance/communication  ?  Needs dental extraction prior to TAVR    SUBJECTIVE:    Patient was seen today  No acute complaints  No cp, sob,     OBJECTIVE:  Current Weight: Weight - Scale: 89 1 kg (196 lb 6 2 oz)  @  Vitals:    02/02/21 2331 02/03/21 0600 02/03/21 0604 02/03/21 0701   BP: (!) 120/48  144/73 141/73   BP Location: Left arm      Pulse: 60  60 62   Resp: 18   18   Temp: 97 8 °F (36 6 °C)  97 7 °F (36 5 °C) 97 7 °F (36 5 °C)   TempSrc: Oral      SpO2: 99%  96% 97%   Weight:  89 1 kg (196 lb 6 2 oz)         Intake/Output Summary (Last 24 hours) at 2/3/2021 1114  Last data filed at 2/3/2021 0900  Gross per 24 hour   Intake 720 ml   Output 1420 ml   Net -700 ml     Weight (last 2 days)     Date/Time   Weight    02/03/21 0600   89 1 (196 39)    02/02/21 0600   89 1 (196 4)    02/02/21 0500   89 1 (196 4)    02/01/21 0537   85 7 (189)              General: conscious, cooperative, in no acute distress  Eyes: conjunctivae pink, anicteric sclerae  ENT: lips and mucous membranes moist  Neck: supple, + JVD  Chest: no respiratory distress, no accessory muscle use, normal respiratory effort  CVS: normal heart rate, no friction rub  Abdomen: soft, non-tender, non-distended, normoactive bowel sounds  Extremities: no edema of both legs  Skin: no rash  Neuro: awake, alert, oriented      Medications:    Current Facility-Administered Medications:     acetaminophen (TYLENOL) tablet 650 mg, 650 mg, Oral, Q6H PRN, Vin Lie, DO    atorvastatin (LIPITOR) tablet 20 mg, 20 mg, Oral, Daily With Milinda Seashore, DO, 20 mg at 02/02/21 1624    bisacodyl (DULCOLAX) rectal suppository 10 mg, 10 mg, Rectal, Daily PRN, Vin Lie, DO    timolol (TIMOPTIC) 0 5 % ophthalmic solution 1 drop, 1 drop, Both Eyes, BID, 1 drop at 02/03/21 0905 **AND** brimonidine (ALPHAGAN P) 0 15 % ophthalmic solution 1 drop, 1 drop, Both Eyes, BID, Vin Lie, DO, 1 drop at 02/03/21 0905    chlorhexidine (PERIDEX) 0 12 % oral rinse 15 mL, 15 mL, Swish & [place], Q12H [facility], Albert Mccarthy PA-C, 15 mL at 02/03/21 1213    dabigatran etexilate (PRADAXA) capsule 75 mg, 75 mg, Oral, Q12H [facility], Gabriela Green PA-C, 75 mg at 02/03/21 0265    gabapentin (NEURONTIN) capsule 300 mg, 300 mg, Oral, HS, Vin Lie, DO, 300 mg at 02/02/21 2145    insulin glargine (LANTUS) subcutaneous injection 15 Units 0 15 mL, 15 Units, Subcutaneous, HS, Cezar Teixeira MD, 15 Units at 02/02/21 2146    insulin lispro (HumaLOG) 100 units/mL subcutaneous injection 1-5 Units, 1-5 Units, Subcutaneous, HS, Cezar Teixeira MD, 1 Units at 02/01/21 2201    insulin lispro (HumaLOG) 100 units/mL subcutaneous injection 1-6 Units, 1-6 Units, Subcutaneous, TID AC, 2 Units at 02/02/21 1626 **AND** Fingerstick Glucose (POCT), , , TID AC, Francesco Black MD    insulin lispro (HumaLOG) 100 units/mL subcutaneous injection 8 Units, 8 Units, Subcutaneous, TID With Meals, Francesco Black MD, 8 Units at 02/03/21 0906    lactulose oral solution 10 g, 10 g, Oral, BID, Adelene Egyptian, DO, 10 g at 02/03/21 4736    levothyroxine tablet 137 mcg, 137 mcg, Oral, Daily, Adelene Egyptian, DO, 137 mcg at 02/03/21 0605    metoprolol succinate (TOPROL-XL) 24 hr tablet 50 mg, 50 mg, Oral, Daily, Adelene Egyptian, DO, 50 mg at 02/03/21 0905    metoprolol tartrate (LOPRESSOR) partial tablet 12 5 mg, 12 5 mg, Oral, Once, Param Gillis PA-C, Stopped at 02/02/21 0702    mupirocin (BACTROBAN) 2 % nasal ointment 1 application, 1 application, Nasal, Once, Param Gillis PA-C    mupirocin (BACTROBAN) 2 % nasal ointment, , Nasal, Q12H [facility] MEENAKSHI Mccarthy, 1 application at 53/74/76 7735    pantoprazole (PROTONIX) EC tablet 40 mg, 40 mg, Oral, Daily Before Breakfast, Adelene Egyptian, DO, 40 mg at 02/03/21 0605    torsemide [facility]) tablet 20 mg, 20 mg, Oral, BID, Adelene Egyptian, DO, 20 mg at 02/03/21 7637    Lab Results:   Results from last 7 days   Lab Units 02/03/21  0603 02/02/21  0510 02/01/21  0844 01/31/21  0626 01/30/21  2128 01/30/21  1723 01/30/21  1410   WBC Thousand/uL 4 93 6 03 5 62 4 66 5 17  --  5 93   HEMOGLOBIN g/dL 10 8* 11 5 11 8 11 2* 12 2  --  11 3*   HEMATOCRIT % 37 3 39 3 40 1 39 0 42 5  --  39 0   PLATELETS Thousands/uL 122* 142* 146* 151 160  --  251   POTASSIUM mmol/L 3 4* 3 4* 3 4* 3 2* 2 7*  --  3 4*   CHLORIDE mmol/L 101 104 101 101 99*  --  98*   CO2 mmol/L 37* 38* 35* 35* 37*  --  37*   BUN mg/dL 91* 96* 106* 115* 114*  --  121*   CREATININE mg/dL 1 71* 1 40* 1 76* 2 10* 1 87*  --  1 77*   CALCIUM mg/dL 9 2 9 5 9 5 8 7 9 4  --  8 2*   MAGNESIUM mg/dL  --   --  2 1  --   --   --   --    ALK PHOS U/L 217*  --  213*  -- --   --  214*   ALT U/L 18  --  20  --   --   --  23   AST U/L 22  --  20  --   --   --  41   BLOOD CULTURE   --   --   --   --   --   --  No Growth at 72 hrs  No Growth at 72 hrs  LEUKOCYTES UA   --   --   --   --   --  Negative  --    BLOOD UA   --   --   --   --   --  Trace-Intact*  --          Portions of the record may have been created with voice recognition software  Occasional wrong word or "sound a like" substitutions may have occurred due to the inherent limitations of voice recognition software  Read the chart carefully and recognize, using context, where substitutions have occurred  If you have any questions, please contact the dictating provider

## 2021-02-03 NOTE — PLAN OF CARE
Problem: OCCUPATIONAL THERAPY ADULT  Goal: Performs self-care activities at highest level of function for planned discharge setting  See evaluation for individualized goals  Description: Treatment Interventions: ADL retraining, Endurance training, Patient/family training, Equipment evaluation/education, Compensatory technique education, Energy conservation, Activityengagement          See flowsheet documentation for full assessment, interventions and recommendations  Note: Limitation: Decreased ADL status, Decreased endurance, Decreased self-care trans, Decreased high-level ADLs  Prognosis: Fair  Assessment: Pt is a 66 y o  female who was admitted to [facility] on 1/31/2021 with Aortic stenosis CAD, CHF, diabetes, CKD, chronic ARF with hypoxia, cirrhosis  Pt's problem list also includes PMH of leg cramps, history of colon polyps, hernia, HTN with CKD, arthritis, morbid obesity, umbilical hernia, mixed hyperlipidemia    At baseline pt was completing assistance from [facility] with showering 2x week, otherwise I, assistance with IADL's from family +RW/w/c with household mobility, w/c with community mobility  Pt lives with son and DIL in a multilevel home with a first floor set-up and 6STE  Currently pt requires min a  for overall ADLS and S with RW for functional mobility/transfers  Pt currently presents with impairments in the following categories -steps to enter environment and difficulty performing IADLS  activity tolerance, endurance, standing balance/tolerance and safety    These impairments, as well as pt's fatigue, decreased caregiver support and risk for falls  limit pt's ability to safely engage in all baseline areas of occupation, includingbathing, dressing, toileting, functional mobility/transfers, community mobility, laundry , house maintenance, medication management, meal prep, social participation  and leisure activities  The patient's raw score on the AM-PAC Daily Activity inpatient short form is 22, standardized score is 47 1, greater than 39 4  Patients at this level are likely to benefit from DC to home  Please refer to the recommendation of the Occupational Therapist for safe DC planning  From OT standpoint, recommend home with family support and home OT upon D/C  OT will continue to follow to address the below stated goals  OT Discharge Recommendation: Home with skilled therapy  OT - OK to Discharge:  Yes

## 2021-02-03 NOTE — CONSULTS
Consultation - [address] [facility]  Nichole  66 y o  female MRN: [id number]  Unit/Bed#: [facility] 210- Encounter: [id number]        Consults    Reason for Consult / Principal Problem:       Abnormal LFTs      ASSESSMENT AND PLAN:      69-year-old with CAD status post CABG, tricuspid valve repair, Afib on Xarelto status post pacemaker placement, aortic stenosis ( limited benefit from TAVR given debilitated condition   But CT surgery to re-evaluate as an outpatient), colon polyps and constipation initially presented with encephalopathy and hypoglycemia which have resolved  Currently undergoing consultation with palliative care given advanced cardiopulmonary disease  GI consulted for elevated ammonia and abnormal LFTs  1  Cirrhosis  Based upon elastography showing F4 fibrosis, low platelets and deranged liver function  Current etiologies include NAFLD as patient has risk factors, chronic congestion from pulmonary hypertension and CHF  IgG elevated at 2900  Ferritin normal, iron sat normal, hepatitis-B and C negative  Other workup pending  No liver mass or ascites on  Ultrasound  Will need EGD as an outpatient  Follow up in clinic as an outpatient  Patient lives near or in so will request GI to see her at [facility] if possible or [facility]  2  Hepatic encephalopathy   Controlled  Currently on lactulose  3-4 bowel movements per day  3  Preventative healthcare   needs vaccination for hepatitis- B  Immune to hepatitis A     4  Aortic stenosis /CHF/pulmonary hypertension  CT surgery to re-evaluate as an outpatient  5  Atrial fibrillation  On Xarelto  We will sign off  Thank you for allowing us to participate in the care of this patient        Joe Marquez MD  Gastroenterology Fellow  [address]  Date: February 3, 2021          ______________________________________________________________________    HPI: 69-year-old with CAD status post CABG, tricuspid valve repair, Afib on Xarelto status post pacemaker placement, aortic stenosis ( limited benefit from TAVR given debilitated condition   But CT surgery to re-evaluate as an outpatient), colon polyps and constipation initially presented with encephalopathy and hypoglycemia which have resolved  Currently undergoing consultation with palliative care given advanced cardiopulmonary disease  GI consulted for elevated ammonia and abnormal LFTs  Currently patient denies any GI symptoms  She is having 1-2 bowel movements per day  No changes in mental status hospitalization  No asterixis  Weight is stable  No history of alcohol abuse  REVIEW OF SYSTEMS:    CONSTITUTIONAL: Denies any fever, chills, rigors, and weight loss  HEENT: No earache or tinnitus  Denies hearing loss or visual disturbances  CARDIOVASCULAR: No chest pain or palpitations  RESPIRATORY: Denies any cough, hemoptysis, shortness of breath or dyspnea on exertion  GASTROINTESTINAL: As noted in the History of Present Illness  GENITOURINARY: No problems with urination  Denies any hematuria or dysuria  NEUROLOGIC: No dizziness or vertigo, denies headaches  MUSCULOSKELETAL: Denies any muscle or joint pain  SKIN: Denies skin rashes or itching  ENDOCRINE: Denies excessive thirst  Denies intolerance to heat or cold  PSYCHOSOCIAL: Denies depression or anxiety  Denies any recent memory loss         Historical Information   Past Medical History:   Diagnosis Date    Arthritis     Atrial flutter ([facility] )     Cardiac disease     Carotid artery occlusion     [facility]estive [facility] failure[facility]     Cholelithiasis     last assessed 8/8/2016    Coronary artery disease     Diabetes mellitus ([facility] )     Disease of thyroid gland     Essential hypertension 8/15/2016    GERD (gastroesophageal reflux disease)     History of transfusion     Hyperlipidemia     Hypertension     Long-term insulin use in type 2 diabetes ([facility] ) 6/4/2016    Other specified diabetes mellitus with diabetic autonomic (poly)neuropathy ([facility]     Oxygen dependent     3L    Pleural effusion 2008    Pulmonary embolism ([facility] )     2008    Renal disorder     Retinopathy     bilat    Sleep apnea     USES 3 LITER O2 CONTINOUIS    Subclavian artery stenosis (HCC)     Umbilical hernia with obstruction, without gangrene     last assessed 7/8/2016     Past Surgical History:   Procedure Laterality Date    ABDOMINAL SURGERY      CARDIAC PACEMAKER PLACEMENT      CARDIAC SURGERY      cabg x 2    CATARACT EXTRACTION      CHOLECYSTECTOMY      COLONOSCOPY      CORONARY ARTERY BYPASS GRAFT  2008    EYE SURGERY      FRACTURE SURGERY Right 2010    shoulder    HERNIA REPAIR      umbilical    CT LAP,CHOLECYSTECTOMY N/A 8/10/2016    Procedure: CHOLECYSTECTOMY LAPAROSCOPIC;  Surgeon: Janny Wisdom MD;  [facility];  Service: General    CT LARYNGOSCOPY,DIRECT,SCOPE,INJ CORDS Left 11/4/2016    Procedure: MICRODIRECT LARYNGOSCOPY; LEFT VOCAL FOLD INJECTION ;  Surgeon: Jessica Reynoso MD;  [facility];  Service: ENT    CT REPAIR UMBILICAL YEAY,2+D/W,JVJHG N/A 8/10/2016    Procedure: LAPAROSCOPIC REPAIR HERNIA VENTRAL;  Surgeon: Janny Wisdom MD;  [facility];  Service: General    [address] / STENTING Right     TRICUSPID VALVE REPLACEMENT      VASCULAR SURGERY      heart valve replacement     Social History   Social History     Substance and Sexual Activity   Alcohol Use Not Currently    Alcohol/week: 0 0 standard drinks    Frequency: Never    Drinks per session: Patient refused    Binge frequency: Never    Comment: only on NEW YEAR'S     Social History     Substance and Sexual Activity   Drug Use Never    Types: Oxycodone     Social History     Tobacco Use   Smoking Status Never Smoker   Smokeless Tobacco Never Used     Family History   Problem Relation Age of Onset    Heart disease Mother     Breast cancer Maternal Aunt     Heart disease Sister        Meds/Allergies     Medications Prior to Admission   Medication    Accu-Chek Virgie Plus test strip    acetaminophen (TYLENOL) 325 mg tablet    B-D UF III MINI PEN NEEDLES 31G X 5 MM MISC    COMBIGAN 0 2-0 5 %    Cyanocobalamin (VITAMIN B 12 PO)    famotidine (PEPCID) 20 mg tablet    gabapentin (NEURONTIN) 300 mg capsule    Insulin Aspart (NovoLOG PenFill) 100 UNITS/ML cartridge for injection    insulin glargine (Basaglar KwikPen) 100 units/mL injection pen    lactulose (CHRONULAC) 10 g/15 mL solution    levothyroxine (Synthroid) 137 mcg tablet    metolazone (ZAROXOLYN) 2 5 mg tablet    metoprolol succinate (TOPROL-XL) 50 mg 24 hr tablet    Multiple Vitamin (MULTIVITAMIN) tablet    pantoprazole (PROTONIX) 40 mg tablet    potassium chloride (Klor-Con M10) 10 mEq tablet    torsemide (DEMADEX) 20 mg tablet    dabigatran etexilate (PRADAXA) 75 mg capsule    simvastatin (ZOCOR) 20 mg tablet     Current Facility-Administered Medications   Medication Dose Route Frequency    acetaminophen (TYLENOL) tablet 650 mg  650 mg Oral Q6H PRN    atorvastatin (LIPITOR) tablet 20 mg  20 mg Oral Daily With Dinner    bisacodyl (DULCOLAX) rectal suppository 10 mg  10 mg Rectal Daily PRN    timolol (TIMOPTIC) 0 5 % ophthalmic solution 1 drop  1 drop Both Eyes BID    And    brimonidine (ALPHAGAN P) 0 15 % ophthalmic solution 1 drop  1 drop Both Eyes BID    chlorhexidine (PERIDEX) 0 12 % oral rinse 15 mL  15 mL Swish & Spit Q12H [address]    dabigatran etexilate (PRADAXA) capsule 75 mg  75 mg Oral Q12H [address]    gabapentin (NEURONTIN) capsule 300 mg  300 mg Oral HS    insulin glargine (LANTUS) subcutaneous injection 15 Units 0 15 mL  15 Units Subcutaneous HS    insulin lispro (HumaLOG) 100 units/mL subcutaneous injection 1-5 Units  1-5 Units Subcutaneous HS    insulin lispro (HumaLOG) 100 units/mL subcutaneous injection 1-6 Units  1-6 Units Subcutaneous TID AC    insulin lispro (HumaLOG) 100 units/mL subcutaneous injection 8 Units  8 Units Subcutaneous TID With Meals    lactulose oral solution 10 g  10 g Oral BID    levothyroxine tablet 137 mcg  137 mcg Oral Daily    metoprolol succinate (TOPROL-XL) 24 hr tablet 50 mg  50 mg Oral Daily    metoprolol tartrate (LOPRESSOR) partial tablet 12 5 mg  12 5 mg Oral Once    mupirocin (BACTROBAN) 2 % nasal ointment 1 application  1 application Nasal Once    mupirocin (BACTROBAN) 2 % nasal ointment   Nasal Q12H [address]    pantoprazole (PROTONIX) EC tablet 40 mg  40 mg Oral Daily Before Breakfast    torsemide (DEMADEX) tablet 20 mg  20 mg Oral BID       Allergies   Allergen Reactions    Bromide Ion [Bromine]      Blurred vision  Has preservative that  Pt cannot use    Other Blisters     Adhesive tape    Timolol      Per pt, allergic to steroid in medication    Tramadol      cognition changes    Ibuprofen Palpitations    Penicillins Rash           Objective     Blood pressure 141/73, pulse 62, temperature 97 7 °F (36 5 °C), resp  rate 18, weight 89 1 kg (196 lb 6 2 oz), SpO2 97 %, not currently breastfeeding  Body mass index is 34 79 kg/m²  Intake/Output Summary (Last 24 hours) at 2/3/2021 1101  Last data filed at 2/3/2021 0900  Gross per 24 hour   Intake 720 ml   Output 1420 ml   Net -700 ml         PHYSICAL EXAM:      General Appearance:   Alert, cooperative, no distress   HEENT:   Normocephalic, atraumatic, anicteric      Neck:  Supple, symmetrical, trachea midline   Lungs:   Clear to auscultation bilaterally; no rales, rhonchi or wheezing; respirations unlabored    Heart[de-identified]   Regular rate and rhythm; no murmur, rub, or gallop     Abdomen:   Soft, non-tender, non-distended; normal bowel sounds; no masses, no organomegaly    Genitalia:   Deferred    Rectal:   Deferred    Extremities:  No cyanosis, clubbing or edema    Pulses:  2+ and symmetric all extremities    Skin:  No jaundice, rashes, or lesions    Lymph nodes:  No palpable cervical lymphadenopathy  Lab Results:   Admission on 01/31/2021   Component Date Value    POC Glucose 01/31/2021 317*    POC Glucose 02/01/2021 275*    Hemoglobin A1C 02/01/2021 7 1*    EAG 02/01/2021 157     WBC 02/01/2021 5 62     RBC 02/01/2021 4 27     Hemoglobin 02/01/2021 11 8     Hematocrit 02/01/2021 40 1     MCV 02/01/2021 94     MCH 02/01/2021 27 6     MCHC 02/01/2021 29 4*    RDW 02/01/2021 20 0*    MPV 02/01/2021 10 5     Platelets 97/52/2283 146*    nRBC 02/01/2021 0     Neutrophils Relative 02/01/2021 63     Immat GRANS % 02/01/2021 0     Lymphocytes Relative 02/01/2021 15     Monocytes Relative 02/01/2021 19*    Eosinophils Relative 02/01/2021 2     Basophils Relative 02/01/2021 1     Neutrophils Absolute 02/01/2021 3 59     Immature Grans Absolute 02/01/2021 0 01     Lymphocytes Absolute 02/01/2021 0 85     Monocytes Absolute 02/01/2021 1 04     Eosinophils Absolute 02/01/2021 0 10     Basophils Absolute 02/01/2021 0 03     Sodium 02/01/2021 141     Potassium 02/01/2021 3 4*    Chloride 02/01/2021 101     CO2 02/01/2021 35*    ANION GAP 02/01/2021 5     BUN 02/01/2021 106*    Creatinine 02/01/2021 1 76*    Glucose 02/01/2021 261*    Calcium 02/01/2021 9 5     Corrected Calcium 02/01/2021 10 5*    AST 02/01/2021 20     ALT 02/01/2021 20     Alkaline Phosphatase 02/01/2021 213*    Total Protein 02/01/2021 8 3*    Albumin 02/01/2021 2 8*    Total Bilirubin 02/01/2021 1 84*    eGFR 02/01/2021 28     Magnesium 02/01/2021 2 1     POC Glucose 02/01/2021 249*    ABO Grouping 02/01/2021 B     Rh Factor 02/01/2021 Positive     Antibody Screen 02/01/2021 Negative     Specimen Expiration Date 02/01/2021 [id number]     Protime 02/01/2021 17 0*    INR 02/01/2021 1 38*    PTT 02/01/2021 34     MRSA Culture Only 02/01/2021 No Methicillin Resistant Staphlyococcus aureus (MRSA) isolated     Unit Product Code 02/01/2021 G1531B45     Unit Number 02/01/2021 [id number]     Unit ABO 02/01/2021 B     Unit RH 02/01/2021 POS     Crossmatch 02/01/2021 Compatible     Unit Dispense Status 02/01/2021 Crossmatched     Unit Product Code 02/01/2021 U9651J57     Unit Number 02/01/2021 [id number]     Unit ABO 02/01/2021 B     Unit RH 02/01/2021 POS     Crossmatch 02/01/2021 Compatible     Unit Dispense Status 02/01/2021 Crossmatched     Unit Product Code 02/01/2021 [id number]     Unit Number 02/01/2021 [id number]     Unit ABO 02/01/2021 B     Unit RH 02/01/2021 POS     Crossmatch 02/01/2021 Compatible     Unit Dispense Status 02/01/2021 Crossmatched     Unit Product Code 02/01/2021 [id number]     Unit Number 02/01/2021 [id number]     Unit ABO 02/01/2021 B     Unit RH 02/01/2021 POS     Crossmatch 02/01/2021 Compatible     Unit Dispense Status 02/01/2021 Crossmatched     POC Glucose 02/01/2021 295*    POC Glucose 02/01/2021 345*    POC Glucose 02/01/2021 301*    POC Glucose 02/01/2021 279*    POC Glucose 02/01/2021 175*    WBC 02/02/2021 6 03     RBC 02/02/2021 4 19     Hemoglobin 02/02/2021 11 5     Hematocrit 02/02/2021 39 3     MCV 02/02/2021 94     MCH 02/02/2021 27 4     MCHC 02/02/2021 29 3*    RDW 02/02/2021 19 9*    MPV 02/02/2021 10 4     Platelets 88/41/6175 142*    nRBC 02/02/2021 0     Neutrophils Relative 02/02/2021 67     Immat GRANS % 02/02/2021 0     Lymphocytes Relative 02/02/2021 14     Monocytes Relative 02/02/2021 15*    Eosinophils Relative 02/02/2021 3     Basophils Relative 02/02/2021 1     Neutrophils Absolute 02/02/2021 4 08     Immature Grans Absolute 02/02/2021 0 01     Lymphocytes Absolute 02/02/2021 0 84     Monocytes Absolute 02/02/2021 0 90     Eosinophils Absolute 02/02/2021 0 17     Basophils Absolute 02/02/2021 0 03     Sodium 02/02/2021 145     Potassium 02/02/2021 3 4*    Chloride 02/02/2021 104     CO2 02/02/2021 38*    ANION GAP 02/02/2021 3*    BUN 02/02/2021 96*    Creatinine 02/02/2021 1 40*    Glucose 02/02/2021 65     Calcium 02/02/2021 9 5     eGFR 02/02/2021 38     Ammonia 02/02/2021 56*    POC Glucose 02/02/2021 73     IGA 02/02/2021 437 0*    IGG 02/02/2021 2,910 0*    IGM 02/02/2021 165 0     Ceruloplasmin 02/02/2021 42 6*    Hep A Total Ab 02/02/2021 Reactive*    Hep B Core Total Ab 02/02/2021 Non-reactive     Hep B S Ab 02/02/2021 <3 10     Hepatitis B Surface Ag 02/02/2021 Non-reactive     Iron Saturation 02/02/2021 10     TIBC 02/02/2021 388     Iron 02/02/2021 37*    Ferritin 02/02/2021 74     POC Glucose 02/02/2021 90     POC Glucose 02/02/2021 77     POC Glucose 02/02/2021 218*    POC Glucose 02/02/2021 142*    WBC 02/03/2021 4 93     RBC 02/03/2021 3 95     Hemoglobin 02/03/2021 10 8*    Hematocrit 02/03/2021 37 3     MCV 02/03/2021 94     MCH 02/03/2021 27 3     MCHC 02/03/2021 29 0*    RDW 02/03/2021 20 0*    MPV 02/03/2021 10 8     Platelets 58/17/7107 122*    nRBC 02/03/2021 0     Neutrophils Relative 02/03/2021 70     Immat GRANS % 02/03/2021 0     Lymphocytes Relative 02/03/2021 12*    Monocytes Relative 02/03/2021 15*    Eosinophils Relative 02/03/2021 3     Basophils Relative 02/03/2021 0     Neutrophils Absolute 02/03/2021 3 40     Immature Grans Absolute 02/03/2021 0 02     Lymphocytes Absolute 02/03/2021 0 59*    Monocytes Absolute 02/03/2021 0 74     Eosinophils Absolute 02/03/2021 0 16     Basophils Absolute 02/03/2021 0 02     Sodium 02/03/2021 142     Potassium 02/03/2021 3 4*    Chloride 02/03/2021 101     CO2 02/03/2021 37*    ANION GAP 02/03/2021 4     BUN 02/03/2021 91*    Creatinine 02/03/2021 1 71*    Glucose 02/03/2021 141*    Calcium 02/03/2021 9 2     Corrected Calcium 02/03/2021 10 3*    AST 02/03/2021 22     ALT 02/03/2021 18     Alkaline Phosphatase 02/03/2021 217*    Total Protein 02/03/2021 7 9     Albumin 02/03/2021 2 6*    Total Bilirubin 02/03/2021 1 98*    eGFR 02/03/2021 29     POC Glucose 02/03/2021 143*    POC Glucose 02/03/2021 129     POC Glucose 02/03/2021 307*       Imaging Studies: I have personally reviewed pertinent imaging studies

## 2021-02-03 NOTE — QUICK NOTE
Son works most of today and will not be able to pick patient up  Will not be able to arrange transport because someone needs to be home and he will not be until quite late  He will  patient tomorrow      Evangelina Jackson PA-C

## 2021-02-03 NOTE — ASSESSMENT & PLAN NOTE
Patient was awaiting TAVR, previously scheduled on 02/02 as outpatient, however presented to [facility] with AMS/Hypogylcemia which has since resolved  Provider at [facility] discussed with Cardiothoracic surgery, who recommended transfer for consideration for scheduled surgery  · Appreciate CT surgery input   · As of 2/2, they are not recommending TAVR  Consider outpatient f/u with CT surgery vs palliative care given multiple other medical issues  They have spoke with son    · Son at this point is not interested in palliative care  · Would like to optimize her and f/u outpatient to reconsider  · PT performed stairs with patient and have cleared for home

## 2021-02-03 NOTE — OCCUPATIONAL THERAPY NOTE
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, with long-term current use of insulin (HCC)    Chronic atrial fibrillation (HCC)    Acquired hypothyroidism    Stage 3 chronic kidney disease    Encephalopathy acute, metabolic    Hyperammonemia (HCC)    Chronic respiratory failure with hypoxia (HCC)    Cirrhosis ([facility] [address] )    Past Medical History  Past Medical History:   Diagnosis Date    Arthritis     Atrial flutter ([facility] [address] )     Cardiac disease     Carotid artery occlusion     CHF (congestive heart failure) ([facility] [address] )     Cholelithiasis     last assessed 8/8/2016    Coronary artery disease     Diabetes mellitus ([facility] [address] )     Disease of thyroid gland     Essential hypertension 8/15/2016    GERD (gastroesophageal reflux disease)     History of transfusion     Hyperlipidemia     Hypertension     Long-term insulin use in type 2 diabetes ([facility] [address] ) 6/4/2016    Other specified diabetes mellitus with diabetic autonomic (poly)neuropathy ([facility] [address] )     Oxygen dependent     3L    Pleural effusion 2008    Pulmonary embolism ([facility] [address] )     2008    Renal disorder     Retinopathy     bilat    Sleep apnea     USES 3 LITER O2 CONTINOUIS    Subclavian artery stenosis ([facility] [address] )     Umbilical hernia with obstruction, without gangrene     last assessed 7/8/2016     Past Surgical History  Past Surgical History:   Procedure Laterality Date    ABDOMINAL SURGERY      CARDIAC PACEMAKER PLACEMENT      CARDIAC SURGERY      cabg x 2    CATARACT EXTRACTION      CHOLECYSTECTOMY      COLONOSCOPY      CORONARY ARTERY BYPASS GRAFT  2008    EYE SURGERY      FRACTURE SURGERY Right 2010    shoulder    HERNIA REPAIR      umbilical    ID LAP,CHOLECYSTECTOMY N/A 8/10/2016    Procedure: CHOLECYSTECTOMY LAPAROSCOPIC;  Surgeon: Branden Cummins MD;  [facility];  Service: General    ID LARYNGOSCOPY,DIRECT,SCOPE,INJ CORDS Left 11/4/2016    Procedure: Brandon Garcia; LEFT VOCAL FOLD INJECTION ;  Surgeon: Cristian Jin MD;  [facility];  Service: ENT    ID REPAIR UMBILICAL OCIE,1+K/R,GTESP N/A 8/10/2016    Procedure: Garcia Spikes;  Surgeon: Branden Cummins MD;  [facility];  Service: General    [address] / STENTING Right     TRICUSPID VALVE REPLACEMENT      VASCULAR SURGERY      heart valve replacement         02/03/21 1200   OT Last Visit   OT Visit Date 02/03/21   Note Type   Note type Evaluation   Restrictions/Precautions   Other Precautions Cognitive; Chair Alarm; Bed Alarm;Telemetry;O2;Fall Risk  (+3L 02)   Pain Assessment   Pain Assessment Tool Pain Assessment not indicated - pt denies pain   Pain Score No Pain   Home Living   Type of Home House   Home Layout Multi-level; Able to live on main level with bedroom/bathroom  (+6 MICHELINE)   Bathroom Shower/Tub Tub/shower unit   [employer] Raised  (+commode)   Bathroom Equipment Grab bars in shower; Shower chair   Bathroom Accessibility Accessible   Home Equipment Walker;Grab bars; Wheelchair-manual   Prior Function   Level of [place] Needs assistance with IADLs; Needs assistance with ADLs and functional mobility   Lives With Son   Receives Help From Home health;Friend(s)   ADL Assistance Needs assistance   IADLs Needs assistance   Falls in the last 6 months 1 to 4  (pt reports 3 falls in last 6 months)   Vocational Retired   Lifestyle   Autonomy Assistance with ADL's (HHA 2x week for showering), assistance with IADL's  (pt makes breakfast and does own laundry, son/DIL assist with other IADL's including driving) +RW short distance functional mobility, w/c level within home at times (making breakfast/laundry tasks)   Reciprocal Relationships family-son/DIL-DIL home during the day to assist as needed (Pt reports is home alone "sometimes")   Service to Others retired   Intrinsic Gratification TV/sedantry   Psychosocial   Psychosocial (WDL) [address]  7  [address] [facility] 5  [address] [facility] 5  [address]  [address] Deficit Right lower leg including foot; Left lower leg including foot   UB Dressing Assistance 4  Minimal Assistance   LB Dressing Assistance 4  Minimal Assistance   LB Dressing Deficit Don/doff R sock; Don/doff L sock; Increased time to complete   Toileting Assistance  5  Supervision/Setup   Bed Mobility   Supine to Sit Unable to assess   Sit to Supine Unable to assess   Transfers   Sit to Stand 5  Supervision   Additional items Assist x 1   Stand to Sit 5  Supervision   Additional items Assist x 1   Stand pivot 5  Supervision   Additional items Assist x 1  (+RW)   Functional Mobility   Functional Mobility 5  Supervision   Additional Comments S with RW short distance functional mobility, +3L 02 SP02 WFL, fatigues easily  Additional items Rolling walker   Balance   Static Sitting Fair +   Dynamic Sitting Fair   Static Standing Fair   Dynamic Standing [address],4, & 5 -   Activity Tolerance   Activity Tolerance Patient limited by fatigue   Medical Staff Made Aware PT Brittani   Nurse Made Aware RN cleared pt for therapy   RUE Assessment   RUE Assessment WFL   LUE Assessment   LUE Assessment WFL   Hand Function   Gross Motor Coordination Functional   Fine Motor Coordination Functional   Vision-Basic Assessment   Current Vision Wears glasses all the time   Cognition   Arousal/Participation Alert; Responsive; Cooperative   Attention Attends with cues to redirect   Orientation Level Oriented X4  (with increaesed time to process)   Memory Decreased recall of precautions;Decreased recall of recent events   Following Commands Follows one step commands with increased time or repetition   Comments pt pleasant and motivated for therapy, delayed processing at times, occassional safety cue with mobility   Assessment   Limitation Decreased ADL status; Decreased endurance;Decreased self-care trans;Decreased high-level ADLs   Prognosis Fair   Assessment Pt is a 66 y o  female who was admitted to [facility] on 1/31/2021 with Aortic stenosis CAD, CHF, diabetes, CKD, chronic ARF with hypoxia, cirrhosis  Pt's problem list also includes PMH of leg cramps, history of colon polyps, hernia, HTN with CKD, arthritis, morbid obesity, umbilical hernia, mixed hyperlipidemia    At baseline pt was completing assistance from [facility] with showering 2x week, otherwise I, assistance with IADL's from family +RW/w/c with household mobility, w/c with community mobility  Pt lives with son and DIL in a multilevel home with a first floor set-up and 6STE  Currently pt requires min a  for overall ADLS and S with RW for functional mobility/transfers  Pt currently presents with impairments in the following categories -steps to enter environment and difficulty performing IADLS  activity tolerance, endurance, standing balance/tolerance and safety   These impairments, as well as pt's fatigue, decreased caregiver support and risk for falls  limit pt's ability to safely engage in all baseline areas of occupation, includingbathing, dressing, toileting, functional mobility/transfers, community mobility, laundry , house maintenance, medication management, meal prep, social participation  and leisure activities  The patient's raw score on the AM-PAC Daily Activity inpatient short form is 22, standardized score is 47 1, greater than 39 4  Patients at this level are likely to benefit from DC to home  Please refer to the recommendation of the Occupational Therapist for safe DC planning    From OT standpoint, recommend home with family support and home OT upon D/C  OT will continue to follow to address the below stated goals  Goals   Patient Goals go home   LTG Time Frame 10-14   Long Term Goal #1 see goals below   Plan   Treatment Interventions ADL retraining; Endurance training;Patient/family training;Equipment evaluation/education; Compensatory technique education; Energy conservation; Activityengagement   Goal Expiration Date 02/17/21   OT Treatment Day 1   OT Frequency 3-5x/wk   Additional Treatment Session   Start Time 1226   End Time 5699   Treatment Assessment Pt seen for an additional OT treatment session with focus on education on home safety/energy conservation techniques and compensatory techniques with LB dressing, activity tolerance, standing tolerance (approx~1 minute with rest breaks during mobility tasks)  Pt making progress  Pt has assistance with ADL's/IADL's at home through care givers, DIL/son and is functioning at a S level with mobility, min a LB dressing -education given to increase [place]    From OT standpoint recommend home OT  Deepthi Console No further acute OT needs indicated at this time - Recommend continued oob for meals, ambulation to/from BR, setup for self care tasks and mobility in hallway with nursing/restorative - d/c from caseload with above recommendations   Recommendation   OT Discharge Recommendation Home with skilled therapy   OT - OK to Discharge Yes   Additional Comments  home OT   AM-PAC Daily Activity Inpatient   Lower Body Dressing 3   Bathing 3   Toileting 4   Upper Body Dressing 4   Grooming 4   Eating 4   Daily Activity Raw Score 22   Daily Activity Standardized Score (Calc for Raw Score >=11) 47  1   AM-PAC Applied Cognition Inpatient   Following a Speech/Presentation 2   Understanding Ordinary Conversation 3   Taking Medications 3   Remembering Where Things Are Placed or Put Away 3   Remembering List of 4-5 Errands 2   Taking Care of Complicated Tasks 2   Applied Cognition Raw Score 15   Applied Cognition Standardized Score 33 54      Occupational Therapy Goals:    *Mod I with bed mobility to engage in functional tasks  *Mod I Adl's after setup with use of AE PRN  *Mod I toileting and clothing management   *Mod I functional mobility and transfers to/from all surfaces with Fair + dynamic balance and safety for participation in dynamic adls and iadl tasks   *Demonstrate good carryover with safe use of RW during functional tasks   *Assess DME needs   *Increase activity tolerance to 25-30 minutes for participation in adls and enjoyable activities   good carryover of pt/family education and training with good tolerance for increased safety and independence with ADL's/ADl's      Erick Loera MOT, OTR/L

## 2021-02-03 NOTE — PLAN OF CARE
Problem: Potential for Falls  Goal: Patient will remain free of falls  Description: INTERVENTIONS:  - Assess patient frequently for physical needs  -  Identify cognitive and physical deficits and behaviors that affect risk of falls    -  [place] fall precautions as indicated by assessment   - Educate patient/family on patient safety including physical limitations  - Instruct patient to call for assistance with activity based on assessment  - Modify environment to reduce risk of injury  - Consider OT/PT consult to assist with strengthening/mobility  Outcome: Progressing     Problem: RESPIRATORY - ADULT  Goal: Achieves optimal ventilation and oxygenation  Description: INTERVENTIONS:  - Assess for changes in respiratory status  - Assess for changes in mentation and behavior  - Position to facilitate oxygenation and minimize respiratory effort  - Oxygen administered by appropriate delivery if ordered  - Initiate smoking cessation education as indicated  - Encourage broncho-pulmonary hygiene including cough, deep breathe, Incentive Spirometry  - Assess the need for suctioning and aspirate as needed  - Assess and instruct to report SOB or any respiratory difficulty  - Respiratory Therapy support as indicated  Outcome: Progressing     Problem: SKIN/TISSUE INTEGRITY - ADULT  Goal: Incision(s), wounds(s) or drain site(s) healing without S/S of infection  Description: INTERVENTIONS  - Assess and document risk factors for skin impairment   - Assess and document dressing, incision, wound bed, drain sites and surrounding tissue  - Consider nutrition services referral as needed  - Oral mucous membranes remain intact  - Provide patient/ family education  Outcome: Progressing  Goal: Oral mucous membranes remain intact  Description: INTERVENTIONS  - Assess oral mucosa and hygiene practices  - Implement preventative oral hygiene regimen  - Implement oral medicated treatments as ordered  - Initiate Nutrition services referral as needed  Outcome: Progressing     Problem: Prexisting or High Potential for Compromised Skin Integrity  Goal: Skin integrity is maintained or improved  Description: INTERVENTIONS:  - Identify patients at risk for skin breakdown  - Assess and monitor skin integrity  - Assess and monitor nutrition and hydration status  - Monitor labs   - Assess for incontinence   - Turn and reposition patient  - Assist with mobility/ambulation  - Relieve pressure over bony prominences  - Avoid friction and shearing  - Provide appropriate hygiene as needed including keeping skin clean and dry  - Evaluate need for skin moisturizer/barrier cream  - Collaborate with interdisciplinary team   - Patient/family teaching  - Consider wound care consult   Outcome: Progressing     Problem: PAIN - ADULT  Goal: Verbalizes/displays adequate comfort level or baseline comfort level  Description: Interventions:  - Encourage patient to monitor pain and request assistance  - Assess pain using appropriate pain scale  - Administer analgesics based on type and severity of pain and evaluate response  - Implement non-pharmacological measures as appropriate and evaluate response  - Consider cultural and social influences on pain and pain management  - Notify physician/advanced practitioner if interventions unsuccessful or patient reports new pain  Outcome: Progressing     Problem: INFECTION - ADULT  Goal: Absence or prevention of progression during hospitalization  Description: INTERVENTIONS:  - Assess and monitor for signs and symptoms of infection  - Monitor lab/diagnostic results  - Monitor all insertion sites, i e  indwelling lines, tubes, and drains  - Monitor endotracheal if appropriate and nasal secretions for changes in amount and color  - [place] appropriate cooling/warming therapies per order  - Administer medications as ordered  - Instruct and encourage patient and family to use good hand hygiene technique  - Identify and instruct in appropriate isolation precautions for identified infection/condition  Outcome: Progressing     Problem: SAFETY ADULT  Goal: Patient will remain free of falls  Description: INTERVENTIONS:  - Assess patient frequently for physical needs  -  Identify cognitive and physical deficits and behaviors that affect risk of falls    -  [place] fall precautions as indicated by assessment   - Educate patient/family on patient safety including physical limitations  - Instruct patient to call for assistance with activity based on assessment  - Modify environment to reduce risk of injury  - Consider OT/PT consult to assist with strengthening/mobility  Outcome: Progressing  Goal: Maintain or return to baseline ADL function  Description: INTERVENTIONS:  -  Assess patient's ability to carry out ADLs; assess patient's baseline for ADL function and identify physical deficits which impact ability to perform ADLs (bathing, care of mouth/teeth, toileting, grooming, dressing, etc )  - Assess/evaluate cause of self-care deficits   - Assess range of motion  - Assess patient's mobility; develop plan if impaired  - Assess patient's need for assistive devices and provide as appropriate  - Encourage maximum independence but intervene and supervise when necessary  - Involve family in performance of ADLs  - Assess for home care needs following discharge   - Consider OT consult to assist with ADL evaluation and planning for discharge  - Provide patient education as appropriate  Outcome: Progressing  Goal: Maintain or return mobility status to optimal level  Description: INTERVENTIONS:  - Assess patient's baseline mobility status (ambulation, transfers, stairs, etc )    - Identify cognitive and physical deficits and behaviors that affect mobility  - Identify mobility aids required to assist with transfers and/or ambulation (gait belt, sit-to-stand, lift, walker, cane, etc )  - [place] fall precautions as indicated by assessment  - Record patient progress and toleration of activity level on Mobility SBAR; progress patient to next Phase/Stage  - Instruct patient to call for assistance with activity based on assessment  - Consider rehabilitation consult to assist with strengthening/weightbearing, etc   Outcome: Progressing     Problem: DISCHARGE PLANNING  Goal: Discharge to home or other facility with appropriate resources  Description: INTERVENTIONS:  - Identify barriers to discharge w/patient and caregiver  - Arrange for needed discharge resources and transportation as appropriate  - Identify discharge learning needs (meds, wound care, etc )  - Arrange for interpretive services to assist at discharge as needed  - Refer to Case Management Department for coordinating discharge planning if the patient needs post-hospital services based on physician/advanced practitioner order or complex needs related to functional status, cognitive ability, or social support system  Outcome: Progressing     Problem: Knowledge Deficit  Goal: Patient/family/caregiver demonstrates understanding of disease process, treatment plan, medications, and discharge instructions  Description: Complete learning assessment and assess knowledge base    Interventions:  - Provide teaching at level of understanding  - Provide teaching via preferred learning methods  Outcome: Progressing     Problem: CARDIOVASCULAR - ADULT  Goal: Maintains optimal cardiac output and hemodynamic stability  Description: INTERVENTIONS:  - Monitor I/O, vital signs and rhythm  - Monitor for S/S and trends of decreased cardiac output  - Administer and titrate ordered vasoactive medications to optimize hemodynamic stability  - Assess quality of pulses, skin color and temperature  - Assess for signs of decreased coronary artery perfusion  - Instruct patient to report change in severity of symptoms  Outcome: Progressing

## 2021-02-03 NOTE — PHYSICAL THERAPY NOTE
PT TREATMENT    PATIENT'S RAW SCORE ON THE AM-PAC BASIC MOBILITY INPATIENT SHORT FORM IS 20, STANDARDIZED SCORE IS GREATER THAN 42 9  PATIENTS AT THIS LEVEL ARE LIKELY TO BENEFIT FROM DC TO HOME        02/03/21 1253   PT Last Visit   PT Visit Date 02/03/21   Note Type   Note Type Treatment   Pain Assessment   Pain Assessment Tool 0-10   Pain Score No Pain   Restrictions/Precautions   Other Precautions Fall Risk;O2;Bed Alarm; Chair Alarm  (CHAIR ALARM ACTIVE POST TREAT; 3 L O2)   General   Chart Reviewed Yes   Response to Previous Treatment Patient with no complaints from previous session  Family/Caregiver Present No   Cognition   Arousal/Participation Alert; Responsive; Cooperative   Attention Attends with cues to redirect   Orientation Level Oriented X4   Subjective   Subjective "CAN I GO HOME?"   Transfers   Sit to Stand 5  Supervision   Stand to Sit 5  Supervision   Ambulation/Elevation   Gait pattern Excessively slow; Foward flexed;Decreased foot clearance   Gait Assistance 5  Supervision   Additional items Assist x 1  (TO MANAGE O2 TANK )   Assistive Device Rolling walker   Distance 10 FEET (SEATED REST) + 10 FEET    Stair Management Assistance 4  Minimal assist   Additional items Assist x 1   Stair Management Technique One rail R;Foreward;Nonreciprocal   Number of Stairs 2  (1 + 1 )   Balance   Static Sitting Fair +   Static Standing Fair   Ambulatory Fair  (W/ RW)   Endurance Deficit   Endurance Deficit Yes   Endurance Deficit Description FATIGUE AND WEAKNESS   Activity Tolerance   Activity Tolerance Patient limited by fatigue   Medical Staff Made Aware OT JULISSA    Nurse Made Aware STEW TO SEE PER CESAR ALANIZ    Assessment   Prognosis Good   Problem List Decreased strength;Decreased endurance; Impaired balance;Decreased mobility;Obesity   Assessment PT INITIATED TREATMENT SESSION IN ORDER TO ASSIST PATIENT IN ACHIEVING GOALS TO IMPROVE ACTIVITY TOLERANCE AND AMBULATION AND TO TRIAL STAIRS AS APPROPRIATE   PATIENT MOBILIZES GENERALLY AT A VERY REDUCED SPEED AND REQUIRES RE-DIRECTION TO TASK (VERY COMMUNICATIVE)  SHE WAS ABLE TO PERFORM SIT<-->STAND TRANSFERS AND AMBULATION AT A SUPERVISION LEVEL W/ USE OF RW  SHE AMBULATED 10 FEET (SEATED REST BREAK) + 10 FEET PRESENTING WITH REDUCED LE CLEARANCE AND GAIT SPEED  REQUIRES INCREASED REST BREAKS DURING MOBILITY TASKS SECONDARY TO FATIGUE  DID NOT AMBULATE FURTHER TO CONSERVE ENERGY FOR STAIR NAVIGATION  WC AND O2 TANK RETRIEVED TO TRANSPORT PATIENT TO STAIRS  PT EDUCATED/DEMONSTRATED NONRECIPROCAL PERFORMANCE WITH B/L UE ON R SIDED HAND RAIL  PATIENT ABLE TO NEGOTIATE 1 STEP UP/DOWN WITH MIN-AX1 FROM THERAPIST X2 PERFORMANCES  PATIENT MILDLY ANXIOUS ON STAIRWELL OF 10 STEPS AND DECISION WAS MADE NOT TO HAVE HER ASCEND 6 AND HAVE TO TURN ON STEPS  SHE SAFELY AND EFFICIENTLY WAS ABLE TO CLEAR B/L LE ON ONE STEP AND I ANTICIPATE SHE WILL DO SO SAFELY AT D/C  AT BASELINE SHE REPORTS HER SON "STANDS BEHIND ME AND PUSHES MY BUTT UP THE STEPS"  WILL HAVE ASSISTANCE FROM SON UPON D/C  PT D/C RECOMMENDATION REMAINS FOR RETURN HOME WITH HHPT  PATIENT WILL BENEFIT FROM CONTINUED SKILLED PT THIS ADMISSION TO ACHIEVE MAXIMAL FUNCTION AND SAFETY  Goals   Patient Goals TO GO HOME TODAY    PT Treatment Day 1   Plan   Treatment/Interventions Functional transfer training;LE strengthening/ROM; Therapeutic exercise; Endurance training;Patient/family training;Equipment eval/education; Bed mobility;Gait training;OT;Spoke to nursing;Elevations   Progress Progressing toward goals   PT Frequency Other (Comment)  (3-5X/WK)   Recommendation   PT Discharge Recommendation Home with skilled therapy; Return to previous environment with social support  (HHPT)   Equipment Recommended Walker  (HAS RW)   PT - OK to Discharge Yes  (HOME W/ FAMILY ASSIST PRN + HHPT )   AM-PAC Basic Mobility Inpatient   Turning in Bed Without Bedrails 3   Lying on Back to Sitting on Edge of Flat Bed 3   Moving Bed to Chair 3 Standing Up From Chair 4   Walk in Room 4   Climb 3-5 Stairs 3   Basic Mobility Inpatient Raw Score 20   Basic Mobility Standardized Score 43 99     BILL PURCELL PT, DPT

## 2021-02-03 NOTE — ASSESSMENT & PLAN NOTE
Lab Results   Component Value Date    HGBA1C 7 1 (H) 02/01/2021       Recent Labs     02/02/21  1625 02/02/21  2103 02/03/21  0557 02/03/21  0629   POCGLU 218* 142* 129 143*       Blood Sugar Average: Last 72 hrs:  (P) 207 2   · Presented with episode of hypoglycemia, similar episodes in the past  · Now resolved  · Endocrinology consulted, see note for insulin recs

## 2021-02-04 VITALS
TEMPERATURE: 98 F | WEIGHT: 196.34 LBS | OXYGEN SATURATION: 99 % | DIASTOLIC BLOOD PRESSURE: 74 MMHG | HEART RATE: 64 BPM | SYSTOLIC BLOOD PRESSURE: 129 MMHG | RESPIRATION RATE: 18 BRPM | BODY MASS INDEX: 34.78 KG/M2

## 2021-02-04 LAB
ABO GROUP BLD BPU: NORMAL
ALBUMIN SERPL BCP-MCNC: 2.6 G/DL (ref 3.5–5)
ALP SERPL-CCNC: 200 U/L (ref 46–116)
ALT SERPL W P-5'-P-CCNC: 18 U/L (ref 12–78)
ANION GAP SERPL CALCULATED.3IONS-SCNC: 4 MMOL/L (ref 4–13)
AST SERPL W P-5'-P-CCNC: 25 U/L (ref 5–45)
BASOPHILS # BLD AUTO: 0.02 THOUSANDS/ΜL (ref 0–0.1)
BASOPHILS NFR BLD AUTO: 0 % (ref 0–1)
BILIRUB SERPL-MCNC: 1.57 MG/DL (ref 0.2–1)
BPU ID: NORMAL
BUN SERPL-MCNC: 92 MG/DL (ref 5–25)
CALCIUM ALBUM COR SERPL-MCNC: 10.2 MG/DL (ref 8.3–10.1)
CALCIUM SERPL-MCNC: 9.1 MG/DL (ref 8.3–10.1)
CHLORIDE SERPL-SCNC: 101 MMOL/L (ref 100–108)
CO2 SERPL-SCNC: 35 MMOL/L (ref 21–32)
CREAT SERPL-MCNC: 1.6 MG/DL (ref 0.6–1.3)
CROSSMATCH: NORMAL
EOSINOPHIL # BLD AUTO: 0.22 THOUSAND/ΜL (ref 0–0.61)
EOSINOPHIL NFR BLD AUTO: 5 % (ref 0–6)
ERYTHROCYTE [DISTWIDTH] IN BLOOD BY AUTOMATED COUNT: 19.7 % (ref 11.6–15.1)
GFR SERPL CREATININE-BSD FRML MDRD: 32 ML/MIN/1.73SQ M
GLUCOSE SERPL-MCNC: 114 MG/DL (ref 65–140)
GLUCOSE SERPL-MCNC: 120 MG/DL (ref 65–140)
GLUCOSE SERPL-MCNC: 147 MG/DL (ref 65–140)
GLUCOSE SERPL-MCNC: 226 MG/DL (ref 65–140)
HCT VFR BLD AUTO: 35 % (ref 34.8–46.1)
HGB BLD-MCNC: 10.3 G/DL (ref 11.5–15.4)
IMM GRANULOCYTES # BLD AUTO: 0.01 THOUSAND/UL (ref 0–0.2)
IMM GRANULOCYTES NFR BLD AUTO: 0 % (ref 0–2)
LYMPHOCYTES # BLD AUTO: 0.73 THOUSANDS/ΜL (ref 0.6–4.47)
LYMPHOCYTES NFR BLD AUTO: 15 % (ref 14–44)
MCH RBC QN AUTO: 27.8 PG (ref 26.8–34.3)
MCHC RBC AUTO-ENTMCNC: 29.4 G/DL (ref 31.4–37.4)
MCV RBC AUTO: 94 FL (ref 82–98)
MONOCYTES # BLD AUTO: 0.78 THOUSAND/ΜL (ref 0.17–1.22)
MONOCYTES NFR BLD AUTO: 16 % (ref 4–12)
NEUTROPHILS # BLD AUTO: 3.01 THOUSANDS/ΜL (ref 1.85–7.62)
NEUTS SEG NFR BLD AUTO: 64 % (ref 43–75)
NRBC BLD AUTO-RTO: 0 /100 WBCS
PLATELET # BLD AUTO: 118 THOUSANDS/UL (ref 149–390)
PMV BLD AUTO: 10.7 FL (ref 8.9–12.7)
POTASSIUM SERPL-SCNC: 3.8 MMOL/L (ref 3.5–5.3)
PROT SERPL-MCNC: 7.6 G/DL (ref 6.4–8.2)
RBC # BLD AUTO: 3.71 MILLION/UL (ref 3.81–5.12)
SODIUM SERPL-SCNC: 140 MMOL/L (ref 136–145)
UNIT DISPENSE STATUS: NORMAL
UNIT PRODUCT CODE: NORMAL
UNIT RH: NORMAL
WBC # BLD AUTO: 4.77 THOUSAND/UL (ref 4.31–10.16)

## 2021-02-04 PROCEDURE — 80053 COMPREHEN METABOLIC PANEL: CPT | Performed by: INTERNAL MEDICINE

## 2021-02-04 PROCEDURE — 99232 SBSQ HOSP IP/OBS MODERATE 35: CPT | Performed by: INTERNAL MEDICINE

## 2021-02-04 PROCEDURE — 85025 COMPLETE CBC W/AUTO DIFF WBC: CPT | Performed by: INTERNAL MEDICINE

## 2021-02-04 PROCEDURE — 99239 HOSP IP/OBS DSCHRG MGMT >30: CPT | Performed by: INTERNAL MEDICINE

## 2021-02-04 PROCEDURE — 82948 REAGENT STRIP/BLOOD GLUCOSE: CPT

## 2021-02-04 RX ORDER — INSULIN ASPART 100 [IU]/ML
8 INJECTION, SOLUTION INTRAVENOUS; SUBCUTANEOUS
Refills: 0
Start: 2021-02-04 | End: 2021-03-01 | Stop reason: HOSPADM

## 2021-02-04 RX ORDER — SPIRONOLACTONE 25 MG/1
25 TABLET ORAL DAILY
Qty: 30 TABLET | Refills: 0 | Status: SHIPPED | OUTPATIENT
Start: 2021-02-05 | End: 2021-03-01 | Stop reason: HOSPADM

## 2021-02-04 RX ADMIN — INSULIN LISPRO 2 UNITS: 100 INJECTION, SOLUTION INTRAVENOUS; SUBCUTANEOUS at 12:13

## 2021-02-04 RX ADMIN — METOPROLOL SUCCINATE 50 MG: 50 TABLET, EXTENDED RELEASE ORAL at 08:36

## 2021-02-04 RX ADMIN — MUPIROCIN 1 APPLICATION: 20 OINTMENT TOPICAL at 08:42

## 2021-02-04 RX ADMIN — TIMOLOL MALEATE 1 DROP: 5 SOLUTION/ DROPS OPHTHALMIC at 08:42

## 2021-02-04 RX ADMIN — CHLORHEXIDINE GLUCONATE 0.12% ORAL RINSE 15 ML: 1.2 LIQUID ORAL at 08:36

## 2021-02-04 RX ADMIN — ACETAMINOPHEN 650 MG: 325 TABLET ORAL at 01:13

## 2021-02-04 RX ADMIN — LEVOTHYROXINE SODIUM 137 MCG: 25 TABLET ORAL at 05:38

## 2021-02-04 RX ADMIN — SPIRONOLACTONE 25 MG: 25 TABLET, FILM COATED ORAL at 08:36

## 2021-02-04 RX ADMIN — TIMOLOL MALEATE 1 DROP: 5 SOLUTION/ DROPS OPHTHALMIC at 17:14

## 2021-02-04 RX ADMIN — TORSEMIDE 20 MG: 20 TABLET ORAL at 17:12

## 2021-02-04 RX ADMIN — PANTOPRAZOLE SODIUM 40 MG: 40 TABLET, DELAYED RELEASE ORAL at 05:41

## 2021-02-04 RX ADMIN — ACETAMINOPHEN 650 MG: 325 TABLET ORAL at 08:35

## 2021-02-04 RX ADMIN — ATORVASTATIN CALCIUM 20 MG: 20 TABLET, FILM COATED ORAL at 17:12

## 2021-02-04 RX ADMIN — TORSEMIDE 20 MG: 20 TABLET ORAL at 08:36

## 2021-02-04 RX ADMIN — LACTULOSE 10 G: 10 SOLUTION ORAL at 17:12

## 2021-02-04 RX ADMIN — LACTULOSE 10 G: 10 SOLUTION ORAL at 08:36

## 2021-02-04 RX ADMIN — DABIGATRAN ETEXILATE MESYLATE 75 MG: 75 CAPSULE ORAL at 08:36

## 2021-02-04 RX ADMIN — ALPRAZOLAM 0.25 MG: 0.25 TABLET ORAL at 01:13

## 2021-02-04 RX ADMIN — BRIMONIDINE TARTRATE 1 DROP: 1.5 SOLUTION OPHTHALMIC at 08:42

## 2021-02-04 RX ADMIN — BRIMONIDINE TARTRATE 1 DROP: 1.5 SOLUTION OPHTHALMIC at 17:14

## 2021-02-04 NOTE — ASSESSMENT & PLAN NOTE
Lab Results   Component Value Date    HGBA1C 7 1 (H) 02/01/2021       Recent Labs     02/03/21  1553 02/03/21 2037 02/04/21  0535 02/04/21  1015   POCGLU 235* 255* 120 226*       Blood Sugar Average: Last 72 hrs:  (P) 943 [id number]   · Presented with episode of hypoglycemia, similar episodes in the past  · Now resolved  · Endocrinology consulted, see note for insulin recs

## 2021-02-04 NOTE — ASSESSMENT & PLAN NOTE
Ammonia level was noted to be elevated at 77 at [facility]   Patient does not appear to have history of cirrhosis or evidence of cirrhosis on prior imaging however with abnormal LFTs and INR, working up further as below  · Patient does report that she takes lactulose on the regular basis due to history of constipation but was not taking over last 1 week as she ran out her prescription  · Resumed lactulose  · GI input appreciated  · Likely has some cirrhosis, probably CHUNG and will require outpatient f/u

## 2021-02-04 NOTE — DISCHARGE SUMMARY
Discharge- Denise Bueno 1950, 66 y o  female MRN: [id number]    Unit/Bed#: 2 210-01 Encounter: [id number]    Primary Care Provider: Karishma Galvan MD   Date and time admitted to hospital: 1/31/2021  4:40 PM    * Aortic stenosis  Assessment & Plan  Patient was awaiting TAVR, previously scheduled on 02/02 as outpatient, however presented to [facility] with AMS/Hypogylcemia which has since resolved  Provider at [facility] discussed with Cardiothoracic surgery, who recommended transfer for consideration for scheduled surgery  · Appreciate CT surgery input   · As of 2/2, they are not recommending TAVR  Consider outpatient f/u with CT surgery vs palliative care given multiple other medical issues  They have spoke with son  · Son at this point is not interested in palliative care  · Would like to optimize her and f/u outpatient to reconsider  · PT performed stairs with patient and have cleared for home    Cirrhosis [facility])  Assessment & Plan  Possible given abnormal lab tests  · RUQ U/S showing mildly enlarged liver, diffuse coarsened heterogeneous echotexture suggesting underlying cirrhotic changes  · GI consulted, appreciate input  · Hep A reactive, likely hx of prior infection given elevated IgG  · Likely CHUNG, will f/u outpatient     Hyperammonemia ([facility] [address] )  Assessment & Plan  Ammonia level was noted to be elevated at 77 at [facility]   Patient does not appear to have history of cirrhosis or evidence of cirrhosis on prior imaging however with abnormal LFTs and INR, working up further as below  · Patient does report that she takes lactulose on the regular basis due to history of constipation but was not taking over last 1 week as she ran out her prescription  · Resumed lactulose  · GI input appreciated  · Likely has some cirrhosis, probably CHUNG and will require outpatient f/u     Encephalopathy acute, metabolic  Assessment & Plan  Presented to [facility] with AMS, hypoglycemia, hypothermia, azotemia and elevated ammonia level  Mental status improved after correction of hypoglycemia  · CT head negative, exam nonfocal  · Monitor mental status    Stage 3 chronic kidney disease  Assessment & Plan  Lab Results   Component Value Date    EGFR 32 02/04/2021    EGFR 29 02/03/2021    EGFR 38 02/02/2021    CREATININE 1 60 (H) 02/04/2021    CREATININE 1 71 (H) 02/03/2021    CREATININE 1 40 (H) 02/02/2021     Baseline creatinine appears to be 1 5-2 0 as per prior records  · Creatinine on arrival to [facility] (1/31) at higher end of normal  · Diuretics were held at [facility] and was given fluids  · Has been back on diuretics  · Creat stable  · Renal following     Chronic combined systolic and diastolic congestive heart failure (HCC)  Assessment & Plan  Wt Readings from Last 3 Encounters:   02/03/21 89 1 kg (196 lb 6 2 oz)   01/31/21 89 1 kg (196 lb 6 9 oz)   01/15/21 93 kg (205 lb)     Prior echo-8/20 -EF 40-45%, severe aortic stenosis, PA pressure 80 mmHg   Got some fluids on arrival to [facility], now off and back on diuretics  · Monitor daily weight, intake output  · Patient was awaiting TAVR, scheduled on 02/02 as outpatient previously, CT surgery saw patient and currently have no plans for TAVR at this time  · Can f/u outpatient if medically improved         Chronic respiratory failure with hypoxia [facility])  Assessment & Plan  Reports she wears 3L ATC at home  · Continue     Acquired hypothyroidism  Assessment & Plan  · Continue levothyroxine    Chronic atrial fibrillation ([address] )  Assessment & Plan  Rate controlled, history of sick sinus status post pacemaker  · Continue metoprolol  · We had been holding Pradaxa while awaiting surgery but was resumed on 2/2 as there are no plans for TAVR    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, with long-term current use of insulin [facility])  Assessment & Plan  Lab Results   Component Value Date    HGBA1C 7 1 (H) 02/01/2021       Recent Labs     02/03/21  1553 02/03/21 2037 02/04/21  0535 02/04/21  1015   POCGLU 235* 255* 120 226*       Blood Sugar Average: Last 72 hrs:  (P) 292 [id number]   · Presented with episode of hypoglycemia, similar episodes in the past  · Now resolved  · Endocrinology consulted, see note for insulin recs     Coronary artery disease involving coronary bypass graft of native heart without angina pectoris  Assessment & Plan  · Continue statin and metoprolol      Discharging Physician / Practitioner: Bishop Siemens, PA-C  PCP: Simran Rosario MD  Admission Date:   Admission Orders (From admission, onward)     Ordered        01/31/21 1654  Inpatient Admission  Once                   Discharge Date: 02/04/21    Resolved Problems  Date Reviewed: 2/4/2021    None          Consultations During Hospital Stay:  · Cardiac Surgery  · Renal  · GI    Procedures Performed:   · US Elastography: Metavir score of F4,Ccirrhosis  · US RUQ: Cirrhosis   No definite focal liver mass   No significant ascites  Status post cholecystectomy  · Panorex: Focal lucency crown of left mandibular canine could represent dental shay  Otherwise, dentition is largely absent as above  · CT head without contrast: No acute intracranial abnormality  · CXR: vascular congestion    Significant Findings / Test Results:   · See above, also multiple lab studies done    Incidental Findings:   · See above     Test Results Pending at Discharge (will require follow up): · Liver w/u tests     Outpatient Tests Requested:  · F/u GI  · F/u PCP  · F/u cardiologist    Complications:  none    Reason for Admission:  Tx from [facility] for expedited [facility] Course:     Ignacio Nichols is a 66 y o  female patient with a past medical history of severe aortic stenosis, type 2 diabetes on insulin, obesity, chronic respiratory failure on 3 L of oxygen around the clock, hypothyroidism, CAD, stage III CKD with baseline creatinine in around 1 5-2 0, chronic combined systolic and diastolic heart failure, who originally presented to the hospital on 1/31/2021 as a transfer from [address] [place] [address] for expedited consideration of TAVR  Patient had been previously scheduled for a TAVR on 2/2 already as an outpatient after completing extensive workup  She presented to [address] [place] [address] on January 30th with altered mental status felt secondary to hypoglycemia and elevated ammonia level  Her mental status returned to baseline and conversation was had with cardiothoracic surgery here at [facility] who recommended transfer for consideration of her surgery  She was seen by them here and they ultimately recommended holding off on surgery for now given other medical complexities  They also recommended a palliative care consultation, however at this time her son was not interested in this and wanted to medically manage the patient and consider surgery if she improves  While here, GI was consulted for her elevated ammonia level and felt that she probably has some underlying cirrhosis, likely CHUNG  She will require outpatient evaluation for this  Endocrinology also saw her for insulin recommendations  Ultimately patient was cleared for home with home services after PT/OT evaluations  Please see above list of diagnoses and related plan for additional information  Condition at Discharge: stable     Discharge Day Visit / Exam:     Subjective:  Doing okay  Wants to go home  No other complaints  Vitals: Blood Pressure: 124/66 (02/04/21 0656)  Pulse: 63 (02/04/21 0656)  Temperature: 97 7 °F (36 5 °C) (02/04/21 0656)  Temp Source: Oral (02/04/21 0044)  Respirations: 18 (02/04/21 0656)  Weight - Scale: 89 1 kg (196 lb 5 5 oz) (02/04/21 0537)  SpO2: 95 % (02/04/21 0835)  Exam:   Physical Exam  Vitals signs and nursing note reviewed  Constitutional:       Appearance: She is obese  Comments: On 3L (baseline)    Cardiovascular:      Rate and Rhythm: Normal rate  Rhythm irregular        Heart sounds: Murmur present  Pulmonary:      Effort: No respiratory distress  Abdominal:      Hernia: A hernia is present  Musculoskeletal:      Right lower leg: No edema  Left lower leg: No edema  Neurological:      Mental Status: She is oriented to person, place, and time  Discussion with Family: Patient  Left VM for son  Discharge instructions/Information to patient and family:   See after visit summary for information provided to patient and family  Provisions for Follow-Up Care:  See after visit summary for information related to follow-up care and any pertinent home health orders  Disposition:     Home with VNA Services (Reminder: Complete face to face encounter)    For Discharges to [facility] SNF:   · Not Applicable to this Patient - Not Applicable to this Patient    Planned Readmission: no     Discharge Statement:  I spent 34 minutes discharging the patient  This time was spent on the day of discharge  I had direct contact with the patient on the day of discharge  Greater than 50% of the total time was spent examining patient, answering all patient questions, arranging and discussing plan of care with patient as well as directly providing post-discharge instructions  Additional time then spent on discharge activities  Discharge Medications:  See after visit summary for reconciled discharge medications provided to patient and family        ** Please Note: This note has been constructed using a voice recognition system **

## 2021-02-04 NOTE — CASE MANAGEMENT
Pt recommended for home with VNA  [facility] able to resume services at discharge to provide SN/PT/OT  Pt's son to transport at discharge  CM spoke with pt's son who confirmed that he is able to provide transportation around 5:00 PM  RN and MEENAKSHI Green aware of same

## 2021-02-04 NOTE — ASSESSMENT & PLAN NOTE
Lab Results   Component Value Date    EGFR 32 02/04/2021    EGFR 29 02/03/2021    EGFR 38 02/02/2021    CREATININE 1 60 (H) 02/04/2021    CREATININE 1 71 (H) 02/03/2021    CREATININE 1 40 (H) 02/02/2021     Baseline creatinine appears to be 1 5-2 0 as per prior records  · Creatinine on arrival to \[facility]\"" (1/31) at higher end of normal  · Diuretics were held at [facility] and was given fluids  · Has been back on diuretics  · Creat stable  · Renal following

## 2021-02-04 NOTE — PROGRESS NOTES
NEPHROLOGY PROGRESS NOTE   Marek Chaudhry 66 y o  female MRN: [id number]  Unit/Bed#: CW2 210-01 Encounter: [id number]      ASSESSMENT & PLAN    1  Stage 3 chronic kidney disease likely secondary to cardiorenal syndrome/cirrhosis  ? Baseline creatinine 1 5-2 0-creatinine stable at 1 6  ? Urinalysis from January 30th shows trace blood, otherwise bland  ? CT a from October 2020 shows unremarkable kidneys with no hydronephrosis an unremarkable adrenal glands  ? Restarted on diuretics  ? Right upper quadrant ultrasound reveals no ascites no focal liver mass  ? Newly diagnosed cirrhosis  ? TAVR workup when medical conditions stabilized     2  Electrolytes/Acid Base  · Hypokalemia -continue spironolactone 25 mg daily     3  Blood Pressure  ? Acceptable blood pressures with diuresis with a map that stable  ? History of aortic stenosis requiring TAVR-remains on metoprolol XL 50 mg daily  Volume overload-this has improved and now with some cramping at night  Will monitor keep on current diuretics and repeat bmp as outpatient likely will increase fluid and Na intake as outpatient    4  Anemia of CKD  ? Continue to trend hemoglobin currently stable  ? Check iron studies as outpatient as she is pending discharge     5  CKD-BMD  ? Monitor parameters as an outpatient     6  Clinical Course/CV Risk Reduction/Health maintenance/communication  ? TAVR workup  ? GI follow up  ?  Will set up outpatient follow up with Dr Jacqueline Kohli:    Patient was seen no cp, sob, fevers, chills  Resting comfortably  Some cramping at night    OBJECTIVE:  Current Weight: Weight - Scale: 89 1 kg (196 lb 5 5 oz)  @  Vitals:    02/04/21 0044 02/04/21 0537 02/04/21 0656 02/04/21 0835   BP: 107/58  124/66    BP Location: Right arm      Pulse: 60  63    Resp: 20  18    Temp: 97 7 °F (36 5 °C)  97 7 °F (36 5 °C)    TempSrc: Oral      SpO2: 99%  98% 95%   Weight:  89 1 kg (196 lb 5 5 oz)         Intake/Output Summary (Last 24 hours) at 2/4/2021 9038  Last data filed at 2/4/2021 0537  Gross per 24 hour   Intake 236 ml   Output 595 ml   Net -359 ml     Weight (last 2 days)     Date/Time   Weight    02/04/21 0537   89 1 (196 34)    02/03/21 0600   89 1 (196 39)    02/02/21 0600   89 1 (196 4)    02/02/21 0500   89 1 (196 4)              General: conscious, cooperative, in no acute distress  Eyes: conjunctivae pink, anicteric sclerae  ENT: lips and mucous membranes moist  Neck: supple, no JVD  Chest: no respiratory distress, no accessory muscle use, normal respiratory effort  CVS: normal heart rate, no friction rub, GRACIE  Abdomen: 1+ edema  Extremities: no edema of both legs  Skin: no rash  Neuro: awake, alert, oriented      Medications:    Current Facility-Administered Medications:     acetaminophen (TYLENOL) tablet 650 mg, 650 mg, Oral, Q6H PRN, Zettie Catena, DO, 650 mg at 02/04/21 5761    ALPRAZolam Merleen Severs) tablet 0 25 mg, 0 25 mg, Oral, BID PRN, Cupid-Labslaya Rowland PA-C, 0 25 mg at 02/04/21 0113    atorvastatin (LIPITOR) tablet 20 mg, 20 mg, Oral, Daily With "[employer]", DO, 20 mg at 02/03/21 1716    bisacodyl (DULCOLAX) rectal suppository 10 mg, 10 mg, Rectal, Daily PRN, Zettie Catena, DO    timolol (TIMOPTIC) 0 5 % ophthalmic solution 1 drop, 1 drop, Both Eyes, BID, 1 drop at 02/04/21 0842 **AND** brimonidine (ALPHAGAN P) 0 15 % ophthalmic solution 1 drop, 1 drop, Both Eyes, BID, Zettie Catena, DO, 1 drop at 02/04/21 0842    chlorhexidine (PERIDEX) 0 12 % oral rinse 15 mL, 15 mL, Swish [employer] [place], Q12H [facility], Daniel Mccarthy PA-C, 15 mL at 02/04/21 3994    dabigatran etexilate (PRADAXA) capsule 75 mg, 75 mg, Oral, Q12H [facility], Gabriela Green PA-C, 75 mg at 02/03/21 2103    gabapentin (NEURONTIN) capsule 300 mg, 300 mg, Oral, HS, Arthur Houston DO, 300 mg at 02/03/21 2103    insulin glargine (LANTUS) subcutaneous injection 15 Units 0 15 mL, 15 Units, Subcutaneous, HS, Vickie Adair MD, 15 Units at 02/03/21 2104    insulin lispro (HumaLOG) 100 units/mL subcutaneous injection 1-5 Units, 1-5 Units, Subcutaneous, HS, Lyn Griffin MD, 2 Units at 02/03/21 2104    insulin lispro (HumaLOG) 100 units/mL subcutaneous injection 1-6 Units, 1-6 Units, Subcutaneous, TID AC, 3 Units at 02/03/21 1718 **AND** Fingerstick Glucose (POCT), , , TID AC, Lyn Griffin MD    insulin lispro (HumaLOG) 100 units/mL subcutaneous injection 8 Units, 8 Units, Subcutaneous, TID With Meals, Lyn Griffin MD, 8 Units at 02/04/21 0835    lactulose oral solution 10 g, 10 g, Oral, BID, Adrianna Suazo DO, 10 g at 02/04/21 0721    levothyroxine tablet 137 mcg, 137 mcg, Oral, Daily, Adrianna Suazo DO, 137 mcg at 02/04/21 0538    metoprolol succinate (TOPROL-XL) 24 hr tablet 50 mg, 50 mg, Oral, Daily, Adrianna Suazo DO, 50 mg at 02/04/21 0836    metoprolol tartrate (LOPRESSOR) partial tablet 12 5 mg, 12 5 mg, Oral, Once, Joshua Gilliland PA-C, Stopped at 02/02/21 0702    mupirocin (BACTROBAN) 2 % nasal ointment 1 application, 1 application, Nasal, Once, Joshua Gilliland PA-C    mupirocin (BACTROBAN) 2 % nasal ointment, , Nasal, Q12H [facility], Manny Mccarthy PA-C, 1 application at 86/03/73 6674    pantoprazole (PROTONIX) EC tablet 40 mg, 40 mg, Oral, Daily Before Breakfast, Adrianna Suazo DO, 40 mg at 02/04/21 0541    spironolactone (ALDACTONE) tablet 25 mg, 25 mg, Oral, Daily, Jose Antonio Jones, , 25 mg at 02/04/21 0836    torsemide (DEMADEX) tablet 20 mg, 20 mg, Oral, BID, Adrianna Suazo DO, 20 mg at 02/04/21 0836    Invasive Devices:      Lab Results:   Results from last 7 days   Lab Units 02/04/21  0608 02/03/21  0603 02/02/21  0510 02/01/21  0844 01/31/21  0626  01/30/21  1723 01/30/21  1410   WBC Thousand/uL 4 77 4 93 6 03 5 62 4 66   < >  --  5 93   HEMOGLOBIN g/dL 10 3* 10 8* 11 5 11 8 11 2*   < >  --  11 3*   HEMATOCRIT % 35 0 37 3 39 3 40 1 39 0   < >  --  39 0   PLATELETS Thousands/uL 118* 122* 142* 146* 151   < >  --  251   POTASSIUM mmol/L 3 8 3 4* 3 4* 3  4* 3 2*   < >  --  3 4*   CHLORIDE mmol/L 101 101 104 101 101   < >  --  98*   CO2 mmol/L 35* 37* 38* 35* 35*   < >  --  37*   BUN mg/dL 92* 91* 96* 106* 115*   < >  --  121*   CREATININE mg/dL 1 60* 1 71* 1 40* 1 76* 2 10*   < >  --  1 77*   CALCIUM mg/dL 9 1 9 2 9 5 9 5 8 7   < >  --  8 2*   MAGNESIUM mg/dL  --   --   --  2 1  --   --   --   --    ALK PHOS U/L 200* 217*  --  213*  --   --   --  214*   ALT U/L 18 18  --  20  --   --   --  23   AST U/L 25 22  --  20  --   --   --  41   BLOOD CULTURE   --   --   --   --   --   --   --  No Growth After 4 Days  No Growth After 4 Days  LEUKOCYTES UA   --   --   --   --   --   --  Negative  --    BLOOD UA   --   --   --   --   --   --  Trace-Intact*  --     < > = values in this interval not displayed  Portions of the record may have been created with voice recognition software  Occasional wrong word or "sound a like" substitutions may have occurred due to the inherent limitations of voice recognition software  Read the chart carefully and recognize, using context, where substitutions have occurred  If you have any questions, please contact the dictating provider

## 2021-02-04 NOTE — PLAN OF CARE
Problem: Potential for Falls  Goal: Patient will remain free of falls  Description: INTERVENTIONS:  - Assess patient frequently for physical needs  -  Identify cognitive and physical deficits and behaviors that affect risk of falls    -  [place] fall precautions as indicated by assessment   - Educate patient/family on patient safety including physical limitations  - Instruct patient to call for assistance with activity based on assessment  - Modify environment to reduce risk of injury  - Consider OT/PT consult to assist with strengthening/mobility  Outcome: Progressing     Problem: RESPIRATORY - ADULT  Goal: Achieves optimal ventilation and oxygenation  Description: INTERVENTIONS:  - Assess for changes in respiratory status  - Assess for changes in mentation and behavior  - Position to facilitate oxygenation and minimize respiratory effort  - Oxygen administered by appropriate delivery if ordered  - Initiate smoking cessation education as indicated  - Encourage broncho-pulmonary hygiene including cough, deep breathe, Incentive Spirometry  - Assess the need for suctioning and aspirate as needed  - Assess and instruct to report SOB or any respiratory difficulty  - Respiratory Therapy support as indicated  Outcome: Progressing     Problem: SKIN/TISSUE INTEGRITY - ADULT  Goal: Incision(s), wounds(s) or drain site(s) healing without S/S of infection  Description: INTERVENTIONS  - Assess and document risk factors for skin impairment   - Assess and document dressing, incision, wound bed, drain sites and surrounding tissue  - Consider nutrition services referral as needed  - Oral mucous membranes remain intact  - Provide patient/ family education  Outcome: Progressing  Goal: Oral mucous membranes remain intact  Description: INTERVENTIONS  - Assess oral mucosa and hygiene practices  - Implement preventative oral hygiene regimen  - Implement oral medicated treatments as ordered  - Initiate Nutrition services referral as needed  Outcome: Progressing     Problem: CARDIOVASCULAR - ADULT  Goal: Maintains optimal cardiac output and hemodynamic stability  Description: INTERVENTIONS:  - Monitor I/O, vital signs and rhythm  - Monitor for S/S and trends of decreased cardiac output  - Administer and titrate ordered vasoactive medications to optimize hemodynamic stability  - Assess quality of pulses, skin color and temperature  - Assess for signs of decreased coronary artery perfusion  - Instruct patient to report change in severity of symptoms  Outcome: Progressing     Problem: Prexisting or High Potential for Compromised Skin Integrity  Goal: Skin integrity is maintained or improved  Description: INTERVENTIONS:  - Identify patients at risk for skin breakdown  - Assess and monitor skin integrity  - Assess and monitor nutrition and hydration status  - Monitor labs   - Assess for incontinence   - Turn and reposition patient  - Assist with mobility/ambulation  - Relieve pressure over bony prominences  - Avoid friction and shearing  - Provide appropriate hygiene as needed including keeping skin clean and dry  - Evaluate need for skin moisturizer/barrier cream  - Collaborate with interdisciplinary team   - Patient/family teaching  - Consider wound care consult   Outcome: Progressing     Problem: DISCHARGE PLANNING  Goal: Discharge to home or other facility with appropriate resources  Description: INTERVENTIONS:  - Identify barriers to discharge w/patient and caregiver  - Arrange for needed discharge resources and transportation as appropriate  - Identify discharge learning needs (meds, wound care, etc )  - Arrange for interpretive services to assist at discharge as needed  - Refer to Case Management Department for coordinating discharge planning if the patient needs post-hospital services based on physician/advanced practitioner order or complex needs related to functional status, cognitive ability, or social support system  Outcome: Progressing     Problem: SAFETY ADULT  Goal: Patient will remain free of falls  Description: INTERVENTIONS:  - Assess patient frequently for physical needs  -  Identify cognitive and physical deficits and behaviors that affect risk of falls    -  [place] fall precautions as indicated by assessment   - Educate patient/family on patient safety including physical limitations  - Instruct patient to call for assistance with activity based on assessment  - Modify environment to reduce risk of injury  - Consider OT/PT consult to assist with strengthening/mobility  Outcome: Progressing  Goal: Maintain or return to baseline ADL function  Description: INTERVENTIONS:  -  Assess patient's ability to carry out ADLs; assess patient's baseline for ADL function and identify physical deficits which impact ability to perform ADLs (bathing, care of mouth/teeth, toileting, grooming, dressing, etc )  - Assess/evaluate cause of self-care deficits   - Assess range of motion  - Assess patient's mobility; develop plan if impaired  - Assess patient's need for assistive devices and provide as appropriate  - Encourage maximum independence but intervene and supervise when necessary  - Involve family in performance of ADLs  - Assess for home care needs following discharge   - Consider OT consult to assist with ADL evaluation and planning for discharge  - Provide patient education as appropriate  Outcome: Progressing  Goal: Maintain or return mobility status to optimal level  Description: INTERVENTIONS:  - Assess patient's baseline mobility status (ambulation, transfers, stairs, etc )    - Identify cognitive and physical deficits and behaviors that affect mobility  - Identify mobility aids required to assist with transfers and/or ambulation (gait belt, sit-to-stand, lift, walker, cane, etc )  - [place] fall precautions as indicated by assessment  - Record patient progress and toleration of activity level on Mobility SBAR; progress patient to next Phase/Stage  - Instruct patient to call for assistance with activity based on assessment  - Consider rehabilitation consult to assist with strengthening/weightbearing, etc   Outcome: Progressing     Problem: Knowledge Deficit  Goal: Patient/family/caregiver demonstrates understanding of disease process, treatment plan, medications, and discharge instructions  Description: Complete learning assessment and assess knowledge base    Interventions:  - Provide teaching at level of understanding  - Provide teaching via preferred learning methods  Outcome: Progressing

## 2021-02-04 NOTE — ASSESSMENT & PLAN NOTE
Rate controlled, history of sick sinus status post pacemaker  · Continue metoprolol  · We had been holding Pradaxa while awaiting surgery but was resumed on 2/2 as there are no plans for TAVR

## 2021-02-04 NOTE — ASSESSMENT & PLAN NOTE
Presented to [facility] with AMS, hypoglycemia, hypothermia, azotemia and elevated ammonia level   Mental status improved after correction of hypoglycemia  · CT head negative, exam nonfocal  · Monitor mental status

## 2021-02-05 ENCOUNTER — TRANSITIONAL CARE MANAGEMENT (OUTPATIENT)
Dept: FAMILY MEDICINE CLINIC | Facility: CLINIC | Age: 71
End: 2021-02-05

## 2021-02-05 LAB
BACTERIA BLD CULT: NORMAL
BACTERIA BLD CULT: NORMAL
RYE IGE QN: NEGATIVE

## 2021-02-05 NOTE — PROGRESS NOTES
Cardiology Out Patient Progress Note  [facility]    Bob August 66 y o  female MRN: [id number]   Encounter: [id number]    1  Nonrheumatic aortic valve stenosis    2  Coronary artery disease involving coronary bypass graft of native heart without angina pectoris    3  Chronic combined systolic and diastolic congestive heart failure ([facility] )    4  Chronic atrial fibrillation (HCC)    5  Chronic respiratory failure with hypoxia (HCC)    6  Stage 3 chronic kidney disease, unspecified whether stage 3a or 3b CKD    7  H/O coronary artery bypass surgery    8  Status post placement of cardiac pacemaker    9  Morbid obesity with BMI of 40 0-44 9, adult ([facility] )    10  Hyponatremia      Assessment/Plan   1  Combined systolic and diastolic heart failure [place] heart Association class 3  Patient has been in the hospital multiple times  Her EF has also decreased  She developed severe pulmonary hypertension as well as severe aortic stenosis  Has been seen by the surgery for TAVR  She was found to be high risk for the surgery  She could not be on ACE-inhibitor due to CRI  Will continue beta-blockers  Continue oxygen therapy      2  Coronary artery disease status post coronary artery bypass surgery with 4 grafts  Status post cardiac catheterization, in February of 2018  All grafts are patent she has three-vessel coronary artery disease  Continue medical Rx     She has a cardiac catheterization performed on November 2020 which is not changed from previous cardiac catheterization    3  Chronic atrial fibrillation  Patient has underlying very slow ventricular rate and is status post Medtronic pacemaker which is functioning adequately  She is on Pradaxa 75 mg twice a day  4  Diabetes mellitus  Management as per PMD    5  Chronic renal insufficiency  Her serum creatinine now 1 6-1 9  Repeat labs will be ordered    6 History of tricuspid annuloplasty    Repeat echo report from February 2018 reviewed  PA pressure now around 80 EF is around 45 -50%  All those issues discussed with patient and patient's son  7  Dyslipidemia  Continue statins    8  Cardiomyopathy  Patient has developed cardiomyopathy had EF has decreased  It could be related to being 100% paced also there may be component of valvular disease contributing to it  She was scheduled to have TAVR done but she was found to be high risk    9  Hypertension  Continue current Rx blood pressure is acceptable    10   Severe aortic stenosis  Valve area 0 8 cm2  TAVR procedure once she is more stable    11  Chronic Hypoxic respiratory failure  Etiology multifactorial she is on 3 L of oxygen    12  History of cirrhosis of liver with altered mental status and elevated ammonia level  She will follow up with Gastroenterology      I have spoken to patient and patient's son at length  Various options discussed  Will increase Demadex to 60 mg daily to keep her on the dry side  Her creatinine is 1 6  She has done well when her weight is around 205 lb  It is hard to measure her as she cannot stand  She certainly have more leg edema than before  Will check BMP continue other Rx as before discussed with patient's son  They have recommended palliative therapy at this time they are not sure they would like to do that they are waiting for her recovery and see if she can buildup herself to get the procedure done  Counseling :   A description of the counseling  Patient is little under stress regarding her sister's death  She was counselled  She was reassured  All her questions were answered  Advised her to lose weight  Diet was advised  Advised to decrease salt intake and decreased fluid intake  Monitor her input output  HPI :     Merlinda Bran is a 66y o  year old female who presents for follow-up    She haspast medical history significant for coronary artery disease status post coronary artery bypass surgery with 4 bypasses in 2004 and then tricuspid valve annuloplasty in January 2012, known moderate pulmonary hypertension with mild to moderate TR, recurrent abdominal hernia, history of anemia, chronic atrial fibrillation with underlying sick sinus syndrome status post Medtronic pacemaker on long-term antithrombotic therapy with Xarelto who came for regular follow-up  Patient has some dyspnea on exertion  She hasn't had any recent stress test  She also had some mild CRI creatinine stable around 1 3  He has no fever no chills  No PND, no orthopnea, mostly exertional symptoms  02/10/2021  Above reviewed  Patient was recently admitted to [facility] well she presented to hospital with hypoglycemia and altered mental status  There note reviewed in detail  She has cardiac catheterization done in November 2020 which shows severe 3 vessel disease per grafts were patent  She had a complex medical history including history of CABG with EF around 40-45%, now severe aortic stenosis valve area 0 8, history of recurrent heart failure, history of TR with tricuspid valve annuloplasty, history of CRI with creatinine around 2, history of newly diagnosed cirrhosis of liver, history of severe pulmonary hypertension with PA pressure around 80 and history of permanent atrial fibrillation with Medtronic pacemaker  As per note she was recommended to have palliative therapy however patient and son was not interested at that time  It was also found her ammonia level was elevated  She has lost weight her current weight is around 210 lb  She had blood test done 02/04/2021 which shows her total bilirubin now 1 57    Creatinine is 1 6 and her other electrolytes were acceptable    As per the discharge note from [facility] she was discharged on torsemide 40 mg daily, spironolactone 25 mg daily, simvastatin 20 mg daily, metoprolol XL 50 mg daily, levothyroxine 137 mcg daily, potassium 10 mg/20 mg daily Pepcid and Pradaxa was 75 twice a day along with her insulin  Currently she is using 3 L of oxygen  She came with her son  It is very hard to measure her weight as she cannot stand steadily on the machine she 10 to fall  He is watching her leg edema  Her weight here is 210 lb  We would like her to keep it around 205-210 as she does best when her weight is like that way  Today her heart rate is 62 beats per minute she is paced as before  Review of Systems   Constitutional: Positive for activity change  Negative for chills, diaphoresis, fever and unexpected weight change  HENT: Negative for congestion  Eyes: Negative for discharge and redness  Respiratory: Positive for shortness of breath  Negative for cough, chest tightness and wheezing  Cardiovascular: Positive for leg swelling  Negative for chest pain and palpitations  Gastrointestinal: Negative for abdominal pain, diarrhea and nausea  Endocrine: Negative  Genitourinary: Negative for decreased urine volume and urgency  Musculoskeletal: Positive for arthralgias, back pain and gait problem  Skin: Negative for rash and wound  Allergic/Immunologic: Negative  Neurological: Negative for dizziness, seizures, syncope, weakness, light-headedness and headaches  Hematological: Negative  Psychiatric/Behavioral: Negative for agitation and confusion  The patient is nervous/anxious           Periods of confusion       Historical Information   Past Medical History:   Diagnosis Date    Arthritis     [facility]     Cardiac disease     Carotid artery occlusion     CHF (congestive heart failure) ([facility]     Cholelithiasis     last assessed 8/8/2016    Coronary artery disease     Diabetes mellitus ([facility]     Disease of thyroid gland     Essential hypertension 8/15/2016    GERD (gastroesophageal reflux disease)     History of transfusion     Hyperlipidemia     Hypertension     Long-term insulin use in type 2 diabetes ([facility] ) 6/4/2016    Other specified diabetes mellitus with diabetic autonomic (poly)neuropathy ([facility] )     Oxygen dependent     3L    Pleural effusion 2008    Pulmonary embolism ([facility] )     2008    Renal disorder     Retinopathy     bilat    Sleep apnea     USES 3 LITER O2 CONTINOUIS    Subclavian artery stenosis (HCC)     Umbilical hernia with obstruction, without gangrene     last assessed 7/8/2016     Past Surgical History:   Procedure Laterality Date    ABDOMINAL SURGERY      CARDIAC PACEMAKER PLACEMENT      CARDIAC SURGERY      cabg x 2    CATARACT EXTRACTION      CHOLECYSTECTOMY      COLONOSCOPY      CORONARY ARTERY BYPASS GRAFT  2008    EYE SURGERY      FRACTURE SURGERY Right 2010    shoulder    HERNIA REPAIR      umbilical    GA LAP,CHOLECYSTECTOMY N/A 8/10/2016    Procedure: CHOLECYSTECTOMY LAPAROSCOPIC;  Surgeon: Luis Cooley MD;  [facility];  Service: General    GA LARYNGOSCOPY,DIRECT,SCOPE,INJ CORDS Left 11/4/2016    Procedure: MICRODIRECT LARYNGOSCOPY; LEFT VOCAL FOLD INJECTION ;  Surgeon: Nely Azevedo MD;  [facility];  Service: ENT    GA REPAIR UMBILICAL AWRR,7+T/U,GWTFK N/A 8/10/2016    Procedure: LAPAROSCOPIC REPAIR HERNIA VENTRAL;  Surgeon: Luis Cooley MD;  [facility];  Service: General    [address] / STENTING Right     TRICUSPID VALVE REPLACEMENT      VASCULAR SURGERY      heart valve replacement     Social History     Substance and Sexual Activity   Alcohol Use Not Currently    Alcohol/week: 0 0 standard drinks    Frequency: Never    Drinks per session: Patient refused    Binge frequency: Never    Comment: only on NEW YEAR'S     Social History     Substance and Sexual Activity   Drug Use Never    Types: Oxycodone     Social History     Tobacco Use   Smoking Status Never Smoker   Smokeless Tobacco Never Used     Family History:   Family History   Problem Relation Age of Onset    Heart disease Mother     Breast cancer Maternal Aunt     Heart disease Sister        Meds/Allergies     Allergies   Allergen Reactions    Bromide Ion [Bromine]      Blurred vision   Has preservative that  Pt cannot use    Other Blisters     Adhesive tape    Timolol      Per pt, allergic to steroid in medication    Tramadol      cognition changes    Ibuprofen Palpitations    Penicillins Rash       Current Outpatient Medications:     Accu-Chek Virgie Plus test strip, TEST BLOOD SUGAR 3 TIMES EVERY DAY  DX: E11 65, Disp: 300 each, Rfl: 3    acetaminophen (TYLENOL) 325 mg tablet, Take 2 tablets (650 mg total) by mouth every 4 (four) hours as needed for mild pain or fever, Disp:  , Rfl: 0    B-D UF III MINI PEN NEEDLES 31G X 5 MM MISC, 4 (four) times a day As directed, Disp: , Rfl:     COMBIGAN 0 2-0 5 %, Administer 1 drop to both eyes 2 (two) times a day, Disp: , Rfl:     Cyanocobalamin (VITAMIN B 12 PO), Take by mouth daily She is waiting to  from pharmacy, Disp: , Rfl:     dabigatran etexilate (PRADAXA) 75 mg capsule, Take 1 capsule (75 mg total) by mouth every 12 (twelve) hours, Disp: 60 capsule, Rfl: 0    famotidine (PEPCID) 20 mg tablet, TAKE 1 TABLET BY MOUTH TWICE A DAY, Disp: 60 tablet, Rfl: 11    gabapentin (NEURONTIN) 300 mg capsule, Take 1 capsule (300 mg total) by mouth daily at bedtime, Disp: 30 capsule, Rfl: 3    Insulin Aspart (NovoLOG PenFill) 100 UNITS/ML cartridge for injection, Inject 8 Units under the skin 3 (three) times a day with meals, Disp: , Rfl: 0    insulin glargine (Basaglar KwikPen) 100 units/mL injection pen, Inject 15 Units under the skin daily, Disp: 5 pen, Rfl: 2    lactulose (CHRONULAC) 10 g/15 mL solution, PLEASE SPECIFY DIRECTIONS, REFILLS AND QUANTITY, Disp: 1 mL, Rfl: 1    levothyroxine (Synthroid) 137 mcg tablet, Take 1 tablet (137 mcg total) by mouth daily, Disp: 90 tablet, Rfl: 3    Multiple Vitamin (MULTIVITAMIN) tablet, Take 1 tablet by mouth daily, Disp: , Rfl:     pantoprazole (PROTONIX) 40 mg tablet, Take 40 mg by mouth daily in the early morning , Disp: , Rfl:     potassium chloride (Klor-Con M10) 10 mEq tablet, Take 2 tablets (20 mEq total) by mouth daily, Disp: 45 tablet, Rfl: 1    spironolactone (ALDACTONE) 25 mg tablet, Take 1 tablet (25 mg total) by mouth daily (Patient taking differently: Take 25 mg by mouth daily She is waiting to  at the pharmacy today, 2/5), Disp: 30 tablet, Rfl: 0    torsemide (DEMADEX) 20 mg tablet, Take 2 tablets (40 mg total) by mouth daily, Disp: 180 tablet, Rfl: 0    metoprolol succinate (TOPROL-XL) 50 mg 24 hr tablet, TAKE 1 TABLET BY MOUTH EVERY DAY (Patient not taking: Reported on 2/10/2021), Disp: 30 tablet, Rfl: 0    simvastatin (ZOCOR) 20 mg tablet, TAKE 1 TABLET (20MG) BY ORAL ROUTE EVERY DAY IN THE EVENING, Disp: 90 tablet, Rfl: 2    Vitals: Blood pressure 110/68, pulse 63, temperature 97 5 °F (36 4 °C), temperature source Temporal, height 5' 3" (1 6 m), weight 95 3 kg (210 lb), SpO2 94 %, not currently breastfeeding  Body mass index is 37 2 kg/m²  Physical Exam:  Physical Exam   Constitutional: She is oriented to person, place, and time  She appears well-developed and well-nourished  No distress  HENT:   Head: Normocephalic and atraumatic  Eyes: Pupils are equal, round, and reactive to light  Neck: Neck supple  No JVD present  No tracheal deviation present  No thyromegaly present  Cardiovascular: Normal rate, regular rhythm, S1 normal and S2 normal  Exam reveals no gallop, no S3, no S4, no distant heart sounds and no friction rub  Murmur heard  Systolic (ejection) murmur is present with a grade of 2/6  S1-S2 regular as she is paced with 3/6 pansystolic murmur at left lower sternal border additional there is a harsh ejection systolic murmur with soft S2 in aortic area  Pulmonary/Chest: Effort normal  No respiratory distress  She has no wheezes  She has no rales  She exhibits no tenderness     Bilateral entry coarse breath sounds decreased at bases no rhonchi no wheezing   Abdominal: Soft  Bowel sounds are normal  She exhibits no distension  There is no abdominal tenderness  Musculoskeletal:         General: Edema present  No deformity  Comments: One to 2+ edema up to knees   Neurological: She is alert and oriented to person, place, and time  Skin: Skin is warm and dry  No rash noted  She is not diaphoretic  No pallor  Psychiatric: She has a normal mood and affect  Her behavior is normal  Judgment normal          Diagnostic cardiac testing review:    Cardiac catheterization:  Cardiac catheterization done in February of 2018 shows severe three-vessel coronary artery disease  Distal left main has 70%  %  Proximal circ 70%, obtuse marginal 100%, RPDA 100% occluded  Patient had patent LIMA to LAD, SVG vein graft to diagonal, circumflex as well as RPDA  LV-gram was not done due to CRI  Echo Doppler:  Echo Doppler done in February of 2018 shows EF around 45%, dilated right-sided chambers, moderate to severe TR with PA pressure 65 mm of mercury, mild AI, mild MR, moderate to markedly dilated left atrium, moderately dilated right atrium Paradoxical septal motion noted    Stress test:  Nuclear stress test done in 2016 was abnormal   EF was 57%  Paradoxical septal motion was noted  Nuclear stress test in March of 2017 shows is fixed apical wall defect with prior infarct  No ischemia EF was 57%    :  Cardiac catheterization  In February of 2018  Findings:     1  Dominance: Right dominant coronary system     2  Left main Coronary artery: Normal size vessels  It bifurcates into large LAD and a nondominant but medium-size circumflex system  Distal left main has around 70% stenosis  I     3  Left anterior descending artery: LAD is a large-size vessel and in the mid it is 100% occluded  Competitive flow is seen in LAD from LIMA    A diagonal has also patent graft from aorta      4  Circumflex Coronary artery: Circumflex is a nondominant medium size vessel, it is still a medium to large size artery  It has proximally around 70%  OM1 is 100% occluded with competitive flow  AV groove circumflex is small         5  Right coronary artery: RCA is normal size vessel and it is 100% occluded in the mid area  SVG vein graft to RPDA is widely patent        5  Left ventriculogram: LV gram was not done due to CRI  LVEDP was around 16  There was no gradient across aortic valve    Pacemaker interrogation:  Patient has Medtronic pacemaker which is functioning adequately its VVI mode  Patient is paced 100% all the time    EC lead EKG done on 2018 shows atrial fibrillation with few paced beats  Heart rate is 65 beats per minute  Patient has known Medtronic pacemaker  It is a VVI type  2018  Repeat EKG shows paced rhythm heart rate 78 beats per minute  Underlying is AFib    Twelve lead EKG done in our office 2019 shows atrial fibrillation underlying with ventricular paced heart rate 66 beats per minute  No change from old EKG  Twelve lead EKG done 2019 shows 100 percent paced rhythm  She has underlying atrial fibrillation  No change from old EKG heart rate 83 beats per minute  Results of echo and stress test reviewed with patient  Labs done 2018 shows sodium 139 potassium 3 4 BUN 67 creatinine 1 45 GFR is 37  Twelve lead EKG 2020 shows ventricular paced heart rate 67 beats per minute underlying atrial fibrillation  She is 100% paced  Twelve lead EKG 2020 shows ventricular paced beats heart rate 76 beats per minute she has underlying atrial fibrillation which is not changed    Twelve lead EKG 2020 shows paced rhythm underlying atrial fibrillation heart rate 67 beats per minute  Twelve lead EKG 02/10/2021 shows underlying atrial fibrillation she is ventricular paced heart rate 62 beats per minute      Lab Review   Lab Results   Component Value Date    WBC 4 77 2021    HGB 10 3 (L) 02/04/2021    HCT 35 0 02/04/2021    MCV 94 02/04/2021     (L) 02/04/2021     Lab Results   Component Value Date    K 3 8 02/04/2021     02/04/2021    CO2 35 (H) 02/04/2021    BUN 92 (H) 02/04/2021    CREATININE 1 60 (H) 02/04/2021    GLUF 188 (H) 11/13/2020    CALCIUM 9 1 02/04/2021    CORRECTEDCA 10 2 (H) 02/04/2021    AST 25 02/04/2021    ALT 18 02/04/2021    ALKPHOS 200 (H) 02/04/2021    PROT 7 9 09/21/2016    EGFR 32 02/04/2021     Lab Results   Component Value Date    CALCIUM 9 1 02/04/2021    K 3 8 02/04/2021    CO2 35 (H) 02/04/2021     02/04/2021    BUN 92 (H) 02/04/2021    CREATININE 1 60 (H) 02/04/2021     Dr Lencho Reyna MD [facility]      "This note has been constructed using a voice recognition system  Therefore there may be syntax, spelling, and/or grammatical errors   Please call if you have any questions  "

## 2021-02-06 ENCOUNTER — TELEPHONE (OUTPATIENT)
Dept: GASTROENTEROLOGY | Facility: AMBULARY SURGERY CENTER | Age: 71
End: 2021-02-06

## 2021-02-06 NOTE — TELEPHONE ENCOUNTER
Returned patients call  LM stating I was calling to see if a nurse or anyone reached out to her as of yet to assist with her questions regarding her medication refills  Advised pt to call with any questions

## 2021-02-06 NOTE — TELEPHONE ENCOUNTER
----- Message from Ernesto Morales sent at 2/5/2021  9:56 AM EST -----  Regarding: FW: Scheduling Patient  When making appointment with patient, she had some questions about her refills and is asking for someone to give her a call  She will also confirm her appointment on the 18th after talking with her son   ----- Message -----  From: Shanda July  Sent: 2/5/2021   8:26 AM EST  To: Ernesto Morales  Subject: Scheduling Patient                               Good Morning,    Please see below msg and call patient to schedule  Blaine James  ----- Message -----  From: Frank Hein MD  Sent: 2/3/2021   9:38 AM EST  To: Gastroenterology N[place]ia Gracia    Please schedule patient at [facility] or [facility] for clinic follow-up in 2-3 weeks  Hospitalization follow-up  Patient lives in [facility]

## 2021-02-08 ENCOUNTER — TELEPHONE (OUTPATIENT)
Dept: GASTROENTEROLOGY | Facility: CLINIC | Age: 71
End: 2021-02-08

## 2021-02-08 ENCOUNTER — TELEPHONE (OUTPATIENT)
Dept: NEPHROLOGY | Facility: CLINIC | Age: 71
End: 2021-02-08

## 2021-02-08 DIAGNOSIS — I50.9 CONGESTIVE HEART FAILURE, UNSPECIFIED HF CHRONICITY, UNSPECIFIED HEART FAILURE TYPE (HCC): ICD-10-CM

## 2021-02-08 LAB
A1AT PHENOTYP SERPL IFE: NORMAL
A1AT SERPL-MCNC: 140 MG/DL (ref 101–187)

## 2021-02-08 RX ORDER — METOPROLOL SUCCINATE 50 MG/1
TABLET, EXTENDED RELEASE ORAL
Qty: 30 TABLET | Refills: 0 | Status: SHIPPED | OUTPATIENT
Start: 2021-02-08 | End: 2021-03-01 | Stop reason: HOSPADM

## 2021-02-08 NOTE — TELEPHONE ENCOUNTER
Called and spoke with patient  She was confused if she was supposed to be taking her lactulose, because she states she never received it  I saw it was called in to pharmacy on 1/29 but told her I would ensure it was called in for her  I then called and spoke with [facility] pharmacist, who stated that she picked up lactulose prescription on 2/5  I called patient back to clarify situation and reached voicemail   Left message to call back

## 2021-02-08 NOTE — TELEPHONE ENCOUNTER
----- Message from Lucinda Ramirez sent at 2/5/2021  9:56 AM EST -----  Regarding: FW: Scheduling Patient  When making appointment with patient, she had some questions about her refills and is asking for someone to give her a call  She will also confirm her appointment on the 18th after talking with her son   ----- Message -----  From: Romaine Simpson  Sent: 2/5/2021   8:26 AM EST  To: Lucinda Ramirez  Subject: Scheduling Patient                               Good Morning,    Please see below msg and call patient to schedule  Blaine Carvajal  ----- Message -----  From: Waytt Hummel MD  Sent: 2/3/2021   9:38 AM EST  To: Gastroenterology Mortimer Birks    Please schedule patient at [facility] or [facility] for clinic follow-up in 2-3 weeks  Hospitalization follow-up  Patient lives in [facility]

## 2021-02-09 ENCOUNTER — TELEPHONE (OUTPATIENT)
Dept: CARDIOLOGY CLINIC | Facility: CLINIC | Age: 71
End: 2021-02-09

## 2021-02-09 NOTE — TELEPHONE ENCOUNTER
Called and spoke with patient  I informed her lactulose was picked up on 2/5  She states her son must have picked it up and will check with him  I advised if she does not have it to call us back, as we do not want her to miss doses of lactulose   She verbalized undertsanding

## 2021-02-10 ENCOUNTER — TELEPHONE (OUTPATIENT)
Dept: GASTROENTEROLOGY | Facility: CLINIC | Age: 71
End: 2021-02-10

## 2021-02-10 ENCOUNTER — OFFICE VISIT (OUTPATIENT)
Dept: CARDIOLOGY CLINIC | Facility: CLINIC | Age: 71
End: 2021-02-10
Payer: MEDICARE

## 2021-02-10 VITALS
OXYGEN SATURATION: 94 % | DIASTOLIC BLOOD PRESSURE: 68 MMHG | HEIGHT: 63 IN | HEART RATE: 63 BPM | TEMPERATURE: 97.5 F | SYSTOLIC BLOOD PRESSURE: 110 MMHG | BODY MASS INDEX: 37.21 KG/M2 | WEIGHT: 210 LBS

## 2021-02-10 DIAGNOSIS — E66.01 MORBID OBESITY WITH BMI OF 40.0-44.9, ADULT (HCC): ICD-10-CM

## 2021-02-10 DIAGNOSIS — I48.20 CHRONIC ATRIAL FIBRILLATION (HCC): ICD-10-CM

## 2021-02-10 DIAGNOSIS — Z95.0 STATUS POST PLACEMENT OF CARDIAC PACEMAKER: ICD-10-CM

## 2021-02-10 DIAGNOSIS — N18.30 STAGE 3 CHRONIC KIDNEY DISEASE, UNSPECIFIED WHETHER STAGE 3A OR 3B CKD (HCC): ICD-10-CM

## 2021-02-10 DIAGNOSIS — I50.42 CHRONIC COMBINED SYSTOLIC AND DIASTOLIC CONGESTIVE HEART FAILURE (HCC): ICD-10-CM

## 2021-02-10 DIAGNOSIS — I35.0 NONRHEUMATIC AORTIC VALVE STENOSIS: ICD-10-CM

## 2021-02-10 DIAGNOSIS — I50.43 ACUTE ON CHRONIC COMBINED SYSTOLIC AND DIASTOLIC CONGESTIVE HEART FAILURE (HCC): ICD-10-CM

## 2021-02-10 DIAGNOSIS — E87.1 HYPONATREMIA: ICD-10-CM

## 2021-02-10 DIAGNOSIS — Z95.1 H/O CORONARY ARTERY BYPASS SURGERY: ICD-10-CM

## 2021-02-10 DIAGNOSIS — J96.11 CHRONIC RESPIRATORY FAILURE WITH HYPOXIA (HCC): Chronic | ICD-10-CM

## 2021-02-10 DIAGNOSIS — K21.9 GASTROESOPHAGEAL REFLUX DISEASE, UNSPECIFIED WHETHER ESOPHAGITIS PRESENT: Primary | ICD-10-CM

## 2021-02-10 DIAGNOSIS — I25.810 CORONARY ARTERY DISEASE INVOLVING CORONARY BYPASS GRAFT OF NATIVE HEART WITHOUT ANGINA PECTORIS: ICD-10-CM

## 2021-02-10 PROCEDURE — 99214 OFFICE O/P EST MOD 30 MIN: CPT | Performed by: INTERNAL MEDICINE

## 2021-02-10 PROCEDURE — 93000 ELECTROCARDIOGRAM COMPLETE: CPT | Performed by: INTERNAL MEDICINE

## 2021-02-10 RX ORDER — TORSEMIDE 20 MG/1
60 TABLET ORAL DAILY
Qty: 180 TABLET | Refills: 1 | Status: ON HOLD | OUTPATIENT
Start: 2021-02-10 | End: 2021-03-01 | Stop reason: SDUPTHER

## 2021-02-10 NOTE — TELEPHONE ENCOUNTER
GI Physician: Dr Areta Apgar for Medication: Pantoprazole    Dose: 40 mg    Quantity: ?     Pharmacy and Location: [facility]

## 2021-02-10 NOTE — TELEPHONE ENCOUNTER
I left message for patient to call the office to schedule a hospital follow up appointment  Try to reach you letter was sent to patient

## 2021-02-10 NOTE — TELEPHONE ENCOUNTER
Pt's son Radha Rainey called and requested a refill for Pradaxa 75 mg for pt  Please send to [facility] pharmacy on file  Kasandra Thomas

## 2021-02-11 RX ORDER — PANTOPRAZOLE SODIUM 40 MG/1
40 TABLET, DELAYED RELEASE ORAL
Qty: 30 TABLET | Refills: 0 | Status: SHIPPED | OUTPATIENT
Start: 2021-02-11 | End: 2021-02-18 | Stop reason: ALTCHOICE

## 2021-02-11 RX ORDER — DABIGATRAN ETEXILATE 75 MG/1
75 CAPSULE, COATED PELLETS ORAL EVERY 12 HOURS SCHEDULED
Qty: 180 CAPSULE | Refills: 3 | Status: SHIPPED | OUTPATIENT
Start: 2021-02-11 | End: 2021-03-01 | Stop reason: HOSPADM

## 2021-02-11 NOTE — TELEPHONE ENCOUNTER
Office visit scheduled 2/18/21  Patient recently inpatient at [facility] -encephalopathy, elevated ammonia levels, abnormal LFTs, cirrhosis/fibrosis        Pantoprazole 40 mg daily listed on medication list       Script entered

## 2021-02-15 ENCOUNTER — TELEPHONE (OUTPATIENT)
Dept: CARDIOLOGY CLINIC | Facility: CLINIC | Age: 71
End: 2021-02-15

## 2021-02-18 ENCOUNTER — TELEMEDICINE (OUTPATIENT)
Dept: GASTROENTEROLOGY | Facility: CLINIC | Age: 71
End: 2021-02-18
Payer: MEDICARE

## 2021-02-18 DIAGNOSIS — K74.60 CIRRHOSIS (HCC): ICD-10-CM

## 2021-02-18 DIAGNOSIS — K59.04 CHRONIC IDIOPATHIC CONSTIPATION: ICD-10-CM

## 2021-02-18 DIAGNOSIS — K21.9 GASTROESOPHAGEAL REFLUX DISEASE WITHOUT ESOPHAGITIS: Primary | ICD-10-CM

## 2021-02-18 PROCEDURE — 99214 OFFICE O/P EST MOD 30 MIN: CPT | Performed by: INTERNAL MEDICINE

## 2021-02-18 RX ORDER — LACTULOSE 10 G/15ML
10 SOLUTION ORAL 2 TIMES DAILY
Qty: 946 ML | Refills: 11 | Status: SHIPPED | OUTPATIENT
Start: 2021-02-18

## 2021-02-18 NOTE — PROGRESS NOTES
Virtual Regular Visit      Assessment/Plan:    Problem List Items Addressed This Visit        Digestive    Gastroesophageal reflux disease without esophagitis - Primary       History of GERD for which she had been on Protonix regularly  -  Discussed about the long-term side effects and advised to stop the Protonix and take Pepcid regularly or p r n  depending on the symptoms  Cirrhosis ([address] )      Patient with recently diagnosed cirrhosis and hepatic encephalopathy  Cirrhosis was thought to be from cardiac and CHUNG     -  Explained to patient's son in detail about possible complications from cirrhosis    - increase lactulose to twice daily  Advised him to titrate depending on her mental status and bowel movements  -  Check alpha-fetoprotein level  Advised about screening for hepatocellular carcinoma with the alpha-fetoprotein level and ultrasound every 6 months    - 2 g sodium diet and restrict fluid intake    - continue diuretics Aldactone and Demadex and follow up with cardiologist    - office visit in couple of months and may consider EGD at that time depending on her medical condition  She is not stable for any procedures at this time  Relevant Orders    AFP tumor marker      Other Visit Diagnoses     Chronic idiopathic constipation        Relevant Medications    lactulose (CHRONULAC) 10 g/15 mL solution               Reason for visit is   Chief Complaint   Patient presents with    Virtual Regular Visit        Encounter provider Fidel Sarah MD    Provider located at [address] [place] [id number]      Recent Visits  No visits were found meeting these conditions     Showing recent visits within past 7 days and meeting all other requirements     Today's Visits  Date Type Provider Dept   02/18/21 Telemedicine MD Hieu Rojo   Showing today's visits and meeting all other requirements     Future Appointments  No visits were found meeting these conditions  Showing future appointments within next 150 days and meeting all other requirements        The patient was identified by name and date of birth  Bry Thomas and her son Mr Aline Robins were informed that this is a telemedicine visit and that the visit is being conducted through [facility] [place] and patient was informed that this is not a secure, HIPAA-compliant platform  She agrees to proceed     My office door was closed  No one else was in the room  She acknowledged consent and understanding of privacy and security of the video platform  The patient has agreed to participate and understands they can discontinue the visit at any time  Patient is aware this is a billable service  Subjective  Bry Thomas is a 66 y o  female  with history of multiple medical problems was seen by our service because of  hepatic encephalopathy and newly diagnosed cirrhosis which was thought to be from cardiac and CHUNG, when she was admitted to [facility] earlier this month  She has been doing well but still with some short-term memory  Taking lactulose 15 mL once daily and having 1 good bowel movement a day  Denies any abdominal pain, nausea or vomiting  Good appetite, no recent weight loss  Denies any heartburn or acid reflux  Denies any difficulty swallowing  She takes pantoprazole regularly  I did EGD and colonoscopies on her in December 2019  She follows with cardiologist and has been on spironolactone and Demadex      Past Medical History:   Diagnosis Date    Arthritis     Atrial flutter ([address] )     Cardiac disease     Carotid artery occlusion     CHF (congestive heart failure) (HCC)     Cholelithiasis     last assessed 8/8/2016    Coronary artery disease     Diabetes mellitus [facility] [facility]     Disease of thyroid gland     Essential hypertension 8/15/2016    GERD (gastroesophageal reflux disease)  History of transfusion     Hyperlipidemia     Hypertension     Long-term insulin use in type 2 diabetes ([facility] [address] [facility] 6/4/2016    Other specified diabetes mellitus with diabetic autonomic (poly)neuropathy ([facility] [facility]     Oxygen dependent     3L    Pleural effusion 2008    Pulmonary embolism ([facility] )     2008    Renal disorder     Retinopathy     bilat    Sleep apnea     USES 3 LITER O2 CONTINOUIS    Subclavian artery stenosis (HCC)     Umbilical hernia with obstruction, without gangrene     last assessed 7/8/2016       Past Surgical History:   Procedure Laterality Date    ABDOMINAL SURGERY      CARDIAC PACEMAKER PLACEMENT      CARDIAC SURGERY      cabg x 2    CATARACT EXTRACTION      CHOLECYSTECTOMY      COLONOSCOPY      CORONARY ARTERY BYPASS GRAFT  2008    EYE SURGERY      FRACTURE SURGERY Right 2010    shoulder    HERNIA REPAIR      umbilical    NJ LAP,CHOLECYSTECTOMY N/A 8/10/2016    Procedure: CHOLECYSTECTOMY LAPAROSCOPIC;  Surgeon: Luis Cooley MD;  Location: [facility];  Service: General     [address] Left 11/4/2016    Procedure: MICRODIRECT LARYNGOSCOPY; LEFT VOCAL FOLD INJECTION ;  Surgeon: Neyl Azevedo MD;  [facility];  Service: ENT    NJ REPAIR UMBILICAL DWCF,0+I/L,UFXHE N/A 8/10/2016    Procedure: LAPAROSCOPIC REPAIR HERNIA VENTRAL;  Surgeon: Luis Cooley MD;  Location: [facility];  Service: General    [address] / STENTING Right     TRICUSPID VALVE REPLACEMENT      VASCULAR SURGERY      heart valve replacement       Current Outpatient Medications   Medication Sig Dispense Refill    Accu-Chek Virgie Plus test strip TEST BLOOD SUGAR 3 TIMES EVERY DAY  DX: E11 65 300 each 3    acetaminophen (TYLENOL) 325 mg tablet Take 2 tablets (650 mg total) by mouth every 4 (four) hours as needed for mild pain or fever  0    B-D UF III MINI PEN NEEDLES 31G X 5 MM MISC 4 (four) times a day As directed      COMBIGAN 0 2-0 5 % Administer 1 drop to both eyes 2 (two) times a day      Cyanocobalamin (VITAMIN B 12 PO) Take by mouth daily She is waiting to  from pharmacy      dabigatran etexilate (PRADAXA) 75 mg capsule Take 1 capsule (75 mg total) by mouth every 12 (twelve) hours 180 capsule 3    famotidine (PEPCID) 20 mg tablet TAKE 1 TABLET BY MOUTH TWICE A DAY 60 tablet 11    gabapentin (NEURONTIN) 300 mg capsule Take 1 capsule (300 mg total) by mouth daily at bedtime 30 capsule 3    Insulin Aspart (NovoLOG PenFill) 100 UNITS/ML cartridge for injection Inject 8 Units under the skin 3 (three) times a day with meals  0    insulin glargine (Basaglar KwikPen) 100 units/mL injection pen Inject 15 Units under the skin daily 5 pen 2    lactulose (CHRONULAC) 10 g/15 mL solution Take 15 mL (10 g total) by mouth 2 (two) times a day 946 mL 11    levothyroxine (Synthroid) 137 mcg tablet Take 1 tablet (137 mcg total) by mouth daily 90 tablet 3    metoprolol succinate (TOPROL-XL) 50 mg 24 hr tablet TAKE 1 TABLET BY MOUTH EVERY DAY 30 tablet 0    Multiple Vitamin (MULTIVITAMIN) tablet Take 1 tablet by mouth daily      potassium chloride (Klor-Con M10) 10 mEq tablet Take 2 tablets (20 mEq total) by mouth daily 45 tablet 1    simvastatin (ZOCOR) 20 mg tablet TAKE 1 TABLET (20MG) BY ORAL ROUTE EVERY DAY IN THE EVENING 90 tablet 2    spironolactone (ALDACTONE) 25 mg tablet Take 1 tablet (25 mg total) by mouth daily (Patient taking differently: Take 25 mg by mouth daily She is waiting to  at the pharmacy today, 2/5) 30 tablet 0    torsemide (DEMADEX) 20 mg tablet Take 3 tablets (60 mg total) by mouth daily 180 tablet 1     No current facility-administered medications for this visit  Allergies   Allergen Reactions    Bromide Ion [Bromine]      Blurred vision   Has preservative that  Pt cannot use    Other Blisters     Adhesive tape    Timolol      Per pt, allergic to steroid in medication    Tramadol      cognition changes    Ibuprofen Palpitations    Penicillins Rash       Review of Systems   Denies any chest pain  Complaining about shortness of breath and she is on oxygen supplements  Complaining about joint pains and muscle pains  Difficulty with ambulation and uses wheelchair  Video Exam    There were no vitals filed for this visit  Physical Exam     Normal breathing  No visible abdominal distention/ascites  I spent 20 minutes directly with the patient during this visit      VIRTUAL VISIT DISCLAIMER    Dea Carmen acknowledges that she has consented to an online visit or consultation  She understands that the online visit is based solely on information provided by her, and that, in the absence of a face-to-face physical evaluation by the physician, the diagnosis she receives is both limited and provisional in terms of accuracy and completeness  This is not intended to replace a full medical face-to-face evaluation by the physician  Dea Morales understands and accepts these terms

## 2021-02-18 NOTE — ASSESSMENT & PLAN NOTE
History of GERD for which she had been on Protonix regularly  -  Discussed about the long-term side effects and advised to stop the Protonix and take Pepcid regularly or p r n  depending on the symptoms

## 2021-02-18 NOTE — ASSESSMENT & PLAN NOTE
Patient with recently diagnosed cirrhosis and hepatic encephalopathy  Cirrhosis was thought to be from cardiac and CHUNG     -  Explained to patient's son in detail about possible complications from cirrhosis    - increase lactulose to twice daily  Advised him to titrate depending on her mental status and bowel movements  -  Check alpha-fetoprotein level  Advised about screening for hepatocellular carcinoma with the alpha-fetoprotein level and ultrasound every 6 months    - 2 g sodium diet and restrict fluid intake    - continue diuretics Aldactone and Demadex and follow up with cardiologist    - office visit in couple of months and may consider EGD at that time depending on her medical condition  She is not stable for any procedures at this time

## 2021-02-19 ENCOUNTER — TELEPHONE (OUTPATIENT)
Dept: GASTROENTEROLOGY | Facility: AMBULARY SURGERY CENTER | Age: 71
End: 2021-02-19

## 2021-02-19 ENCOUNTER — TELEPHONE (OUTPATIENT)
Dept: FAMILY MEDICINE CLINIC | Facility: CLINIC | Age: 71
End: 2021-02-19

## 2021-02-19 NOTE — TELEPHONE ENCOUNTER
Left message for patient to call and schedule a three month follow up with a PA per Dr Johanne Sebastian

## 2021-02-19 NOTE — TELEPHONE ENCOUNTER
----- Message from Jono Verdugo sent at 2/19/2021 11:29 AM EST -----    ----- Message -----  From: Ean Love MD  Sent: 2/18/2021   4:20 PM EST  To: Maicol Plascencia,    I saw this patient today for virtual visit    Please schedule for follow-up office visit with PA in 3 months    Thank you  Dr Akash Shoemaker

## 2021-02-19 NOTE — TELEPHONE ENCOUNTER
Spoke to Tatianna from [facility] she stated that patient has possible liver issues and confusion  She is supposed to be seeing a Liver specialist however the appt is not anytime soon and Bowen Wallace was wondering if you could fax over some BW for CBC, Liver function,10 panel, ammonia level, UAC analysis  Please fax to [phone]  I told Bowen Wallace that Dr Marcos Couch be in till Monday  Bowen Wallace said that it was perfectly fine because she wont be going till next week anyway  Sending to Dr Jose Teresa  Please advise      Severiano Bennett MA

## 2021-02-22 ENCOUNTER — APPOINTMENT (INPATIENT)
Dept: NON INVASIVE DIAGNOSTICS | Facility: HOSPITAL | Age: 71
DRG: 871 | End: 2021-02-22
Payer: MEDICARE

## 2021-02-22 ENCOUNTER — HOSPITAL ENCOUNTER (INPATIENT)
Dept: RADIOLOGY | Facility: HOSPITAL | Age: 71
Discharge: HOME | DRG: 871 | End: 2021-02-22
Attending: EMERGENCY MEDICINE
Payer: MEDICARE

## 2021-02-22 ENCOUNTER — APPOINTMENT (EMERGENCY)
Dept: RADIOLOGY | Facility: HOSPITAL | Age: 71
DRG: 871 | End: 2021-02-22
Payer: MEDICARE

## 2021-02-22 ENCOUNTER — HOSPITAL ENCOUNTER (INPATIENT)
Facility: HOSPITAL | Age: 71
LOS: 7 days | Discharge: NURSING, REHAB OR LONG-TERM CARE | DRG: 871 | End: 2021-03-01
Attending: EMERGENCY MEDICINE | Admitting: INTERNAL MEDICINE
Payer: MEDICARE

## 2021-02-22 DIAGNOSIS — I50.9 ACUTE EXACERBATION OF CHF (CONGESTIVE HEART FAILURE) (HCC): ICD-10-CM

## 2021-02-22 DIAGNOSIS — K59.00 CONSTIPATION: ICD-10-CM

## 2021-02-22 DIAGNOSIS — R09.81 NASAL CONGESTION: ICD-10-CM

## 2021-02-22 DIAGNOSIS — Z79.4 TYPE 2 DIABETES MELLITUS WITH RETINOPATHY AND MACULAR EDEMA, WITH LONG-TERM CURRENT USE OF INSULIN, UNSPECIFIED LATERALITY, UNSPECIFIED RETINOPATHY SEVERITY (HCC): ICD-10-CM

## 2021-02-22 DIAGNOSIS — M62.08 DIASTASIS OF RECTUS ABDOMINIS: ICD-10-CM

## 2021-02-22 DIAGNOSIS — M19.90 ARTHRITIS: ICD-10-CM

## 2021-02-22 DIAGNOSIS — R09.89 PULMONARY VASCULAR CONGESTION: ICD-10-CM

## 2021-02-22 DIAGNOSIS — R41.82 ALTERED MENTAL STATUS: Primary | ICD-10-CM

## 2021-02-22 DIAGNOSIS — A41.9 SEPSIS, DUE TO UNSPECIFIED ORGANISM, UNSPECIFIED WHETHER ACUTE ORGAN DYSFUNCTION PRESENT (HCC): ICD-10-CM

## 2021-02-22 DIAGNOSIS — I50.43 ACUTE ON CHRONIC COMBINED SYSTOLIC AND DIASTOLIC CONGESTIVE HEART FAILURE (HCC): ICD-10-CM

## 2021-02-22 DIAGNOSIS — R53.1 GENERALIZED WEAKNESS: ICD-10-CM

## 2021-02-22 DIAGNOSIS — R79.1 ELEVATED INR: ICD-10-CM

## 2021-02-22 DIAGNOSIS — R17 SCLERAL ICTERUS: ICD-10-CM

## 2021-02-22 DIAGNOSIS — R78.81 GRAM-NEGATIVE BACTEREMIA: ICD-10-CM

## 2021-02-22 DIAGNOSIS — I50.42 CHRONIC COMBINED SYSTOLIC AND DIASTOLIC CONGESTIVE HEART FAILURE (HCC): ICD-10-CM

## 2021-02-22 DIAGNOSIS — S32.039A L3 VERTEBRAL FRACTURE (HCC): ICD-10-CM

## 2021-02-22 DIAGNOSIS — Z79.4 TYPE 2 DIABETES MELLITUS WITH DIABETIC NEUROPATHY, WITH LONG-TERM CURRENT USE OF INSULIN (HCC): ICD-10-CM

## 2021-02-22 DIAGNOSIS — I48.20 CHRONIC ATRIAL FIBRILLATION (HCC): ICD-10-CM

## 2021-02-22 DIAGNOSIS — S22.089A T12 VERTEBRAL FRACTURE (HCC): ICD-10-CM

## 2021-02-22 DIAGNOSIS — R60.1 ANASARCA: ICD-10-CM

## 2021-02-22 DIAGNOSIS — N17.9 AKI (ACUTE KIDNEY INJURY) (HCC): ICD-10-CM

## 2021-02-22 DIAGNOSIS — E11.311 TYPE 2 DIABETES MELLITUS WITH RETINOPATHY AND MACULAR EDEMA, WITH LONG-TERM CURRENT USE OF INSULIN, UNSPECIFIED LATERALITY, UNSPECIFIED RETINOPATHY SEVERITY (HCC): ICD-10-CM

## 2021-02-22 DIAGNOSIS — E11.40 TYPE 2 DIABETES MELLITUS WITH DIABETIC NEUROPATHY, WITH LONG-TERM CURRENT USE OF INSULIN (HCC): ICD-10-CM

## 2021-02-22 PROBLEM — R65.10 SIRS (SYSTEMIC INFLAMMATORY RESPONSE SYNDROME) (HCC): Status: ACTIVE | Noted: 2021-02-22

## 2021-02-22 PROBLEM — I95.9 HYPOTENSION: Status: ACTIVE | Noted: 2021-02-22

## 2021-02-22 PROBLEM — N18.9 ACUTE KIDNEY INJURY SUPERIMPOSED ON CHRONIC KIDNEY DISEASE (HCC): Status: ACTIVE | Noted: 2018-02-16

## 2021-02-22 PROBLEM — Z71.89 GOALS OF CARE, COUNSELING/DISCUSSION: Status: ACTIVE | Noted: 2021-02-22

## 2021-02-22 LAB
ALBUMIN SERPL BCP-MCNC: 2.6 G/DL (ref 3.5–5)
ALP SERPL-CCNC: 212 U/L (ref 46–116)
ALT SERPL W P-5'-P-CCNC: 20 U/L (ref 12–78)
AMMONIA PLAS-SCNC: 21 UMOL/L (ref 11–35)
ANION GAP SERPL CALCULATED.3IONS-SCNC: 13 MMOL/L (ref 4–13)
ANION GAP SERPL CALCULATED.3IONS-SCNC: 7 MMOL/L (ref 4–13)
APTT PPP: 52 SECONDS (ref 23–37)
AST SERPL W P-5'-P-CCNC: 24 U/L (ref 5–45)
BACTERIA UR QL AUTO: ABNORMAL /HPF
BASE EX.OXY STD BLDV CALC-SCNC: 87.9 % (ref 60–80)
BASE EXCESS BLDV CALC-SCNC: 2.3 MMOL/L
BASOPHILS # BLD AUTO: 0.03 THOUSANDS/ΜL (ref 0–0.1)
BASOPHILS NFR BLD AUTO: 0 % (ref 0–1)
BILIRUB SERPL-MCNC: 1.9 MG/DL (ref 0.2–1)
BILIRUB UR QL STRIP: NEGATIVE
BUN SERPL-MCNC: 101 MG/DL (ref 5–25)
BUN SERPL-MCNC: 105 MG/DL (ref 5–25)
CALCIUM ALBUM COR SERPL-MCNC: 9.9 MG/DL (ref 8.3–10.1)
CALCIUM SERPL-MCNC: 8.8 MG/DL (ref 8.3–10.1)
CALCIUM SERPL-MCNC: 9.3 MG/DL (ref 8.3–10.1)
CHLORIDE SERPL-SCNC: 103 MMOL/L (ref 100–108)
CHLORIDE SERPL-SCNC: 103 MMOL/L (ref 100–108)
CLARITY UR: CLEAR
CO2 SERPL-SCNC: 25 MMOL/L (ref 21–32)
CO2 SERPL-SCNC: 29 MMOL/L (ref 21–32)
COLOR UR: YELLOW
CREAT SERPL-MCNC: 2.21 MG/DL (ref 0.6–1.3)
CREAT SERPL-MCNC: 2.29 MG/DL (ref 0.6–1.3)
EOSINOPHIL # BLD AUTO: 0.01 THOUSAND/ΜL (ref 0–0.61)
EOSINOPHIL NFR BLD AUTO: 0 % (ref 0–6)
ERYTHROCYTE [DISTWIDTH] IN BLOOD BY AUTOMATED COUNT: 19.7 % (ref 11.6–15.1)
FLUAV RNA RESP QL NAA+PROBE: NEGATIVE
FLUBV RNA RESP QL NAA+PROBE: NEGATIVE
GFR SERPL CREATININE-BSD FRML MDRD: 21 ML/MIN/1.73SQ M
GFR SERPL CREATININE-BSD FRML MDRD: 22 ML/MIN/1.73SQ M
GLUCOSE SERPL-MCNC: 136 MG/DL (ref 65–140)
GLUCOSE SERPL-MCNC: 143 MG/DL (ref 65–140)
GLUCOSE SERPL-MCNC: 143 MG/DL (ref 65–140)
GLUCOSE SERPL-MCNC: 156 MG/DL (ref 65–140)
GLUCOSE UR STRIP-MCNC: NEGATIVE MG/DL
HCO3 BLDV-SCNC: 27.8 MMOL/L (ref 24–30)
HCT VFR BLD AUTO: 39.4 % (ref 34.8–46.1)
HGB BLD-MCNC: 11.1 G/DL (ref 11.5–15.4)
HGB UR QL STRIP.AUTO: ABNORMAL
IMM GRANULOCYTES # BLD AUTO: 0.07 THOUSAND/UL (ref 0–0.2)
IMM GRANULOCYTES NFR BLD AUTO: 1 % (ref 0–2)
INR PPP: 2.07 (ref 0.84–1.19)
KETONES UR STRIP-MCNC: NEGATIVE MG/DL
LACTATE SERPL-SCNC: 2 MMOL/L (ref 0.5–2)
LACTATE SERPL-SCNC: 3.4 MMOL/L (ref 0.5–2)
LACTATE SERPL-SCNC: 4 MMOL/L (ref 0.5–2)
LEUKOCYTE ESTERASE UR QL STRIP: ABNORMAL
LIPASE SERPL-CCNC: 91 U/L (ref 73–393)
LYMPHOCYTES # BLD AUTO: 0.59 THOUSANDS/ΜL (ref 0.6–4.47)
LYMPHOCYTES NFR BLD AUTO: 5 % (ref 14–44)
MCH RBC QN AUTO: 26.2 PG (ref 26.8–34.3)
MCHC RBC AUTO-ENTMCNC: 28.2 G/DL (ref 31.4–37.4)
MCV RBC AUTO: 93 FL (ref 82–98)
MONOCYTES # BLD AUTO: 0.99 THOUSAND/ΜL (ref 0.17–1.22)
MONOCYTES NFR BLD AUTO: 8 % (ref 4–12)
NEUTROPHILS # BLD AUTO: 10.25 THOUSANDS/ΜL (ref 1.85–7.62)
NEUTS SEG NFR BLD AUTO: 86 % (ref 43–75)
NITRITE UR QL STRIP: NEGATIVE
NON-SQ EPI CELLS URNS QL MICRO: ABNORMAL /HPF
NRBC BLD AUTO-RTO: 0 /100 WBCS
NT-PROBNP SERPL-MCNC: ABNORMAL PG/ML
O2 CT BLDV-SCNC: 15.5 ML/DL
PCO2 BLDV: 46.7 MM HG (ref 42–50)
PH BLDV: 7.39 [PH] (ref 7.3–7.4)
PH UR STRIP.AUTO: 6 [PH]
PLATELET # BLD AUTO: 177 THOUSANDS/UL (ref 149–390)
PMV BLD AUTO: 10.3 FL (ref 8.9–12.7)
PO2 BLDV: 60.5 MM HG (ref 35–45)
POTASSIUM SERPL-SCNC: 4.2 MMOL/L (ref 3.5–5.3)
POTASSIUM SERPL-SCNC: 4.6 MMOL/L (ref 3.5–5.3)
PROCALCITONIN SERPL-MCNC: 0.44 NG/ML
PROT SERPL-MCNC: 8.6 G/DL (ref 6.4–8.2)
PROT UR STRIP-MCNC: NEGATIVE MG/DL
PROTHROMBIN TIME: 23 SECONDS (ref 11.6–14.5)
RBC # BLD AUTO: 4.23 MILLION/UL (ref 3.81–5.12)
RBC #/AREA URNS AUTO: ABNORMAL /HPF
RSV RNA RESP QL NAA+PROBE: NEGATIVE
SARS-COV-2 RNA RESP QL NAA+PROBE: NEGATIVE
SODIUM SERPL-SCNC: 139 MMOL/L (ref 136–145)
SODIUM SERPL-SCNC: 141 MMOL/L (ref 136–145)
SP GR UR STRIP.AUTO: 1.01 (ref 1–1.03)
UROBILINOGEN UR QL STRIP.AUTO: 1 E.U./DL
WBC # BLD AUTO: 11.94 THOUSAND/UL (ref 4.31–10.16)
WBC #/AREA URNS AUTO: ABNORMAL /HPF
WBC CLUMPS # UR AUTO: PRESENT /UL

## 2021-02-22 PROCEDURE — 87077 CULTURE AEROBIC IDENTIFY: CPT | Performed by: EMERGENCY MEDICINE

## 2021-02-22 PROCEDURE — 85025 COMPLETE CBC W/AUTO DIFF WBC: CPT | Performed by: EMERGENCY MEDICINE

## 2021-02-22 PROCEDURE — 82805 BLOOD GASES W/O2 SATURATION: CPT | Performed by: EMERGENCY MEDICINE

## 2021-02-22 PROCEDURE — 36415 COLL VENOUS BLD VENIPUNCTURE: CPT | Performed by: EMERGENCY MEDICINE

## 2021-02-22 PROCEDURE — 99223 1ST HOSP IP/OBS HIGH 75: CPT | Performed by: INTERNAL MEDICINE

## 2021-02-22 PROCEDURE — 83605 ASSAY OF LACTIC ACID: CPT | Performed by: EMERGENCY MEDICINE

## 2021-02-22 PROCEDURE — 99222 1ST HOSP IP/OBS MODERATE 55: CPT | Performed by: INTERNAL MEDICINE

## 2021-02-22 PROCEDURE — 93005 ELECTROCARDIOGRAM TRACING: CPT

## 2021-02-22 PROCEDURE — G1004 CDSM NDSC: HCPCS

## 2021-02-22 PROCEDURE — 0241U HB NFCT DS VIR RESP RNA 4 TRGT: CPT | Performed by: EMERGENCY MEDICINE

## 2021-02-22 PROCEDURE — 99285 EMERGENCY DEPT VISIT HI MDM: CPT

## 2021-02-22 PROCEDURE — 87040 BLOOD CULTURE FOR BACTERIA: CPT | Performed by: EMERGENCY MEDICINE

## 2021-02-22 PROCEDURE — 83690 ASSAY OF LIPASE: CPT | Performed by: EMERGENCY MEDICINE

## 2021-02-22 PROCEDURE — 99285 EMERGENCY DEPT VISIT HI MDM: CPT | Performed by: EMERGENCY MEDICINE

## 2021-02-22 PROCEDURE — 74176 CT ABD & PELVIS W/O CONTRAST: CPT

## 2021-02-22 PROCEDURE — 82948 REAGENT STRIP/BLOOD GLUCOSE: CPT

## 2021-02-22 PROCEDURE — 85610 PROTHROMBIN TIME: CPT | Performed by: EMERGENCY MEDICINE

## 2021-02-22 PROCEDURE — 99232 SBSQ HOSP IP/OBS MODERATE 35: CPT | Performed by: INTERNAL MEDICINE

## 2021-02-22 PROCEDURE — 96361 HYDRATE IV INFUSION ADD-ON: CPT

## 2021-02-22 PROCEDURE — 93923 UPR/LXTR ART STDY 3+ LVLS: CPT

## 2021-02-22 PROCEDURE — 96360 HYDRATION IV INFUSION INIT: CPT

## 2021-02-22 PROCEDURE — 70450 CT HEAD/BRAIN W/O DYE: CPT

## 2021-02-22 PROCEDURE — 83605 ASSAY OF LACTIC ACID: CPT | Performed by: INTERNAL MEDICINE

## 2021-02-22 PROCEDURE — 71045 X-RAY EXAM CHEST 1 VIEW: CPT

## 2021-02-22 PROCEDURE — 83880 ASSAY OF NATRIURETIC PEPTIDE: CPT | Performed by: EMERGENCY MEDICINE

## 2021-02-22 PROCEDURE — 81001 URINALYSIS AUTO W/SCOPE: CPT | Performed by: EMERGENCY MEDICINE

## 2021-02-22 PROCEDURE — 80053 COMPREHEN METABOLIC PANEL: CPT | Performed by: EMERGENCY MEDICINE

## 2021-02-22 PROCEDURE — 84145 PROCALCITONIN (PCT): CPT | Performed by: EMERGENCY MEDICINE

## 2021-02-22 PROCEDURE — 71250 CT THORAX DX C-: CPT

## 2021-02-22 PROCEDURE — 82140 ASSAY OF AMMONIA: CPT | Performed by: EMERGENCY MEDICINE

## 2021-02-22 PROCEDURE — 80048 BASIC METABOLIC PNL TOTAL CA: CPT | Performed by: INTERNAL MEDICINE

## 2021-02-22 PROCEDURE — 93923 UPR/LXTR ART STDY 3+ LVLS: CPT | Performed by: SURGERY

## 2021-02-22 PROCEDURE — 85730 THROMBOPLASTIN TIME PARTIAL: CPT | Performed by: EMERGENCY MEDICINE

## 2021-02-22 PROCEDURE — 87186 SC STD MICRODIL/AGAR DIL: CPT | Performed by: EMERGENCY MEDICINE

## 2021-02-22 RX ORDER — VANCOMYCIN HYDROCHLORIDE 1 G/200ML
1000 INJECTION, SOLUTION INTRAVENOUS ONCE
Status: COMPLETED | OUTPATIENT
Start: 2021-02-22 | End: 2021-02-22

## 2021-02-22 RX ORDER — ACETAMINOPHEN 325 MG/1
650 TABLET ORAL EVERY 4 HOURS PRN
Status: DISCONTINUED | OUTPATIENT
Start: 2021-02-22 | End: 2021-03-01 | Stop reason: HOSPADM

## 2021-02-22 RX ORDER — HEPARIN SODIUM 5000 [USP'U]/ML
5000 INJECTION, SOLUTION INTRAVENOUS; SUBCUTANEOUS EVERY 8 HOURS SCHEDULED
Status: DISCONTINUED | OUTPATIENT
Start: 2021-02-22 | End: 2021-02-24

## 2021-02-22 RX ORDER — CEFEPIME HYDROCHLORIDE 1 G/50ML
1000 INJECTION, SOLUTION INTRAVENOUS EVERY 24 HOURS
Status: DISCONTINUED | OUTPATIENT
Start: 2021-02-23 | End: 2021-02-23

## 2021-02-22 RX ORDER — ALBUMIN (HUMAN) 12.5 G/50ML
25 SOLUTION INTRAVENOUS EVERY 8 HOURS
Status: DISCONTINUED | OUTPATIENT
Start: 2021-02-22 | End: 2021-02-25

## 2021-02-22 RX ORDER — CEFEPIME HYDROCHLORIDE 1 G/50ML
1000 INJECTION, SOLUTION INTRAVENOUS EVERY 24 HOURS
Status: DISCONTINUED | OUTPATIENT
Start: 2021-02-22 | End: 2021-02-22

## 2021-02-22 RX ADMIN — HEPARIN SODIUM 5000 UNITS: 5000 INJECTION INTRAVENOUS; SUBCUTANEOUS at 20:21

## 2021-02-22 RX ADMIN — SODIUM CHLORIDE 1000 ML: 0.9 INJECTION, SOLUTION INTRAVENOUS at 11:50

## 2021-02-22 RX ADMIN — CEFEPIME HYDROCHLORIDE 1000 MG: 1 INJECTION, SOLUTION INTRAVENOUS at 13:58

## 2021-02-22 RX ADMIN — INSULIN LISPRO 1 UNITS: 100 INJECTION, SOLUTION INTRAVENOUS; SUBCUTANEOUS at 17:45

## 2021-02-22 RX ADMIN — SODIUM CHLORIDE 500 ML: 0.9 INJECTION, SOLUTION INTRAVENOUS at 08:18

## 2021-02-22 RX ADMIN — ALBUMIN (HUMAN) 25 G: 0.25 INJECTION, SOLUTION INTRAVENOUS at 12:44

## 2021-02-22 RX ADMIN — VANCOMYCIN HYDROCHLORIDE 1000 MG: 1 INJECTION, SOLUTION INTRAVENOUS at 14:32

## 2021-02-22 RX ADMIN — ALBUMIN (HUMAN) 25 G: 0.25 INJECTION, SOLUTION INTRAVENOUS at 20:21

## 2021-02-22 NOTE — ASSESSMENT & PLAN NOTE
Patient has aortic stenosis and has recurrent admissions for congestive heart failure, and is a poor candidate for surgery as well  However I had a discussion with the patient's son regarding the patient's wishes for her code status and he mentioned that patient would have not wanted herself to be resuscitated or intubated or shock  Hence patient will be made DNR level 3

## 2021-02-22 NOTE — ASSESSMENT & PLAN NOTE
Patient has hypertension which is most likely secondary to dehydration and intravascular volume depletion  Patient was on torsemide at home, possible poor oral intake as well  Patient currently has LAVONNE on CKD  Does not look to be significantly volume overloaded hence most likely patient is dehydrated, will treat the patient with IV albumin, hold off on IV fluids due to the risk of worsening CHF  Get a 2D echocardiogram, follow-up cardiology recommendations  Monitor BMP  Follow up culture data, meanwhile patient will be on empiric IV antibiotics    Will discontinue antibiotics in 48 hours if cultures negative

## 2021-02-22 NOTE — ASSESSMENT & PLAN NOTE
Lab Results   Component Value Date    HGBA1C 7 1 (H) 02/01/2021       No results for input(s): POCGLU in the last 72 hours  Blood Sugar Average: Last 72 hrs:   continue Accu-Chek q i d  with sliding scale insulin low dose

## 2021-02-22 NOTE — SEPSIS NOTE
Sepsis Note   Denise Bueno 66 y o  female MRN: [id number]  Unit/Bed#: [address] [address] Encounter: [id number]      qSOFA     Row Name 02/22/21 12:30:44 02/22/21 11:54:44 02/22/21 11:36:44 02/22/21 1021 02/22/21 0957    Altered mental status GCS < 15  --  --  --  --  --    Respiratory Rate > / =22  --  1  --  0  0    Systolic BP < / =523  1  0  1  0  0    Q Sofa Score  2  2  2  1  1    Row Name 02/22/21 0812 02/22/21 0725             Altered mental status GCS < 15  1  1       Respiratory Rate > / =22  --  0       Systolic BP < / =383  --  0       Q Sofa Score  1  1           Initial Sepsis Screening     Row Name 02/22/21 1258 02/22/21 0945             Is the patient's history suggestive of a new or worsening infection? No  -VL  No  -LK       Suspected source of infection  --  --       Are two or more of the following signs & symptoms of infection both present and new to the patient?  --  --       Indicate SIRS criteria  Altered mental status; Tachypnea > 20 resp per min  -VL  --       If the answer is yes to both questions, suspicion of sepsis is present  --  --       If severe sepsis is present AND tissue hypoperfusion perists in the hour after fluid resuscitation or lactate > 4, the patient meets criteria for SEPTIC SHOCK  --  --       Are any of the following organ dysfunction criteria present within 6 hours of suspected infection and SIRS criteria that are NOT considered to be chronic conditions? No  -VL  --       Organ dysfunction  -- Lactic acid, LAVONNE on CKD  -VL  --       Date of presentation of severe sepsis  --  --       Time of presentation of severe sepsis  --  --       Tissue hypoperfusion persists in the hour after crystalloid fluid administration, evidenced, by either:  --  --       Was hypotension present within one hour of the conclusion of crystalloid fluid administration?   Yes  -VL  --       Date of presentation of septic shock  --  --       Time of presentation of septic shock  --  -- User Key  (r) = Recorded By, (t) = Taken By, (c) = Cosigned By    [address] Name Provider Type    RIKKI Garza DO Physician    STEPHIE Franco MD Physician          Patient does not have a source of infection, patient did not have any fevers, no history of cough or cold, no history of any urinary symptoms, no abdominal pain  Patient had altered mental status only, and urinalysis negative in the ED  But blood cultures have been drawn  Patient has tachypnea and has multiple reasons for her to be tachypneic including possible congestive heart failure and cardiorenal syndrome  We will draw blood cultures x2 sets, treat the patient with IV vancomycin 1 g x 1 dose, and cefepime 1 g q 12 hours  Follow up culture data  Patient's lactic acidosis is possibly from poor perfusion from decreased cardiac output as well  We will be cautious in giving patient aggressive IV fluids due to the fact that the patient has CHF with a low aortic valve area  We will treat the patient with IV albumin instead  Cardiology on board  Aggressive IV fluids is detrimental to the patient's condition  Currently we will follow the patient  Patient does have some mild erythema and redness in her right lower extremity, however not significant enough to cause septic shock  It looks more like a skin tear and bilateral lower extremity chronic venous stasis changes, right worse than the left  We will follow-up lactic acid levels  Consult Cardiology already

## 2021-02-22 NOTE — ASSESSMENT & PLAN NOTE
Patient is lethargic and drowsy:  Blood pressure running low, will consider offering metoprolol once    Continue supportive care

## 2021-02-22 NOTE — SEPSIS NOTE
Sepsis Note   Ciara Lyon 66 y o  female MRN: [id number]  Unit/Bed#: ED CT1 Encounter: [id number]      qSOFA     Row Name 02/22/21 6155 02/22/21 0725             Altered mental status GCS < 15  1  1       Respiratory Rate > / =22  --  0       Systolic BP < / =226  --  0       Q Sofa Score  1  1           Initial Sepsis Screening     Row Name 02/22/21 0945                Is the patient's history suggestive of a new or worsening infection? No  -LK        Suspected source of infection  --        Are two or more of the following signs & symptoms of infection both present and new to the patient?  --        Indicate SIRS criteria  --        If the answer is yes to both questions, suspicion of sepsis is present  --        If severe sepsis is present AND tissue hypoperfusion perists in the hour after fluid resuscitation or lactate > 4, the patient meets criteria for SEPTIC SHOCK  --        Are any of the following organ dysfunction criteria present within 6 hours of suspected infection and SIRS criteria that are NOT considered to be chronic conditions?   --        Organ dysfunction  --        Date of presentation of severe sepsis  --        Time of presentation of severe sepsis  --        Tissue hypoperfusion persists in the hour after crystalloid fluid administration, evidenced, by either:  --        Was hypotension present within one hour of the conclusion of crystalloid fluid administration?  --        Date of presentation of septic shock  --        Time of presentation of septic shock  --          User Key  (r) = Recorded By, (t) = Taken By, (c) = Cosigned By    [address] Name Provider Type    RIKKI Urena DO Physician

## 2021-02-22 NOTE — CONSULTS
Consultation - Cardiology   Jackie Louis 66 y o  female MRN: [id number]  Unit/Bed#: 412[address] [facility] 413-01 Encounter: [id number]    Assessment/Plan     Assessment:  1  Metabolic encephalopathy  2  Critical aortic stenosis  3  Acute kidney injury on chronic kidney disease  4  Diabetes  5  Hypotension  6  Chronic combined systolic and diastolic heart failure:  Dry weight appears to be 205 lb    Plan:  Patient has been admitted to the hospitalist service  1  IV fluids as per the primary team  2  Will hold Demadex, Aldactone and Toprol due to hypotension  3  Will give albumin 25%, 25 grams IV q 8 hours in attempt to mobilize patient's fluid in the 3rd space back into the vasculature  Will continue to monitor  During previous admissions, patient has required IV Lasix drip once blood pressures were stable  4  Will cancel echo as we know valve area is critical and she is not candidate for surgery  History of Present Illness   Physician Requesting Consult: Chanda Adorno MD  Reason for Consult / Principal Problem:  Hypotension, critical aortic stenosis, change in mental status    HPI: Jackie Louis is a 66y o  year old female who presented to the emergency room after family noted change in mental status over the last 24 hours  She was recently admitted to [facility] from 01/30/2021 to 02/04/2021 also for change in mental status         During that hospital admission, she was seen by CT surgery, Dr Bal Sampson, who felt that in his opinion, she was medically declaring that she was unlikely to derive any benefit from TAVR due to her multiple medical issues which have been difficult to manage despite her failure of optimal medical therapy  Palliative care was offered during that admission and declined by patient's son      Patient is a poor historian due to mental status, I did contact her son, Chela Corona at [phone], who states that mom has been confused over the last 24 hours and she also had experienced a fall yesterday while trying to take herself to the bedroom  He noted after lunch she just did not seem to be herself she was not answering questions and he noted increased tremors in her hands  She would just states that she did not feel well  Inpatient consult to Cardiology  Consult performed by: ISIDRA Diaz  Consult ordered by: Susanne Smallwood MD          Review of Systems   Unable to perform ROS: Mental status change (Patient states no to questions regarding review of systems, she does know she is here in the hospital and when asked why she came, she states because she did not feel well )   Constitutional: Positive for activity change  Negative for appetite change and fever  HENT: Negative  Negative for congestion, ear discharge, mouth sores, rhinorrhea, sneezing and tinnitus  Eyes: Negative  Negative for photophobia and visual disturbance  Respiratory: Negative  Negative for apnea and shortness of breath  Cardiovascular: Negative  Negative for chest pain, palpitations and leg swelling  Gastrointestinal: Negative  Negative for abdominal distention, blood in stool, diarrhea, nausea and vomiting  Endocrine: Negative  Negative for polydipsia, polyphagia and polyuria  Genitourinary: Negative  Negative for difficulty urinating  Musculoskeletal: Negative  Negative for arthralgias  Neurological: Negative  Hematological: Negative  Psychiatric/Behavioral: Negative          Historical Information   Past Medical History:   Diagnosis Date    Arthritis     Atrial flutter ([facility] )     Cardiac disease     Carotid artery occlusion     CHF (congestive heart failure) ([facility] )     Cholelithiasis     last assessed 8/8/2016    Coronary artery disease     Diabetes mellitus ([facility] )     Disease of thyroid gland     Essential hypertension 8/15/2016    GERD (gastroesophageal reflux disease)     History of transfusion     Hyperlipidemia     Hypertension     Long-term insulin use in type 2 diabetes [facility] 6/4/2016    Other specified diabetes mellitus with diabetic autonomic (poly)neuropathy ([facility] )     Oxygen dependent     3L    Pleural effusion 2008    [facility] embolism [facility]     2008    Renal disorder     Retinopathy     bilat    Sleep apnea     USES 3 LITER O2 CONTINOUIS    Subclavian artery stenosis (HCC)     Umbilical hernia with obstruction, without gangrene     last assessed 7/8/2016     Past Surgical History:   Procedure Laterality Date    ABDOMINAL SURGERY      CARDIAC PACEMAKER PLACEMENT      CARDIAC SURGERY      cabg x 2    CATARACT EXTRACTION      CHOLECYSTECTOMY      COLONOSCOPY      CORONARY ARTERY BYPASS GRAFT  2008    EYE SURGERY      FRACTURE SURGERY Right 2010    shoulder    HERNIA REPAIR      umbilical    MA LAP,CHOLECYSTECTOMY N/A 8/10/2016    Procedure: CHOLECYSTECTOMY LAPAROSCOPIC;  Surgeon: Mariel Scanlon MD;  Location: [facility];  Service: General     [address] Left 11/4/2016    Procedure: Cornelio Shaggy; LEFT VOCAL FOLD INJECTION ;  Surgeon: Raúl Maldonado MD;  [facility];  Service: ENT    MA REPAIR UMBILICAL KGXA,2+D/M,TTIAK N/A 8/10/2016    Procedure: LAPAROSCOPIC REPAIR HERNIA VENTRAL;  Surgeon: Mariel Scanlon MD;  Location[facility];  Service: General    [address] / STENTING Right     TRICUSPID VALVE REPLACEMENT      VASCULAR SURGERY      heart valve replacement     Social History     Substance and Sexual Activity   Alcohol Use Not Currently    Alcohol/week: 0 0 standard drinks    Frequency: Never    Drinks per session: Patient refused    Binge frequency: Never    Comment: only on NEW YEAR'S     Social History     Substance and Sexual Activity   Drug Use Never    Types: Oxycodone     E-Cigarette/Vaping    E-Cigarette Use Never User      E-Cigarette/Vaping Substances    Nicotine No     THC No     CBD No     Flavoring No     Other No     Unknown No Social History     Tobacco Use   Smoking Status Never Smoker   Smokeless Tobacco Never Used     Family History:   Family History   Problem Relation Age of Onset    Heart disease Mother     Breast cancer Maternal Aunt     Heart disease Sister        Meds/Allergies   all current active meds have been reviewed, current meds:   Current Facility-Administered Medications   Medication Dose Route Frequency    acetaminophen (TYLENOL) tablet 650 mg  650 mg Oral Q4H PRN    albumin human (FLEXBUMIN) 25 % injection 25 g  25 g Intravenous Q8H    cefepime (MAXIPIME) IVPB (premix in dextrose) 1,000 mg 50 mL  1,000 mg Intravenous Q24H    [START ON 2021] levothyroxine tablet 137 mcg  137 mcg Oral Daily    vancomycin (VANCOCIN) IVPB (premix in dextrose) 1,000 mg 200 mL  1,000 mg Intravenous Once    and PTA meds:   Prior to Admission Medications   Prescriptions Last Dose Informant Patient Reported? Taking?    Accu-Chek Virgie Plus test strip  Child No Yes   Sig: TEST BLOOD SUGAR 3 TIMES EVERY DAY  DX: E11 65   B-D UF III MINI PEN NEEDLES 31G X 5 MM MISC  Child Yes Yes   Si (four) times a day As directed   COMBIGAN 0 2-0 5 % 2021 at Unknown time Child Yes Yes   Sig: Administer 1 drop to both eyes 2 (two) times a day   Insulin Aspart (NovoLOG PenFill) 100 UNITS/ML cartridge for injection  Child No Yes   Sig: Inject 8 Units under the skin 3 (three) times a day with meals   acetaminophen (TYLENOL) 325 mg tablet 2021 at Unknown time Child No Yes   Sig: Take 2 tablets (650 mg total) by mouth every 4 (four) hours as needed for mild pain or fever   dabigatran etexilate (PRADAXA) 75 mg capsule 2021 at Unknown time  No Yes   Sig: Take 1 capsule (75 mg total) by mouth every 12 (twelve) hours   famotidine (PEPCID) 20 mg tablet 2021 at Unknown time Child No Yes   Sig: TAKE 1 TABLET BY MOUTH TWICE A DAY   gabapentin (NEURONTIN) 300 mg capsule 2021 at Unknown time Child No Yes   Sig: Take 1 capsule (300 mg total) by mouth daily at bedtime   insulin glargine (Basaglar KwikPen) 100 units/mL injection pen  Child No Yes   Sig: Inject 15 Units under the skin daily   lactulose (CHRONULAC) 10 g/15 mL solution 2/21/2021 at Unknown time  No Yes   Sig: Take 15 mL (10 g total) by mouth 2 (two) times a day   levothyroxine (Synthroid) 137 mcg tablet 2/22/2021 at Unknown time Child No Yes   Sig: Take 1 tablet (137 mcg total) by mouth daily   metoprolol succinate (TOPROL-XL) 50 mg 24 hr tablet 2/21/2021 at Unknown time Child No Yes   Sig: TAKE 1 TABLET BY MOUTH EVERY DAY   potassium chloride (Klor-Con M10) 10 mEq tablet 2/20/2021 Child No Yes   Sig: Take 2 tablets (20 mEq total) by mouth daily   simvastatin (ZOCOR) 20 mg tablet  Child No No   Sig: TAKE 1 TABLET (20MG) BY ORAL ROUTE EVERY DAY IN THE EVENING   spironolactone (ALDACTONE) 25 mg tablet 2/21/2021 at Unknown time Child No Yes   Sig: Take 1 tablet (25 mg total) by mouth daily   Patient taking differently: Take 25 mg by mouth daily She is waiting to  at the pharmacy today, 2/5   torsemide (DEMADEX) 20 mg tablet 2/21/2021 at Unknown time  No Yes   Sig: Take 3 tablets (60 mg total) by mouth daily      Facility-Administered Medications: None     Allergies   Allergen Reactions    Bromide Ion [Bromine]      Blurred vision  Has preservative that  Pt cannot use    Other Blisters     Adhesive tape    Timolol      Per pt, allergic to steroid in medication    Tramadol      cognition changes    Ibuprofen Palpitations    Penicillins Rash       Objective   Vitals: Blood pressure (!) 85/67, pulse 60, temperature (!) 96 8 °F (36 °C), temperature source Oral, resp  rate 22, SpO2 96 %, not currently breastfeeding    Orthostatic Blood Pressures      Most Recent Value   Blood Pressure  (!) 85/67 filed at 02/22/2021 1230   Patient Position - Orthostatic VS  Lying filed at 02/22/2021 1021            Intake/Output Summary (Last 24 hours) at 2/22/2021 1246  Last data filed at 2/22/2021 1033  Gross per 24 hour   Intake 1000 ml   Output 400 ml   Net 600 ml       Invasive Devices     Peripheral Intravenous Line            Peripheral IV 21 Left Forearm less than 1 day                Physical Exam  Vitals signs and nursing note reviewed  Constitutional:       Appearance: Normal appearance  She is obese  HENT:      Head: Normocephalic and atraumatic  Right Ear: External ear normal       Left Ear: External ear normal       Nose: Nose normal    Eyes:      General: Scleral icterus present  Right eye: No discharge  Left eye: No discharge  Conjunctiva/sclera: Conjunctivae normal       Pupils: Pupils are equal, round, and reactive to light  Neck:      Musculoskeletal: Normal range of motion and neck supple  Cardiovascular:      Rate and Rhythm: Normal rate and regular rhythm  Pulses: Normal pulses  Heart sounds: Murmur present  Systolic murmur present with a grade of 3/6  Pulmonary:      Effort: Pulmonary effort is normal  No respiratory distress  Breath sounds: Normal breath sounds  No wheezing, rhonchi or rales  Abdominal:      General: Bowel sounds are normal  There is no distension  Palpations: Abdomen is soft  Musculoskeletal:      Right lower le+ Pitting Edema present  Left lower le+ Pitting Edema present  Skin:     General: Skin is warm and dry  Capillary Refill: Capillary refill takes less than 2 seconds  Findings: Lesion present  Comments: Venous stasis discoloration of both lower extremities, areas of wounds on upper arms where looks like patient has been scratching self  Neurological:      Mental Status: She is easily aroused  She is disoriented  Psychiatric:         Mood and Affect: Affect is blunt  Speech: Speech is delayed  Behavior: Behavior is cooperative  Cognition and Memory: Cognition is impaired  Judgment: Judgment is impulsive           Lab Results:   I have personally reviewed pertinent lab results  CBC with diff:   Results from last 7 days   Lab Units 02/22/21  0734   WBC Thousand/uL 11 94*   RBC Million/uL 4 23   HEMOGLOBIN g/dL 11 1*   HEMATOCRIT % 39 4   MCV fL 93   MCH pg 26 2*   MCHC g/dL 28 2*   RDW % 19 7*   MPV fL 10 3   PLATELETS Thousands/uL 177     CMP:   Results from last 7 days   Lab Units 02/22/21  0734   SODIUM mmol/L 139   POTASSIUM mmol/L 4 6   CHLORIDE mmol/L 103   CO2 mmol/L 29   BUN mg/dL 105*   CREATININE mg/dL 2 21*   CALCIUM mg/dL 8 8   AST U/L 24   ALT U/L 20   ALK PHOS U/L 212*   EGFR ml/min/1 73sq m 22     Troponin:   0   Lab Value Date/Time    TROPONINI 0 03 01/30/2021 1410    TROPONINI 0 03 11/30/2020 1157    TROPONINI 0 02 09/27/2020 2022    TROPONINI <0 02 09/25/2020 1840    TROPONINI <0 02 09/04/2020 1633    TROPONINI <0 02 09/04/2020 1220    TROPONINI <0 02 06/19/2020 1935    TROPONINI <0 02 12/24/2019 0506    TROPONINI <0 02 12/24/2019 0112    TROPONINI 0 02 05/23/2019 1115    TROPONINI 0 04 05/23/2019 0830    TROPONINI <0 02 05/23/2019 0324    TROPONINI 0 02 05/07/2019 0517    TROPONINI <0 02 02/16/2018 1604    TROPONINI <0 02 02/16/2018 1110    TROPONINI <0 02 02/16/2018 0605    TROPONINI 0 03 08/16/2016 0948    TROPONINI 0 04 (H) 08/16/2016 0321    TROPONINI 0 03 08/15/2016 1742    TROPONINI 0 02 06/04/2016 0619    TROPONINI <0 02 06/03/2016 2137     BNP:   Results from last 7 days   Lab Units 02/22/21  0734   POTASSIUM mmol/L 4 6   CHLORIDE mmol/L 103   CO2 mmol/L 29   BUN mg/dL 105*   CREATININE mg/dL 2 21*   CALCIUM mg/dL 8 8   EGFR ml/min/1 73sq m 22     Coags:   Results from last 7 days   Lab Units 02/22/21  0734   PTT seconds 52*   INR  2 07*     Imaging: I have personally reviewed pertinent reports      EKG:  Ventricular pacing with underlying atrial fibrillation  VTE Prophylaxis: Sequential compression device Jero Zamora)     Code Status: Level 1 - Full Code  Advance Directive and Living Will: Yes    Power of : POLST:      Raul Salazar, [address] [facility]

## 2021-02-22 NOTE — ASSESSMENT & PLAN NOTE
Possibly secondary to multifactorial including acute kidney injury on chronic kidney disease, intravascular dehydration and poor perfusion, less likely sepsis however we will start the patient on IV antibiotics vancomycin x1 dose, and cefepime q 12 hours  We will check procalcitonin level in the morning  Will follow-up culture data  Lactic acid was elevated 4 0 however less likely due to sepsis  We will be cautious in giving the patient aggressive IV fluids due to the fact patient has a critical aortic stenosis, and aggressive IV fluids will push the patient to congestive heart failure  Cardiology on board and will consider offering IV albumin  CT of the head negative for any acute intracranial abnormalities, ammonia negative  VBG showed a pH of 7 43, and pCO2 of 46

## 2021-02-22 NOTE — H&P
H&P- Carter Cabot 1950, 66 y o  female MRN: [id number]    Unit/Bed#: [address] [place] [address] Encounter: [id number]    Primary Care Provider: Abel Velasquez MD   Date and time admitted to hospital: 2/22/2021  7:33 AM        SIRS (systemic inflammatory response syndrome) ([facility] )  Assessment & Plan  As evidenced by leukocytosis of 12, and hypotension  Also lactic acid was elevated to 4 0  However patient does not have any source of infection, urinalysis negative, chest x-ray negative for any infiltrates, CT of the abdomen pelvis negative any other intra-abdominal pathology  Treat the patient with IV albumin, follow-up BMP, CBC, follow-up culture data  Currently on IV antibiotics-vancomycin x1 dose, and cefepime 1 g q 24 hours for total of 2 days  Will discontinue antibiotics if cultures negative  Hypotension  Assessment & Plan  Patient has hypertension which is most likely secondary to dehydration and intravascular volume depletion  Patient was on torsemide at home, possible poor oral intake as well  Patient currently has LAVONNE on CKD  Does not look to be significantly volume overloaded hence most likely patient is dehydrated, will treat the patient with IV albumin, hold off on IV fluids due to the risk of worsening CHF  Get a 2D echocardiogram, follow-up cardiology recommendations  Monitor BMP  Follow up culture data, meanwhile patient will be on empiric IV antibiotics  Will discontinue antibiotics in 48 hours if cultures negative      * Altered mental status  Assessment & Plan  Possibly secondary to multifactorial including acute kidney injury on chronic kidney disease, intravascular dehydration and poor perfusion, less likely sepsis however we will start the patient on IV antibiotics vancomycin x1 dose, and cefepime q 12 hours  We will check procalcitonin level in the morning  Will follow-up culture data  Lactic acid was elevated 4 0 however less likely due to sepsis    We will be cautious in giving the patient aggressive IV fluids due to the fact patient has a critical aortic stenosis, and aggressive IV fluids will push the patient to congestive heart failure  Cardiology on board and will consider offering IV albumin  CT of the head negative for any acute intracranial abnormalities, ammonia negative  VBG showed a pH of 7 43, and pCO2 of 46  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, with long-term current use of insulin ([facility])  Assessment & Plan  Lab Results   Component Value Date    HGBA1C 7 1 (H) 02/01/2021       No results for input(s): POCGLU in the last 72 hours  Blood Sugar Average: Last 72 hrs:   continue Accu-Chek q i d  with sliding scale insulin low dose  Coronary artery disease involving coronary bypass graft of native heart without angina pectoris  Assessment & Plan  Patient is lethargic and drowsy:  Blood pressure running low, will consider offering metoprolol once  Continue supportive care    Chronic combined systolic and diastolic congestive heart failure [facility])  Assessment & Plan  Wt Readings from Last 3 Encounters:   02/22/21 97 2 kg (214 lb 3 2 oz)   02/10/21 95 3 kg (210 lb)   02/04/21 89 1 kg (196 lb 5 5 oz)     Blood pressure running low hold heart failure medications  Acute kidney injury superimposed on chronic kidney disease [facility])  Assessment & Plan  Lab Results   Component Value Date    EGFR 22 02/22/2021    EGFR 32 02/04/2021    EGFR 29 02/03/2021    CREATININE 2 21 (H) 02/22/2021    CREATININE 1 60 (H) 02/04/2021    CREATININE 1 71 (H) 02/03/2021   Secondary to possibly over-diuresis, check postvoid residual   Treat with IV albumin  Monitor BMP in the morning  Goals of care, counseling/discussion  Assessment & Plan  Patient has aortic stenosis and has recurrent admissions for congestive heart failure, and is a poor candidate for surgery as well    However I had a discussion with the patient's son regarding the patient's wishes for her code status and he mentioned that patient would have not wanted herself to be resuscitated or intubated or shock  Hence patient will be made DNR level 3  History and Physical - [facility]    Patient Information: Cooper Carvalho 66 y o  female MRN: [id number]  Unit/Bed#: [address] Encounter: [id number]  Admitting Physician: Shasta Parr MD  PCP: Addie Carter MD  Date of Admission:  02/22/21        Date of Service:   02/22/21    Hospital Problem List:     Principal Problem:    Altered mental status  Active Problems:    Hypotension    S[facility]systemic inflammatory response syndrome) ([facility] )    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, with long-term current use of insulin ([facility] )    Coronary artery disease involving coronary bypass graft of native heart without angina pectoris    Chronic combined systolic and diastolic congestive heart failure ([facility] )    Acute kidney injury superimposed on chronic kidney disease ([facility] )    H/O coronary artery bypass surgery    Goals of care, counseling/discussion      VTE Prophylaxis: Heparin    Code Status: Level 3 - DNAR and DNI    Anticipated Length of Stay:  Patient will be admitted on an Inpatient basis with an anticipated length of stay of  > 2 midnights  Justification for Hospital Stay: Altered Mental status  Total Time for Visit, including Counseling / Coordination of Care: 1 hour  Greater than 50% of this total time spent on direct patient counseling and coordination of care  Chief Complaint:     Altered Mental Status (per ems pt has been more weak and altered for the last few days a home with family)    History of Present Illness:    Cooper Carvalho is a 66 y o  female with PMHx significant for aortic stenosis, cirrhosis, history of hyperammonemia in the past, CHF, CKD stage 3, ambulatory dysfunction, obesity, who presents with chief complaints of altered mental status at home    Patient lives alone but her son lives in an apartment upstairs  He usually takes care of his mother most of the time and keeps an eye on her as well  History was obtained from the patient's son  Patient is altered mental status and cannot give a history  Patient was recently admitted at outside hospital after being treated for congestive heart failure, and was found to have aortic stenosis and referred to [facility] for TAVR  However patient was treated for CHF, and was evaluated CT surgery  Patient was deemed high risk for surgery and hence patient was treated for CHF and then discharged to home  Since the patient has been at home patient was well at her baseline until 2-3 days ago when she started having more lethargy and weakness, and over the last couple days she has been having significant weakness that she cannot get out of her bed which is unusual   At baseline patient can get out of her bed to her wheelchair and backed into her bed if needed  Otherwise patient is wheelchair-bound  Patient was significantly lethargic and the patient's son called EMS to get the patient to the ER  During her last hospitalization patient's torsemide was increased to 40 mg daily, and patient was switched from metolazone to spironolactone  When arrived to the ED today patient's vitals were as follows:  Temperature 96 1°, pulse 61, respiratory 16, blood pressure was 142/58 on arrival to the ED and saturating 98% on 2 L via nasal cannula  Patient had further workup  Workup showed some leukocytosis with a white count of 12, no fever, chest x-ray was negative for any infiltrates however CT scan of the chest abdomen pelvis showed some pulmonary congestion but no acute intra-abdominal pathology  Urinalysis was negative for UTI, patient was admitted for altered mental status  On arrival to the floor patient's became hypotensive with systolic in the 01K  At which time patient was given some IV fluids bolus    Patient blood pressure slightly improved with systolic in the 54B  Patient was then admitted for hypotension, and altered mental status  Review of Systems:    Review of Systems: A thorough 10 point review of System was done which was negative for other than that mentioned in HPI       Past Medical and Surgical History:     Past Medical History:   Diagnosis Date    Arthritis     Atrial flutter ([facility] )     Cardiac disease     Carotid artery occlusion     CHF (congestive heart failure) ([place] [address] )     Cholelithiasis     last assessed 8/8/2016    Coronary artery disease     Diabetes mellitus ([place] [address] )     Disease of thyroid gland     Essential hypertension 8/15/2016    GERD (gastroesophageal reflux disease)     History of transfusion     Hyperlipidemia     Hypertension     Long-term insulin use in type 2 diabetes ([place] [address] ) 6/4/2016    Other specified diabetes mellitus with diabetic autonomic (poly)neuropathy ([place] [address] )     Oxygen dependent     3L    Pleural effusion 2008    Pulmonary embolism ([place] [address] )     2008    Renal disorder     Retinopathy     bilat    Sleep apnea     USES 3 LITER O2 CONTINOUIS    Subclavian artery stenosis ([place] [address] )     Umbilical hernia with obstruction, without gangrene     last assessed 7/8/2016       Past Surgical History:   Procedure Laterality Date    ABDOMINAL SURGERY      CARDIAC PACEMAKER PLACEMENT      CARDIAC SURGERY      cabg x 2    CATARACT EXTRACTION      CHOLECYSTECTOMY      COLONOSCOPY      CORONARY ARTERY BYPASS GRAFT  2008    EYE SURGERY      FRACTURE SURGERY Right 2010    shoulder    HERNIA REPAIR      umbilical    WA LAP,CHOLECYSTECTOMY N/A 8/10/2016    Procedure: CHOLECYSTECTOMY LAPAROSCOPIC;  Surgeon: Celena Boeck, MD;  Location: [facility];  Service: General     [address] Left 11/4/2016    Procedure: MICRODIRECT LARYNGOSCOPY; LEFT VOCAL FOLD INJECTION ;  Surgeon: Bibi Chou MD;  [facility];  Service: ENT    WA REPAIR UMBILICAL PTOD,0+F/O,XPUEE N/A 8/10/2016 Procedure: LAPAROSCOPIC REPAIR HERNIA VENTRAL;  Surgeon: Murali Daniels MD;  Location: [facility];  Service: General    [address] / STENTING Right     TRICUSPID VALVE REPLACEMENT      VASCULAR SURGERY      heart valve replacement       Meds/Allergies:    PTA meds:   Prior to Admission Medications   Prescriptions Last Dose Informant Patient Reported? Taking?    Accu-Chek Virgie Plus test strip  Child No Yes   Sig: TEST BLOOD SUGAR 3 TIMES EVERY DAY  DX: E11 65   B-D UF III MINI PEN NEEDLES 31G X 5 MM MISC  Child Yes Yes   Si (four) times a day As directed   COMBIGAN 0 2-0 5 % 2021 at Unknown time Child Yes Yes   Sig: Administer 1 drop to both eyes 2 (two) times a day   Insulin Aspart (NovoLOG PenFill) 100 UNITS/ML cartridge for injection  Child No Yes   Sig: Inject 8 Units under the skin 3 (three) times a day with meals   acetaminophen (TYLENOL) 325 mg tablet 2021 at Unknown time Child No Yes   Sig: Take 2 tablets (650 mg total) by mouth every 4 (four) hours as needed for mild pain or fever   dabigatran etexilate (PRADAXA) 75 mg capsule 2021 at Unknown time  No Yes   Sig: Take 1 capsule (75 mg total) by mouth every 12 (twelve) hours   famotidine (PEPCID) 20 mg tablet 2021 at Unknown time Child No Yes   Sig: TAKE 1 TABLET BY MOUTH TWICE A DAY   gabapentin (NEURONTIN) 300 mg capsule 2021 at Unknown time Child No Yes   Sig: Take 1 capsule (300 mg total) by mouth daily at bedtime   insulin glargine (Basaglar KwikPen) 100 units/mL injection pen  Child No Yes   Sig: Inject 15 Units under the skin daily   lactulose (CHRONULAC) 10 g/15 mL solution 2021 at Unknown time  No Yes   Sig: Take 15 mL (10 g total) by mouth 2 (two) times a day   levothyroxine (Synthroid) 137 mcg tablet 2021 at Unknown time Child No Yes   Sig: Take 1 tablet (137 mcg total) by mouth daily   metoprolol succinate (TOPROL-XL) 50 mg 24 hr tablet 2021 at Unknown time Child No Yes   Sig: TAKE 1 TABLET BY MOUTH EVERY DAY   potassium chloride (Klor-Con M10) 10 mEq tablet 2/20/2021 Child No Yes   Sig: Take 2 tablets (20 mEq total) by mouth daily   simvastatin (ZOCOR) 20 mg tablet  Child No No   Sig: TAKE 1 TABLET (20MG) BY ORAL ROUTE EVERY DAY IN THE EVENING   spironolactone (ALDACTONE) 25 mg tablet 2/21/2021 at Unknown time Child No Yes   Sig: Take 1 tablet (25 mg total) by mouth daily   Patient taking differently: Take 25 mg by mouth daily She is waiting to  at the pharmacy today, 2/5   torsemide (DEMADEX) 20 mg tablet 2/21/2021 at Unknown time  No Yes   Sig: Take 3 tablets (60 mg total) by mouth daily      Facility-Administered Medications: None       Allergies: Allergies   Allergen Reactions    Bromide Ion [Bromine]      Blurred vision  Has preservative that  Pt cannot use    Other Blisters     Adhesive tape    Timolol      Per pt, allergic to steroid in medication    Tramadol      cognition changes    Ibuprofen Palpitations    Penicillins Rash     History:     Marital Status: Single     Substance Use History:   Social History     Substance and Sexual Activity   Alcohol Use Not Currently    Alcohol/week: 0 0 standard drinks    Frequency: Never    Drinks per session: Patient refused    Binge frequency: Never    Comment: only on NEW YEAR'S     Social History     Tobacco Use   Smoking Status Never Smoker   Smokeless Tobacco Never Used     Social History     Substance and Sexual Activity   Drug Use Never    Types: Oxycodone       Family History:    non-contributory    Physical Exam:     Vitals:   Blood Pressure: 95/59 (02/22/21 1314)  Pulse: 60 (02/22/21 1230)  Temperature: (!) 96 8 °F (36 °C) (02/22/21 1154)  Temp Source: Oral (02/22/21 1154)  Respirations: 22 (02/22/21 1154)  Weight - Scale: 97 2 kg (214 lb 3 2 oz) (02/22/21 1147)  SpO2: 96 % (02/22/21 1230)    Physical Exam  General exam patient is lethargic and drowsy, communicates but does not make any sense    Looks to be chronically ill-looking  Eye exam:  Pupils are equal and reactive to light, EOMI  Neck exam:  Supple, short neck  Extremity exam:  Bilateral extremity chronic venous stasis changes noted and mild erythema noted on the right lower extremity with some warm  However minimal tenderness noted  No joint effusions  Skin exam:  No acute rash noted, bilateral lower extremity chronic venous stasis changes noted as, some areas of skin sloughing and excoriations noted on the right lower extremity  Mild edema noted in the right lower extremity as well  CNS exam:  No acute focal neurological deficit  Cardiovascular exam:  S1-S2 irregularly irregular, and bradycardic  Lung exam:  Clear to auscultate bilaterally at the bases no rhonchi or wheezes heard  Lab Results: I have personally reviewed pertinent reports  Results from last 7 days   Lab Units 02/22/21  0734   WBC Thousand/uL 11 94*   HEMOGLOBIN g/dL 11 1*   HEMATOCRIT % 39 4   PLATELETS Thousands/uL 177   NEUTROS PCT % 86*   LYMPHS PCT % 5*   MONOS PCT % 8   EOS PCT % 0     Results from last 7 days   Lab Units 02/22/21  0734   POTASSIUM mmol/L 4 6   CHLORIDE mmol/L 103   CO2 mmol/L 29   BUN mg/dL 105*   CREATININE mg/dL 2 21*   CALCIUM mg/dL 8 8   ALK PHOS U/L 212*   ALT U/L 20   AST U/L 24     Results from last 7 days   Lab Units 02/22/21  0734   INR  2 07*       Imaging: I have personally reviewed pertinent reports  Ct Chest Abdomen Pelvis Wo Contrast    Result Date: 2/22/2021  Narrative: CT CHEST, ABDOMEN AND PELVIS WITHOUT IV CONTRAST INDICATION:   Altered mental status  Sepsis  COMPARISON:  CT of the chest, CT of the abdomen/pelvis dated 10/23/2020, and chest CT dated 1/19/2017  TECHNIQUE: CT examination of the chest, abdomen and pelvis was performed without intravenous contrast   Axial, sagittal, and coronal 2D reformatted images were created from the source data and submitted for interpretation   Radiation dose length product (DLP) for this visit:  1521 66 mGy-cm   This examination, like all CT scans performed in the [facility], was performed utilizing techniques to minimize radiation dose exposure, including the use of iterative reconstruction and automated exposure control  Enteric contrast was not administered  FINDINGS: CHEST LUNGS:  Lung parenchyma is distorted by respiratory motion and atelectasis related to imaging during exhalation  Central predominant groundglass opacities in both lungs  Bandlike opacities in the lung bases with morphology suggesting atelectasis  No acute-appearing dense consolidations  Mild scattered foci of interlobular septal reticulation  3 mm nodule in the right upper lobe (3/36)--present in 2017 confirming benignancy  No suspicious pulmonary nodules  PLEURA:  Trace right layering effusion posteriorly  No loculated components  No pneumothorax  HEART/GREAT VESSELS:  Four-chamber enlargement of the heart  Prior CABG  Native coronary atherosclerosis  Valvular calcifications  Cardiac assist device with leads in the right atrium and right ventricle  No pericardial effusion  Thoracic aortic atherosclerosis without aneurysm  Mildly ectatic main pulmonary artery 30 mm  Pulmonary vessels appear cephalized on coronal reformats  MEDIASTINUM AND JAIRO:  Unremarkable  CHEST WALL AND LOWER NECK:   Unremarkable  ABDOMEN LIVER/BILIARY TREE:  Question nodular contour to liver  No focal parenchymal masses or biliary ductal dilatation, though parenchyma is obscured by motion artifact  GALLBLADDER:  Gallbladder is surgically absent  SPLEEN:  Unremarkable  PANCREAS:  Diffuse fatty atrophy, greatest at the tail  No masses or pancreatic ductal dilatation demonstrated  ADRENAL GLANDS:  Unremarkable  KIDNEYS/URETERS: No contour distorting masses, perinephric collections, urolithiasis, or hydronephrosis  STOMACH AND BOWEL: Small hiatal hernia  No gastric wall thickening    No dilated or inflamed loops of small bowel or colon, including loops bulging through the patient's ventral abdominal wall defect, discussed below  APPENDIX:  Cecum located within the ventral abdominal wall  Appendix not well demonstrated  No evidence for appendicitis, though  ABDOMINOPELVIC CAVITY:  Mild ascites, greatest around the liver and spleen  Small amount of free fluid tracking into the pelvis  No encapsulated collections  No free air  Shotty retroperitoneal lymph nodes are demonstrated without laurel mesenteric, retroperitoneal, or pelvic sidewall lymphadenopathy  VESSELS:  Aortoiliac and branch vessel atherosclerosis without aneurysm  Multiple greater saphenous varices and inguinal regions, greater on the left, measuring up to 18 mm in diameter  PELVIS REPRODUCTIVE ORGANS:  Unremarkable for patient's age  URINARY BLADDER:  Urinary bladder wall appears diffusely thickened, even accounting for degree of nondistention  ABDOMINAL WALL/INGUINAL REGIONS:  Subxiphoid midline hernia with wide neck measuring 3 6 in meters by 2 5 cm with 3 6 cm neck  Abdominal fat and ascites fluid protrudes anteriorly  Hernia repair mesh at the supra umbilical ventral wall  Inferior to the mesh there is pronounced diastasis recti splaying of the rectus abdominis muscles by 9 cm  Mesenteric fat, small bowel, and large bowel loops loops protrude into the defect  Overall, protruding contents measure 18 7 cm x 9 2 cm x 23 cm  Relatively pronounced anasarca around the hips and gluteal regions  No encapsulated superficial collections  OSSEOUS STRUCTURES: Healed median sternotomy  New transverse fracture through the superior endplate of the P31 vertebral body  No significant vertebral body height loss  No significant retropulsed fragments  New anterior compression deformity at the superior endplate of the L3 vertebral body  Approximately 20% height loss  Minimal buckling of the posterior cortex, superiorly, without significant spinal canal stenosis   Grade 1 anterolisthesis of L4 on L5 secondary to bulky facet osteophytes  No destructive lytic or blastic lesions  Impression: 1  Two new vertebral fractures when compared to October  Transverse fracture of the T12 vertebral body without significant height loss  Mild anterior compression deformity of the L3 vertebral body with minimal posterior cortical buckling  No retropulsed fragments or significant neural impingement  2   Thickening of the bladder wall, diffusely  Correlate for evidence of infectious cystitis on urinalysis  3   Pulmonary vascular engorgement and cephalization, hazy central groundglass opacities, scattered interlobular septal thickening, and small right pleural effusion  Constellation of findings favors volume overload with pulmonary edema  4   Mild ascites and anasarca  Nodular liver contour suggestive of cirrhosis  5   Large diastasis defect in the ventral abdomen  Small and large bowel protruding through the defect do not appear obstructed or inflamed  I personally discussed this study with Cristy Moya on 2/22/2021 at 9:31 AM  Workstation performed: [id number]     Xr Mandible Panorex ([place] Only)    Result Date: 2/1/2021  Narrative: MANDIBLE - PANOREX INDICATION:  Preoperative exam, for OR today  COMPARISON:  CT neck 10/24/2016 VIEWS:  XR MANDIBLE PANOREX ([place] ONLY) FINDINGS: There is no mandibular fracture or pathologic bone lesion  The maxilla is essentially devoid of T12 with some residual roots suggested on the left  The mandibular bicuspids and molars are also absent apart from a solitary molar on the right which demonstrates a dental amalgam   There is focal lucency of the crown of left mandibular canine which could represent dental shay  The temporomandibular joints appear grossly intact  Impression: Focal lucency crown of left mandibular canine could represent dental shay  Otherwise, dentition is largely absent as above   The study was marked in [facility] for immediate notification  Workstation performed: [id number]     Xr Chest Portable    Result Date: 2/22/2021  Narrative: CHEST INDICATION:   AMS  COMPARISON:  Chest radiograph from 1/30/2021 and chest CT from 2/22/2021  EXAM PERFORMED/VIEWS:  XR CHEST PORTABLE FINDINGS: Moderate cardiomegaly  CABG  Left subclavian pacemaker lead in right ventricle  The lungs are clear  No pneumothorax or pleural effusion  Osseous structures appear within normal limits for patient age  Impression: No acute cardiopulmonary disease  Workstation performed: [id number]     Xr Chest 1 View Portable    Result Date: 1/30/2021  Narrative: CHEST INDICATION:   ams  COMPARISON:  11/30/2020 EXAM PERFORMED/VIEWS:  XR CHEST PORTABLE FINDINGS:  Left-sided chest wall intracardiac device is identified  Leads are intact  Cardiomediastinal silhouette is within normal limits status post CABG  Vascular congestion  No pneumothorax or pleural effusion  Sternal wires are present  Visualized osseous structures appear otherwise unremarkable for the patient's age  Impression: Vascular congestion  Workstation performed: [id number]     Ct Head Without Contrast    Result Date: 2/22/2021  Narrative: CT BRAIN - WITHOUT CONTRAST INDICATION:   Altered mental status  COMPARISON:  1/30/2021 and 11/30/2020  TECHNIQUE:  CT examination of the brain was performed  In addition to axial images, sagittal and coronal 2D reformatted images were created and submitted for interpretation  Radiation dose length product (DLP) for this visit:  971 mGy-cm   This examination, like all CT scans performed in the [facility], was performed utilizing techniques to minimize radiation dose exposure, including the use of iterative reconstruction and automated exposure control  IMAGE QUALITY:  Diagnostic  FINDINGS: PARENCHYMA:  Moderate patchy periventricular white matter hypodensities consistent with chronic microangiopathic changes    Parenchymal appearance is unchanged from prior  No acute infarct  No parenchymal hemorrhage  No mass  VENTRICLES AND EXTRA-AXIAL SPACES:  Normal for the patient's age  VISUALIZED ORBITS AND PARANASAL SINUSES:  Bilateral cataract surgeries  Orbits are, otherwise, unremarkable  Paranasal sinuses are clear  CALVARIUM AND EXTRACRANIAL SOFT TISSUES:  Normal      Impression: No acute intracranial abnormalities or significant change from priors  Workstation performed: [id number]     Ct Head Without Contrast    Result Date: 1/30/2021  Narrative: CT BRAIN - WITHOUT CONTRAST INDICATION:   ams  COMPARISON:  11/30/2020 TECHNIQUE:  CT examination of the brain was performed  In addition to axial images, sagittal and coronal 2D reformatted images were created and submitted for interpretation  Radiation dose length product (DLP) for this visit:  1812 mGy-cm   This examination, like all CT scans performed in the [facility], was performed utilizing techniques to minimize radiation dose exposure, including the use of iterative reconstruction and automated exposure control  IMAGE QUALITY:  Diagnostic  FINDINGS: PARENCHYMA: Decreased attenuation is noted in periventricular and subcortical white matter demonstrating an appearance that is statistically most likely to represent mild microangiopathic change  No CT signs of acute infarction  No intracranial mass, mass effect or midline shift  No acute parenchymal hemorrhage  VENTRICLES AND EXTRA-AXIAL SPACES:  Normal for the patient's age  VISUALIZED ORBITS AND PARANASAL SINUSES:  Unremarkable  CALVARIUM AND EXTRACRANIAL SOFT TISSUES:  Normal      Impression: No acute intracranial abnormality  Workstation performed: [id number]     Us Right Upper Quadrant    Result Date: 2/2/2021  Narrative: RIGHT UPPER QUADRANT ULTRASOUND INDICATION:     elevated LFTs, ammonia  COMPARISON:  Right upper quadrant ultrasound dated 6/6/2016   TECHNIQUE:   Real-time ultrasound of the right upper quadrant was performed with a curvilinear transducer with both volumetric sweeps and still imaging techniques  FINDINGS: PANCREAS:  Portions of the pancreas are obscured by bowel gas  Visualized portions of the pancreas are unremarkable  AORTA AND IVC:  Visualized portions are normal for patient age  LIVER: Size:  Mildly enlarged  The liver measures 18 5 cm in the midclavicular line  Contour:  Surface contour is nodular  Parenchyma: There is diffuse coarsened heterogeneous echotexture suggesting underlying cirrhotic changes  No evidence of suspicious mass  Limited imaging of the main portal vein shows it to be patent and hepatopetal   BILIARY: Patient has undergone cholecystectomy  No intrahepatic biliary dilatation  CBD measures 4 mm  No choledocholithiasis  KIDNEY: Right kidney measures 13 4 cm  Within normal limits  ASCITES:   None  Impression: Cirrhosis  No definite focal liver mass  No significant ascites  Status post cholecystectomy  Workstation performed: [id number]     Us Elastography    Result Date: 2/2/2021  Narrative: ELASTOGRAPHY, LIVER ULTRASOUND INDICATION:   abnormal lft  COMPARISON:  None TECHNIQUE: Targeted ultrasound of the liver was performed with a curvilinear transducer utilizing a total of 10 shear wave Elastography samples  FINDINGS:  Shear Wave Elastography sampling was performed to evaluate stiffness changes within the liver associated with fibrosis  The resulting E median is 12 2 kPa  The corresponding Metavir score is F4 (cirrhosis), >11 87 kPa   >1 98 m/s  The IQR/median value is 15 4 %  (IQR of less than 30% is considered a satisfactory data set)  Impression: Metavir score of F4,Ccirrhosis  Workstation performed: [id number]       XR chest portable   Final Result      No acute cardiopulmonary disease  Workstation performed: [id number]         CT head without contrast   Final Result      No acute intracranial abnormalities or significant change from priors  Workstation performed: [id number]         CT chest abdomen pelvis wo contrast   Final Result      1  Two new vertebral fractures when compared to October  Transverse fracture of the T12 vertebral body without significant height loss  Mild anterior compression deformity of the L3 vertebral body with minimal posterior cortical buckling  No    retropulsed fragments or significant neural impingement  2   Thickening of the bladder wall, diffusely  Correlate for evidence of infectious cystitis on urinalysis  3   Pulmonary vascular engorgement and cephalization, hazy central groundglass opacities, scattered interlobular septal thickening, and small right pleural effusion  Constellation of findings favors volume overload with pulmonary edema  4   Mild ascites and anasarca  Nodular liver contour suggestive of cirrhosis  5   Large diastasis defect in the ventral abdomen  Small and large bowel protruding through the defect do not appear obstructed or inflamed  I personally discussed this study with Michelle Mick on 2/22/2021 at 9:31 AM          Workstation performed: [id number]         VAS GRAHAM & waveform analysis, multiple levels    (Results Pending)       EKG, Pathology, and Other Studies Reviewed on Admission:   Atrial fibrillation with rate controlled, bradycardic  Allscripts/EPIC Records Reviewed: Yes     ** Please Note: "This note has been constructed using a voice recognition system  Therefore there may be syntax, spelling, and/or grammatical errors   Please call if you have any questions  "**

## 2021-02-22 NOTE — ASSESSMENT & PLAN NOTE
Lab Results   Component Value Date    EGFR 22 02/22/2021    EGFR 32 02/04/2021    EGFR 29 02/03/2021    CREATININE 2 21 (H) 02/22/2021    CREATININE 1 60 (H) 02/04/2021    CREATININE 1 71 (H) 02/03/2021   Secondary to possibly over-diuresis, check postvoid residual   Treat with IV albumin  Monitor BMP in the morning

## 2021-02-22 NOTE — PLAN OF CARE
Problem: Potential for Falls  Goal: Patient will remain free of falls  Description: INTERVENTIONS:  - Assess patient frequently for physical needs  -  Identify cognitive and physical deficits and behaviors that affect risk of falls    -  [place] fall precautions as indicated by assessment   - Educate patient/family on patient safety including physical limitations  - Instruct patient to call for assistance with activity based on assessment  - Modify environment to reduce risk of injury  - Consider OT/PT consult to assist with strengthening/mobility  Outcome: Progressing     Problem: Prexisting or High Potential for Compromised Skin Integrity  Goal: Skin integrity is maintained or improved  Description: INTERVENTIONS:  - Identify patients at risk for skin breakdown  - Assess and monitor skin integrity  - Assess and monitor nutrition and hydration status  - Monitor labs   - Assess for incontinence   - Turn and reposition patient  - Assist with mobility/ambulation  - Relieve pressure over bony prominences  - Avoid friction and shearing  - Provide appropriate hygiene as needed including keeping skin clean and dry  - Evaluate need for skin moisturizer/barrier cream  - Collaborate with interdisciplinary team   - Patient/family teaching  - Consider wound care consult   Outcome: Progressing     Problem: PAIN - ADULT  Goal: Verbalizes/displays adequate comfort level or baseline comfort level  Description: Interventions:  - Encourage patient to monitor pain and request assistance  - Assess pain using appropriate pain scale  - Administer analgesics based on type and severity of pain and evaluate response  - Implement non-pharmacological measures as appropriate and evaluate response  - Consider cultural and social influences on pain and pain management  - Notify physician/advanced practitioner if interventions unsuccessful or patient reports new pain  Outcome: Progressing     Problem: INFECTION - ADULT  Goal: Absence or prevention of progression during hospitalization  Description: INTERVENTIONS:  - Assess and monitor for signs and symptoms of infection  - Monitor lab/diagnostic results  - Monitor all insertion sites, i e  indwelling lines, tubes, and drains  - Monitor endotracheal if appropriate and nasal secretions for changes in amount and color  - [place] appropriate cooling/warming therapies per order  - Administer medications as ordered  - Instruct and encourage patient and family to use good hand hygiene technique  - Identify and instruct in appropriate isolation precautions for identified infection/condition  Outcome: Progressing  Goal: Absence of fever/infection during neutropenic period  Description: INTERVENTIONS:  - Monitor WBC    Outcome: Progressing     Problem: SAFETY ADULT  Goal: Maintain or return to baseline ADL function  Description: INTERVENTIONS:  -  Assess patient's ability to carry out ADLs; assess patient's baseline for ADL function and identify physical deficits which impact ability to perform ADLs (bathing, care of mouth/teeth, toileting, grooming, dressing, etc )  - Assess/evaluate cause of self-care deficits   - Assess range of motion  - Assess patient's mobility; develop plan if impaired  - Assess patient's need for assistive devices and provide as appropriate  - Encourage maximum independence but intervene and supervise when necessary  - Involve family in performance of ADLs  - Assess for home care needs following discharge   - Consider OT consult to assist with ADL evaluation and planning for discharge  - Provide patient education as appropriate  Outcome: Progressing  Goal: Maintain or return mobility status to optimal level  Description: INTERVENTIONS:  - Assess patient's baseline mobility status (ambulation, transfers, stairs, etc )    - Identify cognitive and physical deficits and behaviors that affect mobility  - Identify mobility aids required to assist with transfers and/or ambulation (gait belt, sit-to-stand, lift, walker, cane, etc )  - [place] fall precautions as indicated by assessment  - Record patient progress and toleration of activity level on Mobility SBAR; progress patient to next Phase/Stage  - Instruct patient to call for assistance with activity based on assessment  - Consider rehabilitation consult to assist with strengthening/weightbearing, etc   Outcome: Progressing     Problem: DISCHARGE PLANNING  Goal: Discharge to home or other facility with appropriate resources  Description: INTERVENTIONS:  - Identify barriers to discharge w/patient and caregiver  - Arrange for needed discharge resources and transportation as appropriate  - Identify discharge learning needs (meds, wound care, etc )  - Arrange for interpretive services to assist at discharge as needed  - Refer to Case Management Department for coordinating discharge planning if the patient needs post-hospital services based on physician/advanced practitioner order or complex needs related to functional status, cognitive ability, or social support system  Outcome: Progressing     Problem: Knowledge Deficit  Goal: Patient/family/caregiver demonstrates understanding of disease process, treatment plan, medications, and discharge instructions  Description: Complete learning assessment and assess knowledge base    Interventions:  - Provide teaching at level of understanding  - Provide teaching via preferred learning methods  Outcome: Progressing     Problem: CARDIOVASCULAR - ADULT  Goal: Maintains optimal cardiac output and hemodynamic stability  Description: INTERVENTIONS:  - Monitor I/O, vital signs and rhythm  - Monitor for S/S and trends of decreased cardiac output  - Administer and titrate ordered vasoactive medications to optimize hemodynamic stability  - Assess quality of pulses, skin color and temperature  - Assess for signs of decreased coronary artery perfusion  - Instruct patient to report change in severity of symptoms  Outcome: Progressing  Goal: Absence of cardiac dysrhythmias or at baseline rhythm  Description: INTERVENTIONS:  - Continuous cardiac monitoring, vital signs, obtain 12 lead EKG if ordered  - Administer antiarrhythmic and heart rate control medications as ordered  - Monitor electrolytes and administer replacement therapy as ordered  Outcome: Progressing     Problem: RESPIRATORY - ADULT  Goal: Achieves optimal ventilation and oxygenation  Description: INTERVENTIONS:  - Assess for changes in respiratory status  - Assess for changes in mentation and behavior  - Position to facilitate oxygenation and minimize respiratory effort  - Oxygen administered by appropriate delivery if ordered  - Initiate smoking cessation education as indicated  - Encourage broncho-pulmonary hygiene including cough, deep breathe, Incentive Spirometry  - Assess the need for suctioning and aspirate as needed  - Assess and instruct to report SOB or any respiratory difficulty  - Respiratory Therapy support as indicated  Outcome: Progressing     Problem: SKIN/TISSUE INTEGRITY - ADULT  Goal: Skin integrity remains intact  Description: INTERVENTIONS  - Identify patients at risk for skin breakdown  - Assess and monitor skin integrity  - Assess and monitor nutrition and hydration status  - Monitor labs (i e  albumin)  - Assess for incontinence   - Turn and reposition patient  - Assist with mobility/ambulation  - Relieve pressure over bony prominences  - Avoid friction and shearing  - Provide appropriate hygiene as needed including keeping skin clean and dry  - Evaluate need for skin moisturizer/barrier cream  - Collaborate with interdisciplinary team (i e  Nutrition, Rehabilitation, etc )   - Patient/family teaching  Outcome: Progressing

## 2021-02-22 NOTE — ED PROVIDER NOTES
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 Taking?    Accu-Chek Virgie Plus test strip  Child No Yes   Sig: TEST BLOOD SUGAR 3 TIMES EVERY DAY  DX: E11 65   B-D UF III MINI PEN NEEDLES 31G X 5 MM MISC  Child Yes Yes   Si (four) times a day As directed   COMBIGAN 0 2-0 5 % 2021 at Unknown time Child Yes Yes   Sig: Administer 1 drop to both eyes 2 (two) times a day   Insulin Aspart (NovoLOG PenFill) 100 UNITS/ML cartridge for injection  Child No Yes   Sig: Inject 8 Units under the skin 3 (three) times a day with meals   acetaminophen (TYLENOL) 325 mg tablet 2021 at Unknown time Child No Yes   Sig: Take 2 tablets (650 mg total) by mouth every 4 (four) hours as needed for mild pain or fever   dabigatran etexilate (PRADAXA) 75 mg capsule 2021 at Unknown time  No Yes   Sig: Take 1 capsule (75 mg total) by mouth every 12 (twelve) hours   famotidine (PEPCID) 20 mg tablet 2021 at Unknown time Child No Yes   Sig: TAKE 1 TABLET BY MOUTH TWICE A DAY   gabapentin (NEURONTIN) 300 mg capsule 2/21/2021 at Unknown time Child No Yes   Sig: Take 1 capsule (300 mg total) by mouth daily at bedtime   insulin glargine (Basaglar KwikPen) 100 units/mL injection pen  Child No Yes   Sig: Inject 15 Units under the skin daily   lactulose (CHRONULAC) 10 g/15 mL solution 2/21/2021 at Unknown time  No Yes   Sig: Take 15 mL (10 g total) by mouth 2 (two) times a day   levothyroxine (Synthroid) 137 mcg tablet 2/22/2021 at Unknown time Child No Yes   Sig: Take 1 tablet (137 mcg total) by mouth daily   metoprolol succinate (TOPROL-XL) 50 mg 24 hr tablet 2/21/2021 at Unknown time Child No Yes   Sig: TAKE 1 TABLET BY MOUTH EVERY DAY   potassium chloride (Klor-Con M10) 10 mEq tablet 2/20/2021 Child No Yes   Sig: Take 2 tablets (20 mEq total) by mouth daily   simvastatin (ZOCOR) 20 mg tablet  Child No No   Sig: TAKE 1 TABLET (20MG) BY ORAL ROUTE EVERY DAY IN THE EVENING   spironolactone (ALDACTONE) 25 mg tablet 2/21/2021 at Unknown time Child No Yes   Sig: Take 1 tablet (25 mg total) by mouth daily   Patient taking differently: Take 25 mg by mouth daily She is waiting to  at the pharmacy today, 2/5   torsemide (DEMADEX) 20 mg tablet 2/21/2021 at Unknown time  No Yes   Sig: Take 3 tablets (60 mg total) by mouth daily      Facility-Administered Medications: None       Past Medical History:   Diagnosis Date    Arthritis     [facility]rial flutter [facility]     Cardiac disease     Carotid artery occlusion     CHF (congestive heart failure) (HCC)     Cholelithiasis     last assessed 8/8/2016    Coronary artery disease     Diabetes mellitus ([facility] )     Disease of thyroid gland     Essential hypertension 8/15/2016    GERD (gastroesophageal reflux disease)     History of transfusion     Hyperlipidemia     Hypertension     Long-term insulin use in type 2 diabetes ([facility] ) 6/4/2016    Other specified diabetes mellitus with diabetic autonomic (poly)neuropathy ([facility]     Oxygen dependent     3L    Pleural effusion 2008    Pulmonary embolism ([facility] )     2008    Renal disorder     Retinopathy     bilat    Sleep apnea     USES 3 LITER O2 CONTINOUIS    Subclavian artery stenosis (HCC)     Umbilical hernia with obstruction, without gangrene     last assessed 7/8/2016       Past Surgical History:   Procedure Laterality Date    ABDOMINAL SURGERY      CARDIAC PACEMAKER PLACEMENT      CARDIAC SURGERY      cabg x 2    CATARACT EXTRACTION      CHOLECYSTECTOMY      COLONOSCOPY      CORONARY ARTERY BYPASS GRAFT  2008    EYE SURGERY      FRACTURE SURGERY Right 2010    shoulder    HERNIA REPAIR      umbilical    NJ LAP,CHOLECYSTECTOMY N/A 8/10/2016    Procedure: CHOLECYSTECTOMY LAPAROSCOPIC;  Surgeon: Claudine Grissom MD;  [facility];  Service: General    NJ LARYNGOSCOPY,DIRECT,SCOPE,INJ CORDS Left 11/4/2016    Procedure: MICRODIRECT LARYNGOSCOPY; LEFT VOCAL FOLD INJECTION ;  Surgeon: Nathalie Cardoso MD;  [facility];  Service: ENT    NJ REPAIR UMBILICAL XZTC,5+Y/P,XCFBE N/A 8/10/2016    Procedure: LAPAROSCOPIC REPAIR HERNIA VENTRAL;  Surgeon: Claudine Grissom MD;  Location[facility];  Service: General    [address] / STENTING Right     TRICUSPID VALVE REPLACEMENT      VASCULAR SURGERY      heart valve replacement       Family History   Problem Relation Age of Onset    Heart disease Mother     Breast cancer Maternal Aunt     Heart disease Sister      I have reviewed and agree with the history as documented      E-Cigarette/Vaping    E-Cigarette Use Never User      E-Cigarette/Vaping Substances    Nicotine No     THC No     CBD No     Flavoring No     Other No     Unknown No      Social History     Tobacco Use    Smoking status: Never Smoker    Smokeless tobacco: Never Used   Substance Use Topics    Alcohol use: Not Currently     Alcohol/week: 0 0 standard drinks     Frequency: Never Drinks per session: Patient refused     Binge frequency: Never     Comment: only on NEW YEAR'S    Drug use: Never     Types: Oxycodone       Review of Systems   Unable to perform ROS: Mental status change       Physical Exam  Physical Exam  Vitals signs and nursing note reviewed  Constitutional:       General: She is not in acute distress  Appearance: She is well-developed  She is obese  She is ill-appearing (chronically ill appearing)  She is not toxic-appearing or diaphoretic  HENT:      Head: Normocephalic and atraumatic  Right Ear: External ear normal       Left Ear: External ear normal       Nose: Nose normal       Mouth/Throat:      Mouth: Mucous membranes are dry  Eyes:      General: Scleral icterus present  Extraocular Movements: Extraocular movements intact  Conjunctiva/sclera: Conjunctivae normal       Pupils: Pupils are equal, round, and reactive to light  Neck:      Musculoskeletal: Normal range of motion and neck supple  Meningeal: Brudzinski's sign and Kernig's sign absent  Cardiovascular:      Rate and Rhythm: Normal rate and regular rhythm  Heart sounds: Normal heart sounds  No murmur  No gallop  Pulmonary:      Effort: Pulmonary effort is normal  No respiratory distress  Breath sounds: Normal breath sounds  No stridor  No wheezing, rhonchi or rales  Chest:      Chest wall: No tenderness  Abdominal:      General: Bowel sounds are normal  There is no distension  Palpations: Abdomen is soft  There is no mass  Tenderness: There is no abdominal tenderness  There is no right CVA tenderness, left CVA tenderness, guarding or rebound  Hernia: A hernia is present  Musculoskeletal: Normal range of motion  Right lower leg: Edema (DP pulse bilaterally with doppler, but not palpable  there is purpura to the RLE and a open wound from a blister on the anterior shin) present  Left lower leg: Edema present     Skin:     General: Skin is warm and dry  Coloration: Skin is pale  Findings: No bruising, erythema, lesion or rash  Neurological:      General: No focal deficit present  Mental Status: She is alert  She is disoriented  GCS: GCS eye subscore is 3  GCS verbal subscore is 4  GCS motor subscore is 6        Comments: No facial droop  Moving all 4 extremities- globally weak, but able to hold both upper extremities up equally, unable to hold either lower extremity up, but has equal effort against gravity  Eyes closed, but open to speech  Alert to self only- answers "what" clearly, but that is the only thing that she said   Does follow some commands          Vital Signs  ED Triage Vitals   Temperature Pulse Respirations Blood Pressure SpO2   02/22/21 0725 02/22/21 0725 02/22/21 0725 02/22/21 0725 02/22/21 0725   (!) 96 1 °F (35 6 °C) 61 16 142/58 98 %      Temp Source Heart Rate Source Patient Position - Orthostatic VS BP Location FiO2 (%)   02/22/21 1021 02/22/21 0725 02/22/21 0725 02/22/21 0725 --   Tympanic Monitor Sitting Left arm       Pain Score       02/22/21 0725       No Pain           Vitals:    02/22/21 1154 02/22/21 1230 02/22/21 1314 02/22/21 1628   BP: 106/51 (!) 85/67 95/59 128/85   Pulse: 63 60  59   Patient Position - Orthostatic VS:             Visual Acuity      ED Medications  Medications   levothyroxine tablet 137 mcg (has no administration in time range)   acetaminophen (TYLENOL) tablet 650 mg (has no administration in time range)   albumin human (FLEXBUMIN) 25 % injection 25 g (0 g Intravenous Stopped 2/22/21 1315)   cefepime (MAXIPIME) IVPB (premix in dextrose) 1,000 mg 50 mL (has no administration in time range)   insulin lispro (HumaLOG) 100 units/mL subcutaneous injection 1-5 Units (has no administration in time range)   insulin lispro (HumaLOG) 100 units/mL subcutaneous injection 1-5 Units (has no administration in time range)   heparin (porcine) subcutaneous injection 5,000 Units (has no administration in time range)   sodium chloride 0 9 % bolus 500 mL (0 mL Intravenous Stopped 2/22/21 1033)   vancomycin (VANCOCIN) IVPB (premix in dextrose) 1,000 mg 200 mL (1,000 mg Intravenous New Bag 2/22/21 1432)       Diagnostic Studies  Results Reviewed     Procedure Component Value Units Date/Time    Blood culture #1 [[id number]] Collected: 02/22/21 0754    Lab Status: Preliminary result Specimen: Blood from Arm, Right Updated: 02/22/21 1102     Blood Culture Received in Microbiology Lab  Culture in Progress  Blood culture #2 [[id number]] Collected: 02/22/21 0734    Lab Status: Preliminary result Specimen: Blood from Arm, Left Updated: 02/22/21 1102     Blood Culture Received in Microbiology Lab  Culture in Progress  Procalcitonin with AM Reflex [[id number]]  (Abnormal) Collected: 02/22/21 0755    Lab Status: Final result Specimen: Blood from Arm, Right Updated: 02/22/21 1000     Procalcitonin 0 44 ng/ml     Procalcitonin Reflex [[id number]]     Lab Status: No result Specimen: Blood     COVID19, Influenza A/B, RSV PCR, [facility] [[id number]]  (Normal) Collected: 02/22/21 0736    Lab Status: Final result Specimen: Nares from Nasopharyngeal Swab Updated: 02/22/21 0838     SARS-CoV-2 Negative     INFLUENZA A PCR Negative     INFLUENZA B PCR Negative     RSV PCR Negative    Narrative: This test has been authorized by FDA under an EUA (Emergency Use Assay) for use by authorized laboratories  Clinical caution and judgement should be used with the interpretation of these results with consideration of the clinical impression and other laboratory testing  Testing reported as "Positive" or "Negative" has been proven to be accurate according to standard laboratory validation requirements  All testing is performed with control materials showing appropriate reactivity at standard intervals      Urine Microscopic [[id number]]  (Abnormal) Collected: 02/22/21 0803    Lab Status: Final result Specimen: Urine, Straight Cath Updated: 02/22/21 0829     RBC, UA 1-2 /hpf      WBC, UA 4-10 /hpf      Epithelial Cells Occasional /hpf      Bacteria, UA Moderate /hpf      WBC Clumps Present     Lipase [[id number]]  (Normal) Collected: 02/22/21 0734    Lab Status: Final result Specimen: Blood from Arm, Left Updated: 02/22/21 0821     Lipase 91 u/L     UA w Reflex to Microscopic w Reflex to Culture [[id number]]  (Abnormal) Collected: 02/22/21 0803    Lab Status: Final result Specimen: Urine, Straight Cath Updated: 02/22/21 0819     Color, UA Yellow     Clarity, UA Clear     Specific Gravity, UA 1 010     pH, UA 6 0     Leukocytes, UA Trace     Nitrite, UA Negative     Protein, UA Negative mg/dl      Glucose, UA Negative mg/dl      Ketones, UA Negative mg/dl      Urobilinogen, UA 1 0 E U /dl      Bilirubin, UA Negative     Blood, UA Trace-Intact    Ammonia [[id number]]  (Normal) Collected: 02/22/21 0755    Lab Status: Final result Specimen: Blood from Arm, Right Updated: 02/22/21 0813     Ammonia 21 umol/L     Comprehensive metabolic panel [[id number]]  (Abnormal) Collected: 02/22/21 0734    Lab Status: Final result Specimen: Blood from Arm, Left Updated: 02/22/21 9369     Sodium 139 mmol/L      Potassium 4 6 mmol/L      Chloride 103 mmol/L      CO2 29 mmol/L      ANION GAP 7 mmol/L       mg/dL      Creatinine 2 21 mg/dL      Glucose 136 mg/dL      Calcium 8 8 mg/dL      Corrected Calcium 9 9 mg/dL      AST 24 U/L      ALT 20 U/L      Alkaline Phosphatase 212 U/L      Total Protein 8 6 g/dL      Albumin 2 6 g/dL      Total Bilirubin 1 90 mg/dL      eGFR 22 ml/min/1 73sq m     Narrative:      [place] guidelines for Chronic Kidney Disease (CKD):     Stage 1 with normal or high GFR (GFR > 90 mL/min/1 73 square meters)    Stage 2 Mild CKD (GFR = 60-89 mL/min/1 73 square meters)    Stage 3A Moderate CKD (GFR = 45-59 mL/min/1 73 square meters)    Stage 3B Moderate CKD (GFR = 30-44 mL/min/1 73 square meters)   Stage 4 Severe CKD (GFR = 15-29 mL/min/1 73 square meters)    Stage 5 End Stage CKD (GFR <15 mL/min/1 73 square meters)  Note: GFR calculation is accurate only with a steady state creatinine    NT-BNP PRO [[id number]]  (Abnormal) Collected: 02/22/21 0734    Lab Status: Final result Specimen: Blood from Arm, Left Updated: 02/22/21 0808     NT-proBNP 13,253 pg/mL     Lactic acid [[id number]]  (Normal) Collected: 02/22/21 0734    Lab Status: Final result Specimen: Blood from Arm, Left Updated: 02/22/21 0805     LACTIC ACID 2 0 mmol/L     Narrative:      Result may be elevated if tourniquet was used during collection      Protime-INR [[id number]]  (Abnormal) Collected: 02/22/21 0734    Lab Status: Final result Specimen: Blood from Arm, Left Updated: 02/22/21 0800     Protime 23 0 seconds      INR 2 07    APTT [[id number]]  (Abnormal) Collected: 02/22/21 0734    Lab Status: Final result Specimen: Blood from Arm, Left Updated: 02/22/21 0800     PTT 52 seconds     CBC and differential [[id number]]  (Abnormal) Collected: 02/22/21 0734    Lab Status: Final result Specimen: Blood from Arm, Left Updated: 02/22/21 0745     WBC 11 94 Thousand/uL      RBC 4 23 Million/uL      Hemoglobin 11 1 g/dL      Hematocrit 39 4 %      MCV 93 fL      MCH 26 2 pg      MCHC 28 2 g/dL      RDW 19 7 %      MPV 10 3 fL      Platelets 231 Thousands/uL      nRBC 0 /100 WBCs      Neutrophils Relative 86 %      Immat GRANS % 1 %      Lymphocytes Relative 5 %      Monocytes Relative 8 %      Eosinophils Relative 0 %      Basophils Relative 0 %      Neutrophils Absolute 10 25 Thousands/µL      Immature Grans Absolute 0 07 Thousand/uL      Lymphocytes Absolute 0 59 Thousands/µL      Monocytes Absolute 0 99 Thousand/µL      Eosinophils Absolute 0 01 Thousand/µL      Basophils Absolute 0 03 Thousands/µL     Blood gas, Venous [[id number]]  (Abnormal) Collected: 02/22/21 0734    Lab Status: Final result Specimen: Blood from Arm, Left Updated: 02/22/21 0745 pH, Zana 7 393     pCO2, Zana 46 7 mm Hg      pO2, Zana 60 5 mm Hg      HCO3, Zana 27 8 mmol/L      Base Excess, Zana 2 3 mmol/L      O2 Content, Zana 15 5 ml/dL      O2 HGB, VENOUS 87 9 %                  XR chest portable   Final Result by Cora Ferguson MD (02/22 7934)      No acute cardiopulmonary disease  Workstation performed: [id number]         CT head without contrast   Final Result by Shelby Melendez MD (02/22 5625)      No acute intracranial abnormalities or significant change from priors  Workstation performed: [id number]         CT chest abdomen pelvis wo contrast   Final Result by Shelby Melendez MD (02/22 0732)      1  Two new vertebral fractures when compared to October  Transverse fracture of the T12 vertebral body without significant height loss  Mild anterior compression deformity of the L3 vertebral body with minimal posterior cortical buckling  No    retropulsed fragments or significant neural impingement  2   Thickening of the bladder wall, diffusely  Correlate for evidence of infectious cystitis on urinalysis  3   Pulmonary vascular engorgement and cephalization, hazy central groundglass opacities, scattered interlobular septal thickening, and small right pleural effusion  Constellation of findings favors volume overload with pulmonary edema  4   Mild ascites and anasarca  Nodular liver contour suggestive of cirrhosis  5   Large diastasis defect in the ventral abdomen  Small and large bowel protruding through the defect do not appear obstructed or inflamed               I personally discussed this study with Patricio Miguel on 2/22/2021 at 9:31 AM          Workstation performed: [id number]         VAS GRAHAM & waveform analysis, multiple levels    (Results Pending)              Procedures  ECG 12 Lead Documentation Only    Date/Time: 2/22/2021 7:44 AM  Performed by: Lyn Vidales DO  Authorized by: Lyn Vidales DO     Indications / Diagnosis:  AMS  Patient location:  ED  Previous ECG:     Previous ECG:  Compared to current    Comparison ECG info:  1/30/2021    Similarity:  No change  Interpretation:     Interpretation: non-specific    Rate:     ECG rate:  60    ECG rate assessment: normal    Rhythm:     Rhythm: paced    Pacing:     Type of pacing:  Ventricular  QRS:     QRS intervals: Wide  Comments:      qtc 536             ED Course  ED Course as of Feb 22 1717   Mon Feb 22, 2021   0802 INR(!): 2 07   0809 LAVONNE   Creatinine(!): 2 21                         Initial Sepsis Screening     Row Name 02/22/21 1258 02/22/21 0945             Is the patient's history suggestive of a new or worsening infection? No  -VL  No  -LK       Suspected source of infection  --  --       Are two or more of the following signs & symptoms of infection both present and new to the patient?  --  --       Indicate SIRS criteria  Altered mental status; Tachypnea > 20 resp per min  -VL  --       If the answer is yes to both questions, suspicion of sepsis is present  --  --       If severe sepsis is present AND tissue hypoperfusion perists in the hour after fluid resuscitation or lactate > 4, the patient meets criteria for SEPTIC SHOCK  --  --       Are any of the following organ dysfunction criteria present within 6 hours of suspected infection and SIRS criteria that are NOT considered to be chronic conditions? No  -VL  --       Organ dysfunction  -- Lactic acid, LAVONNE on CKD  -VL  --       Date of presentation of severe sepsis  --  --       Time of presentation of severe sepsis  --  --       Tissue hypoperfusion persists in the hour after crystalloid fluid administration, evidenced, by either:  --  --       Was hypotension present within one hour of the conclusion of crystalloid fluid administration?   Yes  -VL  --       Date of presentation of septic shock  --  --       Time of presentation of septic shock  --  --         User Key  (r) = Recorded By, (t) = Taken By, (c) = Cosigned By    [address] Name Provider Type    RIKKI Villegas DO Physician    STEPHIE Parr MD Physician                        MDM  Number of Diagnoses or Management Options  Acute exacerbation of CHF (congestive heart failure) [facility]):   LAVONNE (acute kidney injury) [facility]): Altered mental status:   Anasarca:   Diastasis of rectus abdominis:   Elevated INR:   Generalized weakness:   Pulmonary vascular congestion:   Scleral icterus:   T12 vertebral fracture [facility]):   Diagnosis management comments: Assessment and Plan:  66year old female with complicated past medical history who is brought in by ambulance secondary to progressively worsening confusion/ change in mental status  Differential includes hepatic encephalopathy versus infection versus dehydration  Workup will include CTH to assess for intracranial hemorrhage, CT CAP to assess for PNA, abdominal infection, assess hernia, urinalysis to assess for UTI, labs to assess for leukocytosis, anemia, electrolyte abnormalities, kidney and liver function, ammonia to assess for hepatic encephalopathy  Will also check bilateral arterial duplex as pulses only present with doppler and patient has purpura that is new to the RLE  Start with 500mL NS for resuscitation, though would limit IVF as patient appears clinically volume overloaded  Patient not found to have any signs of infection and ammonia is wnl, but her liver function appears to be worsening and the fluid overload is also problematic in setting of severe aortic stenosis  Discussed with admitting team that the arterial duplex studies are still pending  Disposition  Final diagnoses:    Altered mental status   Generalized weakness   Scleral icterus   Pulmonary vascular congestion   Acute exacerbation of CHF (congestive heart failure) (HCC)   Anasarca   Diastasis of rectus abdominis   LAVONNE (acute kidney injury) ([address] )   Elevated INR   T12 vertebral fracture (HCC)   L3 vertebral fracture ([address] )     Time reflects when diagnosis was documented in both MDM as applicable and the Disposition within this note     Time User Action Codes Description Comment    2/22/2021  9:46 AM Dina Hernando Add [R41 0] Disorientation     2/22/2021  9:46 AM Dina Hernando Remove [R41 0] Disorientation     2/22/2021  9:46 AM Dina Hernando Add [R41 82] Altered mental status     2/22/2021  9:46 AM Dina Hernando Add [R53 1] Generalized weakness     2/22/2021  9:46 AM Dina Hernando Add [R17] Scleral icterus     2/22/2021  9:46 AM Dina Hernando Add [R09 89] Pulmonary vascular congestion     2/22/2021  9:47 AM Dina Hernando Add [I50 9] Acute exacerbation of CHF (congestive heart failure) ([facility] )     2/22/2021  9:47 AM Dina Hernando Add [R60 1] Anasarca     2/22/2021  9:47 AM Dina Hernando Add [M62 08] Diastasis of rectus abdominis     2/22/2021  9:47 AM Dina Hernando Add [N17 9] LAVONNE (acute kidney injury) ([facility] )     2/22/2021  9:48 AM Dina Hernando Add [R79 1] Elevated INR     2/22/2021  9:49 AM Dina Hernando Add [C06 958H] T12 vertebral fracture ([facility] )     2/22/2021  9:49 AM Dina Hernando Add [S32 039A] L3 vertebral fracture [facility])       ED Disposition     ED Disposition Condition Date/Time Comment    Admit Stable Mon Feb 22, 2021  9:59 AM Case was discussed with Dr David Hicks and the patient's admission status was agreed to be Admission Status: inpatient status to the service of Dr David Hicks   Follow-up Information    None         Current Discharge Medication List      CONTINUE these medications which have NOT CHANGED    Details   Accu-Chek Virgie Plus test strip TEST BLOOD SUGAR 3 TIMES EVERY DAY  DX: E11 65  Qty: 300 each, Refills: 3    Associated Diagnoses: 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, with long-term current use of insulin, unspecified laterality, unspecified retinopathy severity ([facility] );  Type 2 diabetes mellitus with hyperglycemia, with long-term current use of insulin ([facility])      acetaminophen (TYLENOL) 325 mg tablet Take 2 tablets (650 mg total) by mouth every 4 (four) hours as needed for mild pain or fever  Qty:  , Refills: 0    Associated Diagnoses: Closed compression fracture of L3 lumbar vertebra with routine healing, subsequent encounter      B-D UF III MINI PEN NEEDLES 31G X 5 MM MISC 4 (four) times a day As directed      COMBIGAN 0 2-0 5 % Administer 1 drop to both eyes 2 (two) times a day      dabigatran etexilate (PRADAXA) 75 mg capsule Take 1 capsule (75 mg total) by mouth every 12 (twelve) hours  Qty: 180 capsule, Refills: 3    Associated Diagnoses: Chronic atrial fibrillation (HCC)      famotidine (PEPCID) 20 mg tablet TAKE 1 TABLET BY MOUTH TWICE A DAY  Qty: 60 tablet, Refills: 11    Associated Diagnoses: Gastroesophageal reflux disease without esophagitis      gabapentin (NEURONTIN) 300 mg capsule Take 1 capsule (300 mg total) by mouth daily at bedtime  Qty: 30 capsule, Refills: 3    Associated Diagnoses: Neuropathy      Insulin Aspart (NovoLOG PenFill) 100 UNITS/ML cartridge for injection Inject 8 Units under the skin 3 (three) times a day with meals  Refills: 0    Associated Diagnoses: 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, with long-term current use of insulin, unspecified laterality, unspecified retinopathy severity ([facility])      insulin glargine (Basaglar KwikPen) 100 units/mL injection pen Inject 15 Units under the skin daily  Qty: 5 pen, Refills: 2    Associated Diagnoses: Type 2 diabetes mellitus with diabetic neuropathy, with long-term current use of insulin ([facility])      lactulose (CHRONULAC) 10 g/15 mL solution Take 15 mL (10 g total) by mouth 2 (two) times a day  Qty: 946 mL, Refills: 11    Associated Diagnoses: Chronic idiopathic constipation      levothyroxine (Synthroid) 137 mcg tablet Take 1 tablet (137 mcg total) by mouth daily  Qty: 90 tablet, Refills: 3    Associated Diagnoses: Type 2 diabetes mellitus with diabetic neuropathy, with long-term current use of insulin (HCC)      metoprolol succinate (TOPROL-XL) 50 mg 24 hr tablet TAKE 1 TABLET BY MOUTH EVERY DAY  Qty: 30 tablet, Refills: 0    Associated Diagnoses: Congestive heart failure, unspecified HF chronicity, unspecified heart failure type (HCC)      potassium chloride (Klor-Con M10) 10 mEq tablet Take 2 tablets (20 mEq total) by mouth daily  Qty: 45 tablet, Refills: 1    Comments: DX Code Needed    Associated Diagnoses: Combined systolic and diastolic congestive heart failure (HCC); CKD (chronic kidney disease) stage 3, GFR 30-59 ml/min      spironolactone (ALDACTONE) 25 mg tablet Take 1 tablet (25 mg total) by mouth daily  Qty: 30 tablet, Refills: 0    Associated Diagnoses: Chronic respiratory failure with hypoxia (HCC)      torsemide (DEMADEX) 20 mg tablet Take 3 tablets (60 mg total) by mouth daily  Qty: 180 tablet, Refills: 1    Associated Diagnoses: Acute on chronic combined systolic and diastolic congestive heart failure (HCC)      simvastatin (ZOCOR) 20 mg tablet TAKE 1 TABLET (20MG) BY ORAL ROUTE EVERY DAY IN THE EVENING  Qty: 90 tablet, Refills: 2    Associated Diagnoses: Dyslipidemia           No discharge procedures on file      PDMP Review     None          ED Provider  Electronically Signed by           Tai Estrella DO  02/22/21 3627

## 2021-02-22 NOTE — NURSING NOTE
Dr Niki Wisdom at bedside to evaluate patient  At this time, per Dr Niki Wisdom, Sepsis Alert to be cancelled  Patient is still to receive IV Abx as ordered, but fluids are to be d/c and replaced with Albumin  Informed charge RNMarvin, as well as Ehsan Villalpando, of MD decision to cancel sepsis alert    Awaiting further orders from MD

## 2021-02-22 NOTE — ASSESSMENT & PLAN NOTE
As evidenced by leukocytosis of 12, and hypotension  Also lactic acid was elevated to 4 0  However patient does not have any source of infection, urinalysis negative, chest x-ray negative for any infiltrates, CT of the abdomen pelvis negative any other intra-abdominal pathology  Treat the patient with IV albumin, follow-up BMP, CBC, follow-up culture data  Currently on IV antibiotics-vancomycin x1 dose, and cefepime 1 g q 24 hours for total of 2 days  Will discontinue antibiotics if cultures negative

## 2021-02-22 NOTE — ASSESSMENT & PLAN NOTE
Wt Readings from Last 3 Encounters:   02/22/21 97 2 kg (214 lb 3 2 oz)   02/10/21 95 3 kg (210 lb)   02/04/21 89 1 kg (196 lb 5 5 oz)     Blood pressure running low hold heart failure medications

## 2021-02-23 PROBLEM — A41.9 SEPSIS (HCC): Status: ACTIVE | Noted: 2021-02-22

## 2021-02-23 PROBLEM — R78.81 GRAM-NEGATIVE BACTEREMIA: Status: ACTIVE | Noted: 2021-02-23

## 2021-02-23 PROBLEM — E87.2 LACTIC ACIDOSIS: Status: ACTIVE | Noted: 2021-02-23

## 2021-02-23 PROBLEM — G93.40 ENCEPHALOPATHY: Status: ACTIVE | Noted: 2021-02-22

## 2021-02-23 PROBLEM — R93.5 ABNORMAL CT OF THE ABDOMEN: Status: ACTIVE | Noted: 2021-02-23

## 2021-02-23 LAB
ALBUMIN SERPL BCP-MCNC: 3.2 G/DL (ref 3.5–5)
ALP SERPL-CCNC: 163 U/L (ref 46–116)
ALT SERPL W P-5'-P-CCNC: 17 U/L (ref 12–78)
ANION GAP SERPL CALCULATED.3IONS-SCNC: 12 MMOL/L (ref 4–13)
AST SERPL W P-5'-P-CCNC: 20 U/L (ref 5–45)
BASOPHILS # BLD AUTO: 0.02 THOUSANDS/ΜL (ref 0–0.1)
BASOPHILS NFR BLD AUTO: 0 % (ref 0–1)
BILIRUB DIRECT SERPL-MCNC: 2.49 MG/DL (ref 0–0.2)
BILIRUB SERPL-MCNC: 3.68 MG/DL (ref 0.2–1)
BUN SERPL-MCNC: 102 MG/DL (ref 5–25)
CALCIUM SERPL-MCNC: 9.3 MG/DL (ref 8.3–10.1)
CHLORIDE SERPL-SCNC: 104 MMOL/L (ref 100–108)
CO2 SERPL-SCNC: 27 MMOL/L (ref 21–32)
CREAT SERPL-MCNC: 2.14 MG/DL (ref 0.6–1.3)
EOSINOPHIL # BLD AUTO: 0 THOUSAND/ΜL (ref 0–0.61)
EOSINOPHIL NFR BLD AUTO: 0 % (ref 0–6)
ERYTHROCYTE [DISTWIDTH] IN BLOOD BY AUTOMATED COUNT: 19.8 % (ref 11.6–15.1)
GFR SERPL CREATININE-BSD FRML MDRD: 22 ML/MIN/1.73SQ M
GLUCOSE SERPL-MCNC: 121 MG/DL (ref 65–140)
GLUCOSE SERPL-MCNC: 128 MG/DL (ref 65–140)
GLUCOSE SERPL-MCNC: 131 MG/DL (ref 65–140)
GLUCOSE SERPL-MCNC: 134 MG/DL (ref 65–140)
GLUCOSE SERPL-MCNC: 135 MG/DL (ref 65–140)
GLUCOSE SERPL-MCNC: 185 MG/DL (ref 65–140)
GLUCOSE SERPL-MCNC: 205 MG/DL (ref 65–140)
HCT VFR BLD AUTO: 39.7 % (ref 34.8–46.1)
HGB BLD-MCNC: 11 G/DL (ref 11.5–15.4)
IMM GRANULOCYTES # BLD AUTO: 0.05 THOUSAND/UL (ref 0–0.2)
IMM GRANULOCYTES NFR BLD AUTO: 0 % (ref 0–2)
LYMPHOCYTES # BLD AUTO: 0.57 THOUSANDS/ΜL (ref 0.6–4.47)
LYMPHOCYTES NFR BLD AUTO: 5 % (ref 14–44)
MCH RBC QN AUTO: 26.1 PG (ref 26.8–34.3)
MCHC RBC AUTO-ENTMCNC: 27.7 G/DL (ref 31.4–37.4)
MCV RBC AUTO: 94 FL (ref 82–98)
MONOCYTES # BLD AUTO: 1.04 THOUSAND/ΜL (ref 0.17–1.22)
MONOCYTES NFR BLD AUTO: 9 % (ref 4–12)
NEUTROPHILS # BLD AUTO: 10.12 THOUSANDS/ΜL (ref 1.85–7.62)
NEUTS SEG NFR BLD AUTO: 86 % (ref 43–75)
NRBC BLD AUTO-RTO: 0 /100 WBCS
PLATELET # BLD AUTO: 150 THOUSANDS/UL (ref 149–390)
PMV BLD AUTO: 10.3 FL (ref 8.9–12.7)
POTASSIUM SERPL-SCNC: 4.1 MMOL/L (ref 3.5–5.3)
PROCALCITONIN SERPL-MCNC: 1.89 NG/ML
PROT SERPL-MCNC: 8.1 G/DL (ref 6.4–8.2)
RBC # BLD AUTO: 4.22 MILLION/UL (ref 3.81–5.12)
SODIUM SERPL-SCNC: 143 MMOL/L (ref 136–145)
WBC # BLD AUTO: 11.8 THOUSAND/UL (ref 4.31–10.16)

## 2021-02-23 PROCEDURE — 80048 BASIC METABOLIC PNL TOTAL CA: CPT | Performed by: INTERNAL MEDICINE

## 2021-02-23 PROCEDURE — 99232 SBSQ HOSP IP/OBS MODERATE 35: CPT | Performed by: INTERNAL MEDICINE

## 2021-02-23 PROCEDURE — 97530 THERAPEUTIC ACTIVITIES: CPT

## 2021-02-23 PROCEDURE — 80076 HEPATIC FUNCTION PANEL: CPT | Performed by: INTERNAL MEDICINE

## 2021-02-23 PROCEDURE — 85025 COMPLETE CBC W/AUTO DIFF WBC: CPT | Performed by: INTERNAL MEDICINE

## 2021-02-23 PROCEDURE — 97163 PT EVAL HIGH COMPLEX 45 MIN: CPT

## 2021-02-23 PROCEDURE — 92610 EVALUATE SWALLOWING FUNCTION: CPT

## 2021-02-23 PROCEDURE — 84145 PROCALCITONIN (PCT): CPT | Performed by: EMERGENCY MEDICINE

## 2021-02-23 PROCEDURE — 82948 REAGENT STRIP/BLOOD GLUCOSE: CPT

## 2021-02-23 PROCEDURE — 99223 1ST HOSP IP/OBS HIGH 75: CPT | Performed by: INTERNAL MEDICINE

## 2021-02-23 RX ORDER — PRAVASTATIN SODIUM 40 MG
40 TABLET ORAL
Status: DISCONTINUED | OUTPATIENT
Start: 2021-02-23 | End: 2021-03-01 | Stop reason: HOSPADM

## 2021-02-23 RX ORDER — FUROSEMIDE 10 MG/ML
10 INJECTION INTRAMUSCULAR; INTRAVENOUS ONCE
Status: COMPLETED | OUTPATIENT
Start: 2021-02-23 | End: 2021-02-23

## 2021-02-23 RX ORDER — GABAPENTIN 300 MG/1
300 CAPSULE ORAL
Status: DISCONTINUED | OUTPATIENT
Start: 2021-02-23 | End: 2021-03-01 | Stop reason: HOSPADM

## 2021-02-23 RX ORDER — CEFEPIME HYDROCHLORIDE 1 G/50ML
1000 INJECTION, SOLUTION INTRAVENOUS EVERY 24 HOURS
Status: DISCONTINUED | OUTPATIENT
Start: 2021-02-23 | End: 2021-02-25

## 2021-02-23 RX ADMIN — ALBUMIN (HUMAN) 25 G: 0.25 INJECTION, SOLUTION INTRAVENOUS at 12:07

## 2021-02-23 RX ADMIN — HEPARIN SODIUM 5000 UNITS: 5000 INJECTION INTRAVENOUS; SUBCUTANEOUS at 13:34

## 2021-02-23 RX ADMIN — PRAVASTATIN SODIUM 40 MG: 40 TABLET ORAL at 16:19

## 2021-02-23 RX ADMIN — ALBUMIN (HUMAN) 25 G: 0.25 INJECTION, SOLUTION INTRAVENOUS at 05:30

## 2021-02-23 RX ADMIN — HEPARIN SODIUM 5000 UNITS: 5000 INJECTION INTRAVENOUS; SUBCUTANEOUS at 05:54

## 2021-02-23 RX ADMIN — ALBUMIN (HUMAN) 25 G: 0.25 INJECTION, SOLUTION INTRAVENOUS at 21:04

## 2021-02-23 RX ADMIN — HEPARIN SODIUM 5000 UNITS: 5000 INJECTION INTRAVENOUS; SUBCUTANEOUS at 21:07

## 2021-02-23 RX ADMIN — INSULIN LISPRO 1 UNITS: 100 INJECTION, SOLUTION INTRAVENOUS; SUBCUTANEOUS at 16:12

## 2021-02-23 RX ADMIN — CEFEPIME HYDROCHLORIDE 1000 MG: 1 INJECTION, SOLUTION INTRAVENOUS at 16:12

## 2021-02-23 RX ADMIN — INSULIN LISPRO 1 UNITS: 100 INJECTION, SOLUTION INTRAVENOUS; SUBCUTANEOUS at 21:06

## 2021-02-23 RX ADMIN — FUROSEMIDE 10 MG: 10 INJECTION, SOLUTION INTRAMUSCULAR; INTRAVENOUS at 13:35

## 2021-02-23 NOTE — CONSULTS
Consultation - Infectious Disease   Dea Morales 66 y o  female MRN: [id number]  Unit/Bed#: [address] [place] [address] Encounter: [id number]      IMPRESSION & RECOMMENDATIONS:   Impression/Recommendations:  1  Sepsis, POA  Tachypnea, hypothermia  Due to #2  No other appreciable source  ROS limited  Exam benign  Flu/RSV/COVID PCR, LFTs, CT C/A/P otherwise unremarkable  Hemodynamically stable although chronically ill due to multiple advance comorbidities  Rec:  · Continue antibiotics as below  · Follow up blood cultures as below  · Follow temperatures closely  · Check CBC in AM  · Supportive care as per the primary service    2  Gram-negative bacteremia  Unclear source  Consider UTI given bladder wall thickening on CT although bland UA and unclear if having  symptoms  Consider transient GI source in setting of cirrhosis, large ventral hernia  Abdominal exam benign  LFTs, CT A/P otherwise unremarkable  Rec:  · Continue cefepime for now  · Follow up ID/suspceptibilities and tailor antibiotics as indicated    3  LAVONNE on CKD  Likely multifactorial   Baseline 1 6-1 7  Rec:  · Follow creatinine closely and dose-adjust antibiotics as indicated  · Recheck BMP in AM    4  Acute encephalopathy  Multifactorial and likely toxic-metabolic due to all of above  Consider also hepatic encephalopathy  CT head negative  Low suspicion for primary CNS infection  Rec    5  Chronic CHF  In setting of CAD with ICM  Increased weight and BNP on admission  Cardiology follow-up ongoing  6   Severe AS  7   Afib  Status post PPM     8   Cirrhosis  Thought due to CHUNG  9   DM with DPN  Recent A1c 7 1  The above impression and plan was discussed in detail with Dr Swati Dailey  Antibiotics:  Cefepime #2    Thank you for this consultation  We will follow along with you      HISTORY OF PRESENT ILLNESS:  Reason for Consult: Gram-negative bacteremia    HPI: Dea Morales is a 66 y o  female with multiple medical problems including DM with DPN, CAD with cardiomyopathy, PPM, severe AS, chronic hypoxic respiratory failure, CKD, possible cirrhosis  She is a poor historian so all of the history is obtained through the medical record  She has had several recent admissions for encephalopathy due to various causes  Chart review notes that palliative care has been recommended in the past although the patient and family have not been ready  Most recently she presented to the ED yesterday from home via EMS with increased weakness and confusion  Upon presentation she was noted to be hypothermic with tachypnea  He labs revealed LAVONNE and an elevated BNP  A UA was bland  A CT C/A/P was performed which showed a diffusely thickened bladder wall, cirrhosis, and new vertebral compression fractures  She was given a dose of vancomycin and started on cefepime  Since admission her blood cultures have come back growing GNRs  We are asked to comment on further evaluation and management  In performing this consult, I have reviewed prior admission and outpatient visit records in detail  REVIEW OF SYSTEMS:  Limited due to limited response to questions  Denies recent N/V/D  Says she has trouble passing urine but can't specify  A complete system-based review of systems is otherwise negative      PAST MEDICAL HISTORY:  Past Medical History:   Diagnosis Date    Arthritis     Atrial flutter [facility] [address] [facility]     Cardiac disease     Carotid artery occlusion     CHF (congestive heart failure) ([facility] [address] )     Cholelithiasis     last assessed 8/8/2016    Coronary artery disease     Diabetes mellitus ([facility] [address] )     Disease of thyroid gland     Essential hypertension 8/15/2016    GERD (gastroesophageal reflux disease)     History of transfusion     Hyperlipidemia     Hypertension     Long-term insulin use in type 2 diabetes ([facility] [address] ) 6/4/2016    Other specified diabetes mellitus with diabetic autonomic (poly)neuropathy ([facility] [address] )     Oxygen dependent 3L    Pleural effusion 2008    Pulmonary embolism ([address] )     2008    Renal disorder     Retinopathy     bilat    Sleep apnea     USES 3 LITER O2 CONTINOUIS    Subclavian artery stenosis (HCC)     Umbilical hernia with obstruction, without gangrene     last assessed 7/8/2016     Past Surgical History:   Procedure Laterality Date    ABDOMINAL SURGERY      CARDIAC PACEMAKER PLACEMENT      CARDIAC SURGERY      cabg x 2    CATARACT EXTRACTION      CHOLECYSTECTOMY      COLONOSCOPY      CORONARY ARTERY BYPASS GRAFT  2008    EYE SURGERY      FRACTURE SURGERY Right 2010    shoulder    HERNIA REPAIR      umbilical    IL LAP,CHOLECYSTECTOMY N/A 8/10/2016    Procedure: CHOLECYSTECTOMY LAPAROSCOPIC;  Surgeon: Janny Wisdom MD;  [facility];  Service: General    IL LARYNGOSCOPY,DIRECT,SCOPE,INJ CORDS Left 11/4/2016    Procedure: Alexander Adair; LEFT VOCAL FOLD INJECTION ;  Surgeon: Jessica Reynoso MD;  [facility];  Service: ENT    IL REPAIR UMBILICAL WWQL,9+J/Y,QUWOL N/A 8/10/2016    Procedure: LAPAROSCOPIC REPAIR HERNIA VENTRAL;  Surgeon: Janny Wisdom MD;  [facility];  Service: General    [address] / STENTING Right     TRICUSPID VALVE REPLACEMENT      VASCULAR SURGERY      heart valve replacement       FAMILY HISTORY:  Non-contributory    SOCIAL HISTORY:  Social History     Substance and Sexual Activity   Alcohol Use Not Currently    Alcohol/week: 0 0 standard drinks    Frequency: Never    Drinks per session: Patient refused    Binge frequency: Never    Comment: only on NEW YEAR'S     Social History     Substance and Sexual Activity   Drug Use Never    Types: Oxycodone     Social History     Tobacco Use   Smoking Status Never Smoker   Smokeless Tobacco Never Used       ALLERGIES:  Allergies   Allergen Reactions    Bromide Ion [Bromine]      Blurred vision   Has preservative that  Pt cannot use    Other Blisters     Adhesive tape    Timolol Per pt, allergic to steroid in medication    Tramadol      cognition changes    Ibuprofen Palpitations    Penicillins Rash       MEDICATIONS:  All current active medications have been reviewed  PHYSICAL EXAM:  Vitals:  Temp:  [96 8 °F (36 °C)-97 2 °F (36 2 °C)] 97 2 °F (36 2 °C)  HR:  [59-73] 66  Resp:  [18-22] 18  BP: ()/(51-85) 122/55  SpO2:  [89 %-96 %] 89 %  Temp (24hrs), Av °F (36 1 °C), Min:96 8 °F (36 °C), Max:97 2 °F (36 2 °C)  Current: Temperature: (!) 97 2 °F (36 2 °C)     Physical Exam:  General:  Chronically-ill appearing elderly female, in no acute distress  Eyes:  Conjunctive clear with no hemorrhages or effusions  Oropharynx:  No ulcers, no lesions  Neck:  Supple, no lymphadenopathy  Lungs:  Normal respiratory excursion, no accessory muscle use  Cardiac:  Regular rate and rhythm on monitor  Abdomen:  Soft, obese, large ventral hernia  Extremities:  No edema  Extremities cool with mild cyanosis of feet  Skin:  No rashes, no ulcers  Neurological:  Moves all four extremities spontaneously, sensation grossly intact    LABS, IMAGING, & OTHER STUDIES:  Lab Results:  I have personally reviewed pertinent labs    Results from last 7 days   Lab Units 21  0554 21  1626 21  0734   POTASSIUM mmol/L 4 1 4 2 4 6   CHLORIDE mmol/L 104 103 103   CO2 mmol/L 27 25 29   BUN mg/dL 102* 101* 105*   CREATININE mg/dL 2 14* 2 29* 2 21*   EGFR ml/min/1 73sq m  22   CALCIUM mg/dL 9 3 9 3 8 8   AST U/L  --   --  24   ALT U/L  --   --  20   ALK PHOS U/L  --   --  212*     Results from last 7 days   Lab Units 21  0554 21  0734   WBC Thousand/uL 11 80* 11 94*   HEMOGLOBIN g/dL 11 0* 11 1*   PLATELETS Thousands/uL 150 177     Results from last 7 days   Lab Units 21  0754 21  0734   BLOOD CULTURE  Gram Negative Vin Enteric Like*  --    GRAM STAIN RESULT  Gram negative rods* Gram negative rods*       Imaging Studies:   I have personally reviewed pertinent imaging study reports and images in PACS  CT C/A/P reviewed personally large ventral hernia, diffuse bladder wall thickening, pulmonary edema, no pneumonia    EKG, Pathology, and Other Studies:   I have personally reviewed pertinent reports

## 2021-02-23 NOTE — ASSESSMENT & PLAN NOTE
Patient was noted to have 2/2 blood culture positive with Gram-negative bacilli on admission  Unclear source but suspected to be urinary  UA with evidence of trace leukocytes, bacteriuria and pyuria    Unfortunately urine culture was not collected  Blood culture sensitivities noted  Seen by ID, input appreciated  · Initially treated with IV cefepime , subsequently transitioned to ceftriaxone through 2/28 to complete 7 days of IV antibiotics

## 2021-02-23 NOTE — ASSESSMENT & PLAN NOTE
Echocardiogram with severe critical aortic stenosis  Not candidate for TAVR or SAVR because of multiple comorbidities and overall functional status   Cardiology following, input appreciated    Discussed with patient and family regarding palliative care versus hospice  · Avoid hypotension  ·  Consideration of transition to palliative care versus hospice based on clinical progress

## 2021-02-23 NOTE — ASSESSMENT & PLAN NOTE
As evidenced by leukocytosis of 12, and hypotension  Also lactic acid was elevated to 4 0  Secondary to Gram-negative bacteremia, source unclear  Possible urinary source  chest x-ray negative for any infiltrates,   Abdominal exam benign, LFT stable  CT of the abdomen pelvis negative any other intra-abdominal pathology     Hemodynamics improved  · Continue IV antibiotics as above  · Monitor clinically  · ID evaluation appreciated

## 2021-02-23 NOTE — ASSESSMENT & PLAN NOTE
Wt Readings from Last 3 Encounters:   03/01/21 96 2 kg (212 lb 1 3 oz)   02/10/21 95 3 kg (210 lb)   02/04/21 89 1 kg (196 lb 5 5 oz)   Echocardiogram with EF 45%, critical aortic stenosis,  Baseline weight appears to be 205 lb is as per Cardiology notes  Cardiology input appreciated  Received IV Lasix  but noted to have worsening of creatinine and BUN  Repeat chest x-ray with mild pulmonary vascular congestion  Respiratory status appears stable, weight noted  Cardiology following, input appreciated    Recommended to avoid over-diuresis/hypotension secondary to severe critical aortic stenosis could be low-flow low gradient AS  · Continue current meds, torsemide as needed  · avoid hypotension  · Continue metoprolol  · Overall guarded prognosis

## 2021-02-23 NOTE — PHYSICAL THERAPY NOTE
PT EVALUATION       02/23/21 0935   Note Type   Note type Evaluation   Pain Assessment   Pain Assessment Tool Pain Assessment not indicated - pt denies pain   Home Living   Type of Home House   Home Layout Multi-level; Able to live on main level with bedroom/bathroom  (6 [address] with rails)   [address]; Wheelchair-manual;Other (Comment)  (02)   Additional Comments All history obtained from previous records  Prior Function   Level of [place] Needs assistance with ADLs and functional mobility  (ambulates with a walker per last PT note)   Lives With Other (Comment)  (son and DIL)   Receives Help From Family   Restrictions/Precautions   Other Precautions Fall Risk;O2;Bed Alarm; Chair Alarm;Cognitive   General   Additional Pertinent History Pt admitted wtih altered mental status, lethargy, and weakness  Pt has no recollection of this and reports she feels fine today  Cognition   Overall Cognitive Status Impaired   Arousal/Participation Cooperative   Orientation Level Oriented to person   RLE Assessment   RLE Assessment   (ROM WFL, MMT 4/5)   LLE Assessment   LLE Assessment   (ROM WFL, MMT 4/5)   Bed Mobility   Supine to Sit 4  Minimal assistance   Additional items LE management; Increased time required   Transfers   Sit to Stand 4  Minimal assistance   Stand to Sit 4  Minimal assistance   Stand pivot 4  Minimal assistance   Additional items   (with walker)   Ambulation/Elevation   Gait pattern   (slow rafael)   Gait Assistance 4  Minimal assist   Assistive Device Rolling walker  (2S77)   Distance 10 feet   Balance   Static Sitting Fair +   Dynamic Sitting Fair   Static Standing Fair   Dynamic Standing Fair -   Ambulatory Fair -   Activity Tolerance   Activity Tolerance Patient tolerated treatment well   Assessment   Prognosis Good   Problem List Decreased strength;Decreased endurance; Impaired balance;Decreased mobility; Decreased cognition   Assessment Patient seen for Physical Therapy evaluation   Patient admitted with Encephalopathy  Comorbidities affecting patient's physical performance include: afib, DM , obesity, arthritis, CHF  Personal factors affecting patient at time of initial evaluation include: ambulating with assistive device, stairs to enter home, inability to navigate level surfaces without external assistance and inability to perform ADLS  Prior to admission, patient was independent with functional mobility with walker  Please find objective findings from Physical Therapy assessment regarding body systems outlined above with impairments and limitations including weakness, impaired balance, decreased endurance, decreased activity tolerance, decreased functional mobility tolerance, fall risk and decreased cognition  The Barthel Index was used as a functional outcome tool presenting with a score of 50 today indicating marked limitations of functional mobility and ADLS  Patient's clinical presentation is currently unstable/unpredictable as seen in patient's presentation of varying levels of cognitive performance, increased fall risk, new onset of impairment of functional mobility, decreased endurance and new onset of weakness  Pt would benefit from continued Physical Therapy treatment to address deficits as defined above and maximize level of functional mobility  As demonstrated by objective findings, the assigned level of complexity for this evaluation is high  The patient's -[facility] Basic Mobility Inpatient Short Form Raw Score is 18, Standardized Score is 41 05  A standardized score less than 42 9 suggests the patient may benefit from discharge to post-acute rehabilitation services, however pt lives with her family and would be safe to go home with 24 hour supervision and home PT     Goals   Patient Goals "I want to eat breakfast"   [facility] Expiration Date 03/02/21   Short Term Goal #1 improve bed mobility to independent, improve transfers to supervision, pt will ambulate 50 feet with supervision, min assist up and down 6 steps so pt can enter her home   [facility] Expiration Date 03/09/21   Long Term Goal #1 independent transfers bed to chair, modified independent ambulation with a walker 50 feet so pt can negotiate her home, supervision up and down 6 steps so pt can enter her home  Plan   Treatment/Interventions ADL retraining;Functional transfer training;LE strengthening/ROM; Elevations; Therapeutic exercise; Endurance training;Cognitive reorientation;Gait training;Bed mobility; Patient/family training   PT Frequency 5x/wk   Recommendation   PT Discharge Recommendation Home with skilled therapy; Other (Comment)  (24 hour supervision)   Equipment Recommended   (pt has a walker and a wc)   [facility] [address] Mobility Inpatient   Turning in Bed Without Bedrails 4   Lying on Back to Sitting on Edge of Flat Bed 3   Moving Bed to Chair 3   Standing Up From Chair 3   Walk in Room 3   Climb 3-5 Stairs 2   Basic Mobility Inpatient Raw Score 18   Basic Mobility Standardized Score 41 05   Barthel Index   Feeding 5   Bathing 0   Grooming Score 0   Dressing Score 5   Bladder Score 10   Bowels Score 10   Toilet Use Score 5   Transfers (Bed/Chair) Score 10   Mobility (Level Surface) Score 0   Stairs Score 5   Barthel Index Score 48   Licensure   [place] License Number  Walter Carranza PT [id number]       Time KF:0263  Time RLL:5459  Total Time: 10      S:  "It feels good to be out of bed"   O:  Supervision to rise from a chair and ambulate 50 feet with a rolling walker with 2L02  Pt returned to chair with min assist and left OOB in a recliner with chair alarm and call bell  A:  Pt tolerated activity well  Anticiapate pt's function will improve with continued activity and as medical status is stabilized    P:  Continue PT    Rober Jurado, PT

## 2021-02-23 NOTE — ASSESSMENT & PLAN NOTE
Lab Results   Component Value Date    HGBA1C 7 1 (H) 02/01/2021       Recent Labs     02/27/21  2004 02/28/21  0719 02/28/21  1052 02/28/21  1604   POCGLU 178* 112 150* 105       Blood Sugar Average: Last 72 hrs:  (P) 878 [id number]   Appetite fair, acceptable on current regimen  Continue Lantus and pre meal insulin at current dose  continue corrective scale

## 2021-02-23 NOTE — ASSESSMENT & PLAN NOTE
Controlled, history of sick sinus syndrome status post pacemaker  Continue metoprolol at lower dose with holding parameters   Discussed with Cardiology regarding resumption of anticoagulation,Alternative NOAC due to CKD  Started on Eliquis  Hemoglobin stable

## 2021-02-23 NOTE — PROGRESS NOTES
Progress Note - Cardiology   [facility]     Denise Bueno 66 y o  female MRN: [id number]  : 1950  Unit/Bed#: [address] [place] [address] Encounter: [id number]    Assessment and Plan:   1  Critical aortic stenosis:  Last valve area was 0 8  Patient evaluated by CT surgery at [facility] and unfortunately due to her multiple comorbidities is not felt to be a candidate for SAVR or TAVR  -  this was discussed at length with her son, Kheinde Anderson on day of admission  Family considering palliative care    2  Altered mental status:  Patient's mental status improved after improvement of blood pressure  Most likely multifactorial including acute kidney injury, dehydration and poor perfusion secondary to critical aortic stenosis  3  Acute kidney injury on chronic kidney disease:  Patient's renal function slowly improving with improvement in blood pressure  Will continue albumin and trial Lasix IV 10 mg x 1      4  Diabetes:  Managed per the primary team    5  Chronic combined systolic and diastolic heart failure:  Dry weight appears to be 205 lb    Subjective / Objective:   Patient seen and examined  She is more awake and alert today, blood pressures improved after receiving albumin  Diuretics, Aldactone and Demadex currently on hold  Patient on 4 L oxygen, will give Lasix 10 mg IV x1 and monitor  Vitals: Blood pressure 122/55, pulse 66, temperature (!) 97 2 °F (36 2 °C), resp  rate 18, weight 95 8 kg (211 lb 3 2 oz), SpO2 (!) 89 %, not currently breastfeeding  Vitals:    21 1147 21 0600   Weight: 97 2 kg (214 lb 3 2 oz) 95 8 kg (211 lb 3 2 oz)     Body mass index is 37 41 kg/m²    BP Readings from Last 3 Encounters:   21 122/55   02/10/21 110/68   21 129/74     Orthostatic Blood Pressures      Most Recent Value   Blood Pressure  122/55 filed at 2021 0745   Patient Position - Orthostatic VS  Lying filed at 2021 1021        I/O 02/21 0701 - 02/22 0700 02/22 0701 - 02/23 0700 02/23 0701 - 02/24 0700    IV Piggyback  1000     Total Intake(mL/kg)  1000 (10 4)     Urine (mL/kg/hr)  400     Total Output  400     Net  +600                Invasive Devices     Peripheral Intravenous Line            Peripheral IV 02/22/21 Left Forearm 1 day                  Intake/Output Summary (Last 24 hours) at 2/23/2021 1025  Last data filed at 2/22/2021 1033  Gross per 24 hour   Intake 500 ml   Output --   Net 500 ml         Physical Exam:   Physical Exam  Vitals signs and nursing note reviewed  Constitutional:       General: She is not in acute distress  Appearance: Normal appearance  She is well-developed  She is obese  HENT:      Head: Normocephalic  Right Ear: External ear normal       Left Ear: External ear normal       Nose: Nose normal    Eyes:      General: No scleral icterus  Right eye: No discharge  Left eye: No discharge  Conjunctiva/sclera: Conjunctivae normal       Pupils: Pupils are equal, round, and reactive to light  Neck:      Musculoskeletal: Normal range of motion and neck supple  Thyroid: No thyromegaly  Cardiovascular:      Rate and Rhythm: Normal rate and regular rhythm  Pulses: Normal pulses  Heart sounds: Murmur present  Systolic murmur present with a grade of 3/6  Comments: Trace lower extremity edema  Pulmonary:      Effort: Pulmonary effort is normal  No accessory muscle usage or respiratory distress  Breath sounds: Examination of the right-lower field reveals decreased breath sounds and rales  Examination of the left-lower field reveals decreased breath sounds and rales  Decreased breath sounds and rales present  Abdominal:      General: Bowel sounds are normal  There is no distension  Palpations: Abdomen is soft  Musculoskeletal:      Right lower leg: Edema present  Left lower leg: Edema present  Skin:     General: Skin is warm and dry        Capillary Refill: Capillary refill takes less than 2 seconds  Neurological:      General: No focal deficit present  Mental Status: She is alert  Mental status is at baseline  Psychiatric:         Mood and Affect: Mood normal          Speech: Speech normal          Behavior: Behavior normal  Behavior is cooperative  Thought Content: Thought content normal                  Medications/ Allergies:     Current Facility-Administered Medications   Medication Dose Route Frequency Provider Last Rate    acetaminophen  650 mg Oral Q4H PRN Iesha Mcmullen MD      albumin human  25 g Intravenous Q8H ISIDRA Davis 0 g (02/22/21 1315)    cefepime  2,000 mg Intravenous Q24H Talya Nicholson MD      heparin (porcine)  5,000 Units Subcutaneous [facility] Iesha Mcmullen MD      insulin lispro  1-5 Units Subcutaneous Q6H [address] Iesha Mcmullen MD      insulin lispro  1-5 Units Subcutaneous HS Iesha Mcmullen MD      levothyroxine  137 mcg Oral Daily Iesha Mcmullen MD       acetaminophen, 650 mg, Q4H PRN      Allergies   Allergen Reactions    Bromide Ion [Bromine]      Blurred vision   Has preservative that  Pt cannot use    Other Blisters     Adhesive tape    Timolol      Per pt, allergic to steroid in medication    Tramadol      cognition changes    Ibuprofen Palpitations    Penicillins Rash       VTE Pharmacologic Prophylaxis:   Sequential compression device (Venodyne)     Labs:   Troponins:      CBC with diff:  Results from last 7 days   Lab Units 02/23/21  0554 02/22/21  0734   WBC Thousand/uL 11 80* 11 94*   HEMOGLOBIN g/dL 11 0* 11 1*   HEMATOCRIT % 39 7 39 4   MCV fL 94 93   PLATELETS Thousands/uL 150 177   MCH pg 26 1* 26 2*   MCHC g/dL 27 7* 28 2*   RDW % 19 8* 19 7*   MPV fL 10 3 10 3   NRBC AUTO /100 WBCs 0 0       CMP:  Results from last 7 days   Lab Units 02/23/21  0554 02/22/21  1626 02/22/21  0734   SODIUM mmol/L 143 141 139   POTASSIUM mmol/L 4 1 4 2 4 6   CHLORIDE mmol/L 104 103 103   CO2 mmol/L 27 25 29   ANION GAP mmol/L 12 13 7   BUN mg/dL 102* 101* 105*   CREATININE mg/dL 2 14* 2 29* 2 21*   CALCIUM mg/dL 9 3 9 3 8 8   AST U/L  --   --  24   ALT U/L  --   --  20   ALK PHOS U/L  --   --  212*   TOTAL PROTEIN g/dL  --   --  8 6*   ALBUMIN g/dL  --   --  2 6*   TOTAL BILIRUBIN mg/dL  --   --  1 90*   EGFR ml/min/1 73sq m 22 21 22       Coags:  Results from last 7 days   Lab Units 02/22/21  0734   PTT seconds 52*   INR  2 07*       Recent Labs     02/22/21  0734   NTBNP 13,253*        Imaging & Testing   I have personally reviewed pertinent reports  Ct Chest Abdomen Pelvis Wo Contrast    Result Date: 2/22/2021  Narrative: CT CHEST, ABDOMEN AND PELVIS WITHOUT IV CONTRAST INDICATION:   Altered mental status  Sepsis  COMPARISON:  CT of the chest, CT of the abdomen/pelvis dated 10/23/2020, and chest CT dated 1/19/2017  TECHNIQUE: CT examination of the chest, abdomen and pelvis was performed without intravenous contrast   Axial, sagittal, and coronal 2D reformatted images were created from the source data and submitted for interpretation  Radiation dose length product (DLP) for this visit:  1525 66 mGy-cm   This examination, like all CT scans performed in the [facility], was performed utilizing techniques to minimize radiation dose exposure, including the use of iterative reconstruction and automated exposure control  Enteric contrast was not administered  FINDINGS: CHEST LUNGS:  Lung parenchyma is distorted by respiratory motion and atelectasis related to imaging during exhalation  Central predominant groundglass opacities in both lungs  Bandlike opacities in the lung bases with morphology suggesting atelectasis  No acute-appearing dense consolidations  Mild scattered foci of interlobular septal reticulation  3 mm nodule in the right upper lobe (3/36)--present in 2017 confirming benignancy  No suspicious pulmonary nodules  PLEURA:  Trace right layering effusion posteriorly    No loculated components  No pneumothorax  HEART/GREAT VESSELS:  Four-chamber enlargement of the heart  Prior CABG  Native coronary atherosclerosis  Valvular calcifications  Cardiac assist device with leads in the right atrium and right ventricle  No pericardial effusion  Thoracic aortic atherosclerosis without aneurysm  Mildly ectatic main pulmonary artery 30 mm  Pulmonary vessels appear cephalized on coronal reformats  MEDIASTINUM AND JAIRO:  Unremarkable  CHEST WALL AND LOWER NECK:   Unremarkable  ABDOMEN LIVER/BILIARY TREE:  Question nodular contour to liver  No focal parenchymal masses or biliary ductal dilatation, though parenchyma is obscured by motion artifact  GALLBLADDER:  Gallbladder is surgically absent  SPLEEN:  Unremarkable  PANCREAS:  Diffuse fatty atrophy, greatest at the tail  No masses or pancreatic ductal dilatation demonstrated  ADRENAL GLANDS:  Unremarkable  KIDNEYS/URETERS: No contour distorting masses, perinephric collections, urolithiasis, or hydronephrosis  STOMACH AND BOWEL: Small hiatal hernia  No gastric wall thickening  No dilated or inflamed loops of small bowel or colon, including loops bulging through the patient's ventral abdominal wall defect, discussed below  APPENDIX:  Cecum located within the ventral abdominal wall  Appendix not well demonstrated  No evidence for appendicitis, though  ABDOMINOPELVIC CAVITY:  Mild ascites, greatest around the liver and spleen  Small amount of free fluid tracking into the pelvis  No encapsulated collections  No free air  Shotty retroperitoneal lymph nodes are demonstrated without laurel mesenteric, retroperitoneal, or pelvic sidewall lymphadenopathy  VESSELS:  Aortoiliac and branch vessel atherosclerosis without aneurysm  Multiple greater saphenous varices and inguinal regions, greater on the left, measuring up to 18 mm in diameter  PELVIS REPRODUCTIVE ORGANS:  Unremarkable for patient's age   URINARY BLADDER:  Urinary bladder wall appears diffusely thickened, even accounting for degree of nondistention  ABDOMINAL WALL/INGUINAL REGIONS:  Subxiphoid midline hernia with wide neck measuring 3 6 in meters by 2 5 cm with 3 6 cm neck  Abdominal fat and ascites fluid protrudes anteriorly  Hernia repair mesh at the supra umbilical ventral wall  Inferior to the mesh there is pronounced diastasis recti splaying of the rectus abdominis muscles by 9 cm  Mesenteric fat, small bowel, and large bowel loops loops protrude into the defect  Overall, protruding contents measure 18 7 cm x 9 2 cm x 23 cm  Relatively pronounced anasarca around the hips and gluteal regions  No encapsulated superficial collections  OSSEOUS STRUCTURES: Healed median sternotomy  New transverse fracture through the superior endplate of the U62 vertebral body  No significant vertebral body height loss  No significant retropulsed fragments  New anterior compression deformity at the superior endplate of the L3 vertebral body  Approximately 20% height loss  Minimal buckling of the posterior cortex, superiorly, without significant spinal canal stenosis  Grade 1 anterolisthesis of L4 on L5 secondary to bulky facet osteophytes  No destructive lytic or blastic lesions  Impression: 1  Two new vertebral fractures when compared to October  Transverse fracture of the T12 vertebral body without significant height loss  Mild anterior compression deformity of the L3 vertebral body with minimal posterior cortical buckling  No retropulsed fragments or significant neural impingement  2   Thickening of the bladder wall, diffusely  Correlate for evidence of infectious cystitis on urinalysis  3   Pulmonary vascular engorgement and cephalization, hazy central groundglass opacities, scattered interlobular septal thickening, and small right pleural effusion  Constellation of findings favors volume overload with pulmonary edema  4   Mild ascites and anasarca    Nodular liver contour suggestive of cirrhosis  5   Large diastasis defect in the ventral abdomen  Small and large bowel protruding through the defect do not appear obstructed or inflamed  I personally discussed this study with Prema Whalen on 2/22/2021 at 9:31 AM  Workstation performed: [id number]       Xr Chest Portable    Result Date: 2/22/2021  Narrative: CHEST INDICATION:   AMS  COMPARISON:  Chest radiograph from 1/30/2021 and chest CT from 2/22/2021  EXAM PERFORMED/VIEWS:  XR CHEST PORTABLE FINDINGS: Moderate cardiomegaly  CABG  Left subclavian pacemaker lead in right ventricle  The lungs are clear  No pneumothorax or pleural effusion  Osseous structures appear within normal limits for patient age  Impression: No acute cardiopulmonary disease  Workstation performed: [id number]       Ct Head Without Contrast    Result Date: 2/22/2021  Narrative: CT BRAIN - WITHOUT CONTRAST INDICATION:   Altered mental status  COMPARISON:  1/30/2021 and 11/30/2020  TECHNIQUE:  CT examination of the brain was performed  In addition to axial images, sagittal and coronal 2D reformatted images were created and submitted for interpretation  Radiation dose length product (DLP) for this visit:  971 mGy-cm   This examination, like all CT scans performed in the [facility], was performed utilizing techniques to minimize radiation dose exposure, including the use of iterative reconstruction and automated exposure control  IMAGE QUALITY:  Diagnostic  FINDINGS: PARENCHYMA:  Moderate patchy periventricular white matter hypodensities consistent with chronic microangiopathic changes  Parenchymal appearance is unchanged from prior  No acute infarct  No parenchymal hemorrhage  No mass  VENTRICLES AND EXTRA-AXIAL SPACES:  Normal for the patient's age  VISUALIZED ORBITS AND PARANASAL SINUSES:  Bilateral cataract surgeries  Orbits are, otherwise, unremarkable  Paranasal sinuses are clear   CALVARIUM AND EXTRACRANIAL SOFT TISSUES:  Normal  Impression: No acute intracranial abnormalities or significant change from priors  Workstation performed: [id number]     Sotelo Marus & Waveform Analysis, Multiple Levels    Result Date: 2021  Narrative:  [facility] REPORT CLINICAL: Indications: Patient being evaluated for peripheral arterial disease secondary to petechiae and swelling as per ordering Provider  Operative History: Cardiac Pacemaker Risk Factors: The patient has history of HTN, Diabetes (NIDDM (oral meds)), HLD, CKD and CAD  Clinical: Right Pressure:  80/ mm Hg, Left Pressure:  128/ mm Hg  FINDINGS:  Segment       Rig  Left                          P    P  Ant  Tibial    98  235  Post  Tibial  245  247  Ankle         245  247  Metatarsal     98  150  Great Toe      87  102     CONCLUSION: Impression RIGHT LOWER LIMB Ankle/Brachial Index: unreliable due to patient movement  PPG/PVR Tracings are normal  Metatarsal Pressure 98 mmHg  Great Toe Pressure: 87 mmHg, within the healing range  LEFT LOWER LIMB Ankle/Brachial Index: unreliable due to patient movement  PPG/PVR Tracings are normal  Metatarsal Pressure 150 mmHg  Great Toe Pressure: 102 mmHg, within the healing range  Technically limited/difficult study due to patient movement and uncooperative  Bilateral ABIs, metatarsal and great toe pressures unreliable due to movement    Technical findings given to Dr Eden Alford at 2:46 pm   SIGNATURE: Electronically Signed by: Severiano Hughs, MD, [address] on 2021 05:05:52 PM      Cardiac testing:   Results for orders placed during the hospital encounter of 20   Echo complete with contrast if indicated    Narrative [address]  [address] [facility] [address]  [phone]    Transthoracic Echocardiogram  2D, M-mode, Doppler, and Color Doppler    Study date:  08-Aug-2020    Patient: Bianca Howell  MR number: [id number]  Account number: [de-identified]  : 1950  Age: 69 years  Gender: Female  Status: Outpatient  Location: Echo lab  Height: 65 in  Weight: 207 5 lb  BP: 143/ 82 mmHg    Indications: CAD    Diagnoses: I25 83 - Coronary atherosclerosis due to lipid rich plaque    Sonographer:  Alexandra Garcia RDCS  Primary Physician:  Usha Simms MD  Referring Physician:  Abril Briggs MD  Group:  [facility]  Ordering Physician:  Abril Briggs MD  Interpreting Physician:  Abril Briggs MD    SUMMARY    LEFT VENTRICLE:  The ventricle was mildly dilated  Systolic function was mild to moderately reduced  Ejection fraction was estimated in the range of 40 % to 45 % to be 45 %  There was mild diffuse hypokinesis  Wall thickness was mildly to moderately increased  There was mild concentric hypertrophy  Doppler parameters were consistent with high ventricular filling pressure  VENTRICULAR SEPTUM:  There was paradoxical motion  These changes are consistent with right ventricular pacing  RIGHT VENTRICLE:  The ventricle was mildly to moderately dilated  Systolic function was mildly reduced  Wall thickness was mildly increased  Systolic pressure was markedly increased  LEFT ATRIUM:  The atrium was moderately dilated  RIGHT ATRIUM:  The atrium was moderately dilated  MITRAL VALVE:  There was mild to moderate annular calcification  There was mild regurgitation  AORTIC VALVE:  The valve was trileaflet  Leaflets exhibited moderately increased thickness, moderate calcification, and markedly reduced cuspal separation  Transaortic velocity was increased due to valvular stenosis  There was severe stenosis  TROY around 0 85  LVOT velocity 0 65 m/sec and Peak AV 2 36 m/sec and DI  26  Peak na d mean gradient are low, need to rule out low gradient severe AS    TRICUSPID VALVE:  There was moderate regurgitation  Estimated peak PA pressure was 80 mmHg  PULMONIC VALVE:  There was mild regurgitation      IVC, HEPATIC VEINS:  The inferior vena cava was dilated, with poor inspiratory collapse, consistent with elevated right atrial pressure  HISTORY: PRIOR HISTORY: HTN, DM, Obesity, PHTN, CABG, GERD, Pacemaker    PROCEDURE: The procedure was performed in the echo lab  This was a routine study  The transthoracic approach was used  The study included complete 2D imaging, M-mode, complete spectral Doppler, and color Doppler  The heart rate was 66 bpm,  at the start of the study  Echocardiographic views were limited due to lung interference  Image quality was adequate  LEFT VENTRICLE: The ventricle was mildly dilated  Systolic function was mild to moderately reduced  Ejection fraction was estimated in the range of 40 % to 45 % to be 45 %  There was mild diffuse hypokinesis  Wall thickness was mildly to  moderately increased  There was mild concentric hypertrophy  DOPPLER: Doppler parameters were consistent with high ventricular filling pressure  VENTRICULAR SEPTUM: Thickness was mildly increased  There was paradoxical motion  These changes are consistent with right ventricular pacing  RIGHT VENTRICLE: The ventricle was mildly to moderately dilated  Systolic function was mildly reduced  Wall thickness was mildly increased  DOPPLER: Systolic pressure was markedly increased  LEFT ATRIUM: The atrium was moderately dilated  RIGHT ATRIUM: The atrium was moderately dilated  MITRAL VALVE: There was mild to moderate annular calcification  There was normal leaflet separation  DOPPLER: The transmitral velocity was within the normal range  There was no evidence for stenosis  There was mild regurgitation  AORTIC VALVE: The valve was trileaflet  Leaflets exhibited moderately increased thickness, moderate calcification, and markedly reduced cuspal separation  DOPPLER: Transaortic velocity was increased due to valvular stenosis  There was  severe stenosis  TROY around 0 85  LVOT velocity 0 65 m/sec and Peak AV 2 36 m/sec and DI  26   Peak na d mean gradient are low, need to rule out low gradient severe AS There was no regurgitation  TRICUSPID VALVE: DOPPLER: There was moderate regurgitation  Estimated peak PA pressure was 80 mmHg  PULMONIC VALVE: DOPPLER: There was mild regurgitation  PERICARDIUM: There was no thickening or calcification  There was no pericardial effusion  AORTA: The root exhibited normal size  SYSTEMIC VEINS: IVC: The inferior vena cava was dilated, with poor inspiratory collapse, consistent with elevated right atrial pressure  SYSTEM MEASUREMENT TABLES    2D mode  AoR Diam 2D: 2 8 cm  LA Diam (2D): 4 5 cm  LA/Ao (2D): 1 61  FS (2D Teich): 19 6 %  IVSd (2D): 1 29 cm  LVDEV: 94 4 cmï¾³  LVESV: 56 3 cmï¾³  LVIDd(2D): 4 54 cm  LVISd (2D): 3 65 cm  LVOT Area 2D: 3 14 cmï¾²  LVPWd (2D): 1 21 cm  SV (Teich): 38 1 cmï¾³    Apical four chamber  LVEF A4C: 42 %    Unspecified Scan Mode  TROY Cont Eq (Peak Carlos): 0 87 cmï¾²  LVOT Diam : 2 cm  LVOT Vmax: 638 mm/s  LVOT Vmax; Mean: 638 mm/s  Peak Grad ; Mean: 2 mm[Hg]  MV Peak E Carlos  Mean: 1930 mm/s  Max P mm[Hg]  V Max: 4170 mm/s  Vmax: 3910 mm/s  RA Area: 25 7 cmï¾²  RA Volume: 91 6 cmï¾³  TAPSE: 0 8 cm    [employer] Accredited Echocardiography Laboratory    Prepared and electronically signed by    Jacinta Villatoro MD  Signed 11-Aug-2020 09:30:19         Fransisco Salcedo        "This note has been constructed using a voice recognition system  Therefore there may be syntax, spelling, and/or grammatical errors   Please call if you have any questions  "

## 2021-02-23 NOTE — ASSESSMENT & PLAN NOTE
Report having chest discomfort, EKG without any acute changes    Cardiac markers negative  Cardiology following  Continue metoprolol  Continue supportive care

## 2021-02-23 NOTE — PLAN OF CARE
Problem: Potential for Falls  Goal: Patient will remain free of falls  Description: INTERVENTIONS:  - Assess patient frequently for physical needs  -  Identify cognitive and physical deficits and behaviors that affect risk of falls    -  [place] fall precautions as indicated by assessment   - Educate patient/family on patient safety including physical limitations  - Instruct patient to call for assistance with activity based on assessment  - Modify environment to reduce risk of injury  - Consider OT/PT consult to assist with strengthening/mobility  Outcome: Progressing     Problem: Prexisting or High Potential for Compromised Skin Integrity  Goal: Skin integrity is maintained or improved  Description: INTERVENTIONS:  - Identify patients at risk for skin breakdown  - Assess and monitor skin integrity  - Assess and monitor nutrition and hydration status  - Monitor labs   - Assess for incontinence   - Turn and reposition patient  - Assist with mobility/ambulation  - Relieve pressure over bony prominences  - Avoid friction and shearing  - Provide appropriate hygiene as needed including keeping skin clean and dry  - Evaluate need for skin moisturizer/barrier cream  - Collaborate with interdisciplinary team   - Patient/family teaching  - Consider wound care consult   Outcome: Progressing     Problem: PAIN - ADULT  Goal: Verbalizes/displays adequate comfort level or baseline comfort level  Description: Interventions:  - Encourage patient to monitor pain and request assistance  - Assess pain using appropriate pain scale  - Administer analgesics based on type and severity of pain and evaluate response  - Implement non-pharmacological measures as appropriate and evaluate response  - Consider cultural and social influences on pain and pain management  - Notify physician/advanced practitioner if interventions unsuccessful or patient reports new pain  Outcome: Progressing     Problem: INFECTION - ADULT  Goal: Absence or prevention of progression during hospitalization  Description: INTERVENTIONS:  - Assess and monitor for signs and symptoms of infection  - Monitor lab/diagnostic results  - Monitor all insertion sites, i e  indwelling lines, tubes, and drains  - Monitor endotracheal if appropriate and nasal secretions for changes in amount and color  - [place] appropriate cooling/warming therapies per order  - Administer medications as ordered  - Instruct and encourage patient and family to use good hand hygiene technique  - Identify and instruct in appropriate isolation precautions for identified infection/condition  Outcome: Progressing  Goal: Absence of fever/infection during neutropenic period  Description: INTERVENTIONS:  - Monitor WBC    Outcome: Progressing     Problem: SAFETY ADULT  Goal: Maintain or return to baseline ADL function  Description: INTERVENTIONS:  -  Assess patient's ability to carry out ADLs; assess patient's baseline for ADL function and identify physical deficits which impact ability to perform ADLs (bathing, care of mouth/teeth, toileting, grooming, dressing, etc )  - Assess/evaluate cause of self-care deficits   - Assess range of motion  - Assess patient's mobility; develop plan if impaired  - Assess patient's need for assistive devices and provide as appropriate  - Encourage maximum independence but intervene and supervise when necessary  - Involve family in performance of ADLs  - Assess for home care needs following discharge   - Consider OT consult to assist with ADL evaluation and planning for discharge  - Provide patient education as appropriate  Outcome: Progressing  Goal: Maintain or return mobility status to optimal level  Description: INTERVENTIONS:  - Assess patient's baseline mobility status (ambulation, transfers, stairs, etc )    - Identify cognitive and physical deficits and behaviors that affect mobility  - Identify mobility aids required to assist with transfers and/or ambulation (gait belt, sit-to-stand, lift, walker, cane, etc )  - [place] fall precautions as indicated by assessment  - Record patient progress and toleration of activity level on Mobility SBAR; progress patient to next Phase/Stage  - Instruct patient to call for assistance with activity based on assessment  - Consider rehabilitation consult to assist with strengthening/weightbearing, etc   Outcome: Progressing     Problem: DISCHARGE PLANNING  Goal: Discharge to home or other facility with appropriate resources  Description: INTERVENTIONS:  - Identify barriers to discharge w/patient and caregiver  - Arrange for needed discharge resources and transportation as appropriate  - Identify discharge learning needs (meds, wound care, etc )  - Arrange for interpretive services to assist at discharge as needed  - Refer to Case Management Department for coordinating discharge planning if the patient needs post-hospital services based on physician/advanced practitioner order or complex needs related to functional status, cognitive ability, or social support system  Outcome: Progressing     Problem: Knowledge Deficit  Goal: Patient/family/caregiver demonstrates understanding of disease process, treatment plan, medications, and discharge instructions  Description: Complete learning assessment and assess knowledge base    Interventions:  - Provide teaching at level of understanding  - Provide teaching via preferred learning methods  Outcome: Progressing     Problem: CARDIOVASCULAR - ADULT  Goal: Maintains optimal cardiac output and hemodynamic stability  Description: INTERVENTIONS:  - Monitor I/O, vital signs and rhythm  - Monitor for S/S and trends of decreased cardiac output  - Administer and titrate ordered vasoactive medications to optimize hemodynamic stability  - Assess quality of pulses, skin color and temperature  - Assess for signs of decreased coronary artery perfusion  - Instruct patient to report change in severity of symptoms  Outcome: Progressing  Goal: Absence of cardiac dysrhythmias or at baseline rhythm  Description: INTERVENTIONS:  - Continuous cardiac monitoring, vital signs, obtain 12 lead EKG if ordered  - Administer antiarrhythmic and heart rate control medications as ordered  - Monitor electrolytes and administer replacement therapy as ordered  Outcome: Progressing     Problem: RESPIRATORY - ADULT  Goal: Achieves optimal ventilation and oxygenation  Description: INTERVENTIONS:  - Assess for changes in respiratory status  - Assess for changes in mentation and behavior  - Position to facilitate oxygenation and minimize respiratory effort  - Oxygen administered by appropriate delivery if ordered  - Initiate smoking cessation education as indicated  - Encourage broncho-pulmonary hygiene including cough, deep breathe, Incentive Spirometry  - Assess the need for suctioning and aspirate as needed  - Assess and instruct to report SOB or any respiratory difficulty  - Respiratory Therapy support as indicated  Outcome: Progressing     Problem: SKIN/TISSUE INTEGRITY - ADULT  Goal: Skin integrity remains intact  Description: INTERVENTIONS  - Identify patients at risk for skin breakdown  - Assess and monitor skin integrity  - Assess and monitor nutrition and hydration status  - Monitor labs (i e  albumin)  - Assess for incontinence   - Turn and reposition patient  - Assist with mobility/ambulation  - Relieve pressure over bony prominences  - Avoid friction and shearing  - Provide appropriate hygiene as needed including keeping skin clean and dry  - Evaluate need for skin moisturizer/barrier cream  - Collaborate with interdisciplinary team (i e  Nutrition, Rehabilitation, etc )   - Patient/family teaching  Outcome: Progressing     Problem: Nutrition/Hydration-ADULT  Goal: Nutrient/Hydration intake appropriate for improving, restoring or maintaining nutritional needs  Description: Monitor and assess patient's nutrition/hydration status for malnutrition  Collaborate with interdisciplinary team and initiate plan and interventions as ordered  Monitor patient's weight and dietary intake as ordered or per policy  Utilize nutrition screening tool and intervene as necessary  Determine patient's food preferences and provide high-protein, high-caloric foods as appropriate       INTERVENTIONS:  - Monitor oral intake, urinary output, labs, and treatment plans  - Assess nutrition and hydration status and recommend course of action  - Evaluate amount of meals eaten  - Assist patient with eating if necessary   - Allow adequate time for meals  - Recommend/ encourage appropriate diets, oral nutritional supplements, and vitamin/mineral supplements  - Assess need for intravenous fluids  - Provide specific nutrition/hydration education as appropriate  - Include patient/family/caregiver in decisions related to nutrition  Outcome: Progressing

## 2021-02-23 NOTE — ASSESSMENT & PLAN NOTE
CT of the abdomen revealed Two new vertebral fractures when compared to October   Transverse fracture of the T12 vertebral body without significant height loss   Mild anterior compression deformity of the L3 vertebral body with minimal posterior cortical buckling   No   retropulsed fragments or significant neural impingement     Monitor clinically

## 2021-02-23 NOTE — CASE MANAGEMENT
LOS 1 day  Patient is not a bundle  Patient is a 30 day unrelated readmission  SW called son Grant Ford to discuss discharge planning and complete CM open  SW introduced self and explained role  Patient lives with son and DIL in a multilevel home with 6 stairs to enter with rails  Patient lives on the main level  Patient uses a walker, wheelchair, and [facility]  Patient needs assistance with ADL's and has [facility]  Patient is alert and confused  Oriented to person only  Hx of inpatient rehab at [address] [facility]  No MH/DA issues  Pt PCP is Dr Lizbeth MD  Patient has prescription coverage and has no difficulty affording medications  Preferred pharmacy is [facility] in [place]  LW on chart  SW discussed discharge plans with son regarding home pt and if patient has 24 hour supervision  Son states that he has been having difficulty meeting all care needs for patient and feels that she would benefit from [address] placement  Son is just starting the MA process  SW emailed son a list of facilities to review  SW will follow for choices

## 2021-02-23 NOTE — PROGRESS NOTES
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)    CKD (chronic kidney disease)    H/O coronary artery bypass surgery    Aortic stenosis    Lactic acidosis    Chronic atrial fibrillation ([facility])    Goals of care, counseling/discussion    Abnormal CT of the abdomen      Assessment & Plan:    Gram-negative bacteremia  Assessment & Plan  Patient was noted to have 2/2 blood culture positive with Gram-negative bacilli on admission  Unclear source but suspected to be urinary  UA with evidence of trace leukocytes, bacteriuria and pyuria  Unfortunately urine culture was not collected  · Continue IV cefepime  · Follow-up culture sensitivities  · Monitor        Sepsis [facility])  Assessment & Plan  As evidenced by leukocytosis of 12, and hypotension  Also lactic acid was elevated to 4 0  Secondary to Gram-negative bacteremia, source unclear  Possible urinary source  chest x-ray negative for any infiltrates,   Abdominal exam benign, LFT stable  CT of the abdomen pelvis negative any other intra-abdominal pathology     Hemodynamics improved  · Continue IV antibiotics as below  · Monitor clinically  · ID evaluation      * Encephalopathy, toxic metabolic  Assessment & Plan  Likely secondary to multifactorial including sepsis, acute kidney injury on chronic kidney disease, intravascular depletion in setting of critical aortic stenosis and poor perfusion,   CT of the head negative for any acute intracranial abnormalities, ammonia negative  VBG showed a pH of 7 43, and pCO2 of 46  Blood culture noted to be positive for Gram-negative rods  Clinically overall better today  Neuro exam nonfocal  · Continue IV antibiotics as below  · Monitor neuro status  · Monitor blood pressure, avoid hypotension    Lactic acidosis  Assessment & Plan  Multifactorial secondary to sepsis, hypotension in setting of critical aortic stenosis, underlying cirrhosis  Blood pressure overall improved  Continue to monitor    Aortic stenosis  Assessment & Plan  Prior cardiology notes noted, patient is not candidate for SAVR or TAVR    CKD (chronic kidney disease)  Assessment & Plan  Baseline creatinine appears to be fluctuating, recently trended up to mid 2s  Will continue to monitor  Avoid hypotension  Monitor intake output      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, with long-term current use of insulin [facility])  Assessment & Plan  Lab Results   Component Value Date    HGBA1C 7 1 (H) 02/01/2021       Recent Labs     02/22/21  1640 02/22/21 2011   POCGLU 156* 143*       Blood Sugar Average: Last 72 hrs:  (P) 149 5 continue Accu-Chek q i d  with sliding scale insulin low dose      Chronic combined systolic and diastolic congestive heart failure (HCC)  Assessment & Plan  Wt Readings from Last 3 Encounters:   02/22/21 97 2 kg (214 lb 3 2 oz)   02/10/21 95 3 kg (210 lb)   02/04/21 89 1 kg (196 lb 5 5 oz)   Echocardiogram with EF 45%, critical aortic stenosis,  Baseline weight appears to be 205 lb is as per Cardiology notes  Cardiology input appreciated  · Resumed on albumin with IV Lasix, avoid hypotension  · Overall guarded prognosis          Coronary artery disease involving coronary bypass graft of native heart without angina pectoris  Assessment & Plan  Will resume medications as tolerated  Continue supportive care    Goals of care, counseling/discussion  Assessment & Plan  Patient has aortic stenosis and has recurrent admissions for congestive heart failure, and is a poor candidate for surgery as well  Team had a discussion with the patient's son regarding the patient's wishes for her code status and he mentioned that patient would have not wanted herself to be resuscitated or intubated or shock  Hence patient will be made DNR level 3  Will continue with goals of care discussions      Chronic atrial fibrillation [facility])  Assessment & Plan  Controlled, history of sick sinus syndrome status post pacemaker  Will resume metoprolol at lower dose with holding parameters   Discussed with Cardiology regarding resumption of anticoagulation,? Alternative NOAC due to CKD    Abnormal CT of the abdomen  Assessment & Plan  CT of the abdomen revealed Two new vertebral fractures when compared to October   Transverse fracture of the T12 vertebral body without significant height loss   Mild anterior compression deformity of the L3 vertebral body with minimal posterior cortical buckling   No   retropulsed fragments or significant neural impingement  Monitor clinically        VTE Pharmacologic Prophylaxis:   Pharmacologic: Heparin  Mechanical VTE Prophylaxis in Place: Yes    Patient Centered Rounds: I have performed bedside rounds with nursing staff today  Discussions with Specialists or Other Care Team Provider:  Cardiology    Education and Discussions with Family / Patient:  Discussed with patient, discussed with son over the phone    Time Spent for Care: 45 minutes  More than 50% of total time spent on counseling and coordination of care as described above      Current Length of Stay: 1 day(s)    Current Patient Status: Inpatient   Certification Statement: The patient will continue to require additional inpatient hospital stay due to Bacteremia    Discharge Plan:  Pending at present    Code Status: Level 3 - DNAR and DNI      Subjective:   Appears more alert and communicative  Sitting up in chair, reports that she remembers me from prior interactions  Denies chest pain or shortness of breath  Denies any abdominal pain  Reports that she did have some urinary difficulties before but denies at present    Afebrile, blood pressure overall improved  Noted to be positive for Gram-negative in blood cultures      Objective:     Vitals:   Temp (24hrs), Av 9 °F (36 1 °C), Min:96 8 °F (36 °C), Max:97 2 °F (36 2 °C)    Temp:  [96 8 °F (36 °C)-97 2 °F (36 2 °C)] 97 2 °F (36 2 °C)  HR:  [59-73] 66  Resp:  [18-22] 18  BP: ()/(51-85) 122/55  SpO2:  [89 %-96 %] 89 % on 2 L  Body mass index is 37 41 kg/m²  Input and Output Summary (last 24 hours):     No intake or output data in the 24 hours ending 21 1130    Physical Exam:     Physical Exam  Constitutional:       General: She is not in acute distress  Comments: Appears chronically ill   HENT:      Head: Normocephalic and atraumatic  Neck:      Musculoskeletal: Neck supple  Cardiovascular:      Rate and Rhythm: Normal rate  Heart sounds: Murmur present  Pulmonary:      Effort: Pulmonary effort is normal  No respiratory distress  Breath sounds: Examination of the right-lower field reveals rales  Examination of the left-lower field reveals rales  Decreased breath sounds and rales present  No wheezing  Abdominal:      General: Bowel sounds are normal  There is no distension  Palpations: Abdomen is soft  Tenderness: There is no abdominal tenderness  There is no guarding or rebound  Hernia: A hernia is present  Musculoskeletal:      Right lower leg: No edema  Left lower leg: No edema  Skin:     General: Skin is warm and dry  Findings: No rash  Neurological:      General: No focal deficit present  Mental Status: She is alert  Mental status is at baseline        Comments: Moving all extremities symmetrically with good strength         Additional Data:     Labs:    Results from last 7 days   Lab Units 21  0554   WBC Thousand/uL 11 80*   HEMOGLOBIN g/dL 11 0*   HEMATOCRIT % 39 7   PLATELETS Thousands/uL 150   NEUTROS PCT % 86*   LYMPHS PCT % 5*   MONOS PCT % 9   EOS PCT % 0     Results from last 7 days   Lab Units 02/23/21  0554  02/22/21  0734   POTASSIUM mmol/L 4 1   < > 4 6   CHLORIDE mmol/L 104   < > 103   CO2 mmol/L 27   < > 29   BUN mg/dL 102*   < > 105*   CREATININE mg/dL 2 14*   < > 2 21*   CALCIUM mg/dL 9 3   < > 8 8   ALK PHOS U/L  --   --  212*   ALT U/L  --   --  20   AST U/L  --   --  24    < > = values in this interval not displayed  Results from last 7 days   Lab Units 02/22/21  0734   INR  2 07*       * I Have Reviewed All Lab Data Listed Above  * Additional Pertinent Lab Tests Reviewed: All Labs Within Last 24 Hours Reviewed      Imaging:  Imaging Reports Reviewed Today Include:CT, CXR      Recent Cultures (last 7 days):     Results from last 7 days   Lab Units 02/22/21  0754 02/22/21  0734   BLOOD CULTURE  Gram Negative Vin Enteric Like*  --    GRAM STAIN RESULT  Gram negative rods* Gram negative rods*       Last 24 Hours Medication List:   Current Facility-Administered Medications   Medication Dose Route Frequency Provider Last Rate    acetaminophen  650 mg Oral Q4H PRN Kayli Chopra MD      albumin human  25 g Intravenous Q8H Viktor Beans, CRNP 0 g (02/22/21 1315)    cefepime  2,000 mg Intravenous Q24H Laura Soto MD      furosemide  10 mg Intravenous Once Viktor Beans, CRNP      heparin (porcine)  5,000 Units Subcutaneous Q8H [address] Kayli Chopra MD      insulin lispro  1-5 Units Subcutaneous Q6H [address] Kayli Chopra MD      insulin lispro  1-5 Units Subcutaneous HS Kayli Chopra MD      levothyroxine  137 mcg Oral Daily Kayli Chopra MD            Today, Patient Was Seen By: Laura Soto MD    ** Please Note: "This note has been constructed using a voice recognition system  Therefore there may be syntax, spelling, and/or grammatical errors   Please call if you have any questions  "**

## 2021-02-23 NOTE — ASSESSMENT & PLAN NOTE
Likely secondary to multifactorial including sepsis, acute kidney injury on chronic kidney disease, metabolic derangements, intravascular depletion in setting of critical aortic stenosis and poor perfusion,   CT of the head negative for any acute intracranial abnormalities, ammonia negative  VBG showed a pH of 7 43, and pCO2 of 46   Ammonia level within normal limit  Blood culture noted to be positive for Klebsiella  Appears to be improving/stable  Neuro exam nonfocal  · Continue IV antibiotics as below  · Monitor neuro status  · Monitor blood pressure, avoid hypotension  · Cautious use of opiates

## 2021-02-23 NOTE — ASSESSMENT & PLAN NOTE
Multifactorial secondary to sepsis, hypotension in setting of critical aortic stenosis, underlying cirrhosis  Blood pressure overall improved

## 2021-02-23 NOTE — PLAN OF CARE
Problem: Potential for Falls  Goal: Patient will remain free of falls  Description: INTERVENTIONS:  - Assess patient frequently for physical needs  -  Identify cognitive and physical deficits and behaviors that affect risk of falls    -  [place] fall precautions as indicated by assessment   - Educate patient/family on patient safety including physical limitations  - Instruct patient to call for assistance with activity based on assessment  - Modify environment to reduce risk of injury  - Consider OT/PT consult to assist with strengthening/mobility  Outcome: Progressing     Problem: Prexisting or High Potential for Compromised Skin Integrity  Goal: Skin integrity is maintained or improved  Description: INTERVENTIONS:  - Identify patients at risk for skin breakdown  - Assess and monitor skin integrity  - Assess and monitor nutrition and hydration status  - Monitor labs   - Assess for incontinence   - Turn and reposition patient  - Assist with mobility/ambulation  - Relieve pressure over bony prominences  - Avoid friction and shearing  - Provide appropriate hygiene as needed including keeping skin clean and dry  - Evaluate need for skin moisturizer/barrier cream  - Collaborate with interdisciplinary team   - Patient/family teaching  - Consider wound care consult   Outcome: Progressing     Problem: PAIN - ADULT  Goal: Verbalizes/displays adequate comfort level or baseline comfort level  Description: Interventions:  - Encourage patient to monitor pain and request assistance  - Assess pain using appropriate pain scale  - Administer analgesics based on type and severity of pain and evaluate response  - Implement non-pharmacological measures as appropriate and evaluate response  - Consider cultural and social influences on pain and pain management  - Notify physician/advanced practitioner if interventions unsuccessful or patient reports new pain  Outcome: Progressing     Problem: INFECTION - ADULT  Goal: Absence or prevention of progression during hospitalization  Description: INTERVENTIONS:  - Assess and monitor for signs and symptoms of infection  - Monitor lab/diagnostic results  - Monitor all insertion sites, i e  indwelling lines, tubes, and drains  - Monitor endotracheal if appropriate and nasal secretions for changes in amount and color  - [place] appropriate cooling/warming therapies per order  - Administer medications as ordered  - Instruct and encourage patient and family to use good hand hygiene technique  - Identify and instruct in appropriate isolation precautions for identified infection/condition  Outcome: Progressing  Goal: Absence of fever/infection during neutropenic period  Description: INTERVENTIONS:  - Monitor WBC    Outcome: Progressing     Problem: SAFETY ADULT  Goal: Maintain or return to baseline ADL function  Description: INTERVENTIONS:  -  Assess patient's ability to carry out ADLs; assess patient's baseline for ADL function and identify physical deficits which impact ability to perform ADLs (bathing, care of mouth/teeth, toileting, grooming, dressing, etc )  - Assess/evaluate cause of self-care deficits   - Assess range of motion  - Assess patient's mobility; develop plan if impaired  - Assess patient's need for assistive devices and provide as appropriate  - Encourage maximum independence but intervene and supervise when necessary  - Involve family in performance of ADLs  - Assess for home care needs following discharge   - Consider OT consult to assist with ADL evaluation and planning for discharge  - Provide patient education as appropriate  Outcome: Progressing  Goal: Maintain or return mobility status to optimal level  Description: INTERVENTIONS:  - Assess patient's baseline mobility status (ambulation, transfers, stairs, etc )    - Identify cognitive and physical deficits and behaviors that affect mobility  - Identify mobility aids required to assist with transfers and/or ambulation (gait belt, sit-to-stand, lift, walker, cane, etc )  - [place] fall precautions as indicated by assessment  - Record patient progress and toleration of activity level on Mobility SBAR; progress patient to next Phase/Stage  - Instruct patient to call for assistance with activity based on assessment  - Consider rehabilitation consult to assist with strengthening/weightbearing, etc   Outcome: Progressing     Problem: DISCHARGE PLANNING  Goal: Discharge to home or other facility with appropriate resources  Description: INTERVENTIONS:  - Identify barriers to discharge w/patient and caregiver  - Arrange for needed discharge resources and transportation as appropriate  - Identify discharge learning needs (meds, wound care, etc )  - Arrange for interpretive services to assist at discharge as needed  - Refer to Case Management Department for coordinating discharge planning if the patient needs post-hospital services based on physician/advanced practitioner order or complex needs related to functional status, cognitive ability, or social support system  Outcome: Progressing     Problem: Knowledge Deficit  Goal: Patient/family/caregiver demonstrates understanding of disease process, treatment plan, medications, and discharge instructions  Description: Complete learning assessment and assess knowledge base    Interventions:  - Provide teaching at level of understanding  - Provide teaching via preferred learning methods  Outcome: Progressing     Problem: CARDIOVASCULAR - ADULT  Goal: Maintains optimal cardiac output and hemodynamic stability  Description: INTERVENTIONS:  - Monitor I/O, vital signs and rhythm  - Monitor for S/S and trends of decreased cardiac output  - Administer and titrate ordered vasoactive medications to optimize hemodynamic stability  - Assess quality of pulses, skin color and temperature  - Assess for signs of decreased coronary artery perfusion  - Instruct patient to report change in severity of symptoms  Outcome: Progressing  Goal: Absence of cardiac dysrhythmias or at baseline rhythm  Description: INTERVENTIONS:  - Continuous cardiac monitoring, vital signs, obtain 12 lead EKG if ordered  - Administer antiarrhythmic and heart rate control medications as ordered  - Monitor electrolytes and administer replacement therapy as ordered  Outcome: Progressing     Problem: RESPIRATORY - ADULT  Goal: Achieves optimal ventilation and oxygenation  Description: INTERVENTIONS:  - Assess for changes in respiratory status  - Assess for changes in mentation and behavior  - Position to facilitate oxygenation and minimize respiratory effort  - Oxygen administered by appropriate delivery if ordered  - Initiate smoking cessation education as indicated  - Encourage broncho-pulmonary hygiene including cough, deep breathe, Incentive Spirometry  - Assess the need for suctioning and aspirate as needed  - Assess and instruct to report SOB or any respiratory difficulty  - Respiratory Therapy support as indicated  Outcome: Progressing     Problem: SKIN/TISSUE INTEGRITY - ADULT  Goal: Skin integrity remains intact  Description: INTERVENTIONS  - Identify patients at risk for skin breakdown  - Assess and monitor skin integrity  - Assess and monitor nutrition and hydration status  - Monitor labs (i e  albumin)  - Assess for incontinence   - Turn and reposition patient  - Assist with mobility/ambulation  - Relieve pressure over bony prominences  - Avoid friction and shearing  - Provide appropriate hygiene as needed including keeping skin clean and dry  - Evaluate need for skin moisturizer/barrier cream  - Collaborate with interdisciplinary team (i e  Nutrition, Rehabilitation, etc )   - Patient/family teaching  Outcome: Progressing

## 2021-02-23 NOTE — SPEECH THERAPY NOTE
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, with long-term current use of insulin ([facility])    CKD (chronic kidney disease)    Chronic atrial fibrillation ([facility])    H/O coronary artery bypass surgery    Aortic stenosis    Sepsis ([facility] [address] )    Goals of care, counseling/discussion    Gram-negative bacteremia    Lactic acidosis    Abnormal CT of the abdomen           Past Medical History  Past Medical History:   Diagnosis Date    Arthritis     Atrial flutter ([facility] )     Cardiac disease     Carotid artery occlusion     CHF (congestive heart failure) ([facility] )     Cholelithiasis     last assessed 8/8/2016    Coronary artery disease     Diabetes mellitus ([facility] )     Disease of thyroid gland     Essential hypertension 8/15/2016    GERD (gastroesophageal reflux disease)     History of transfusion     Hyperlipidemia     Hypertension     Long-term insulin use in type 2 diabetes ([facility] ) 6/4/2016    Other specified diabetes mellitus with diabetic autonomic (poly)neuropathy ([address] )     Oxygen dependent     3L    Pleural effusion 2008    Pulmonary embolism ([facility] )     2008    Renal disorder     Retinopathy     bilat    Sleep apnea     USES 3 LITER O2 CONTINOUIS    Subclavian artery stenosis ([address] )     Umbilical hernia with obstruction, without gangrene     last assessed 7/8/2016       Past Surgical History  Past Surgical History:   Procedure Laterality Date    ABDOMINAL SURGERY      CARDIAC PACEMAKER PLACEMENT      CARDIAC SURGERY      cabg x 2    CATARACT EXTRACTION      CHOLECYSTECTOMY      COLONOSCOPY      CORONARY ARTERY BYPASS GRAFT  2008    EYE SURGERY      FRACTURE SURGERY Right 2010    shoulder    HERNIA REPAIR      umbilical    MS LAP,CHOLECYSTECTOMY N/A 8/10/2016    Procedure: CHOLECYSTECTOMY LAPAROSCOPIC;  Surgeon: Celia Hall MD;  Location: [facility];  Service: General     [address] Left 11/4/2016    Procedure: Juanito Rubin; LEFT VOCAL FOLD INJECTION ;  Surgeon: Jared Hager MD;  [facility];  Service: ENT    MS REPAIR UMBILICAL SBCF,6+E/G,KHWBI N/A 8/10/2016    Procedure: LAPAROSCOPIC REPAIR HERNIA VENTRAL;  Surgeon: Celia Hall MD;  [facility];  Service: General    [address] / STENTING Right     TRICUSPID VALVE REPLACEMENT      VASCULAR SURGERY      heart valve replacement       Summary    Pt presents with at least mild oropharyngeal dysphagia with work of breathing noted and increased lethargy based on the consistencies assessed during this evaluation  Recommendations:   Diet: mechanically altered/level 2 diet and nectar thick liquids NO STRAWS   Meds: whole with puree and crushed with puree   Frequent Oral care: 4x/day  Aspiration precautions and compensatory swallowing strategies: upright posture, only feed when fully alert, slow rate of feeding, small bites/sips, no straws and alternating bites and sips  Other Recommendations/ considerations: may need VBS       Current Medical Status  Pt is a 66 y o  female who presented to [facility] with PMHx significant for aortic stenosis, cirrhosis, history of hyperammonemia in the past, CHF, CKD stage 3, ambulatory dysfunction, obesity, who presents with chief complaints of altered mental status at home  Patient lives alone but her son lives in an apartment upstairs  He usually takes care of his mother most of the time and keeps an eye on her as well   Watt Baumgarten was obtained from the patient's son    Patient is altered mental status and cannot give a history    Patient was recently admitted at outside hospital after being treated for congestive heart failure, and was found to have aortic stenosis and referred to [facility] for TAVR  However patient was treated for CHF, and was evaluated CT surgery  Patient was deemed high risk for surgery and hence patient was treated for CHF and then discharged to home    Since the patient has been at home patient was well at her baseline until 2-3 days ago when she started having more lethargy and weakness, and over the last couple days she has been having significant weakness that she cannot get out of her bed which is unusual   At baseline patient can get out of her bed to her wheelchair and backed into her bed if needed  Otherwise patient is wheelchair-bound  Patient was significantly lethargic and the patient's son called EMS to get the patient to the ER  During her last hospitalization patient's torsemide was increased to 40 mg daily, and patient was switched from metolazone to spironolactone     On arrival to the floor patient's became hypotensive with systolic in the 92D  At which time patient was given some IV fluids bolus  Patient blood pressure slightly improved with systolic in the 63H  Patient was then admitted for hypotension, and altered mental status  Past medical history:   Please see H&P for details    Special Studies:  2/22/21: CT chest abdomen pelvis wo contrast: 1  Two new vertebral fractures when compared to October  Transverse fracture of the T12 vertebral body without significant height loss  Mild anterior compression deformity of the L3 vertebral body with minimal posterior cortical buckling  No   retropulsed fragments or significant neural impingement    2   Thickening of the bladder wall, diffusely    Correlate for evidence of infectious cystitis on urinalysis    3   Pulmonary vascular engorgement and cephalization, hazy central groundglass opacities, scattered interlobular septal thickening, and small right pleural effusion  Constellation of findings favors volume overload with pulmonary edema    4   Mild ascites and anasarca  Nodular liver contour suggestive of cirrhosis    5   Large diastasis defect in the ventral abdomen  Small and large bowel protruding through the defect do not appear obstructed or inflamed  2/22/21: Chest Xray:    No acute cardiopulmonary disease  2/22/21: CT head wo contrast: No acute intracranial abnormalities or significant change from priors  Social/Education/Vocational Hx:  Pt lives with family  Pt  Reports that she lives with her son and DIL  States that DIL does all the cooking  She states that she usually likes to watch TV during the day  Swallow Information   Current Risks for Dysphagia & Aspiration: AMS  Current Symptoms/Concerns: coughing with po  Current Diet: NPO   Baseline Diet: regular diet and thin liquids    Baseline Assessment   Behavior/Cognition: alert  Speech/Language Status: able to participate in basic conversation, able to follow commands and noted work of breathing with speaking  Pt  Needed frequent rest breaks in between sentences even though SPO2 remained at 98% on 3L via NC  She stated that she uses O2 at home as well  Pt  Unable to recall month, year, or why she was in the hospital  She also could not recall her home address    Patient Positioning: upright in recliner     Swallow Mechanism Exam   Facial: symmetrical  Labial: decreased strength and decreased coordination  Lingual: bilateral decreased strength and decreased coordination  Velum: unable to visualize  Mandible: adequate ROM  Dentition: limited dentition- no teeth upper, and limited dentition lower  Vocal quality:weak   Volitional Cough: weak   Respiratory: 3L NC    Consistencies Assessed and Performance   Consistencies Administered: ice chips, thin liquids, nectar thick, puree and hard solids    Oral Stage: at least mild impairment  Noted long oral holding of all materials prior to transfer and initiation of swallow including single ice chip  Mastication of hard solid was very lengthy and pt , did state she is unable to chew certain foods at home so her DIL compensates for that with the cooking  Bolus formation and transfer were delayed and cannot rule out premature spillage of material      Pharyngeal Stage: at least a mild impairment  Weak to no laryngeal rise with swallow  Pt  Was noted to generate a strong cough response on thin liquids via cup and straw  No s/s of aspiration noted with puree, hard solid, or nectar thick liquid via cup  She stated that "it was too much" which generated the cough response  Pt  Noted to need cues to attend to items in her mouth and did demonstrate an increased risk of falling asleep with material in her mouth        Esophageal Concerns: none reported      Results Reviewed with: patient and MD   Dysphagia Goals: pt will tolerate least restrictive diet with least restrictive liquids without s/s of aspiration x3-4 sessions  Discharge recommendation: dependent on progress    Speech Therapy Prognosis   Prognosis: deferred    Prognosis Considerations: medical status and prior medical history     Yodit Nichols MS CCC-SLP  Speech Language Pathologist  Available via [address] [place] [address] License # [id number]  [facility] [address] # [id number]

## 2021-02-23 NOTE — ASSESSMENT & PLAN NOTE
Patient has aortic stenosis and has recurrent admissions for congestive heart failure, and is a poor candidate for surgery as well  Team had a discussion with the patient's son regarding the patient's wishes for her code status and he mentioned that patient would have not wanted herself to be resuscitated or intubated or shock  Hence patient will be made DNR level 3  Ongoing goals of care discussions  Patient will be discharged to skilled nursing facility with transitioning to long-term care and consideration of transitioning to comfort care/hospice based on clinical progress  Discussed with the son

## 2021-02-23 NOTE — ASSESSMENT & PLAN NOTE
Baseline creatinine appears to be fluctuating, recently trended up to mid 2s  Initially worsened after diuresis  Now slowly downtrending  · Restarted torsemide as needed  · Avoid hypotension  · Monitor BMP

## 2021-02-24 LAB
ALBUMIN SERPL BCP-MCNC: 3.4 G/DL (ref 3.5–5)
ALP SERPL-CCNC: 139 U/L (ref 46–116)
ALT SERPL W P-5'-P-CCNC: 20 U/L (ref 12–78)
AMMONIA PLAS-SCNC: 21 UMOL/L (ref 11–35)
ANION GAP SERPL CALCULATED.3IONS-SCNC: 12 MMOL/L (ref 4–13)
AST SERPL W P-5'-P-CCNC: 23 U/L (ref 5–45)
BACTERIA BLD CULT: ABNORMAL
BACTERIA BLD CULT: ABNORMAL
BASOPHILS # BLD AUTO: 0.03 THOUSANDS/ΜL (ref 0–0.1)
BASOPHILS NFR BLD AUTO: 0 % (ref 0–1)
BILIRUB SERPL-MCNC: 3.9 MG/DL (ref 0.2–1)
BUN SERPL-MCNC: 115 MG/DL (ref 5–25)
CALCIUM ALBUM COR SERPL-MCNC: 10 MG/DL (ref 8.3–10.1)
CALCIUM SERPL-MCNC: 9.5 MG/DL (ref 8.3–10.1)
CHLORIDE SERPL-SCNC: 103 MMOL/L (ref 100–108)
CO2 SERPL-SCNC: 27 MMOL/L (ref 21–32)
CREAT SERPL-MCNC: 2.36 MG/DL (ref 0.6–1.3)
EOSINOPHIL # BLD AUTO: 0.02 THOUSAND/ΜL (ref 0–0.61)
EOSINOPHIL NFR BLD AUTO: 0 % (ref 0–6)
ERYTHROCYTE [DISTWIDTH] IN BLOOD BY AUTOMATED COUNT: 19.8 % (ref 11.6–15.1)
GFR SERPL CREATININE-BSD FRML MDRD: 20 ML/MIN/1.73SQ M
GLUCOSE SERPL-MCNC: 168 MG/DL (ref 65–140)
GLUCOSE SERPL-MCNC: 192 MG/DL (ref 65–140)
GLUCOSE SERPL-MCNC: 213 MG/DL (ref 65–140)
GLUCOSE SERPL-MCNC: 233 MG/DL (ref 65–140)
GLUCOSE SERPL-MCNC: 244 MG/DL (ref 65–140)
GLUCOSE SERPL-MCNC: 312 MG/DL (ref 65–140)
GRAM STN SPEC: ABNORMAL
GRAM STN SPEC: ABNORMAL
HCT VFR BLD AUTO: 39.3 % (ref 34.8–46.1)
HGB BLD-MCNC: 10.8 G/DL (ref 11.5–15.4)
IMM GRANULOCYTES # BLD AUTO: 0.02 THOUSAND/UL (ref 0–0.2)
IMM GRANULOCYTES NFR BLD AUTO: 0 % (ref 0–2)
LYMPHOCYTES # BLD AUTO: 0.58 THOUSANDS/ΜL (ref 0.6–4.47)
LYMPHOCYTES NFR BLD AUTO: 6 % (ref 14–44)
MCH RBC QN AUTO: 26 PG (ref 26.8–34.3)
MCHC RBC AUTO-ENTMCNC: 27.5 G/DL (ref 31.4–37.4)
MCV RBC AUTO: 95 FL (ref 82–98)
MONOCYTES # BLD AUTO: 1.07 THOUSAND/ΜL (ref 0.17–1.22)
MONOCYTES NFR BLD AUTO: 11 % (ref 4–12)
NEUTROPHILS # BLD AUTO: 8.02 THOUSANDS/ΜL (ref 1.85–7.62)
NEUTS SEG NFR BLD AUTO: 83 % (ref 43–75)
NRBC BLD AUTO-RTO: 0 /100 WBCS
PLATELET # BLD AUTO: 148 THOUSANDS/UL (ref 149–390)
PMV BLD AUTO: 10.2 FL (ref 8.9–12.7)
POTASSIUM SERPL-SCNC: 4.3 MMOL/L (ref 3.5–5.3)
PROT SERPL-MCNC: 7.9 G/DL (ref 6.4–8.2)
RBC # BLD AUTO: 4.15 MILLION/UL (ref 3.81–5.12)
SODIUM SERPL-SCNC: 142 MMOL/L (ref 136–145)
TROPONIN I SERPL-MCNC: 0.04 NG/ML
WBC # BLD AUTO: 9.74 THOUSAND/UL (ref 4.31–10.16)

## 2021-02-24 PROCEDURE — 97167 OT EVAL HIGH COMPLEX 60 MIN: CPT

## 2021-02-24 PROCEDURE — 99232 SBSQ HOSP IP/OBS MODERATE 35: CPT | Performed by: INTERNAL MEDICINE

## 2021-02-24 PROCEDURE — 80053 COMPREHEN METABOLIC PANEL: CPT | Performed by: INTERNAL MEDICINE

## 2021-02-24 PROCEDURE — 82948 REAGENT STRIP/BLOOD GLUCOSE: CPT

## 2021-02-24 PROCEDURE — 82140 ASSAY OF AMMONIA: CPT | Performed by: INTERNAL MEDICINE

## 2021-02-24 PROCEDURE — 84484 ASSAY OF TROPONIN QUANT: CPT | Performed by: INTERNAL MEDICINE

## 2021-02-24 PROCEDURE — 85025 COMPLETE CBC W/AUTO DIFF WBC: CPT | Performed by: INTERNAL MEDICINE

## 2021-02-24 PROCEDURE — 93005 ELECTROCARDIOGRAM TRACING: CPT

## 2021-02-24 PROCEDURE — 92526 ORAL FUNCTION THERAPY: CPT

## 2021-02-24 RX ORDER — OXYCODONE HYDROCHLORIDE 5 MG/1
2.5 TABLET ORAL EVERY 4 HOURS PRN
Status: DISCONTINUED | OUTPATIENT
Start: 2021-02-24 | End: 2021-02-26

## 2021-02-24 RX ORDER — POLYETHYLENE GLYCOL 3350 17 G/17G
17 POWDER, FOR SOLUTION ORAL DAILY PRN
Status: DISCONTINUED | OUTPATIENT
Start: 2021-02-24 | End: 2021-02-28

## 2021-02-24 RX ORDER — DOCUSATE SODIUM 100 MG/1
100 CAPSULE, LIQUID FILLED ORAL 2 TIMES DAILY
Status: DISCONTINUED | OUTPATIENT
Start: 2021-02-24 | End: 2021-03-01 | Stop reason: HOSPADM

## 2021-02-24 RX ORDER — ECHINACEA PURPUREA EXTRACT 125 MG
1 TABLET ORAL EVERY 2 HOUR PRN
Status: DISCONTINUED | OUTPATIENT
Start: 2021-02-24 | End: 2021-03-01 | Stop reason: HOSPADM

## 2021-02-24 RX ADMIN — LEVOTHYROXINE SODIUM 137 MCG: 25 TABLET ORAL at 05:23

## 2021-02-24 RX ADMIN — ALBUMIN (HUMAN) 25 G: 0.25 INJECTION, SOLUTION INTRAVENOUS at 20:56

## 2021-02-24 RX ADMIN — DOCUSATE SODIUM 100 MG: 100 CAPSULE, LIQUID FILLED ORAL at 18:32

## 2021-02-24 RX ADMIN — INSULIN LISPRO 2 UNITS: 100 INJECTION, SOLUTION INTRAVENOUS; SUBCUTANEOUS at 13:00

## 2021-02-24 RX ADMIN — POLYETHYLENE GLYCOL 3350 17 G: 17 POWDER, FOR SOLUTION ORAL at 18:32

## 2021-02-24 RX ADMIN — HEPARIN SODIUM 5000 UNITS: 5000 INJECTION INTRAVENOUS; SUBCUTANEOUS at 05:21

## 2021-02-24 RX ADMIN — SALINE NASAL SPRAY 1 SPRAY: 1.5 SOLUTION NASAL at 17:15

## 2021-02-24 RX ADMIN — CEFEPIME HYDROCHLORIDE 1000 MG: 1 INJECTION, SOLUTION INTRAVENOUS at 14:47

## 2021-02-24 RX ADMIN — PRAVASTATIN SODIUM 40 MG: 40 TABLET ORAL at 17:15

## 2021-02-24 RX ADMIN — ALBUMIN (HUMAN) 25 G: 0.25 INJECTION, SOLUTION INTRAVENOUS at 12:56

## 2021-02-24 RX ADMIN — APIXABAN 2.5 MG: 2.5 TABLET, FILM COATED ORAL at 17:14

## 2021-02-24 RX ADMIN — ALBUMIN (HUMAN) 25 G: 0.25 INJECTION, SOLUTION INTRAVENOUS at 05:16

## 2021-02-24 RX ADMIN — GABAPENTIN 300 MG: 300 CAPSULE ORAL at 22:07

## 2021-02-24 RX ADMIN — INSULIN LISPRO 2 UNITS: 100 INJECTION, SOLUTION INTRAVENOUS; SUBCUTANEOUS at 22:08

## 2021-02-24 RX ADMIN — INSULIN LISPRO 2 UNITS: 100 INJECTION, SOLUTION INTRAVENOUS; SUBCUTANEOUS at 08:27

## 2021-02-24 RX ADMIN — OXYCODONE HYDROCHLORIDE 2.5 MG: 5 TABLET ORAL at 18:31

## 2021-02-24 RX ADMIN — INSULIN LISPRO 1 UNITS: 100 INJECTION, SOLUTION INTRAVENOUS; SUBCUTANEOUS at 17:15

## 2021-02-24 NOTE — SPEECH THERAPY NOTE
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, with long-term current use of insulin (HCC)    CKD (chronic kidney disease)    Chronic atrial fibrillation (HCC)    H/O coronary artery bypass surgery    Aortic stenosis    Sepsis ([facility] [address] )    Goals of care, counseling/discussion    Gram-negative bacteremia    Lactic acidosis    Abnormal CT of the abdomen      Subjective:  Upon arrival to patient's room, she was noted to be in a recliner chair with the leg rest up  Both of her legs were over the leg rest touching the floor and she was attempting to push herself up to stand  The chair alarm had no yet been triggered  Pt  Kept repeating "I have to get up I am not comfortable"  RN stopped in room and assisted getting patient back to bed which at that point pt  Stated "I don't want to go back to bed, I want to sit in the chair"  Confusion seems to continue as she was educated and could not converse with RN and therapist about why she needed to stay in chair or bed  Objective: Attempted to see patient for dysphagia treatment, however patient was only agreeable to thin liquid sips  No s/s of aspiration noted with small sips of thin liquids  She was unable to tell me what she had for breakfast meal or how much she ate  Pt  Noted oriented to time concepts  Assessment:  Will follow patient when she is willing to accept more PO       Plan/Recommendations:  Speech will continue to follow    Jake Saravia MS CCC-SLP  Speech Language Pathologist  Available via [employer]  [place] License # [id number]  [facility] # [id number]

## 2021-02-24 NOTE — PROGRESS NOTES
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)    CKD (chronic kidney disease)    H/O coronary artery bypass surgery    Aortic stenosis    Lactic acidosis    Chronic atrial fibrillation ([facility])    Goals of care, counseling/discussion    Abnormal CT of the abdomen      Assessment & Plan:    Gram-negative bacteremia  Assessment & Plan  Patient was noted to have 2/2 blood culture positive with Gram-negative bacilli on admission  Unclear source but suspected to be urinary  UA with evidence of trace leukocytes, bacteriuria and pyuria  Unfortunately urine culture was not collected  Seen by ID, input appreciated  · Continue IV cefepime  · Follow-up culture sensitivities  · Monitor        Sepsis [facility])  Assessment & Plan  As evidenced by leukocytosis of 12, and hypotension  Also lactic acid was elevated to 4 0  Secondary to Gram-negative bacteremia, source unclear  Possible urinary source  chest x-ray negative for any infiltrates,   Abdominal exam benign, LFT stable  CT of the abdomen pelvis negative any other intra-abdominal pathology     Hemodynamics improved  · Continue IV antibiotics as above  · Monitor clinically  · ID evaluation appreciated      * Encephalopathy, toxic metabolic  Assessment & Plan  Likely secondary to multifactorial including sepsis, acute kidney injury on chronic kidney disease, metabolic derangements, intravascular depletion in setting of critical aortic stenosis and poor perfusion,   CT of the head negative for any acute intracranial abnormalities, ammonia negative  VBG showed a pH of 7 43, and pCO2 of 46  Ammonia level within normal limit  Blood culture noted to be positive for Gram-negative rods  Appears to be improving  Neuro exam nonfocal  · Continue IV antibiotics as below  · Monitor neuro status  · Monitor blood pressure, avoid hypotension    Lactic acidosis  Assessment & Plan  Multifactorial secondary to sepsis, hypotension in setting of critical aortic stenosis, underlying cirrhosis  Blood pressure overall improved    Aortic stenosis  Assessment & Plan  Prior cardiology notes noted, patient is not candidate for SAVR or TAVR    CKD (chronic kidney disease)  Assessment & Plan  Baseline creatinine appears to be fluctuating, recently trended up to mid 2s  Will continue to monitor  Avoid hypotension  Monitor intake output      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, with long-term current use of insulin [facility])  Assessment & Plan  Lab Results   Component Value Date    HGBA1C 7 1 (H) 02/01/2021       Recent Labs     02/22/21  1640 02/22/21 2011   POCGLU 156* 143*       Blood Sugar Average: Last 72 hrs:  (P) 149 5     continue Accu-Chek q i d  with sliding scale insulin low dose      Chronic combined systolic and diastolic congestive heart failure (HCC)  Assessment & Plan  Wt Readings from Last 3 Encounters:   02/24/21 95 7 kg (211 lb)   02/10/21 95 3 kg (210 lb)   02/04/21 89 1 kg (196 lb 5 5 oz)   Echocardiogram with EF 45%, critical aortic stenosis,  Baseline weight appears to be 205 lb is as per Cardiology notes  Cardiology input appreciated  Received IV Lasix yesterday but noted to have worsening of creatinine and BUN  · Resumed on albumin   · avoid hypotension  · Overall guarded prognosis          Coronary artery disease involving coronary bypass graft of native heart without angina pectoris  Assessment & Plan  Will resume medications as tolerated  Continue supportive care    Goals of care, counseling/discussion  Assessment & Plan  Patient has aortic stenosis and has recurrent admissions for congestive heart failure, and is a poor candidate for surgery as well  Team had a discussion with the patient's son regarding the patient's wishes for her code status and he mentioned that patient would have not wanted herself to be resuscitated or intubated or shock  Hence patient will be made DNR level 3  Discussed with son again, updated regarding clinical status  Will continue with goals of care discussions      Chronic atrial fibrillation [facility])  Assessment & Plan  Controlled, history of sick sinus syndrome status post pacemaker  Continue metoprolol at lower dose with holding parameters   Discussed with Cardiology regarding resumption of anticoagulation,Alternative NOAC due to CKD  Started on Eliquis    Abnormal CT of the abdomen  Assessment & Plan  CT of the abdomen revealed Two new vertebral fractures when compared to October   Transverse fracture of the T12 vertebral body without significant height loss   Mild anterior compression deformity of the L3 vertebral body with minimal posterior cortical buckling   No   retropulsed fragments or significant neural impingement  Monitor clinically    Addendum 6:00 p m  Patient reported having diffuse chest discomfort without any aggravating or relieving factors, requesting oxycodone  Reports her breathing is stable  Noted to be comfortably sitting in chair having dinner  Vital signs stable, saturating mid 90s on 2 L of supplemental oxygen  On examination, patient appears more alert awake comfortable  superficial chest  tenderness noted diffusely  Lungs are diminished with mild crackles at bases  Abdomen is soft nontender  Will get EKG  Cardiac markers x2  Continue Tylenol as needed  P r n  Low-dose oxycodone for breakthrough pain    Discussed with patient and son at bedside  VTE Pharmacologic Prophylaxis:   Pharmacologic: Apixaban (Eliquis)  Mechanical VTE Prophylaxis in Place: Yes    Patient Centered Rounds: I have performed bedside rounds with nursing staff today  Discussions with Specialists or Other Care Team Provider:  Cardiology    Education and Discussions with Family / Patient:  Discussed with patient, discussed with son over the phone    Time Spent for Care: 45 minutes  More than 50% of total time spent on counseling and coordination of care as described above  Current Length of Stay: 2 day(s)    Current Patient Status: Inpatient   Certification Statement: The patient will continue to require additional inpatient hospital stay due to Bacteremia    Discharge Plan:  Pending at present    Code Status: Level 3 - DNAR and DNI      Subjective:   More alert awake, sitting up in chair  Reports generalized malaise and not feeling well  Reports that her breathing is okay  Denies any chest pain or abdominal pain    Creatinine BUN if worsened after diuretics    Objective:     Vitals:   Temp (24hrs), Av 3 °F (36 3 °C), Min:97 °F (36 1 °C), Max:97 6 °F (36 4 °C)    Temp:  [97 °F (36 1 °C)-97 6 °F (36 4 °C)] 97 6 °F (36 4 °C)  HR:  [60-78] 68  Resp:  [18-19] 18  BP: (106-153)/(44-77) 128/74  SpO2:  [91 %-99 %] 96 % on 2 L  Body mass index is 37 38 kg/m²  Input and Output Summary (last 24 hours):     No intake or output data in the 24 hours ending 21 1141    Physical Exam:     Physical Exam  Constitutional:       General: She is not in acute distress  Comments: Chronically ill   HENT:      Head: Normocephalic and atraumatic  Neck:      Musculoskeletal: Neck supple  Cardiovascular:      Rate and Rhythm: Normal rate  Heart sounds: Murmur present  Pulmonary:      Effort: Pulmonary effort is normal  No respiratory distress  Breath sounds: Examination of the right-lower field reveals decreased breath sounds   Examination of the left-lower field reveals decreased breath sounds  Decreased breath sounds present  No wheezing or rales  Abdominal:      General: Bowel sounds are normal  There is no distension  Palpations: Abdomen is soft  Tenderness: There is no abdominal tenderness  There is no guarding or rebound  Hernia: A hernia is present  Musculoskeletal:      Right lower leg: No edema  Left lower leg: No edema  Skin:     General: Skin is warm and dry  Findings: No rash  Neurological:      Mental Status: She is alert  Additional Data:     Labs:    Results from last 7 days   Lab Units 02/24/21  0523   WBC Thousand/uL 9 74   HEMOGLOBIN g/dL 10 8*   HEMATOCRIT % 39 3   PLATELETS Thousands/uL 148*   NEUTROS PCT % 83*   LYMPHS PCT % 6*   MONOS PCT % 11   EOS PCT % 0     Results from last 7 days   Lab Units 02/24/21  0523   POTASSIUM mmol/L 4 3   CHLORIDE mmol/L 103   CO2 mmol/L 27   BUN mg/dL 115*   CREATININE mg/dL 2 36*   CALCIUM mg/dL 9 5   ALK PHOS U/L 139*   ALT U/L 20   AST U/L 23     Results from last 7 days   Lab Units 02/22/21  0734   INR  2 07*       * I Have Reviewed All Lab Data Listed Above  * Additional Pertinent Lab Tests Reviewed:  All Labs Within Last 24 Hours Reviewed      Imaging:  Imaging Reports Reviewed Today Include:CT, CXR      Recent Cultures (last 7 days):     Results from last 7 days   Lab Units 02/22/21  0754 02/22/21  0734   BLOOD CULTURE  Klebsiella oxytoca* Klebsiella oxytoca*   GRAM STAIN RESULT  Gram negative rods* Gram negative rods*       Last 24 Hours Medication List:   Current Facility-Administered Medications   Medication Dose Route Frequency Provider Last Rate    acetaminophen  650 mg Oral Q4H PRN Kaur Stuart MD      albumin human  25 g Intravenous Q8H ISIDRA Gallegos 0 g (02/22/21 1315)    apixaban  2 5 mg Oral BID ISIDRA Gallegos      cefepime  1,000 mg Intravenous Q24H Leonel Gonzalez MD 1,000 mg (02/23/21 1612)    gabapentin  300 mg Oral HS Beatriz Diego MD  insulin lispro  1-5 Units Subcutaneous HS Ej aYnez MD      insulin lispro  1-5 Units Subcutaneous TID AC Jeremiah Castillo MD      levothyroxine  137 mcg Oral Daily Ej Yanez MD      metoprolol tartrate  12 5 mg Oral Q12H Rad Russell MD      pravastatin  40 mg Oral Daily With Patience Garces MD            Today, Patient Was Seen By: Jeremiah Castillo MD    ** Please Note: "This note has been constructed using a voice recognition system  Therefore there may be syntax, spelling, and/or grammatical errors   Please call if you have any questions  "**

## 2021-02-24 NOTE — PROGRESS NOTES
Progress Note - Cardiology   [facility]     Denise Bueno 66 y o  female MRN: [id number]  : 1950  Unit/Bed#: [address] [place] [address] Encounter: [id number]    Assessment and Plan:   1  Critical aortic stenosis:  Last valve area was 0 8  Patient evaluated by CT surgery at [facility] and unfortunately due to her multiple comorbidities is not felt to be a candidate for SAVR or TAVR  -  this was discussed at length with her son, Kehinde Anderson on day of admission  Family considering palliative care     2  Altered mental status:  Patient's mental status improved after improvement of blood pressure  Most likely multifactorial including acute kidney injury, dehydration and poor perfusion secondary to critical aortic stenosis      3  Acute kidney injury on chronic kidney disease/cardiorenal syndrome:   Creatinine bumped up after receiving 10 mg of IV Lasix yesterday  Unfortunately, I&Os were not documented     4  Diabetes:  Managed per the primary team     5  Chronic combined systolic and diastolic heart failure:  Dry weight appears to be 205 lb    Subjective / Objective:   Patient seen and examined  She is out of bed to the chair  Does not remember being admitted to the hospital   Still noted to be short of breath, encouraged to take deep breaths and use incentive spirometry  Vitals: Blood pressure 128/74, pulse 68, temperature 97 6 °F (36 4 °C), resp  rate 18, weight 95 7 kg (211 lb), SpO2 96 %, not currently breastfeeding  Vitals:    21 0600 21 0600   Weight: 95 8 kg (211 lb 3 2 oz) 95 7 kg (211 lb)     Body mass index is 37 38 kg/m²    BP Readings from Last 3 Encounters:   21 128/74   02/10/21 110/68   21 129/74     Orthostatic Blood Pressures      Most Recent Value   Blood Pressure  128/74 filed at 2021 0848   Patient Position - Orthostatic VS  Lying filed at 2021 0745        I/O       701 -  0700 701 -  0700 02/24 0701 - 02/25 0700    IV Piggyback 1000      Total Intake(mL/kg) 1000 (10 4)      Urine (mL/kg/hr) 600      Total Output 600      Net +400             Unmeasured Urine Occurrence  1 x         Invasive Devices     Peripheral Intravenous Line            Peripheral IV 02/22/21 Left Forearm 2 days                No intake or output data in the 24 hours ending 02/24/21 1017      Physical Exam:   Physical Exam  Vitals signs and nursing note reviewed  Constitutional:       General: She is not in acute distress  Appearance: Normal appearance  She is well-developed  She is obese  HENT:      Head: Normocephalic  Right Ear: External ear normal       Left Ear: External ear normal       Nose: Nose normal    Eyes:      General: No scleral icterus  Right eye: No discharge  Left eye: No discharge  Conjunctiva/sclera: Conjunctivae normal       Pupils: Pupils are equal, round, and reactive to light  Neck:      Musculoskeletal: Normal range of motion and neck supple  Thyroid: No thyromegaly  Cardiovascular:      Rate and Rhythm: Normal rate and regular rhythm  Pulses: Normal pulses  Heart sounds: Murmur present  Systolic murmur present with a grade of 3/6  Pulmonary:      Effort: Pulmonary effort is normal  No accessory muscle usage or respiratory distress  Breath sounds: Examination of the right-lower field reveals decreased breath sounds and rales  Examination of the left-lower field reveals decreased breath sounds and rales  Decreased breath sounds and rales present  Comments: Patient encouraged to use incentive spirometer  Abdominal:      General: Bowel sounds are normal  There is no distension  Palpations: Abdomen is soft  Musculoskeletal:      Right lower leg: No edema  Left lower leg: No edema  Skin:     General: Skin is warm and dry  Capillary Refill: Capillary refill takes less than 2 seconds     Neurological:      General: No focal deficit present  Mental Status: She is alert and oriented to person, place, and time  Mental status is at baseline  Psychiatric:         Mood and Affect: Mood normal          Behavior: Behavior normal          Thought Content: Thought content normal          Judgment: Judgment normal                    Medications/ Allergies:     Current Facility-Administered Medications   Medication Dose Route Frequency Provider Last Rate    acetaminophen  650 mg Oral Q4H PRN Samantha Phelan MD      albumin human  25 g Intravenous Q8H Grabiel HigginbothamISIDRA 0 g (02/22/21 1315)    cefepime  1,000 mg Intravenous Q24H Byron Callejas MD 1,000 mg (02/23/21 1612)    gabapentin  300 mg Oral HS Kvng Diallo MD      heparin (porcine)  5,000 Units Subcutaneous [facility] Samantha Phelan MD      insulin lispro  1-5 Units Subcutaneous HS Samantha Phelan MD      insulin lispro  1-5 Units Subcutaneous TID AC Kvng Diallo MD      levothyroxine  137 mcg Oral Daily Samantha Phelan MD      metoprolol tartrate  12 5 mg Oral Q12H [address] Kvng Diallo MD      pravastatin  40 mg Oral Daily With Jean Vuong MD       acetaminophen, 650 mg, Q4H PRN      Allergies   Allergen Reactions    Bromide Ion [Bromine]      Blurred vision   Has preservative that  Pt cannot use    Other Blisters     Adhesive tape    Timolol      Per pt, allergic to steroid in medication    Tramadol      cognition changes    Ibuprofen Palpitations    Penicillins Rash       VTE Pharmacologic Prophylaxis:   Sequential compression device (Venodyne)     Labs:     CBC with diff:  Results from last 7 days   Lab Units 02/24/21  0523 02/23/21  0554 02/22/21  0734   WBC Thousand/uL 9 74 11 80* 11 94*   HEMOGLOBIN g/dL 10 8* 11 0* 11 1*   HEMATOCRIT % 39 3 39 7 39 4   MCV fL 95 94 93   PLATELETS Thousands/uL 148* 150 177   MCH pg 26 0* 26 1* 26 2*   MCHC g/dL 27 5* 27 7* 28 2*   RDW % 19 8* 19 8* 19 7*   MPV fL 10 2 10 3 10 3   NRBC AUTO /100 WBCs 0 0 0 CMP:  Results from last 7 days   Lab Units 02/24/21  0523 02/23/21  0554 02/22/21  1626 02/22/21  0734   SODIUM mmol/L 142 143 141 139   POTASSIUM mmol/L 4 3 4 1 4 2 4 6   CHLORIDE mmol/L 103 104 103 103   CO2 mmol/L 27 27 25 29   ANION GAP mmol/L 12 12 13 7   BUN mg/dL 115* 102* 101* 105*   CREATININE mg/dL 2 36* 2 14* 2 29* 2 21*   CALCIUM mg/dL 9 5 9 3 9 3 8 8   AST U/L 23 20  --  24   ALT U/L 20 17  --  20   ALK PHOS U/L 139* 163*  --  212*   TOTAL PROTEIN g/dL 7 9 8 1  --  8 6*   ALBUMIN g/dL 3 4* 3 2*  --  2 6*   TOTAL BILIRUBIN mg/dL 3 90* 3 68*  --  1 90*   EGFR ml/min/1 73sq m 20 22 21 22     Coags:  Results from last 7 days   Lab Units 02/22/21  0734   PTT seconds 52*   INR  2 07*     NT-proBNP:   Recent Labs     02/22/21  0734   NTBNP 13,253*        Imaging & Testing   I have personally reviewed pertinent reports  Ct Chest Abdomen Pelvis Wo Contrast    Result Date: 2/22/2021  Narrative: CT CHEST, ABDOMEN AND PELVIS WITHOUT IV CONTRAST INDICATION:   Altered mental status  Sepsis  COMPARISON:  CT of the chest, CT of the abdomen/pelvis dated 10/23/2020, and chest CT dated 1/19/2017  TECHNIQUE: CT examination of the chest, abdomen and pelvis was performed without intravenous contrast   Axial, sagittal, and coronal 2D reformatted images were created from the source data and submitted for interpretation  Radiation dose length product (DLP) for this visit:  1525 66 mGy-cm   This examination, like all CT scans performed in the [facility], was performed utilizing techniques to minimize radiation dose exposure, including the use of iterative reconstruction and automated exposure control  Enteric contrast was not administered  FINDINGS: CHEST LUNGS:  Lung parenchyma is distorted by respiratory motion and atelectasis related to imaging during exhalation  Central predominant groundglass opacities in both lungs  Bandlike opacities in the lung bases with morphology suggesting atelectasis    No acute-appearing dense consolidations  Mild scattered foci of interlobular septal reticulation  3 mm nodule in the right upper lobe (3/36)--present in 2017 confirming benignancy  No suspicious pulmonary nodules  PLEURA:  Trace right layering effusion posteriorly  No loculated components  No pneumothorax  HEART/GREAT VESSELS:  Four-chamber enlargement of the heart  Prior CABG  Native coronary atherosclerosis  Valvular calcifications  Cardiac assist device with leads in the right atrium and right ventricle  No pericardial effusion  Thoracic aortic atherosclerosis without aneurysm  Mildly ectatic main pulmonary artery 30 mm  Pulmonary vessels appear cephalized on coronal reformats  MEDIASTINUM AND JAIRO:  Unremarkable  CHEST WALL AND LOWER NECK:   Unremarkable  ABDOMEN LIVER/BILIARY TREE:  Question nodular contour to liver  No focal parenchymal masses or biliary ductal dilatation, though parenchyma is obscured by motion artifact  GALLBLADDER:  Gallbladder is surgically absent  SPLEEN:  Unremarkable  PANCREAS:  Diffuse fatty atrophy, greatest at the tail  No masses or pancreatic ductal dilatation demonstrated  ADRENAL GLANDS:  Unremarkable  KIDNEYS/URETERS: No contour distorting masses, perinephric collections, urolithiasis, or hydronephrosis  STOMACH AND BOWEL: Small hiatal hernia  No gastric wall thickening  No dilated or inflamed loops of small bowel or colon, including loops bulging through the patient's ventral abdominal wall defect, discussed below  APPENDIX:  Cecum located within the ventral abdominal wall  Appendix not well demonstrated  No evidence for appendicitis, though  ABDOMINOPELVIC CAVITY:  Mild ascites, greatest around the liver and spleen  Small amount of free fluid tracking into the pelvis  No encapsulated collections  No free air  Shotty retroperitoneal lymph nodes are demonstrated without laurel mesenteric, retroperitoneal, or pelvic sidewall lymphadenopathy   VESSELS:  Aortoiliac and branch vessel atherosclerosis without aneurysm  Multiple greater saphenous varices and inguinal regions, greater on the left, measuring up to 18 mm in diameter  PELVIS REPRODUCTIVE ORGANS:  Unremarkable for patient's age  URINARY BLADDER:  Urinary bladder wall appears diffusely thickened, even accounting for degree of nondistention  ABDOMINAL WALL/INGUINAL REGIONS:  Subxiphoid midline hernia with wide neck measuring 3 6 in meters by 2 5 cm with 3 6 cm neck  Abdominal fat and ascites fluid protrudes anteriorly  Hernia repair mesh at the supra umbilical ventral wall  Inferior to the mesh there is pronounced diastasis recti splaying of the rectus abdominis muscles by 9 cm  Mesenteric fat, small bowel, and large bowel loops loops protrude into the defect  Overall, protruding contents measure 18 7 cm x 9 2 cm x 23 cm  Relatively pronounced anasarca around the hips and gluteal regions  No encapsulated superficial collections  OSSEOUS STRUCTURES: Healed median sternotomy  New transverse fracture through the superior endplate of the G39 vertebral body  No significant vertebral body height loss  No significant retropulsed fragments  New anterior compression deformity at the superior endplate of the L3 vertebral body  Approximately 20% height loss  Minimal buckling of the posterior cortex, superiorly, without significant spinal canal stenosis  Grade 1 anterolisthesis of L4 on L5 secondary to bulky facet osteophytes  No destructive lytic or blastic lesions  Impression: 1  Two new vertebral fractures when compared to October  Transverse fracture of the T12 vertebral body without significant height loss  Mild anterior compression deformity of the L3 vertebral body with minimal posterior cortical buckling  No retropulsed fragments or significant neural impingement  2   Thickening of the bladder wall, diffusely  Correlate for evidence of infectious cystitis on urinalysis   3   Pulmonary vascular engorgement and cephalization, hazy central groundglass opacities, scattered interlobular septal thickening, and small right pleural effusion  Constellation of findings favors volume overload with pulmonary edema  4   Mild ascites and anasarca  Nodular liver contour suggestive of cirrhosis  5   Large diastasis defect in the ventral abdomen  Small and large bowel protruding through the defect do not appear obstructed or inflamed  I personally discussed this study with Geoffcolton Matias on 2/22/2021 at 9:31 AM  Workstation performed: [id number]     Xr Chest Portable    Result Date: 2/22/2021  Narrative: CHEST INDICATION:   AMS  COMPARISON:  Chest radiograph from 1/30/2021 and chest CT from 2/22/2021  EXAM PERFORMED/VIEWS:  XR CHEST PORTABLE FINDINGS: Moderate cardiomegaly  CABG  Left subclavian pacemaker lead in right ventricle  The lungs are clear  No pneumothorax or pleural effusion  Osseous structures appear within normal limits for patient age  Impression: No acute cardiopulmonary disease  Workstation performed: [id number]     Ct Head Without Contrast    Result Date: 2/22/2021  Narrative: CT BRAIN - WITHOUT CONTRAST INDICATION:   Altered mental status  COMPARISON:  1/30/2021 and 11/30/2020  TECHNIQUE:  CT examination of the brain was performed  In addition to axial images, sagittal and coronal 2D reformatted images were created and submitted for interpretation  Radiation dose length product (DLP) for this visit:  971 mGy-cm   This examination, like all CT scans performed in the [facility], was performed utilizing techniques to minimize radiation dose exposure, including the use of iterative reconstruction and automated exposure control  IMAGE QUALITY:  Diagnostic  FINDINGS: PARENCHYMA:  Moderate patchy periventricular white matter hypodensities consistent with chronic microangiopathic changes  Parenchymal appearance is unchanged from prior  No acute infarct  No parenchymal hemorrhage    No mass  VENTRICLES AND EXTRA-AXIAL SPACES:  Normal for the patient's age  VISUALIZED ORBITS AND PARANASAL SINUSES:  Bilateral cataract surgeries  Orbits are, otherwise, unremarkable  Paranasal sinuses are clear  CALVARIUM AND EXTRACRANIAL SOFT TISSUES:  Normal      Impression: No acute intracranial abnormalities or significant change from priors  Workstation performed: [id number]     [employer] & Waveform Analysis, Multiple Levels    Result Date: 2/22/2021  Narrative:  [facility] REPORT CLINICAL: Indications: Patient being evaluated for peripheral arterial disease secondary to petechiae and swelling as per ordering Provider  Operative History: Cardiac Pacemaker Risk Factors: The patient has history of HTN, Diabetes (NIDDM (oral meds)), HLD, CKD and CAD  Clinical: Right Pressure:  80/ mm Hg, Left Pressure:  128/ mm Hg  FINDINGS:  Segment       Rig  Left                          P    P  Ant  Tibial    98  235  Post  Tibial  245  247  Ankle         245  247  Metatarsal     98  150  Great Toe      87  102     CONCLUSION: Impression RIGHT LOWER LIMB Ankle/Brachial Index: unreliable due to patient movement  PPG/PVR Tracings are normal  Metatarsal Pressure 98 mmHg  Great Toe Pressure: 87 mmHg, within the healing range  LEFT LOWER LIMB Ankle/Brachial Index: unreliable due to patient movement  PPG/PVR Tracings are normal  Metatarsal Pressure 150 mmHg  Great Toe Pressure: 102 mmHg, within the healing range  Technically limited/difficult study due to patient movement and uncooperative  Bilateral ABIs, metatarsal and great toe pressures unreliable due to movement    Technical findings given to Dr Kate Nogueira at 2:46 pm   SIGNATURE: Electronically Signed by: Eduardo Lacey MD, [address] on 2021-02-22 05:05:52 PM        Cardiac testing:   Results for orders placed during the hospital encounter of 08/08/20   Echo complete with contrast if indicated    Narrative [address]  [address] [place], [place] [address]  [phone]    Transthoracic Echocardiogram  2D, M-mode, Doppler, and Color Doppler    Study date:  08-Aug-2020    Patient: Shelly Bauman  MR number: [id number]  Account number: [de-identified]  : 1950  Age: 69 years  Gender: Female  Status: Outpatient  Location: Echo lab  Height: 65 in  Weight: 207 5 lb  BP: 143/ 82 mmHg    Indications: CAD    Diagnoses: I25 83 - Coronary atherosclerosis due to lipid rich plaque    Sonographer:  Stan Simmons RDCS  Primary Physician:  Renetta Gregory MD  Referring Physician:  Courtney Laws MD  [facility]  [facility]  Ordering Physician:  Courtney Laws MD  Interpreting Physician:  Courtney Laws MD    SUMMARY    LEFT VENTRICLE:  The ventricle was mildly dilated  Systolic function was mild to moderately reduced  Ejection fraction was estimated in the range of 40 % to 45 % to be 45 %  There was mild diffuse hypokinesis  Wall thickness was mildly to moderately increased  There was mild concentric hypertrophy  Doppler parameters were consistent with high ventricular filling pressure  VENTRICULAR SEPTUM:  There was paradoxical motion  These changes are consistent with right ventricular pacing  RIGHT VENTRICLE:  The ventricle was mildly to moderately dilated  Systolic function was mildly reduced  Wall thickness was mildly increased  Systolic pressure was markedly increased  LEFT ATRIUM:  The atrium was moderately dilated  RIGHT ATRIUM:  The atrium was moderately dilated  MITRAL VALVE:  There was mild to moderate annular calcification  There was mild regurgitation  AORTIC VALVE:  The valve was trileaflet  Leaflets exhibited moderately increased thickness, moderate calcification, and markedly reduced cuspal separation  Transaortic velocity was increased due to valvular stenosis  There was severe stenosis  TROY around 0 85  LVOT velocity 0 65 m/sec and Peak AV 2 36 m/sec and DI  26   Peak na d mean gradient are low, need to rule out low gradient severe AS    TRICUSPID VALVE:  There was moderate regurgitation  Estimated peak PA pressure was 80 mmHg  PULMONIC VALVE:  There was mild regurgitation  IVC, HEPATIC VEINS:  The inferior vena cava was dilated, with poor inspiratory collapse, consistent with elevated right atrial pressure  HISTORY: PRIOR HISTORY: HTN, DM, Obesity, PHTN, CABG, GERD, Pacemaker    PROCEDURE: The procedure was performed in the echo lab  This was a routine study  The transthoracic approach was used  The study included complete 2D imaging, M-mode, complete spectral Doppler, and color Doppler  The heart rate was 66 bpm,  at the start of the study  Echocardiographic views were limited due to lung interference  Image quality was adequate  LEFT VENTRICLE: The ventricle was mildly dilated  Systolic function was mild to moderately reduced  Ejection fraction was estimated in the range of 40 % to 45 % to be 45 %  There was mild diffuse hypokinesis  Wall thickness was mildly to  moderately increased  There was mild concentric hypertrophy  DOPPLER: Doppler parameters were consistent with high ventricular filling pressure  VENTRICULAR SEPTUM: Thickness was mildly increased  There was paradoxical motion  These changes are consistent with right ventricular pacing  RIGHT VENTRICLE: The ventricle was mildly to moderately dilated  Systolic function was mildly reduced  Wall thickness was mildly increased  DOPPLER: Systolic pressure was markedly increased  LEFT ATRIUM: The atrium was moderately dilated  RIGHT ATRIUM: The atrium was moderately dilated  MITRAL VALVE: There was mild to moderate annular calcification  There was normal leaflet separation  DOPPLER: The transmitral velocity was within the normal range  There was no evidence for stenosis  There was mild regurgitation  AORTIC VALVE: The valve was trileaflet   Leaflets exhibited moderately increased thickness, moderate calcification, and markedly reduced cuspal separation  DOPPLER: Transaortic velocity was increased due to valvular stenosis  There was  severe stenosis  TROY around 0 85  LVOT velocity 0 65 m/sec and Peak AV 2 36 m/sec and DI  26  Peak na d mean gradient are low, need to rule out low gradient severe AS There was no regurgitation  TRICUSPID VALVE: DOPPLER: There was moderate regurgitation  Estimated peak PA pressure was 80 mmHg  PULMONIC VALVE: DOPPLER: There was mild regurgitation  PERICARDIUM: There was no thickening or calcification  There was no pericardial effusion  AORTA: The root exhibited normal size  SYSTEMIC VEINS: IVC: The inferior vena cava was dilated, with poor inspiratory collapse, consistent with elevated right atrial pressure  SYSTEM MEASUREMENT TABLES    2D mode  AoR Diam 2D: 2 8 cm  LA Diam (2D): 4 5 cm  LA/Ao (2D): 1 61  FS (2D Teich): 19 6 %  IVSd (2D): 1 29 cm  LVDEV: 94 4 cmï¾³  LVESV: 56 3 cmï¾³  LVIDd(2D): 4 54 cm  LVISd (2D): 3 65 cm  LVOT Area 2D: 3 14 cmï¾²  LVPWd (2D): 1 21 cm  SV (Teich): 38 1 cmï¾³    Apical four chamber  LVEF A4C: 42 %    Unspecified Scan Mode  TROY Cont Eq (Peak Carlos): 0 87 cmï¾²  LVOT Diam : 2 cm  LVOT Vmax: 638 mm/s  LVOT Vmax; Mean: 638 mm/s  Peak Grad ; Mean: 2 mm[Hg]  MV Peak E Carlos  Mean: 1930 mm/s  Max P mm[Hg]  V Max: 4170 mm/s  Vmax: 3910 mm/s  RA Area: 25 7 cmï¾²  RA Volume: 91 6 cmï¾³  TAPSE: 0 8 cm    [employer] Accredited Echocardiography Laboratory    Prepared and electronically signed by    Vida Combs MD  Signed 11-Aug-2020 09:30:19           Randa Osler        "This note has been constructed using a voice recognition system  Therefore there may be syntax, spelling, and/or grammatical errors   Please call if you have any questions  "

## 2021-02-24 NOTE — OCCUPATIONAL THERAPY NOTE
OT EVALUATION       02/24/21 1056   Note Type   Note type Evaluation   Restrictions/Precautions   Other Precautions Fall Risk;O2;Chair Alarm; Bed Alarm;Cognitive   Pain Assessment   Pain Assessment Tool Pain Assessment not indicated - pt denies pain  ("I dont feel well" pt unable to state symptoms nurse aware)   Home Living   Type of [address] [place] [address] Avenue; Able to live on main level with bedroom/bathroom  (6 [address])   Home Equipment Walker; Wheelchair-manual  (O2)   Additional Comments pt unable to state home setup or prior functional level due to cognitive impairment    Prior Function   Level of [place] Needs assistance with ADLs and functional mobility  (uses RW)   Lives With Son  (daughter in law)   Receives Help From Family   ADL Assistance Needs assistance   IADLs Needs assistance   ADL   Eating Assistance 4  Minimal Assistance   Grooming Assistance 4  Minimal Assistance   UB Bathing Assistance 4  Minimal Assistance   LB [address] 3  Moderate Assistance   UB Dressing Assistance 4  Minimal Assistance    [address] 3  Moderate Assistance   Toileting Assistance  3  Moderate Assistance   Transfers   Sit to Stand 3  Moderate assistance   Additional items Assist x 1;Verbal cues   Stand to Sit 3  Moderate assistance   Additional items Assist x 1;Verbal cues   Stand pivot 3  Moderate assistance   Additional items Assist x 1;Verbal cues   Balance   Static Sitting Fair   Dynamic Sitting Fair -   Static Standing Fair -   Dynamic Standing Poor +   Activity Tolerance   Activity Tolerance Patient limited by fatigue  (cognition, pt not feeling well, nurse aware )   Nurse Made Aware yes   RUE Assessment   RUE Assessment WFL   LUE Assessment   LUE Assessment WFL   Cognition   Overall Cognitive Status Impaired   Arousal/Participation Alert;Lethargic   Attention Attends with cues to redirect   Orientation Level Oriented to person;Oriented to place; Disoriented to time;Disoriented to situation Following Commands Follows one step commands inconsistently   Comments pt lethargic, unable to state why she doesnt feel well  Assessment   Limitation Decreased ADL status; Decreased UE strength;Decreased Safe judgement during ADL;Decreased cognition;Decreased endurance;Decreased high-level ADLs; Decreased self-care trans  (decreased balance and mobility )   Prognosis Fair   Assessment Patient evaluated by Occupational Therapy  Patient admitted with Encephalopathy  The patients occupational profile, medical and therapy history includes a extensive additional review of physical, cognitive, or psychosocial history related to current functional performance  Comorbidities affecting functional mobility and ADLS include: Retinopathy, arthritis, CAD, cardiac disease, diabetes, hypertension and PE  Prior to admission, patient was requiring assist for functional mobility with walker, requiring assist for ADLS and requiring assist for IADLS  The evaluation identifies the following performance deficits: weakness, impaired balance, decreased endurance, increased fall risk, new onset of impairment of functional mobility, decreased ADLS, decreased IADLS, decreased activity tolerance, decreased safety awareness, impaired judgement, decreased cognition and decreased strength, that result in activity limitations and/or participation restrictions  This evaluation requires clinical decision making of high complexity, because the patient presents with comorbidites that affect occupational performance and required significant modification of tasks or assistance with consideration of multiple treatment options  The Barthel Index was used as a functional outcome tool presenting with a score of 40, indicating marked limitations of functional mobility and ADLS  The patient's raw score on the -PAC Daily Activity inpatient short form is 15, standardized score is 34 69, less than 39 4   Patients at this level are likely to benefit from DC to post-acute rehabilitation services  Please refer to the recommendation of the Occupational Therapist for safe DC planning  Patient will benefit from skilled Occupational Therapy services to address above deficits and facilitate a safe return to prior level of function  Goals   Patient Goals unable to state due to cognitive impairment   STG Time Frame   (1-7 days)   Short Term Goal  Patient will increase standing tolerance to 3 minutes during ADL task to decrease assistance level and decrease fall risk; Patient will increase bed mobility to min assist in preparation for ADLS and transfers; Patient will increase functional mobility to and from bathroom with rolling walker with min assist to increase performance with ADLS and to use a toilet; Patient will tolerate 10 minutes of UE ROM/strengthening to increase general activity tolerance and performance in ADLS/IADLS; Patient will improve functional activity tolerance to 10 minutes of sustained functional tasks to increase participation in basic self-care and decrease assistance level;   Patient will increase dynamic sitting balance to fair+ to improve the ability to sit at edge of bed or on a chair for ADLS;  Patient will increase dynamic standing balance to fair- to improve postural stability and decrease fall risk during standing ADLS and transfers  LTG Time Frame   (8-14 days)   Long Term Goal Patient will increase standing tolerance to 6 minutes during ADL task to decrease assistance level and decrease fall risk; Patient will increase bed mobility to supervision in preparation for ADLS and transfers;  Patient will increase functional mobility to and from bathroom with rolling walker with supervision to increase performance with ADLS and to use a toilet; Patient will tolerate 20 minutes of UE ROM/strengthening to increase general activity tolerance and performance in ADLS/IADLS; Patient will improve functional activity tolerance to 20 minutes of sustained functional tasks to increase participation in basic self-care and decrease assistance level;   Patient will increase dynamic sitting balance to good to improve the ability to sit at edge of bed or on a chair for ADLS;  Patient will increase dynamic standing balance to fair to improve postural stability and decrease fall risk during standing ADLS and transfers  Functional Transfer Goals   Pt Will Perform All Functional Transfers   (STG min assist LTG supervision )   ADL Goals   Pt Will Perform Eating   (STG supervision LTG Independent )   Pt Will Perform Grooming   (STG supervision LTG Independent )   Pt Will Perform Bathing   (STG min assist LTG supervision )   Pt Will Perform UE Dressing   (STG supervision LTG Independent )   Pt Will Perform LE Dressing   (STG min assist LTG supervision )   Pt Will Perform Toileting   (STG min assist LTG supervision )   Plan   Treatment Interventions ADL retraining;Functional transfer training;UE strengthening/ROM; Endurance training;Equipment evaluation/education;Patient/family training; Activityengagement; Compensatory technique education;Cognitive reorientation   Goal Expiration Date 03/10/21   OT Frequency 3-5x/wk   Recommendation   OT Discharge Recommendation Post-Acute Rehabilitation Services   AM-PAC Daily Activity Inpatient   Lower Body Dressing 2   Bathing 2   Toileting 2   Upper Body Dressing 3   Grooming 3   Eating 3   Daily Activity Raw Score 15   Daily Activity Standardized Score (Calc for Raw Score >=11) 34 69   AM-PAC Applied Cognition Inpatient   Following a Speech/Presentation 2   Understanding Ordinary Conversation 2   Taking Medications 1   Remembering Where Things Are Placed or Put Away 1   Remembering List of 4-5 Errands 1   Taking Care of Complicated Tasks 1   Applied Cognition Raw Score 8   Applied Cognition Standardized Score 19 32   Barthel Index   Feeding 5   Bathing 0   Grooming Score 0   Dressing Score 5   Bladder Score 10   Bowels Score 10 Toilet Use Score 5   Transfers (Bed/Chair) Score 5   Mobility (Level Surface) Score 0   Stairs Score 0   Barthel Index Score 40   Licensure   [place] License Number  Micky Peres Mitul Jostin 86 OTR/L [id number]

## 2021-02-24 NOTE — PROGRESS NOTES
Progress Note - Infectious Disease   Noemi Washington 66 y o  female MRN: [id number]  Unit/Bed#: [address] Encounter: [id number]      Impression/Recommendations:  1  Sepsis, POA  Tachypnea, hypothermia  Due to #2  No other appreciable source  ROS limited  Exam benign  Flu/RSV/COVID PCR, LFTs, CT C/A/P otherwise unremarkable  Hemodynamically stable although chronically ill due to multiple advance comorbidities  Improving with antibiotics  Rec:  ? Continue antibiotics as below  ? Follow up blood cultures as below  ? Follow temperatures closely  ? Supportive care as per the primary service     2  Klebsiella bacteremia  Unclear source  Consider UTI given bladder wall thickening on CT although bland UA and unclear if having  symptoms  Consider transient GI source in setting of cirrhosis, large ventral hernia  Abdominal exam benign  LFTs, CT A/P otherwise unremarkable  Rec:  ? Continue cefepime for now  ? Follow up suspceptibilities and tailor antibiotics as indicated     3  LAVONNE on CKD  Likely multifactorial   Baseline 1 6-1 7  Rec:  ? Follow creatinine closely and dose-adjust antibiotics as indicated  ? Recheck BMP in AM     4  Acute encephalopathy  Multifactorial and likely toxic-metabolic due to all of above  Consider also hepatic encephalopathy  CT head negative  Low suspicion for primary CNS infection  Improving      5  Chronic CHF  In setting of CAD with ICM  Increased weight and BNP on admission  Cardiology follow-up ongoing      6   Severe AS        7   Afib  Status post PPM      8   Cirrhosis  Thought due to CHUNG      9  DM with DPN  Recent A1c 7 1       Antibiotics:  Cefepime #3    Subjective:  Patient seen on AM rounds  Talking on phone with son  Seems more awake and alert today  Offered no complaints  24 Hour Events:  No documented fevers, chills, sweats, nausea, vomiting, or diarrhea      Objective:  Vitals:  Temp:  [97 °F (36 1 °C)-97 6 °F (36 4 °C)] 97 6 °F (36 4 °C)  HR:  [60-78] 68  Resp:  [18-19] 18  BP: (106-153)/(44-77) 128/74  SpO2:  [91 %-99 %] 96 %  Temp (24hrs), Av 3 °F (36 3 °C), Min:97 °F (36 1 °C), Max:97 6 °F (36 4 °C)  Current: Temperature: 97 6 °F (36 4 °C)    Physical Exam:   General:  No acute distress  Psychiatric:  Awake and alert, talking on phone  Pulmonary:  Normal respiratory excursion without accessory muscle use  Abdomen:  Large ventral hernia  Extremities:  No edema  Skin:  No rashes    Lab Results:  I have personally reviewed pertinent labs  Results from last 7 days   Lab Units 21  0523 21  0554 21  1626 21  0734   POTASSIUM mmol/L 4 3 4 1 4 2 4 6   CHLORIDE mmol/L 103 104 103 103   CO2 mmol/L 27 27 25 29   BUN mg/dL 115* 102* 101* 105*   CREATININE mg/dL 2 36* 2 14* 2 29* 2 21*   EGFR ml/min/1 73sq m 20 22 21 22   CALCIUM mg/dL 9 5 9 3 9 3 8 8   AST U/L 23 20  --  24   ALT U/L 20 17  --  20   ALK PHOS U/L 139* 163*  --  212*     Results from last 7 days   Lab Units 21  0523 21  0554 21  0734   WBC Thousand/uL 9 74 11 80* 11 94*   HEMOGLOBIN g/dL 10 8* 11 0* 11 1*   PLATELETS Thousands/uL 148* 150 177     Results from last 7 days   Lab Units 21  0754 21  0734   BLOOD CULTURE  Klebsiella oxytoca* Klebsiella oxytoca*   GRAM STAIN RESULT  Gram negative rods* Gram negative rods*       Imaging Studies:   I have personally reviewed pertinent imaging study reports and images in PACS  EKG, Pathology, and Other Studies:   I have personally reviewed pertinent reports

## 2021-02-25 ENCOUNTER — TELEPHONE (OUTPATIENT)
Dept: FAMILY MEDICINE CLINIC | Facility: CLINIC | Age: 71
End: 2021-02-25

## 2021-02-25 ENCOUNTER — APPOINTMENT (INPATIENT)
Dept: RADIOLOGY | Facility: HOSPITAL | Age: 71
DRG: 871 | End: 2021-02-25
Payer: MEDICARE

## 2021-02-25 LAB
ALBUMIN SERPL BCP-MCNC: 4 G/DL (ref 3.5–5)
ALP SERPL-CCNC: 120 U/L (ref 46–116)
ALT SERPL W P-5'-P-CCNC: 20 U/L (ref 12–78)
ANION GAP SERPL CALCULATED.3IONS-SCNC: 11 MMOL/L (ref 4–13)
AST SERPL W P-5'-P-CCNC: 24 U/L (ref 5–45)
BASOPHILS # BLD AUTO: 0.03 THOUSANDS/ΜL (ref 0–0.1)
BASOPHILS NFR BLD AUTO: 0 % (ref 0–1)
BILIRUB SERPL-MCNC: 3.3 MG/DL (ref 0.2–1)
BUN SERPL-MCNC: 117 MG/DL (ref 5–25)
CALCIUM SERPL-MCNC: 9.6 MG/DL (ref 8.3–10.1)
CHLORIDE SERPL-SCNC: 101 MMOL/L (ref 100–108)
CO2 SERPL-SCNC: 26 MMOL/L (ref 21–32)
CREAT SERPL-MCNC: 2.33 MG/DL (ref 0.6–1.3)
EOSINOPHIL # BLD AUTO: 0.13 THOUSAND/ΜL (ref 0–0.61)
EOSINOPHIL NFR BLD AUTO: 2 % (ref 0–6)
ERYTHROCYTE [DISTWIDTH] IN BLOOD BY AUTOMATED COUNT: 19.9 % (ref 11.6–15.1)
GFR SERPL CREATININE-BSD FRML MDRD: 20 ML/MIN/1.73SQ M
GLUCOSE SERPL-MCNC: 167 MG/DL (ref 65–140)
GLUCOSE SERPL-MCNC: 173 MG/DL (ref 65–140)
GLUCOSE SERPL-MCNC: 176 MG/DL (ref 65–140)
GLUCOSE SERPL-MCNC: 209 MG/DL (ref 65–140)
GLUCOSE SERPL-MCNC: 221 MG/DL (ref 65–140)
HCT VFR BLD AUTO: 35.3 % (ref 34.8–46.1)
HGB BLD-MCNC: 9.9 G/DL (ref 11.5–15.4)
IMM GRANULOCYTES # BLD AUTO: 0.03 THOUSAND/UL (ref 0–0.2)
IMM GRANULOCYTES NFR BLD AUTO: 0 % (ref 0–2)
LYMPHOCYTES # BLD AUTO: 0.6 THOUSANDS/ΜL (ref 0.6–4.47)
LYMPHOCYTES NFR BLD AUTO: 7 % (ref 14–44)
MCH RBC QN AUTO: 26 PG (ref 26.8–34.3)
MCHC RBC AUTO-ENTMCNC: 28 G/DL (ref 31.4–37.4)
MCV RBC AUTO: 93 FL (ref 82–98)
MONOCYTES # BLD AUTO: 1.07 THOUSAND/ΜL (ref 0.17–1.22)
MONOCYTES NFR BLD AUTO: 12 % (ref 4–12)
NEUTROPHILS # BLD AUTO: 6.76 THOUSANDS/ΜL (ref 1.85–7.62)
NEUTS SEG NFR BLD AUTO: 79 % (ref 43–75)
NRBC BLD AUTO-RTO: 0 /100 WBCS
PLATELET # BLD AUTO: 128 THOUSANDS/UL (ref 149–390)
PMV BLD AUTO: 11 FL (ref 8.9–12.7)
POTASSIUM SERPL-SCNC: 4.1 MMOL/L (ref 3.5–5.3)
PROT SERPL-MCNC: 7.9 G/DL (ref 6.4–8.2)
RBC # BLD AUTO: 3.81 MILLION/UL (ref 3.81–5.12)
SODIUM SERPL-SCNC: 138 MMOL/L (ref 136–145)
TROPONIN I SERPL-MCNC: 0.05 NG/ML
TROPONIN I SERPL-MCNC: 0.05 NG/ML
WBC # BLD AUTO: 8.62 THOUSAND/UL (ref 4.31–10.16)

## 2021-02-25 PROCEDURE — 82948 REAGENT STRIP/BLOOD GLUCOSE: CPT

## 2021-02-25 PROCEDURE — 99232 SBSQ HOSP IP/OBS MODERATE 35: CPT | Performed by: INTERNAL MEDICINE

## 2021-02-25 PROCEDURE — 85025 COMPLETE CBC W/AUTO DIFF WBC: CPT | Performed by: INTERNAL MEDICINE

## 2021-02-25 PROCEDURE — 84484 ASSAY OF TROPONIN QUANT: CPT | Performed by: NURSE PRACTITIONER

## 2021-02-25 PROCEDURE — 99233 SBSQ HOSP IP/OBS HIGH 50: CPT | Performed by: INTERNAL MEDICINE

## 2021-02-25 PROCEDURE — 84484 ASSAY OF TROPONIN QUANT: CPT | Performed by: INTERNAL MEDICINE

## 2021-02-25 PROCEDURE — 71045 X-RAY EXAM CHEST 1 VIEW: CPT

## 2021-02-25 PROCEDURE — 80053 COMPREHEN METABOLIC PANEL: CPT | Performed by: INTERNAL MEDICINE

## 2021-02-25 RX ORDER — INSULIN GLARGINE 100 [IU]/ML
10 INJECTION, SOLUTION SUBCUTANEOUS
Status: DISCONTINUED | OUTPATIENT
Start: 2021-02-25 | End: 2021-03-01 | Stop reason: HOSPADM

## 2021-02-25 RX ORDER — CEFTRIAXONE 1 G/50ML
1000 INJECTION, SOLUTION INTRAVENOUS EVERY 24 HOURS
Status: COMPLETED | OUTPATIENT
Start: 2021-02-25 | End: 2021-03-01

## 2021-02-25 RX ADMIN — Medication 12.5 MG: at 08:30

## 2021-02-25 RX ADMIN — INSULIN LISPRO 1 UNITS: 100 INJECTION, SOLUTION INTRAVENOUS; SUBCUTANEOUS at 17:39

## 2021-02-25 RX ADMIN — ALBUMIN (HUMAN) 25 G: 0.25 INJECTION, SOLUTION INTRAVENOUS at 04:32

## 2021-02-25 RX ADMIN — INSULIN LISPRO 1 UNITS: 100 INJECTION, SOLUTION INTRAVENOUS; SUBCUTANEOUS at 12:49

## 2021-02-25 RX ADMIN — ACETAMINOPHEN 650 MG: 325 TABLET, FILM COATED ORAL at 16:54

## 2021-02-25 RX ADMIN — LEVOTHYROXINE SODIUM 137 MCG: 25 TABLET ORAL at 05:38

## 2021-02-25 RX ADMIN — APIXABAN 2.5 MG: 2.5 TABLET, FILM COATED ORAL at 08:31

## 2021-02-25 RX ADMIN — APIXABAN 2.5 MG: 2.5 TABLET, FILM COATED ORAL at 17:40

## 2021-02-25 RX ADMIN — OXYCODONE HYDROCHLORIDE 2.5 MG: 5 TABLET ORAL at 17:38

## 2021-02-25 RX ADMIN — INSULIN LISPRO 1 UNITS: 100 INJECTION, SOLUTION INTRAVENOUS; SUBCUTANEOUS at 08:36

## 2021-02-25 RX ADMIN — CEFTRIAXONE 1000 MG: 1 INJECTION, SOLUTION INTRAVENOUS at 14:47

## 2021-02-25 RX ADMIN — INSULIN LISPRO 2 UNITS: 100 INJECTION, SOLUTION INTRAVENOUS; SUBCUTANEOUS at 21:59

## 2021-02-25 RX ADMIN — PRAVASTATIN SODIUM 40 MG: 40 TABLET ORAL at 17:40

## 2021-02-25 RX ADMIN — GABAPENTIN 300 MG: 300 CAPSULE ORAL at 21:50

## 2021-02-25 RX ADMIN — Medication 12.5 MG: at 20:28

## 2021-02-25 NOTE — PLAN OF CARE
Problem: Potential for Falls  Goal: Patient will remain free of falls  Description: INTERVENTIONS:  - Assess patient frequently for physical needs  -  Identify cognitive and physical deficits and behaviors that affect risk of falls    -  [place] fall precautions as indicated by assessment   - Educate patient/family on patient safety including physical limitations  - Instruct patient to call for assistance with activity based on assessment  - Modify environment to reduce risk of injury  - Consider OT/PT consult to assist with strengthening/mobility  Outcome: Progressing     Problem: Prexisting or High Potential for Compromised Skin Integrity  Goal: Skin integrity is maintained or improved  Description: INTERVENTIONS:  - Identify patients at risk for skin breakdown  - Assess and monitor skin integrity  - Assess and monitor nutrition and hydration status  - Monitor labs   - Assess for incontinence   - Turn and reposition patient  - Assist with mobility/ambulation  - Relieve pressure over bony prominences  - Avoid friction and shearing  - Provide appropriate hygiene as needed including keeping skin clean and dry  - Evaluate need for skin moisturizer/barrier cream  - Collaborate with interdisciplinary team   - Patient/family teaching  - Consider wound care consult   Outcome: Progressing     Problem: PAIN - ADULT  Goal: Verbalizes/displays adequate comfort level or baseline comfort level  Description: Interventions:  - Encourage patient to monitor pain and request assistance  - Assess pain using appropriate pain scale  - Administer analgesics based on type and severity of pain and evaluate response  - Implement non-pharmacological measures as appropriate and evaluate response  - Consider cultural and social influences on pain and pain management  - Notify physician/advanced practitioner if interventions unsuccessful or patient reports new pain  Outcome: Progressing     Problem: INFECTION - ADULT  Goal: Absence or prevention of progression during hospitalization  Description: INTERVENTIONS:  - Assess and monitor for signs and symptoms of infection  - Monitor lab/diagnostic results  - Monitor all insertion sites, i e  indwelling lines, tubes, and drains  - Monitor endotracheal if appropriate and nasal secretions for changes in amount and color  - [place] appropriate cooling/warming therapies per order  - Administer medications as ordered  - Instruct and encourage patient and family to use good hand hygiene technique  - Identify and instruct in appropriate isolation precautions for identified infection/condition  Outcome: Progressing  Goal: Absence of fever/infection during neutropenic period  Description: INTERVENTIONS:  - Monitor WBC    Outcome: Progressing     Problem: SAFETY ADULT  Goal: Maintain or return to baseline ADL function  Description: INTERVENTIONS:  -  Assess patient's ability to carry out ADLs; assess patient's baseline for ADL function and identify physical deficits which impact ability to perform ADLs (bathing, care of mouth/teeth, toileting, grooming, dressing, etc )  - Assess/evaluate cause of self-care deficits   - Assess range of motion  - Assess patient's mobility; develop plan if impaired  - Assess patient's need for assistive devices and provide as appropriate  - Encourage maximum independence but intervene and supervise when necessary  - Involve family in performance of ADLs  - Assess for home care needs following discharge   - Consider OT consult to assist with ADL evaluation and planning for discharge  - Provide patient education as appropriate  Outcome: Progressing  Goal: Maintain or return mobility status to optimal level  Description: INTERVENTIONS:  - Assess patient's baseline mobility status (ambulation, transfers, stairs, etc )    - Identify cognitive and physical deficits and behaviors that affect mobility  - Identify mobility aids required to assist with transfers and/or ambulation (gait belt, sit-to-stand, lift, walker, cane, etc )  - [place] fall precautions as indicated by assessment  - Record patient progress and toleration of activity level on Mobility SBAR; progress patient to next Phase/Stage  - Instruct patient to call for assistance with activity based on assessment  - Consider rehabilitation consult to assist with strengthening/weightbearing, etc   Outcome: Progressing     Problem: DISCHARGE PLANNING  Goal: Discharge to home or other facility with appropriate resources  Description: INTERVENTIONS:  - Identify barriers to discharge w/patient and caregiver  - Arrange for needed discharge resources and transportation as appropriate  - Identify discharge learning needs (meds, wound care, etc )  - Arrange for interpretive services to assist at discharge as needed  - Refer to Case Management Department for coordinating discharge planning if the patient needs post-hospital services based on physician/advanced practitioner order or complex needs related to functional status, cognitive ability, or social support system  Outcome: Progressing     Problem: Knowledge Deficit  Goal: Patient/family/caregiver demonstrates understanding of disease process, treatment plan, medications, and discharge instructions  Description: Complete learning assessment and assess knowledge base    Interventions:  - Provide teaching at level of understanding  - Provide teaching via preferred learning methods  Outcome: Progressing     Problem: CARDIOVASCULAR - ADULT  Goal: Maintains optimal cardiac output and hemodynamic stability  Description: INTERVENTIONS:  - Monitor I/O, vital signs and rhythm  - Monitor for S/S and trends of decreased cardiac output  - Administer and titrate ordered vasoactive medications to optimize hemodynamic stability  - Assess quality of pulses, skin color and temperature  - Assess for signs of decreased coronary artery perfusion  - Instruct patient to report change in severity of symptoms  Outcome: Progressing  Goal: Absence of cardiac dysrhythmias or at baseline rhythm  Description: INTERVENTIONS:  - Continuous cardiac monitoring, vital signs, obtain 12 lead EKG if ordered  - Administer antiarrhythmic and heart rate control medications as ordered  - Monitor electrolytes and administer replacement therapy as ordered  Outcome: Progressing     Problem: RESPIRATORY - ADULT  Goal: Achieves optimal ventilation and oxygenation  Description: INTERVENTIONS:  - Assess for changes in respiratory status  - Assess for changes in mentation and behavior  - Position to facilitate oxygenation and minimize respiratory effort  - Oxygen administered by appropriate delivery if ordered  - Initiate smoking cessation education as indicated  - Encourage broncho-pulmonary hygiene including cough, deep breathe, Incentive Spirometry  - Assess the need for suctioning and aspirate as needed  - Assess and instruct to report SOB or any respiratory difficulty  - Respiratory Therapy support as indicated  Outcome: Progressing     Problem: SKIN/TISSUE INTEGRITY - ADULT  Goal: Skin integrity remains intact  Description: INTERVENTIONS  - Identify patients at risk for skin breakdown  - Assess and monitor skin integrity  - Assess and monitor nutrition and hydration status  - Monitor labs (i e  albumin)  - Assess for incontinence   - Turn and reposition patient  - Assist with mobility/ambulation  - Relieve pressure over bony prominences  - Avoid friction and shearing  - Provide appropriate hygiene as needed including keeping skin clean and dry  - Evaluate need for skin moisturizer/barrier cream  - Collaborate with interdisciplinary team (i e  Nutrition, Rehabilitation, etc )   - Patient/family teaching  Outcome: Progressing     Problem: Nutrition/Hydration-ADULT  Goal: Nutrient/Hydration intake appropriate for improving, restoring or maintaining nutritional needs  Description: Monitor and assess patient's nutrition/hydration status for malnutrition  Collaborate with interdisciplinary team and initiate plan and interventions as ordered  Monitor patient's weight and dietary intake as ordered or per policy  Utilize nutrition screening tool and intervene as necessary  Determine patient's food preferences and provide high-protein, high-caloric foods as appropriate       INTERVENTIONS:  - Monitor oral intake, urinary output, labs, and treatment plans  - Assess nutrition and hydration status and recommend course of action  - Evaluate amount of meals eaten  - Assist patient with eating if necessary   - Allow adequate time for meals  - Recommend/ encourage appropriate diets, oral nutritional supplements, and vitamin/mineral supplements  - Assess need for intravenous fluids  - Provide specific nutrition/hydration education as appropriate  - Include patient/family/caregiver in decisions related to nutrition  Outcome: Progressing

## 2021-02-25 NOTE — TELEPHONE ENCOUNTER
I tried to go through the chart and explain what I could see was going on  Answered his questions to the best of my ability  No further action needed

## 2021-02-25 NOTE — CASE MANAGEMENT
NAIN spoke with mary Stephanielaila Mathur for facility choices  Son provided top 5 choices:    [address]  [address] at [facility]    Referrals sent- may need an [facility]  Will follow

## 2021-02-25 NOTE — PROGRESS NOTES
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, with long-term current use of insulin ([facility])    CKD (chronic kidney disease)    H/O coronary artery bypass surgery    Aortic stenosis    Lactic acidosis    Chronic atrial fibrillation ([facility])    Goals of care, counseling/discussion    Abnormal CT of the abdomen      Assessment & Plan:    Klebsiella bacteremia  Assessment & Plan  Patient was noted to have 2/2 blood culture positive with Gram-negative bacilli on admission  Unclear source but suspected to be urinary  UA with evidence of trace leukocytes, bacteriuria and pyuria  Unfortunately urine culture was not collected  Blood culture sensitivities noted  Seen by ID, input appreciated  · Initially treated with IV cefepime , now transition to ceftriaxone through 2/28 to complete 7 days of IV antibiotics  · Can transition to oral Omnicef 300 mg p o  Q  24 hours through 3/3 to complete 10 days total        Sepsis ([place] [address] )  Assessment & Plan  As evidenced by leukocytosis of 12, and hypotension  Also lactic acid was elevated to 4 0  Secondary to Gram-negative bacteremia, source unclear  Possible urinary source  chest x-ray negative for any infiltrates,   Abdominal exam benign, LFT stable  CT of the abdomen pelvis negative any other intra-abdominal pathology     Hemodynamics improved  · Continue IV antibiotics as above  · Monitor clinically  · ID evaluation appreciated      * Encephalopathy, toxic metabolic  Assessment & Plan  Likely secondary to multifactorial including sepsis, acute kidney injury on chronic kidney disease, metabolic derangements, intravascular depletion in setting of critical aortic stenosis and poor perfusion,   CT of the head negative for any acute intracranial abnormalities, ammonia negative  VBG showed a pH of 7 43, and pCO2 of 46  Ammonia level within normal limit  Blood culture noted to be positive for Klebsiella  Appears to be improving/stable  Neuro exam nonfocal  · Continue IV antibiotics as below  · Monitor neuro status  · Monitor blood pressure, avoid hypotension    Lactic acidosis  Assessment & Plan  Multifactorial secondary to sepsis, hypotension in setting of critical aortic stenosis, underlying cirrhosis  Blood pressure overall improved    Aortic stenosis  Assessment & Plan  Prior cardiology notes noted, follow up Cardiology recommendations    CKD (chronic kidney disease)  Assessment & Plan  Baseline creatinine appears to be fluctuating, recently trended up to mid 2s  Will continue to monitor  Avoid hypotension  Monitor intake output      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)  Assessment & Plan  Lab Results   Component Value Date    HGBA1C 7 1 (H) 02/01/2021       Recent Labs     02/25/21  0736 02/25/21  1105 02/25/21  1546 02/25/21  2040   POCGLU 173* 176* 209* 221*       Blood Sugar Average: Last 72 hrs:  (P) 149  5   Appetite fair  Resume Lantus and pre meal insulin at lower dose  continue corrective scale    Chronic combined systolic and diastolic congestive heart failure (HCC)  Assessment & Plan  Wt Readings from Last 3 Encounters:   02/24/21 95 7 kg (211 lb)   02/10/21 95 3 kg (210 lb)   02/04/21 89 1 kg (196 lb 5 5 oz)   Echocardiogram with EF 45%, critical aortic stenosis,  Baseline weight appears to be 205 lb is as per Cardiology notes  Cardiology input appreciated  Received IV Lasix  but noted to have worsening of creatinine and BUN  · Check chest x-ray  · avoid hypotension  · Overall guarded prognosis          Coronary artery disease involving coronary bypass graft of native heart without angina pectoris  Assessment & Plan  Will resume medications as tolerated  Report having chest discomfort, EKG without any acute changes  Cardiac markers negative  Cardiology following  Continue supportive care    Goals of care, counseling/discussion  Assessment & Plan  Patient has aortic stenosis and has recurrent admissions for congestive heart failure, and is a poor candidate for surgery as well  Team had a discussion with the patient's son regarding the patient's wishes for her code status and he mentioned that patient would have not wanted herself to be resuscitated or intubated or shock  Hence patient will be made DNR level 3  Discussed with son again, updated regarding clinical status  Will continue with goals of care discussions      Chronic atrial fibrillation [facility])  Assessment & Plan  Controlled, history of sick sinus syndrome status post pacemaker  Continue metoprolol at lower dose with holding parameters   Discussed with Cardiology regarding resumption of anticoagulation,Alternative NOAC due to CKD  Started on Eliquis    Abnormal CT of the abdomen  Assessment & Plan  CT of the abdomen revealed Two new vertebral fractures when compared to October   Transverse fracture of the T12 vertebral body without significant height loss   Mild anterior compression deformity of the L3 vertebral body with minimal posterior cortical buckling   No   retropulsed fragments or significant neural impingement  Monitor clinically        VTE Pharmacologic Prophylaxis:   Pharmacologic: Apixaban (Eliquis)  Mechanical VTE Prophylaxis in Place: Yes    Patient Centered Rounds: I have performed bedside rounds with nursing staff today      Discussions with Specialists or Other Care Team Provider:  Cardiology    Education and Discussions with Family / Patient:  Discussed with patient, discussed with son over the phone    Time Spent for Care: 45 minutes  More than 50% of total time spent on counseling and coordination of care as described above  Current Length of Stay: 3 day(s)    Current Patient Status: Inpatient   Certification Statement: The patient will continue to require additional inpatient hospital stay due to Bacteremia    Discharge Plan:  Pending at present    Code Status: Level 3 - DNAR and DNI      Subjective:   Noted to be lying in bed reported feeling short of breath  Diffuse chest discomfort  Denies dizziness or abdominal pain      Objective:     Vitals:   Temp (24hrs), Av 1 °F (36 2 °C), Min:96 8 °F (36 °C), Max:97 3 °F (36 3 °C)    Temp:  [96 8 °F (36 °C)-97 3 °F (36 3 °C)] 96 8 °F (36 °C)  HR:  [60-76] 62  Resp:  [16-19] 16  BP: (120-163)/(65-80) 163/80  SpO2:  [88 %-97 %] 96 % on 2 L  Body mass index is 37 38 kg/m²  Input and Output Summary (last 24 hours): Intake/Output Summary (Last 24 hours) at 2021 1239  Last data filed at 2021 0432  Gross per 24 hour   Intake 210 ml   Output --   Net 210 ml       Physical Exam:     Physical Exam  Constitutional:       General: She is not in acute distress  Appearance: She is ill-appearing  Comments: Chronically ill   HENT:      Head: Normocephalic and atraumatic  Neck:      Musculoskeletal: Neck supple  Cardiovascular:      Rate and Rhythm: Normal rate  Heart sounds: Murmur present  Pulmonary:      Effort: Pulmonary effort is normal  No respiratory distress  Breath sounds: Rales (Minimal at bases) present  No wheezing  Comments: Diminished bilaterally  Abdominal:      General: Bowel sounds are normal  There is no distension  Palpations: Abdomen is soft  Tenderness: There is no abdominal tenderness  There is no guarding or rebound  Hernia: A hernia is present  Musculoskeletal:      Right lower leg: No edema  Left lower leg: No edema  Skin:     General: Skin is warm and dry  Findings: No rash  Neurological:      Mental Status: She is alert  Mental status is at baseline  Additional Data:     Labs:    Results from last 7 days   Lab Units 02/25/21  0534   WBC Thousand/uL 8 62   HEMOGLOBIN g/dL 9 9*   HEMATOCRIT % 35 3   PLATELETS Thousands/uL 128*   NEUTROS PCT % 79*   LYMPHS PCT % 7*   MONOS PCT % 12   EOS PCT % 2     Results from last 7 days   Lab Units 02/25/21  0534   POTASSIUM mmol/L 4 1   CHLORIDE mmol/L 101   CO2 mmol/L 26   BUN mg/dL 117*   CREATININE mg/dL 2 33*   CALCIUM mg/dL 9 6   ALK PHOS U/L 120*   ALT U/L 20   AST U/L 24     Results from last 7 days   Lab Units 02/22/21  0734   INR  2 07*       * I Have Reviewed All Lab Data Listed Above  * Additional Pertinent Lab Tests Reviewed:  All Labs Within Last 24 Hours Reviewed      Imaging:  Imaging Reports Reviewed Today Include:CT, CXR      Recent Cultures (last 7 days):     Results from last 7 days   Lab Units 02/22/21  0754 02/22/21  0734   BLOOD CULTURE  Klebsiella oxytoca* Klebsiella oxytoca*   GRAM STAIN RESULT  Gram negative rods* Gram negative rods*       Last 24 Hours Medication List:   Current Facility-Administered Medications   Medication Dose Route Frequency Provider Last Rate    acetaminophen  650 mg Oral Q4H PRN Jayne Franco MD      apixaban  2 5 mg Oral BID ISIDRA York      cefTRIAXone  1,000 mg Intravenous Q24H Wale Brizuela MD      docusate sodium  100 mg Oral BID Romayne Sickle, MD      gabapentin  300 mg Oral HS Romayne Sickle, MD      insulin lispro  1-5 Units Subcutaneous HS Jayne Franco MD      insulin lispro  1-5 Units Subcutaneous TID AC Romayne Sickle, MD      levothyroxine  137 mcg Oral Daily Jayne Franco MD      metoprolol tartrate  12 5 mg Oral Q12H Mandepe Chávez MD      oxyCODONE  2 5 mg Oral Q4H PRN Romayne Sickle, MD      polyethylene glycol  17 g Oral Daily PRN Davi Marcela Montiel MD      pravastatin  40 mg Oral Daily With Breonna Monday, MD      sodium chloride  1 spray Each Nare Q2H PRN Beatriz Diego MD            Today, Patient Was Seen By: Beatriz Diego MD    ** Please Note: "This note has been constructed using a voice recognition system  Therefore there may be syntax, spelling, and/or grammatical errors   Please call if you have any questions  "**

## 2021-02-25 NOTE — PROGRESS NOTES
Progress Note - Cardiology   [facility]     Ciara Lyon 66 y o  female MRN: [id number]  : 1950  Unit/Bed#: [address] [place] [address] Encounter: [id number]    Assessment and Plan:   1  Critical aortic stenosis:  Last valve area was 0 8   Patient evaluated by CT surgery at [facility] and unfortunately due to her multiple comorbidities is not felt to be a candidate for SAVR or TAVR    -  this was discussed at length with her son, Sabrina Stiles on day of admission   Family considering palliative care     2  Altered mental status:  Patient's mental status improved after improvement of blood pressure   Most likely multifactorial including acute kidney injury, dehydration and poor perfusion secondary to critical aortic stenosis      3  Acute kidney injury on chronic kidney disease/cardiorenal syndrome:   Creatinine bumped up after receiving 10 mg of IV Lasix 2021  Creatinine slowly improving  4  Hypertension:  Patient with 1 blood pressure with systolic of 170, question if this is an outlier  Will continue to monitor      5  Diabetes:  Managed per the primary team     6  Chronic combined systolic and diastolic heart failure:  Dry weight appears to be 205 lb    7  Klebsiella bacteremia:  Patient started on cefepime 1 gm IV daily  Subjective / Objective:       Vitals: Blood pressure 163/80, pulse 62, temperature (!) 96 8 °F (36 °C), resp  rate 16, weight 95 7 kg (211 lb), SpO2 96 %, not currently breastfeeding  Vitals:    21 0600 21 0600   Weight: 95 8 kg (211 lb 3 2 oz) 95 7 kg (211 lb)     Body mass index is 37 38 kg/m²    BP Readings from Last 3 Encounters:   21 163/80   02/10/21 110/68   21 129/74     Orthostatic Blood Pressures      Most Recent Value   Blood Pressure  163/80 filed at 2021 0830   Patient Position - Orthostatic VS  Lying filed at 2021 0745        I/O        07 -  07 07 -  07 0701 - 02/26 0700    I V  (mL/kg)  10 (0 1)     IV Piggyback  200     Total Intake(mL/kg)  210 (2 2)     Urine (mL/kg/hr)       Total Output       Net  +210            Unmeasured Urine Occurrence 1 x 3 x         Invasive Devices     Peripheral Intravenous Line            Peripheral IV 02/24/21 Dorsal (posterior); Left;Proximal Forearm less than 1 day                  Intake/Output Summary (Last 24 hours) at 2/25/2021 1030  Last data filed at 2/25/2021 0432  Gross per 24 hour   Intake 210 ml   Output --   Net 210 ml         Physical Exam:   Physical Exam  Vitals signs and nursing note reviewed  Constitutional:       General: She is not in acute distress  Appearance: Normal appearance  She is well-developed  She is obese  HENT:      Head: Normocephalic  Right Ear: External ear normal       Left Ear: External ear normal       Nose: Nose normal    Eyes:      General: No scleral icterus  Right eye: No discharge  Left eye: No discharge  Conjunctiva/sclera: Conjunctivae normal       Pupils: Pupils are equal, round, and reactive to light  Neck:      Musculoskeletal: Normal range of motion and neck supple  Thyroid: No thyromegaly  Cardiovascular:      Rate and Rhythm: Normal rate and regular rhythm  Pulses: Normal pulses  Heart sounds: Murmur present  Systolic murmur present with a grade of 3/6  Pulmonary:      Effort: Pulmonary effort is normal  No accessory muscle usage or respiratory distress  Breath sounds: Examination of the right-lower field reveals decreased breath sounds  Examination of the left-lower field reveals decreased breath sounds  Decreased breath sounds present  Abdominal:      General: Bowel sounds are normal  There is no distension  Palpations: Abdomen is soft  Musculoskeletal:      Right lower leg: No edema  Left lower leg: No edema  Skin:     General: Skin is warm and dry  Neurological:      General: No focal deficit present  Mental Status: She is alert  Mental status is at baseline  Medications/ Allergies:     Current Facility-Administered Medications   Medication Dose Route Frequency Provider Last Rate    acetaminophen  650 mg Oral Q4H PRN Ej Yanez MD      albumin human  25 g Intravenous Q8H Marella Cargo, CRNP 0 g (02/22/21 1315)    apixaban  2 5 mg Oral BID Marella Cargo, CRNP      cefepime  1,000 mg Intravenous Q24H Shasta Dos Santos MD 1,000 mg (02/24/21 1447)    docusate sodium  100 mg Oral BID Jeremiah Castillo MD      gabapentin  300 mg Oral HS Jeremiah Castillo MD      insulin lispro  1-5 Units Subcutaneous HS Ej Yanez MD      insulin lispro  1-5 Units Subcutaneous TID AC Jeremiah Castillo MD      levothyroxine  137 mcg Oral Daily Ej Yanez MD      metoprolol tartrate  12 5 mg Oral Q12H [address] Jeremiah Castillo MD      oxyCODONE  2 5 mg Oral Q4H PRN Jeremiah Castillo MD      polyethylene glycol  17 g Oral Daily PRN Jeremiah Castillo MD      pravastatin  40 mg Oral Daily With Patience Garces MD      sodium chloride  1 spray Each Nare Q2H PRN Jeremiah Castillo MD       acetaminophen, 650 mg, Q4H PRN  oxyCODONE, 2 5 mg, Q4H PRN  polyethylene glycol, 17 g, Daily PRN  sodium chloride, 1 spray, Q2H PRN      Allergies   Allergen Reactions    Bromide Ion [Bromine]      Blurred vision   Has preservative that  Pt cannot use    Other Blisters     Adhesive tape    Timolol      Per pt, allergic to steroid in medication    Tramadol      cognition changes    Ibuprofen Palpitations    Penicillins Rash       VTE Pharmacologic Prophylaxis:   Sequential compression device (Venodyne)     Labs:   Troponins:  Results from last 7 days   Lab Units 02/25/21  0534 02/25/21  0056 02/24/21  1826   TROPONIN I ng/mL 0 05* 0 05* 0 04     CBC with diff:  Results from last 7 days   Lab Units 02/25/21  0534 02/24/21  0523 02/23/21  0554 02/22/21  0734   WBC Thousand/uL 8 62 9 74 11 80* 11 94*   HEMOGLOBIN g/dL 9 9* 10 8* 11 0* 11 1*   HEMATOCRIT % 35 3 39 3 39 7 39 4   MCV fL 93 95 94 93   PLATELETS Thousands/uL 128* 148* 150 177   MCH pg 26 0* 26 0* 26 1* 26 2*   MCHC g/dL 28 0* 27 5* 27 7* 28 2*   RDW % 19 9* 19 8* 19 8* 19 7*   MPV fL 11 0 10 2 10 3 10 3   NRBC AUTO /100 WBCs 0 0 0 0     CMP:  Results from last 7 days   Lab Units 02/25/21  0534 02/24/21  0523 02/23/21  0554 02/22/21  1626 02/22/21  0734   SODIUM mmol/L 138 142 143 141 139   POTASSIUM mmol/L 4 1 4 3 4 1 4 2 4 6   CHLORIDE mmol/L 101 103 104 103 103   CO2 mmol/L 26 27 27 25 29   ANION GAP mmol/L 11 12 12 13 7   BUN mg/dL 117* 115* 102* 101* 105*   CREATININE mg/dL 2 33* 2 36* 2 14* 2 29* 2 21*   CALCIUM mg/dL 9 6 9 5 9 3 9 3 8 8   AST U/L 24 23 20  --  24   ALT U/L 20 20 17  --  20   ALK PHOS U/L 120* 139* 163*  --  212*   TOTAL PROTEIN g/dL 7 9 7 9 8 1  --  8 6*   ALBUMIN g/dL 4 0 3 4* 3 2*  --  2 6*   TOTAL BILIRUBIN mg/dL 3 30* 3 90* 3 68*  --  1 90*   EGFR ml/min/1 73sq m 20 20 22 21 22     Coags:  Results from last 7 days   Lab Units 02/22/21  0734   PTT seconds 52*   INR  2 07*       Imaging & Testing   I have personally reviewed pertinent reports  Ct Chest Abdomen Pelvis Wo Contrast    Result Date: 2/22/2021  Narrative: CT CHEST, ABDOMEN AND PELVIS WITHOUT IV CONTRAST INDICATION:   Altered mental status  Sepsis  COMPARISON:  CT of the chest, CT of the abdomen/pelvis dated 10/23/2020, and chest CT dated 1/19/2017  TECHNIQUE: CT examination of the chest, abdomen and pelvis was performed without intravenous contrast   Axial, sagittal, and coronal 2D reformatted images were created from the source data and submitted for interpretation  Radiation dose length product (DLP) for this visit:  1525 66 mGy-cm   This examination, like all CT scans performed in the [facility], was performed utilizing techniques to minimize radiation dose exposure, including the use of iterative reconstruction and automated exposure control   Enteric contrast was not administered  FINDINGS: CHEST LUNGS:  Lung parenchyma is distorted by respiratory motion and atelectasis related to imaging during exhalation  Central predominant groundglass opacities in both lungs  Bandlike opacities in the lung bases with morphology suggesting atelectasis  No acute-appearing dense consolidations  Mild scattered foci of interlobular septal reticulation  3 mm nodule in the right upper lobe (3/36)--present in 2017 confirming benignancy  No suspicious pulmonary nodules  PLEURA:  Trace right layering effusion posteriorly  No loculated components  No pneumothorax  HEART/GREAT VESSELS:  Four-chamber enlargement of the heart  Prior CABG  Native coronary atherosclerosis  Valvular calcifications  Cardiac assist device with leads in the right atrium and right ventricle  No pericardial effusion  Thoracic aortic atherosclerosis without aneurysm  Mildly ectatic main pulmonary artery 30 mm  Pulmonary vessels appear cephalized on coronal reformats  MEDIASTINUM AND JAIRO:  Unremarkable  CHEST WALL AND LOWER NECK:   Unremarkable  ABDOMEN LIVER/BILIARY TREE:  Question nodular contour to liver  No focal parenchymal masses or biliary ductal dilatation, though parenchyma is obscured by motion artifact  GALLBLADDER:  Gallbladder is surgically absent  SPLEEN:  Unremarkable  PANCREAS:  Diffuse fatty atrophy, greatest at the tail  No masses or pancreatic ductal dilatation demonstrated  ADRENAL GLANDS:  Unremarkable  KIDNEYS/URETERS: No contour distorting masses, perinephric collections, urolithiasis, or hydronephrosis  STOMACH AND BOWEL: Small hiatal hernia  No gastric wall thickening  No dilated or inflamed loops of small bowel or colon, including loops bulging through the patient's ventral abdominal wall defect, discussed below  APPENDIX:  Cecum located within the ventral abdominal wall  Appendix not well demonstrated  No evidence for appendicitis, though   ABDOMINOPELVIC CAVITY: Mild ascites, greatest around the liver and spleen  Small amount of free fluid tracking into the pelvis  No encapsulated collections  No free air  Shotty retroperitoneal lymph nodes are demonstrated without laurel mesenteric, retroperitoneal, or pelvic sidewall lymphadenopathy  VESSELS:  Aortoiliac and branch vessel atherosclerosis without aneurysm  Multiple greater saphenous varices and inguinal regions, greater on the left, measuring up to 18 mm in diameter  PELVIS REPRODUCTIVE ORGANS:  Unremarkable for patient's age  URINARY BLADDER:  Urinary bladder wall appears diffusely thickened, even accounting for degree of nondistention  ABDOMINAL WALL/INGUINAL REGIONS:  Subxiphoid midline hernia with wide neck measuring 3 6 in meters by 2 5 cm with 3 6 cm neck  Abdominal fat and ascites fluid protrudes anteriorly  Hernia repair mesh at the supra umbilical ventral wall  Inferior to the mesh there is pronounced diastasis recti splaying of the rectus abdominis muscles by 9 cm  Mesenteric fat, small bowel, and large bowel loops loops protrude into the defect  Overall, protruding contents measure 18 7 cm x 9 2 cm x 23 cm  Relatively pronounced anasarca around the hips and gluteal regions  No encapsulated superficial collections  OSSEOUS STRUCTURES: Healed median sternotomy  New transverse fracture through the superior endplate of the U65 vertebral body  No significant vertebral body height loss  No significant retropulsed fragments  New anterior compression deformity at the superior endplate of the L3 vertebral body  Approximately 20% height loss  Minimal buckling of the posterior cortex, superiorly, without significant spinal canal stenosis  Grade 1 anterolisthesis of L4 on L5 secondary to bulky facet osteophytes  No destructive lytic or blastic lesions  Impression: 1  Two new vertebral fractures when compared to October  Transverse fracture of the T12 vertebral body without significant height loss    Mild anterior compression deformity of the L3 vertebral body with minimal posterior cortical buckling  No retropulsed fragments or significant neural impingement  2   Thickening of the bladder wall, diffusely  Correlate for evidence of infectious cystitis on urinalysis  3   Pulmonary vascular engorgement and cephalization, hazy central groundglass opacities, scattered interlobular septal thickening, and small right pleural effusion  Constellation of findings favors volume overload with pulmonary edema  4   Mild ascites and anasarca  Nodular liver contour suggestive of cirrhosis  5   Large diastasis defect in the ventral abdomen  Small and large bowel protruding through the defect do not appear obstructed or inflamed  I personally discussed this study with Deisy Hirsch on 2/22/2021 at 9:31 AM  Workstation performed: [id number]     Xr Chest Portable    Result Date: 2/22/2021  Narrative: CHEST INDICATION:   AMS  COMPARISON:  Chest radiograph from 1/30/2021 and chest CT from 2/22/2021  EXAM PERFORMED/VIEWS:  XR CHEST PORTABLE FINDINGS: Moderate cardiomegaly  CABG  Left subclavian pacemaker lead in right ventricle  The lungs are clear  No pneumothorax or pleural effusion  Osseous structures appear within normal limits for patient age  Impression: No acute cardiopulmonary disease  Workstation performed: [id number]     Ct Head Without Contrast    Result Date: 2/22/2021  Narrative: CT BRAIN - WITHOUT CONTRAST INDICATION:   Altered mental status  COMPARISON:  1/30/2021 and 11/30/2020  TECHNIQUE:  CT examination of the brain was performed  In addition to axial images, sagittal and coronal 2D reformatted images were created and submitted for interpretation  Radiation dose length product (DLP) for this visit:  971 mGy-cm     This examination, like all CT scans performed in the [facility], was performed utilizing techniques to minimize radiation dose exposure, including the use of iterative reconstruction and automated exposure control  IMAGE QUALITY:  Diagnostic  FINDINGS: PARENCHYMA:  Moderate patchy periventricular white matter hypodensities consistent with chronic microangiopathic changes  Parenchymal appearance is unchanged from prior  No acute infarct  No parenchymal hemorrhage  No mass  VENTRICLES AND EXTRA-AXIAL SPACES:  Normal for the patient's age  VISUALIZED ORBITS AND PARANASAL SINUSES:  Bilateral cataract surgeries  Orbits are, otherwise, unremarkable  Paranasal sinuses are clear  CALVARIUM AND EXTRACRANIAL SOFT TISSUES:  Normal      Impression: No acute intracranial abnormalities or significant change from priors  Workstation performed: [id number]       Beto Zuleta & Waveform Analysis, Multiple Levels    Result Date: 2/22/2021  Narrative:  [facility] REPORT CLINICAL: Indications: Patient being evaluated for peripheral arterial disease secondary to petechiae and swelling as per ordering Provider  Operative History: Cardiac Pacemaker Risk Factors: The patient has history of HTN, Diabetes (NIDDM (oral meds)), HLD, CKD and CAD  Clinical: Right Pressure:  80/ mm Hg, Left Pressure:  128/ mm Hg  FINDINGS:  Segment       Rig  Left                          P    P  Ant  Tibial    98  235  Post  Tibial  245  247  Ankle         245  247  Metatarsal     98  150  Great Toe      87  102     CONCLUSION: Impression RIGHT LOWER LIMB Ankle/Brachial Index: unreliable due to patient movement  PPG/PVR Tracings are normal  Metatarsal Pressure 98 mmHg  Great Toe Pressure: 87 mmHg, within the healing range  LEFT LOWER LIMB Ankle/Brachial Index: unreliable due to patient movement  PPG/PVR Tracings are normal  Metatarsal Pressure 150 mmHg  Great Toe Pressure: 102 mmHg, within the healing range  Technically limited/difficult study due to patient movement and uncooperative  Bilateral ABIs, metatarsal and great toe pressures unreliable due to movement    Technical findings given to Dr Otilia Ramos at 2:46 pm   SIGNATURE: Electronically Signed by: Mak Duckworth MD, RPVI on 2021 05:05:52 PM    Cardiac testing:   Results for orders placed during the hospital encounter of 20   Echo complete with contrast if indicated    Niesha Galvan   [address], [place]   [phone]    Transthoracic Echocardiogram  2D, M-mode, Doppler, and Color Doppler    Study date:  08-Aug-2020    Patient: John Barrow  MR number: [id number]  Account number: [de-identified]  : 1950  Age: 69 years  Gender: Female  Status: Outpatient  Location: Echo lab  Height: 65 in  Weight: 207 5 lb  BP: 143/ 82 mmHg    Indications: CAD    Diagnoses: I25 83 - Coronary atherosclerosis due to lipid rich plaque    Sonographer:  Tavia Dee RDCS  Primary Physician:  Constantine Arcos MD  Referring Physician:  Vida Combs MD  [facility]  Ordering Physician:  Vida Combs MD  Interpreting Physician:  Vida Combs MD    SUMMARY    LEFT VENTRICLE:  The ventricle was mildly dilated  Systolic function was mild to moderately reduced  Ejection fraction was estimated in the range of 40 % to 45 % to be 45 %  There was mild diffuse hypokinesis  Wall thickness was mildly to moderately increased  There was mild concentric hypertrophy  Doppler parameters were consistent with high ventricular filling pressure  VENTRICULAR SEPTUM:  There was paradoxical motion  These changes are consistent with right ventricular pacing  RIGHT VENTRICLE:  The ventricle was mildly to moderately dilated  Systolic function was mildly reduced  Wall thickness was mildly increased  Systolic pressure was markedly increased  LEFT ATRIUM:  The atrium was moderately dilated  RIGHT ATRIUM:  The atrium was moderately dilated  MITRAL VALVE:  There was mild to moderate annular calcification  There was mild regurgitation  AORTIC VALVE:  The valve was trileaflet  Leaflets exhibited moderately increased thickness, moderate calcification, and markedly reduced cuspal separation  Transaortic velocity was increased due to valvular stenosis  There was severe stenosis  TROY around 0 85  LVOT velocity 0 65 m/sec and Peak AV 2 36 m/sec and DI  26  Peak na d mean gradient are low, need to rule out low gradient severe AS    TRICUSPID VALVE:  There was moderate regurgitation  Estimated peak PA pressure was 80 mmHg  PULMONIC VALVE:  There was mild regurgitation  IVC, HEPATIC VEINS:  The inferior vena cava was dilated, with poor inspiratory collapse, consistent with elevated right atrial pressure  HISTORY: PRIOR HISTORY: HTN, DM, Obesity, PHTN, CABG, GERD, Pacemaker    PROCEDURE: The procedure was performed in the echo lab  This was a routine study  The transthoracic approach was used  The study included complete 2D imaging, M-mode, complete spectral Doppler, and color Doppler  The heart rate was 66 bpm,  at the start of the study  Echocardiographic views were limited due to lung interference  Image quality was adequate  LEFT VENTRICLE: The ventricle was mildly dilated  Systolic function was mild to moderately reduced  Ejection fraction was estimated in the range of 40 % to 45 % to be 45 %  There was mild diffuse hypokinesis  Wall thickness was mildly to  moderately increased  There was mild concentric hypertrophy  DOPPLER: Doppler parameters were consistent with high ventricular filling pressure  VENTRICULAR SEPTUM: Thickness was mildly increased  There was paradoxical motion  These changes are consistent with right ventricular pacing  RIGHT VENTRICLE: The ventricle was mildly to moderately dilated  Systolic function was mildly reduced  Wall thickness was mildly increased  DOPPLER: Systolic pressure was markedly increased  LEFT ATRIUM: The atrium was moderately dilated  RIGHT ATRIUM: The atrium was moderately dilated      MITRAL VALVE: There was mild to moderate annular calcification  There was normal leaflet separation  DOPPLER: The transmitral velocity was within the normal range  There was no evidence for stenosis  There was mild regurgitation  AORTIC VALVE: The valve was trileaflet  Leaflets exhibited moderately increased thickness, moderate calcification, and markedly reduced cuspal separation  DOPPLER: Transaortic velocity was increased due to valvular stenosis  There was  severe stenosis  TROY around 0 85  LVOT velocity 0 65 m/sec and Peak AV 2 36 m/sec and DI  26  Peak na d mean gradient are low, need to rule out low gradient severe AS There was no regurgitation  TRICUSPID VALVE: DOPPLER: There was moderate regurgitation  Estimated peak PA pressure was 80 mmHg  PULMONIC VALVE: DOPPLER: There was mild regurgitation  PERICARDIUM: There was no thickening or calcification  There was no pericardial effusion  AORTA: The root exhibited normal size  SYSTEMIC VEINS: IVC: The inferior vena cava was dilated, with poor inspiratory collapse, consistent with elevated right atrial pressure  SYSTEM MEASUREMENT TABLES    2D mode  AoR Diam 2D: 2 8 cm  LA Diam (2D): 4 5 cm  LA/Ao (2D): 1 61  FS (2D Teich): 19 6 %  IVSd (2D): 1 29 cm  LVDEV: 94 4 cmï¾³  LVESV: 56 3 cmï¾³  LVIDd(2D): 4 54 cm  LVISd (2D): 3 65 cm  LVOT Area 2D: 3 14 cmï¾²  LVPWd (2D): 1 21 cm  SV (Teich): 38 1 cmï¾³    Apical four chamber  LVEF A4C: 42 %    Unspecified Scan Mode  TROY Cont Eq (Peak Carlos): 0 87 cmï¾²  LVOT Diam : 2 cm  LVOT Vmax: 638 mm/s  LVOT Vmax; Mean: 638 mm/s  Peak Grad ; Mean: 2 mm[Hg]  MV Peak E Carlos   Mean: 1930 mm/s  Max P mm[Hg]  V Max: 4170 mm/s  Vmax: 3910 mm/s  RA Area: 25 7 cmï¾²  RA Volume: 91 6 cmï¾³  TAPSE: 0 8 cm    [employer] Accredited Echocardiography Laboratory    Prepared and electronically signed by    Chance Heck MD  Signed 11-Aug-2020 09:30:19         Emy Pappas        "This note has been constructed using a voice recognition system  Therefore there may be syntax, spelling, and/or grammatical errors   Please call if you have any questions  "

## 2021-02-25 NOTE — PROGRESS NOTES
Progress Note - Infectious Disease   Denise Bueno 66 y o  female MRN: [id number]  Unit/Bed#: [address] [place] [address] Encounter: [id number]      Impression/Recommendations:  1   Sepsis, POA   Tachypnea, hypothermia   Due to #2   No other appreciable source   ROS limited   Exam benign   Flu/RSV/COVID PCR, LFTs, CT C/A/P otherwise unremarkable   Hemodynamically stable although chronically ill due to multiple advance comorbidities  Improving with antibiotics  Rec:  ? Continue antibiotics as below  ? Follow temperatures closely  ? Supportive care as per the primary service     2   Klebsiella bacteremia   Unclear source   Consider UTI given bladder wall thickening on CT although bland UA and unclear if having  symptoms   Consider transient GI source in setting of cirrhosis, large ventral hernia   Abdominal exam benign   LFTs, CT A/P otherwise unremarkable  Rec:  ? Change to ceftriaxone to continue through 2/28 to complete 7 days total IV antibiotics  ? If ready for D/C prior to 2/28 could change to Omnicef 300 mg PO Q24 to continue through 3/3 (10 days total antibiotics)     3   LAVONNE on CKD   Likely multifactorial   Baseline 1 6-1 7     Rec:  ? Follow creatinine closely and dose-adjust antibiotics as indicated     4   Acute encephalopathy   Multifactorial and likely toxic-metabolic due to all of above   Consider also hepatic encephalopathy   CT head negative   Low suspicion for primary CNS infection  Improving      5   Chronic CHF   In setting of CAD with ICM   Increased weight and BNP on admission   Cardiology follow-up ongoing      6   Severe AS        7   Afib   Status post PPM      8   Cirrhosis   Thought due to CHUNG      9   DM with DPN    Recent A1c 7 1       The patient is stable from an ID standpoint  Antibiotics:  Cefepime #4    Subjective:  Patient seen on AM rounds  Denies fevers, chills, sweats, nausea, vomiting, or diarrhea      24 Hour Events:  No documented fevers, chills, sweats, nausea, vomiting, or diarrhea  Objective:  Vitals:  Temp:  [96 8 °F (36 °C)-97 3 °F (36 3 °C)] 96 8 °F (36 °C)  HR:  [60-76] 62  Resp:  [16-19] 16  BP: (120-163)/(65-80) 163/80  SpO2:  [88 %-97 %] 96 %  Temp (24hrs), Av 1 °F (36 2 °C), Min:96 8 °F (36 °C), Max:97 3 °F (36 3 °C)  Current: Temperature: (!) 96 8 °F (36 °C)    Physical Exam:   General:  No acute distress  Psychiatric:  Awake and alert  Pulmonary:  Normal respiratory excursion without accessory muscle use  Abdomen:  Soft, nontender  Extremities:  Cool feet with cyanotic changes  Skin:  No rashes    Lab Results:  I have personally reviewed pertinent labs  Results from last 7 days   Lab Units 21  0534 21  0523 21  0554   POTASSIUM mmol/L 4 1 4 3 4 1   CHLORIDE mmol/L 101 103 104   CO2 mmol/L 26 27 27   BUN mg/dL 117* 115* 102*   CREATININE mg/dL 2 33* 2 36* 2 14*   EGFR ml/min/1 73sq m 20 20 22   CALCIUM mg/dL 9 6 9 5 9 3   AST U/L 24 23 20   ALT U/L 20 20 17   ALK PHOS U/L 120* 139* 163*     Results from last 7 days   Lab Units 21  0534 21  0523 21  0554   WBC Thousand/uL 8 62 9 74 11 80*   HEMOGLOBIN g/dL 9 9* 10 8* 11 0*   PLATELETS Thousands/uL 128* 148* 150     Results from last 7 days   Lab Units 21  0754 21  0734   BLOOD CULTURE  Klebsiella oxytoca* Klebsiella oxytoca*   GRAM STAIN RESULT  Gram negative rods* Gram negative rods*       Imaging Studies:   I have personally reviewed pertinent imaging study reports and images in PACS  EKG, Pathology, and Other Studies:   I have personally reviewed pertinent reports

## 2021-02-25 NOTE — PLAN OF CARE
Problem: Potential for Falls  Goal: Patient will remain free of falls  Description: INTERVENTIONS:  - Assess patient frequently for physical needs  -  Identify cognitive and physical deficits and behaviors that affect risk of falls    -  [place] fall precautions as indicated by assessment   - Educate patient/family on patient safety including physical limitations  - Instruct patient to call for assistance with activity based on assessment  - Modify environment to reduce risk of injury  - Consider OT/PT consult to assist with strengthening/mobility  Outcome: Progressing

## 2021-02-25 NOTE — CASE MANAGEMENT
Patient accepted at [facility]  Pending discharge date, if there is no bed at [facility], patient family is open to [facility]  Plan right now is for patient to discharge to [facility] for STR to LTC when medically stable  Will continue to follow

## 2021-02-25 NOTE — TELEPHONE ENCOUNTER
Patient called today to make sure patients appt was canceled and to also ask that Dr Carmela Quintana give him a call, regarding patients hospitalization  He states she is not doing well at all and he has some questions

## 2021-02-26 PROBLEM — A41.9 SEPSIS (HCC): Status: RESOLVED | Noted: 2021-02-22 | Resolved: 2021-02-26

## 2021-02-26 PROBLEM — E87.2 LACTIC ACIDOSIS: Status: RESOLVED | Noted: 2021-02-23 | Resolved: 2021-02-26

## 2021-02-26 LAB
ANION GAP SERPL CALCULATED.3IONS-SCNC: 8 MMOL/L (ref 4–13)
BUN SERPL-MCNC: 122 MG/DL (ref 5–25)
CALCIUM SERPL-MCNC: 9.1 MG/DL (ref 8.3–10.1)
CHLORIDE SERPL-SCNC: 103 MMOL/L (ref 100–108)
CO2 SERPL-SCNC: 28 MMOL/L (ref 21–32)
CREAT SERPL-MCNC: 2.19 MG/DL (ref 0.6–1.3)
ERYTHROCYTE [DISTWIDTH] IN BLOOD BY AUTOMATED COUNT: 19.8 % (ref 11.6–15.1)
GFR SERPL CREATININE-BSD FRML MDRD: 22 ML/MIN/1.73SQ M
GLUCOSE SERPL-MCNC: 134 MG/DL (ref 65–140)
GLUCOSE SERPL-MCNC: 139 MG/DL (ref 65–140)
GLUCOSE SERPL-MCNC: 173 MG/DL (ref 65–140)
GLUCOSE SERPL-MCNC: 188 MG/DL (ref 65–140)
GLUCOSE SERPL-MCNC: 194 MG/DL (ref 65–140)
HCT VFR BLD AUTO: 36.3 % (ref 34.8–46.1)
HGB BLD-MCNC: 10.2 G/DL (ref 11.5–15.4)
MCH RBC QN AUTO: 26.1 PG (ref 26.8–34.3)
MCHC RBC AUTO-ENTMCNC: 28.1 G/DL (ref 31.4–37.4)
MCV RBC AUTO: 93 FL (ref 82–98)
PLATELET # BLD AUTO: 116 THOUSANDS/UL (ref 149–390)
PMV BLD AUTO: 10.4 FL (ref 8.9–12.7)
POTASSIUM SERPL-SCNC: 4.3 MMOL/L (ref 3.5–5.3)
RBC # BLD AUTO: 3.91 MILLION/UL (ref 3.81–5.12)
SODIUM SERPL-SCNC: 139 MMOL/L (ref 136–145)
WBC # BLD AUTO: 5.05 THOUSAND/UL (ref 4.31–10.16)

## 2021-02-26 PROCEDURE — 99233 SBSQ HOSP IP/OBS HIGH 50: CPT | Performed by: INTERNAL MEDICINE

## 2021-02-26 PROCEDURE — 99232 SBSQ HOSP IP/OBS MODERATE 35: CPT | Performed by: INTERNAL MEDICINE

## 2021-02-26 PROCEDURE — 97110 THERAPEUTIC EXERCISES: CPT

## 2021-02-26 PROCEDURE — 92526 ORAL FUNCTION THERAPY: CPT

## 2021-02-26 PROCEDURE — 85027 COMPLETE CBC AUTOMATED: CPT | Performed by: INTERNAL MEDICINE

## 2021-02-26 PROCEDURE — 82948 REAGENT STRIP/BLOOD GLUCOSE: CPT

## 2021-02-26 PROCEDURE — 80048 BASIC METABOLIC PNL TOTAL CA: CPT | Performed by: INTERNAL MEDICINE

## 2021-02-26 RX ORDER — METOPROLOL SUCCINATE 25 MG/1
25 TABLET, EXTENDED RELEASE ORAL
Qty: 90 TABLET | Refills: 1 | Status: SHIPPED | OUTPATIENT
Start: 2021-02-26

## 2021-02-26 RX ORDER — OXYCODONE HYDROCHLORIDE 5 MG/1
2.5 TABLET ORAL EVERY 4 HOURS PRN
Status: DISCONTINUED | OUTPATIENT
Start: 2021-02-26 | End: 2021-03-01 | Stop reason: HOSPADM

## 2021-02-26 RX ORDER — METOPROLOL SUCCINATE 25 MG/1
25 TABLET, EXTENDED RELEASE ORAL
Status: DISCONTINUED | OUTPATIENT
Start: 2021-02-26 | End: 2021-03-01 | Stop reason: HOSPADM

## 2021-02-26 RX ADMIN — LEVOTHYROXINE SODIUM 137 MCG: 25 TABLET ORAL at 05:16

## 2021-02-26 RX ADMIN — INSULIN LISPRO 1 UNITS: 100 INJECTION, SOLUTION INTRAVENOUS; SUBCUTANEOUS at 17:00

## 2021-02-26 RX ADMIN — INSULIN LISPRO 5 UNITS: 100 INJECTION, SOLUTION INTRAVENOUS; SUBCUTANEOUS at 08:08

## 2021-02-26 RX ADMIN — PRAVASTATIN SODIUM 40 MG: 40 TABLET ORAL at 17:03

## 2021-02-26 RX ADMIN — INSULIN LISPRO 5 UNITS: 100 INJECTION, SOLUTION INTRAVENOUS; SUBCUTANEOUS at 17:07

## 2021-02-26 RX ADMIN — METOPROLOL SUCCINATE 25 MG: 25 TABLET, EXTENDED RELEASE ORAL at 23:17

## 2021-02-26 RX ADMIN — DOCUSATE SODIUM 100 MG: 100 CAPSULE, LIQUID FILLED ORAL at 17:03

## 2021-02-26 RX ADMIN — INSULIN GLARGINE 10 UNITS: 100 INJECTION, SOLUTION SUBCUTANEOUS at 23:17

## 2021-02-26 RX ADMIN — INSULIN LISPRO 5 UNITS: 100 INJECTION, SOLUTION INTRAVENOUS; SUBCUTANEOUS at 12:07

## 2021-02-26 RX ADMIN — INSULIN LISPRO 1 UNITS: 100 INJECTION, SOLUTION INTRAVENOUS; SUBCUTANEOUS at 12:11

## 2021-02-26 RX ADMIN — DOCUSATE SODIUM 100 MG: 100 CAPSULE, LIQUID FILLED ORAL at 09:08

## 2021-02-26 RX ADMIN — APIXABAN 2.5 MG: 2.5 TABLET, FILM COATED ORAL at 09:07

## 2021-02-26 RX ADMIN — APIXABAN 2.5 MG: 2.5 TABLET, FILM COATED ORAL at 17:03

## 2021-02-26 RX ADMIN — INSULIN GLARGINE 10 UNITS: 100 INJECTION, SOLUTION SUBCUTANEOUS at 00:27

## 2021-02-26 RX ADMIN — CEFTRIAXONE 1000 MG: 1 INJECTION, SOLUTION INTRAVENOUS at 14:10

## 2021-02-26 RX ADMIN — Medication 12.5 MG: at 09:07

## 2021-02-26 RX ADMIN — OXYCODONE HYDROCHLORIDE 2.5 MG: 5 TABLET ORAL at 16:48

## 2021-02-26 RX ADMIN — INSULIN LISPRO 1 UNITS: 100 INJECTION, SOLUTION INTRAVENOUS; SUBCUTANEOUS at 23:13

## 2021-02-26 RX ADMIN — GABAPENTIN 300 MG: 300 CAPSULE ORAL at 23:13

## 2021-02-26 NOTE — SPEECH THERAPY NOTE
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, with long-term current use of insulin (HCC)    CKD (chronic kidney disease)    Chronic atrial fibrillation (HCC)    H/O coronary artery bypass surgery    Aortic stenosis    Sepsis ([address] )    Goals of care, counseling/discussion    Klebsiella bacteremia    Lactic acidosis    Abnormal CT of the abdomen      Subjective:  Pt  Was OOB in recliner chair with eyes closed  She was agreeable to session even though she stated she was "tired"  Objective:  Pt  Was seen for dysphagia treatment to assess diet tolerance and possible upgrade in consistency  Pt  Was agree to try thin liquids and ice chips as well as some crackers  She had no s/s of aspiration on thin liquids via single cup sips, ice chips or crackers  She was noted to throat clear with liquids via straw  Pt  Is on a fluid restriction, so had ~ 4oz total     Assessment:  No s/s of aspiration noted on thin liquids via cup or crackers       Plan/Recommendations:  Upgrade consistency to Dys 3 with thin liquids via cup  Medications in puree (pt preference)  Speech to follow    Abi Maynard, MS YANCEY-SLP  Speech Language Pathologist  Available via [facility] License # [id number]  [facility] [facility] [address] # [id number]

## 2021-02-26 NOTE — PHYSICAL THERAPY NOTE
PT TREATMENT     02/26/21 0915   PT Last Visit   PT Visit Date 02/26/21   Note Type   Note Type Treatment   Pain Assessment   Pain Assessment Tool Pain Assessment not indicated - pt denies pain   General   Chart Reviewed Yes   Subjective   Subjective "some difficulty breathing"   Transfers   Sit to Stand 4  Minimal assistance   Additional items Assist x 1;Verbal cues   Stand to Sit 4  Minimal assistance   Additional items Assist x 1;Verbal cues   Ambulation/Elevation   Gait pattern   (slow rafael;unsteady)   Gait Assistance 4  Minimal assist   Additional items Assist x 1;Verbal cues; Tactile cues   Assistive Device Rolling walker   Distance 12 feet with change in direction in pt's room  Balance   Static Sitting Fair +   Static Standing Fair   Dynamic Standing Fair -   Ambulatory Fair -   Activity Tolerance   Activity Tolerance Patient limited by fatigue  (weakness;deconditioning;SOB)   Assessment   Assessment Pt fatigues easily with trans and short distance ambulation with the RW  Pt minimally SOB at rest and with ambulation  Pt on 2L O2, SAO2 90 to 95% with rest and amb  Pt will benefit from STR prior to dc home with son due to weakness, SOB and deconditioning  The patient's AM-[facility] Basic Mobility Inpatient Short Form Raw Score is 14, Standardized Score is 35 55  A standardized score less than 42 9 suggests the patient may benefit from discharge to post-acute rehabilitation services  Please also refer to the recommendation of the Physical Therapist for safe discharge planning  Plan   Treatment/Interventions ADL retraining;Functional transfer training;LE strengthening/ROM; Therapeutic exercise; Endurance training;Patient/family training;Equipment eval/education; Bed mobility;Gait training; Compensatory technique education   PT Frequency 5x/wk   Recommendation   PT Discharge Recommendation Post-Acute Rehabilitation Services   AM-PAC Basic Mobility Inpatient   Turning in Bed Without Bedrails 2   Lying on Back to Sitting on Edge of Flat Bed 2   Moving Bed to Chair 3   Standing Up From Chair 3   Walk in Room 3   Climb 3-5 Stairs 1   Basic Mobility Inpatient Raw Score 14   Basic Mobility Standardized Score 42 23   Licensure   [place] License Number  Roddy [place], [place] [id number]

## 2021-02-26 NOTE — PROGRESS NOTES
Progress Note - Cardiology   [facility]     Shaka Ceron 66 y o  female MRN: [id number]  : 1950  Unit/Bed#: 4 [address] Encounter: [id number]    Assessment and Plan:   1  Critical aortic stenosis:  Last valve area was 0 8   Patient evaluated by CT surgery at [facility] and unfortunately due to her multiple comorbidities is not felt to be a candidate for SAVR or TAVR  -  goal is to maintain systolic blood pressure at least 120 mm Hg to maintain cerebral perfusion    -  this was discussed at length with her son, Sushma Rao on day of admission   Family considering palliative care    -  patient's heart rate stable with decreased dose of metoprolol, will transition her to Toprol XL 25 mg at bedtime     -  discussed with patient at some length that she is not a surgical or transcatheter candidate for valve replacement  She states this was explained to her when she was in the hospital in [place] and she understands that  -  we did begin to discuss options of care such as palliative versus hospice      2  Altered mental status:  Patient's mental status improved after improvement of blood pressure   Most likely multifactorial including acute kidney injury, dehydration and poor perfusion secondary to critical aortic stenosis      3  Acute kidney injury on chronic kidney disease/cardiorenal syndrome:   Creatinine bumped up after receiving 10 mg of IV Lasix 2021  Creatinine slowly improving      4  Hypertension:  Patient with 1 blood pressure with systolic of 045, question if this is an outlier  Will continue to monitor      5  Diabetes:  Managed per the primary team     6  Chronic combined systolic and diastolic heart failure:  Dry weight appears to be 205 lb     7  Klebsiella bacteremia:  Patient started on cefepime 1 gm IV daily  Subjective / Objective:   Patient seen and examined  Blood pressure is significantly improved off diuretics    Due to her critical aortic stenosis her goal blood pressure should be systolic greater than 498  Patient noted to be conversationally dyspneic  I did speak at length with her regarding her not being a surgical candidate  Briefly discussed options of palliative care versus hospice care  Patient did note that her son had brought topic up to her yesterday  She does not wish to be intubated or chest compressions  She did verbalize to me that she wants to be kept comfortable but she has not committed to hospice yet  Will discussed with primary team     Vitals: Blood pressure 138/73, pulse 60, temperature (!) 96 2 °F (35 7 °C), resp  rate 16, weight 95 7 kg (211 lb), SpO2 94 %, not currently breastfeeding  Vitals:    02/23/21 0600 02/24/21 0600   Weight: 95 8 kg (211 lb 3 2 oz) 95 7 kg (211 lb)     Body mass index is 37 38 kg/m²  BP Readings from Last 3 Encounters:   02/26/21 138/73   02/10/21 110/68   02/04/21 129/74     Orthostatic Blood Pressures      Most Recent Value   Blood Pressure  138/73 filed at 02/26/2021 0908   Patient Position - Orthostatic VS  Lying filed at 02/23/2021 0745        I/O       02/24 0701 - 02/25 0700 02/25 0701 - 02/26 0700 02/26 0701 - 02/27 0700    I V  (mL/kg) 10 (0 1)      IV Piggyback 200      Total Intake(mL/kg) 210 (2 2)      Net +210             Unmeasured Urine Occurrence 3 x          Invasive Devices     Peripheral Intravenous Line            Peripheral IV 02/24/21 Dorsal (posterior); Left;Proximal Forearm 1 day                No intake or output data in the 24 hours ending 02/26/21 1037      Physical Exam:   Physical Exam  Vitals signs and nursing note reviewed  Constitutional:       General: She is not in acute distress  Appearance: Normal appearance  She is well-developed  She is obese  HENT:      Head: Normocephalic  Right Ear: External ear normal       Left Ear: External ear normal       Nose: Nose normal    Eyes:      General: No scleral icterus          Right eye: No discharge  Left eye: No discharge  Conjunctiva/sclera: Conjunctivae normal       Pupils: Pupils are equal, round, and reactive to light  Neck:      Musculoskeletal: Normal range of motion and neck supple  Thyroid: No thyromegaly  Cardiovascular:      Rate and Rhythm: Normal rate and regular rhythm  Pulses: Normal pulses  Heart sounds: Murmur present  Systolic murmur present with a grade of 3/6  Pulmonary:      Effort: Pulmonary effort is normal  No accessory muscle usage or respiratory distress  Breath sounds: Examination of the right-lower field reveals decreased breath sounds  Examination of the left-lower field reveals decreased breath sounds  Decreased breath sounds present  No wheezing, rhonchi or rales  Comments: Conversational dyspnea  Abdominal:      General: Bowel sounds are normal  There is no distension  Palpations: Abdomen is soft  Musculoskeletal:      Right lower leg: No edema  Left lower leg: No edema  Skin:     General: Skin is warm and dry  Capillary Refill: Capillary refill takes less than 2 seconds  Neurological:      General: No focal deficit present  Mental Status: She is alert and oriented to person, place, and time  Mental status is at baseline  Psychiatric:         Attention and Perception: Attention normal          Mood and Affect: Mood normal          Speech: Speech normal          Behavior: Behavior normal  Behavior is cooperative  Thought Content:  Thought content normal                    Medications/ Allergies:     Current Facility-Administered Medications   Medication Dose Route Frequency Provider Last Rate    acetaminophen  650 mg Oral Q4H PRN Fidel Delgadillo MD      apixaban  2 5 mg Oral BID ISIDRA Thomas      cefTRIAXone  1,000 mg Intravenous Q24H Gini Weinberg MD 1,000 mg (02/25/21 2547)    docusate sodium  100 mg Oral BID Katelynn Calderon MD      gabapentin  300 mg Oral [facility] MD Dacia Joyce insulin glargine  10 Units Subcutaneous HS France Vela MD      insulin lispro  1-5 Units Subcutaneous HS Shonda Farris MD      insulin lispro  1-5 Units Subcutaneous TID AC France Vela MD      insulin lispro  5 Units Subcutaneous TID With Meals MD Dacia Pritchard levothyroxine  137 mcg Oral Daily Shonda Farris MD      metoprolol tartrate  12 5 mg Oral Q12H [facility] France Vela MD      oxyCODONE  2 5 mg Oral Q4H PRN France Vela MD      polyethylene glycol  17 g Oral Daily PRN France Vela MD      pravastatin  40 mg Oral Daily With Darrius Lakhani MD      sodium chloride  1 spray Each Nare Q2H PRN France Vela MD       acetaminophen, 650 mg, Q4H PRN  oxyCODONE, 2 5 mg, Q4H PRN  polyethylene glycol, 17 g, Daily PRN  sodium chloride, 1 spray, Q2H PRN      Allergies   Allergen Reactions    Bromide Ion [Bromine]      Blurred vision   Has preservative that  Pt cannot use    Other Blisters     Adhesive tape    Timolol      Per pt, allergic to steroid in medication    Tramadol      cognition changes    Ibuprofen Palpitations    Penicillins Rash       VTE Pharmacologic Prophylaxis:   Sequential compression device (Venodyne)     Labs:   Troponins:  Results from last 7 days   Lab Units 02/25/21  0534 02/25/21  0056 02/24/21  1826   TROPONIN I ng/mL 0 05* 0 05* 0 04     CBC with diff:  Results from last 7 days   Lab Units 02/26/21  0510 02/25/21  0534 02/24/21  0523 02/23/21  0554 02/22/21  0734   WBC Thousand/uL 5 05 8 62 9 74 11 80* 11 94*   HEMOGLOBIN g/dL 10 2* 9 9* 10 8* 11 0* 11 1*   HEMATOCRIT % 36 3 35 3 39 3 39 7 39 4   MCV fL 93 93 95 94 93   PLATELETS Thousands/uL 116* 128* 148* 150 177   MCH pg 26 1* 26 0* 26 0* 26 1* 26 2*   MCHC g/dL 28 1* 28 0* 27 5* 27 7* 28 2*   RDW % 19 8* 19 9* 19 8* 19 8* 19 7*   MPV fL 10 4 11 0 10 2 10 3 10 3   NRBC AUTO /100 WBCs  --  0 0 0 0     CMP:  Results from last 7 days   Lab Units 02/26/21  0510 02/25/21  0534 02/24/21  0523 02/23/21  0554 02/22/21  1626 02/22/21  0734   SODIUM mmol/L 139 138 142 143 141 139   POTASSIUM mmol/L 4 3 4 1 4 3 4 1 4 2 4 6   CHLORIDE mmol/L 103 101 103 104 103 103   CO2 mmol/L 28 26 27 27 25 29   ANION GAP mmol/L 8 11 12 12 13 7   BUN mg/dL 122* 117* 115* 102* 101* 105*   CREATININE mg/dL 2 19* 2 33* 2 36* 2 14* 2 29* 2 21*   CALCIUM mg/dL 9 1 9 6 9 5 9 3 9 3 8 8   AST U/L  --  24 23 20  --  24   ALT U/L  --  20 20 17  --  20   ALK PHOS U/L  --  120* 139* 163*  --  212*   TOTAL PROTEIN g/dL  --  7 9 7 9 8 1  --  8 6*   ALBUMIN g/dL  --  4 0 3 4* 3 2*  --  2 6*   TOTAL BILIRUBIN mg/dL  --  3 30* 3 90* 3 68*  --  1 90*   EGFR ml/min/1 73sq m 22 20 20 22 21 22     Coags:  Results from last 7 days   Lab Units 02/22/21  0734   PTT seconds 52*   INR  2 07*       Imaging & Testing   I have personally reviewed pertinent reports  Ct Chest Abdomen Pelvis Wo Contrast    Result Date: 2/22/2021  Narrative: CT CHEST, ABDOMEN AND PELVIS WITHOUT IV CONTRAST INDICATION:   Altered mental status  Sepsis  COMPARISON:  CT of the chest, CT of the abdomen/pelvis dated 10/23/2020, and chest CT dated 1/19/2017  TECHNIQUE: CT examination of the chest, abdomen and pelvis was performed without intravenous contrast   Axial, sagittal, and coronal 2D reformatted images were created from the source data and submitted for interpretation  Radiation dose length product (DLP) for this visit:  1525 66 mGy-cm   This examination, like all CT scans performed in the [facility], was performed utilizing techniques to minimize radiation dose exposure, including the use of iterative reconstruction and automated exposure control  Enteric contrast was not administered  FINDINGS: CHEST LUNGS:  Lung parenchyma is distorted by respiratory motion and atelectasis related to imaging during exhalation  Central predominant groundglass opacities in both lungs    Bandlike opacities in the lung bases with morphology suggesting atelectasis  No acute-appearing dense consolidations  Mild scattered foci of interlobular septal reticulation  3 mm nodule in the right upper lobe (3/36)--present in 2017 confirming benignancy  No suspicious pulmonary nodules  PLEURA:  Trace right layering effusion posteriorly  No loculated components  No pneumothorax  HEART/GREAT VESSELS:  Four-chamber enlargement of the heart  Prior CABG  Native coronary atherosclerosis  Valvular calcifications  Cardiac assist device with leads in the right atrium and right ventricle  No pericardial effusion  Thoracic aortic atherosclerosis without aneurysm  Mildly ectatic main pulmonary artery 30 mm  Pulmonary vessels appear cephalized on coronal reformats  MEDIASTINUM AND JAIRO:  Unremarkable  CHEST WALL AND LOWER NECK:   Unremarkable  ABDOMEN LIVER/BILIARY TREE:  Question nodular contour to liver  No focal parenchymal masses or biliary ductal dilatation, though parenchyma is obscured by motion artifact  GALLBLADDER:  Gallbladder is surgically absent  SPLEEN:  Unremarkable  PANCREAS:  Diffuse fatty atrophy, greatest at the tail  No masses or pancreatic ductal dilatation demonstrated  ADRENAL GLANDS:  Unremarkable  KIDNEYS/URETERS: No contour distorting masses, perinephric collections, urolithiasis, or hydronephrosis  STOMACH AND BOWEL: Small hiatal hernia  No gastric wall thickening  No dilated or inflamed loops of small bowel or colon, including loops bulging through the patient's ventral abdominal wall defect, discussed below  APPENDIX:  Cecum located within the ventral abdominal wall  Appendix not well demonstrated  No evidence for appendicitis, though  ABDOMINOPELVIC CAVITY:  Mild ascites, greatest around the liver and spleen  Small amount of free fluid tracking into the pelvis  No encapsulated collections  No free air  Shotty retroperitoneal lymph nodes are demonstrated without laurel mesenteric, retroperitoneal, or pelvic sidewall lymphadenopathy  VESSELS:  Aortoiliac and branch vessel atherosclerosis without aneurysm  Multiple greater saphenous varices and inguinal regions, greater on the left, measuring up to 18 mm in diameter  PELVIS REPRODUCTIVE ORGANS:  Unremarkable for patient's age  URINARY BLADDER:  Urinary bladder wall appears diffusely thickened, even accounting for degree of nondistention  ABDOMINAL WALL/INGUINAL REGIONS:  Subxiphoid midline hernia with wide neck measuring 3 6 in meters by 2 5 cm with 3 6 cm neck  Abdominal fat and ascites fluid protrudes anteriorly  Hernia repair mesh at the supra umbilical ventral wall  Inferior to the mesh there is pronounced diastasis recti splaying of the rectus abdominis muscles by 9 cm  Mesenteric fat, small bowel, and large bowel loops loops protrude into the defect  Overall, protruding contents measure 18 7 cm x 9 2 cm x 23 cm  Relatively pronounced anasarca around the hips and gluteal regions  No encapsulated superficial collections  OSSEOUS STRUCTURES: Healed median sternotomy  New transverse fracture through the superior endplate of the T85 vertebral body  No significant vertebral body height loss  No significant retropulsed fragments  New anterior compression deformity at the superior endplate of the L3 vertebral body  Approximately 20% height loss  Minimal buckling of the posterior cortex, superiorly, without significant spinal canal stenosis  Grade 1 anterolisthesis of L4 on L5 secondary to bulky facet osteophytes  No destructive lytic or blastic lesions  Impression: 1  Two new vertebral fractures when compared to October  Transverse fracture of the T12 vertebral body without significant height loss  Mild anterior compression deformity of the L3 vertebral body with minimal posterior cortical buckling  No retropulsed fragments or significant neural impingement  2   Thickening of the bladder wall, diffusely  Correlate for evidence of infectious cystitis on urinalysis   3   Pulmonary vascular engorgement and cephalization, hazy central groundglass opacities, scattered interlobular septal thickening, and small right pleural effusion  Constellation of findings favors volume overload with pulmonary edema  4   Mild ascites and anasarca  Nodular liver contour suggestive of cirrhosis  5   Large diastasis defect in the ventral abdomen  Small and large bowel protruding through the defect do not appear obstructed or inflamed  I personally discussed this study with Deisy Hirsch on 2/22/2021 at 9:31 AM  Workstation performed: [id number]     Xr Chest Portable    Result Date: 2/22/2021  Narrative: CHEST INDICATION:   AMS  COMPARISON:  Chest radiograph from 1/30/2021 and chest CT from 2/22/2021  EXAM PERFORMED/VIEWS:  XR CHEST PORTABLE FINDINGS: Moderate cardiomegaly  CABG  Left subclavian pacemaker lead in right ventricle  The lungs are clear  No pneumothorax or pleural effusion  Osseous structures appear within normal limits for patient age  Impression: No acute cardiopulmonary disease  Workstation performed: [id number]     Ct Head Without Contrast    Result Date: 2/22/2021  Narrative: CT BRAIN - WITHOUT CONTRAST INDICATION:   Altered mental status  COMPARISON:  1/30/2021 and 11/30/2020  TECHNIQUE:  CT examination of the brain was performed  In addition to axial images, sagittal and coronal 2D reformatted images were created and submitted for interpretation  Radiation dose length product (DLP) for this visit:  971 mGy-cm   This examination, like all CT scans performed in the [facility], was performed utilizing techniques to minimize radiation dose exposure, including the use of iterative reconstruction and automated exposure control  IMAGE QUALITY:  Diagnostic  FINDINGS: PARENCHYMA:  Moderate patchy periventricular white matter hypodensities consistent with chronic microangiopathic changes  Parenchymal appearance is unchanged from prior  No acute infarct    No parenchymal hemorrhage  No mass  VENTRICLES AND EXTRA-AXIAL SPACES:  Normal for the patient's age  VISUALIZED ORBITS AND PARANASAL SINUSES:  Bilateral cataract surgeries  Orbits are, otherwise, unremarkable  Paranasal sinuses are clear  CALVARIUM AND EXTRACRANIAL SOFT TISSUES:  Normal      Impression: No acute intracranial abnormalities or significant change from priors  Workstation performed: [id number]     Beto Merlyn & Waveform Analysis, Multiple Levels    Result Date: 2/22/2021  Narrative:  [facility] REPORT CLINICAL: Indications: Patient being evaluated for peripheral arterial disease secondary to petechiae and swelling as per ordering Provider  Operative History: Cardiac Pacemaker Risk Factors: The patient has history of HTN, Diabetes (NIDDM (oral meds)), HLD, CKD and CAD  Clinical: Right Pressure:  80/ mm Hg, Left Pressure:  128/ mm Hg  FINDINGS:  Segment       Rig  Left                          P    P  Ant  Tibial    98  235  Post  Tibial  245  247  Ankle         245  247  Metatarsal     98  150  Great Toe      87  102     CONCLUSION: Impression RIGHT LOWER LIMB Ankle/Brachial Index: unreliable due to patient movement  PPG/PVR Tracings are normal  Metatarsal Pressure 98 mmHg  Great Toe Pressure: 87 mmHg, within the healing range  LEFT LOWER LIMB Ankle/Brachial Index: unreliable due to patient movement  PPG/PVR Tracings are normal  Metatarsal Pressure 150 mmHg  Great Toe Pressure: 102 mmHg, within the healing range  Technically limited/difficult study due to patient movement and uncooperative  Bilateral ABIs, metatarsal and great toe pressures unreliable due to movement    Technical findings given to Dr Otilia Ramos at 2:46 pm   SIGNATURE: Electronically Signed by: Luz Maria Varner MD, [address] on 2021-02-22 05:05:52 PM      Cardiac testing:   Results for orders placed during the hospital encounter of 08/08/20   Echo complete with contrast if indicated    Narrative [address] [place] [address]  [phone]    Transthoracic Echocardiogram  2D, M-mode, Doppler, and Color Doppler    Study date:  08-Aug-2020    Patient: Stewart Robins  MR number: [id number]  Account number: [de-identified]  : 1950  Age: 69 years  Gender: Female  Status: Outpatient  Location: Echo lab  Height: 65 in  Weight: 207 5 lb  BP: 143/ 82 mmHg    Indications: CAD    Diagnoses: I25 83 - Coronary atherosclerosis due to lipid rich plaque    Sonographer:  Norma Richmond RDCS  Primary Physician:  Esteban Murphy MD  Referring Physician:  Yuriy Myles MD  Group:  [id number]  Ordering Physician:  Yuriy Myles MD  Interpreting Physician:  Yuriy Myles MD    SUMMARY    LEFT VENTRICLE:  The ventricle was mildly dilated  Systolic function was mild to moderately reduced  Ejection fraction was estimated in the range of 40 % to 45 % to be 45 %  There was mild diffuse hypokinesis  Wall thickness was mildly to moderately increased  There was mild concentric hypertrophy  Doppler parameters were consistent with high ventricular filling pressure  VENTRICULAR SEPTUM:  There was paradoxical motion  These changes are consistent with right ventricular pacing  RIGHT VENTRICLE:  The ventricle was mildly to moderately dilated  Systolic function was mildly reduced  Wall thickness was mildly increased  Systolic pressure was markedly increased  LEFT ATRIUM:  The atrium was moderately dilated  RIGHT ATRIUM:  The atrium was moderately dilated  MITRAL VALVE:  There was mild to moderate annular calcification  There was mild regurgitation  AORTIC VALVE:  The valve was trileaflet  Leaflets exhibited moderately increased thickness, moderate calcification, and markedly reduced cuspal separation  Transaortic velocity was increased due to valvular stenosis  There was severe stenosis  TROY around 0 85  LVOT velocity 0 65 m/sec and Peak AV 2 36 m/sec and DI  26   Peak na d mean gradient are low, need to rule out low gradient severe AS    TRICUSPID VALVE:  There was moderate regurgitation  Estimated peak PA pressure was 80 mmHg  PULMONIC VALVE:  There was mild regurgitation  IVC, HEPATIC VEINS:  The inferior vena cava was dilated, with poor inspiratory collapse, consistent with elevated right atrial pressure  HISTORY: PRIOR HISTORY: HTN, DM, Obesity, PHTN, CABG, GERD, Pacemaker    PROCEDURE: The procedure was performed in the echo lab  This was a routine study  The transthoracic approach was used  The study included complete 2D imaging, M-mode, complete spectral Doppler, and color Doppler  The heart rate was 66 bpm,  at the start of the study  Echocardiographic views were limited due to lung interference  Image quality was adequate  LEFT VENTRICLE: The ventricle was mildly dilated  Systolic function was mild to moderately reduced  Ejection fraction was estimated in the range of 40 % to 45 % to be 45 %  There was mild diffuse hypokinesis  Wall thickness was mildly to  moderately increased  There was mild concentric hypertrophy  DOPPLER: Doppler parameters were consistent with high ventricular filling pressure  VENTRICULAR SEPTUM: Thickness was mildly increased  There was paradoxical motion  These changes are consistent with right ventricular pacing  RIGHT VENTRICLE: The ventricle was mildly to moderately dilated  Systolic function was mildly reduced  Wall thickness was mildly increased  DOPPLER: Systolic pressure was markedly increased  LEFT ATRIUM: The atrium was moderately dilated  RIGHT ATRIUM: The atrium was moderately dilated  MITRAL VALVE: There was mild to moderate annular calcification  There was normal leaflet separation  DOPPLER: The transmitral velocity was within the normal range  There was no evidence for stenosis  There was mild regurgitation  AORTIC VALVE: The valve was trileaflet   Leaflets exhibited moderately increased thickness, moderate calcification, and markedly reduced cuspal separation  DOPPLER: Transaortic velocity was increased due to valvular stenosis  There was  severe stenosis  TROY around 0 85  LVOT velocity 0 65 m/sec and Peak AV 2 36 m/sec and DI  26  Peak na d mean gradient are low, need to rule out low gradient severe AS There was no regurgitation  TRICUSPID VALVE: DOPPLER: There was moderate regurgitation  Estimated peak PA pressure was 80 mmHg  PULMONIC VALVE: DOPPLER: There was mild regurgitation  PERICARDIUM: There was no thickening or calcification  There was no pericardial effusion  AORTA: The root exhibited normal size  SYSTEMIC VEINS: IVC: The inferior vena cava was dilated, with poor inspiratory collapse, consistent with elevated right atrial pressure  SYSTEM MEASUREMENT TABLES    2D mode  AoR Diam 2D: 2 8 cm  LA Diam (2D): 4 5 cm  LA/Ao (2D): 1 61  FS (2D Teich): 19 6 %  IVSd (2D): 1 29 cm  LVDEV: 94 4 cmï¾³  LVESV: 56 3 cmï¾³  LVIDd(2D): 4 54 cm  LVISd (2D): 3 65 cm  LVOT Area 2D: 3 14 cmï¾²  LVPWd (2D): 1 21 cm  SV (Teich): 38 1 cmï¾³    Apical four chamber  LVEF A4C: 42 %    Unspecified Scan Mode  TROY Cont Eq (Peak Carlos): 0 87 cmï¾²  LVOT Diam : 2 cm  LVOT Vmax: 638 mm/s  LVOT Vmax; Mean: 638 mm/s  Peak Grad ; Mean: 2 mm[Hg]  MV Peak E Carlos  Mean: 1930 mm/s  Max P mm[Hg]  V Max: 4170 mm/s  Vmax: 3910 mm/s  RA Area: 25 7 cmï¾²  RA Volume: 91 6 cmï¾³  TAPSE: 0 8 cm    [employer] Accredited Echocardiography [facility]    Prepared and electronically signed by    Servando Smith MD  Signed 11-Aug-2020 09:30:19         Hunter Males      "This note has been constructed using a voice recognition system  Therefore there may be syntax, spelling, and/or grammatical errors   Please call if you have any questions  "

## 2021-02-26 NOTE — PROGRESS NOTES
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, with long-term current use of insulin ([facility])    CKD (chronic kidney disease)    H/O coronary artery bypass surgery    Aortic stenosis    Chronic atrial fibrillation ([facility])    Goals of care, counseling/discussion    Abnormal CT of the abdomen      Assessment & Plan:    Klebsiella bacteremia  Assessment & Plan  Patient was noted to have 2/2 blood culture positive with Gram-negative bacilli on admission  Unclear source but suspected to be urinary  UA with evidence of trace leukocytes, bacteriuria and pyuria  Unfortunately urine culture was not collected  Blood culture sensitivities noted  Seen by ID, input appreciated  · Initially treated with IV cefepime , now transition to ceftriaxone through 2/28 to complete 7 days of IV antibiotics  · Can transition to oral Omnicef 300 mg p o  Q  24 hours through 3/3 to complete 10 days total        * Encephalopathy, toxic metabolic  Assessment & Plan  Likely secondary to multifactorial including sepsis, acute kidney injury on chronic kidney disease, metabolic derangements, intravascular depletion in setting of critical aortic stenosis and poor perfusion,   CT of the head negative for any acute intracranial abnormalities, ammonia negative  VBG showed a pH of 7 43, and pCO2 of 46   Ammonia level within normal limit  Blood culture noted to be positive for Klebsiella  Appears to be improving/stable  Neuro exam nonfocal  · Continue IV antibiotics as below  · Monitor neuro status  · Monitor blood pressure, avoid hypotension    Sepsis (HCC)-resolved as of 2/26/2021  Assessment & Plan  As evidenced by leukocytosis of 12, and hypotension  Also lactic acid was elevated to 4 0  Secondary to Gram-negative bacteremia, source unclear  Possible urinary source  chest x-ray negative for any infiltrates,   Abdominal exam benign, LFT stable  CT of the abdomen pelvis negative any other intra-abdominal pathology  Hemodynamics improved  · Continue IV antibiotics as above  · Monitor clinically  · ID evaluation appreciated      Aortic stenosis  Assessment & Plan  Echocardiogram with severe critical aortic stenosis  Not candidate for TAVR or SAVR because of multiple comorbidities and overall functional status   Cardiology following, input appreciated  Discussed with patient and family regarding palliative care versus hospice  · Monitor clinically    Consideration of transition to palliative care versus hospice based on clinical progress    CKD (chronic kidney disease)  Assessment & Plan  Baseline creatinine appears to be fluctuating, recently trended up to mid 2s  Initially worsened after diuresis  Now slowly downtrending  · Consider resumption of diuretics  · Avoid hypotension  · Monitor intake output      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, with long-term current use of insulin [facility])  Assessment & Plan  Lab Results   Component Value Date    HGBA1C 7 1 (H) 02/01/2021       Recent Labs     02/26/21  0748 02/26/21  1131 02/26/21  1615 02/26/21  2100   POCGLU 139 194* 173* 188*       Blood Sugar Average: Last 72 hrs:  (P) 191 [id number]   Appetite fair  Continue Lantus and pre meal insulin at current dose  continue corrective scale    Chronic combined systolic and diastolic congestive heart failure (HCC)  Assessment & Plan  Wt Readings from Last 3 Encounters:   02/24/21 95 7 kg (211 lb)   02/10/21 95 3 kg (210 lb)   02/04/21 89 1 kg (196 lb 5 5 oz)   Echocardiogram with EF 45%, critical aortic stenosis,  Baseline weight appears to be 205 lb is as per Cardiology notes  Cardiology input appreciated  Received IV Lasix  but noted to have worsening of creatinine and BUN  Repeat chest x-ray with mild pulmonary vascular congestion  · Continue current meds  · avoid hypotension  · Follow-up with Cardiology recommendations  · Overall guarded prognosis          Coronary artery disease involving coronary bypass graft of native heart without angina pectoris  Assessment & Plan  Report having chest discomfort, EKG without any acute changes  Cardiac markers negative  Cardiology following  Continue metoprolol  Continue supportive care    Lactic acidosis-resolved as of 2/26/2021  Assessment & Plan  Multifactorial secondary to sepsis, hypotension in setting of critical aortic stenosis, underlying cirrhosis  Blood pressure overall improved    Goals of care, counseling/discussion  Assessment & Plan  Patient has aortic stenosis and has recurrent admissions for congestive heart failure, and is a poor candidate for surgery as well  Team had a discussion with the patient's son regarding the patient's wishes for her code status and he mentioned that patient would have not wanted herself to be resuscitated or intubated or shock  Hence patient will be made DNR level 3  Discussed with son again, updated regarding clinical status    Will continue with goals of care discussions  Patient will be discharged to skilled nursing facility with consideration of transition to comfort care and hospice based on clinical progress      Chronic atrial fibrillation [facility])  Assessment & Plan  Controlled, history of sick sinus syndrome status post pacemaker  Continue metoprolol at lower dose with holding parameters   Discussed with Cardiology regarding resumption of anticoagulation,Alternative NOAC due to CKD  Started on Eliquis    Abnormal CT of the abdomen  Assessment & Plan  CT of the abdomen revealed Two new vertebral fractures when compared to October   Transverse fracture of the T12 vertebral body without significant height loss   Mild anterior compression deformity of the L3 vertebral body with minimal posterior cortical buckling   No   retropulsed fragments or significant neural impingement  Monitor clinically        VTE Pharmacologic Prophylaxis:   Pharmacologic: Apixaban (Eliquis)  Mechanical VTE Prophylaxis in Place: Yes    Patient Centered Rounds: I have performed bedside rounds with nursing staff today  Discussions with Specialists or Other Care Team Provider:  Cardiology, Infectious Disease    Education and Discussions with Family / Patient:  Discussed with patient, discussed with son over the phone    Time Spent for Care: 45 minutes  More than 50% of total time spent on counseling and coordination of care as described above  Current Length of Stay: 4 day(s)    Current Patient Status: Inpatient   Certification Statement: The patient will continue to require additional inpatient hospital stay due to Bacteremia    Discharge Plan:  Anticipate skilled nursing facility in next 24-48 hours    Code Status: Level 3 - DNAR and DNI      Subjective:   Noted to be sitting up in bed  Appears mild short of breath not in distress  Reports diffuse chest discomfort requesting pain medication    Denies abdominal pain nausea vomiting    Objective:     Vitals:   Temp (24hrs), Av °F (35 °C), Min:88 5 °F (31 4 °C), Max:97 8 °F (36 6 °C)    Temp:  [88 5 °F (31 4 °C)-97 8 °F (36 6 °C)] 97 8 °F (36 6 °C)  HR:  [59-65] 60  Resp:  [16-18] 18  BP: (137-159)/(73-79) 138/73  SpO2:  [91 %-97 %] 94 % on 2 L  Body mass index is 37 38 kg/m²  Input and Output Summary (last 24 hours):     No intake or output data in the 24 hours ending 21 1217    Physical Exam:     Physical Exam  Constitutional:       General: She is not in acute distress       Comments: Chronically ill   HENT:      Head: Normocephalic and atraumatic  Neck:      Musculoskeletal: Neck supple  Cardiovascular:      Rate and Rhythm: Normal rate  Heart sounds: Murmur present  Pulmonary:      Effort: Pulmonary effort is normal  No accessory muscle usage or respiratory distress  Breath sounds: Examination of the right-lower field reveals rales  Examination of the left-lower field reveals rales  Decreased breath sounds and rales present  No wheezing  Comments: Mild conversational dyspnea  Abdominal:      General: Bowel sounds are normal  There is no distension  Palpations: Abdomen is soft  Tenderness: There is no abdominal tenderness  There is no guarding or rebound  Hernia: A hernia is present  Musculoskeletal:      Right lower leg: No edema  Left lower leg: No edema  Skin:     General: Skin is warm and dry  Findings: No rash  Neurological:      Mental Status: She is alert  Mental status is at baseline  Additional Data:     Labs:    Results from last 7 days   Lab Units 02/26/21  0510 02/25/21  0534   WBC Thousand/uL 5 05 8 62   HEMOGLOBIN g/dL 10 2* 9 9*   HEMATOCRIT % 36 3 35 3   PLATELETS Thousands/uL 116* 128*   NEUTROS PCT %  --  79*   LYMPHS PCT %  --  7*   MONOS PCT %  --  12   EOS PCT %  --  2     Results from last 7 days   Lab Units 02/26/21  0510 02/25/21  0534   POTASSIUM mmol/L 4 3 4 1   CHLORIDE mmol/L 103 101   CO2 mmol/L 28 26   BUN mg/dL 122* 117*   CREATININE mg/dL 2 19* 2 33*   CALCIUM mg/dL 9 1 9 6   ALK PHOS U/L  --  120*   ALT U/L  --  20   AST U/L  --  24     Results from last 7 days   Lab Units 02/22/21  0734   INR  2 07*       * I Have Reviewed All Lab Data Listed Above  * Additional Pertinent Lab Tests Reviewed:  All Labs Within Last 24 Hours Reviewed      Imaging:  Imaging Reports Reviewed Today Include:CT, CXR      Recent Cultures (last 7 days):     Results from last 7 days   Lab Units 02/22/21  0754 02/22/21  0734   BLOOD CULTURE  Klebsiella oxytoca* Klebsiella oxytoca*   GRAM STAIN RESULT  Gram negative rods* Gram negative rods*       Last 24 Hours Medication List:   Current Facility-Administered Medications   Medication Dose Route Frequency Provider Last Rate    acetaminophen  650 mg Oral Q4H PRN Lai Rueda MD      apixaban  2 5 mg Oral BID ISIDRA Cohen      cefTRIAXone  1,000 mg Intravenous Q24H Deysi Dietrich MD 1,000 mg (02/25/21 1447)    docusate sodium  100 mg Oral BID Dionna Arambula MD      gabapentin  300 mg Oral HS Dionna Arambula MD      insulin glargine  10 Units Subcutaneous HS Dionna Arambula MD      insulin lispro  1-5 Units Subcutaneous HS Lai Rueda MD      insulin lispro  1-5 Units Subcutaneous TID AC Dionna Arambula MD      insulin lispro  5 Units Subcutaneous TID With Meals Dionna Arambula MD      levothyroxine  137 mcg Oral Daily Lai Rueda MD      metoprolol succinate  25 mg Oral HS ISIDRA Cohen      oxyCODONE  2 5 mg Oral Q4H PRN Dionna Arambula MD      polyethylene glycol  17 g Oral Daily PRN Dionna Arambula MD      pravastatin  40 mg Oral Daily With Vick Vigren MD      sodium chloride  1 spray Each Redgie Minder PRN Dionna Arambula MD            Today, Patient Was Seen By: Dionna Arambula MD    ** Please Note: "This note has been constructed using a voice recognition system  Therefore there may be syntax, spelling, and/or grammatical errors   Please call if you have any questions  "**

## 2021-02-26 NOTE — PROGRESS NOTES
Progress Note - Infectious Disease   Nichole Jacobs 66 y o  female MRN: [id number]  Unit/Bed#: [address] [place] [address] Encounter: [id number]      Impression/Recommendations:  1   Sepsis, POA   Tachypnea, hypothermia   Due to #2   No other appreciable source   ROS limited   Exam benign   Flu/RSV/COVID PCR, LFTs, CT C/A/P otherwise unremarkable   Hemodynamically stable although chronically ill due to multiple advance comorbidities   Improved with antibiotics  Rec:  ? Continue antibiotics as below  ? Follow temperatures closely  ? Supportive care as per the primary service     2   Klebsiella bacteremia   Unclear source   Consider UTI given bladder wall thickening on CT although bland UA and unclear if having  symptoms   Consider transient GI source in setting of cirrhosis, large ventral hernia   Abdominal exam benign   LFTs, CT A/P otherwise unremarkable  Rec:  ? Continue ceftriaxone through 2/28 to complete 7 days total IV antibiotics  ? If ready for D/C prior to 2/28 could change to Omnicef 300 mg PO Q24 to continue through 3/3 (10 days total antibiotics)     3   LAVONNE on CKD   Likely multifactorial   Baseline 1 6-1 7  Slowly improving      4   Acute encephalopathy   Multifactorial and likely toxic-metabolic due to all of above   Consider also hepatic encephalopathy   CT head negative   Low suspicion for primary CNS infection   Improving      5   Chronic CHF   In setting of CAD with ICM   Increased weight and BNP on admission   Cardiology follow-up ongoing  Poor overall prognosis      6   Severe AS        7   Afib   Status post PPM      8   Cirrhosis   Thought due to CHUNG      9   DM with DPN    Recent A1c 7 1       The above plan was discussed in detail with Dr Savage Corona  The patient is stable from an ID standpoint for D/C to SNF  We will sign off for now  Please call with new questions      Antibiotics:  Ceftriaxone #2  Antibiotics #5    Subjective:  Patient seen on PM rounds    Uncomfortable and chair and "needs to get up"  Repeats this multiple times  Denies fevers, chills, sweats, nausea, vomiting, or diarrhea  24 Hour Events:  No documented fevers, chills, sweats, nausea, vomiting, or diarrhea  Objective:  Vitals:  Temp:  [88 5 °F (31 4 °C)-97 8 °F (36 6 °C)] 97 8 °F (36 6 °C)  HR:  [59-65] 60  Resp:  [16-18] 18  BP: (137-159)/(73-79) 138/73  SpO2:  [91 %-97 %] 94 %  Temp (24hrs), Av °F (35 °C), Min:88 5 °F (31 4 °C), Max:97 8 °F (36 6 °C)  Current: Temperature: 97 8 °F (36 6 °C)    Physical Exam:   General:  No acute distress  Psychiatric:  Awake and alert, flat and irritable affect  Pulmonary:  Normal respiratory excursion without accessory muscle use  Abdomen:  Soft, nontender  Extremities:  Nonpitting leg edema  Skin:  No rashes    Lab Results:  I have personally reviewed pertinent labs  Results from last 7 days   Lab Units 21  0510 21  0534 21  0523 21  0554   POTASSIUM mmol/L 4 3 4 1 4 3 4 1   CHLORIDE mmol/L 103 101 103 104   CO2 mmol/L 28 26 27 27   BUN mg/dL 122* 117* 115* 102*   CREATININE mg/dL 2 19* 2 33* 2 36* 2 14*   EGFR ml/min/1 73sq m 22 20 20 22   CALCIUM mg/dL 9 1 9 6 9 5 9 3   AST U/L  --  24 23 20   ALT U/L  --  20 20 17   ALK PHOS U/L  --  120* 139* 163*     Results from last 7 days   Lab Units 21  0510 21  0534 21  0523   WBC Thousand/uL 5 05 8 62 9 74   HEMOGLOBIN g/dL 10 2* 9 9* 10 8*   PLATELETS Thousands/uL 116* 128* 148*     Results from last 7 days   Lab Units 21  0754 21  0734   BLOOD CULTURE  Klebsiella oxytoca* Klebsiella oxytoca*   GRAM STAIN RESULT  Gram negative rods* Gram negative rods*       Imaging Studies:   I have personally reviewed pertinent imaging study reports and images in PACS  EKG, Pathology, and Other Studies:   I have personally reviewed pertinent reports                       ,

## 2021-02-27 LAB
ANION GAP SERPL CALCULATED.3IONS-SCNC: 7 MMOL/L (ref 4–13)
BUN SERPL-MCNC: 122 MG/DL (ref 5–25)
CALCIUM SERPL-MCNC: 9.4 MG/DL (ref 8.3–10.1)
CHLORIDE SERPL-SCNC: 102 MMOL/L (ref 100–108)
CO2 SERPL-SCNC: 27 MMOL/L (ref 21–32)
CREAT SERPL-MCNC: 2.09 MG/DL (ref 0.6–1.3)
GFR SERPL CREATININE-BSD FRML MDRD: 23 ML/MIN/1.73SQ M
GLUCOSE SERPL-MCNC: 130 MG/DL (ref 65–140)
GLUCOSE SERPL-MCNC: 134 MG/DL (ref 65–140)
GLUCOSE SERPL-MCNC: 141 MG/DL (ref 65–140)
GLUCOSE SERPL-MCNC: 175 MG/DL (ref 65–140)
GLUCOSE SERPL-MCNC: 178 MG/DL (ref 65–140)
HCT VFR BLD AUTO: 37.8 % (ref 34.8–46.1)
HGB BLD-MCNC: 10.5 G/DL (ref 11.5–15.4)
POTASSIUM SERPL-SCNC: 4.2 MMOL/L (ref 3.5–5.3)
POTASSIUM SERPL-SCNC: 5.5 MMOL/L (ref 3.5–5.3)
SODIUM SERPL-SCNC: 136 MMOL/L (ref 136–145)

## 2021-02-27 PROCEDURE — 80048 BASIC METABOLIC PNL TOTAL CA: CPT | Performed by: INTERNAL MEDICINE

## 2021-02-27 PROCEDURE — 99232 SBSQ HOSP IP/OBS MODERATE 35: CPT | Performed by: INTERNAL MEDICINE

## 2021-02-27 PROCEDURE — 85014 HEMATOCRIT: CPT | Performed by: INTERNAL MEDICINE

## 2021-02-27 PROCEDURE — 99231 SBSQ HOSP IP/OBS SF/LOW 25: CPT | Performed by: INTERNAL MEDICINE

## 2021-02-27 PROCEDURE — 82948 REAGENT STRIP/BLOOD GLUCOSE: CPT

## 2021-02-27 PROCEDURE — 85018 HEMOGLOBIN: CPT | Performed by: INTERNAL MEDICINE

## 2021-02-27 PROCEDURE — 84132 ASSAY OF SERUM POTASSIUM: CPT | Performed by: INTERNAL MEDICINE

## 2021-02-27 RX ORDER — SENNOSIDES 8.6 MG
2 TABLET ORAL
Status: DISCONTINUED | OUTPATIENT
Start: 2021-02-27 | End: 2021-03-01 | Stop reason: HOSPADM

## 2021-02-27 RX ORDER — BISACODYL 10 MG
10 SUPPOSITORY, RECTAL RECTAL ONCE
Status: COMPLETED | OUTPATIENT
Start: 2021-02-27 | End: 2021-02-27

## 2021-02-27 RX ADMIN — METOPROLOL SUCCINATE 25 MG: 25 TABLET, EXTENDED RELEASE ORAL at 21:12

## 2021-02-27 RX ADMIN — POLYETHYLENE GLYCOL 3350 17 G: 17 POWDER, FOR SOLUTION ORAL at 14:12

## 2021-02-27 RX ADMIN — PRAVASTATIN SODIUM 40 MG: 40 TABLET ORAL at 17:13

## 2021-02-27 RX ADMIN — OXYCODONE HYDROCHLORIDE 2.5 MG: 5 TABLET ORAL at 13:51

## 2021-02-27 RX ADMIN — CEFTRIAXONE 1000 MG: 1 INJECTION, SOLUTION INTRAVENOUS at 14:12

## 2021-02-27 RX ADMIN — INSULIN LISPRO 5 UNITS: 100 INJECTION, SOLUTION INTRAVENOUS; SUBCUTANEOUS at 17:00

## 2021-02-27 RX ADMIN — INSULIN GLARGINE 10 UNITS: 100 INJECTION, SOLUTION SUBCUTANEOUS at 21:13

## 2021-02-27 RX ADMIN — DOCUSATE SODIUM 100 MG: 100 CAPSULE, LIQUID FILLED ORAL at 17:13

## 2021-02-27 RX ADMIN — INSULIN LISPRO 1 UNITS: 100 INJECTION, SOLUTION INTRAVENOUS; SUBCUTANEOUS at 21:16

## 2021-02-27 RX ADMIN — INSULIN LISPRO 1 UNITS: 100 INJECTION, SOLUTION INTRAVENOUS; SUBCUTANEOUS at 17:00

## 2021-02-27 RX ADMIN — SENNOSIDES 17.2 MG: 8.6 TABLET, FILM COATED ORAL at 21:12

## 2021-02-27 RX ADMIN — INSULIN LISPRO 5 UNITS: 100 INJECTION, SOLUTION INTRAVENOUS; SUBCUTANEOUS at 07:37

## 2021-02-27 RX ADMIN — LEVOTHYROXINE SODIUM 137 MCG: 25 TABLET ORAL at 07:14

## 2021-02-27 RX ADMIN — GABAPENTIN 300 MG: 300 CAPSULE ORAL at 21:12

## 2021-02-27 RX ADMIN — APIXABAN 2.5 MG: 2.5 TABLET, FILM COATED ORAL at 09:37

## 2021-02-27 RX ADMIN — INSULIN LISPRO 5 UNITS: 100 INJECTION, SOLUTION INTRAVENOUS; SUBCUTANEOUS at 12:12

## 2021-02-27 RX ADMIN — APIXABAN 2.5 MG: 2.5 TABLET, FILM COATED ORAL at 17:13

## 2021-02-27 RX ADMIN — DOCUSATE SODIUM 100 MG: 100 CAPSULE, LIQUID FILLED ORAL at 09:38

## 2021-02-27 RX ADMIN — BISACODYL 10 MG: 10 SUPPOSITORY RECTAL at 14:12

## 2021-02-27 NOTE — PROGRESS NOTES
Progress Note - Cardiology   [facility]     Galindo Mcfarlane 66 y o  female MRN: [id number]  : 1950  Unit/Bed#: [address] [place] [address] Encounter: [id number]    ASSESSMENT/RECOMMENDATIONS:   patient appears somewhat disoriented today but not in any acute distress  Aortic stenosis: severe-critical   with recent echo showing TROY of 0 8 5 cm2 and DVI of 0 26  Could be low-flow low gradient AS  Maintain optimal blood pressure and avoid hypotension  Not a candidate for SAVR, or TAVR  because of multiple comorbidities and overall general condition  considering palliative care vs  hospice     Coronary artery disease, s/p CABG X 4,   S/P cardiac catheterization in 2018 and 2020 which showed that all grafts were patent  Continue pravastatin  Not on aspirin as she is on Eliquis      Encephalopathy,   metabolic/ toxic, possibly secondary to sepsis/Klebsiella bacteremia, on IV cefepime     LAVONNE on CKD  BUN/creatinine    122/2 09      Hypertension, blood pressure is stable  Resume cardiac meds as tolerated by BP  Currently on  Toprol 25 mg daily     History of chronic systolic and diastolic heart failure, EF 45%  resume cardiac meds as tolerated by BP     Chronic atrial fibrillation,   S/P permanent pacemaker insertion  On Eliquis 2 5  mg b I d      Severe pulmonary hypertension      History of diabetes mellitus and cirrhosis  Managed by primary service               Subjective / Objective:    patient appears somewhat disoriented but does not appear to be in any acute distress    Vitals: Blood pressure (!) 108/47, pulse 60, temperature 97 8 °F (36 6 °C), temperature source Oral, resp  rate 16, height 5' 3" (1 6 m), weight 95 6 kg (210 lb 12 2 oz), SpO2 96 %, not currently breastfeeding  Vitals:    21 0600 21 0600   Weight: 95 7 kg (211 lb) 95 6 kg (210 lb 12 2 oz)     Body mass index is 37 33 kg/m²    BP Readings from Last 3 Encounters:   21 (!) 108/47   02/10/21 110/68 02/04/21 129/74     Orthostatic Blood Pressures      Most Recent Value   Blood Pressure  (!) 108/47 filed at 02/27/2021 3364   Patient Position - Orthostatic VS  Lying filed at 02/23/2021 0745        I/O       02/25 0701 - 02/26 0700 02/26 0701 - 02/27 0700 02/27 0701 - 02/28 0700    P  O   950     I V  (mL/kg)  10 (0 1)     IV Piggyback       Total Intake(mL/kg)  960 (10)     Urine (mL/kg/hr)  850 (0 4)     Total Output  850     Net  +110                Invasive Devices     Peripheral Intravenous Line            Peripheral IV 02/27/21 Left Wrist less than 1 day                  Intake/Output Summary (Last 24 hours) at 2/27/2021 1058  Last data filed at 2/26/2021 1800  Gross per 24 hour   Intake 500 ml   Output 400 ml   Net 100 ml         Physical Exam:     Neurologic:   Appears somewhat disoriented  Constitutional:  obese  Eyes:  PERRL, conjunctiva normal   HENT:  Atraumatic, external ears normal, nose normal,   NECK: Normal range of motion, no tenderness, neck is supple , No JVP  Respiratory:  Decreased breath sounds in the bases  Cardiovascular: Regular S1-S2 with a III/VI ejection systolic murmur  No pericardial rub or gallop audible  GI:  Soft, nondistended, audible bowel sounds, nontender, no hepatosplenomegaly appreciated  Musculoskeletal:  No tenderness, no deformities  Extremities:   Mild lower extremity edema   Distal pulses are present  Psychiatric:  Speech and behavior appropriate             Medications/ Allergies:     Current Facility-Administered Medications   Medication Dose Route Frequency Provider Last Rate    acetaminophen  650 mg Oral Q4H PRN Ej Yanez MD      apixaban  2 5 mg Oral BID ISIDRA Pierce      cefTRIAXone  1,000 mg Intravenous Q24H Shasta Dos Santos MD 1,000 mg (02/26/21 1410)    docusate sodium  100 mg Oral BID Jeremiah Castillo MD      gabapentin  300 mg Oral HS Jeremiah Castillo MD      insulin glargine  10 Units Subcutaneous HS Jeremiah Castillo MD      insulin lispro  1-5 Units Subcutaneous HS Yuli Simon MD      insulin lispro  1-5 Units Subcutaneous TID AC Arely Maynard MD      insulin lispro  5 Units Subcutaneous TID With Meals Arely Maynard MD      levothyroxine  137 mcg Oral Daily Yuli Simon MD      metoprolol succinate  25 mg Oral HS ISIDRA Jesus      oxyCODONE  2 5 mg Oral Q4H PRN Arely Maynard MD      polyethylene glycol  17 g Oral Daily PRN Arely Maynard MD      pravastatin  40 mg Oral Daily With Dairl MD Ramos      sodium chloride  1 spray Each Nare Q2H PRN Arely Maynard MD       acetaminophen, 650 mg, Q4H PRN  oxyCODONE, 2 5 mg, Q4H PRN  polyethylene glycol, 17 g, Daily PRN  sodium chloride, 1 spray, Q2H PRN      Allergies   Allergen Reactions    Bromide Ion [Bromine]      Blurred vision   Has preservative that  Pt cannot use    Other Blisters     Adhesive tape    Timolol      Per pt, allergic to steroid in medication    Tramadol      cognition changes    Ibuprofen Palpitations    Penicillins Rash     Tolerated cefepime, ceftriaxone 2/2021       VTE Pharmacologic Prophylaxis:   Eliquis    Labs:   Troponins:  Results from last 7 days   Lab Units 02/25/21  0534 02/25/21  0056 02/24/21  1826   TROPONIN I ng/mL 0 05* 0 05* 0 04       CBC with diff:  Results from last 7 days   Lab Units 02/26/21  0510 02/25/21  0534 02/24/21  0523 02/23/21  0554 02/22/21  0734   WBC Thousand/uL 5 05 8 62 9 74 11 80* 11 94*   HEMOGLOBIN g/dL 10 2* 9 9* 10 8* 11 0* 11 1*   HEMATOCRIT % 36 3 35 3 39 3 39 7 39 4   MCV fL 93 93 95 94 93   PLATELETS Thousands/uL 116* 128* 148* 150 177   MCH pg 26 1* 26 0* 26 0* 26 1* 26 2*   MCHC g/dL 28 1* 28 0* 27 5* 27 7* 28 2*   RDW % 19 8* 19 9* 19 8* 19 8* 19 7*   MPV fL 10 4 11 0 10 2 10 3 10 3   NRBC AUTO /100 WBCs  --  0 0 0 0       CMP:  Results from last 7 days   Lab Units 02/27/21  0449 02/26/21  0510 02/25/21  0534 02/24/21  0523 02/23/21  0554 02/22/21  1626 02/22/21  0734   SODIUM mmol/L 136 139 138 142 143 141 139   POTASSIUM mmol/L 5 5* 4 3 4 1 4 3 4 1 4 2 4 6   CHLORIDE mmol/L 102 103 101 103 104 103 103   CO2 mmol/L 27 28 26 27 27 25 29   ANION GAP mmol/L 7 8 11 12 12 13 7   BUN mg/dL 122* 122* 117* 115* 102* 101* 105*   CREATININE mg/dL 2 09* 2 19* 2 33* 2 36* 2 14* 2 29* 2 21*   CALCIUM mg/dL 9 4 9 1 9 6 9 5 9 3 9 3 8 8   AST U/L  --   --  24 23 20  --  24   ALT U/L  --   --  20 20 17  --  20   ALK PHOS U/L  --   --  120* 139* 163*  --  212*   TOTAL PROTEIN g/dL  --   --  7 9 7 9 8 1  --  8 6*   ALBUMIN g/dL  --   --  4 0 3 4* 3 2*  --  2 6*   TOTAL BILIRUBIN mg/dL  --   --  3 30* 3 90* 3 68*  --  1 90*   EGFR ml/min/1 73sq m 23 22 20 20 22 21 22       Magnesium:    Coags:  Results from last 7 days   Lab Units 02/22/21  0734   PTT seconds 52*   INR  2 07*     TSH:    No components found for: TSH3  Lipid Profile:    Hgb A1c:    NT-proBNP: No results for input(s): NTBNP in the last 72 hours  Imaging & Testing   I have personally reviewed pertinent reports  Ct Chest Abdomen Pelvis Wo Contrast    Result Date: 2/22/2021  Narrative: CT CHEST, ABDOMEN AND PELVIS WITHOUT IV CONTRAST INDICATION:   Altered mental status  Sepsis  COMPARISON:  CT of the chest, CT of the abdomen/pelvis dated 10/23/2020, and chest CT dated 1/19/2017  TECHNIQUE: CT examination of the chest, abdomen and pelvis was performed without intravenous contrast   Axial, sagittal, and coronal 2D reformatted images were created from the source data and submitted for interpretation  Radiation dose length product (DLP) for this visit:  1525 66 mGy-cm   This examination, like all CT scans performed in the [facility], was performed utilizing techniques to minimize radiation dose exposure, including the use of iterative reconstruction and automated exposure control  Enteric contrast was not administered   FINDINGS: CHEST LUNGS:  Lung parenchyma is distorted by respiratory motion and atelectasis related to imaging during exhalation  Central predominant groundglass opacities in both lungs  Bandlike opacities in the lung bases with morphology suggesting atelectasis  No acute-appearing dense consolidations  Mild scattered foci of interlobular septal reticulation  3 mm nodule in the right upper lobe (3/36)--present in 2017 confirming benignancy  No suspicious pulmonary nodules  PLEURA:  Trace right layering effusion posteriorly  No loculated components  No pneumothorax  HEART/GREAT VESSELS:  Four-chamber enlargement of the heart  Prior CABG  Native coronary atherosclerosis  Valvular calcifications  Cardiac assist device with leads in the right atrium and right ventricle  No pericardial effusion  Thoracic aortic atherosclerosis without aneurysm  Mildly ectatic main pulmonary artery 30 mm  Pulmonary vessels appear cephalized on coronal reformats  MEDIASTINUM AND JAIRO:  Unremarkable  CHEST WALL AND LOWER NECK:   Unremarkable  ABDOMEN LIVER/BILIARY TREE:  Question nodular contour to liver  No focal parenchymal masses or biliary ductal dilatation, though parenchyma is obscured by motion artifact  GALLBLADDER:  Gallbladder is surgically absent  SPLEEN:  Unremarkable  PANCREAS:  Diffuse fatty atrophy, greatest at the tail  No masses or pancreatic ductal dilatation demonstrated  ADRENAL GLANDS:  Unremarkable  KIDNEYS/URETERS: No contour distorting masses, perinephric collections, urolithiasis, or hydronephrosis  STOMACH AND BOWEL: Small hiatal hernia  No gastric wall thickening  No dilated or inflamed loops of small bowel or colon, including loops bulging through the patient's ventral abdominal wall defect, discussed below  APPENDIX:  Cecum located within the ventral abdominal wall  Appendix not well demonstrated  No evidence for appendicitis, though  ABDOMINOPELVIC CAVITY:  Mild ascites, greatest around the liver and spleen  Small amount of free fluid tracking into the pelvis  No encapsulated collections    No free air  Shotty retroperitoneal lymph nodes are demonstrated without laurel mesenteric, retroperitoneal, or pelvic sidewall lymphadenopathy  VESSELS:  Aortoiliac and branch vessel atherosclerosis without aneurysm  Multiple greater saphenous varices and inguinal regions, greater on the left, measuring up to 18 mm in diameter  PELVIS REPRODUCTIVE ORGANS:  Unremarkable for patient's age  URINARY BLADDER:  Urinary bladder wall appears diffusely thickened, even accounting for degree of nondistention  ABDOMINAL WALL/INGUINAL REGIONS:  Subxiphoid midline hernia with wide neck measuring 3 6 in meters by 2 5 cm with 3 6 cm neck  Abdominal fat and ascites fluid protrudes anteriorly  Hernia repair mesh at the supra umbilical ventral wall  Inferior to the mesh there is pronounced diastasis recti splaying of the rectus abdominis muscles by 9 cm  Mesenteric fat, small bowel, and large bowel loops loops protrude into the defect  Overall, protruding contents measure 18 7 cm x 9 2 cm x 23 cm  Relatively pronounced anasarca around the hips and gluteal regions  No encapsulated superficial collections  OSSEOUS STRUCTURES: Healed median sternotomy  New transverse fracture through the superior endplate of the K27 vertebral body  No significant vertebral body height loss  No significant retropulsed fragments  New anterior compression deformity at the superior endplate of the L3 vertebral body  Approximately 20% height loss  Minimal buckling of the posterior cortex, superiorly, without significant spinal canal stenosis  Grade 1 anterolisthesis of L4 on L5 secondary to bulky facet osteophytes  No destructive lytic or blastic lesions  Impression: 1  Two new vertebral fractures when compared to October  Transverse fracture of the T12 vertebral body without significant height loss  Mild anterior compression deformity of the L3 vertebral body with minimal posterior cortical buckling    No retropulsed fragments or significant neural impingement  2   Thickening of the bladder wall, diffusely  Correlate for evidence of infectious cystitis on urinalysis  3   Pulmonary vascular engorgement and cephalization, hazy central groundglass opacities, scattered interlobular septal thickening, and small right pleural effusion  Constellation of findings favors volume overload with pulmonary edema  4   Mild ascites and anasarca  Nodular liver contour suggestive of cirrhosis  5   Large diastasis defect in the ventral abdomen  Small and large bowel protruding through the defect do not appear obstructed or inflamed  I personally discussed this study with Tiffany Alcaraz on 2/22/2021 at 9:31 AM  Workstation performed: [id number]     Xr Mandible Panorex ([place] Only)    Result Date: 2/1/2021  Narrative: MANDIBLE - PANOREX INDICATION:  Preoperative exam, for OR today  COMPARISON:  CT neck 10/24/2016 VIEWS:  XR MANDIBLE PANOREX ([place] ONLY) FINDINGS: There is no mandibular fracture or pathologic bone lesion  The maxilla is essentially devoid of T12 with some residual roots suggested on the left  The mandibular bicuspids and molars are also absent apart from a solitary molar on the right which demonstrates a dental amalgam   There is focal lucency of the crown of left mandibular canine which could represent dental shay  The temporomandibular joints appear grossly intact  Impression: Focal lucency crown of left mandibular canine could represent dental shay  Otherwise, dentition is largely absent as above  The study was marked in [facility] for immediate notification  Workstation performed: [id number]     Xr Chest Portable    Result Date: 2/26/2021  Narrative: CHEST INDICATION:   cough  COMPARISON:  Chest radiograph and CT from 2/22/2021  EXAM PERFORMED/VIEWS:  XR CHEST PORTABLE  FINDINGS: Mild cardiomegaly  CABG  Left subclavian pacemaker lead in right ventricle  Mild pulmonary venous congestion  No definite effusion  No pneumothorax  Osseous structures normal for age  Impression: Mild pulmonary venous congestion  Workstation performed: [id number]     Xr Chest Portable    Result Date: 2/22/2021  Narrative: CHEST INDICATION:   AMS  COMPARISON:  Chest radiograph from 1/30/2021 and chest CT from 2/22/2021  EXAM PERFORMED/VIEWS:  XR CHEST PORTABLE FINDINGS: Moderate cardiomegaly  CABG  Left subclavian pacemaker lead in right ventricle  The lungs are clear  No pneumothorax or pleural effusion  Osseous structures appear within normal limits for patient age  Impression: No acute cardiopulmonary disease  Workstation performed: [id number]     Xr Chest 1 View Portable    Result Date: 1/30/2021  Narrative: CHEST INDICATION:   ams  COMPARISON:  11/30/2020 EXAM PERFORMED/VIEWS:  XR CHEST PORTABLE FINDINGS:  Left-sided chest wall intracardiac device is identified  Leads are intact  Cardiomediastinal silhouette is within normal limits status post CABG  Vascular congestion  No pneumothorax or pleural effusion  Sternal wires are present  Visualized osseous structures appear otherwise unremarkable for the patient's age  Impression: Vascular congestion  Workstation performed: [id number]     Ct Head Without Contrast    Result Date: 2/22/2021  Narrative: CT BRAIN - WITHOUT CONTRAST INDICATION:   Altered mental status  COMPARISON:  1/30/2021 and 11/30/2020  TECHNIQUE:  CT examination of the brain was performed  In addition to axial images, sagittal and coronal 2D reformatted images were created and submitted for interpretation  Radiation dose length product (DLP) for this visit:  971 mGy-cm   This examination, like all CT scans performed in the [facility], was performed utilizing techniques to minimize radiation dose exposure, including the use of iterative reconstruction and automated exposure control  IMAGE QUALITY:  Diagnostic   FINDINGS: PARENCHYMA:  Moderate patchy periventricular white matter hypodensities consistent with chronic microangiopathic changes  Parenchymal appearance is unchanged from prior  No acute infarct  No parenchymal hemorrhage  No mass  VENTRICLES AND EXTRA-AXIAL SPACES:  Normal for the patient's age  VISUALIZED ORBITS AND PARANASAL SINUSES:  Bilateral cataract surgeries  Orbits are, otherwise, unremarkable  Paranasal sinuses are clear  CALVARIUM AND EXTRACRANIAL SOFT TISSUES:  Normal      Impression: No acute intracranial abnormalities or significant change from priors  Workstation performed: [id number]     Ct Head Without Contrast    Result Date: 1/30/2021  Narrative: CT BRAIN - WITHOUT CONTRAST INDICATION:   ams  COMPARISON:  11/30/2020 TECHNIQUE:  CT examination of the brain was performed  In addition to axial images, sagittal and coronal 2D reformatted images were created and submitted for interpretation  Radiation dose length product (DLP) for this visit:  1812 mGy-cm   This examination, like all CT scans performed in the [facility], was performed utilizing techniques to minimize radiation dose exposure, including the use of iterative reconstruction and automated exposure control  IMAGE QUALITY:  Diagnostic  FINDINGS: PARENCHYMA: Decreased attenuation is noted in periventricular and subcortical white matter demonstrating an appearance that is statistically most likely to represent mild microangiopathic change  No CT signs of acute infarction  No intracranial mass, mass effect or midline shift  No acute parenchymal hemorrhage  VENTRICLES AND EXTRA-AXIAL SPACES:  Normal for the patient's age  VISUALIZED ORBITS AND PARANASAL SINUSES:  Unremarkable  CALVARIUM AND EXTRACRANIAL SOFT TISSUES:  Normal      Impression: No acute intracranial abnormality   Workstation performed: [id number] [employer] Waveform Analysis, Multiple Levels    Result Date: 2/22/2021  Narrative:  [facility] REPORT CLINICAL: Indications: Patient being evaluated for peripheral arterial disease secondary to petechiae and swelling as per ordering Provider  Operative History: Cardiac Pacemaker Risk Factors: The patient has history of HTN, Diabetes (NIDDM (oral meds)), HLD, CKD and CAD  Clinical: Right Pressure:  80/ mm Hg, Left Pressure:  128/ mm Hg  FINDINGS:  Segment       Rig  Left                          P    P  Ant  Tibial    98  235  Post  Tibial  245  247  Ankle         245  247  Metatarsal     98  150  Great Toe      87  102     CONCLUSION: Impression RIGHT LOWER LIMB Ankle/Brachial Index: unreliable due to patient movement  PPG/PVR Tracings are normal  Metatarsal Pressure 98 mmHg  Great Toe Pressure: 87 mmHg, within the healing range  LEFT LOWER LIMB Ankle/Brachial Index: unreliable due to patient movement  PPG/PVR Tracings are normal  Metatarsal Pressure 150 mmHg  Great Toe Pressure: 102 mmHg, within the healing range  Technically limited/difficult study due to patient movement and uncooperative  Bilateral ABIs, metatarsal and great toe pressures unreliable due to movement  Technical findings given to Dr Theodore Chávez at 2:46 pm   SIGNATURE: Electronically Signed by: Renée Roche MD, RPVI on 2021-02-22 05:05:52 PM    Us Right Upper Quadrant    Result Date: 2/2/2021  Narrative: RIGHT UPPER QUADRANT ULTRASOUND INDICATION:     elevated LFTs, ammonia  COMPARISON:  Right upper quadrant ultrasound dated 6/6/2016  TECHNIQUE:   Real-time ultrasound of the right upper quadrant was performed with a curvilinear transducer with both volumetric sweeps and still imaging techniques  FINDINGS: PANCREAS:  Portions of the pancreas are obscured by bowel gas  Visualized portions of the pancreas are unremarkable  AORTA AND IVC:  Visualized portions are normal for patient age  LIVER: Size:  Mildly enlarged  The liver measures 18 5 cm in the midclavicular line  Contour:  Surface contour is nodular  Parenchyma: There is diffuse coarsened heterogeneous echotexture suggesting underlying cirrhotic changes   No evidence of suspicious mass  Limited imaging of the main portal vein shows it to be patent and hepatopetal   BILIARY: Patient has undergone cholecystectomy  No intrahepatic biliary dilatation  CBD measures 4 mm  No choledocholithiasis  KIDNEY: Right kidney measures 13 4 cm  Within normal limits  ASCITES:   None  Impression: Cirrhosis  No definite focal liver mass  No significant ascites  Status post cholecystectomy  Workstation performed: [id number]     Us Elastography    Result Date: 2021  Narrative: ELASTOGRAPHY, LIVER ULTRASOUND INDICATION:   abnormal lft  COMPARISON:  None TECHNIQUE: Targeted ultrasound of the liver was performed with a curvilinear transducer utilizing a total of 10 shear wave Elastography samples  FINDINGS:  Shear Wave Elastography sampling was performed to evaluate stiffness changes within the liver associated with fibrosis  The resulting E median is 12 2 kPa  The corresponding Metavir score is F4 (cirrhosis), >11 87 kPa   >1 98 m/s  The IQR/median value is 15 4 %  (IQR of less than 30% is considered a satisfactory data set)  Impression: Metavir score of F4,Ccirrhosis   Workstation performed: [id number]        Cardiac testing:   Results for orders placed during the hospital encounter of 20   Echo complete with contrast if indicated    Narrative [address]  [address] [facility] [address]  [phone]    Transthoracic Echocardiogram  2D, M-mode, Doppler, and Color Doppler    Study date:  08-Aug-2020    Patient: Juan Jose Arora  MR number: [id number]  Account number: [de-identified]  : 1950  Age: 69 years  Gender: Female  Status: Outpatient  Location: Echo lab  Height: 65 in  Weight: 207 5 lb  BP: 143/ 82 mmHg    Indications: CAD    Diagnoses: I25 83 - Coronary atherosclerosis due to lipid rich plaque    Sonographer:  Madina Guillen RDCS  Primary Physician:  Emily Stout MD  Referring Physician:  Joi Hernandez MD  Group:  [facility]  Ordering Physician:  Mariah Summers MD  Interpreting Physician:  Mariah Summers MD    SUMMARY    LEFT VENTRICLE:  The ventricle was mildly dilated  Systolic function was mild to moderately reduced  Ejection fraction was estimated in the range of 40 % to 45 % to be 45 %  There was mild diffuse hypokinesis  Wall thickness was mildly to moderately increased  There was mild concentric hypertrophy  Doppler parameters were consistent with high ventricular filling pressure  VENTRICULAR SEPTUM:  There was paradoxical motion  These changes are consistent with right ventricular pacing  RIGHT VENTRICLE:  The ventricle was mildly to moderately dilated  Systolic function was mildly reduced  Wall thickness was mildly increased  Systolic pressure was markedly increased  LEFT ATRIUM:  The atrium was moderately dilated  RIGHT ATRIUM:  The atrium was moderately dilated  MITRAL VALVE:  There was mild to moderate annular calcification  There was mild regurgitation  AORTIC VALVE:  The valve was trileaflet  Leaflets exhibited moderately increased thickness, moderate calcification, and markedly reduced cuspal separation  Transaortic velocity was increased due to valvular stenosis  There was severe stenosis  TROY around 0 85  LVOT velocity 0 65 m/sec and Peak AV 2 36 m/sec and DI  26  Peak na d mean gradient are low, need to rule out low gradient severe AS    TRICUSPID VALVE:  There was moderate regurgitation  Estimated peak PA pressure was 80 mmHg  PULMONIC VALVE:  There was mild regurgitation  IVC, HEPATIC VEINS:  The inferior vena cava was dilated, with poor inspiratory collapse, consistent with elevated right atrial pressure  HISTORY: PRIOR HISTORY: HTN, DM, Obesity, PHTN, CABG, GERD, Pacemaker    PROCEDURE: The procedure was performed in the echo lab  This was a routine study  The transthoracic approach was used   The study included complete 2D imaging, M-mode, complete spectral Doppler, and color Doppler  The heart rate was 66 bpm,  at the start of the study  Echocardiographic views were limited due to lung interference  Image quality was adequate  LEFT VENTRICLE: The ventricle was mildly dilated  Systolic function was mild to moderately reduced  Ejection fraction was estimated in the range of 40 % to 45 % to be 45 %  There was mild diffuse hypokinesis  Wall thickness was mildly to  moderately increased  There was mild concentric hypertrophy  DOPPLER: Doppler parameters were consistent with high ventricular filling pressure  VENTRICULAR SEPTUM: Thickness was mildly increased  There was paradoxical motion  These changes are consistent with right ventricular pacing  RIGHT VENTRICLE: The ventricle was mildly to moderately dilated  Systolic function was mildly reduced  Wall thickness was mildly increased  DOPPLER: Systolic pressure was markedly increased  LEFT ATRIUM: The atrium was moderately dilated  RIGHT ATRIUM: The atrium was moderately dilated  MITRAL VALVE: There was mild to moderate annular calcification  There was normal leaflet separation  DOPPLER: The transmitral velocity was within the normal range  There was no evidence for stenosis  There was mild regurgitation  AORTIC VALVE: The valve was trileaflet  Leaflets exhibited moderately increased thickness, moderate calcification, and markedly reduced cuspal separation  DOPPLER: Transaortic velocity was increased due to valvular stenosis  There was  severe stenosis  TROY around 0 85  LVOT velocity 0 65 m/sec and Peak AV 2 36 m/sec and DI  26  Peak na d mean gradient are low, need to rule out low gradient severe AS There was no regurgitation  TRICUSPID VALVE: DOPPLER: There was moderate regurgitation  Estimated peak PA pressure was 80 mmHg  PULMONIC VALVE: DOPPLER: There was mild regurgitation  PERICARDIUM: There was no thickening or calcification  There was no pericardial effusion      AORTA: The root exhibited normal size  SYSTEMIC VEINS: IVC: The inferior vena cava was dilated, with poor inspiratory collapse, consistent with elevated right atrial pressure  SYSTEM MEASUREMENT TABLES    2D mode  AoR Diam 2D: 2 8 cm  LA Diam (2D): 4 5 cm  LA/Ao (2D): 1 61  FS (2D Teich): 19 6 %  IVSd (2D): 1 29 cm  LVDEV: 94 4 cmï¾³  LVESV: 56 3 cmï¾³  LVIDd(2D): 4 54 cm  LVISd (2D): 3 65 cm  LVOT Area 2D: 3 14 cmï¾²  LVPWd (2D): 1 21 cm  SV (Teich): 38 1 cmï¾³    Apical four chamber  LVEF A4C: 42 %    Unspecified Scan Mode  TROY Cont Eq (Peak Carlos): 0 87 cmï¾²  LVOT Diam : 2 cm  LVOT Vmax: 638 mm/s  LVOT Vmax; Mean: 638 mm/s  Peak Grad ; Mean: 2 mm[Hg]  MV Peak E Carlos  Mean: 1930 mm/s  Max P mm[Hg]  V Max: 4170 mm/s  Vmax: 3910 mm/s  RA Area: 25 7 cmï¾²  RA Volume: 91 6 cmï¾³  TAPSE: 0 8 cm    [employer] Accredited Echocardiography Laboratory    Prepared and electronically signed by    Mark Germain MD  Signed 11-Aug-2020 09:30:19           Dr Viktor Samuels MD, [facility]      "This note has been constructed using a voice recognition system  Therefore there may be syntax, spelling, and/or grammatical errors   Please call if you have any questions  "

## 2021-02-27 NOTE — PROGRESS NOTES
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, with long-term current use of insulin ([facility])    CKD (chronic kidney disease)    H/O coronary artery bypass surgery    Aortic stenosis    Chronic atrial fibrillation ([facility])    Goals of care, counseling/discussion    Abnormal CT of the abdomen      Assessment & Plan:    Klebsiella bacteremia  Assessment & Plan  Patient was noted to have 2/2 blood culture positive with Gram-negative bacilli on admission  Unclear source but suspected to be urinary  UA with evidence of trace leukocytes, bacteriuria and pyuria  Unfortunately urine culture was not collected  Blood culture sensitivities noted  Seen by ID, input appreciated  · Initially treated with IV cefepime , now transition to ceftriaxone through 2/28 to complete 7 days of IV antibiotics  · Can transition to oral Omnicef 300 mg p o  Q  24 hours through 3/3 to complete 10 days total        * Encephalopathy, toxic metabolic  Assessment & Plan  Likely secondary to multifactorial including sepsis, acute kidney injury on chronic kidney disease, metabolic derangements, intravascular depletion in setting of critical aortic stenosis and poor perfusion,   CT of the head negative for any acute intracranial abnormalities, ammonia negative  VBG showed a pH of 7 43, and pCO2 of 46   Ammonia level within normal limit  Blood culture noted to be positive for Klebsiella  Appears to be improving/stable  Neuro exam nonfocal  · Continue IV antibiotics as below  · Monitor neuro status  · Monitor blood pressure, avoid hypotension    Sepsis (HCC)-resolved as of 2/26/2021  Assessment & Plan  As evidenced by leukocytosis of 12, and hypotension  Also lactic acid was elevated to 4 0  Secondary to Gram-negative bacteremia, source unclear  Possible urinary source  chest x-ray negative for any infiltrates,   Abdominal exam benign, LFT stable  CT of the abdomen pelvis negative any other intra-abdominal pathology  Hemodynamics improved  · Continue IV antibiotics as above  · Monitor clinically  · ID evaluation appreciated      Aortic stenosis  Assessment & Plan  Echocardiogram with severe critical aortic stenosis  Not candidate for TAVR or SAVR because of multiple comorbidities and overall functional status   Cardiology following, input appreciated  Discussed with patient and family regarding palliative care versus hospice  · Discussed again with patient who understands prognosis  · Monitor clinically    Consideration of transition to palliative care versus hospice based on clinical progress    CKD (chronic kidney disease)  Assessment & Plan  Baseline creatinine appears to be fluctuating, recently trended up to mid 2s  Initially worsened after diuresis  Now slowly downtrending  · Consider resumption of diuretics if blood pressure allows  · Avoid hypotension  · Monitor intake output  · Repeat potassium level      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, with long-term current use of insulin [facility])  Assessment & Plan  Lab Results   Component Value Date    HGBA1C 7 1 (H) 02/01/2021       Recent Labs     02/26/21  1615 02/26/21  2100 02/27/21  0753 02/27/21  1132   POCGLU 173* 188* 130 141*       Blood Sugar Average: Last 72 hrs:  (P) 405 [id number]   Appetite fair  Continue Lantus and pre meal insulin at current dose  continue corrective scale    Chronic combined systolic and diastolic congestive heart failure (HCC)  Assessment & Plan  Wt Readings from Last 3 Encounters:   02/27/21 95 6 kg (210 lb 12 2 oz)   02/10/21 95 3 kg (210 lb)   02/04/21 89 1 kg (196 lb 5 5 oz)   Echocardiogram with EF 45%, critical aortic stenosis,  Baseline weight appears to be 205 lb is as per Cardiology notes  Cardiology input appreciated  Received IV Lasix  but noted to have worsening of creatinine and BUN  Repeat chest x-ray with mild pulmonary vascular congestion  Respiratory status appears stable,  Cardiology following, input appreciated  Recommended to avoid over-diuresis/hypotension secondary to severe critical aortic stenosis could be low-flow low gradient AS  · Continue current meds  · avoid hypotension  · Continue metoprolol  · Overall guarded prognosis          Coronary artery disease involving coronary bypass graft of native heart without angina pectoris  Assessment & Plan  Report having chest discomfort, EKG without any acute changes  Cardiac markers negative  Cardiology following  Continue metoprolol  Continue supportive care    Lactic acidosis-resolved as of 2/26/2021  Assessment & Plan  Multifactorial secondary to sepsis, hypotension in setting of critical aortic stenosis, underlying cirrhosis  Blood pressure overall improved    Goals of care, counseling/discussion  Assessment & Plan  Patient has aortic stenosis and has recurrent admissions for congestive heart failure, and is a poor candidate for surgery as well  Team had a discussion with the patient's son regarding the patient's wishes for her code status and he mentioned that patient would have not wanted herself to be resuscitated or intubated or shock  Hence patient will be made DNR level 3     Ongoing goals of care discussions  Patient will be discharged to skilled nursing facility with consideration of transition to comfort care and hospice      Chronic atrial fibrillation [facility])  Assessment & Plan  Controlled, history of sick sinus syndrome status post pacemaker  Continue metoprolol at lower dose with holding parameters   Discussed with Cardiology regarding resumption of anticoagulation,Alternative NOAC due to CKD  Started on Eliquis  Monitor hemoglobin    Abnormal CT of the abdomen  Assessment & Plan  CT of the abdomen revealed Two new vertebral fractures when compared to October   Transverse fracture of the T12 vertebral body without significant height loss   Mild anterior compression deformity of the L3 vertebral body with minimal posterior cortical buckling   No   retropulsed fragments or significant neural impingement  Monitor clinically    Constipation-bowel regimen while on opiates  Dulcolax x1    VTE Pharmacologic Prophylaxis:   Pharmacologic: Apixaban (Eliquis)  Mechanical VTE Prophylaxis in Place: Yes    Patient Centered Rounds: I have performed bedside rounds with nursing staff today  Discussions with Specialists or Other Care Team Provider:  Cardiology, Infectious Disease    Education and Discussions with Family / Patient:  Discussed with patient, discussed with son over the phone    Time Spent for Care: 45 minutes  More than 50% of total time spent on counseling and coordination of care as described above      Current Length of Stay: 5 day(s)    Current Patient Status: Inpatient   Certification Statement: The patient will continue to require additional inpatient hospital stay due to Bacteremia    Discharge Plan:  Anticipate skilled nursing facility in next 24-48 hours    Code Status: Level 3 - DNAR and DNI      Subjective:   Noted to be sitting up in chair  Reports having shortness of breath but appears comfortable  Denies chest pain but reports having diffuse pain  Requesting pain medication, received 1 dose of oxycodone yesterday    Tolerating diet but abdomen appears distended no bowel movement for 2 days  Hyperkalemia (hemolyzed sample) and azotemia noted    Objective:     Vitals:   Temp (24hrs), Av 9 °F (37 2 °C), Min:98 9 °F (37 2 °C), Max:98 9 °F (37 2 °C)    Temp: [98 9 °F (37 2 °C)] 98 9 °F (37 2 °C)  HR:  [60-69] 60  Resp:  [16-19] 17  BP: (110-141)/(50-82) 110/50  SpO2:  [93 %-98 %] 96 % on 2 L  Body mass index is 37 33 kg/m²  Input and Output Summary (last 24 hours): Intake/Output Summary (Last 24 hours) at 2/27/2021 1446  Last data filed at 2/27/2021 0900  Gross per 24 hour   Intake 1020 ml   Output 850 ml   Net 170 ml       Physical Exam:     Physical Exam  Constitutional:       General: She is not in acute distress  Comments: Chronically ill   HENT:      Head: Normocephalic and atraumatic  Neck:      Musculoskeletal: Neck supple  Cardiovascular:      Rate and Rhythm: Normal rate  Heart sounds: Murmur present  Pulmonary:      Effort: Pulmonary effort is normal  No tachypnea, accessory muscle usage or respiratory distress  Breath sounds: Examination of the right-lower field reveals rales  Examination of the left-lower field reveals rales  Decreased breath sounds and rales present  No wheezing or rhonchi  Abdominal:      General: Bowel sounds are normal  There is distension  Palpations: Abdomen is soft  Tenderness: There is no abdominal tenderness  There is no guarding or rebound  Hernia: A hernia is present  Musculoskeletal:      Right lower leg: No edema  Left lower leg: No edema  Skin:     General: Skin is warm and dry  Findings: No rash  Neurological:      Mental Status: She is alert        Comments: Oriented to place, month and time of the day         Additional Data:     Labs:    Results from last 7 days   Lab Units 02/27/21  1228 02/26/21  0510 02/25/21  0534   WBC Thousand/uL  --  5 05 8 62   HEMOGLOBIN g/dL 10 5* 10 2* 9 9*   HEMATOCRIT % 37 8 36 3 35 3   PLATELETS Thousands/uL  --  116* 128*   NEUTROS PCT %  --   --  79*   LYMPHS PCT %  --   --  7*   MONOS PCT %  --   --  12   EOS PCT %  --   --  2     Results from last 7 days   Lab Units 02/27/21  1325 02/27/21  0449  02/25/21  0534   POTASSIUM mmol/L 4 2 5 5*   < > 4 1   CHLORIDE mmol/L  --  102   < > 101   CO2 mmol/L  --  27   < > 26   BUN mg/dL  --  122*   < > 117*   CREATININE mg/dL  --  2 09*   < > 2 33*   CALCIUM mg/dL  --  9 4   < > 9 6   ALK PHOS U/L  --   --   --  120*   ALT U/L  --   --   --  20   AST U/L  --   --   --  24    < > = values in this interval not displayed  Results from last 7 days   Lab Units 02/22/21  0734   INR  2 07*       * I Have Reviewed All Lab Data Listed Above  * Additional Pertinent Lab Tests Reviewed:  All Labs Within Last 24 Hours Reviewed      Imaging:  Imaging Reports Reviewed Today Include:CT, CXR      Recent Cultures (last 7 days):     Results from last 7 days   Lab Units 02/22/21  0754 02/22/21  0734   BLOOD CULTURE  Klebsiella oxytoca* Klebsiella oxytoca*   GRAM STAIN RESULT  Gram negative rods* Gram negative rods*       Last 24 Hours Medication List:   Current Facility-Administered Medications   Medication Dose Route Frequency Provider Last Rate    acetaminophen  650 mg Oral Q4H PRN Preeti Dominguez MD      apixaban  2 5 mg Oral BID ISIDRA Ibarra      cefTRIAXone  1,000 mg Intravenous Q24H Nakita Chaparro MD 1,000 mg (02/27/21 1412)    docusate sodium  100 mg Oral BID Jose Armando Sagastume MD      gabapentin  300 mg Oral HS Jose Armando Sagastume MD      insulin glargine  10 Units Subcutaneous HS Jose Armando Sagastume MD      insulin lispro  1-5 Units Subcutaneous HS Preeti Dominguez MD      insulin lispro  1-5 Units Subcutaneous TID AC Jose Armando Sagastume MD      insulin lispro  5 Units Subcutaneous TID With Meals Jose Armando Sagastume MD      levothyroxine  137 mcg Oral Daily Preeti Dominguez MD      metoprolol succinate  25 mg Oral HS ISIDRA Ibarra      oxyCODONE  2 5 mg Oral Q4H PRN Jose Armando Sagastume MD      polyethylene glycol  17 g Oral Daily PRN Jose Armando Sagastume MD      pravastatin  40 mg Oral Daily With Raffi Gallegos MD      senna  2 tablet Oral HS Jose Armando Sagastume MD      sodium chloride  1 spray Each Nare Q2H PRN Arely Maynard MD            Today, Patient Was Seen By: Arely Maynard MD    ** Please Note: "This note has been constructed using a voice recognition system  Therefore there may be syntax, spelling, and/or grammatical errors   Please call if you have any questions  "**

## 2021-02-28 ENCOUNTER — APPOINTMENT (INPATIENT)
Dept: RADIOLOGY | Facility: HOSPITAL | Age: 71
DRG: 871 | End: 2021-02-28
Payer: MEDICARE

## 2021-02-28 PROBLEM — R14.0 ABDOMINAL DISTENSION: Status: ACTIVE | Noted: 2021-02-28

## 2021-02-28 LAB
ANION GAP SERPL CALCULATED.3IONS-SCNC: 9 MMOL/L (ref 4–13)
ATRIAL RATE: 41 BPM
ATRIAL RATE: 65 BPM
BUN SERPL-MCNC: 117 MG/DL (ref 5–25)
CALCIUM SERPL-MCNC: 9.1 MG/DL (ref 8.3–10.1)
CHLORIDE SERPL-SCNC: 100 MMOL/L (ref 100–108)
CO2 SERPL-SCNC: 27 MMOL/L (ref 21–32)
CREAT SERPL-MCNC: 1.96 MG/DL (ref 0.6–1.3)
GFR SERPL CREATININE-BSD FRML MDRD: 25 ML/MIN/1.73SQ M
GLUCOSE SERPL-MCNC: 105 MG/DL (ref 65–140)
GLUCOSE SERPL-MCNC: 107 MG/DL (ref 65–140)
GLUCOSE SERPL-MCNC: 112 MG/DL (ref 65–140)
GLUCOSE SERPL-MCNC: 150 MG/DL (ref 65–140)
GLUCOSE SERPL-MCNC: 163 MG/DL (ref 65–140)
POTASSIUM SERPL-SCNC: 4.3 MMOL/L (ref 3.5–5.3)
QRS AXIS: 253 DEGREES
QRS AXIS: 258 DEGREES
QRSD INTERVAL: 184 MS
QRSD INTERVAL: 208 MS
QT INTERVAL: 534 MS
QT INTERVAL: 536 MS
QTC INTERVAL: 536 MS
QTC INTERVAL: 537 MS
SODIUM SERPL-SCNC: 136 MMOL/L (ref 136–145)
T WAVE AXIS: 105 DEGREES
T WAVE AXIS: 64 DEGREES
VENTRICULAR RATE: 60 BPM
VENTRICULAR RATE: 61 BPM

## 2021-02-28 PROCEDURE — 74018 RADEX ABDOMEN 1 VIEW: CPT

## 2021-02-28 PROCEDURE — 82948 REAGENT STRIP/BLOOD GLUCOSE: CPT

## 2021-02-28 PROCEDURE — 80048 BASIC METABOLIC PNL TOTAL CA: CPT | Performed by: INTERNAL MEDICINE

## 2021-02-28 PROCEDURE — 99232 SBSQ HOSP IP/OBS MODERATE 35: CPT | Performed by: INTERNAL MEDICINE

## 2021-02-28 PROCEDURE — 93010 ELECTROCARDIOGRAM REPORT: CPT | Performed by: INTERNAL MEDICINE

## 2021-02-28 RX ORDER — BISACODYL 10 MG
10 SUPPOSITORY, RECTAL RECTAL DAILY PRN
Status: DISCONTINUED | OUTPATIENT
Start: 2021-02-28 | End: 2021-03-01 | Stop reason: HOSPADM

## 2021-02-28 RX ORDER — POLYETHYLENE GLYCOL 3350 17 G/17G
17 POWDER, FOR SOLUTION ORAL DAILY
Status: DISCONTINUED | OUTPATIENT
Start: 2021-03-01 | End: 2021-03-01 | Stop reason: HOSPADM

## 2021-02-28 RX ADMIN — LEVOTHYROXINE SODIUM 137 MCG: 25 TABLET ORAL at 05:01

## 2021-02-28 RX ADMIN — ACETAMINOPHEN 650 MG: 325 TABLET, FILM COATED ORAL at 21:42

## 2021-02-28 RX ADMIN — APIXABAN 2.5 MG: 2.5 TABLET, FILM COATED ORAL at 17:03

## 2021-02-28 RX ADMIN — METOPROLOL SUCCINATE 25 MG: 25 TABLET, EXTENDED RELEASE ORAL at 21:43

## 2021-02-28 RX ADMIN — POLYETHYLENE GLYCOL 3350 17 G: 17 POWDER, FOR SOLUTION ORAL at 10:30

## 2021-02-28 RX ADMIN — INSULIN LISPRO 5 UNITS: 100 INJECTION, SOLUTION INTRAVENOUS; SUBCUTANEOUS at 17:03

## 2021-02-28 RX ADMIN — INSULIN LISPRO 1 UNITS: 100 INJECTION, SOLUTION INTRAVENOUS; SUBCUTANEOUS at 21:44

## 2021-02-28 RX ADMIN — DOCUSATE SODIUM 100 MG: 100 CAPSULE, LIQUID FILLED ORAL at 17:03

## 2021-02-28 RX ADMIN — INSULIN LISPRO 5 UNITS: 100 INJECTION, SOLUTION INTRAVENOUS; SUBCUTANEOUS at 08:15

## 2021-02-28 RX ADMIN — CEFTRIAXONE 1000 MG: 1 INJECTION, SOLUTION INTRAVENOUS at 14:12

## 2021-02-28 RX ADMIN — GABAPENTIN 300 MG: 300 CAPSULE ORAL at 21:43

## 2021-02-28 RX ADMIN — BISACODYL 10 MG: 10 SUPPOSITORY RECTAL at 19:45

## 2021-02-28 RX ADMIN — DOCUSATE SODIUM 100 MG: 100 CAPSULE, LIQUID FILLED ORAL at 08:15

## 2021-02-28 RX ADMIN — INSULIN LISPRO 1 UNITS: 100 INJECTION, SOLUTION INTRAVENOUS; SUBCUTANEOUS at 12:15

## 2021-02-28 RX ADMIN — INSULIN GLARGINE 10 UNITS: 100 INJECTION, SOLUTION SUBCUTANEOUS at 21:44

## 2021-02-28 RX ADMIN — SENNOSIDES 17.2 MG: 8.6 TABLET, FILM COATED ORAL at 21:43

## 2021-02-28 RX ADMIN — PRAVASTATIN SODIUM 40 MG: 40 TABLET ORAL at 17:03

## 2021-02-28 RX ADMIN — OXYCODONE HYDROCHLORIDE 2.5 MG: 5 TABLET ORAL at 10:30

## 2021-02-28 RX ADMIN — APIXABAN 2.5 MG: 2.5 TABLET, FILM COATED ORAL at 08:15

## 2021-02-28 NOTE — PROGRESS NOTES
Progress Note - Cardiology   [facility]     Nichole Hudson 66 y o  female MRN: [id number]  : 1950  Unit/Bed#: [address] [place] [address] Encounter: [id number]    ASSESSMENT/RECOMMENDATIONS:   patient appears lethargic but in no acute distress     Aortic stenosis: severe-critical   with recent echo showing TROY of 0 8 5 cm2 and DVI of 0 26   Could be low-flow low gradient AS  Maintain optimal blood pressure and avoid hypotension  Not a candidate for SAVR, or TAVR  because of multiple comorbidities and overall general condition  considering palliative care vs  hospice     Coronary artery disease, s/p CABG X 4,   S/P cardiac catheterization in 2018 and 2020 which showed that all grafts were patent  Continue pravastatin  Not on aspirin as she is on Eliquis      Encephalopathy,   metabolic/ toxic, possibly secondary to sepsis/Klebsiella bacteremia, on IV cefepime     LAVONNE on CKD  BUN/creatinine    117/1 96      Hypertension, blood pressure is stable /74  Resume other cardiac meds as tolerated by BP  Currently on  Toprol 25 mg daily     History of chronic systolic and diastolic heart failure, EF 45%  resume cardiac meds as tolerated by BP     Chronic atrial fibrillation,   S/P permanent pacemaker insertion  On Eliquis 2 5  mg b I d      Severe pulmonary hypertension      History of diabetes mellitus and cirrhosis  Managed by primary service          Subjective / Objective:    patient appears lethargic  She is in no acute distress  Vitals: Blood pressure 121/74, pulse 63, temperature (!) 96 1 °F (35 6 °C), resp  rate 18, height 5' 3" (1 6 m), weight 95 kg (209 lb 7 oz), SpO2 97 %, not currently breastfeeding  Vitals:    21 0600 21 0534   Weight: 95 6 kg (210 lb 12 2 oz) 95 kg (209 lb 7 oz)     Body mass index is 37 1 kg/m²    BP Readings from Last 3 Encounters:   21 121/74   02/10/21 110/68   21 129/74     Orthostatic Blood Pressures      Most Recent Value   Blood Pressure  121/74 filed at 02/28/2021 0447   Patient Position - Orthostatic VS  Lying filed at 02/23/2021 0745        I/O       02/26 0701 - 02/27 0700 02/27 0701 - 02/28 0700 02/28 0701 - 03/01 0700    P  O  950 500 360    I V  (mL/kg) 10 (0 1) 20 (0 2)     Total Intake(mL/kg) 960 (10) 520 (5 5) 360 (3 8)    Urine (mL/kg/hr) 850 (0 4) 450 (0 2)     Total Output 850 450     Net +110 +70 +360               Invasive Devices     Peripheral Intravenous Line            Peripheral IV 02/27/21 Left Wrist 1 day                  Intake/Output Summary (Last 24 hours) at 2/28/2021 1114  Last data filed at 2/28/2021 0800  Gross per 24 hour   Intake 360 ml   Output --   Net 360 ml         Physical Exam:   Physical Exam    Neurologic:   lethargic  Constitutional:  Well developed, well nourished,  With no acute distress  Eyes:  conjunctiva normal   HENT:  Atraumatic, external ears normal, nose normal,   NECK: Normal range of motion, no tenderness, neck is supple , No JVP  Respiratory:   Decreased breath sounds, secondary to decreased effort  Cardiovascular: Regular S1-S2 with a III/VI ejection systolic murmur  No pericardial rub or gallop audible  GI:  Soft, nondistended, audible bowel sounds, nontender, no hepatosplenomegaly appreciated  Musculoskeletal:  No tenderness, no deformities  Extremities:   mild edema   Distal pulses are present           Medications/ Allergies:     Current Facility-Administered Medications   Medication Dose Route Frequency Provider Last Rate    acetaminophen  650 mg Oral Q4H PRN Royal Shin MD      apixaban  2 5 mg Oral BID ISIDRA Gasca      cefTRIAXone  1,000 mg Intravenous Q24H Barbie Graves MD 1,000 mg (02/27/21 1412)    docusate sodium  100 mg Oral BID Fish Mendoza MD      gabapentin  300 mg Oral HS Fish Mendoza MD      insulin glargine  10 Units Subcutaneous HS Fish Mendoza MD      insulin lispro  1-5 Units Subcutaneous HS Royal Shin MD     [facility] insulin lispro  1-5 Units Subcutaneous TID AC Roz Nash MD      insulin lispro  5 Units Subcutaneous TID With Meals Roz Nash MD      levothyroxine  137 mcg Oral Daily Adilene Joyner MD      metoprolol succinate  25 mg Oral HS ISIDRA Umana      oxyCODONE  2 5 mg Oral Q4H PRN Roz Nash MD      polyethylene glycol  17 g Oral Daily PRN Roz Nash MD      pravastatin  40 mg Oral Daily With Shanda Jang MD      senna  2 tablet Oral HS Roz Nash MD      sodium chloride  1 spray Each Nare Q2H PRN Roz Nash MD       acetaminophen, 650 mg, Q4H PRN  oxyCODONE, 2 5 mg, Q4H PRN  polyethylene glycol, 17 g, Daily PRN  sodium chloride, 1 spray, Q2H PRN      Allergies   Allergen Reactions    Bromide Ion [Bromine]      Blurred vision   Has preservative that  Pt cannot use    Other Blisters     Adhesive tape    Timolol      Per pt, allergic to steroid in medication    Tramadol      cognition changes    Ibuprofen Palpitations    Penicillins Rash     Tolerated cefepime, ceftriaxone 2/2021       VTE Pharmacologic Prophylaxis:   Eliquis    Labs:   Troponins:  Results from last 7 days   Lab Units 02/25/21  0534 02/25/21  0056 02/24/21  1826   TROPONIN I ng/mL 0 05* 0 05* 0 04       CBC with diff:  Results from last 7 days   Lab Units 02/27/21  1228 02/26/21  0510 02/25/21  0534 02/24/21  0523 02/23/21  0554 02/22/21  0734   WBC Thousand/uL  --  5 05 8 62 9 74 11 80* 11 94*   HEMOGLOBIN g/dL 10 5* 10 2* 9 9* 10 8* 11 0* 11 1*   HEMATOCRIT % 37 8 36 3 35 3 39 3 39 7 39 4   MCV fL  --  93 93 95 94 93   PLATELETS Thousands/uL  --  116* 128* 148* 150 177   MCH pg  --  26 1* 26 0* 26 0* 26 1* 26 2*   MCHC g/dL  --  28 1* 28 0* 27 5* 27 7* 28 2*   RDW %  --  19 8* 19 9* 19 8* 19 8* 19 7*   MPV fL  --  10 4 11 0 10 2 10 3 10 3   NRBC AUTO /100 WBCs  --   --  0 0 0 0       CMP:  Results from last 7 days   Lab Units 02/28/21  0506 02/27/21  1325 02/27/21  0449 02/26/21  0510 02/25/21  0534 02/24/21  0523 02/23/21  0554 02/22/21  1626 02/22/21  0734   SODIUM mmol/L 136  --  136 139 138 142 143 141 139   POTASSIUM mmol/L 4 3 4 2 5 5* 4 3 4 1 4 3 4 1 4 2 4 6   CHLORIDE mmol/L 100  --  102 103 101 103 104 103 103   CO2 mmol/L 27  --  27 28 26 27 27 25 29   ANION GAP mmol/L 9  --  7 8 11 12 12 13 7   BUN mg/dL 117*  --  122* 122* 117* 115* 102* 101* 105*   CREATININE mg/dL 1 96*  --  2 09* 2 19* 2 33* 2 36* 2 14* 2 29* 2 21*   CALCIUM mg/dL 9 1  --  9 4 9 1 9 6 9 5 9 3 9 3 8 8   AST U/L  --   --   --   --  24 23 20  --  24   ALT U/L  --   --   --   --  20 20 17  --  20   ALK PHOS U/L  --   --   --   --  120* 139* 163*  --  212*   TOTAL PROTEIN g/dL  --   --   --   --  7 9 7 9 8 1  --  8 6*   ALBUMIN g/dL  --   --   --   --  4 0 3 4* 3 2*  --  2 6*   TOTAL BILIRUBIN mg/dL  --   --   --   --  3 30* 3 90* 3 68*  --  1 90*   EGFR ml/min/1 73sq m 25  --  23 22 20 20 22 21 22       Magnesium:    Coags:  Results from last 7 days   Lab Units 02/22/21  0734   PTT seconds 52*   INR  2 07*     TSH:    No components found for: TSH3  Lipid Profile:    Hgb A1c:    NT-proBNP: No results for input(s): NTBNP in the last 72 hours  Imaging & Testing   I have personally reviewed pertinent reports  Ct Chest Abdomen Pelvis Wo Contrast    Result Date: 2/22/2021  Narrative: CT CHEST, ABDOMEN AND PELVIS WITHOUT IV CONTRAST INDICATION:   Altered mental status  Sepsis  COMPARISON:  CT of the chest, CT of the abdomen/pelvis dated 10/23/2020, and chest CT dated 1/19/2017  TECHNIQUE: CT examination of the chest, abdomen and pelvis was performed without intravenous contrast   Axial, sagittal, and coronal 2D reformatted images were created from the source data and submitted for interpretation  Radiation dose length product (DLP) for this visit:  1525 66 mGy-cm     This examination, like all CT scans performed in the [facility], was performed utilizing techniques to minimize radiation dose exposure, including the use of iterative reconstruction and automated exposure control  Enteric contrast was not administered  FINDINGS: CHEST LUNGS:  Lung parenchyma is distorted by respiratory motion and atelectasis related to imaging during exhalation  Central predominant groundglass opacities in both lungs  Bandlike opacities in the lung bases with morphology suggesting atelectasis  No acute-appearing dense consolidations  Mild scattered foci of interlobular septal reticulation  3 mm nodule in the right upper lobe (3/36)--present in 2017 confirming benignancy  No suspicious pulmonary nodules  PLEURA:  Trace right layering effusion posteriorly  No loculated components  No pneumothorax  HEART/GREAT VESSELS:  Four-chamber enlargement of the heart  Prior CABG  Native coronary atherosclerosis  Valvular calcifications  Cardiac assist device with leads in the right atrium and right ventricle  No pericardial effusion  Thoracic aortic atherosclerosis without aneurysm  Mildly ectatic main pulmonary artery 30 mm  Pulmonary vessels appear cephalized on coronal reformats  MEDIASTINUM AND JAIRO:  Unremarkable  CHEST WALL AND LOWER NECK:   Unremarkable  ABDOMEN LIVER/BILIARY TREE:  Question nodular contour to liver  No focal parenchymal masses or biliary ductal dilatation, though parenchyma is obscured by motion artifact  GALLBLADDER:  Gallbladder is surgically absent  SPLEEN:  Unremarkable  PANCREAS:  Diffuse fatty atrophy, greatest at the tail  No masses or pancreatic ductal dilatation demonstrated  ADRENAL GLANDS:  Unremarkable  KIDNEYS/URETERS: No contour distorting masses, perinephric collections, urolithiasis, or hydronephrosis  STOMACH AND BOWEL: Small hiatal hernia  No gastric wall thickening  No dilated or inflamed loops of small bowel or colon, including loops bulging through the patient's ventral abdominal wall defect, discussed below  APPENDIX:  Cecum located within the ventral abdominal wall    Appendix not well demonstrated  No evidence for appendicitis, though  ABDOMINOPELVIC CAVITY:  Mild ascites, greatest around the liver and spleen  Small amount of free fluid tracking into the pelvis  No encapsulated collections  No free air  Shotty retroperitoneal lymph nodes are demonstrated without laurel mesenteric, retroperitoneal, or pelvic sidewall lymphadenopathy  VESSELS:  Aortoiliac and branch vessel atherosclerosis without aneurysm  Multiple greater saphenous varices and inguinal regions, greater on the left, measuring up to 18 mm in diameter  PELVIS REPRODUCTIVE ORGANS:  Unremarkable for patient's age  URINARY BLADDER:  Urinary bladder wall appears diffusely thickened, even accounting for degree of nondistention  ABDOMINAL WALL/INGUINAL REGIONS:  Subxiphoid midline hernia with wide neck measuring 3 6 in meters by 2 5 cm with 3 6 cm neck  Abdominal fat and ascites fluid protrudes anteriorly  Hernia repair mesh at the supra umbilical ventral wall  Inferior to the mesh there is pronounced diastasis recti splaying of the rectus abdominis muscles by 9 cm  Mesenteric fat, small bowel, and large bowel loops loops protrude into the defect  Overall, protruding contents measure 18 7 cm x 9 2 cm x 23 cm  Relatively pronounced anasarca around the hips and gluteal regions  No encapsulated superficial collections  OSSEOUS STRUCTURES: Healed median sternotomy  New transverse fracture through the superior endplate of the J28 vertebral body  No significant vertebral body height loss  No significant retropulsed fragments  New anterior compression deformity at the superior endplate of the L3 vertebral body  Approximately 20% height loss  Minimal buckling of the posterior cortex, superiorly, without significant spinal canal stenosis  Grade 1 anterolisthesis of L4 on L5 secondary to bulky facet osteophytes  No destructive lytic or blastic lesions  Impression: 1  Two new vertebral fractures when compared to October  Transverse fracture of the T12 vertebral body without significant height loss  Mild anterior compression deformity of the L3 vertebral body with minimal posterior cortical buckling  No retropulsed fragments or significant neural impingement  2   Thickening of the bladder wall, diffusely  Correlate for evidence of infectious cystitis on urinalysis  3   Pulmonary vascular engorgement and cephalization, hazy central groundglass opacities, scattered interlobular septal thickening, and small right pleural effusion  Constellation of findings favors volume overload with pulmonary edema  4   Mild ascites and anasarca  Nodular liver contour suggestive of cirrhosis  5   Large diastasis defect in the ventral abdomen  Small and large bowel protruding through the defect do not appear obstructed or inflamed  I personally discussed this study with Deisy Hirsch on 2/22/2021 at 9:31 AM  Workstation performed: [id number]     Xr Mandible Panorex ([place] Only)    Result Date: 2/1/2021  Narrative: MANDIBLE - PANOREX INDICATION:  Preoperative exam, for OR today  COMPARISON:  CT neck 10/24/2016 VIEWS:  XR MANDIBLE PANOREX ([place] ONLY) FINDINGS: There is no mandibular fracture or pathologic bone lesion  The maxilla is essentially devoid of T12 with some residual roots suggested on the left  The mandibular bicuspids and molars are also absent apart from a solitary molar on the right which demonstrates a dental amalgam   There is focal lucency of the crown of left mandibular canine which could represent dental shay  The temporomandibular joints appear grossly intact  Impression: Focal lucency crown of left mandibular canine could represent dental shay  Otherwise, dentition is largely absent as above  The study was marked in [facility] for immediate notification  Workstation performed: [id number]     Xr Chest Portable    Result Date: 2/26/2021  Narrative: CHEST INDICATION:   cough   COMPARISON:  Chest radiograph and CT from 2/22/2021  EXAM PERFORMED/VIEWS:  XR CHEST PORTABLE  FINDINGS: Mild cardiomegaly  CABG  Left subclavian pacemaker lead in right ventricle  Mild pulmonary venous congestion  No definite effusion  No pneumothorax  Osseous structures normal for age  Impression: Mild pulmonary venous congestion  Workstation performed: [id number]     Xr Chest Portable    Result Date: 2/22/2021  Narrative: CHEST INDICATION:   AMS  COMPARISON:  Chest radiograph from 1/30/2021 and chest CT from 2/22/2021  EXAM PERFORMED/VIEWS:  XR CHEST PORTABLE FINDINGS: Moderate cardiomegaly  CABG  Left subclavian pacemaker lead in right ventricle  The lungs are clear  No pneumothorax or pleural effusion  Osseous structures appear within normal limits for patient age  Impression: No acute cardiopulmonary disease  Workstation performed: [id number]     Xr Chest 1 View Portable    Result Date: 1/30/2021  Narrative: CHEST INDICATION:   ams  COMPARISON:  11/30/2020 EXAM PERFORMED/VIEWS:  XR CHEST PORTABLE FINDINGS:  Left-sided chest wall intracardiac device is identified  Leads are intact  Cardiomediastinal silhouette is within normal limits status post CABG  Vascular congestion  No pneumothorax or pleural effusion  Sternal wires are present  Visualized osseous structures appear otherwise unremarkable for the patient's age  Impression: Vascular congestion  Workstation performed: [id number]     Ct Head Without Contrast    Result Date: 2/22/2021  Narrative: CT BRAIN - WITHOUT CONTRAST INDICATION:   Altered mental status  COMPARISON:  1/30/2021 and 11/30/2020  TECHNIQUE:  CT examination of the brain was performed  In addition to axial images, sagittal and coronal 2D reformatted images were created and submitted for interpretation  Radiation dose length product (DLP) for this visit:  971 mGy-cm     This examination, like all CT scans performed in the [facility], was performed utilizing techniques to minimize radiation dose exposure, including the use of iterative reconstruction and automated exposure control  IMAGE QUALITY:  Diagnostic  FINDINGS: PARENCHYMA:  Moderate patchy periventricular white matter hypodensities consistent with chronic microangiopathic changes  Parenchymal appearance is unchanged from prior  No acute infarct  No parenchymal hemorrhage  No mass  VENTRICLES AND EXTRA-AXIAL SPACES:  Normal for the patient's age  VISUALIZED ORBITS AND PARANASAL SINUSES:  Bilateral cataract surgeries  Orbits are, otherwise, unremarkable  Paranasal sinuses are clear  CALVARIUM AND EXTRACRANIAL SOFT TISSUES:  Normal      Impression: No acute intracranial abnormalities or significant change from priors  Workstation performed: [id number]     Ct Head Without Contrast    Result Date: 1/30/2021  Narrative: CT BRAIN - WITHOUT CONTRAST INDICATION:   ams  COMPARISON:  11/30/2020 TECHNIQUE:  CT examination of the brain was performed  In addition to axial images, sagittal and coronal 2D reformatted images were created and submitted for interpretation  Radiation dose length product (DLP) for this visit:  1812 mGy-cm   This examination, like all CT scans performed in the [facility], was performed utilizing techniques to minimize radiation dose exposure, including the use of iterative reconstruction and automated exposure control  IMAGE QUALITY:  Diagnostic  FINDINGS: PARENCHYMA: Decreased attenuation is noted in periventricular and subcortical white matter demonstrating an appearance that is statistically most likely to represent mild microangiopathic change  No CT signs of acute infarction  No intracranial mass, mass effect or midline shift  No acute parenchymal hemorrhage  VENTRICLES AND EXTRA-AXIAL SPACES:  Normal for the patient's age  VISUALIZED ORBITS AND PARANASAL SINUSES:  Unremarkable  CALVARIUM AND EXTRACRANIAL SOFT TISSUES:  Normal      Impression: No acute intracranial abnormality   Workstation performed: [id number]     [facility] Waveform Analysis, Multiple Levels    Result Date: 2/22/2021  Narrative:  [facility] REPORT CLINICAL: Indications: Patient being evaluated for peripheral arterial disease secondary to petechiae and swelling as per ordering Provider  Operative History: Cardiac Pacemaker Risk Factors: The patient has history of HTN, Diabetes (NIDDM (oral meds)), HLD, CKD and CAD  Clinical: Right Pressure:  80/ mm Hg, Left Pressure:  128/ mm Hg  FINDINGS:  Segment       Rig  Left                          P    P  Ant  Tibial    98  235  Post  Tibial  245  247  Ankle         245  247  Metatarsal     98  150  Great Toe      87  102     CONCLUSION: Impression RIGHT LOWER LIMB Ankle/Brachial Index: unreliable due to patient movement  PPG/PVR Tracings are normal  Metatarsal Pressure 98 mmHg  Great Toe Pressure: 87 mmHg, within the healing range  LEFT LOWER LIMB Ankle/Brachial Index: unreliable due to patient movement  PPG/PVR Tracings are normal  Metatarsal Pressure 150 mmHg  Great Toe Pressure: 102 mmHg, within the healing range  Technically limited/difficult study due to patient movement and uncooperative  Bilateral ABIs, metatarsal and great toe pressures unreliable due to movement  Technical findings given to Dr Nicolas Lezama at 2:46 pm   SIGNATURE: Electronically Signed by: Sonya Espinal MD, RPVI on 2021-02-22 05:05:52 PM    Us Right Upper Quadrant    Result Date: 2/2/2021  Narrative: RIGHT UPPER QUADRANT ULTRASOUND INDICATION:     elevated LFTs, ammonia  COMPARISON:  Right upper quadrant ultrasound dated 6/6/2016  TECHNIQUE:   Real-time ultrasound of the right upper quadrant was performed with a curvilinear transducer with both volumetric sweeps and still imaging techniques  FINDINGS: PANCREAS:  Portions of the pancreas are obscured by bowel gas  Visualized portions of the pancreas are unremarkable  AORTA AND IVC:  Visualized portions are normal for patient age   LIVER: Size:  Mildly enlarged  The liver measures 18 5 cm in the midclavicular line  Contour:  Surface contour is nodular  Parenchyma: There is diffuse coarsened heterogeneous echotexture suggesting underlying cirrhotic changes  No evidence of suspicious mass  Limited imaging of the main portal vein shows it to be patent and hepatopetal   BILIARY: Patient has undergone cholecystectomy  No intrahepatic biliary dilatation  CBD measures 4 mm  No choledocholithiasis  KIDNEY: Right kidney measures 13 4 cm  Within normal limits  ASCITES:   None  Impression: Cirrhosis  No definite focal liver mass  No significant ascites  Status post cholecystectomy  Workstation performed: [id number]     Us Elastography    Result Date: 2021  Narrative: ELASTOGRAPHY, LIVER ULTRASOUND INDICATION:   abnormal lft  COMPARISON:  None TECHNIQUE: Targeted ultrasound of the liver was performed with a curvilinear transducer utilizing a total of 10 shear wave Elastography samples  FINDINGS:  Shear Wave Elastography sampling was performed to evaluate stiffness changes within the liver associated with fibrosis  The resulting E median is 12 2 kPa  The corresponding Metavir score is F4 (cirrhosis), >11 87 kPa   >1 98 m/s  The IQR/median value is 15 4 %  (IQR of less than 30% is considered a satisfactory data set)  Impression: Metavir score of F4,Ccirrhosis   Workstation performed: [id number]        Cardiac testing:   Results for orders placed during the hospital encounter of 20   Echo complete with contrast if indicated    Narrative [address]  [address] [facility] [address]  [phone]    Transthoracic Echocardiogram  2D, M-mode, Doppler, and Color Doppler    Study date:  08-Aug-2020    Patient: Tata Villafuerte  MR number: [id number]  Account number: [de-identified]  : 1950  Age: 69 years  Gender: Female  Status: Outpatient  Location: Echo lab  Height: 65 in  Weight: 207 5 lb  BP: 143/ 82 mmHg    Indications: CAD    Diagnoses: I25 83 - Coronary atherosclerosis due to lipid rich plaque    Sonographer:  Shant Watts RDCS  Primary Physician:  Mary Zavala MD  Referring Physician:  Aldo Henley MD  Group:  [facility] [id number] [facility]  Ordering Physician:  Aldo Henley MD  Interpreting Physician:  Aldo Henley MD    SUMMARY    LEFT VENTRICLE:  The ventricle was mildly dilated  Systolic function was mild to moderately reduced  Ejection fraction was estimated in the range of 40 % to 45 % to be 45 %  There was mild diffuse hypokinesis  Wall thickness was mildly to moderately increased  There was mild concentric hypertrophy  Doppler parameters were consistent with high ventricular filling pressure  VENTRICULAR SEPTUM:  There was paradoxical motion  These changes are consistent with right ventricular pacing  RIGHT VENTRICLE:  The ventricle was mildly to moderately dilated  Systolic function was mildly reduced  Wall thickness was mildly increased  Systolic pressure was markedly increased  LEFT ATRIUM:  The atrium was moderately dilated  RIGHT ATRIUM:  The atrium was moderately dilated  MITRAL VALVE:  There was mild to moderate annular calcification  There was mild regurgitation  AORTIC VALVE:  The valve was trileaflet  Leaflets exhibited moderately increased thickness, moderate calcification, and markedly reduced cuspal separation  Transaortic velocity was increased due to valvular stenosis  There was severe stenosis  TROY around 0 85  LVOT velocity 0 65 m/sec and Peak AV 2 36 m/sec and DI  26  Peak na d mean gradient are low, need to rule out low gradient severe AS    TRICUSPID VALVE:  There was moderate regurgitation  Estimated peak PA pressure was 80 mmHg  PULMONIC VALVE:  There was mild regurgitation  IVC, HEPATIC VEINS:  The inferior vena cava was dilated, with poor inspiratory collapse, consistent with elevated right atrial pressure      HISTORY: PRIOR HISTORY: HTN, DM, Obesity, PHTN, CABG, GERD, Pacemaker    PROCEDURE: The procedure was performed in the echo lab  This was a routine study  The transthoracic approach was used  The study included complete 2D imaging, M-mode, complete spectral Doppler, and color Doppler  The heart rate was 66 bpm,  at the start of the study  Echocardiographic views were limited due to lung interference  Image quality was adequate  LEFT VENTRICLE: The ventricle was mildly dilated  Systolic function was mild to moderately reduced  Ejection fraction was estimated in the range of 40 % to 45 % to be 45 %  There was mild diffuse hypokinesis  Wall thickness was mildly to  moderately increased  There was mild concentric hypertrophy  DOPPLER: Doppler parameters were consistent with high ventricular filling pressure  VENTRICULAR SEPTUM: Thickness was mildly increased  There was paradoxical motion  These changes are consistent with right ventricular pacing  RIGHT VENTRICLE: The ventricle was mildly to moderately dilated  Systolic function was mildly reduced  Wall thickness was mildly increased  DOPPLER: Systolic pressure was markedly increased  LEFT ATRIUM: The atrium was moderately dilated  RIGHT ATRIUM: The atrium was moderately dilated  MITRAL VALVE: There was mild to moderate annular calcification  There was normal leaflet separation  DOPPLER: The transmitral velocity was within the normal range  There was no evidence for stenosis  There was mild regurgitation  AORTIC VALVE: The valve was trileaflet  Leaflets exhibited moderately increased thickness, moderate calcification, and markedly reduced cuspal separation  DOPPLER: Transaortic velocity was increased due to valvular stenosis  There was  severe stenosis  TROY around 0 85  LVOT velocity 0 65 m/sec and Peak AV 2 36 m/sec and DI  26  Peak na d mean gradient are low, need to rule out low gradient severe AS There was no regurgitation      TRICUSPID VALVE: DOPPLER: There was moderate regurgitation  Estimated peak PA pressure was 80 mmHg  PULMONIC VALVE: DOPPLER: There was mild regurgitation  PERICARDIUM: There was no thickening or calcification  There was no pericardial effusion  AORTA: The root exhibited normal size  SYSTEMIC VEINS: IVC: The inferior vena cava was dilated, with poor inspiratory collapse, consistent with elevated right atrial pressure  SYSTEM MEASUREMENT TABLES    2D mode  AoR Diam 2D: 2 8 cm  LA Diam (2D): 4 5 cm  LA/Ao (2D): 1 61  FS (2D Teich): 19 6 %  IVSd (2D): 1 29 cm  LVDEV: 94 4 cmï¾³  LVESV: 56 3 cmï¾³  LVIDd(2D): 4 54 cm  LVISd (2D): 3 65 cm  LVOT Area 2D: 3 14 cmï¾²  LVPWd (2D): 1 21 cm  SV (Teich): 38 1 cmï¾³    Apical four chamber  LVEF A4C: 42 %    Unspecified Scan Mode  TROY Cont Eq (Peak Carlos): 0 87 cmï¾²  LVOT Diam : 2 cm  LVOT Vmax: 638 mm/s  LVOT Vmax; Mean: 638 mm/s  Peak Grad ; Mean: 2 mm[Hg]  MV Peak E Carlos  Mean: 1930 mm/s  Max P mm[Hg]  V Max: 4170 mm/s  Vmax: 3910 mm/s  RA Area: 25 7 cmï¾²  RA Volume: 91 6 cmï¾³  TAPSE: 0 8 cm    [employer] Accredited Echocardiography Laboratory    Prepared and electronically signed by    Courtney Laws MD  Signed 11-Aug-2020 09:30:19                 Dr Thalia Garcia MD, [facility]      "This note has been constructed using a voice recognition system  Therefore there may be syntax, spelling, and/or grammatical errors   Please call if you have any questions  "

## 2021-02-28 NOTE — PROGRESS NOTES
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, with long-term current use of insulin ([facility])    CKD (chronic kidney disease)    H/O coronary artery bypass surgery    Aortic stenosis    Chronic atrial fibrillation ([facility])    Goals of care, counseling/discussion    Abnormal CT of the abdomen      Assessment & Plan:    Klebsiella bacteremia  Assessment & Plan  Patient was noted to have 2/2 blood culture positive with Gram-negative bacilli on admission  Unclear source but suspected to be urinary  UA with evidence of trace leukocytes, bacteriuria and pyuria  Unfortunately urine culture was not collected  Blood culture sensitivities noted  Seen by ID, input appreciated  · Initially treated with IV cefepime , subsequently transitioned to ceftriaxone through 2/28 to complete 7 days of IV antibiotics        * Encephalopathy, toxic metabolic  Assessment & Plan  Likely secondary to multifactorial including sepsis, acute kidney injury on chronic kidney disease, metabolic derangements, intravascular depletion in setting of critical aortic stenosis and poor perfusion,   CT of the head negative for any acute intracranial abnormalities, ammonia negative  VBG showed a pH of 7 43, and pCO2 of 46   Ammonia level within normal limit  Blood culture noted to be positive for Klebsiella  Appears to be improving/stable  Neuro exam nonfocal  · Continue IV antibiotics as below  · Monitor neuro status  · Monitor blood pressure, avoid hypotension    Sepsis (HCC)-resolved as of 2/26/2021  Assessment & Plan  As evidenced by leukocytosis of 12, and hypotension  Also lactic acid was elevated to 4 0  Secondary to Gram-negative bacteremia, source unclear  Possible urinary source  chest x-ray negative for any infiltrates,   Abdominal exam benign, LFT stable  CT of the abdomen pelvis negative any other intra-abdominal pathology  Hemodynamics improved  · Continue IV antibiotics as above  · Monitor clinically  · ID evaluation appreciated      Aortic stenosis  Assessment & Plan  Echocardiogram with severe critical aortic stenosis  Not candidate for TAVR or SAVR because of multiple comorbidities and overall functional status   Cardiology following, input appreciated  Discussed with patient and family regarding palliative care versus hospice  · Discussed again with patient who understands prognosis  · Monitor clinically    Consideration of transition to palliative care versus hospice based on clinical progress    CKD (chronic kidney disease)  Assessment & Plan  Baseline creatinine appears to be fluctuating, recently trended up to mid 2s  Initially worsened after diuresis  Now slowly downtrending  · Consider resumption of diuretics if blood pressure allows  · Avoid hypotension  · Monitor intake output      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, with long-term current use of insulin [facility])  Assessment & Plan  Lab Results   Component Value Date    HGBA1C 7 1 (H) 02/01/2021       Recent Labs     02/27/21  2004 02/28/21  0719 02/28/21  1052 02/28/21  1604   POCGLU 178* 112 150* 105       Blood Sugar Average: Last 72 hrs:  (P) 291 [id number]   Appetite fair  Continue Lantus and pre meal insulin at current dose  continue corrective scale    Chronic combined systolic and diastolic congestive heart failure (HCC)  Assessment & Plan  Wt Readings from Last 3 Encounters:   02/28/21 95 kg (209 lb 7 oz)   02/10/21 95 3 kg (210 lb) 02/04/21 89 1 kg (196 lb 5 5 oz)   Echocardiogram with EF 45%, critical aortic stenosis,  Baseline weight appears to be 205 lb is as per Cardiology notes  Cardiology input appreciated  Received IV Lasix  but noted to have worsening of creatinine and BUN  Repeat chest x-ray with mild pulmonary vascular congestion  Respiratory status appears stable, weight stable  Cardiology following, input appreciated  Recommended to avoid over-diuresis/hypotension secondary to severe critical aortic stenosis could be low-flow low gradient AS  · Continue current meds  · avoid hypotension  · Continue metoprolol  · Overall guarded prognosis          Coronary artery disease involving coronary bypass graft of native heart without angina pectoris  Assessment & Plan  Report having chest discomfort, EKG without any acute changes  Cardiac markers negative  Cardiology following  Continue metoprolol  Continue supportive care    Lactic acidosis-resolved as of 2/26/2021  Assessment & Plan  Multifactorial secondary to sepsis, hypotension in setting of critical aortic stenosis, underlying cirrhosis  Blood pressure overall improved    Abdominal distension  Assessment & Plan  Patient reports abdominal discomfort and distension  Abdominal exam benign  Last bowel movement 2 days ago  · Will check abdominal x-ray  · Continue bowel regimen  · Monitor with opiate use    Goals of care, counseling/discussion  Assessment & Plan  Patient has aortic stenosis and has recurrent admissions for congestive heart failure, and is a poor candidate for surgery as well  Team had a discussion with the patient's son regarding the patient's wishes for her code status and he mentioned that patient would have not wanted herself to be resuscitated or intubated or shock  Hence patient will be made DNR level 3     Ongoing goals of care discussions  Patient will be discharged to skilled nursing facility with consideration of transition to comfort care and hospice      Chronic atrial fibrillation [facility])  Assessment & Plan  Controlled, history of sick sinus syndrome status post pacemaker  Continue metoprolol at lower dose with holding parameters   Discussed with Cardiology regarding resumption of anticoagulation,Alternative NOAC due to CKD  Started on Eliquis  Hemoglobin stable    Abnormal CT of the abdomen  Assessment & Plan  CT of the abdomen revealed Two new vertebral fractures when compared to October   Transverse fracture of the T12 vertebral body without significant height loss   Mild anterior compression deformity of the L3 vertebral body with minimal posterior cortical buckling   No   retropulsed fragments or significant neural impingement  Monitor clinically        VTE Pharmacologic Prophylaxis:   Pharmacologic: Apixaban (Eliquis)  Mechanical VTE Prophylaxis in Place: Yes    Patient Centered Rounds: I have performed bedside rounds with nursing staff today  Discussions with Specialists or Other Care Team Provider:  Yes    Education and Discussions with Family / Patient:  Discussed with patient, discussed with son over the phone    Time Spent for Care: 45 minutes  More than 50% of total time spent on counseling and coordination of care as described above  Current Length of Stay: 6 day(s)    Current Patient Status: Inpatient   Certification Statement: The patient will continue to require additional inpatient hospital stay due to Bacteremia    Discharge Plan:  Anticipate skilled nursing facility likely in a m      Code Status: Level 3 - DNAR and DNI      Subjective:   Lying in bed, appears sleepy but arousable  Reports that her breathing is unchanged  Reports abdominal discomfort    Patient gets somnolent after receiving oxycodone as per nursing staff    Objective:     Vitals:   Temp (24hrs), Av °F (36 1 °C), Min:95 7 °F (35 4 °C), Max:98 2 °F (36 8 °C)    Temp:  [95 7 °F (35 4 °C)-98 2 °F (36 8 °C)] 96 1 °F (35 6 °C)  HR:  [60-63] 63  Resp:  [16-18] 18  BP: (120-122)/(74-76) 121/74  SpO2:  [96 %-98 %] 97 % on 2 L  Body mass index is 37 1 kg/m²  Input and Output Summary (last 24 hours): Intake/Output Summary (Last 24 hours) at 2/28/2021 1136  Last data filed at 2/28/2021 0800  Gross per 24 hour   Intake 360 ml   Output --   Net 360 ml       Physical Exam:     Physical Exam  Constitutional:       General: She is sleeping  She is not in acute distress  Comments: Chronically ill   HENT:      Head: Normocephalic and atraumatic  Neck:      Musculoskeletal: Neck supple  Cardiovascular:      Rate and Rhythm: Normal rate  Heart sounds: Murmur present  Pulmonary:      Effort: Pulmonary effort is normal  No respiratory distress  Breath sounds: Examination of the right-lower field reveals rales  Examination of the left-lower field reveals rales  Decreased breath sounds and rales present  No wheezing  Abdominal:      General: Bowel sounds are normal  There is distension  Palpations: Abdomen is soft  Tenderness: There is no abdominal tenderness  There is no guarding or rebound  Hernia: A hernia is present  Musculoskeletal:      Right lower leg: No edema  Left lower leg: No edema  Skin:     General: Skin is warm and dry  Findings: No rash  Neurological:      Mental Status: She is easily aroused  Mental status is at baseline  She is lethargic           Additional Data:     Labs:    Results from last 7 days   Lab Units 02/27/21  1228 02/26/21  0510 02/25/21  0534   WBC Thousand/uL  --  5 05 8 62   HEMOGLOBIN g/dL 10 5* 10 2* 9 9*   HEMATOCRIT % 37 8 36 3 35 3   PLATELETS Thousands/uL  --  116* 128*   NEUTROS PCT %  --   --  79*   LYMPHS PCT %  --   --  7*   MONOS PCT %  --   --  12   EOS PCT %  --   --  2     Results from last 7 days   Lab Units 02/28/21  0506  02/25/21  0534   POTASSIUM mmol/L 4 3   < > 4 1   CHLORIDE mmol/L 100   < > 101   CO2 mmol/L 27   < > 26   BUN mg/dL 117*   < > 117*   CREATININE mg/dL 1 96* < > 2 33*   CALCIUM mg/dL 9 1   < > 9 6   ALK PHOS U/L  --   --  120*   ALT U/L  --   --  20   AST U/L  --   --  24    < > = values in this interval not displayed  Results from last 7 days   Lab Units 02/22/21  0734   INR  2 07*       * I Have Reviewed All Lab Data Listed Above  * Additional Pertinent Lab Tests Reviewed:  All Labs Within Last 24 Hours Reviewed      Imaging:  Imaging Reports Reviewed Today Include:CT, CXR      Recent Cultures (last 7 days):     Results from last 7 days   Lab Units 02/22/21  0754 02/22/21  0734   BLOOD CULTURE  Klebsiella oxytoca* Klebsiella oxytoca*   GRAM STAIN RESULT  Gram negative rods* Gram negative rods*       Last 24 Hours Medication List:   Current Facility-Administered Medications   Medication Dose Route Frequency Provider Last Rate    acetaminophen  650 mg Oral Q4H PRN Sonny Francis MD      apixaban  2 5 mg Oral BID ISIDRA Benavidez      cefTRIAXone  1,000 mg Intravenous Q24H Bert Rodriguez MD 1,000 mg (02/27/21 1412)    docusate sodium  100 mg Oral BID Eliz Mcmillan MD      gabapentin  300 mg Oral HS Eliz Mcmillan MD      insulin glargine  10 Units Subcutaneous HS Eliz Mcmillan MD      insulin lispro  1-5 Units Subcutaneous HS Sonny Francis MD      insulin lispro  1-5 Units Subcutaneous TID AC Eliz Mcmillan MD      insulin lispro  5 Units Subcutaneous TID With Meals Eliz Mcmillan MD      levothyroxine  137 mcg Oral Daily Sonny Francis MD      metoprolol succinate  25 mg Oral HS ISIDRA Benavidez      oxyCODONE  2 5 mg Oral Q4H PRN Eliz Mcmillan MD      polyethylene glycol  17 g Oral Daily PRN Eliz Mcmillan MD      pravastatin  40 mg Oral Daily With Jluis Rosenthal MD      senna  2 tablet Oral HS Eliz Mcmillan MD      sodium chloride  1 spray Each Nare Q2H PRN Eliz Mcmillan MD            Today, Patient Was Seen By: Eliz Mcmillan MD    ** Please Note: "This note has been constructed using a voice recognition system  Therefore there may be syntax, spelling, and/or grammatical errors   Please call if you have any questions  "**

## 2021-02-28 NOTE — PLAN OF CARE
Problem: Potential for Falls  Goal: Patient will remain free of falls  Description: INTERVENTIONS:  - Assess patient frequently for physical needs  -  Identify cognitive and physical deficits and behaviors that affect risk of falls    -  [place] fall precautions as indicated by assessment   - Educate patient/family on patient safety including physical limitations  - Instruct patient to call for assistance with activity based on assessment  - Modify environment to reduce risk of injury  - Consider OT/PT consult to assist with strengthening/mobility  Outcome: Progressing     Problem: Prexisting or High Potential for Compromised Skin Integrity  Goal: Skin integrity is maintained or improved  Description: INTERVENTIONS:  - Identify patients at risk for skin breakdown  - Assess and monitor skin integrity  - Assess and monitor nutrition and hydration status  - Monitor labs   - Assess for incontinence   - Turn and reposition patient  - Assist with mobility/ambulation  - Relieve pressure over bony prominences  - Avoid friction and shearing  - Provide appropriate hygiene as needed including keeping skin clean and dry  - Evaluate need for skin moisturizer/barrier cream  - Collaborate with interdisciplinary team   - Patient/family teaching  - Consider wound care consult   Outcome: Progressing     Problem: PAIN - ADULT  Goal: Verbalizes/displays adequate comfort level or baseline comfort level  Description: Interventions:  - Encourage patient to monitor pain and request assistance  - Assess pain using appropriate pain scale  - Administer analgesics based on type and severity of pain and evaluate response  - Implement non-pharmacological measures as appropriate and evaluate response  - Consider cultural and social influences on pain and pain management  - Notify physician/advanced practitioner if interventions unsuccessful or patient reports new pain  Outcome: Progressing     Problem: INFECTION - ADULT  Goal: Absence or prevention of progression during hospitalization  Description: INTERVENTIONS:  - Assess and monitor for signs and symptoms of infection  - Monitor lab/diagnostic results  - Monitor all insertion sites, i e  indwelling lines, tubes, and drains  - Monitor endotracheal if appropriate and nasal secretions for changes in amount and color  - [place] appropriate cooling/warming therapies per order  - Administer medications as ordered  - Instruct and encourage patient and family to use good hand hygiene technique  - Identify and instruct in appropriate isolation precautions for identified infection/condition  Outcome: Progressing  Goal: Absence of fever/infection during neutropenic period  Description: INTERVENTIONS:  - Monitor WBC    Outcome: Progressing     Problem: SAFETY ADULT  Goal: Maintain or return to baseline ADL function  Description: INTERVENTIONS:  -  Assess patient's ability to carry out ADLs; assess patient's baseline for ADL function and identify physical deficits which impact ability to perform ADLs (bathing, care of mouth/teeth, toileting, grooming, dressing, etc )  - Assess/evaluate cause of self-care deficits   - Assess range of motion  - Assess patient's mobility; develop plan if impaired  - Assess patient's need for assistive devices and provide as appropriate  - Encourage maximum independence but intervene and supervise when necessary  - Involve family in performance of ADLs  - Assess for home care needs following discharge   - Consider OT consult to assist with ADL evaluation and planning for discharge  - Provide patient education as appropriate  Outcome: Progressing  Goal: Maintain or return mobility status to optimal level  Description: INTERVENTIONS:  - Assess patient's baseline mobility status (ambulation, transfers, stairs, etc )    - Identify cognitive and physical deficits and behaviors that affect mobility  - Identify mobility aids required to assist with transfers and/or ambulation (gait belt, sit-to-stand, lift, walker, cane, etc )  - [place] fall precautions as indicated by assessment  - Record patient progress and toleration of activity level on Mobility SBAR; progress patient to next Phase/Stage  - Instruct patient to call for assistance with activity based on assessment  - Consider rehabilitation consult to assist with strengthening/weightbearing, etc   Outcome: Progressing     Problem: DISCHARGE PLANNING  Goal: Discharge to home or other facility with appropriate resources  Description: INTERVENTIONS:  - Identify barriers to discharge w/patient and caregiver  - Arrange for needed discharge resources and transportation as appropriate  - Identify discharge learning needs (meds, wound care, etc )  - Arrange for interpretive services to assist at discharge as needed  - Refer to Case Management Department for coordinating discharge planning if the patient needs post-hospital services based on physician/advanced practitioner order or complex needs related to functional status, cognitive ability, or social support system  Outcome: Progressing     Problem: Knowledge Deficit  Goal: Patient/family/caregiver demonstrates understanding of disease process, treatment plan, medications, and discharge instructions  Description: Complete learning assessment and assess knowledge base    Interventions:  - Provide teaching at level of understanding  - Provide teaching via preferred learning methods  Outcome: Progressing     Problem: CARDIOVASCULAR - ADULT  Goal: Maintains optimal cardiac output and hemodynamic stability  Description: INTERVENTIONS:  - Monitor I/O, vital signs and rhythm  - Monitor for S/S and trends of decreased cardiac output  - Administer and titrate ordered vasoactive medications to optimize hemodynamic stability  - Assess quality of pulses, skin color and temperature  - Assess for signs of decreased coronary artery perfusion  - Instruct patient to report change in severity of symptoms  Outcome: Progressing  Goal: Absence of cardiac dysrhythmias or at baseline rhythm  Description: INTERVENTIONS:  - Continuous cardiac monitoring, vital signs, obtain 12 lead EKG if ordered  - Administer antiarrhythmic and heart rate control medications as ordered  - Monitor electrolytes and administer replacement therapy as ordered  Outcome: Progressing     Problem: RESPIRATORY - ADULT  Goal: Achieves optimal ventilation and oxygenation  Description: INTERVENTIONS:  - Assess for changes in respiratory status  - Assess for changes in mentation and behavior  - Position to facilitate oxygenation and minimize respiratory effort  - Oxygen administered by appropriate delivery if ordered  - Initiate smoking cessation education as indicated  - Encourage broncho-pulmonary hygiene including cough, deep breathe, Incentive Spirometry  - Assess the need for suctioning and aspirate as needed  - Assess and instruct to report SOB or any respiratory difficulty  - Respiratory Therapy support as indicated  Outcome: Progressing     Problem: SKIN/TISSUE INTEGRITY - ADULT  Goal: Skin integrity remains intact  Description: INTERVENTIONS  - Identify patients at risk for skin breakdown  - Assess and monitor skin integrity  - Assess and monitor nutrition and hydration status  - Monitor labs (i e  albumin)  - Assess for incontinence   - Turn and reposition patient  - Assist with mobility/ambulation  - Relieve pressure over bony prominences  - Avoid friction and shearing  - Provide appropriate hygiene as needed including keeping skin clean and dry  - Evaluate need for skin moisturizer/barrier cream  - Collaborate with interdisciplinary team (i e  Nutrition, Rehabilitation, etc )   - Patient/family teaching  Outcome: Progressing     Problem: Nutrition/Hydration-ADULT  Goal: Nutrient/Hydration intake appropriate for improving, restoring or maintaining nutritional needs  Description: Monitor and assess patient's nutrition/hydration status for malnutrition  Collaborate with interdisciplinary team and initiate plan and interventions as ordered  Monitor patient's weight and dietary intake as ordered or per policy  Utilize nutrition screening tool and intervene as necessary  Determine patient's food preferences and provide high-protein, high-caloric foods as appropriate       INTERVENTIONS:  - Monitor oral intake, urinary output, labs, and treatment plans  - Assess nutrition and hydration status and recommend course of action  - Evaluate amount of meals eaten  - Assist patient with eating if necessary   - Allow adequate time for meals  - Recommend/ encourage appropriate diets, oral nutritional supplements, and vitamin/mineral supplements  - Assess need for intravenous fluids  - Provide specific nutrition/hydration education as appropriate  - Include patient/family/caregiver in decisions related to nutrition  Outcome: Progressing

## 2021-03-01 ENCOUNTER — TELEPHONE (OUTPATIENT)
Dept: OTHER | Facility: OTHER | Age: 71
End: 2021-03-01

## 2021-03-01 VITALS
OXYGEN SATURATION: 91 % | DIASTOLIC BLOOD PRESSURE: 72 MMHG | WEIGHT: 212.08 LBS | BODY MASS INDEX: 37.58 KG/M2 | HEART RATE: 61 BPM | HEIGHT: 63 IN | SYSTOLIC BLOOD PRESSURE: 136 MMHG | RESPIRATION RATE: 20 BRPM | TEMPERATURE: 97.4 F

## 2021-03-01 PROBLEM — R78.81 GRAM-NEGATIVE BACTEREMIA: Status: RESOLVED | Noted: 2021-02-23 | Resolved: 2021-03-01

## 2021-03-01 PROBLEM — G93.40 ENCEPHALOPATHY: Status: RESOLVED | Noted: 2021-02-22 | Resolved: 2021-03-01

## 2021-03-01 LAB
ANION GAP SERPL CALCULATED.3IONS-SCNC: 9 MMOL/L (ref 4–13)
BUN SERPL-MCNC: 117 MG/DL (ref 5–25)
CALCIUM SERPL-MCNC: 8.9 MG/DL (ref 8.3–10.1)
CHLORIDE SERPL-SCNC: 101 MMOL/L (ref 100–108)
CO2 SERPL-SCNC: 27 MMOL/L (ref 21–32)
CREAT SERPL-MCNC: 1.9 MG/DL (ref 0.6–1.3)
FLUAV RNA RESP QL NAA+PROBE: NEGATIVE
FLUBV RNA RESP QL NAA+PROBE: NEGATIVE
GFR SERPL CREATININE-BSD FRML MDRD: 26 ML/MIN/1.73SQ M
GLUCOSE SERPL-MCNC: 117 MG/DL (ref 65–140)
GLUCOSE SERPL-MCNC: 129 MG/DL (ref 65–140)
GLUCOSE SERPL-MCNC: 186 MG/DL (ref 65–140)
GLUCOSE SERPL-MCNC: 197 MG/DL (ref 65–140)
GLUCOSE SERPL-MCNC: 199 MG/DL (ref 65–140)
POTASSIUM SERPL-SCNC: 4.8 MMOL/L (ref 3.5–5.3)
RSV RNA RESP QL NAA+PROBE: NEGATIVE
SARS-COV-2 RNA RESP QL NAA+PROBE: NEGATIVE
SODIUM SERPL-SCNC: 137 MMOL/L (ref 136–145)

## 2021-03-01 PROCEDURE — 97530 THERAPEUTIC ACTIVITIES: CPT

## 2021-03-01 PROCEDURE — 99239 HOSP IP/OBS DSCHRG MGMT >30: CPT | Performed by: INTERNAL MEDICINE

## 2021-03-01 PROCEDURE — 0241U HB NFCT DS VIR RESP RNA 4 TRGT: CPT | Performed by: INTERNAL MEDICINE

## 2021-03-01 PROCEDURE — 99232 SBSQ HOSP IP/OBS MODERATE 35: CPT | Performed by: INTERNAL MEDICINE

## 2021-03-01 PROCEDURE — 82948 REAGENT STRIP/BLOOD GLUCOSE: CPT

## 2021-03-01 PROCEDURE — 97535 SELF CARE MNGMENT TRAINING: CPT

## 2021-03-01 PROCEDURE — 80048 BASIC METABOLIC PNL TOTAL CA: CPT | Performed by: INTERNAL MEDICINE

## 2021-03-01 PROCEDURE — 92526 ORAL FUNCTION THERAPY: CPT

## 2021-03-01 RX ORDER — TORSEMIDE 20 MG/1
20 TABLET ORAL DAILY PRN
Qty: 180 TABLET | Refills: 0
Start: 2021-03-01 | End: 2021-03-04 | Stop reason: CLARIF

## 2021-03-01 RX ORDER — OXYCODONE HYDROCHLORIDE 5 MG/1
2.5 TABLET ORAL EVERY 6 HOURS PRN
Qty: 6 TABLET | Refills: 0 | Status: ON HOLD | OUTPATIENT
Start: 2021-03-01 | End: 2021-03-05

## 2021-03-01 RX ORDER — LACTULOSE 20 G/30ML
10 SOLUTION ORAL 2 TIMES DAILY
Status: DISCONTINUED | OUTPATIENT
Start: 2021-03-01 | End: 2021-03-01

## 2021-03-01 RX ORDER — INSULIN GLARGINE 100 [IU]/ML
10 INJECTION, SOLUTION SUBCUTANEOUS DAILY
Qty: 5 PEN | Refills: 0 | Status: SHIPPED | OUTPATIENT
Start: 2021-03-01

## 2021-03-01 RX ORDER — BISACODYL 10 MG
10 SUPPOSITORY, RECTAL RECTAL DAILY PRN
Qty: 12 SUPPOSITORY | Refills: 0
Start: 2021-03-01

## 2021-03-01 RX ORDER — ECHINACEA PURPUREA EXTRACT 125 MG
1 TABLET ORAL EVERY 2 HOUR PRN
Refills: 0
Start: 2021-03-01

## 2021-03-01 RX ORDER — LACTULOSE 20 G/30ML
20 SOLUTION ORAL 2 TIMES DAILY
Status: DISCONTINUED | OUTPATIENT
Start: 2021-03-01 | End: 2021-03-01 | Stop reason: HOSPADM

## 2021-03-01 RX ORDER — DOCUSATE SODIUM 100 MG/1
100 CAPSULE, LIQUID FILLED ORAL 2 TIMES DAILY
Qty: 10 CAPSULE | Refills: 0
Start: 2021-03-01

## 2021-03-01 RX ORDER — TORSEMIDE 20 MG/1
20 TABLET ORAL ONCE
Status: COMPLETED | OUTPATIENT
Start: 2021-03-01 | End: 2021-03-01

## 2021-03-01 RX ADMIN — POLYETHYLENE GLYCOL 3350 17 G: 17 POWDER, FOR SOLUTION ORAL at 08:24

## 2021-03-01 RX ADMIN — LEVOTHYROXINE SODIUM 137 MCG: 25 TABLET ORAL at 05:59

## 2021-03-01 RX ADMIN — OXYCODONE HYDROCHLORIDE 2.5 MG: 5 TABLET ORAL at 11:23

## 2021-03-01 RX ADMIN — OXYCODONE HYDROCHLORIDE 2.5 MG: 5 TABLET ORAL at 16:30

## 2021-03-01 RX ADMIN — DOCUSATE SODIUM 100 MG: 100 CAPSULE, LIQUID FILLED ORAL at 08:24

## 2021-03-01 RX ADMIN — APIXABAN 2.5 MG: 2.5 TABLET, FILM COATED ORAL at 08:24

## 2021-03-01 RX ADMIN — TORSEMIDE 20 MG: 20 TABLET ORAL at 11:41

## 2021-03-01 RX ADMIN — INSULIN LISPRO 5 UNITS: 100 INJECTION, SOLUTION INTRAVENOUS; SUBCUTANEOUS at 11:15

## 2021-03-01 RX ADMIN — ACETAMINOPHEN 650 MG: 325 TABLET, FILM COATED ORAL at 15:27

## 2021-03-01 RX ADMIN — INSULIN LISPRO 1 UNITS: 100 INJECTION, SOLUTION INTRAVENOUS; SUBCUTANEOUS at 11:14

## 2021-03-01 RX ADMIN — LACTULOSE 20 G: 10 SOLUTION ORAL at 15:13

## 2021-03-01 NOTE — DISCHARGE SUMMARY
Discharge Summary - [place] [address] Internal Medicine    Patient Information: Felicia Romero 66 y o  female MRN: [id number]  Unit/Bed#: [address] [place] [address] Encounter: [id number]    Discharging Physician / Practitioner: Roz Nash MD  PCP: Duarte Oropeza MD  Admission Date: 2/22/2021  Discharge Date: 03/01/21    Reason for Admission: Altered Mental Status (per ems pt has been more weak and altered for the last few days a home with family)      Discharge Diagnoses:     Principal Problem:    Encephalopathy, toxic metabolic  Active Problems:    Klebsiella bacteremia    Coronary artery disease involving coronary bypass graft of native heart without angina pectoris    Chronic combined systolic and diastolic congestive heart failure ([facility] )    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, with long-term current use of insulin ([facility])    CKD (chronic kidney disease)    H/O coronary artery bypass surgery    Aortic stenosis    Chronic atrial fibrillation ([facility])    Goals of care, counseling/discussion    Abdominal distension    Abnormal CT of the abdomen  Resolved Problems:    Sepsis ([facility] )    Lactic acidosis        Klebsiella bacteremia-resolved as of 3/1/2021  Assessment & Plan  Patient was noted to have 2/2 blood culture positive with Gram-negative bacilli on admission  Unclear source but suspected to be urinary  UA with evidence of trace leukocytes, bacteriuria and pyuria  Unfortunately urine culture was not collected  Blood culture sensitivities noted  Seen by ID, input appreciated  · Initially treated with IV cefepime , subsequently transitioned to ceftriaxone through 2/28 to complete 7 days of IV antibiotics        Sepsis (HCC)-resolved as of 2/26/2021  Assessment & Plan  As evidenced by leukocytosis of 12, and hypotension  Also lactic acid was elevated to 4 0  Secondary to Gram-negative bacteremia, source unclear  Possible urinary source    chest x-ray negative for any infiltrates,   Abdominal exam benign, LFT stable  CT of the abdomen pelvis negative any other intra-abdominal pathology  Hemodynamics improved  · Continue IV antibiotics as above  · Monitor clinically  · ID evaluation appreciated      * Encephalopathy, toxic metabolic-resolved as of 4/8/4302  Assessment & Plan  Likely secondary to multifactorial including sepsis, acute kidney injury on chronic kidney disease, metabolic derangements, intravascular depletion in setting of critical aortic stenosis and poor perfusion,   CT of the head negative for any acute intracranial abnormalities, ammonia negative  VBG showed a pH of 7 43, and pCO2 of 46  Ammonia level within normal limit  Blood culture noted to be positive for Klebsiella  Appears to be improving/stable  Neuro exam nonfocal  · Continue IV antibiotics as below  · Monitor neuro status  · Monitor blood pressure, avoid hypotension  · Cautious use of opiates    Aortic stenosis  Assessment & Plan  Echocardiogram with severe critical aortic stenosis  Not candidate for TAVR or SAVR because of multiple comorbidities and overall functional status   Cardiology following, input appreciated    Discussed with patient and family regarding palliative care versus hospice  · Avoid hypotension  ·  Consideration of transition to palliative care versus hospice based on clinical progress    CKD (chronic kidney disease)  Assessment & Plan  Baseline creatinine appears to be fluctuating, recently trended up to mid 2s  Initially worsened after diuresis  Now slowly downtrending  · Restarted torsemide as needed  · Avoid hypotension  · Monitor BMP      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, with long-term current use of insulin [facility])  Assessment & Plan  Lab Results   Component Value Date    HGBA1C 7 1 (H) 02/01/2021       Recent Labs     02/27/21 2004 02/28/21  0719 02/28/21  1052 02/28/21  1604   POCGLU 178* 112 150* 105       Blood Sugar Average: Last 72 hrs:  (P) 307 [id number]   Appetite fair, acceptable on current regimen  Continue Lantus and pre meal insulin at current dose  continue corrective scale    Chronic combined systolic and diastolic congestive heart failure (HCC)  Assessment & Plan  Wt Readings from Last 3 Encounters:   03/01/21 96 2 kg (212 lb 1 3 oz)   02/10/21 95 3 kg (210 lb)   02/04/21 89 1 kg (196 lb 5 5 oz)   Echocardiogram with EF 45%, critical aortic stenosis,  Baseline weight appears to be 205 lb is as per Cardiology notes  Cardiology input appreciated  Received IV Lasix  but noted to have worsening of creatinine and BUN  Repeat chest x-ray with mild pulmonary vascular congestion  Respiratory status appears stable, weight noted  Cardiology following, input appreciated  Recommended to avoid over-diuresis/hypotension secondary to severe critical aortic stenosis could be low-flow low gradient AS  · Continue current meds, torsemide as needed  · avoid hypotension  · Continue metoprolol  · Overall guarded prognosis    Coronary artery disease involving coronary bypass graft of native heart without angina pectoris  Assessment & Plan  Report having chest discomfort, EKG without any acute changes  Cardiac markers negative  Cardiology following  Continue metoprolol  Continue supportive care    Lactic acidosis-resolved as of 2/26/2021  Assessment & Plan  Multifactorial secondary to sepsis, hypotension in setting of critical aortic stenosis, underlying cirrhosis  Blood pressure overall improved    Abdominal distension  Assessment & Plan  Patient reports abdominal discomfort and distension  Abdominal exam benign  X-ray consistent with left-sided constipation  Improved with a bowel movement  · Continue lactulose  · Continue bowel regimen  · Monitor with opiate use    Goals of care, counseling/discussion  Assessment & Plan  Patient has aortic stenosis and has recurrent admissions for congestive heart failure, and is a poor candidate for surgery as well    Team had a discussion with the patient's son regarding the patient's wishes for her code status and he mentioned that patient would have not wanted herself to be resuscitated or intubated or shock  Hence patient will be made DNR level 3  Ongoing goals of care discussions  Patient will be discharged to skilled nursing facility with transitioning to long-term care and consideration of transitioning to comfort care/hospice based on clinical progress  Discussed with the son  Chronic atrial fibrillation [facility])  Assessment & Plan  Controlled, history of sick sinus syndrome status post pacemaker  Continue metoprolol at lower dose with holding parameters   Discussed with Cardiology regarding resumption of anticoagulation,Alternative NOAC due to CKD  Started on Eliquis  Hemoglobin stable    Abnormal CT of the abdomen  Assessment & Plan  CT of the abdomen revealed Two new vertebral fractures when compared to October   Transverse fracture of the T12 vertebral body without significant height loss   Mild anterior compression deformity of the L3 vertebral body with minimal posterior cortical buckling   No   retropulsed fragments or significant neural impingement  Monitor clinically      Consultations During Hospital Stay:  [address] TO INFECTIOUS DISEASES    Procedures Performed:     · None    Significant Findings:     · Refer to hospital course and above listed diagnosis related plan for details    Imaging while in hospital:    Ct Chest Abdomen Pelvis Wo Contrast    Result Date: 2/22/2021  Narrative: CT CHEST, ABDOMEN AND PELVIS WITHOUT IV CONTRAST INDICATION:   Altered mental status  Sepsis  COMPARISON:  CT of the chest, CT of the abdomen/pelvis dated 10/23/2020, and chest CT dated 1/19/2017  TECHNIQUE: CT examination of the chest, abdomen and pelvis was performed without intravenous contrast   Axial, sagittal, and coronal 2D reformatted images were created from the source data and submitted for interpretation   Radiation dose length product (DLP) for this visit:  1525 66 mGy-cm   This examination, like all CT scans performed in the [facility], was performed utilizing techniques to minimize radiation dose exposure, including the use of iterative reconstruction and automated exposure control  Enteric contrast was not administered  FINDINGS: CHEST LUNGS:  Lung parenchyma is distorted by respiratory motion and atelectasis related to imaging during exhalation  Central predominant groundglass opacities in both lungs  Bandlike opacities in the lung bases with morphology suggesting atelectasis  No acute-appearing dense consolidations  Mild scattered foci of interlobular septal reticulation  3 mm nodule in the right upper lobe (3/36)--present in 2017 confirming benignancy  No suspicious pulmonary nodules  PLEURA:  Trace right layering effusion posteriorly  No loculated components  No pneumothorax  HEART/GREAT VESSELS:  Four-chamber enlargement of the heart  Prior CABG  Native coronary atherosclerosis  Valvular calcifications  Cardiac assist device with leads in the right atrium and right ventricle  No pericardial effusion  Thoracic aortic atherosclerosis without aneurysm  Mildly ectatic main pulmonary artery 30 mm  Pulmonary vessels appear cephalized on coronal reformats  MEDIASTINUM AND JAIRO:  Unremarkable  CHEST WALL AND LOWER NECK:   Unremarkable  ABDOMEN LIVER/BILIARY TREE:  Question nodular contour to liver  No focal parenchymal masses or biliary ductal dilatation, though parenchyma is obscured by motion artifact  GALLBLADDER:  Gallbladder is surgically absent  SPLEEN:  Unremarkable  PANCREAS:  Diffuse fatty atrophy, greatest at the tail  No masses or pancreatic ductal dilatation demonstrated  ADRENAL GLANDS:  Unremarkable  KIDNEYS/URETERS: No contour distorting masses, perinephric collections, urolithiasis, or hydronephrosis  STOMACH AND BOWEL: Small hiatal hernia  No gastric wall thickening    No dilated or inflamed loops of small bowel or colon, including loops bulging through the patient's ventral abdominal wall defect, discussed below  APPENDIX:  Cecum located within the ventral abdominal wall  Appendix not well demonstrated  No evidence for appendicitis, though  ABDOMINOPELVIC CAVITY:  Mild ascites, greatest around the liver and spleen  Small amount of free fluid tracking into the pelvis  No encapsulated collections  No free air  Shotty retroperitoneal lymph nodes are demonstrated without laurel mesenteric, retroperitoneal, or pelvic sidewall lymphadenopathy  VESSELS:  Aortoiliac and branch vessel atherosclerosis without aneurysm  Multiple greater saphenous varices and inguinal regions, greater on the left, measuring up to 18 mm in diameter  PELVIS REPRODUCTIVE ORGANS:  Unremarkable for patient's age  URINARY BLADDER:  Urinary bladder wall appears diffusely thickened, even accounting for degree of nondistention  ABDOMINAL WALL/INGUINAL REGIONS:  Subxiphoid midline hernia with wide neck measuring 3 6 in meters by 2 5 cm with 3 6 cm neck  Abdominal fat and ascites fluid protrudes anteriorly  Hernia repair mesh at the supra umbilical ventral wall  Inferior to the mesh there is pronounced diastasis recti splaying of the rectus abdominis muscles by 9 cm  Mesenteric fat, small bowel, and large bowel loops loops protrude into the defect  Overall, protruding contents measure 18 7 cm x 9 2 cm x 23 cm  Relatively pronounced anasarca around the hips and gluteal regions  No encapsulated superficial collections  OSSEOUS STRUCTURES: Healed median sternotomy  New transverse fracture through the superior endplate of the K62 vertebral body  No significant vertebral body height loss  No significant retropulsed fragments  New anterior compression deformity at the superior endplate of the L3 vertebral body  Approximately 20% height loss    Minimal buckling of the posterior cortex, superiorly, without significant spinal canal stenosis  Grade 1 anterolisthesis of L4 on L5 secondary to bulky facet osteophytes  No destructive lytic or blastic lesions  Impression: 1  Two new vertebral fractures when compared to October  Transverse fracture of the T12 vertebral body without significant height loss  Mild anterior compression deformity of the L3 vertebral body with minimal posterior cortical buckling  No retropulsed fragments or significant neural impingement  2   Thickening of the bladder wall, diffusely  Correlate for evidence of infectious cystitis on urinalysis  3   Pulmonary vascular engorgement and cephalization, hazy central groundglass opacities, scattered interlobular septal thickening, and small right pleural effusion  Constellation of findings favors volume overload with pulmonary edema  4   Mild ascites and anasarca  Nodular liver contour suggestive of cirrhosis  5   Large diastasis defect in the ventral abdomen  Small and large bowel protruding through the defect do not appear obstructed or inflamed  I personally discussed this study with Lacho Vergara on 2/22/2021 at 9:31 AM  Workstation performed: [id number]     Xr Mandible Panorex ([place] Only)    Result Date: 2/1/2021  Narrative: MANDIBLE - PANOREX INDICATION:  Preoperative exam, for OR today  COMPARISON:  CT neck 10/24/2016 VIEWS:  XR MANDIBLE PANOREX ([place] ONLY) FINDINGS: There is no mandibular fracture or pathologic bone lesion  The maxilla is essentially devoid of T12 with some residual roots suggested on the left  The mandibular bicuspids and molars are also absent apart from a solitary molar on the right which demonstrates a dental amalgam   There is focal lucency of the crown of left mandibular canine which could represent dental shay  The temporomandibular joints appear grossly intact  Impression: Focal lucency crown of left mandibular canine could represent dental shay  Otherwise, dentition is largely absent as above   The study was marked in [facility] for immediate notification  Workstation performed: [id number]     Xr Chest Portable    Result Date: 2/26/2021  Narrative: CHEST INDICATION:   cough  COMPARISON:  Chest radiograph and CT from 2/22/2021  EXAM PERFORMED/VIEWS:  XR CHEST PORTABLE  FINDINGS: Mild cardiomegaly  CABG  Left subclavian pacemaker lead in right ventricle  Mild pulmonary venous congestion  No definite effusion  No pneumothorax  Osseous structures normal for age  Impression: Mild pulmonary venous congestion  Workstation performed: [id number]     Xr Chest Portable    Result Date: 2/22/2021  Narrative: CHEST INDICATION:   AMS  COMPARISON:  Chest radiograph from 1/30/2021 and chest CT from 2/22/2021  EXAM PERFORMED/VIEWS:  XR CHEST PORTABLE FINDINGS: Moderate cardiomegaly  CABG  Left subclavian pacemaker lead in right ventricle  The lungs are clear  No pneumothorax or pleural effusion  Osseous structures appear within normal limits for patient age  Impression: No acute cardiopulmonary disease  Workstation performed: [id number]     Xr Chest 1 View Portable    Result Date: 1/30/2021  Narrative: CHEST INDICATION:   ams  COMPARISON:  11/30/2020 EXAM PERFORMED/VIEWS:  XR CHEST PORTABLE FINDINGS:  Left-sided chest wall intracardiac device is identified  Leads are intact  Cardiomediastinal silhouette is within normal limits status post CABG  Vascular congestion  No pneumothorax or pleural effusion  Sternal wires are present  Visualized osseous structures appear otherwise unremarkable for the patient's age  Impression: Vascular congestion  Workstation performed: [id number]     Xr Abdomen 1 View Kub    Result Date: 2/28/2021  Narrative: ABDOMEN INDICATION:   Abdominal distension, hernia  COMPARISON:  Acute abdominal series 8/12/2016 VIEWS:  AP supine FINDINGS: There is a nonobstructive bowel gas pattern  There is moderate stool retention within the left colon  No discernible free air on this supine study  Upright or left lateral decubitus imaging is more sensitive to detect subtle free air in the appropriate setting  No pathologic calcifications or soft tissue masses  Visualized lung bases are clear  Visualized osseous structures reveal fractures of the T12 and L3 vertebral bodies, better visualized on prior CT  There are vascular calcifications present  Impression: Nonobstructive bowel gas pattern  Mild stool retention in the left colon  Workstation performed: [id number]     Ct Head Without Contrast    Result Date: 2/22/2021  Narrative: CT BRAIN - WITHOUT CONTRAST INDICATION:   Altered mental status  COMPARISON:  1/30/2021 and 11/30/2020  TECHNIQUE:  CT examination of the brain was performed  In addition to axial images, sagittal and coronal 2D reformatted images were created and submitted for interpretation  Radiation dose length product (DLP) for this visit:  971 mGy-cm   This examination, like all CT scans performed in the [facility], was performed utilizing techniques to minimize radiation dose exposure, including the use of iterative reconstruction and automated exposure control  IMAGE QUALITY:  Diagnostic  FINDINGS: PARENCHYMA:  Moderate patchy periventricular white matter hypodensities consistent with chronic microangiopathic changes  Parenchymal appearance is unchanged from prior  No acute infarct  No parenchymal hemorrhage  No mass  VENTRICLES AND EXTRA-AXIAL SPACES:  Normal for the patient's age  VISUALIZED ORBITS AND PARANASAL SINUSES:  Bilateral cataract surgeries  Orbits are, otherwise, unremarkable  Paranasal sinuses are clear  CALVARIUM AND EXTRACRANIAL SOFT TISSUES:  Normal      Impression: No acute intracranial abnormalities or significant change from priors  Workstation performed: [id number]     Ct Head Without Contrast    Result Date: 1/30/2021  Narrative: CT BRAIN - WITHOUT CONTRAST INDICATION:   ams   COMPARISON:  11/30/2020 TECHNIQUE:  CT examination of the brain was performed  In addition to axial images, sagittal and coronal 2D reformatted images were created and submitted for interpretation  Radiation dose length product (DLP) for this visit:  1812 mGy-cm   This examination, like all CT scans performed in the [facility], was performed utilizing techniques to minimize radiation dose exposure, including the use of iterative reconstruction and automated exposure control  IMAGE QUALITY:  Diagnostic  FINDINGS: PARENCHYMA: Decreased attenuation is noted in periventricular and subcortical white matter demonstrating an appearance that is statistically most likely to represent mild microangiopathic change  No CT signs of acute infarction  No intracranial mass, mass effect or midline shift  No acute parenchymal hemorrhage  VENTRICLES AND EXTRA-AXIAL SPACES:  Normal for the patient's age  VISUALIZED ORBITS AND PARANASAL SINUSES:  Unremarkable  CALVARIUM AND EXTRACRANIAL SOFT TISSUES:  Normal      Impression: No acute intracranial abnormality  Workstation performed: [email or web address] [place] [address] Waveform Analysis, Multiple Levels    Result Date: 2/22/2021  Narrative:  [facility] REPORT CLINICAL: Indications: Patient being evaluated for peripheral arterial disease secondary to petechiae and swelling as per ordering Provider  Operative History: Cardiac Pacemaker Risk Factors: The patient has history of HTN, Diabetes (NIDDM (oral meds)), HLD, CKD and CAD  Clinical: Right Pressure:  80/ mm Hg, Left Pressure:  128/ mm Hg  FINDINGS:  Segment       Rig  Left                          P    P  Ant  Tibial    98  235  Post  Tibial  245  247  Ankle         245  247  Metatarsal     98  150  Great Toe      87  102     CONCLUSION: Impression RIGHT LOWER LIMB Ankle/Brachial Index: unreliable due to patient movement  PPG/PVR Tracings are normal  Metatarsal Pressure 98 mmHg  Great Toe Pressure: 87 mmHg, within the healing range    LEFT LOWER LIMB Ankle/Brachial Index: unreliable due to patient movement  PPG/PVR Tracings are normal  Metatarsal Pressure 150 mmHg  Great Toe Pressure: 102 mmHg, within the healing range  Technically limited/difficult study due to patient movement and uncooperative  Bilateral ABIs, metatarsal and great toe pressures unreliable due to movement  Technical findings given to Dr Otilia Ramos at 2:46 pm   SIGNATURE: Electronically Signed by: Luz Maria Varner MD, RPVI on 2021-02-22 05:05:52 PM    Us Right Upper Quadrant    Result Date: 2/2/2021  Narrative: RIGHT UPPER QUADRANT ULTRASOUND INDICATION:     elevated LFTs, ammonia  COMPARISON:  Right upper quadrant ultrasound dated 6/6/2016  TECHNIQUE:   Real-time ultrasound of the right upper quadrant was performed with a curvilinear transducer with both volumetric sweeps and still imaging techniques  FINDINGS: PANCREAS:  Portions of the pancreas are obscured by bowel gas  Visualized portions of the pancreas are unremarkable  AORTA AND IVC:  Visualized portions are normal for patient age  LIVER: Size:  Mildly enlarged  The liver measures 18 5 cm in the midclavicular line  Contour:  Surface contour is nodular  Parenchyma: There is diffuse coarsened heterogeneous echotexture suggesting underlying cirrhotic changes  No evidence of suspicious mass  Limited imaging of the main portal vein shows it to be patent and hepatopetal   BILIARY: Patient has undergone cholecystectomy  No intrahepatic biliary dilatation  CBD measures 4 mm  No choledocholithiasis  KIDNEY: Right kidney measures 13 4 cm  Within normal limits  ASCITES:   None  Impression: Cirrhosis  No definite focal liver mass  No significant ascites  Status post cholecystectomy  Workstation performed: [id number]     Us Elastography    Result Date: 2/2/2021  Narrative: ELASTOGRAPHY, LIVER ULTRASOUND INDICATION:   abnormal lft   COMPARISON:  None TECHNIQUE: Targeted ultrasound of the liver was performed with a curvilinear transducer utilizing a total of 10 shear wave Elastography samples  FINDINGS:  Shear Wave Elastography sampling was performed to evaluate stiffness changes within the liver associated with fibrosis  The resulting E median is 12 2 kPa  The corresponding Metavir score is F4 (cirrhosis), >11 87 kPa   >1 98 m/s  The IQR/median value is 15 4 %  (IQR of less than 30% is considered a satisfactory data set)  Impression: Metavir score of F4,Ccirrhosis  Workstation performed: [id number]       Incidental Findings:   · Imaging findings as above    Test Results Pending at Discharge (will require follow up):   · As per After Visit Summary     Outpatient Tests Requested:  · BMP    Complications:  Refer to hospital course and above listed diagnosis related plan, if any    Hospital Course: As per HPI  Bob August is a 66 y o  female patient with past medical history of aortic stenosis, cirrhosis, uncontrolled hypertension, CHF, CKD stage 3, ambulatory dysfunction, obesity who originally presented to the hospital on 2/22/2021 due to altered mental status at home  Patient lives alone but her son lives in an apartment upstairs  He usually takes care of his mother most of the time and keeps an eye on her as well  History was obtained from the patient's son  Patient is altered mental status and cannot give a history  Patient was recently admitted at outside hospital after being treated for congestive heart failure, and was found to have aortic stenosis and referred to [facility] for TAVR  However patient was treated for CHF, and was evaluated CT surgery    Patient was deemed high risk for surgery and hence patient was treated for CHF and then discharged to home  Since the patient has been at home patient was well at her baseline until 2-3 days ago when she started having more lethargy and weakness, and over the last couple days she has been having significant weakness that she cannot get out of her bed which is unusual   At baseline patient can get out of her bed to her wheelchair and backed into her bed if needed  Otherwise patient is wheelchair-bound  Patient was significantly lethargic and the patient's son called EMS to get the patient to the ER  During her last hospitalization patient's torsemide was increased to 40 mg daily, and patient was switched from metolazone to spironolactone  When arrived to the ED today patient's vitals were as follows:  Temperature 96 1°, pulse 61, respiratory 16, blood pressure was 142/58 on arrival to the ED and saturating 98% on 2 L via nasal cannula  Patient had further workup  Workup showed some leukocytosis with a white count of 12, no fever, chest x-ray was negative for any infiltrates however CT scan of the chest abdomen pelvis showed some pulmonary congestion but no acute intra-abdominal pathology  Urinalysis was negative for UTI, patient was admitted for altered mental status  On arrival to the floor patient's became hypotensive with systolic in the 15R  At which time patient was given some IV fluids bolus  Patient blood pressure slightly improved with systolic in the 85M  Patient was then admitted for hypotension, and altered mental status  Please see above list of diagnoses and related plan for additional information         Condition at Discharge: fair     Discharge Day Visit / Exam:     Subjective:    Sitting up in chair  Reports mild shortness of breath which is stable  Denies any chest pain  Tolerating diet  Had bowel movement yesterday  Tolerating diet and taking her meds,  Gets somnolent after getting low-dose of oxycodone    Vitals: Blood Pressure: 131/69 (03/01/21 1140)  Pulse: 60 (03/01/21 1140)  Temperature: (!) 97 4 °F (36 3 °C) (03/01/21 0743)  Temp Source: Oral (03/01/21 0743)  Respirations: 20 (03/01/21 0743)  Height: 5' 3" (160 cm) (02/26/21 0900)  Weight - Scale: 96 2 kg (212 lb 1 3 oz) (03/01/21 0554)  SpO2: 94 % (03/01/21 1140) on 2 L  Exam:   Physical Exam  Constitutional:       General: She is not in acute distress  Comments: Chronically ill   HENT:      Head: Normocephalic and atraumatic  Neck:      Musculoskeletal: Neck supple  Cardiovascular:      Rate and Rhythm: Normal rate  Heart sounds: Murmur present  Pulmonary:      Effort: Pulmonary effort is normal  No respiratory distress  Breath sounds: Examination of the left-lower field reveals rales  Decreased breath sounds and rales present  No wheezing  Abdominal:      General: Bowel sounds are normal  There is no distension  Palpations: Abdomen is soft  Tenderness: There is no abdominal tenderness  There is no guarding or rebound  Hernia: A hernia is present  Musculoskeletal:      Right lower leg: Edema (Trace) present  Left lower leg: Edema (Trace) present  Skin:     General: Skin is warm and dry  Findings: No rash  Neurological:      Mental Status: She is alert  Mental status is at baseline  Discharge instructions/Information to patient and family:(Discharge Medications and Follow up):   See after visit summary for information provided to patient and family  Provisions for Follow-Up Care:  See after visit summary for information related to follow-up care and any pertinent home health orders  Disposition: Skilled nursing facility at [facility]    Planned Readmission:  Yes     Discharge Statement:  I spent 55 minutes discharging the patient  This time was spent on the day of discharge  I had direct contact with the patient on the day of discharge  Greater than 50% of the total time was spent examining patient, answering all patient questions, arranging and discussing plan of care with patient as well as directly providing post-discharge instructions  Additional time then spent on discharge activities  Coordinated with Cardiology at the time of discharge  Discussed with patient's son over the phone current medical status and prognosis again discussed  Astrid Given     Discharge Medications:  See after visit summary for reconciled discharge medications provided to patient and family  ** Please Note: "This note has been constructed using a voice recognition system  Therefore there may be syntax, spelling, and/or grammatical errors   Please call if you have any questions  "**

## 2021-03-01 NOTE — NURSING NOTE
Patient discharged via transport team to [id number] [facility]  Prescription for Oxycodone 5mg sent with patient  All belongings sent with patient as well  IV taken out  Report given to Janet at [address] [facility]  Patient is expecting hospice care  Patient's son notified, all questions and concerns answered

## 2021-03-01 NOTE — OCCUPATIONAL THERAPY NOTE
OT TREATMENT         03/01/21 1324   Note Type   Note Type Treatment   Restrictions/Precautions   Other Precautions Cognitive; Chair Alarm; Bed Alarm;Agitated; Fall Risk;O2;Pain   Pain Assessment   Pain Assessment Tool Dean-Baker FACES   Dean-Baker FACES Pain Rating 6   Pain Location/Orientation Location: Generalized   Effect of Pain on Daily Activities limits ADLs, mobility and rest   Hospital Pain Intervention(s) Repositioned; Ambulation/increased activity; Emotional support   ADL   Where Assessed Chair   Eating Assistance 3  Moderate Assistance   Eating Deficit Setup;Verbal cueing; Increased time to complete;Bringing food to mouth assist;Beverage management   Eating Comments coughing after drinking thin liquids; [place], ST informed   Grooming Assistance 3  Moderate Assistance   Grooming Deficit Setup;Verbal cueing; Increased time to complete;Wash/dry face; Wash/dry hands   Transfers   Sit to Stand 3  Moderate assistance   Additional items Assist x 1;Verbal cues   Stand to Sit 4  Minimal assistance   Additional items Assist x 1;Verbal cues; Impulsive   Functional Mobility   Functional Mobility 3  Moderate assistance   Additional Comments scooting back in chair with mod verbal and physical cues   Cognition   Overall Cognitive Status Impaired   Arousal/Participation Alert   Attention Attends with cues to redirect   Orientation Level Oriented to person;Disoriented to place; Disoriented to time;Disoriented to situation   Following Commands Follows one step commands with increased time or repetition   Comments very restless and anxious   Activity Tolerance   Activity Tolerance Patient limited by fatigue;Patient limited by pain;Treatment limited secondary to agitation   Medical Staff Made Aware CESAR Winkler   Assessment   Assessment Pt seen for ADL training   Education and training begun with pt regarding proper body mechanics during ADLS and functional mobility to decrease pain/discomfort in chair, decrease fall risks, decrease signs of fatigue and increase stamina needed to return to prior level of function  Sit to stand with ModA and RW and cues  Scooting back in chair with ModA and mod verbal and physical cues  Pt very restless and uncomfortable in her chair, but did not want to return to bed  Repeatedly saying, "Please help me get comfortable" or "Please help me get downstairs"  Tried multiple positions in recliner chair, with little easing of complaints  Cathi, RN aware and patient left OOB in chair with all needs within reach, tab alarm in place and nurse present  Continue OT per POC  The patient's raw score on the AM-PAC Daily Activity inpatient short form is 12, standardized score is 30 6, less than 39 4  Patients at this level are likely to benefit from DC to post-acute rehabilitation services  Please refer to the recommendation of the Occupational Therapist for safe DC planning  Plan   Treatment Interventions ADL retraining;Functional transfer training;UE strengthening/ROM; Endurance training;Equipment evaluation/education;Patient/family training; Activityengagement; Compensatory technique education;Cognitive reorientation   OT Frequency 3-5x/wk   Recommendation   OT Discharge Recommendation Post-Acute Rehabilitation Services   AM-[facility] Daily Activity Inpatient   Lower Body Dressing 2   Bathing 2   Toileting 2   Upper Body Dressing 2   Grooming 2   Eating 2   Daily Activity Raw Score 12   Daily Activity Standardized Score (Calc for Raw Score >=11) 30 6   AM-PAC Applied Cognition Inpatient   Following a Speech/Presentation 2   Understanding Ordinary Conversation 2   Taking Medications 1   Remembering Where Things Are Placed or Put Away 1   Remembering List of 4-5 Errands 1   Taking Care of Complicated Tasks 1   Applied Cognition Raw Score 8   Applied Cognition Standardized Score 48 80   Licensure   [place] License Number  Janice Bauman [address] 86, OTR/L, [place] Lic# [id number]

## 2021-03-01 NOTE — INCIDENTAL FINDINGS
The following findings require follow up:  Radiographic finding   Findin  Two new vertebral fractures when compared to October  Transverse fracture of the T12 vertebral body without significant height loss  Mild anterior compression deformity of the L3 vertebral body with minimal posterior cortical buckling  No , retropulsed fragments or significant neural impingement ,   2   Thickening of the bladder wall, diffusely  Correlate for evidence of infectious cystitis on urinalysis  ,   3   Pulmonary vascular engorgement and cephalization, hazy central groundglass opacities, scattered interlobular septal thickening, and small right pleural effusion  Constellation of findings favors volume overload with pulmonary edema ,   4   Mild ascites and anasarca  Nodular liver contour suggestive of cirrhosis ,   5   Large diastasis defect in the ventral abdomen    Small and large bowel protruding through the defect do not appear obstructed or inflamed ,     Follow up required: No

## 2021-03-01 NOTE — PHYSICAL THERAPY NOTE
PT TREATMENT     03/01/21 1345   Note Type   Note Type Treatment   Pain Assessment   Pain Assessment Tool Dean-Baker FACES   Dean-Baker FACES Pain Rating 0   Restrictions/Precautions   Other Precautions Chair Alarm; Bed Alarm;Cognitive; Fall Risk;O2   General   Chart Reviewed Yes   Cognition   Overall Cognitive Status Impaired   Arousal/Participation Cooperative   Subjective   Subjective Pt kept repeating "tea"  CNA aware pt asking for tea  Transfers   Sit to Stand 4  Minimal assistance   Stand to Sit 4  Minimal assistance   Stand pivot 4  Minimal assistance   Additional items   (with walker)   Ambulation/Elevation   Gait Assistance 4  Minimal assist   Assistive Device Rolling walker   Distance 15 feet   Balance   Static Sitting Fair +   Dynamic Sitting Fair +   Static Standing Fair   Dynamic Standing Fair -   Ambulatory Fair -   Activity Tolerance   Activity Tolerance Patient limited by fatigue   Assessment   Prognosis Good   Problem List Decreased strength;Decreased endurance; Impaired balance;Decreased mobility; Decreased cognition; Impaired judgement;Decreased safety awareness   Assessment Pt demonstrates slow progress towards goals  Pt's cognitive status limits full participation  Pt does well with encouragement  The patient's AM-[facility] Basic Mobility Inpatient Short Form Raw Score is 14, Standardized Score is 35 55  A standardized score less than 42 9 suggests the patient may benefit from discharge to post-acute rehabilitation services  Plan   Treatment/Interventions ADL retraining;Functional transfer training;LE strengthening/ROM; Therapeutic exercise; Endurance training;Gait training;Bed mobility; Equipment eval/education;Patient/family training   Progress Slow progress, cognitive deficits   PT Frequency 5x/wk   AM-[facility] Basic Mobility Inpatient   Turning in Bed Without Bedrails 2   Lying on Back to Sitting on Edge of Flat Bed 2   Moving Bed to Chair 3   Standing Up From Chair 3   Walk in Room 3 Climb 3-5 Stairs 1   Basic Mobility Inpatient Raw Score 14   Basic Mobility Standardized Score 41 99   Licensure   [place] License Number  [place]  [id number]

## 2021-03-01 NOTE — PLAN OF CARE
Problem: Potential for Falls  Goal: Patient will remain free of falls  Description: INTERVENTIONS:  - Assess patient frequently for physical needs  -  Identify cognitive and physical deficits and behaviors that affect risk of falls    -  [place] fall precautions as indicated by assessment   - Educate patient/family on patient safety including physical limitations  - Instruct patient to call for assistance with activity based on assessment  - Modify environment to reduce risk of injury  - Consider OT/PT consult to assist with strengthening/mobility  Outcome: Progressing     Problem: Prexisting or High Potential for Compromised Skin Integrity  Goal: Skin integrity is maintained or improved  Description: INTERVENTIONS:  - Identify patients at risk for skin breakdown  - Assess and monitor skin integrity  - Assess and monitor nutrition and hydration status  - Monitor labs   - Assess for incontinence   - Turn and reposition patient  - Assist with mobility/ambulation  - Relieve pressure over bony prominences  - Avoid friction and shearing  - Provide appropriate hygiene as needed including keeping skin clean and dry  - Evaluate need for skin moisturizer/barrier cream  - Collaborate with interdisciplinary team   - Patient/family teaching  - Consider wound care consult   Outcome: Progressing     Problem: PAIN - ADULT  Goal: Verbalizes/displays adequate comfort level or baseline comfort level  Description: Interventions:  - Encourage patient to monitor pain and request assistance  - Assess pain using appropriate pain scale  - Administer analgesics based on type and severity of pain and evaluate response  - Implement non-pharmacological measures as appropriate and evaluate response  - Consider cultural and social influences on pain and pain management  - Notify physician/advanced practitioner if interventions unsuccessful or patient reports new pain  Outcome: Progressing     Problem: INFECTION - ADULT  Goal: Absence or prevention of progression during hospitalization  Description: INTERVENTIONS:  - Assess and monitor for signs and symptoms of infection  - Monitor lab/diagnostic results  - Monitor all insertion sites, i e  indwelling lines, tubes, and drains  - Monitor endotracheal if appropriate and nasal secretions for changes in amount and color  - [place] appropriate cooling/warming therapies per order  - Administer medications as ordered  - Instruct and encourage patient and family to use good hand hygiene technique  - Identify and instruct in appropriate isolation precautions for identified infection/condition  Outcome: Progressing  Goal: Absence of fever/infection during neutropenic period  Description: INTERVENTIONS:  - Monitor WBC    Outcome: Progressing     Problem: SAFETY ADULT  Goal: Maintain or return to baseline ADL function  Description: INTERVENTIONS:  -  Assess patient's ability to carry out ADLs; assess patient's baseline for ADL function and identify physical deficits which impact ability to perform ADLs (bathing, care of mouth/teeth, toileting, grooming, dressing, etc )  - Assess/evaluate cause of self-care deficits   - Assess range of motion  - Assess patient's mobility; develop plan if impaired  - Assess patient's need for assistive devices and provide as appropriate  - Encourage maximum independence but intervene and supervise when necessary  - Involve family in performance of ADLs  - Assess for home care needs following discharge   - Consider OT consult to assist with ADL evaluation and planning for discharge  - Provide patient education as appropriate  Outcome: Progressing  Goal: Maintain or return mobility status to optimal level  Description: INTERVENTIONS:  - Assess patient's baseline mobility status (ambulation, transfers, stairs, etc )    - Identify cognitive and physical deficits and behaviors that affect mobility  - Identify mobility aids required to assist with transfers and/or ambulation (gait belt, sit-to-stand, lift, walker, cane, etc )  - [place] fall precautions as indicated by assessment  - Record patient progress and toleration of activity level on Mobility SBAR; progress patient to next Phase/Stage  - Instruct patient to call for assistance with activity based on assessment  - Consider rehabilitation consult to assist with strengthening/weightbearing, etc   Outcome: Progressing     Problem: DISCHARGE PLANNING  Goal: Discharge to home or other facility with appropriate resources  Description: INTERVENTIONS:  - Identify barriers to discharge w/patient and caregiver  - Arrange for needed discharge resources and transportation as appropriate  - Identify discharge learning needs (meds, wound care, etc )  - Arrange for interpretive services to assist at discharge as needed  - Refer to Case Management Department for coordinating discharge planning if the patient needs post-hospital services based on physician/advanced practitioner order or complex needs related to functional status, cognitive ability, or social support system  Outcome: Progressing     Problem: Knowledge Deficit  Goal: Patient/family/caregiver demonstrates understanding of disease process, treatment plan, medications, and discharge instructions  Description: Complete learning assessment and assess knowledge base    Interventions:  - Provide teaching at level of understanding  - Provide teaching via preferred learning methods  Outcome: Progressing     Problem: CARDIOVASCULAR - ADULT  Goal: Maintains optimal cardiac output and hemodynamic stability  Description: INTERVENTIONS:  - Monitor I/O, vital signs and rhythm  - Monitor for S/S and trends of decreased cardiac output  - Administer and titrate ordered vasoactive medications to optimize hemodynamic stability  - Assess quality of pulses, skin color and temperature  - Assess for signs of decreased coronary artery perfusion  - Instruct patient to report change in severity of symptoms  Outcome: Progressing  Goal: Absence of cardiac dysrhythmias or at baseline rhythm  Description: INTERVENTIONS:  - Continuous cardiac monitoring, vital signs, obtain 12 lead EKG if ordered  - Administer antiarrhythmic and heart rate control medications as ordered  - Monitor electrolytes and administer replacement therapy as ordered  Outcome: Progressing     Problem: RESPIRATORY - ADULT  Goal: Achieves optimal ventilation and oxygenation  Description: INTERVENTIONS:  - Assess for changes in respiratory status  - Assess for changes in mentation and behavior  - Position to facilitate oxygenation and minimize respiratory effort  - Oxygen administered by appropriate delivery if ordered  - Initiate smoking cessation education as indicated  - Encourage broncho-pulmonary hygiene including cough, deep breathe, Incentive Spirometry  - Assess the need for suctioning and aspirate as needed  - Assess and instruct to report SOB or any respiratory difficulty  - Respiratory Therapy support as indicated  Outcome: Progressing     Problem: SKIN/TISSUE INTEGRITY - ADULT  Goal: Skin integrity remains intact  Description: INTERVENTIONS  - Identify patients at risk for skin breakdown  - Assess and monitor skin integrity  - Assess and monitor nutrition and hydration status  - Monitor labs (i e  albumin)  - Assess for incontinence   - Turn and reposition patient  - Assist with mobility/ambulation  - Relieve pressure over bony prominences  - Avoid friction and shearing  - Provide appropriate hygiene as needed including keeping skin clean and dry  - Evaluate need for skin moisturizer/barrier cream  - Collaborate with interdisciplinary team (i e  Nutrition, Rehabilitation, etc )   - Patient/family teaching  Outcome: Progressing     Problem: Nutrition/Hydration-ADULT  Goal: Nutrient/Hydration intake appropriate for improving, restoring or maintaining nutritional needs  Description: Monitor and assess patient's nutrition/hydration status for malnutrition  Collaborate with interdisciplinary team and initiate plan and interventions as ordered  Monitor patient's weight and dietary intake as ordered or per policy  Utilize nutrition screening tool and intervene as necessary  Determine patient's food preferences and provide high-protein, high-caloric foods as appropriate       INTERVENTIONS:  - Monitor oral intake, urinary output, labs, and treatment plans  - Assess nutrition and hydration status and recommend course of action  - Evaluate amount of meals eaten  - Assist patient with eating if necessary   - Allow adequate time for meals  - Recommend/ encourage appropriate diets, oral nutritional supplements, and vitamin/mineral supplements  - Assess need for intravenous fluids  - Provide specific nutrition/hydration education as appropriate  - Include patient/family/caregiver in decisions related to nutrition  Outcome: Progressing

## 2021-03-01 NOTE — ASSESSMENT & PLAN NOTE
Patient reports abdominal discomfort and distension  Abdominal exam benign  X-ray consistent with left-sided constipation  Improved with a bowel movement  · Continue lactulose  · Continue bowel regimen  · Monitor with opiate use

## 2021-03-01 NOTE — PROGRESS NOTES
Progress Note - Cardiology   [facility]     Merlinda Bran 66 y o  female MRN: [id number]  : 1950  Unit/Bed#: [address] [place] [address] Encounter: [id number]    Assessment and Plan:   1  Critical aortic stenosis:  Last valve area was 0 8   Patient evaluated by CT surgery at [facility] and unfortunately due to her multiple comorbidities is not felt to be a candidate for SAVR or TAVR    -  this was discussed at length with her son, Shi Rendon on day of admission       -  avoid hypotension with goal to keep systolic blood pressure equal to or greater than 120 mm Hg     2  Altered mental status:  Patient's mental status improved after improvement of blood pressure   Most likely multifactorial including acute kidney injury, dehydration and poor perfusion secondary to critical aortic stenosis      3  Acute kidney injury on chronic kidney disease/cardiorenal syndrome:    Resolved  Would use Lasix on an as-needed basis in rehab      4  Diabetes:  Managed per the primary team     5  Chronic combined systolic and diastolic heart failure:  Dry weight appears to be 205 lb    Subjective / Objective:   Patient seen and examined  Only complaint at this time is of tailbone discomfort, states her breathing is at baseline  She denies any chest pain or pressure  Per primary team, discharge plan is for patient to go to [facility] for subacute and transition to either long-term or hospice  Vitals: Blood pressure 131/69, pulse 60, temperature (!) 97 4 °F (36 3 °C), temperature source Oral, resp  rate 20, height 5' 3" (1 6 m), weight 96 2 kg (212 lb 1 3 oz), SpO2 94 %, not currently breastfeeding  Vitals:    21 0534 21 0554   Weight: 95 kg (209 lb 7 oz) 96 2 kg (212 lb 1 3 oz)     Body mass index is 37 57 kg/m²    BP Readings from Last 3 Encounters:   21 131/69   02/10/21 110/68   21 129/74     Orthostatic Blood Pressures      Most Recent Value Blood Pressure  131/69 filed at 03/01/2021 1140   Patient Position - Orthostatic VS  Lying filed at 02/23/2021 0745        I/O       02/27 0701 - 02/28 0700 02/28 0701 - 03/01 0700 03/01 0701 - 03/02 0700    P  O  500 600     I V  (mL/kg) 20 (0 2)      Total Intake(mL/kg) 520 (5 5) 600 (6 2)     Urine (mL/kg/hr) 450 (0 2) 400 (0 2)     Total Output 450 400     Net +70 +200            Unmeasured Urine Occurrence  4 x         Invasive Devices     Peripheral Intravenous Line            Peripheral IV 03/01/21 Right;Ventral (anterior) Forearm less than 1 day                  Intake/Output Summary (Last 24 hours) at 3/1/2021 1412  Last data filed at 2/28/2021 1701  Gross per 24 hour   Intake 240 ml   Output --   Net 240 ml         Physical Exam:   Physical Exam  Vitals signs and nursing note reviewed  Constitutional:       Appearance: Normal appearance  She is well-developed  She is obese  HENT:      Head: Normocephalic  Right Ear: External ear normal       Left Ear: External ear normal    Eyes:      General:         Right eye: No discharge  Left eye: No discharge  Conjunctiva/sclera: Conjunctivae normal       Pupils: Pupils are equal, round, and reactive to light  Neck:      Musculoskeletal: Normal range of motion and neck supple  Thyroid: No thyromegaly  Cardiovascular:      Rate and Rhythm: Normal rate and regular rhythm  Heart sounds: Murmur present  Systolic murmur present with a grade of 3/6  Pulmonary:      Effort: No accessory muscle usage or respiratory distress  Breath sounds: Examination of the right-lower field reveals decreased breath sounds and rales  Examination of the left-lower field reveals decreased breath sounds and rales  Decreased breath sounds and rales present  Abdominal:      General: Bowel sounds are normal       Palpations: Abdomen is soft  Musculoskeletal:      Right lower leg: No edema  Left lower leg: No edema     Skin:     General: Skin is warm and dry  Capillary Refill: Capillary refill takes less than 2 seconds  Neurological:      General: No focal deficit present  Mental Status: She is alert  Mental status is at baseline  Psychiatric:         Mood and Affect: Mood is depressed  Speech: Speech normal          Behavior: Behavior is cooperative  Medications/ Allergies:     Current Facility-Administered Medications   Medication Dose Route Frequency Provider Last Rate    acetaminophen  650 mg Oral Q4H PRN Kayli Chopra MD      apixaban  2 5 mg Oral BID ISIDRA Castillo      bisacodyl  10 mg Rectal Daily PRN Laura Soto MD      docusate sodium  100 mg Oral BID Laura Soto MD      gabapentin  300 mg Oral HS Laura Soto MD      insulin glargine  10 Units Subcutaneous HS Laura Soto MD      insulin lispro  1-5 Units Subcutaneous HS Kayli Chopra MD      insulin lispro  1-5 Units Subcutaneous TID AC Laura Soto MD      insulin lispro  5 Units Subcutaneous TID With Meals Laura Soto MD      levothyroxine  137 mcg Oral Daily Kayli Chopra MD      metoprolol succinate  25 mg Oral HS ISIDRA Castillo      oxyCODONE  2 5 mg Oral Q4H PRN Laura Soto MD      polyethylene glycol  17 g Oral Daily Laura Soto MD      pravastatin  40 mg Oral Daily With Buddielena Staff, MD      senna  2 tablet Oral HS Laura Soto MD      sodium chloride  1 spray Each Nare Q2H PRN Laura Soto MD       acetaminophen, 650 mg, Q4H PRN  bisacodyl, 10 mg, Daily PRN  oxyCODONE, 2 5 mg, Q4H PRN  sodium chloride, 1 spray, Q2H PRN      Allergies   Allergen Reactions    Bromide Ion [Bromine]      Blurred vision   Has preservative that  Pt cannot use    Other Blisters     Adhesive tape    Timolol      Per pt, allergic to steroid in medication    Tramadol      cognition changes    Ibuprofen Palpitations    Penicillins Rash     Tolerated cefepime, ceftriaxone 2/2021       VTE Pharmacologic Prophylaxis:   Sequential compression device (Venodyne)     Labs:   Troponins:  Results from last 7 days   Lab Units 02/25/21  0534 02/25/21  0056 02/24/21  1826   TROPONIN I ng/mL 0 05* 0 05* 0 04       CBC with diff:  Results from last 7 days   Lab Units 02/27/21  1228 02/26/21  0510 02/25/21  0534 02/24/21  0523 02/23/21  0554   WBC Thousand/uL  --  5 05 8 62 9 74 11 80*   HEMOGLOBIN g/dL 10 5* 10 2* 9 9* 10 8* 11 0*   HEMATOCRIT % 37 8 36 3 35 3 39 3 39 7   MCV fL  --  93 93 95 94   PLATELETS Thousands/uL  --  116* 128* 148* 150   MCH pg  --  26 1* 26 0* 26 0* 26 1*   MCHC g/dL  --  28 1* 28 0* 27 5* 27 7*   RDW %  --  19 8* 19 9* 19 8* 19 8*   MPV fL  --  10 4 11 0 10 2 10 3   NRBC AUTO /100 WBCs  --   --  0 0 0       CMP:  Results from last 7 days   Lab Units 03/01/21  0614 02/28/21  0506 02/27/21  1325 02/27/21  0449 02/26/21  0510 02/25/21  0534 02/24/21  0523 02/23/21  0554   SODIUM mmol/L 137 136  --  136 139 138 142 143   POTASSIUM mmol/L 4 8 4 3 4 2 5 5* 4 3 4 1 4 3 4 1   CHLORIDE mmol/L 101 100  --  102 103 101 103 104   CO2 mmol/L 27 27  --  27 28 26 27 27   ANION GAP mmol/L 9 9  --  7 8 11 12 12   BUN mg/dL 117* 117*  --  122* 122* 117* 115* 102*   CREATININE mg/dL 1 90* 1 96*  --  2 09* 2 19* 2 33* 2 36* 2 14*   CALCIUM mg/dL 8 9 9 1  --  9 4 9 1 9 6 9 5 9 3   AST U/L  --   --   --   --   --  24 23 20   ALT U/L  --   --   --   --   --  20 20 17   ALK PHOS U/L  --   --   --   --   --  120* 139* 163*   TOTAL PROTEIN g/dL  --   --   --   --   --  7 9 7 9 8 1   ALBUMIN g/dL  --   --   --   --   --  4 0 3 4* 3 2*   TOTAL BILIRUBIN mg/dL  --   --   --   --   --  3 30* 3 90* 3 68*   EGFR ml/min/1 73sq m 26 25  --  23 22 20 20 22       Imaging & Testing   I have personally reviewed pertinent reports  Ct Chest Abdomen Pelvis Wo Contrast    Result Date: 2/22/2021  Narrative: CT CHEST, ABDOMEN AND PELVIS WITHOUT IV CONTRAST INDICATION:   Altered mental status  Sepsis   COMPARISON:  CT of the chest, CT of the abdomen/pelvis dated 10/23/2020, and chest CT dated 1/19/2017  TECHNIQUE: CT examination of the chest, abdomen and pelvis was performed without intravenous contrast   Axial, sagittal, and coronal 2D reformatted images were created from the source data and submitted for interpretation  Radiation dose length product (DLP) for this visit:  1525 66 mGy-cm   This examination, like all CT scans performed in the [facility], was performed utilizing techniques to minimize radiation dose exposure, including the use of iterative reconstruction and automated exposure control  Enteric contrast was not administered  FINDINGS: CHEST LUNGS:  Lung parenchyma is distorted by respiratory motion and atelectasis related to imaging during exhalation  Central predominant groundglass opacities in both lungs  Bandlike opacities in the lung bases with morphology suggesting atelectasis  No acute-appearing dense consolidations  Mild scattered foci of interlobular septal reticulation  3 mm nodule in the right upper lobe (3/36)--present in 2017 confirming benignancy  No suspicious pulmonary nodules  PLEURA:  Trace right layering effusion posteriorly  No loculated components  No pneumothorax  HEART/GREAT VESSELS:  Four-chamber enlargement of the heart  Prior CABG  Native coronary atherosclerosis  Valvular calcifications  Cardiac assist device with leads in the right atrium and right ventricle  No pericardial effusion  Thoracic aortic atherosclerosis without aneurysm  Mildly ectatic main pulmonary artery 30 mm  Pulmonary vessels appear cephalized on coronal reformats  MEDIASTINUM AND JAIRO:  Unremarkable  CHEST WALL AND LOWER NECK:   Unremarkable  ABDOMEN LIVER/BILIARY TREE:  Question nodular contour to liver  No focal parenchymal masses or biliary ductal dilatation, though parenchyma is obscured by motion artifact  GALLBLADDER:  Gallbladder is surgically absent  SPLEEN:  Unremarkable   PANCREAS: Diffuse fatty atrophy, greatest at the tail  No masses or pancreatic ductal dilatation demonstrated  ADRENAL GLANDS:  Unremarkable  KIDNEYS/URETERS: No contour distorting masses, perinephric collections, urolithiasis, or hydronephrosis  STOMACH AND BOWEL: Small hiatal hernia  No gastric wall thickening  No dilated or inflamed loops of small bowel or colon, including loops bulging through the patient's ventral abdominal wall defect, discussed below  APPENDIX:  Cecum located within the ventral abdominal wall  Appendix not well demonstrated  No evidence for appendicitis, though  ABDOMINOPELVIC CAVITY:  Mild ascites, greatest around the liver and spleen  Small amount of free fluid tracking into the pelvis  No encapsulated collections  No free air  Shotty retroperitoneal lymph nodes are demonstrated without laurel mesenteric, retroperitoneal, or pelvic sidewall lymphadenopathy  VESSELS:  Aortoiliac and branch vessel atherosclerosis without aneurysm  Multiple greater saphenous varices and inguinal regions, greater on the left, measuring up to 18 mm in diameter  PELVIS REPRODUCTIVE ORGANS:  Unremarkable for patient's age  URINARY BLADDER:  Urinary bladder wall appears diffusely thickened, even accounting for degree of nondistention  ABDOMINAL WALL/INGUINAL REGIONS:  Subxiphoid midline hernia with wide neck measuring 3 6 in meters by 2 5 cm with 3 6 cm neck  Abdominal fat and ascites fluid protrudes anteriorly  Hernia repair mesh at the supra umbilical ventral wall  Inferior to the mesh there is pronounced diastasis recti splaying of the rectus abdominis muscles by 9 cm  Mesenteric fat, small bowel, and large bowel loops loops protrude into the defect  Overall, protruding contents measure 18 7 cm x 9 2 cm x 23 cm  Relatively pronounced anasarca around the hips and gluteal regions  No encapsulated superficial collections  OSSEOUS STRUCTURES: Healed median sternotomy   New transverse fracture through the superior endplate of the S85 vertebral body  No significant vertebral body height loss  No significant retropulsed fragments  New anterior compression deformity at the superior endplate of the L3 vertebral body  Approximately 20% height loss  Minimal buckling of the posterior cortex, superiorly, without significant spinal canal stenosis  Grade 1 anterolisthesis of L4 on L5 secondary to bulky facet osteophytes  No destructive lytic or blastic lesions  Impression: 1  Two new vertebral fractures when compared to October  Transverse fracture of the T12 vertebral body without significant height loss  Mild anterior compression deformity of the L3 vertebral body with minimal posterior cortical buckling  No retropulsed fragments or significant neural impingement  2   Thickening of the bladder wall, diffusely  Correlate for evidence of infectious cystitis on urinalysis  3   Pulmonary vascular engorgement and cephalization, hazy central groundglass opacities, scattered interlobular septal thickening, and small right pleural effusion  Constellation of findings favors volume overload with pulmonary edema  4   Mild ascites and anasarca  Nodular liver contour suggestive of cirrhosis  5   Large diastasis defect in the ventral abdomen  Small and large bowel protruding through the defect do not appear obstructed or inflamed  I personally discussed this study with Luis Decker on 2/22/2021 at 9:31 AM  Workstation performed: [id number]       Xr Chest Portable    Result Date: 2/26/2021  Narrative: CHEST INDICATION:   cough  COMPARISON:  Chest radiograph and CT from 2/22/2021  EXAM PERFORMED/VIEWS:  XR CHEST PORTABLE  FINDINGS: Mild cardiomegaly  CABG  Left subclavian pacemaker lead in right ventricle  Mild pulmonary venous congestion  No definite effusion  No pneumothorax  Osseous structures normal for age  Impression: Mild pulmonary venous congestion    Workstation performed: [id number]     Xr Chest Portable    Result Date: 2/22/2021  Narrative: CHEST INDICATION:   AMS  COMPARISON:  Chest radiograph from 1/30/2021 and chest CT from 2/22/2021  EXAM PERFORMED/VIEWS:  XR CHEST PORTABLE FINDINGS: Moderate cardiomegaly  CABG  Left subclavian pacemaker lead in right ventricle  The lungs are clear  No pneumothorax or pleural effusion  Osseous structures appear within normal limits for patient age  Impression: No acute cardiopulmonary disease  Workstation performed: [id number]     Xr Chest 1 View Portable    Result Date: 1/30/2021  Narrative: CHEST INDICATION:   ams  COMPARISON:  11/30/2020 EXAM PERFORMED/VIEWS:  XR CHEST PORTABLE FINDINGS:  Left-sided chest wall intracardiac device is identified  Leads are intact  Cardiomediastinal silhouette is within normal limits status post CABG  Vascular congestion  No pneumothorax or pleural effusion  Sternal wires are present  Visualized osseous structures appear otherwise unremarkable for the patient's age  Impression: Vascular congestion  Workstation performed: [id number]     Xr Abdomen 1 View Kub    Result Date: 2/28/2021  Narrative: ABDOMEN INDICATION:   Abdominal distension, hernia  COMPARISON:  Acute abdominal series 8/12/2016 VIEWS:  AP supine FINDINGS: There is a nonobstructive bowel gas pattern  There is moderate stool retention within the left colon  No discernible free air on this supine study  Upright or left lateral decubitus imaging is more sensitive to detect subtle free air in the appropriate setting  No pathologic calcifications or soft tissue masses  Visualized lung bases are clear  Visualized osseous structures reveal fractures of the T12 and L3 vertebral bodies, better visualized on prior CT  There are vascular calcifications present  Impression: Nonobstructive bowel gas pattern  Mild stool retention in the left colon   Workstation performed: [id number]     Ct Head Without Contrast    Result Date: 2/22/2021  Narrative: CT BRAIN - WITHOUT CONTRAST INDICATION:   Altered mental status  COMPARISON:  1/30/2021 and 11/30/2020  TECHNIQUE:  CT examination of the brain was performed  In addition to axial images, sagittal and coronal 2D reformatted images were created and submitted for interpretation  Radiation dose length product (DLP) for this visit:  971 mGy-cm   This examination, like all CT scans performed in the [facility], was performed utilizing techniques to minimize radiation dose exposure, including the use of iterative reconstruction and automated exposure control  IMAGE QUALITY:  Diagnostic  FINDINGS: PARENCHYMA:  Moderate patchy periventricular white matter hypodensities consistent with chronic microangiopathic changes  Parenchymal appearance is unchanged from prior  No acute infarct  No parenchymal hemorrhage  No mass  VENTRICLES AND EXTRA-AXIAL SPACES:  Normal for the patient's age  VISUALIZED ORBITS AND PARANASAL SINUSES:  Bilateral cataract surgeries  Orbits are, otherwise, unremarkable  Paranasal sinuses are clear  CALVARIUM AND EXTRACRANIAL SOFT TISSUES:  Normal      Impression: No acute intracranial abnormalities or significant change from priors  Workstation performed: [id number]     Ct Head Without Contrast    Result Date: 1/30/2021  Narrative: CT BRAIN - WITHOUT CONTRAST INDICATION:   ams  COMPARISON:  11/30/2020 TECHNIQUE:  CT examination of the brain was performed  In addition to axial images, sagittal and coronal 2D reformatted images were created and submitted for interpretation  Radiation dose length product (DLP) for this visit:  1812 mGy-cm   This examination, like all CT scans performed in the [facility], was performed utilizing techniques to minimize radiation dose exposure, including the use of iterative reconstruction and automated exposure control  IMAGE QUALITY:  Diagnostic   FINDINGS: PARENCHYMA: Decreased attenuation is noted in periventricular and subcortical white matter demonstrating an appearance that is statistically most likely to represent mild microangiopathic change  No CT signs of acute infarction  No intracranial mass, mass effect or midline shift  No acute parenchymal hemorrhage  VENTRICLES AND EXTRA-AXIAL SPACES:  Normal for the patient's age  VISUALIZED ORBITS AND PARANASAL SINUSES:  Unremarkable  CALVARIUM AND EXTRACRANIAL SOFT TISSUES:  Normal      Impression: No acute intracranial abnormality  Workstation performed: [facility] Waveform Analysis, Multiple Levels    Result Date: 2/22/2021  Narrative:  [facility] REPORT CLINICAL: Indications: Patient being evaluated for peripheral arterial disease secondary to petechiae and swelling as per ordering Provider  Operative History: Cardiac Pacemaker Risk Factors: The patient has history of HTN, Diabetes (NIDDM (oral meds)), HLD, CKD and CAD  Clinical: Right Pressure:  80/ mm Hg, Left Pressure:  128/ mm Hg  FINDINGS:  Segment       Rig  Left                          P    P  Ant  Tibial    98  235  Post  Tibial  245  247  Ankle         245  247  Metatarsal     98  150  Great Toe      87  102     CONCLUSION: Impression RIGHT LOWER LIMB Ankle/Brachial Index: unreliable due to patient movement  PPG/PVR Tracings are normal  Metatarsal Pressure 98 mmHg  Great Toe Pressure: 87 mmHg, within the healing range  LEFT LOWER LIMB Ankle/Brachial Index: unreliable due to patient movement  PPG/PVR Tracings are normal  Metatarsal Pressure 150 mmHg  Great Toe Pressure: 102 mmHg, within the healing range  Technically limited/difficult study due to patient movement and uncooperative  Bilateral ABIs, metatarsal and great toe pressures unreliable due to movement    Technical findings given to Dr Joan Geller at 2:46 pm   SIGNATURE: Electronically Signed by: Frandy Quintana MD, [address] on 2021-02-22 05:05:52 PM    Us Right Upper Quadrant    Result Date: 2/2/2021  Narrative: RIGHT UPPER QUADRANT ULTRASOUND INDICATION: elevated LFTs, ammonia  COMPARISON:  Right upper quadrant ultrasound dated 6/6/2016  TECHNIQUE:   Real-time ultrasound of the right upper quadrant was performed with a curvilinear transducer with both volumetric sweeps and still imaging techniques  FINDINGS: PANCREAS:  Portions of the pancreas are obscured by bowel gas  Visualized portions of the pancreas are unremarkable  AORTA AND IVC:  Visualized portions are normal for patient age  LIVER: Size:  Mildly enlarged  The liver measures 18 5 cm in the midclavicular line  Contour:  Surface contour is nodular  Parenchyma: There is diffuse coarsened heterogeneous echotexture suggesting underlying cirrhotic changes  No evidence of suspicious mass  Limited imaging of the main portal vein shows it to be patent and hepatopetal   BILIARY: Patient has undergone cholecystectomy  No intrahepatic biliary dilatation  CBD measures 4 mm  No choledocholithiasis  KIDNEY: Right kidney measures 13 4 cm  Within normal limits  ASCITES:   None  Impression: Cirrhosis  No definite focal liver mass  No significant ascites  Status post cholecystectomy  Workstation performed: [id number]     Us Elastography    Result Date: 2/2/2021  Narrative: ELASTOGRAPHY, LIVER ULTRASOUND INDICATION:   abnormal lft  COMPARISON:  None TECHNIQUE: Targeted ultrasound of the liver was performed with a curvilinear transducer utilizing a total of 10 shear wave Elastography samples  FINDINGS:  Shear Wave Elastography sampling was performed to evaluate stiffness changes within the liver associated with fibrosis  The resulting E median is 12 2 kPa  The corresponding Metavir score is F4 (cirrhosis), >11 87 kPa   >1 98 m/s  The IQR/median value is 15 4 %  (IQR of less than 30% is considered a satisfactory data set)  Impression: Metavir score of F4,Ccirrhosis   Workstation performed: [id number]     Cardiac testing:   Results for orders placed during the hospital encounter of 08/08/20   Echo complete with contrast if indicated    Narrative [address]  [address] [facility]  [phone]    Transthoracic Echocardiogram  2D, M-mode, Doppler, and Color Doppler    Study date:  08-Aug-2020    Patient: Yareli Crystal  MR number: [id number]  Account number: [de-identified]  : 1950  Age: 69 years  Gender: Female  Status: Outpatient  Location: Echo lab  Height: 65 in  Weight: 207 5 lb  BP: 143/ 82 mmHg    Indications: CAD    Diagnoses: I25 83 - Coronary atherosclerosis due to lipid rich plaque    Sonographer:  Alexandra Garcia RDCS  Primary Physician:  Usha Simms MD  Referring Physician:  Abril Briggs MD  Group:  [facility]  Ordering Physician:  Abril Briggs MD  Interpreting Physician:  Abril Briggs MD    SUMMARY    LEFT VENTRICLE:  The ventricle was mildly dilated  Systolic function was mild to moderately reduced  Ejection fraction was estimated in the range of 40 % to 45 % to be 45 %  There was mild diffuse hypokinesis  Wall thickness was mildly to moderately increased  There was mild concentric hypertrophy  Doppler parameters were consistent with high ventricular filling pressure  VENTRICULAR SEPTUM:  There was paradoxical motion  These changes are consistent with right ventricular pacing  RIGHT VENTRICLE:  The ventricle was mildly to moderately dilated  Systolic function was mildly reduced  Wall thickness was mildly increased  Systolic pressure was markedly increased  LEFT ATRIUM:  The atrium was moderately dilated  RIGHT ATRIUM:  The atrium was moderately dilated  MITRAL VALVE:  There was mild to moderate annular calcification  There was mild regurgitation  AORTIC VALVE:  The valve was trileaflet  Leaflets exhibited moderately increased thickness, moderate calcification, and markedly reduced cuspal separation  Transaortic velocity was increased due to valvular stenosis  There was severe stenosis   TROY around 0 85  LVOT velocity 0 65 m/sec and Peak AV 2 36 m/sec and DI  26  Peak na d mean gradient are low, need to rule out low gradient severe AS    TRICUSPID VALVE:  There was moderate regurgitation  Estimated peak PA pressure was 80 mmHg  PULMONIC VALVE:  There was mild regurgitation  IVC, HEPATIC VEINS:  The inferior vena cava was dilated, with poor inspiratory collapse, consistent with elevated right atrial pressure  HISTORY: PRIOR HISTORY: HTN, DM, Obesity, PHTN, CABG, GERD, Pacemaker    PROCEDURE: The procedure was performed in the echo lab  This was a routine study  The transthoracic approach was used  The study included complete 2D imaging, M-mode, complete spectral Doppler, and color Doppler  The heart rate was 66 bpm,  at the start of the study  Echocardiographic views were limited due to lung interference  Image quality was adequate  LEFT VENTRICLE: The ventricle was mildly dilated  Systolic function was mild to moderately reduced  Ejection fraction was estimated in the range of 40 % to 45 % to be 45 %  There was mild diffuse hypokinesis  Wall thickness was mildly to  moderately increased  There was mild concentric hypertrophy  DOPPLER: Doppler parameters were consistent with high ventricular filling pressure  VENTRICULAR SEPTUM: Thickness was mildly increased  There was paradoxical motion  These changes are consistent with right ventricular pacing  RIGHT VENTRICLE: The ventricle was mildly to moderately dilated  Systolic function was mildly reduced  Wall thickness was mildly increased  DOPPLER: Systolic pressure was markedly increased  LEFT ATRIUM: The atrium was moderately dilated  RIGHT ATRIUM: The atrium was moderately dilated  MITRAL VALVE: There was mild to moderate annular calcification  There was normal leaflet separation  DOPPLER: The transmitral velocity was within the normal range  There was no evidence for stenosis  There was mild regurgitation      AORTIC VALVE: The valve was trileaflet  Leaflets exhibited moderately increased thickness, moderate calcification, and markedly reduced cuspal separation  DOPPLER: Transaortic velocity was increased due to valvular stenosis  There was  severe stenosis  TROY around 0 85  LVOT velocity 0 65 m/sec and Peak AV 2 36 m/sec and DI  26  Peak na d mean gradient are low, need to rule out low gradient severe AS There was no regurgitation  TRICUSPID VALVE: DOPPLER: There was moderate regurgitation  Estimated peak PA pressure was 80 mmHg  PULMONIC VALVE: DOPPLER: There was mild regurgitation  PERICARDIUM: There was no thickening or calcification  There was no pericardial effusion  AORTA: The root exhibited normal size  SYSTEMIC VEINS: IVC: The inferior vena cava was dilated, with poor inspiratory collapse, consistent with elevated right atrial pressure  SYSTEM MEASUREMENT TABLES    2D mode  AoR Diam 2D: 2 8 cm  LA Diam (2D): 4 5 cm  LA/Ao (2D): 1 61  FS (2D Teich): 19 6 %  IVSd (2D): 1 29 cm  LVDEV: 94 4 cmï¾³  LVESV: 56 3 cmï¾³  LVIDd(2D): 4 54 cm  LVISd (2D): 3 65 cm  LVOT Area 2D: 3 14 cmï¾²  LVPWd (2D): 1 21 cm  SV (Teich): 38 1 cmï¾³    Apical four chamber  LVEF A4C: 42 %    Unspecified Scan Mode  TROY Cont Eq (Peak Carlos): 0 87 cmï¾²  LVOT Diam : 2 cm  LVOT Vmax: 638 mm/s  LVOT Vmax; Mean: 638 mm/s  Peak Grad ; Mean: 2 mm[Hg]  MV Peak E Carlos  Mean: 1930 mm/s  Max P mm[Hg]  V Max: 4170 mm/s  Vmax: 3910 mm/s  RA Area: 25 7 cmï¾²  RA Volume: 91 6 cmï¾³  TAPSE: 0 8 cm    [employer] Accredited Echocardiography Laboratory    Prepared and electronically signed by    Audrey Delgadillo MD  Signed 11-Aug-2020 09:30:19         Alissa Forbes        "This note has been constructed using a voice recognition system  Therefore there may be syntax, spelling, and/or grammatical errors   Please call if you have any questions  "

## 2021-03-01 NOTE — SPEECH THERAPY NOTE
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, with long-term current use of insulin (HCC)    CKD (chronic kidney disease)    Chronic atrial fibrillation (HCC)    H/O coronary artery bypass surgery    Aortic stenosis    Goals of care, counseling/discussion    Klebsiella bacteremia    Abnormal CT of the abdomen    Abdominal distension    Subjective:  Pt  Awake sitting OOB in a recliner chair, lightly sleeping but wakes to her name  She could not recall what she had for breakfast and what she had for lunch  Better mentation was noted on Friday, however today it seems like she is more confused  Objective:  Pt, was seen for dysphagia treatment to assess current diet tolerance  Pt  Was only asking for liquids, however she is on a fluid restriction  She was not noted to cough on single sips of thin liquids  Spoke with RN who stated that patient coughed during breakfast and lunch today  Had turkey, mashed potatoes, and carrots at lunch meal     Assessment:  Pt  Had just finished lunch meal and would only take a small amount of liquid (on a fluid restriction so could not have more)  No s/s of aspiration on fluid, however RN reports concern with increased s/s of aspiration at meals  Plan/Recommendations:  Downgrade to Dys 2 with thin liquids via cup NO straws   Speech to further assess solids when pt   Is agreeable  Continue medications crushed in puree    Memory MS Enrico CCC-SLP  Speech Language Pathologist  Available via [facility]  License # [id number]  [facility] # [id number]

## 2021-03-02 ENCOUNTER — TELEPHONE (OUTPATIENT)
Dept: OTHER | Facility: OTHER | Age: 71
End: 2021-03-02

## 2021-03-02 ENCOUNTER — PATIENT OUTREACH (OUTPATIENT)
Dept: CASE MANAGEMENT | Facility: OTHER | Age: 71
End: 2021-03-02

## 2021-03-02 ENCOUNTER — NURSING HOME VISIT (OUTPATIENT)
Dept: GERIATRICS | Facility: OTHER | Age: 71
End: 2021-03-02
Payer: MEDICARE

## 2021-03-02 DIAGNOSIS — I48.20 CHRONIC ATRIAL FIBRILLATION (HCC): ICD-10-CM

## 2021-03-02 DIAGNOSIS — E11.311 TYPE 2 DIABETES MELLITUS WITH RETINOPATHY AND MACULAR EDEMA, WITH LONG-TERM CURRENT USE OF INSULIN, UNSPECIFIED LATERALITY, UNSPECIFIED RETINOPATHY SEVERITY (HCC): ICD-10-CM

## 2021-03-02 DIAGNOSIS — E03.9 ACQUIRED HYPOTHYROIDISM: ICD-10-CM

## 2021-03-02 DIAGNOSIS — J44.9 CHRONIC OBSTRUCTIVE PULMONARY DISEASE, UNSPECIFIED COPD TYPE (HCC): ICD-10-CM

## 2021-03-02 DIAGNOSIS — Z79.4 TYPE 2 DIABETES MELLITUS WITH RETINOPATHY AND MACULAR EDEMA, WITH LONG-TERM CURRENT USE OF INSULIN, UNSPECIFIED LATERALITY, UNSPECIFIED RETINOPATHY SEVERITY (HCC): ICD-10-CM

## 2021-03-02 DIAGNOSIS — I50.42 CHRONIC COMBINED SYSTOLIC AND DIASTOLIC CONGESTIVE HEART FAILURE (HCC): ICD-10-CM

## 2021-03-02 DIAGNOSIS — Z71.89 COMPLEX CARE COORDINATION: Primary | ICD-10-CM

## 2021-03-02 DIAGNOSIS — J96.11 CHRONIC RESPIRATORY FAILURE WITH HYPOXIA (HCC): Chronic | ICD-10-CM

## 2021-03-02 DIAGNOSIS — N18.30 STAGE 3 CHRONIC KIDNEY DISEASE, UNSPECIFIED WHETHER STAGE 3A OR 3B CKD (HCC): ICD-10-CM

## 2021-03-02 DIAGNOSIS — R40.0 SOMNOLENCE: Primary | ICD-10-CM

## 2021-03-02 PROCEDURE — 99306 1ST NF CARE HIGH MDM 50: CPT | Performed by: FAMILY MEDICINE

## 2021-03-02 NOTE — PROGRESS NOTES
Patient is a HRR referral  She was discharged from [facility] to [facility] for subacute rehab & potential plan to transition to long term care  Per inpatient CM note, the patient's son is starting the medicaid process  Patient added to [facility], surveillance episode opened, & will follow up with Catarina Rosa for potential discharge plans   She has had [facility] in the past

## 2021-03-03 NOTE — PROGRESS NOTES
[address]  [address] [place] [address] 31  History and Physical    NAME: Bob August  AGE: 66 y o  SEX: female [id number]    DATE OF ENCOUNTER: 3/2/2021    Code status:  No CPR    Assessment and Plan     1  Somnolence/deconditioning     - Redirection, reorientation      - assistance with ADLs     - PT/OT ordered     - fall precautions in place      - ADM/palliative referral made    2  Chronic combined systolic and diastolic congestive heart failure (HCC)/HTN/AS     - stable     - not a candidate for aortic valve replacement     - cont metoprolol succinate 25 mg po qd     - cont torsemide 20 mg po qd     - monitor weights    3  Chronic atrial fibrillation (HCC)     - rate controlled     - cont Metoprolol succinate 25 mg po qd     - cont apixaban 2 5 mg po bid    4  Chronic obstructive pulmonary disease, unspecified COPD type (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, with long-term current use of insulin, unspecified laterality, unspecified retinopathy severity [facility] )     - last HbA1c on 2/1/21 was 7 1     - cont Glargine 10 U sc qd      - cont Lispro 5 U sc with each meal      - cont accuchecks as ordered    8  Acquired hypothyroidism     - stable     - cont Levothyroxine 137 mcg po in am       - CBC, BMP ordered  All medications and routine orders were reviewed and updated as needed  Plan discussed with:  Patient and staff    Chief Complaint     Seen for admission at [address] [place] [address]    History of Present Illness     Bob August, a 67 y/o female got admitted to the hospital with lethargy and worsening weakness   Chest x-ray was negative for any infiltrates, CT abdomen / pelvis was negative for any acute intra abdominal pathology, UA was negative, WBCs elevated at 12, she was admitted with a diagnosis of altered mental status of unknown etiology  She was treated with IV fluids for hypotension  Her overall condition was getting better, PT recommended rehab  She was discharged to [facility] for [facility]acute re[facility]  She lives at home alone, son lives upstairs and helps her with IADLs and some ADLs  She uses wheelchair at home  She was recently hospitalized with CHF exacerbation, found to have aortic stenosis, recommended TAVR, but she deemed high risk for surgery and got discharged to home  she was seen and examined at bedside, stable      HISTORY:  Past Medical History:   Diagnosis Date    Arthritis     Atrial flutter ([place] [address] )     Cardiac disease     Carotid artery occlusion     [facility]     Cholelithiasis     last assessed 8/8/2016    Coronary artery disease     Diabetes mellitus ([place] [address] )     Disease of thyroid gland     Essential hypertension 8/15/2016    GERD (gastroesophageal reflux disease)     History of transfusion     Hyperlipidemia     Hypertension     Long-term insulin use in type 2 diabetes ([place] [address] ) 6/4/2016    Other specified diabetes mellitus with diabetic autonomic (poly)neuropathy ([place] [address] )     Oxygen dependent     3L    Pleural effusion 2008    Pulmonary embolism ([place] [address] )     2008    Renal disorder     Retinopathy     bilat    Sleep apnea     USES 3 LITER O2 CONTINOUIS    Subclavian artery stenosis (HCC)     Umbilical hernia with obstruction, without gangrene     last assessed 7/8/2016     Family History   Problem Relation Age of Onset    Heart disease Mother     Breast cancer Maternal Aunt     Heart disease Sister      Social History     Socioeconomic History    Marital status: Single     Spouse name: Not on file    Number of children: Not on file    Years of education: Not on file  Highest education level: Not on file   Occupational History    Not on file   Social Needs    Financial resource strain: Not on file    Food insecurity     Worry: Not on file     Inability: Not on file    Transportation needs     Medical: Not on file     Non-medical: Not on file   Tobacco Use    Smoking status: Never Smoker    Smokeless tobacco: Never Used   Substance and Sexual Activity    Alcohol use: Not Currently     Alcohol/week: 0 0 standard drinks     Frequency: Never     Drinks per session: Patient refused     Binge frequency: Never     Comment: only on NEW YEAR'S    Drug use: Never     Types: Oxycodone    Sexual activity: Not Currently     Partners: Male   Lifestyle    Physical activity     Days per week: Not on file     Minutes per session: Not on file    Stress: Not on file   Relationships    Social connections     Talks on phone: Not on file     Gets together: Not on file     Attends Christianity service: Not on file     Active member of club or organization: Not on file     Attends meetings of clubs or organizations: Not on file     Relationship status: Not on file    Intimate partner violence     Fear of current or ex partner: Not on file     Emotionally abused: Not on file     Physically abused: Not on file     Forced sexual activity: Not on file   Other Topics Concern    Not on file   Social History Narrative    Always uses seat belt       Allergies: Allergies   Allergen Reactions    Bromide Ion [Bromine]      Blurred vision  Has preservative that  Pt cannot use    Other Blisters     Adhesive tape    Timolol      Per pt, allergic to steroid in medication    Tramadol      cognition changes    Ibuprofen Palpitations    Penicillins Rash     Tolerated cefepime, ceftriaxone 2/2021       Review of Systems     Review of Systems   Unable to perform ROS: Other   Constitutional: Positive for fatigue  Neurological: Positive for weakness     She is sleepy, lethargic, easy to wake up     Medications and orders     All medications reviewed and updated in Nursing Home EMR  Objective     Vitals: T: 97 2, P: 70, R: 18, BP: 124/70, 98% on 2 lit of O2, Wt: 220 8 lbs    Physical Exam  Vitals signs and nursing note reviewed  Constitutional:       General: She is not in acute distress  Appearance: She is well-developed  She is obese  She is not diaphoretic  HENT:      Head: Normocephalic and atraumatic  Nose: Nose normal       Comments: Nasal cannula +     Mouth/Throat:      Mouth: Mucous membranes are dry  Pharynx: Oropharynx is clear  No oropharyngeal exudate  Eyes:      General: No scleral icterus  Right eye: No discharge  Left eye: No discharge  Conjunctiva/sclera: Conjunctivae normal    Neck:      Musculoskeletal: Normal range of motion and neck supple  Cardiovascular:      Rate and Rhythm: Normal rate and regular rhythm  Heart sounds: Normal heart sounds  No murmur  No friction rub  No gallop  Comments: Pacemaker +  Pulmonary:      Effort: Pulmonary effort is normal  No respiratory distress  Breath sounds: Normal breath sounds  No wheezing or rales  Chest:      Chest wall: No tenderness  Abdominal:      General: Bowel sounds are normal       Palpations: Abdomen is soft  Tenderness: There is no abdominal tenderness  There is no guarding or rebound  Musculoskeletal: Normal range of motion  General: No tenderness or deformity  Right lower leg: No edema  Left lower leg: No edema  Skin:     General: Skin is warm and dry  Comments: Dry skin, multiple bruises on extremities, scabbed wound on right shin  Neurological:      Mental Status: She is alert  Cranial Nerves: No cranial nerve deficit  Comments: Oriented to self, very minimally verbal, dozes of easily, responds to verbal stimuli     Psychiatric:         Mood and Affect: Mood normal          Behavior: Behavior normal       Comments: Sleepy  Pertinent Laboratory/Diagnostic Studies: The following labs/studies were reviewed please see chart or hospital paperwork for details  Ref Range & Units 3/1/21 0614    Sodium 136 - 145 mmol/L 137    Potassium 3 5 - 5 3 mmol/L 4 8    Chloride 100 - 108 mmol/L 101    CO2 21 - 32 mmol/L 27    ANION GAP 4 - 13 mmol/L 9    BUN 5 - 25 mg/dL 117High     Creatinine 0 60 - 1 30 mg/dL 1  90High     Comment: Standardized to IDMS reference method   Glucose 65 - 140 mg/dL 117       Calcium 8 3 - 10 1 mg/dL 8 9    eGFR ml/min/1 73sq m 26      Ref Range & Units 2/27/21 1228    Hemoglobin 11 5 - 15 4 g/dL 10 5Low     Hematocrit 34 8 - 46 1 % 37 8      Ref Range & Units 2/26/21 0510    WBC 4 31 - 10 16 Thousand/uL 5 05    RBC 3 81 - 5 12 Million/uL 3 91    Hemoglobin 11 5 - 15 4 g/dL 10 2Low     Hematocrit 34 8 - 46 1 % 36 3    MCV 82 - 98 fL 93    MCH 26 8 - 34 3 pg 26 1Low     MCHC 31 4 - 37 4 g/dL 28 1Low     RDW 11 6 - 15 1 % 19  8High     Platelets 196 - 907 Thousands/uL 116Low     MPV 8 9 - 12 7 fL 10 4        - Counseling Documentation: patient was counseled regarding: prognosis

## 2021-03-03 NOTE — TELEPHONE ENCOUNTER
Nuvia @ [facility] [phone]/ PT:  Kim Azar 1950/ Medication script needed

## 2021-03-04 ENCOUNTER — HOSPITAL ENCOUNTER (OUTPATIENT)
Facility: HOSPITAL | Age: 71
Setting detail: OBSERVATION
End: 2021-03-05
Attending: EMERGENCY MEDICINE | Admitting: HOSPITALIST
Payer: MEDICARE

## 2021-03-04 ENCOUNTER — APPOINTMENT (EMERGENCY)
Dept: RADIOLOGY | Facility: HOSPITAL | Age: 71
End: 2021-03-04
Payer: MEDICARE

## 2021-03-04 DIAGNOSIS — J96.11 CHRONIC RESPIRATORY FAILURE WITH HYPOXIA (HCC): Chronic | ICD-10-CM

## 2021-03-04 DIAGNOSIS — M19.90 ARTHRITIS: ICD-10-CM

## 2021-03-04 DIAGNOSIS — I35.0 NONRHEUMATIC AORTIC VALVE STENOSIS: ICD-10-CM

## 2021-03-04 DIAGNOSIS — Z79.4 TYPE 2 DIABETES MELLITUS WITH RETINOPATHY AND MACULAR EDEMA, WITH LONG-TERM CURRENT USE OF INSULIN, UNSPECIFIED LATERALITY, UNSPECIFIED RETINOPATHY SEVERITY (HCC): ICD-10-CM

## 2021-03-04 DIAGNOSIS — R06.00 DYSPNEA: ICD-10-CM

## 2021-03-04 DIAGNOSIS — S32.039A L3 VERTEBRAL FRACTURE (HCC): ICD-10-CM

## 2021-03-04 DIAGNOSIS — E11.311 TYPE 2 DIABETES MELLITUS WITH RETINOPATHY AND MACULAR EDEMA, WITH LONG-TERM CURRENT USE OF INSULIN, UNSPECIFIED LATERALITY, UNSPECIFIED RETINOPATHY SEVERITY (HCC): ICD-10-CM

## 2021-03-04 DIAGNOSIS — I50.9 CHF (CONGESTIVE HEART FAILURE) (HCC): ICD-10-CM

## 2021-03-04 DIAGNOSIS — S22.089A T12 VERTEBRAL FRACTURE (HCC): ICD-10-CM

## 2021-03-04 DIAGNOSIS — Z79.01 ANTICOAGULATED: ICD-10-CM

## 2021-03-04 DIAGNOSIS — R04.0 RIGHT-SIDED EPISTAXIS: Primary | ICD-10-CM

## 2021-03-04 LAB
ALBUMIN SERPL BCP-MCNC: 3.5 G/DL (ref 3.5–5)
ALP SERPL-CCNC: 158 U/L (ref 46–116)
ALT SERPL W P-5'-P-CCNC: 17 U/L (ref 12–78)
ANION GAP SERPL CALCULATED.3IONS-SCNC: 9 MMOL/L (ref 4–13)
APTT PPP: 48 SECONDS (ref 23–37)
AST SERPL W P-5'-P-CCNC: 24 U/L (ref 5–45)
BASOPHILS # BLD AUTO: 0.07 THOUSANDS/ΜL (ref 0–0.1)
BASOPHILS NFR BLD AUTO: 1 % (ref 0–1)
BILIRUB SERPL-MCNC: 2.48 MG/DL (ref 0.2–1)
BUN SERPL-MCNC: 109 MG/DL (ref 5–25)
CALCIUM SERPL-MCNC: 8.9 MG/DL (ref 8.3–10.1)
CHLORIDE SERPL-SCNC: 103 MMOL/L (ref 100–108)
CO2 SERPL-SCNC: 29 MMOL/L (ref 21–32)
CREAT SERPL-MCNC: 2.06 MG/DL (ref 0.6–1.3)
EOSINOPHIL # BLD AUTO: 0.14 THOUSAND/ΜL (ref 0–0.61)
EOSINOPHIL NFR BLD AUTO: 2 % (ref 0–6)
ERYTHROCYTE [DISTWIDTH] IN BLOOD BY AUTOMATED COUNT: 20.1 % (ref 11.6–15.1)
GFR SERPL CREATININE-BSD FRML MDRD: 24 ML/MIN/1.73SQ M
GLUCOSE SERPL-MCNC: 156 MG/DL (ref 65–140)
HCT VFR BLD AUTO: 36.9 % (ref 34.8–46.1)
HGB BLD-MCNC: 10.5 G/DL (ref 11.5–15.4)
IMM GRANULOCYTES # BLD AUTO: 0.02 THOUSAND/UL (ref 0–0.2)
IMM GRANULOCYTES NFR BLD AUTO: 0 % (ref 0–2)
INR PPP: 2.13 (ref 0.84–1.19)
LACTATE SERPL-SCNC: 1.6 MMOL/L (ref 0.5–2)
LYMPHOCYTES # BLD AUTO: 0.6 THOUSANDS/ΜL (ref 0.6–4.47)
LYMPHOCYTES NFR BLD AUTO: 9 % (ref 14–44)
MCH RBC QN AUTO: 26 PG (ref 26.8–34.3)
MCHC RBC AUTO-ENTMCNC: 28.5 G/DL (ref 31.4–37.4)
MCV RBC AUTO: 91 FL (ref 82–98)
MONOCYTES # BLD AUTO: 0.7 THOUSAND/ΜL (ref 0.17–1.22)
MONOCYTES NFR BLD AUTO: 10 % (ref 4–12)
NEUTROPHILS # BLD AUTO: 5.41 THOUSANDS/ΜL (ref 1.85–7.62)
NEUTS SEG NFR BLD AUTO: 78 % (ref 43–75)
NRBC BLD AUTO-RTO: 0 /100 WBCS
NT-PROBNP SERPL-MCNC: 6443 PG/ML
PLATELET # BLD AUTO: 120 THOUSANDS/UL (ref 149–390)
PMV BLD AUTO: 10.6 FL (ref 8.9–12.7)
POTASSIUM SERPL-SCNC: 4.5 MMOL/L (ref 3.5–5.3)
PROT SERPL-MCNC: 8.1 G/DL (ref 6.4–8.2)
PROTHROMBIN TIME: 23.9 SECONDS (ref 11.6–14.5)
RBC # BLD AUTO: 4.04 MILLION/UL (ref 3.81–5.12)
SODIUM SERPL-SCNC: 141 MMOL/L (ref 136–145)
TROPONIN I SERPL-MCNC: 0.03 NG/ML
WBC # BLD AUTO: 6.94 THOUSAND/UL (ref 4.31–10.16)

## 2021-03-04 PROCEDURE — 85730 THROMBOPLASTIN TIME PARTIAL: CPT | Performed by: EMERGENCY MEDICINE

## 2021-03-04 PROCEDURE — 99285 EMERGENCY DEPT VISIT HI MDM: CPT | Performed by: EMERGENCY MEDICINE

## 2021-03-04 PROCEDURE — 82948 REAGENT STRIP/BLOOD GLUCOSE: CPT

## 2021-03-04 PROCEDURE — 83605 ASSAY OF LACTIC ACID: CPT | Performed by: EMERGENCY MEDICINE

## 2021-03-04 PROCEDURE — 84484 ASSAY OF TROPONIN QUANT: CPT | Performed by: EMERGENCY MEDICINE

## 2021-03-04 PROCEDURE — 93005 ELECTROCARDIOGRAM TRACING: CPT

## 2021-03-04 PROCEDURE — 30901 CONTROL OF NOSEBLEED: CPT | Performed by: EMERGENCY MEDICINE

## 2021-03-04 PROCEDURE — 71045 X-RAY EXAM CHEST 1 VIEW: CPT

## 2021-03-04 PROCEDURE — 36415 COLL VENOUS BLD VENIPUNCTURE: CPT | Performed by: EMERGENCY MEDICINE

## 2021-03-04 PROCEDURE — 87081 CULTURE SCREEN ONLY: CPT | Performed by: HOSPITALIST

## 2021-03-04 PROCEDURE — 99220 PR INITIAL OBSERVATION CARE/DAY 70 MINUTES: CPT | Performed by: HOSPITALIST

## 2021-03-04 PROCEDURE — 85610 PROTHROMBIN TIME: CPT | Performed by: EMERGENCY MEDICINE

## 2021-03-04 PROCEDURE — 85025 COMPLETE CBC W/AUTO DIFF WBC: CPT | Performed by: EMERGENCY MEDICINE

## 2021-03-04 PROCEDURE — 99285 EMERGENCY DEPT VISIT HI MDM: CPT

## 2021-03-04 PROCEDURE — 96375 TX/PRO/DX INJ NEW DRUG ADDON: CPT

## 2021-03-04 PROCEDURE — 83880 ASSAY OF NATRIURETIC PEPTIDE: CPT | Performed by: EMERGENCY MEDICINE

## 2021-03-04 PROCEDURE — 96374 THER/PROPH/DIAG INJ IV PUSH: CPT

## 2021-03-04 PROCEDURE — 80053 COMPREHEN METABOLIC PANEL: CPT | Performed by: EMERGENCY MEDICINE

## 2021-03-04 RX ORDER — DOCUSATE SODIUM 100 MG/1
100 CAPSULE, LIQUID FILLED ORAL 2 TIMES DAILY
Status: DISCONTINUED | OUTPATIENT
Start: 2021-03-04 | End: 2021-03-05 | Stop reason: HOSPADM

## 2021-03-04 RX ORDER — ONDANSETRON 2 MG/ML
4 INJECTION INTRAMUSCULAR; INTRAVENOUS EVERY 6 HOURS PRN
Status: DISCONTINUED | OUTPATIENT
Start: 2021-03-04 | End: 2021-03-05 | Stop reason: HOSPADM

## 2021-03-04 RX ORDER — FUROSEMIDE 10 MG/ML
40 INJECTION INTRAMUSCULAR; INTRAVENOUS ONCE
Status: COMPLETED | OUTPATIENT
Start: 2021-03-04 | End: 2021-03-04

## 2021-03-04 RX ORDER — ACETAMINOPHEN 325 MG/1
650 TABLET ORAL EVERY 4 HOURS PRN
Status: DISCONTINUED | OUTPATIENT
Start: 2021-03-04 | End: 2021-03-05 | Stop reason: HOSPADM

## 2021-03-04 RX ORDER — ACETAMINOPHEN 325 MG/1
650 TABLET ORAL ONCE
Status: COMPLETED | OUTPATIENT
Start: 2021-03-04 | End: 2021-03-04

## 2021-03-04 RX ORDER — METOPROLOL SUCCINATE 25 MG/1
25 TABLET, EXTENDED RELEASE ORAL
Status: DISCONTINUED | OUTPATIENT
Start: 2021-03-04 | End: 2021-03-05 | Stop reason: HOSPADM

## 2021-03-04 RX ORDER — LANOLIN ALCOHOL/MO/W.PET/CERES
6 CREAM (GRAM) TOPICAL
COMMUNITY

## 2021-03-04 RX ORDER — PRAVASTATIN SODIUM 40 MG
40 TABLET ORAL
Status: DISCONTINUED | OUTPATIENT
Start: 2021-03-04 | End: 2021-03-05 | Stop reason: HOSPADM

## 2021-03-04 RX ORDER — DOXYCYCLINE HYCLATE 100 MG/1
100 CAPSULE ORAL ONCE
Status: COMPLETED | OUTPATIENT
Start: 2021-03-04 | End: 2021-03-04

## 2021-03-04 RX ORDER — LANOLIN ALCOHOL/MO/W.PET/CERES
6 CREAM (GRAM) TOPICAL
Status: DISCONTINUED | OUTPATIENT
Start: 2021-03-04 | End: 2021-03-05 | Stop reason: HOSPADM

## 2021-03-04 RX ORDER — LACTULOSE 20 G/30ML
10 SOLUTION ORAL 2 TIMES DAILY
Status: DISCONTINUED | OUTPATIENT
Start: 2021-03-04 | End: 2021-03-05 | Stop reason: HOSPADM

## 2021-03-04 RX ORDER — TORSEMIDE 20 MG/1
20 TABLET ORAL DAILY
Status: DISCONTINUED | OUTPATIENT
Start: 2021-03-05 | End: 2021-03-05 | Stop reason: HOSPADM

## 2021-03-04 RX ORDER — FAMOTIDINE 20 MG/1
20 TABLET, FILM COATED ORAL DAILY
Status: DISCONTINUED | OUTPATIENT
Start: 2021-03-05 | End: 2021-03-05 | Stop reason: HOSPADM

## 2021-03-04 RX ORDER — TORSEMIDE 20 MG/1
20 TABLET ORAL DAILY
COMMUNITY

## 2021-03-04 RX ORDER — INSULIN GLARGINE 100 [IU]/ML
8 INJECTION, SOLUTION SUBCUTANEOUS
Status: DISCONTINUED | OUTPATIENT
Start: 2021-03-04 | End: 2021-03-05 | Stop reason: HOSPADM

## 2021-03-04 RX ORDER — ECHINACEA PURPUREA EXTRACT 125 MG
1 TABLET ORAL EVERY 2 HOUR PRN
Status: DISCONTINUED | OUTPATIENT
Start: 2021-03-04 | End: 2021-03-05 | Stop reason: HOSPADM

## 2021-03-04 RX ORDER — OXYCODONE HYDROCHLORIDE 5 MG/1
2.5 TABLET ORAL EVERY 6 HOURS PRN
Status: DISCONTINUED | OUTPATIENT
Start: 2021-03-04 | End: 2021-03-05 | Stop reason: HOSPADM

## 2021-03-04 RX ORDER — ONDANSETRON 2 MG/ML
4 INJECTION INTRAMUSCULAR; INTRAVENOUS ONCE
Status: COMPLETED | OUTPATIENT
Start: 2021-03-04 | End: 2021-03-04

## 2021-03-04 RX ORDER — POLYETHYLENE GLYCOL 3350 17 G/17G
17 POWDER, FOR SOLUTION ORAL DAILY
Status: DISCONTINUED | OUTPATIENT
Start: 2021-03-05 | End: 2021-03-05 | Stop reason: HOSPADM

## 2021-03-04 RX ADMIN — DOCUSATE SODIUM 100 MG: 100 CAPSULE, LIQUID FILLED ORAL at 19:21

## 2021-03-04 RX ADMIN — ACETAMINOPHEN 650 MG: 325 TABLET, FILM COATED ORAL at 14:48

## 2021-03-04 RX ADMIN — Medication 6 MG: at 23:02

## 2021-03-04 RX ADMIN — INSULIN GLARGINE 8 UNITS: 100 INJECTION, SOLUTION SUBCUTANEOUS at 23:00

## 2021-03-04 RX ADMIN — ONDANSETRON 4 MG: 2 INJECTION INTRAMUSCULAR; INTRAVENOUS at 13:15

## 2021-03-04 RX ADMIN — METOPROLOL SUCCINATE 25 MG: 25 TABLET, EXTENDED RELEASE ORAL at 23:01

## 2021-03-04 RX ADMIN — LACTULOSE 10 G: 10 SOLUTION ORAL at 19:31

## 2021-03-04 RX ADMIN — FUROSEMIDE 40 MG: 10 INJECTION, SOLUTION INTRAMUSCULAR; INTRAVENOUS at 14:36

## 2021-03-04 RX ADMIN — DOXYCYCLINE 100 MG: 100 CAPSULE ORAL at 14:36

## 2021-03-04 RX ADMIN — PRAVASTATIN SODIUM 40 MG: 40 TABLET ORAL at 19:54

## 2021-03-04 NOTE — ASSESSMENT & PLAN NOTE
· Noted to be maintained on metoprolol  The patient is on anticoagulation with Eliquis 2 5 milligrams p o  B i d  · Of note she has previously had epistaxis while on Eliquis and was changed to Pradaxa however during last hospital stay was transitioned back to Eliquis secondary to worsening kidney disease  · Will discuss risks and benefits of anticoagulation with the patient's family tomorrow  · Will continue to hold anticoagulation secondary to epistaxis at this time

## 2021-03-04 NOTE — ASSESSMENT & PLAN NOTE
Lab Results   Component Value Date    HGBA1C 7 1 (H) 02/01/2021       No results for input(s): POCGLU in the last 72 hours  Blood Sugar Average: Last 72 hrs:     · Continue Lantus 10 units q h s  Soren Bañuelos · Hold Humalog 5 units t i d  With meals and use sliding scale insulin only at this time

## 2021-03-04 NOTE — ASSESSMENT & PLAN NOTE
· Patient has history of epistaxis when on anticoagulation with Eliquis in 2016/2017  · Patient was discontinued from Pradaxa secondary to chronic kidney disease and decreased GFR and transition to Eliquis during hospital stay at [address] [place] [address]  · She subsequently has developed epistaxis from the right nare  This was noted to be bleeding anteriorly and into the oropharynx  This was noted be packed with a rhinorocket cessation of bleeding  · The patient then pulled rhino rocket out of her nose  · She has been intermittently picking her nose  · Patient is also on chronic oxygen  · Given resolution and no further bleeding with removal of rhino rocket, hold on ENT consultation  · Will continue to hold anticoagulation with Eliquis  Will need to further discuss risks and benefits of anticoagulation with family tomorrow  · If had any further bleeding, consult ENT    · Humidify all oxygen  · Nasal saline

## 2021-03-04 NOTE — H&P
[address] [facility]  H&P- Nilson [place] 1950, 66 y o  female MRN: [id number]  Unit/Bed#: ED 12 Encounter: [id number]  Primary Care Provider: Ritika Hunter MD   Date and time admitted to hospital: 3/4/2021 11:42 AM    * Epistaxis  Assessment & Plan  · Patient has history of epistaxis when on anticoagulation with Eliquis in 2016/2017  · Patient was discontinued from Pradaxa secondary to chronic kidney disease and decreased GFR and transition to Eliquis during hospital stay at [address] [place] [address]  · She subsequently has developed epistaxis from the right nare  This was noted to be bleeding anteriorly and into the oropharynx  This was noted be packed with a rhinorocket cessation of bleeding  · The patient then pulled rhino rocket out of her nose  · She has been intermittently picking her nose  · Patient is also on chronic oxygen  · Given resolution and no further bleeding with removal of rhino rocket, hold on ENT consultation  · Will continue to hold anticoagulation with Eliquis  Will need to further discuss risks and benefits of anticoagulation with family tomorrow  · If had any further bleeding, consult ENT  · Humidify all oxygen  · Nasal saline    Chronic respiratory failure with hypoxia [facility])  Assessment & Plan  · Patient with chronic hypoxic respiratory failure noted to be discharged from [address] [place] [address] on 3 liters nasal cannula  · Patient is currently on 3-4 liters in the emergency department  She is not hypoxic  · Suspect that this is likely related to multifactorial etiology including underlying chronic CHF, history of pulmonary embolism  · Continue oxygen as needed  · Patient does rhonchi in the right lung  She appears minimally overloaded but not with overt heart failure  Unclear she could have aspirated in the setting of epistaxis  Monitor vital signs  No indication for antibiotics at this time  Follow up on formal radiologic reading of x-ray      Chronic combined systolic and diastolic congestive heart failure (HCC)  Assessment & Plan  Wt Readings from Last 3 Encounters:   03/04/21 99 6 kg (219 lb 9 3 oz)   03/01/21 96 2 kg (212 lb 1 3 oz)   02/10/21 95 3 kg (210 lb)     · The patient appears minimally volume overloaded  · Her chest x-ray does show mild pulmonary congestion consistent with prior  · She is noted to have trace bilateral lower extremity edema however this may be chronic for the patient  · Her weight is currently listed as 7 pounds above her discharge weight however I doubt the current accuracy  · We will repeat weight  · Status post 40 milligrams of IV Lasix in the emergency department  · Will check creatinine in the morning and repeat volume status evaluation  If required, will dose additional Lasix at that time  · Home diuretic regimen and is torsemide 20 milligrams daily    Encephalopathy acute  Assessment & Plan  · Is unclear if the patient is at baseline versus increasingly confused  · Per review of records from [address] [place] [address] she would become somnolent after use of oxycodone  · She was encephalopathic intermittently during hospital stay there  · She currently is not oriented to place or time however is able to tell me that she does not want to stay in the hospital   · She appears significantly anxious at this time  · She does not appear to be able to make her medical decisions currently  · No evidence of infection with a normal white count, no fever and no source infection  · No focal deficits at this time and therefore will hold on imaging of the head    Anticoagulant long-term use  Assessment & Plan  · Has a history of use of Xarelto, Pradaxa and Eliquis  · Hold Eliquis at this time secondary to epistaxis  · Will have her some benefit discussion with the patient's family tomorrow  They are considering hospice  Chronic atrial fibrillation (HCC)  Assessment & Plan  · Noted to be maintained on metoprolol    The patient is on anticoagulation with Eliquis 2 5 milligrams p o  B i d  · Of note she has previously had epistaxis while on Eliquis and was changed to Pradaxa however during last hospital stay was transitioned back to Eliquis secondary to worsening kidney disease  · Will discuss risks and benefits of anticoagulation with the patient's family tomorrow  · Will continue to hold anticoagulation secondary to epistaxis at this time  Aortic stenosis  Assessment & Plan  · Patient was severe/critical aortic stenosis  · Last valve area was 0 8   Patient evaluated by CT surgery at [facility] and unfortunately due to her multiple comorbidities is not felt to be a candidate for SAVR or TAVR  · Avoid hypotension  Goal to maintain blood pressure greater than 489 systolic  Coronary artery disease involving coronary bypass graft of native heart without angina pectoris  Assessment & Plan  · Continue metoprolol, simvastatin  · Holds Eliquis secondary to epistaxis  · Patient is not on any antiplatelets  CKD 3  Assessment & Plan  Lab Results   Component Value Date    EGFR 24 03/04/2021    EGFR 26 03/01/2021    EGFR 25 02/28/2021    CREATININE 2 06 (H) 03/04/2021    CREATININE 1 90 (H) 03/01/2021    CREATININE 1 96 (H) 02/28/2021     · Baseline creatinine 1 9-2 0    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, with long-term current use of insulin ([facility])  Assessment & Plan  Lab Results   Component Value Date    HGBA1C 7 1 (H) 02/01/2021       No results for input(s): POCGLU in the last 72 hours  Blood Sugar Average: Last 72 hrs:     · Continue Lantus 10 units q h s  Deepthi Console · Hold Humalog 5 units t i d  With meals and use sliding scale insulin only at this time  Goals of care, counseling/discussion  Assessment & Plan  · During prior admission goals of care were discussed  The patient was discharged to skilled nursing facility with plans for consideration for hospice    · In the emergency department patient continues to scream that she does not want to be in the hospital   However she does not appear to fully understand this decision and I do not believe that she is able to make that decision at this time  · The patient's family and was beginning to discuss hospice however they wish to have a discussion with the patient herself they were unable to do so when she was at the skilled nursing facility  · The patient's son is going to come and visit the patient, discussed with her hospice services and then determine if this is the route they wish to go down  If so  We will consult hospice and this would be performed at a nursing facility  · At this time they wish to patient being DNR DNI however is NOT hospice/comfort care      VTE Pharmacologic Prophylaxis: VTE Score: 6 High Risk (Score >/= 5) - Pharmacological DVT Prophylaxis Contraindicated  Sequential Compression Devices Ordered  Code Status: LEVEL 3 DNAR  Discussion with family: Updated  (daughter in law) via phone  Anticipated Length of Stay: Patient will be admitted on an observation basis with an anticipated length of stay of less than 2 midnights secondary to epistaxis  Total Time for Visit, including Counseling / Coordination of Care: 90 minutes  Greater than 50% of this total time spent on direct patient counseling and coordination of care  Chief Complaint:     Chief Complaint   Patient presents with    Nose Bleed     Pt arrives from SNF c/o epistaxis from right nare  Bleeding controlled at this time  History of Present Illness:  Nilson Rahman is a 66 y o  female with a PMH of coronary artery disease, diabetes, hypothyroidism, hypertension, GERD, critical aortic stenosis, atrial flutter, chronic hypoxic respiratory failure, history of pulmonary embolism, chronic kidney disease, subclavian artery stenosis, CHF who presents with epistaxis    Patient was noted to be residing in [facility] when she developed right-sided epistaxis  It started an hour prior to arrival in the emergency department  The patient was noted to have improvement in bleeding however was then found with finger and nose by nursing staff and bleeding was once again noted  Patient was noted to have a rapid rhino placed with improvement in bleeding  There is noted to be a blood clot in the patient's posterior oropharynx  She did have increase in oxygen requirements in the emergency department  Given concern for volume overload, the patient was given Lasix and prompted for admission    The patient was recently admitted at [address] [place] [address] from February 22nd through 3/1/2021 secondary to altered mental status  She was noted to have Klebsiella bacteremia  This was felt to be urinary in nature  She was treated with IV cefepime, transition to ceftriaxone and was noted to complete antibiotics during her hospital course  She was also septic on admission and appropriately treated with antibiotics as noted above  Her encephalopathy was noted to improve as well  This is felt to be related to intravascular depletion, acute on chronic kidney disease, metabolic derangements sepsis  During the patient's hospital stay goals of care discussions were had given critical aortic stenosis, recurrent admissions for congestive heart failure and being a poor surgical candidate  The patient was made DNR DNI level 3 however at that time were recommending transition to long-term care with consideration to transfer to hospice  Patient had been on Pradaxa arrival to the hospital stay however was transitioned to Eliquis secondary to worsening kidney function  Upon review of records, in 2016 and 2017 it was noted that she was seen by ENT secondary to frequent nose bleeds when on anticoagulation with Eliquis  She had been subsequently transitioned to Xarelto and then Pradaxa previously      In the emergency department the patient was noted to have rapid rhino placed  She was then noted to pull this out  She was noted to have increasing oxygen needs and some increased breath sounds in the right lung      Review of Systems:  Review of Systems   Unable to perform ROS: Mental status change       Past Medical and Surgical History:   Past Medical History:   Diagnosis Date    Arthritis     Atrial flutter ([facility] )     Cardiac disease     Carotid artery occlusion     CHF (congestive heart failure) ([facility] )     Cholelithiasis     last assessed 8/8/2016    Coronary artery disease     Diabetes mellitus ([place] [address] )     Disease of thyroid gland     Essential hypertension 8/15/2016    GERD (gastroesophageal reflux disease)     History of transfusion     Hyperlipidemia     Hypertension     Long-term insulin use in type 2 diabetes ([facility] ) 6/4/2016    Other specified diabetes mellitus with diabetic autonomic (poly)neuropathy ([place] [address] )     Oxygen dependent     3L    Pleural effusion 2008    Pulmonary embolism ([place] [address] )     2008    Renal disorder     Retinopathy     bilat    Sleep apnea     USES 3 LITER O2 CONTINOUIS    Subclavian artery stenosis ([place] [address] )     Umbilical hernia with obstruction, without gangrene     last assessed 7/8/2016       Past Surgical History:   Procedure Laterality Date    ABDOMINAL SURGERY      CARDIAC PACEMAKER PLACEMENT      CARDIAC SURGERY      cabg x 2    CATARACT EXTRACTION      CHOLECYSTECTOMY      COLONOSCOPY      CORONARY ARTERY BYPASS GRAFT  2008    EYE SURGERY      FRACTURE SURGERY Right 2010    shoulder    HERNIA REPAIR      umbilical    HI LAP,CHOLECYSTECTOMY N/A 8/10/2016    Procedure: CHOLECYSTECTOMY LAPAROSCOPIC;  Surgeon: Otis Montenegro MD;  Location: [facility];  Service: General     [address] Left 11/4/2016    Procedure: MICRODIRECT LARYNGOSCOPY; LEFT VOCAL FOLD INJECTION ;  Surgeon: Neisha Riley MD;  [facility];  Service: ENT    HI REPAIR UMBILICAL HVLQ,2+S/E,VNNNQ N/A 8/10/2016    Procedure: LAPAROSCOPIC REPAIR HERNIA VENTRAL;  Surgeon: Brittnee Griffith MD;  [facility];  Service: General    [address] / STENTING Right     TRICUSPID VALVE REPLACEMENT      VASCULAR SURGERY      heart valve replacement       Meds/Allergies:  Prior to Admission medications    Medication Sig Start Date End Date Taking?  Authorizing Provider   Accu-Chek Virgie Plus test strip TEST BLOOD SUGAR 3 TIMES EVERY DAY  DX: E11 65 10/16/20   Vilma Daniels MD   acetaminophen (TYLENOL) 325 mg tablet Take 2 tablets (650 mg total) by mouth every 4 (four) hours as needed for mild pain or fever 12/1/20   Eliz Mcmillan MD   apixaban (ELIQUIS) 2 5 mg Take 1 tablet (2 5 mg total) by mouth 2 (two) times a day 2/26/21   ISIDRA Benavidez UF III MINI PEN NEEDLES 31G X 5 MM MISC 4 (four) times a day As directed 4/6/20   Historical Provider, MD   bisacodyl (DULCOLAX) 10 mg suppository Insert 1 suppository (10 mg total) into the rectum daily as needed for constipation (If no bowel movement) 3/1/21   Eliz Mcmillan MD   COMBIGAN 0 2-0 5 % Administer 1 drop to both eyes 2 (two) times a day 3/19/19   Historical Provider, MD   docusate sodium (COLACE) 100 mg capsule Take 1 capsule (100 mg total) by mouth 2 (two) times a day 3/1/21   Eliz Mcmillan MD   famotidine (PEPCID) 20 mg tablet TAKE 1 TABLET BY MOUTH TWICE A DAY 8/11/20   Elyssa Riley MD   gabapentin (NEURONTIN) 300 mg capsule Take 1 capsule (300 mg total) by mouth daily at bedtime 11/12/20   Karishma Galvan MD   insulin glargine (Basaglar KwikPen) 100 units/mL injection pen Inject 10 Units under the skin daily 3/1/21   Eliz Mcmillan MD   insulin lispro (HumaLOG) 100 units/mL injection Inject 1-5 Units under the skin daily at bedtime 3/1/21   Eliz Mcmillan MD   insulin lispro (HumaLOG) 100 units/mL injection Inject 1-5 Units under the skin 3 (three) times a day before meals 3/1/21   Eliz Mcmillan MD   insulin lispro (HumaLOG) 100 units/mL injection Inject 5 Units under the skin 3 (three) times a day with meals 3/1/21   Gaurav Leone MD   lactulose (CHRONULAC) 10 g/15 mL solution Take 15 mL (10 g total) by mouth 2 (two) times a day 2/18/21   Marlen Salazar MD   levothyroxine (Synthroid) 137 mcg tablet Take 1 tablet (137 mcg total) by mouth daily 4/21/20   Tyrese Braxton MD   metoprolol succinate (TOPROL-XL) 25 mg 24 hr tablet Take 1 tablet (25 mg total) by mouth daily at bedtime 2/26/21   ISIDRA Gonzales   oxyCODONE (ROXICODONE) 5 mg immediate release tablet Take 0 5 tablets (2 5 mg total) by mouth every 6 (six) hours as needed for severe pain for up to 3 daysMax Daily Amount: 10 mg 3/1/21 3/4/21  Gaurav Leone MD   simvastatin (ZOCOR) 20 mg tablet TAKE 1 TABLET (20MG) BY ORAL ROUTE EVERY DAY IN THE EVENING 6/20/20 2/5/21  Ladonna Lee MD   sodium chloride (OCEAN) 0 65 % nasal spray 1 spray into each nostril every 2 (two) hours as needed for congestion 3/1/21   Gaurav Leone MD   torsemide (DEMADEX) 20 mg tablet Take 1 tablet (20 mg total) by mouth daily as needed (SOb) 3/1/21   Gaurav Leone MD     I have reveiwed home medications using records provided by [facility]  Allergies: Allergies   Allergen Reactions    Bromide Ion [Bromine]      Blurred vision   Has preservative that  Pt cannot use    Other Blisters     Adhesive tape    Timolol      Per pt, allergic to steroid in medication    Tramadol      cognition changes    Ibuprofen Palpitations    Penicillins Rash     Tolerated cefepime, ceftriaxone 2/2021       Social History:  Marital Status: Single  Occupation: retired  Patient Pre-hospital Living Situation: [address]  Patient Pre-hospital Level of Mobility: walks with device  Patient Pre-hospital Diet Restrictions: pureed, thickened  Substance Use History:   Social History     Substance and Sexual Activity   Alcohol Use Not Currently    Alcohol/week: 0 0 standard drinks    Frequency: Never    Drinks per session: Patient refused    Binge frequency: Never    Comment: only on NEW YEAR'S     Social History     Tobacco Use   Smoking Status Never Smoker   Smokeless Tobacco Never Used     Social History     Substance and Sexual Activity   Drug Use Never    Types: Oxycodone     Family History:    Family History   Problem Relation Age of Onset    Heart disease Mother     Breast cancer Maternal Aunt     Heart disease Sister        Physical Exam:     Vitals:   Blood Pressure: 127/56 (03/04/21 1443)  Pulse: 60 (03/04/21 1443)  Temperature: (!) 97 4 °F (36 3 °C) (03/04/21 1143)  Temp Source: Oral (03/04/21 1143)  Respirations: 18 (03/04/21 1443)  Height: 5' 3" (160 cm) (03/04/21 1143)  Weight - Scale: 99 6 kg (219 lb 9 3 oz) (03/04/21 1143)  SpO2: 100 % (03/04/21 1443)    Physical Exam  Vitals signs and nursing note reviewed  Constitutional:       General: She is not in acute distress  Appearance: Normal appearance  She is obese  She is ill-appearing  She is not diaphoretic  HENT:      Head: Normocephalic and atraumatic  Nose: No rhinorrhea  Mouth/Throat:      Mouth: Mucous membranes are moist       Comments: No active bleeding noted in the bilateral nares  No evidence of bright red or dripping blood in the posterior oropharynx  Dry blood noted around the nose and face  Cardiovascular:      Rate and Rhythm: Regular rhythm  Tachycardia present  Heart sounds: Murmur present  Pulmonary:      Effort: Pulmonary effort is normal       Breath sounds: Normal breath sounds  No stridor  No wheezing, rhonchi or rales  Abdominal:      General: Bowel sounds are normal       Palpations: Abdomen is soft  There is no mass  Tenderness: There is no abdominal tenderness  There is no guarding  Musculoskeletal:      Right lower leg: No edema  Left lower leg: No edema  Skin:     General: Skin is warm and dry  Neurological:      Mental Status: She is alert        Comments: Oriented to person however not to place or time  Unable to tell me the president  Is able to tell me her name, date of birth, son name  She continues to moan and scream I do not want to be here, help me!"     She intermittently follows commands  Psychiatric:         Mood and Affect: Mood normal          Behavior: Behavior normal           Additional Data:     Lab Results:  Results from last 7 days   Lab Units 03/04/21  1306   WBC Thousand/uL 6 94   HEMOGLOBIN g/dL 10 5*   HEMATOCRIT % 36 9   PLATELETS Thousands/uL 120*   NEUTROS PCT % 78*   LYMPHS PCT % 9*   MONOS PCT % 10   EOS PCT % 2     Results from last 7 days   Lab Units 03/04/21  1306   SODIUM mmol/L 141   POTASSIUM mmol/L 4 5   CHLORIDE mmol/L 103   CO2 mmol/L 29   BUN mg/dL 109*   CREATININE mg/dL 2 06*   ANION GAP mmol/L 9   CALCIUM mg/dL 8 9   ALBUMIN g/dL 3 5   TOTAL BILIRUBIN mg/dL 2 48*   ALK PHOS U/L 158*   ALT U/L 17   AST U/L 24   GLUCOSE RANDOM mg/dL 156*     Results from last 7 days   Lab Units 03/04/21  1306   INR  2 13*     Results from last 7 days   Lab Units 03/01/21  1627 03/01/21  1106 03/01/21  0729 03/01/21  0726 02/28/21  2058 02/28/21  1604 02/28/21  1052 02/28/21  0719 02/27/21  2004 02/27/21  1553 02/27/21  1132 02/27/21  0753   POC GLUCOSE mg/dl 197* 199* 129 186* 163* 105 150* 112 178* 175* 141* 130         Results from last 7 days   Lab Units 03/04/21  1306   LACTIC ACID mmol/L 1 6       Imaging: Reviewed radiology reports from this admission including: chest xray  XR chest 1 view portable   ED Interpretation by Radha Adler MD (03/04 1432)   Mild vascular congestion-similar to prior          EKG and Other Studies Reviewed on Admission:   · EKG: EKG Results: Paced rhythm  HR 63 - poor baseline, significant artifact  ** Please Note: This note has been constructed using a voice recognition system   **

## 2021-03-04 NOTE — ASSESSMENT & PLAN NOTE
· Is unclear if the patient is at baseline versus increasingly confused  · Per review of records from [address] [place] [address] she would become somnolent after use of oxycodone  · She was encephalopathic intermittently during hospital stay there  · She currently is not oriented to place or time however is able to tell me that she does not want to stay in the hospital   · She appears significantly anxious at this time  · She does not appear to be able to make her medical decisions currently    · No evidence of infection with a normal white count, no fever and no source infection  · No focal deficits at this time and therefore will hold on imaging of the head

## 2021-03-04 NOTE — ASSESSMENT & PLAN NOTE
· During prior admission goals of care were discussed  The patient was discharged to skilled nursing facility with plans for consideration for hospice  · In the emergency department patient continues to scream that she does not want to be in the hospital   However she does not appear to fully understand this decision and I do not believe that she is able to make that decision at this time  · The patient's family and was beginning to discuss hospice however they wish to have a discussion with the patient herself they were unable to do so when she was at the skilled nursing facility  · The patient's son is going to come and visit the patient, discussed with her hospice services and then determine if this is the route they wish to go down  If so  We will consult hospice and this would be performed at a nursing facility    · At this time they wish to patient being DNR DNI however is NOT hospice/comfort care

## 2021-03-04 NOTE — ASSESSMENT & PLAN NOTE
· Patient with chronic hypoxic respiratory failure noted to be discharged from [address] [place] [address] on 3 liters nasal cannula  · Patient is currently on 3-4 liters in the emergency department  She is not hypoxic  · Suspect that this is likely related to multifactorial etiology including underlying chronic CHF, history of pulmonary embolism  · Continue oxygen as needed  · Patient does rhonchi in the right lung  She appears minimally overloaded but not with overt heart failure  Unclear she could have aspirated in the setting of epistaxis  Monitor vital signs  No indication for antibiotics at this time  Follow up on formal radiologic reading of x-ray

## 2021-03-04 NOTE — ED PROVIDER NOTES
History  Chief Complaint   Patient presents with    Nose Bleed     Pt arrives from SNF c/o epistaxis from right nare  Bleeding controlled at this time  Patient is 65-year-old female who presents to the emergency department from [facility] with right-sided epistaxis which started approximately an hour prior to arrival   She denies identified provocation but shares that she is on Eliquis and his history of prior epistaxis remotely  Today she experienced passage of liquid blood anteriorly from the nostril as well as posteriorly into the oropharynx which she swallowed and expectorated  At this time she feels a small amount of blood in the throat-nothing similar to that prior to arrival   She endorses having chest discomfort, dyspnea as well as nausea and vomiting  No known fever  No abdominal pain  No appreciated leg swelling  Patient recently had extensive hospitalization from  through    She had come in from home with altered mentation and generalized weakness  Encephalopathy felt to be multifactorial   She was identified to have Klebsiella bacteremia while hospitalized and treated for this  Past medical history additionally includes CKD, diabetes, CAD, CHF, atrial fibrillation and aortic stenosis  Consideration of valve replacement was undertaken during last hospitalization  As patient was felt to be a poor surgical candidate this was not pursued  She did undergo significant diuresis with improvement in CHF symptoms  Prior to Admission Medications   Prescriptions Last Dose Informant Patient Reported? Taking?    Accu-Chek Virgie Plus test strip  Child No No   Sig: TEST BLOOD SUGAR 3 TIMES EVERY DAY  DX: E11 65   B-D UF III MINI PEN NEEDLES 31G X 5 MM MISC  Child Yes No   Si (four) times a day As directed   COMBIGAN 0 2-0 5 % 3/4/2021 at Unknown time Child Yes Yes   Sig: Administer 1 drop to both eyes 2 (two) times a day   acetaminophen (TYLENOL) 325 mg tablet Unknown at Unknown time Child No No   Sig: Take 2 tablets (650 mg total) by mouth every 4 (four) hours as needed for mild pain or fever   apixaban (ELIQUIS) 2 5 mg 3/4/2021 at Unknown time  No Yes   Sig: Take 1 tablet (2 5 mg total) by mouth 2 (two) times a day   bisacodyl (DULCOLAX) 10 mg suppository Unknown at Unknown time  No No   Sig: Insert 1 suppository (10 mg total) into the rectum daily as needed for constipation (If no bowel movement)   docusate sodium (COLACE) 100 mg capsule 3/4/2021 at Unknown time  No Yes   Sig: Take 1 capsule (100 mg total) by mouth 2 (two) times a day   famotidine (PEPCID) 20 mg tablet 3/4/2021 at Unknown time Child No Yes   Sig: TAKE 1 TABLET BY MOUTH TWICE A DAY   gabapentin (NEURONTIN) 300 mg capsule 3/3/2021 at Unknown time Child No Yes   Sig: Take 1 capsule (300 mg total) by mouth daily at bedtime   insulin glargine (Basaglar KwikPen) 100 units/mL injection pen 3/3/2021 at Unknown time  No Yes   Sig: Inject 10 Units under the skin daily   insulin lispro (HumaLOG) 100 units/mL injection 3/4/2021 at Unknown time  No Yes   Sig: Inject 1-5 Units under the skin daily at bedtime   Patient taking differently: Inject 1-5 Units under the skin 4 (four) times a day (before meals and at bedtime) Sliding scale   insulin lispro (HumaLOG) 100 units/mL injection 3/4/2021 at Unknown time  No Yes   Sig: Inject 5 Units under the skin 3 (three) times a day with meals   lactulose (CHRONULAC) 10 g/15 mL solution 3/4/2021 at Unknown time  No Yes   Sig: Take 15 mL (10 g total) by mouth 2 (two) times a day   levothyroxine (Synthroid) 137 mcg tablet 3/4/2021 at Unknown time Child No Yes   Sig: Take 1 tablet (137 mcg total) by mouth daily   melatonin 3 mg   Yes Yes   Sig: Take 6 mg by mouth daily at bedtime   metoprolol succinate (TOPROL-XL) 25 mg 24 hr tablet 3/3/2021 at Unknown time  No Yes   Sig: Take 1 tablet (25 mg total) by mouth daily at bedtime   oxyCODONE (ROXICODONE) 5 mg immediate release tablet   No No   Sig: Take 0 5 tablets (2 5 mg total) by mouth every 6 (six) hours as needed for severe pain for up to 3 daysMax Daily Amount: 10 mg   simvastatin (ZOCOR) 20 mg tablet  Child No No   Sig: TAKE 1 TABLET (20MG) BY ORAL ROUTE EVERY DAY IN THE EVENING   sodium chloride (OCEAN) 0 65 % nasal spray   No No   Si spray into each nostril every 2 (two) hours as needed for congestion   torsemide (DEMADEX) 20 mg tablet   Yes Yes   Sig: Take 20 mg by mouth daily      Facility-Administered Medications: None       Past Medical History:   Diagnosis Date    Arthritis     Atrial flutter ([facility] )     Cardiac disease     Carotid artery occlusion     CHF (congestive heart failure) ([place] [address] )     Cholelithiasis     last assessed 2016    Coronary artery disease     Diabetes mellitus ([place] [address] )     Disease of thyroid gland     Essential hypertension 8/15/2016    GERD (gastroesophageal reflux disease)     History of transfusion     Hyperlipidemia     Hypertension     Long-term insulin use in type 2 diabetes ([place] [address] ) 2016    Other specified diabetes mellitus with diabetic autonomic (poly)neuropathy ([place] [address] )     Oxygen dependent     3L    Pleural effusion     Pulmonary embolism ([place] [address] )     2008    Renal disorder     Retinopathy     bilat    Sleep apnea     USES 3 LITER O2 CONTINOUIS    Subclavian artery stenosis ([facility] )     Umbilical hernia with obstruction, without gangrene     last assessed 2016       Past Surgical History:   Procedure Laterality Date    ABDOMINAL SURGERY      CARDIAC PACEMAKER PLACEMENT      CARDIAC SURGERY      cabg x 2    CATARACT EXTRACTION      CHOLECYSTECTOMY      COLONOSCOPY      CORONARY ARTERY BYPASS GRAFT  2008    EYE SURGERY      FRACTURE SURGERY Right 2010    shoulder    HERNIA REPAIR      umbilical    IL LAP,CHOLECYSTECTOMY N/A 8/10/2016    Procedure: CHOLECYSTECTOMY LAPAROSCOPIC;  Surgeon: Janny Wisdom MD;  [facility];  Service: General    ID LARYNGOSCOPY,DIRECT,SCOPE,INJ CORDS Left 11/4/2016    Procedure: MICRODIRECT LARYNGOSCOPY; LEFT VOCAL FOLD INJECTION ;  Surgeon: Yohannes Rahman MD;  [facility];  Service: ENT    ID REPAIR UMBILICAL KMXI,1+H/Q,EUZPA N/A 8/10/2016    Procedure: Laure Esquivel;  Surgeon: Luis Hart MD;  [facility];  Service: General    [address] / STENTING Right     TRICUSPID VALVE REPLACEMENT      VASCULAR SURGERY      heart valve replacement       Family History   Problem Relation Age of Onset    Heart disease Mother     Breast cancer Maternal Aunt     Heart disease Sister      I have reviewed and agree with the history as documented  E-Cigarette/Vaping    E-Cigarette Use Never User      E-Cigarette/Vaping Substances    Nicotine No     THC No     CBD No     Flavoring No     Other No     Unknown No      Social History     Tobacco Use    Smoking status: Never Smoker    Smokeless tobacco: Never Used   Substance Use Topics    Alcohol use: Not Currently     Alcohol/week: 0 0 standard drinks     Frequency: Never     Drinks per session: Patient refused     Binge frequency: Never     Comment: only on NEW YEAR'S    Drug use: Never     Types: Oxycodone       Review of Systems   All other systems reviewed and are negative  Physical Exam  Physical Exam  Vitals signs and nursing note reviewed  Constitutional:       Comments: Tired appearing  She responds mostly in 1 word answers though is able to express self in several word phrases  Occasionally needs to clear throat  Respirations with mild audible gurgling  HENT:      Head: Normocephalic  Mouth/Throat:      Mouth: Mucous membranes are moist       Comments: Patient with combination of dry and moist blood over the lips and chin  Very tiny amount of dried blood is appreciated over the left anterior septum  Very dry blood clot appreciated within the right naris    No bleeding anteriorly  Stringy/ropey clot appreciated in the oropharynx  Small amount of moist lead over the majority of the oral mucosa  This is cleared with rinsing  Clot persists in back of oropharynx though no definite active bleeding is appreciated with this  Eyes:      Extraocular Movements: Extraocular movements intact  Conjunctiva/sclera: Conjunctivae normal    Cardiovascular:      Rate and Rhythm: Normal rate  Pulmonary:      Effort: Pulmonary effort is normal       Comments: Faint rales diffusely  Abdominal:      General: There is distension (Protuberant-nontender)  Palpations: Abdomen is soft  Musculoskeletal:         General: No tenderness  Right lower leg: Edema ( trace) present  Left lower leg: Edema ( trace) present  Skin:     General: Skin is warm and dry  Findings: No rash  Neurological:      General: No focal deficit present  Mental Status: She is alert and oriented to person, place, and time     Psychiatric:         Mood and Affect: Mood normal          Vital Signs  ED Triage Vitals   Temperature Pulse Respirations Blood Pressure SpO2   03/04/21 1143 03/04/21 1143 03/04/21 1143 03/04/21 1143 03/04/21 1143   (!) 97 4 °F (36 3 °C) 63 20 127/69 99 %      Temp Source Heart Rate Source Patient Position - Orthostatic VS BP Location FiO2 (%)   03/04/21 1143 03/04/21 1143 03/04/21 1143 03/04/21 1143 --   Oral Monitor Sitting Right arm       Pain Score       03/04/21 1448       5           Vitals:    03/04/21 1143 03/04/21 1443 03/04/21 1729   BP: 127/69 127/56 111/76   Pulse: 63 60 67   Patient Position - Orthostatic VS: Sitting Sitting Lying         Visual Acuity      ED Medications  Medications   insulin lispro (HumaLOG) 100 units/mL subcutaneous injection 1-5 Units (has no administration in time range)   insulin lispro (HumaLOG) 100 units/mL subcutaneous injection 1-5 Units (has no administration in time range)   ondansetron (ZOFRAN) injection 4 mg (4 mg Intravenous Given 3/4/21 1315)   doxycycline hyclate (VIBRAMYCIN) capsule 100 mg (100 mg Oral Given 3/4/21 1436)   acetaminophen (TYLENOL) tablet 650 mg (650 mg Oral Given 3/4/21 1448)   furosemide (LASIX) injection 40 mg (40 mg Intravenous Given 3/4/21 1436)       Diagnostic Studies  Results Reviewed     Procedure Component Value Units Date/Time    Comprehensive metabolic panel [[id number]]  (Abnormal) Collected: 03/04/21 1306    Lab Status: Final result Specimen: Blood from Arm, Right Updated: 03/04/21 1342     Sodium 141 mmol/L      Potassium 4 5 mmol/L      Chloride 103 mmol/L      CO2 29 mmol/L      ANION GAP 9 mmol/L       mg/dL      Creatinine 2 06 mg/dL      Glucose 156 mg/dL      Calcium 8 9 mg/dL      AST 24 U/L      ALT 17 U/L      Alkaline Phosphatase 158 U/L      Total Protein 8 1 g/dL      Albumin 3 5 g/dL      Total Bilirubin 2 48 mg/dL      eGFR 24 ml/min/1 73sq m     Narrative:      [place] guidelines for Chronic Kidney Disease (CKD):     Stage 1 with normal or high GFR (GFR > 90 mL/min/1 73 square meters)    Stage 2 Mild CKD (GFR = 60-89 mL/min/1 73 square meters)    Stage 3A Moderate CKD (GFR = 45-59 mL/min/1 73 square meters)    Stage 3B Moderate CKD (GFR = 30-44 mL/min/1 73 square meters)    Stage 4 Severe CKD (GFR = 15-29 mL/min/1 73 square meters)    Stage 5 End Stage CKD (GFR <15 mL/min/1 73 square meters)  Note: GFR calculation is accurate only with a steady state creatinine    NT-BNP PRO [[id number]]  (Abnormal) Collected: 03/04/21 1306    Lab Status: Final result Specimen: Blood from Arm, Right Updated: 03/04/21 1342     NT-proBNP 6,443 pg/mL     Lactic acid [[id number]]  (Normal) Collected: 03/04/21 1306    Lab Status: Final result Specimen: Blood from Arm, Right Updated: 03/04/21 1335     LACTIC ACID 1 6 mmol/L     Narrative:      Result may be elevated if tourniquet was used during collection      Troponin I [[id number]]  (Normal) Collected: 03/04/21 1306    Lab Status: Final result Specimen: Blood from Arm, Right Updated: 03/04/21 1334     Troponin I 0 03 ng/mL     Protime-INR [[id number]]  (Abnormal) Collected: 03/04/21 1306    Lab Status: Final result Specimen: Blood from Arm, Right Updated: 03/04/21 1332     Protime 23 9 seconds      INR 2 13    APTT [[id number]]  (Abnormal) Collected: 03/04/21 1306    Lab Status: Final result Specimen: Blood from Arm, Right Updated: 03/04/21 1332     PTT 48 seconds     CBC and differential [[id number]]  (Abnormal) Collected: 03/04/21 1306    Lab Status: Final result Specimen: Blood from Arm, Right Updated: 03/04/21 1316     WBC 6 94 Thousand/uL      RBC 4 04 Million/uL      Hemoglobin 10 5 g/dL      Hematocrit 36 9 %      MCV 91 fL      MCH 26 0 pg      MCHC 28 5 g/dL      RDW 20 1 %      MPV 10 6 fL      Platelets 852 Thousands/uL      nRBC 0 /100 WBCs      Neutrophils Relative 78 %      Immat GRANS % 0 %      Lymphocytes Relative 9 %      Monocytes Relative 10 %      Eosinophils Relative 2 %      Basophils Relative 1 %      Neutrophils Absolute 5 41 Thousands/µL      Immature Grans Absolute 0 02 Thousand/uL      Lymphocytes Absolute 0 60 Thousands/µL      Monocytes Absolute 0 70 Thousand/µL      Eosinophils Absolute 0 14 Thousand/µL      Basophils Absolute 0 07 Thousands/µL                  XR chest 1 view portable   ED Interpretation by Jean Verma MD (03/04 1432)   Mild vascular congestion-similar to prior      Final Result by Rock Aftab MD (03/04 1738)      Mild pulmonary venous congestion  Workstation performed: [id number]                    Procedures  ECG 12 Lead Documentation Only    Date/Time: 3/4/2021 1:56 PM  Performed by: Jean Verma MD  Authorized by: Jean Verma MD     ECG reviewed by me, the ED Provider: yes    Patient location:  ED  Previous ECG:     Previous ECG:  Compared to current    Comparison ECG info:  February 24, 2021-prior EKG fully paced    Paced complexes very similar in appearance  Rate:     ECG rate:  63  Rhythm:     Rhythm comment:  Mostly paced with occasional PVCs    Epistaxis management    Date/Time: 3/4/2021 2:15 PM  Performed by: Casi Nogueira MD  Authorized by: Casi Nogueira MD   Universal Protocol:  Consent: Verbal consent obtained  Consent given by: patient  Patient understanding: patient states understanding of the procedure being performed      Patient location:  Bedside  Procedure details:     Treatment site:  R anterior    Hemostasis method:  Rhino rocket    Approach:  External    Treatment complexity:  Limited  Post-procedure details:     Assessment:  Bleeding decreased    Patient tolerance of procedure: Tolerated well, no immediate complications  Comments:      Patient uncomfortable upon placement rhino rocket  Acetaminophen ordered  No bleeding was appreciated anteriorly beyond packing  She did not appreciate any large amount of bleeding into the oropharynx following its placement though potentially a small amount continued  ED Course       ED Course as of Mar 04 1737   Thu Mar 04, 2021   1348 Labs reviewed  Hgb stable  No leukocytosis  Cr minimally elevated from 1 9 - 2 0  Bilirubin has trended down from prior admission (peak at 3 9)  BNP half that from check on last hospitalization  CXR appears stable w/ only mild vascular congestion  Troponin nl       1419 Unfortunately patient began experiencing bleeding anteriorly again from the right nostril  Clip was applied for continuous pressure application for several minutes  Upon immediate removal of this patient had streaming anteriorly  She additionally appreciated more bleeding into the oropharynx  Patient eliminated small amount of clot with nose blowing  Rhino rocket subsequently placed  No bleeding anteriorly  She still continues to have long stringy clot in the oropharynx and is unable to expectorate this    Since placement of rhino rocket supplemental O2 was increased to 4 L in order to achieve O2 sat of 93% on room air  She had been in the low 90s prior to this  Spoke with patient regarding plan for observation in the hospital   Covering with antibiotic and a seton given required nasal packing  She will be closely monitored to assess for ongoing bleeding and O2 sat closely given baseline use requirements  She does clinically seemed to be slightly volume overloaded  Furosemide will be given  [id number] Informed by RN that patient had removed her nasal packing  Patient relates that she is feeling "terrible "  When asking her what had been bothering her she tells me that she had a nose bleed  No bleeding is appreciated from the nostril  She is resistant to ongoing evaluation of the nose and oropharynx though is able to quickly visualize inside the mouth/throat  I do not appreciate any fresh blood in the mouth  She does have persistence of ropey long clot in the oropharynx  She repeatedly makes weak throat clearing sounds  SL IM updated                                              MDM    Disposition  Final diagnoses:   Right-sided epistaxis   Anticoagulated   Dyspnea   CHF (congestive heart failure) ([facility] )     Time reflects when diagnosis was documented in both MDM as applicable and the Disposition within this note     Time User Action Codes Description Comment    3/4/2021  3:00 PM Antolin Loll A Add [R04 0] Right-sided epistaxis     3/4/2021  3:00 PM Antolin Loll A Add [Z79 01] Anticoagulated     3/4/2021  3:01 PM Nirmal Antolin Loll A Add [R06 00] Dyspnea     3/4/2021  3:01 PM Antolin Loll A Add [I50 9] CHF (congestive heart failure) [facility])       ED Disposition     ED Disposition Condition Date/Time Comment    Admit Stable u Mar 4, 2021  3:00 PM Case was discussed with Dr Sulma Rojas and the patient's admission status was agreed to be Admission Status: observation status to the service of Dr Sulma Rojas          Follow-up Information    None         Patient's Medications   Discharge Prescriptions    No medications on file     No discharge procedures on file      PDMP Review     None          ED Provider  Electronically Signed by           Radha Adler MD  03/04/21 9186

## 2021-03-04 NOTE — ASSESSMENT & PLAN NOTE
Wt Readings from Last 3 Encounters:   03/04/21 99 6 kg (219 lb 9 3 oz)   03/01/21 96 2 kg (212 lb 1 3 oz)   02/10/21 95 3 kg (210 lb)     · The patient appears minimally volume overloaded  · Her chest x-ray does show mild pulmonary congestion consistent with prior  · She is noted to have trace bilateral lower extremity edema however this may be chronic for the patient  · Her weight is currently listed as 7 pounds above her discharge weight however I doubt the current accuracy  · We will repeat weight  · Status post 40 milligrams of IV Lasix in the emergency department  · Will check creatinine in the morning and repeat volume status evaluation  If required, will dose additional Lasix at that time      · Home diuretic regimen and is torsemide 20 milligrams daily

## 2021-03-04 NOTE — ASSESSMENT & PLAN NOTE
· Patient was severe/critical aortic stenosis  · Last valve area was 0 8   Patient evaluated by CT surgery at [facility] and unfortunately due to her multiple comorbidities is not felt to be a candidate for SAVR or TAVR  · Avoid hypotension  Goal to maintain blood pressure greater than 364 systolic

## 2021-03-04 NOTE — ASSESSMENT & PLAN NOTE
Lab Results   Component Value Date    EGFR 24 03/04/2021    EGFR 26 03/01/2021    EGFR 25 02/28/2021    CREATININE 2 06 (H) 03/04/2021    CREATININE 1 90 (H) 03/01/2021    CREATININE 1 96 (H) 02/28/2021     · Baseline creatinine 1 9-2 0

## 2021-03-04 NOTE — ASSESSMENT & PLAN NOTE
· Continue metoprolol, simvastatin  · Holds Eliquis secondary to epistaxis  · Patient is not on any antiplatelets

## 2021-03-04 NOTE — ED NOTES
Pt found with finger in right nare  Bleeding began again  Gauze placed in right nare and nose clip applied  Dr Carolina Kirby aware        Amy Childs RN  03/04/21 8080

## 2021-03-04 NOTE — ASSESSMENT & PLAN NOTE
· Has a history of use of Xarelto, Pradaxa and Eliquis  · Hold Eliquis at this time secondary to epistaxis  · Will have her some benefit discussion with the patient's family tomorrow  They are considering hospice

## 2021-03-05 ENCOUNTER — TELEPHONE (OUTPATIENT)
Dept: OTHER | Facility: OTHER | Age: 71
End: 2021-03-05

## 2021-03-05 VITALS
WEIGHT: 222 LBS | BODY MASS INDEX: 39.34 KG/M2 | OXYGEN SATURATION: 96 % | HEART RATE: 61 BPM | SYSTOLIC BLOOD PRESSURE: 125 MMHG | HEIGHT: 63 IN | DIASTOLIC BLOOD PRESSURE: 58 MMHG | TEMPERATURE: 97.4 F | RESPIRATION RATE: 18 BRPM

## 2021-03-05 PROBLEM — R04.0 EPISTAXIS: Status: RESOLVED | Noted: 2021-03-04 | Resolved: 2021-03-05

## 2021-03-05 LAB
ANION GAP SERPL CALCULATED.3IONS-SCNC: 8 MMOL/L (ref 4–13)
BUN SERPL-MCNC: 117 MG/DL (ref 5–25)
CALCIUM SERPL-MCNC: 9.3 MG/DL (ref 8.3–10.1)
CHLORIDE SERPL-SCNC: 106 MMOL/L (ref 100–108)
CO2 SERPL-SCNC: 29 MMOL/L (ref 21–32)
CREAT SERPL-MCNC: 2.21 MG/DL (ref 0.6–1.3)
ERYTHROCYTE [DISTWIDTH] IN BLOOD BY AUTOMATED COUNT: 20.3 % (ref 11.6–15.1)
FLUAV RNA RESP QL NAA+PROBE: NEGATIVE
FLUBV RNA RESP QL NAA+PROBE: NEGATIVE
GFR SERPL CREATININE-BSD FRML MDRD: 22 ML/MIN/1.73SQ M
GLUCOSE SERPL-MCNC: 118 MG/DL (ref 65–140)
GLUCOSE SERPL-MCNC: 131 MG/DL (ref 65–140)
GLUCOSE SERPL-MCNC: 145 MG/DL (ref 65–140)
GLUCOSE SERPL-MCNC: 149 MG/DL (ref 65–140)
GLUCOSE SERPL-MCNC: 165 MG/DL (ref 65–140)
GLUCOSE SERPL-MCNC: 200 MG/DL (ref 65–140)
HCT VFR BLD AUTO: 34.3 % (ref 34.8–46.1)
HGB BLD-MCNC: 9.6 G/DL (ref 11.5–15.4)
MCH RBC QN AUTO: 26.1 PG (ref 26.8–34.3)
MCHC RBC AUTO-ENTMCNC: 28 G/DL (ref 31.4–37.4)
MCV RBC AUTO: 93 FL (ref 82–98)
PLATELET # BLD AUTO: 98 THOUSANDS/UL (ref 149–390)
PMV BLD AUTO: 10.3 FL (ref 8.9–12.7)
POTASSIUM SERPL-SCNC: 4.9 MMOL/L (ref 3.5–5.3)
RBC # BLD AUTO: 3.68 MILLION/UL (ref 3.81–5.12)
RSV RNA RESP QL NAA+PROBE: NEGATIVE
SARS-COV-2 RNA RESP QL NAA+PROBE: NEGATIVE
SODIUM SERPL-SCNC: 143 MMOL/L (ref 136–145)
WBC # BLD AUTO: 5.51 THOUSAND/UL (ref 4.31–10.16)

## 2021-03-05 PROCEDURE — 80048 BASIC METABOLIC PNL TOTAL CA: CPT | Performed by: PHYSICIAN ASSISTANT

## 2021-03-05 PROCEDURE — 99217 PR OBSERVATION CARE DISCHARGE MANAGEMENT: CPT | Performed by: PHYSICIAN ASSISTANT

## 2021-03-05 PROCEDURE — 82948 REAGENT STRIP/BLOOD GLUCOSE: CPT

## 2021-03-05 PROCEDURE — 85027 COMPLETE CBC AUTOMATED: CPT | Performed by: PHYSICIAN ASSISTANT

## 2021-03-05 PROCEDURE — 0241U HB NFCT DS VIR RESP RNA 4 TRGT: CPT | Performed by: PHYSICIAN ASSISTANT

## 2021-03-05 RX ORDER — OXYCODONE HYDROCHLORIDE 5 MG/1
2.5 TABLET ORAL EVERY 6 HOURS PRN
Qty: 6 TABLET | Refills: 0 | Status: SHIPPED | OUTPATIENT
Start: 2021-03-05 | End: 2021-03-08

## 2021-03-05 RX ORDER — DABIGATRAN ETEXILATE 75 MG/1
75 CAPSULE, COATED PELLETS ORAL EVERY 12 HOURS SCHEDULED
Refills: 0
Start: 2021-03-05

## 2021-03-05 RX ADMIN — LACTULOSE 10 G: 10 SOLUTION ORAL at 10:13

## 2021-03-05 RX ADMIN — INSULIN LISPRO 2 UNITS: 100 INJECTION, SOLUTION INTRAVENOUS; SUBCUTANEOUS at 17:43

## 2021-03-05 RX ADMIN — FAMOTIDINE 20 MG: 20 TABLET ORAL at 10:12

## 2021-03-05 RX ADMIN — TORSEMIDE 20 MG: 20 TABLET ORAL at 10:12

## 2021-03-05 RX ADMIN — INSULIN LISPRO 1 UNITS: 100 INJECTION, SOLUTION INTRAVENOUS; SUBCUTANEOUS at 12:41

## 2021-03-05 RX ADMIN — LEVOTHYROXINE SODIUM 137 MCG: 25 TABLET ORAL at 06:11

## 2021-03-05 RX ADMIN — POLYETHYLENE GLYCOL 3350 17 G: 17 POWDER, FOR SOLUTION ORAL at 10:12

## 2021-03-05 RX ADMIN — DOCUSATE SODIUM 100 MG: 100 CAPSULE, LIQUID FILLED ORAL at 10:12

## 2021-03-05 NOTE — ASSESSMENT & PLAN NOTE
Lab Results   Component Value Date    HGBA1C 7 1 (H) 02/01/2021       No results for input(s): POCGLU in the last 72 hours  Blood Sugar Average: Last 72 hrs:     · Continue Lantus 10 units q h s  Enrrique Daniel · Hold Humalog 5 units t i d on discharge and use sliding scale insulin only at this time

## 2021-03-05 NOTE — ASSESSMENT & PLAN NOTE
· Has a history of use of Xarelto, Pradaxa and Eliquis  · Hold Eliquis at this time secondary to epistaxis  · Patient's family would like her to resume anticoagulation unless absolutely not necessary at this time  Therefore will transition back to Pradaxa  They understand that this is not the ideal agent in her underlying chronic kidney disease    Given her creatinine clearance is in the 20 range, the patient will be renally dose at 75 mg p o  B i d

## 2021-03-05 NOTE — ASSESSMENT & PLAN NOTE
· Per review of records from [address] [place] [address] she would become somnolent after use of oxycodone  · She was encephalopathic intermittently during hospital stay there  · She is currently oriented to person, place and time  However she appears intermittently confused  · She appears significantly anxious at this time  · She informs me that she does not want to be in the hospital any longer but that her son would make final decisions for her

## 2021-03-05 NOTE — ASSESSMENT & PLAN NOTE
· Patient with chronic hypoxic respiratory failure noted to be discharged from [address] [place] [address] on 3 liters nasal cannula    · Patient on 3L NC at this time  · Suspect that this is likely related to multifactorial etiology including underlying chronic CHF, history of pulmonary embolism  · Chest x-ray with mild pulmonary vascular congestion

## 2021-03-05 NOTE — ASSESSMENT & PLAN NOTE
· Patient has history of epistaxis when on anticoagulation with Eliquis in 2016/2017  · Patient was discontinued from Pradaxa secondary to chronic kidney disease and decreased GFR and transition to Eliquis during hospital stay at [address] [place] [address]  · She subsequently developed epistaxis from the right nare  This was noted to be bleeding anteriorly and into the oropharynx  This was noted be packed with a rhinorocket cessation of bleeding  · The patient then pulled rhino rocket out of her nose  · She has been intermittently picking her nose  · Patient is also on chronic oxygen  · Patient had no further bleeding since admission  She was withheld from anticoagulation  · Given resolution and no further bleeding with removal of rhino rocket, was not seen by ENT  · Given history of epistaxis with Eliquis, will discontinue Eliquis use  Patient had had less bleeding with Xarelto and Pradaxa  Discuss with the patient's son  He would not want her to be discontinued from this medication if not completely necessary at this time    Therefore will transition to pradaxa 75 mg PO BID  · Humidify all oxygen  · Nasal saline

## 2021-03-05 NOTE — SPEECH THERAPY NOTE
Speech Language/Pathology    Speech/Language Pathology Progress Note    Patient Name: Melvin Sequeira  EOQIO'J Date: 3/5/2021     Consult rec'd for swallow eval  Pt now being discharged back to [facility] with hospice services  Discussed with SHANIQUA Ocasio, ok to cancel swallow eval order       Jh Schuler CCC-SLP  Speech Pathologist  Available via  tiger text

## 2021-03-05 NOTE — CASE MANAGEMENT
TC from Matt De La Fuente with [facility]  Requested CM fax Order for Hospice Eval and Treat to [phone]  CM informed by Iantha Dandy with [facility] that pt can return today  Jeaneth requested COVID test      Gigi Dietrich aware of above  [facility]  [facility]  ETS request made to [facility] via [place]

## 2021-03-05 NOTE — CASE MANAGEMENT
CM informed by Avani Rosario with [facility] - BLS set up with Rene Jorgensen for 7pm maciej BEARN completed  CM notified Marizol Rodriguez with [facility], nurse, and Kelly Jamison of d/c time  Son Judy Hodges aware of d/c time  CM spoke with Min Veloz Liaison - she is mtg with son today at 3pm to complete consents  Shara aware of d/c time for today  [facility] DNR signed and placed in pt chart  Son reviewed and denies questions  Son confirmed he met with John Moreno and signed consents for Hospice

## 2021-03-05 NOTE — ASSESSMENT & PLAN NOTE
Lab Results   Component Value Date    EGFR 22 03/05/2021    EGFR 24 03/04/2021    EGFR 26 03/01/2021    CREATININE 2 21 (H) 03/05/2021    CREATININE 2 06 (H) 03/04/2021    CREATININE 1 90 (H) 03/01/2021     · Baseline creatinine 1 9-2 0  · Currently 2 21  May be related to dose of IV Lasix given yesterday

## 2021-03-05 NOTE — ASSESSMENT & PLAN NOTE
Wt Readings from Last 3 Encounters:   03/05/21 101 kg (222 lb 0 1 oz)   03/01/21 96 2 kg (212 lb 1 3 oz)   02/10/21 95 3 kg (210 lb)     · Her chest x-ray does show mild pulmonary congestion consistent with prior  · She is noted to have trace bilateral lower extremity edema however this may be chronic for the patient  · Patient's weight is above baseline    She appears minimally overloaded on exam   · Status post 40 milligrams of IV Lasix in the emergency department  · Continue home diuretic regimen  · Home diuretic regimen is torsemide 20 milligrams daily

## 2021-03-05 NOTE — ASSESSMENT & PLAN NOTE
· During prior admission goals of care were discussed  The patient was discharged to skilled nursing facility with plans for consideration for hospice  · In the emergency department patient was screaming that she does not want to be in the hospital  · I discussed with the patient's son as well as with the patient herself  The patient does not want to come back to the hospital however she does not fully understand all of these medical decisions  · I discussed with the patient's son regarding hospice  The patient son and daughter-in-law had also discussed with hospice team from [facility]  At this time they wish for the patient be discharged back to [facility] with home hospice  The patient will be subsequently discharged today  · Out-of-hospital DNR has signed

## 2021-03-05 NOTE — CASE MANAGEMENT
CM consulted for Hospice  CM spoke with pt son, Irish Orantes, following his conversation with Cuate ARRIETA confirmed plan would be to return to [facility] with [facility]  Irish Gilmoremacarlos reports he already had a conversation with Alexandra Covarrubias from [place] and preference would be their agency  CM informed Irish Orantes there are other agencies available for Hospice and offered list, but he declined the list  Irish Orantes aware plan would be to return to [facility] today with [facility] if [facility] able to accept back today  Referrals to both [facility] and [facility] in [place]  TC to [facility] with [facility] [phone] and informed her of above  Alexandra Covarrubias confirmed she spoke with both the son and his wife regarding Hospice  Alexandra Covarrubias to f/u with family for consents once referral is received

## 2021-03-05 NOTE — ASSESSMENT & PLAN NOTE
· Noted to be maintained on metoprolol  The patient was on anticoagulation with Eliquis 2 5 milligrams p o  B i d  · Of note she has previously had epistaxis while on Eliquis and was changed to Pradaxa however during last hospital stay was transitioned back to Eliquis secondary to worsening kidney disease  · Discussed the risks and benefits of anticoagulation with the patient's family  At this time, will resume anticoagulation however avoid Eliquis  · Resume anticoagulation with Pradaxa  Continue to hold Eliquis on discharge

## 2021-03-05 NOTE — UTILIZATION REVIEW
Initial Clinical Review    Admission: Date/Time/Statement: 3/4/2021 1502 Observation   Admission Orders (From admission, onward)     Ordered        03/04/21 1502  Place in Observation  Once                   Orders Placed This Encounter   Procedures    Place in Observation     Standing Status:   Standing     Number of Occurrences:   1     Order Specific Question:   Level of Care     Answer:   Med Surg [16]     ED Arrival Information     Expected Arrival Acuity Means of Arrival Escorted By Service Admission Type    - 3/4/2021 11:42 Urgent Ambulance [facility] Hospitalist Urgent    Arrival Complaint    nose bleed        Chief Complaint   Patient presents with    Nose Bleed     Pt arrives from SNF c/o epistaxis from right nare  Bleeding controlled at this time  Assessment/Plan:66year-old female history of CAD, type 2 diabetes, critical AS, a flutter, chronic hypoxic respiratory failure who presents with right-sided epistaxis on apixaban  Patient recently discharged to a nursing home after a stay at [address] [place] [address] secondary to Klebsiella bacteremia  Patient's nares were packed in the ED however patient has removed the packing without recurrent bleeding at this time  Chronically ill-appearing elderly female in no acute distress  Dry blood around there is an upper lip  Bilateral rales  No lower extremity edema noted     A/P:  58-year-old female presented with right-sided epistaxis on Eliquis  Volume status is unclear as weight is not concordant with recent discharge  Patient received empiric diuresis in the ED  Agree with plan to follow-up labs, VS, weight in the a m  to determine if additional diuresis is necessary  Will watch closely for recurrent epistaxis and consult ENT if needed    Ultimately further goals of care conversation should be held as pt expressed that she did not want to be hospitalized although unclear if she is competent to make that decision without family support  ED Triage Vitals   Temperature Pulse Respirations Blood Pressure SpO2   03/04/21 1143 03/04/21 1143 03/04/21 1143 03/04/21 1143 03/04/21 1143   (!) 97 4 °F (36 3 °C) 63 20 127/69 99 %      Temp Source Heart Rate Source Patient Position - Orthostatic VS BP Location FiO2 (%)   03/04/21 1143 03/04/21 1143 03/04/21 1143 03/04/21 1143 --   Oral Monitor Sitting Right arm       Pain Score       03/04/21 1448       5          Wt Readings from Last 1 Encounters:   03/05/21 101 kg (222 lb 0 1 oz)     Additional Vital Signs:   03/05/21 0700  97 5 °F (36 4 °C)  60  18  128/60  87  97 %  32  3 L/min  Nasal cannula  Lying   03/04/21 2201  97 5 °F (36 4 °C)  60  18  130/72  92  99 %  32  3 L/min  Nasal cannula  Lying   03/04/21 2018  97 4 °F (36 3 °C)Abnormal   61  18  151/69  99  97 %  32  3 L/min  Nasal cannula         Pertinent Labs/Diagnostic Test Results:   3/4/2021 CxR - Mild pulmonary venous congestion  3/4/2021 EKG Mostly paced with occasional PVCs  February 24, 2021-prior EKG fully paced    Paced complexes very similar in appearance  Results from last 7 days   Lab Units 03/03/21  1435 03/01/21  1533   SARS-COV-2  Negative Negative     Results from last 7 days   Lab Units 03/05/21  0558 03/04/21  1306 02/27/21  1228   WBC Thousand/uL 5 51 6 94  --    HEMOGLOBIN g/dL 9 6* 10 5* 10 5*   HEMATOCRIT % 34 3* 36 9 37 8   PLATELETS Thousands/uL 98* 120*  --    NEUTROS ABS Thousands/µL  --  5 41  --          Results from last 7 days   Lab Units 03/05/21  0558 03/04/21  1306 03/01/21  0614 02/28/21  0506 02/27/21  1325 02/27/21  0449   SODIUM mmol/L 143 141 137 136  --  136   POTASSIUM mmol/L 4 9 4 5 4 8 4 3 4 2 5 5*   CHLORIDE mmol/L 106 103 101 100  --  102   CO2 mmol/L 29 29 27 27  --  27   ANION GAP mmol/L 8 9 9 9  --  7   BUN mg/dL 117* 109* 117* 117*  --  122*   CREATININE mg/dL 2 21* 2 06* 1 90* 1 96*  --  2 09*   EGFR ml/min/1 73sq m 22 24 26 25  --  23   CALCIUM mg/dL 9 3 8 9 8 9 9 1  --  9 4     Results from last 7 days   Lab Units 03/04/21  1306   AST U/L 24   ALT U/L 17   ALK PHOS U/L 158*   TOTAL PROTEIN g/dL 8 1   ALBUMIN g/dL 3 5   TOTAL BILIRUBIN mg/dL 2 48*     Results from last 7 days   Lab Units 03/01/21  1627 03/01/21  1106 03/01/21  0729 03/01/21  0726 02/28/21  2058 02/28/21  1604 02/28/21  1052 02/28/21  0719 02/27/21  2004 02/27/21  1553 02/27/21  1132 02/27/21  0753   POC GLUCOSE mg/dl 197* 199* 129 186* 163* 105 150* 112 178* 175* 141* 130     Results from last 7 days   Lab Units 03/05/21  0558 03/04/21  1306 03/01/21  0614 02/28/21  0506 02/27/21  0449   GLUCOSE RANDOM mg/dL 131 156* 117 107 134     Results from last 7 days   Lab Units 03/04/21  1306   TROPONIN I ng/mL 0 03     Results from last 7 days   Lab Units 03/04/21  1306   PROTIME seconds 23 9*   INR  2 13*   PTT seconds 48*     Results from last 7 days   Lab Units 03/04/21  1306   LACTIC ACID mmol/L 1 6     Results from last 7 days   Lab Units 03/04/21  1306   NT-PRO BNP pg/mL 6,443*     Results from last 7 days   Lab Units 03/01/21  1533   INFLUENZA A PCR  Negative   INFLUENZA B PCR  Negative   RSV PCR  Negative       ED Treatment:   Medication Administration from 03/04/2021 1141 to 03/04/2021 2011       Date/Time Order Dose Route Action Comments     03/04/2021 1315 ondansetron (ZOFRAN) injection 4 mg 4 mg Intravenous Given      03/04/2021 1436 doxycycline hyclate (VIBRAMYCIN) capsule 100 mg 100 mg Oral Given      03/04/2021 1448 acetaminophen (TYLENOL) tablet 650 mg 650 mg Oral Given      03/04/2021 1436 furosemide (LASIX) injection 40 mg 40 mg Intravenous Given      03/04/2021 1921 docusate sodium (COLACE) capsule 100 mg 100 mg Oral Given      03/04/2021 1931 lactulose oral solution 10 g 10 g Oral Given      03/04/2021 1954 pravastatin (PRAVACHOL) tablet 40 mg 40 mg Oral Given         Past Medical History:   Diagnosis Date    Arthritis     Atrial flutter ([facility] [address] )     Cardiac disease     Carotid artery occlusion     CHF (congestive heart failure) ([place] [address] )     Cholelithiasis     last assessed 8/8/2016    Coronary artery disease     Diabetes mellitus ([place] [address] )     Disease of thyroid gland     Essential hypertension 8/15/2016    GERD (gastroesophageal reflux disease)     History of transfusion     Hyperlipidemia     Hypertension     Long-term insulin use in type 2 diabetes ([place] [address] ) 6/4/2016    Other specified diabetes mellitus with diabetic autonomic (poly)neuropathy ([place] [address] )     Oxygen dependent     3L    Pleural effusion 2008    Pulmonary embolism ([place] [address] )     2008    Renal disorder     Retinopathy     bilat    Sleep apnea     USES 3 LITER O2 [place]    Subclavian artery stenosis (HCC)     Umbilical hernia with obstruction, without gangrene     last assessed 7/8/2016     Present on Admission:   Chronic atrial fibrillation ([place] [address] )   Coronary artery disease involving coronary bypass graft of native heart without angina pectoris   Chronic respiratory failure with hypoxia ([facility])   Chronic combined systolic and diastolic congestive heart failure ([facility])   Aortic stenosis   CKD 3   Encephalopathy acute      Admitting Diagnosis: Bleeding nose [R04 0]  CHF (congestive heart failure) ([facility]) [I50 9]  Dyspnea [R06 00]  Anticoagulated [Z79 01]  Right-sided epistaxis [R04 0]  Age/Sex: 66 y o  female  Admission Orders:  Scheduled Medications:  docusate sodium, 100 mg, Oral, BID  famotidine, 20 mg, Oral, Daily  insulin glargine, 8 Units, Subcutaneous, HS  insulin lispro, 1-5 Units, Subcutaneous, TID AC  insulin lispro, 1-5 Units, Subcutaneous, HS  lactulose, 10 g, Oral, BID  levothyroxine, 137 mcg, Oral, Early Morning  melatonin, 6 mg, Oral, HS  metoprolol succinate, 25 mg, Oral, HS  polyethylene glycol, 17 g, Oral, Daily  pravastatin, 40 mg, Oral, Daily With Dinner  torsemide, 20 mg, Oral, Daily      Continuous IV Infusions: none     PRN Meds: not used   acetaminophen, 650 mg, Oral, Q4H PRN  ondansetron, 4 mg, Intravenous, Q6H PRN  oxyCODONE, 2 5 mg, Oral, Q6H PRN  sodium chloride, 1 spray, Each Nare, Q2H PRN          Network Utilization Review Department  ATTENTION: Please call with any questions or concerns to [phone] and carefully listen to the prompts so that you are directed to the right person  All voicemails are confidential   Sruthi Eduardo all requests for admission clinical reviews, approved or denied determinations and any other requests to dedicated fax number below belonging to the campus where the patient is receiving treatment   List of dedicated fax numbers for the Facilities:  [address] DENIALS (Administrative/Medical Necessity) [phone]   [address] (Maternity/NICU/Pediatrics) [phone]   [address] [facility] [address] Beto Clay [address]48 (Chaitanya Melo "Che" 103) [address] [place] [address] [address] Shruthi [address] [place] [address] [phone]

## 2021-03-05 NOTE — DISCHARGE SUMMARY
[facility]  Discharge- Whitley Villegas 1950, 66 y o  female MRN: [id number]  Unit/Bed#: S -01 Encounter: [id number]  Primary Care Provider: Princess Jenn MD   Date and time admitted to hospital: 3/4/2021 11:42 AM    Goals of care, counseling/discussion  Assessment & Plan  · During prior admission goals of care were discussed  The patient was discharged to skilled nursing facility with plans for consideration for hospice  · In the emergency department patient was screaming that she does not want to be in the hospital  · I discussed with the patient's son as well as with the patient herself  The patient does not want to come back to the hospital however she does not fully understand all of these medical decisions  · I discussed with the patient's son regarding hospice  The patient son and daughter-in-law had also discussed with hospice team from [facility]  At this time they wish for the patient be discharged back to [facility] with home hospice  The patient will be subsequently discharged today  · Out-of-hospital DNR has signed  * Epistaxis-resolved as of 3/5/2021  Assessment & Plan  · Patient has history of epistaxis when on anticoagulation with Eliquis in 2016/2017  · Patient was discontinued from Pradaxa secondary to chronic kidney disease and decreased GFR and transition to Eliquis during hospital stay at [address] [place] [address]  · She subsequently developed epistaxis from the right nare  This was noted to be bleeding anteriorly and into the oropharynx  This was noted be packed with a rhinorocket cessation of bleeding  · The patient then pulled rhino rocket out of her nose  · She has been intermittently picking her nose  · Patient is also on chronic oxygen  · Patient had no further bleeding since admission  She was withheld from anticoagulation    · Given resolution and no further bleeding with removal of rhino rocket, was not seen by ENT  · Given history of epistaxis with Eliquis, will discontinue Eliquis use  Patient had had less bleeding with Xarelto and Pradaxa  Discuss with the patient's son  He would not want her to be discontinued from this medication if not completely necessary at this time  Therefore will transition to pradaxa 75 mg PO BID  · Humidify all oxygen  · Nasal saline    Chronic respiratory failure with hypoxia [facility])  Assessment & Plan  · Patient with chronic hypoxic respiratory failure noted to be discharged from [facility] on 3 liters nasal cannula  · Patient on 3L NC at this time  · Suspect that this is likely related to multifactorial etiology including underlying chronic CHF, history of pulmonary embolism  · Chest x-ray with mild pulmonary vascular congestion    Chronic combined systolic and diastolic congestive heart failure (HCC)  Assessment & Plan  Wt Readings from Last 3 Encounters:   03/05/21 101 kg (222 lb 0 1 oz)   03/01/21 96 2 kg (212 lb 1 3 oz)   02/10/21 95 3 kg (210 lb)     · Her chest x-ray does show mild pulmonary congestion consistent with prior  · She is noted to have trace bilateral lower extremity edema however this may be chronic for the patient  · Patient's weight is above baseline  She appears minimally overloaded on exam   · Status post 40 milligrams of IV Lasix in the emergency department  · Continue home diuretic regimen  · Home diuretic regimen is torsemide 20 milligrams daily    Encephalopathy acute  Assessment & Plan  · Per review of records from [facility] she would become somnolent after use of oxycodone  · She was encephalopathic intermittently during hospital stay there  · She is currently oriented to person, place and time  However she appears intermittently confused  · She appears significantly anxious at this time  · She informs me that she does not want to be in the hospital any longer but that her son would make final decisions for her      Anticoagulant long-term use  Assessment & Plan  · Has a history of use of Xarelto, Pradaxa and Eliquis  · Hold Eliquis at this time secondary to epistaxis  · Patient's family would like her to resume anticoagulation unless absolutely not necessary at this time  Therefore will transition back to Pradaxa  They understand that this is not the ideal agent in her underlying chronic kidney disease  Given her creatinine clearance is in the 20 range, the patient will be renally dose at 75 mg p o  B i d  Chronic atrial fibrillation (HCC)  Assessment & Plan  · Noted to be maintained on metoprolol  The patient was on anticoagulation with Eliquis 2 5 milligrams p o  B i d  · Of note she has previously had epistaxis while on Eliquis and was changed to Pradaxa however during last hospital stay was transitioned back to Eliquis secondary to worsening kidney disease  · Discussed the risks and benefits of anticoagulation with the patient's family  At this time, will resume anticoagulation however avoid Eliquis  · Resume anticoagulation with Pradaxa  Continue to hold Eliquis on discharge  Aortic stenosis  Assessment & Plan  · Patient was severe/critical aortic stenosis  · Last valve area was 0 8   Patient evaluated by CT surgery at [facility] and unfortunately due to her multiple comorbidities is not felt to be a candidate for SAVR or TAVR  · Avoid hypotension  Goal to maintain blood pressure greater than 979 systolic  Coronary artery disease involving coronary bypass graft of native heart without angina pectoris  Assessment & Plan  · Continue metoprolol, simvastatin  · Resume anticoagulation but changed to Pradaxa  · Patient is not on any antiplatelets  CKD 3  Assessment & Plan  Lab Results   Component Value Date    EGFR 22 03/05/2021    EGFR 24 03/04/2021    EGFR 26 03/01/2021    CREATININE 2 21 (H) 03/05/2021    CREATININE 2 06 (H) 03/04/2021    CREATININE 1 90 (H) 03/01/2021     · Baseline creatinine 1 9-2 0  · Currently 2 21    May be related to dose of IV Lasix given yesterday  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, with long-term current use of insulin (HCC)  Assessment & Plan  Lab Results   Component Value Date    HGBA1C 7 1 (H) 02/01/2021       No results for input(s): POCGLU in the last 72 hours  Blood Sugar Average: Last 72 hrs:     · Continue Lantus 10 units q h s  Jeannetta Given · Hold Humalog 5 units t i d on discharge and use sliding scale insulin only at this time  Discharging Physician / Practitioner: Randal Mercedes PA-C  PCP: Karishma Galvan MD  Admission Date:   Admission Orders (From admission, onward)     Ordered        03/04/21 1502  Place in Observation  Once                   Discharge Date: 03/05/21    Disposition:      Short Term Rehab or SNF at [place] [id number] (see below)    For Discharges to [facility] SNF:   · [facility] at [address] Texted SNF Physician    Reason for Admission: Epistaxis    Discharge Diagnoses:     Please see assessment and plan section above for further details regarding discharge diagnoses  Resolved Problems  Date Reviewed: 3/5/2021          Resolved    * (Principal) Epistaxis 3/5/2021     Resolved by  Randal Mercedes PA-C          Consultations During Hospital Stay:  · None    Procedures Performed:   · Nasal packing     Medication Adjustments and Discharge Medications:  · Summary of Medication Adjustments made as a result of this hospitalization: stopping eliquis and changing to pradaxa given epistaxis every time on eliquis, stopping mealtime insulin and using sliding scale only  · Medication Dosing Tapers - Please refer to Discharge Medication List for details on any medication dosing tapers (if applicable to patient)  · Medications being temporarily held (include recommended restart time): None  · Discharge Medication List: See after visit summary for reconciled discharge medications       Wound Care Recommendations:  When applicable, please see wound care section of After Visit Summary  Diet Recommendations at Discharge:  Diet -        Diet Orders   (From admission, onward)             Start     Ordered    03/04/21 2056  Room Service  Once     Question:  Type of Service  Answer:  Room Service - Appropriate with Assistance    03/04/21 2055 03/04/21 1831  Diet Cardiovascular; Cardiac; Dysphagia 1-Pureed; Nectar Thick Liquid  Diet effective now     Question Answer Comment   Diet Type Cardiovascular    Cardiac Cardiac    Other Restriction(s): Dysphagia 1-Pureed    Liquid Modifier Nectar Thick Liquid    RD to adjust diet per protocol? Yes        03/04/21 1830                Instructions for any Catheters / Lines Present at Discharge (including removal date, if applicable): N/A    Significant Findings / Test Results:   · CXR: Mild pulmonary venous congestion  Incidental Findings:   · none     Test Results Pending at Discharge (will require follow up):   · MRSA culture     Outpatient Tests Requested:  · Hospice eval and tx    Complications:  none    Hospital Course:     Shanae Aguila is a 66 y o  female patient who originally presented to the hospital on 3/4/2021 due to epistaxis  PMH of coronary artery disease, diabetes, hypothyroidism, hypertension, GERD, critical aortic stenosis, atrial flutter, chronic hypoxic respiratory failure, history of pulmonary embolism, chronic kidney disease, subclavian artery stenosis, CHF who presents with epistaxis  Patient was noted to be residing in [facility] when she developed right-sided epistaxis  It started an hour prior to arrival in the emergency department  The patient was noted to have improvement in bleeding however was then found with finger and nose by nursing staff and bleeding was once again noted  Patient was noted to have a rapid rhino placed with improvement in bleeding  There is noted to be a blood clot in the patient's posterior oropharynx    She did have increase in oxygen requirements in the emergency department  Given concern for volume overload, the patient was given Lasix and prompted for admission     The patient was recently admitted at [address] [place] [address] from February 22nd through 3/1/2021 secondary to altered mental status  She was noted to have Klebsiella bacteremia  This was felt to be urinary in nature  She was treated with IV cefepime, transition to ceftriaxone and was noted to complete antibiotics during her hospital course  She was also septic on admission and appropriately treated with antibiotics as noted above  Her encephalopathy was noted to improve as well  This is felt to be related to intravascular depletion, acute on chronic kidney disease, metabolic derangements sepsis  During the patient's hospital stay goals of care discussions were had given critical aortic stenosis, recurrent admissions for congestive heart failure and being a poor surgical candidate  The patient was made DNR DNI level 3 however at that time were recommending transition to long-term care with consideration to transfer to hospice  Patient had been on Pradaxa arrival to the hospital stay however was transitioned to Eliquis secondary to worsening kidney function      Upon review of records, in 2016 and 2017 it was noted that she was seen by ENT secondary to frequent nose bleeds when on anticoagulation with Eliquis  She had been subsequently transitioned to Xarelto and then Pradaxa previously  Patient presented with epistaxis, rapid rhino was placed in the emergency department however the patient then pulled it out however she had no increasing bleeding and therefore did not require any further intervention  She was withheld from Eliquis  In discussion with the patient's family and the patient herself, given that she was screaming that she did not want to be in the hospital, they have opted that the patient return to [facility] with hospice    I discussed with patient's son over the phone and he is in agreement with this plan  We discussed the plan for hospice and what this entails  The patient reports that her son will make the final decisions for her but that she does not want to be in the hospital or ever come back to the hospital     The patient to risks and benefits for anticoagulation the setting of epistaxis were discussed with the patient's son over the phone however he would like all medications to be continued unless not necessary at this time  Given the patient had done well on Pradaxa and Xarelto before and has only had significant epistaxis on Eliquis and a creatinine clearance that would allow use of Pradaxa at this time, the patient will be discharged with Pradaxa 75 mg p o  B i d  In place of Eliquis  Condition at Discharge: serious     Discharge Day Visit / Exam:     Subjective:  Feels fine - doesn't want to be here  Wants "help" and "get me out of here"  Vitals: Blood Pressure: 128/60 (03/05/21 0700)  Pulse: 60 (03/05/21 0700)  Temperature: 97 5 °F (36 4 °C) (03/05/21 0700)  Temp Source: Axillary (03/05/21 0700)  Respirations: 18 (03/05/21 0700)  Height: 5' 3" (160 cm) (03/04/21 1143)  Weight - Scale: 101 kg (222 lb 0 1 oz)(pt unable to stand) (03/05/21 0557)  SpO2: 97 % (03/05/21 0700)  Exam:   Physical Exam  Vitals signs and nursing note reviewed  Constitutional:       General: She is not in acute distress  Appearance: Normal appearance  She is ill-appearing  She is not diaphoretic  HENT:      Head: Normocephalic and atraumatic  Mouth/Throat:      Mouth: Mucous membranes are moist    Cardiovascular:      Rate and Rhythm: Normal rate  Heart sounds: Murmur present  Pulmonary:      Effort: Pulmonary effort is normal       Breath sounds: Normal breath sounds  No stridor  No wheezing, rhonchi or rales  Abdominal:      General: Bowel sounds are normal       Palpations: Abdomen is soft  There is no mass        Tenderness: There is no abdominal tenderness  There is no guarding  Musculoskeletal:      Right lower leg: No edema  Left lower leg: No edema  Skin:     General: Skin is warm and dry  Neurological:      Mental Status: She is oriented to person, place, and time  Comments: Awake, alert, oriented x3 however does appear intermittently confused  Speech soft  Discussion with Family: son    Goals of Care Discussions:  · Code Status at Discharge: Level 3 - DNAR and DNI  · Were there any Goals of Care Discussions during Hospitalization?: Yes  · Results of any General Goals of Care Discussions: Plan for  hospice at [facility] with [facility]   · POLST Completed: No   · If POLST Completed, Summary of POLST Agreement Provided Here: N/A   · OK to Rehospitalize if Needed? No unless cannot meet comfort cares at [facility]    Discharge instructions/Information to patient and family:   See after visit summary section titled Discharge Instructions for information provided to patient and family  Planned Readmission: no      Discharge Statement:  I spent 50 minutes discharging the patient  This time was spent on the day of discharge  I had direct contact with the patient on the day of discharge  Greater than 50% of the total time was spent examining patient, answering all patient questions, arranging and discussing plan of care with patient as well as directly providing post-discharge instructions  Additional time then spent on discharge activities      ** Please Note: This note has been constructed using a voice recognition system **

## 2021-03-05 NOTE — ASSESSMENT & PLAN NOTE
· Patient was severe/critical aortic stenosis  · Last valve area was 0 8   Patient evaluated by CT surgery at [facility] and unfortunately due to her multiple comorbidities is not felt to be a candidate for SAVR or TAVR  · Avoid hypotension  Goal to maintain blood pressure greater than 225 systolic

## 2021-03-05 NOTE — ASSESSMENT & PLAN NOTE
· Continue metoprolol, simvastatin  · Resume anticoagulation but changed to Pradaxa  · Patient is not on any antiplatelets

## 2021-03-06 LAB
ATRIAL RATE: 60 BPM
GLUCOSE SERPL-MCNC: 163 MG/DL (ref 65–140)
MRSA NOSE QL CULT: NORMAL
P AXIS: 0 DEGREES
QRS AXIS: 242 DEGREES
QRSD INTERVAL: 176 MS
QT INTERVAL: 480 MS
QTC INTERVAL: 491 MS
T WAVE AXIS: 59 DEGREES
VENTRICULAR RATE: 63 BPM

## 2021-03-06 PROCEDURE — 93010 ELECTROCARDIOGRAM REPORT: CPT | Performed by: INTERNAL MEDICINE

## 2021-03-07 ENCOUNTER — TELEPHONE (OUTPATIENT)
Dept: OTHER | Facility: OTHER | Age: 71
End: 2021-03-07

## 2021-03-08 ENCOUNTER — PATIENT OUTREACH (OUTPATIENT)
Dept: CASE MANAGEMENT | Facility: OTHER | Age: 71
End: 2021-03-08

## 2021-03-08 NOTE — PROGRESS NOTES
Received ADT notification of the patient's ED admission & discharge to & from [facility]  Patient discharged back to [facility] on home hospice  Patient does not meet complex care management criteria  OP CM removed from the care team & surveillance episode closed

## 2021-03-12 ENCOUNTER — TELEPHONE (OUTPATIENT)
Dept: OTHER | Facility: OTHER | Age: 71
End: 2021-03-12

## 2021-03-22 ENCOUNTER — TELEPHONE (OUTPATIENT)
Dept: FAMILY MEDICINE CLINIC | Facility: CLINIC | Age: 71
End: 2021-03-22

## 2021-04-11 DIAGNOSIS — E11.40 TYPE 2 DIABETES MELLITUS WITH DIABETIC NEUROPATHY, WITH LONG-TERM CURRENT USE OF INSULIN (HCC): ICD-10-CM

## 2021-04-11 DIAGNOSIS — Z79.4 TYPE 2 DIABETES MELLITUS WITH DIABETIC NEUROPATHY, WITH LONG-TERM CURRENT USE OF INSULIN (HCC): ICD-10-CM

## 2021-04-11 RX ORDER — LEVOTHYROXINE SODIUM 137 UG/1
TABLET ORAL
Qty: 90 TABLET | Refills: 3 | OUTPATIENT
Start: 2021-04-11

## 2021-11-20 NOTE — PROGRESS NOTES
Spoke with son regarding surgery time  States his mother has an oxygen tank and needs to be present with it on arrival -- will not be staying for surgery, will be going back to work and will pick her up  No

## 2022-09-16 NOTE — PROGRESS NOTES
Daily Note     Today's date: 2020  Patient name: Marquita Madrid  : 1950  MRN: [id number]  Referring provider: Ana Bauer MD  Dx:   Encounter Diagnosis     ICD-10-CM    1  Frequent falls R29 6        Start Time:   Stop Time:   Total time in clinic (min): 45 minutes    Subjective: Patient reports no complaints prior to session  Precautions: fall    Objective: See treatment diary below-   2 lb cuff weight on LLE    STS: 20 reps 1 UE     Standing 3 way 2 sets 10 reps, 2 lb weight on L LE , 1 UE support  Flexion  Abd  Ext     Step taps: 20 reps FWD and Laterally- 6" step each direction  no UE support    Step ups: 20 reps FWD and Laterally- 6" step each direction  1 UE support    Heel Raises- 20 reps, 3 second holds    Fwd over 6" hurdles: 4 times down and back    Ascend/descend ramp counting steps  7-8 steps ascending  12 steps descending       Assessment: Patient tolerated treatment well  Weaning off UE support without LOB  Performed session with decreased rest breaks  She was able to perform descending ramp with 12 steps  Plan: Continue per POC  Descending ramp and descending stairs  fair balance